# Patient Record
Sex: FEMALE | Race: WHITE | NOT HISPANIC OR LATINO | Employment: OTHER | URBAN - METROPOLITAN AREA
[De-identification: names, ages, dates, MRNs, and addresses within clinical notes are randomized per-mention and may not be internally consistent; named-entity substitution may affect disease eponyms.]

---

## 2017-03-03 ENCOUNTER — PATIENT OUTREACH (OUTPATIENT)
Dept: HEALTH INFORMATION MANAGEMENT | Facility: OTHER | Age: 72
End: 2017-03-03

## 2017-03-17 NOTE — PROGRESS NOTES
3/17/17  -  Verify PCP: yes    Communication Preference Obtained: yes     Annual Wellness Visit Scheduling  1. Scheduling Status:Scheduled     Care Gap Scheduling (Attempt to Schedule EACH Overdue Care Gap!)     Health Maintenance Due   Topic Date Due   • Annual Wellness Visit  Scheduled   • PFT SCREENING-FEV1 AND FEV/FVC RATIO / SPIROMETRY SHOULD BE PERFORMED ANNUALLY  03/18/1963   • PAP SMEAR  Will discuss care gaps with PCP   • BONE DENSITY           [employer] Activation: already active  [employer]: yes  Virtual Visits: yes  Opt In to Text Messages: yes

## 2017-03-30 ENCOUNTER — HOSPITAL ENCOUNTER (OUTPATIENT)
Dept: LAB | Facility: MEDICAL CENTER | Age: 72
End: 2017-03-30
Attending: INTERNAL MEDICINE
Payer: MEDICARE

## 2017-03-30 DIAGNOSIS — E78.5 HYPERLIPIDEMIA WITH TARGET LDL LESS THAN 130: ICD-10-CM

## 2017-03-30 DIAGNOSIS — I10 ESSENTIAL HYPERTENSION: ICD-10-CM

## 2017-03-30 LAB
ALBUMIN SERPL BCP-MCNC: 4.1 G/DL (ref 3.2–4.9)
ALBUMIN/GLOB SERPL: 1.2 G/DL
ALP SERPL-CCNC: 70 U/L (ref 30–99)
ALT SERPL-CCNC: 19 U/L (ref 2–50)
ANION GAP SERPL CALC-SCNC: 5 MMOL/L (ref 0–11.9)
AST SERPL-CCNC: 25 U/L (ref 12–45)
BASOPHILS # BLD AUTO: 0.02 K/UL (ref 0–0.12)
BASOPHILS NFR BLD AUTO: 0.2 % (ref 0–1.8)
BILIRUB SERPL-MCNC: 0.6 MG/DL (ref 0.1–1.5)
BUN SERPL-MCNC: 17 MG/DL (ref 8–22)
CALCIUM SERPL-MCNC: 9.6 MG/DL (ref 8.5–10.5)
CHLORIDE SERPL-SCNC: 101 MMOL/L (ref 96–112)
CHOLEST SERPL-MCNC: 168 MG/DL (ref 100–199)
CO2 SERPL-SCNC: 29 MMOL/L (ref 20–33)
CREAT SERPL-MCNC: 0.82 MG/DL (ref 0.5–1.4)
EOSINOPHIL # BLD: 0.08 K/UL (ref 0–0.51)
EOSINOPHIL NFR BLD AUTO: 0.9 % (ref 0–6.9)
ERYTHROCYTE [DISTWIDTH] IN BLOOD BY AUTOMATED COUNT: 42.6 FL (ref 35.9–50)
GLOBULIN SER CALC-MCNC: 3.3 G/DL (ref 1.9–3.5)
GLUCOSE SERPL-MCNC: 104 MG/DL (ref 65–99)
HCT VFR BLD AUTO: 47.9 % (ref 37–47)
HDLC SERPL-MCNC: 53 MG/DL
HGB BLD-MCNC: 15.4 G/DL (ref 12–16)
IMM GRANULOCYTES # BLD AUTO: 0.02 K/UL (ref 0–0.11)
IMM GRANULOCYTES NFR BLD AUTO: 0.2 % (ref 0–0.9)
LDLC SERPL CALC-MCNC: 87 MG/DL
LYMPHOCYTES # BLD: 2.12 K/UL (ref 1–4.8)
LYMPHOCYTES NFR BLD AUTO: 22.8 % (ref 22–41)
MCH RBC QN AUTO: 29.7 PG (ref 27–33)
MCHC RBC AUTO-ENTMCNC: 32.2 G/DL (ref 33.6–35)
MCV RBC AUTO: 92.3 FL (ref 81.4–97.8)
MONOCYTES # BLD: 0.58 K/UL (ref 0–0.85)
MONOCYTES NFR BLD AUTO: 6.2 % (ref 0–13.4)
NEUTROPHILS # BLD: 6.49 K/UL (ref 2–7.15)
NEUTROPHILS NFR BLD AUTO: 69.7 % (ref 44–72)
NRBC # BLD AUTO: 0 K/UL
NRBC BLD-RTO: 0 /100 WBC
PLATELET # BLD AUTO: 173 K/UL (ref 164–446)
PMV BLD AUTO: 9.6 FL (ref 9–12.9)
POTASSIUM SERPL-SCNC: 4.3 MMOL/L (ref 3.6–5.5)
PROT SERPL-MCNC: 7.4 G/DL (ref 6–8.2)
RBC # BLD AUTO: 5.19 M/UL (ref 4.2–5.4)
SODIUM SERPL-SCNC: 135 MMOL/L (ref 135–145)
TRIGL SERPL-MCNC: 138 MG/DL (ref 0–149)
WBC # BLD AUTO: 9.3 K/UL (ref 4.8–10.8)

## 2017-03-30 PROCEDURE — 80053 COMPREHEN METABOLIC PANEL: CPT

## 2017-03-30 PROCEDURE — 36415 COLL VENOUS BLD VENIPUNCTURE: CPT

## 2017-03-30 PROCEDURE — 80061 LIPID PANEL: CPT

## 2017-03-30 PROCEDURE — 85025 COMPLETE CBC W/AUTO DIFF WBC: CPT

## 2017-04-04 ENCOUNTER — OFFICE VISIT (OUTPATIENT)
Dept: MEDICAL GROUP | Age: 72
End: 2017-04-04
Payer: MEDICARE

## 2017-04-04 VITALS
HEART RATE: 66 BPM | DIASTOLIC BLOOD PRESSURE: 92 MMHG | HEIGHT: 62 IN | SYSTOLIC BLOOD PRESSURE: 172 MMHG | BODY MASS INDEX: 39.01 KG/M2 | OXYGEN SATURATION: 94 % | WEIGHT: 212 LBS | TEMPERATURE: 98 F

## 2017-04-04 DIAGNOSIS — E55.9 VITAMIN D DEFICIENCY DISEASE: ICD-10-CM

## 2017-04-04 DIAGNOSIS — G89.4 CHRONIC PAIN SYNDROME: ICD-10-CM

## 2017-04-04 DIAGNOSIS — Z98.890 S/P COLONOSCOPY: ICD-10-CM

## 2017-04-04 DIAGNOSIS — E78.5 HYPERLIPIDEMIA WITH TARGET LDL LESS THAN 130: ICD-10-CM

## 2017-04-04 DIAGNOSIS — E66.9 OBESITY (BMI 30-39.9): ICD-10-CM

## 2017-04-04 DIAGNOSIS — F51.01 PRIMARY INSOMNIA: ICD-10-CM

## 2017-04-04 DIAGNOSIS — I10 ESSENTIAL HYPERTENSION: ICD-10-CM

## 2017-04-04 PROCEDURE — G8419 CALC BMI OUT NRM PARAM NOF/U: HCPCS | Performed by: INTERNAL MEDICINE

## 2017-04-04 PROCEDURE — 1036F TOBACCO NON-USER: CPT | Performed by: INTERNAL MEDICINE

## 2017-04-04 PROCEDURE — 3014F SCREEN MAMMO DOC REV: CPT | Performed by: INTERNAL MEDICINE

## 2017-04-04 PROCEDURE — 1101F PT FALLS ASSESS-DOCD LE1/YR: CPT | Performed by: INTERNAL MEDICINE

## 2017-04-04 PROCEDURE — 99214 OFFICE O/P EST MOD 30 MIN: CPT | Performed by: INTERNAL MEDICINE

## 2017-04-04 PROCEDURE — G8432 DEP SCR NOT DOC, RNG: HCPCS | Performed by: INTERNAL MEDICINE

## 2017-04-04 PROCEDURE — 4040F PNEUMOC VAC/ADMIN/RCVD: CPT | Performed by: INTERNAL MEDICINE

## 2017-04-04 RX ORDER — ZOLPIDEM TARTRATE 10 MG/1
TABLET ORAL
Refills: 0 | COMMUNITY
Start: 2017-03-17 | End: 2020-01-06 | Stop reason: SDUPTHER

## 2017-04-04 RX ORDER — LISINOPRIL 10 MG/1
10 TABLET ORAL DAILY
Qty: 90 TAB | Refills: 4 | Status: SHIPPED | OUTPATIENT
Start: 2017-04-04 | End: 2017-08-22

## 2017-04-04 RX ORDER — LISINOPRIL 10 MG/1
10 TABLET ORAL DAILY
Qty: 90 TAB | Refills: 4 | Status: SHIPPED | OUTPATIENT
Start: 2017-04-04 | End: 2017-04-04 | Stop reason: SDUPTHER

## 2017-04-04 RX ORDER — HYDROCODONE BITARTRATE AND ACETAMINOPHEN 10; 325 MG/1; MG/1
TABLET ORAL
Refills: 0 | COMMUNITY
Start: 2017-03-17 | End: 2020-08-14

## 2017-04-04 ASSESSMENT — ENCOUNTER SYMPTOMS
RESPIRATORY NEGATIVE: 1
CONSTITUTIONAL NEGATIVE: 1
EYES NEGATIVE: 1
PSYCHIATRIC NEGATIVE: 1
GASTROINTESTINAL NEGATIVE: 1
MUSCULOSKELETAL NEGATIVE: 1
CARDIOVASCULAR NEGATIVE: 1
NEUROLOGICAL NEGATIVE: 1

## 2017-04-04 ASSESSMENT — PATIENT HEALTH QUESTIONNAIRE - PHQ9: CLINICAL INTERPRETATION OF PHQ2 SCORE: 0

## 2017-04-04 NOTE — PROGRESS NOTES
Subjective:      Rufina Macias is a 72 y.o. female who presents with Results  The patient is here for followup of chronic medical problems listed below. The patient is compliant with medications and having no side effects from them. Denies chest pain, abdominal pain, dyspnea, myalgias, or cough.   Patient Active Problem List    Diagnosis Date Noted   • Obesity (BMI 30-39.9) 04/04/2017   • Chronic pain syndrome- dr medrano 04/04/2017   • S/P colonoscopy- neg 2006 at [facility] 03/23/2016   • Controlled substance agreement signed 03/22/2016   • Essential hypertension 08/31/2015   • DDD (degenerative disc disease), cervical 04/07/2015   • Thoracic radiculopathy 03/09/2015   • Lumbar radiculopathy 03/09/2015   • Vitamin D deficiency disease 10/11/2013   • DDD (degenerative disc disease), lumbar 10/11/2013   • Chronic allergic rhinitis 10/11/2013   • Primary insomnia 11/29/2012   • History of endometrial cancer-2009 JORDON-BSO; neg nodes 04/16/2012   • DJD (degenerative joint disease)- hands 04/16/2012   • COPD (chronic obstructive pulmonary disease) (CMS-[facility]) 11/07/2011   • Hyperlipidemia with target LDL less than 130 10/28/2010   • Carpal tunnel syndrome      Allergies   Allergen Reactions   • Codeine Swelling   • Ace Inhibitors      Cough            Outpatient Prescriptions Prior to Visit   Medication Sig Dispense Refill   • simvastatin (ZOCOR) 20 MG Tab Take 0.25 Tabs by mouth every evening. 90 Tab 4   • metoprolol (TOPROL-XL) 200 MG XL tablet Take 1 Tab by mouth every day. 90 Tab 4   • Cholecalciferol (VITAMIN D) 2000 UNITS Cap Take 1 Cap by mouth every day. 90 Cap 4   • fluticasone (FLONASE) 50 MCG/ACT nasal spray Spray 1 Spray in nose every day. Each Nostril 1 Bottle 11   • potassium chloride SA (K-DUR) 20 MEQ Tab CR Take 1 Tab by mouth 2 times a day. 180 Tab 4   • hydrochlorothiazide (HYDRODIURIL) 25 MG Tab Take 0.5 Tabs by mouth every day. 90 Tab 4   • albuterol (VENTOLIN OR PROVENTIL) 108 (90 BASE) MCG/ACT Aero Soln  "inhalation aerosol Inhale 2 Puffs by mouth every 6 hours as needed for Shortness of Breath. 8.5 g 3     No facility-administered medications prior to visit.               HPI    Review of Systems   Constitutional: Negative.    HENT: Negative.    Eyes: Negative.    Respiratory: Negative.    Cardiovascular: Negative.    Gastrointestinal: Negative.    Genitourinary: Negative.    Musculoskeletal: Negative.    Skin: Negative.    Neurological: Negative.    Endo/Heme/Allergies: Negative.    Psychiatric/Behavioral: Negative.           Objective:     /92 mmHg  Pulse 66  Temp(Src) 36.7 °C (98 °F)  Ht 1.581 m (5' 2.24\")  Wt 96.163 kg (212 lb)  BMI 38.47 kg/m2  SpO2 94%     Physical Exam   Constitutional: She is oriented to person, place, and time. She appears well-developed and well-nourished.   HENT:   Head: Normocephalic and atraumatic.   Right Ear: External ear normal.   Left Ear: External ear normal.   Nose: Nose normal.   Mouth/Throat: Oropharynx is clear and moist.   Eyes: Conjunctivae and EOM are normal. Pupils are equal, round, and reactive to light. Right eye exhibits no discharge. Left eye exhibits no discharge. No scleral icterus.   Neck: Normal range of motion. Neck supple. No JVD present. No tracheal deviation present. No thyromegaly present.   Cardiovascular: Normal rate, regular rhythm, normal heart sounds and intact distal pulses.  Exam reveals no gallop and no friction rub.    Pulmonary/Chest: Effort normal and breath sounds normal. No stridor. No respiratory distress. She has no wheezes. She has no rales. She exhibits no tenderness.   Abdominal: Soft. Bowel sounds are normal. She exhibits no distension and no mass. There is no tenderness. There is no rebound and no guarding. No hernia.   Musculoskeletal: Normal range of motion. She exhibits no edema or tenderness.   Lymphadenopathy:     She has no cervical adenopathy.   Neurological: She is alert and oriented to person, place, and time. She has " normal reflexes. She displays normal reflexes. No cranial nerve deficit. Coordination normal.   Skin: Skin is warm and dry. No rash noted. No erythema. No pallor.   Psychiatric: She has a normal mood and affect. Her behavior is normal. Judgment and thought content normal.   Nursing note and vitals reviewed.    Hospital Outpatient Visit on 03/30/2017   Component Date Value   • Cholesterol,Tot 03/30/2017 168    • Triglycerides 03/30/2017 138    • HDL 03/30/2017 53    • LDL 03/30/2017 87    • Sodium 03/30/2017 135    • Potassium 03/30/2017 4.3    • Chloride 03/30/2017 101    • Co2 03/30/2017 29    • Anion Gap 03/30/2017 5.0    • Glucose 03/30/2017 104*   • Bun 03/30/2017 17    • Creatinine 03/30/2017 0.82    • Calcium 03/30/2017 9.6    • AST(SGOT) 03/30/2017 25    • ALT(SGPT) 03/30/2017 19    • Alkaline Phosphatase 03/30/2017 70    • Total Bilirubin 03/30/2017 0.6    • Albumin 03/30/2017 4.1    • Total Protein 03/30/2017 7.4    • Globulin 03/30/2017 3.3    • A-G Ratio 03/30/2017 1.2    • WBC 03/30/2017 9.3    • RBC 03/30/2017 5.19    • Hemoglobin 03/30/2017 15.4    • Hematocrit 03/30/2017 47.9*   • MCV 03/30/2017 92.3    • MCH 03/30/2017 29.7    • MCHC 03/30/2017 32.2*   • RDW 03/30/2017 42.6    • Platelet Count 03/30/2017 173    • MPV 03/30/2017 9.6    • Neutrophils-Polys 03/30/2017 69.70    • Lymphocytes 03/30/2017 22.80    • Monocytes 03/30/2017 6.20    • Eosinophils 03/30/2017 0.90    • Basophils 03/30/2017 0.20    • Immature Granulocytes 03/30/2017 0.20    • Nucleated RBC 03/30/2017 0.00    • Neutrophils (Absolute) 03/30/2017 6.49    • Lymphs (Absolute) 03/30/2017 2.12    • Monos (Absolute) 03/30/2017 0.58    • Eos (Absolute) 03/30/2017 0.08    • Baso (Absolute) 03/30/2017 0.02    • Immature Granulocytes (a* 03/30/2017 0.02    • NRBC (Absolute) 03/30/2017 0.00    • GFR If  03/30/2017 >60    • GFR If Non  [employer]* 03/30/2017 >60       No results found for: HBA1C  Lab Results   Component Value  Date/Time    SODIUM 135 03/30/2017 10:58 AM    POTASSIUM 4.3 03/30/2017 10:58 AM    CHLORIDE 101 03/30/2017 10:58 AM    CO2 29 03/30/2017 10:58 AM    GLUCOSE 104* 03/30/2017 10:58 AM    BUN 17 03/30/2017 10:58 AM    CREATININE 0.82 03/30/2017 10:58 AM    BUN-CREATININE RATIO 20 12/02/2010 12:00 AM    GLOM FILT RATE, EST >59 12/02/2010 12:00 AM    ALKALINE PHOSPHATASE 70 03/30/2017 10:58 AM    AST(SGOT) 25 03/30/2017 10:58 AM    ALT(SGPT) 19 03/30/2017 10:58 AM    TOTAL BILIRUBIN 0.6 03/30/2017 10:58 AM     No results found for: INR  Lab Results   Component Value Date/Time    CHOLESTEROL, 03/30/2017 10:58 AM    LDL 87 03/30/2017 10:58 AM    HDL 53 03/30/2017 10:58 AM    TRIGLYCERIDES 138 03/30/2017 10:58 AM       No results found for: TESTOSTERONE  Lab Results   Component Value Date/Time    TSH 1.640 12/02/2010 12:00 AM     Lab Results   Component Value Date/Time    FREE T-4 0.90 10/24/2014 11:56 AM    FREE T-4 0.91 06/13/2013 12:30 PM     No results found for: URICACID  No components found for: VITB12  Lab Results   Component Value Date/Time    25-HYDROXY   VITAMIN D 25 39 09/20/2016 11:36 AM    25-HYDROXY   VITAMIN D 25 39 10/24/2014 11:56 AM                    Assessment/Plan:     1. Essential hypertensionUnder good control. Continue same regimen.   - lisinopril (PRINIVIL) 10 MG Tab; Take 1 Tab by mouth every day. Replaces hctz and potasium  Dispense: 90 Tab; Refill: 4    2. Hyperlipidemia with target LDL less than 130Under good control. Continue same regimen.   - COMP METABOLIC PANEL; Future  - LIPID PROFILE; Future  - CBC WITH DIFFERENTIAL; Future    3. Vitamin D deficiency diseaseUnder good control. Continue same regimen.       4. Obesity (BMI 30-39.9)   diet/exercise/lose 15 lbs.; patient counseled       5. Primary insomniaUnder good control. Continue same regimen.      - zolpidem (AMBIEN) 10 MG Tab; TAKE ONE TABLET BY MOUTH DAILY AT BEDTIME 30 DAY SUPPLY; Refill: 0    6. S/P colonoscopy- neg 2006 at  [facility]    Get and link last colonoscopy report  FIT neg 6 mos ago    7. Chronic pain syndrome- dr medrano  Under good control. Continue same regimen.           - hydrocodone/acetaminophen (NORCO)  MG Tab; TAKE ONE TABLET BY MOUTH FOUR TIMES A DAY AS NEEDED 30 DAY SUPPLY; Refill: 0      30 minute face-to-face encounter took place today.  More than half of this time was spent in the coordination of care of the above problems, as well as counseling.

## 2017-04-04 NOTE — Clinical Note
[facility]  Quincy Angel M.D.  [address]  [place] [address]  Fax: [phone]   Authorization for Release/Disclosure of   Protected Health Information   Name: LUANN MACIAS : 1945 SSN: [id number]   Address: [address] [place] [address] Phone:    [phone] (home)    I authorize the entity listed below to release/disclose the PHI below to:   [facility]/Quincy Angel M.D. and Quincy Angel M.D.   Provider or Entity Name: [facility]     Address   City, State, Zip   Phone:      Fax:     Reason for request: continuity of care   Information to be released:    [x  ] LAST COLONOSCOPY,  including any PATH REPORT and follow-up  [  ] LAST FIT/COLOGUARD RESULT [  ] LAST DEXA  [  ] LAST MAMMOGRAM  [  ] LAST PAP  [  ] LAST LABS [  ] RETINA EXAM REPORT  [  ] IMMUNIZATION RECORDS  [  ] Release all info      [  ] Check here and initial the line next to each item to release ALL health information INCLUDING  _____ Care and treatment for drug and / or alcohol abuse  _____ HIV testing, infection status, or AIDS  _____ Genetic Testing    DATES OF SERVICE OR TIME PERIOD TO BE DISCLOSED: _____________  I understand and acknowledge that:  * This Authorization may be revoked at any time by you in writing, except if your health information has already been used or disclosed.  * Your health information that will be used or disclosed as a result of you signing this authorization could be re-disclosed by the recipient. If this occurs, your re-disclosed health information may no longer be protected by State or Federal laws.  * You may refuse to sign this Authorization. Your refusal will not affect your ability to obtain treatment.  * This Authorization becomes effective upon signing and will  on (date) __________.      If no date is indicated, this Authorization will  one (1) year from the signature date.    Name: Luann Macias    Signature:   Date:     2017       PLEASE FAX REQUESTED RECORDS  BACK TO: [phone]

## 2017-04-04 NOTE — MR AVS SNAPSHOT
"        Rufina Macias   2017 2:20 PM   Office Visit   MRN: [id number]    Department:  [address] [place] [address]   Dept Phone:  [phone]    Description:  Female : 1945   Provider:  Quincy Angel M.D.           Reason for Visit     Results Lab review, Hypertension, Hyperlipidemia      Allergies as of 2017     Allergen Noted Reactions    Codeine 2010   Swelling    Ace Inhibitors 2012       Cough      You were diagnosed with     Essential hypertension   [4210457]       Hyperlipidemia with target LDL less than 130   [014138]       Vitamin D deficiency disease   [388850]       Obesity (BMI 30-39.9)   [436122]         Vital Signs     Blood Pressure Pulse Temperature Height Weight Body Mass Index    172/92 mmHg 66 36.7 °C (98 °F) 1.581 m (5' 2.24\") 96.163 kg (212 lb) 38.47 kg/m2    Oxygen Saturation Smoking Status                94% Former Smoker          Basic Information     Date Of Birth Sex Race Ethnicity Preferred Language    1945 Female White Non- English      Your appointments     May 02, 2017  2:00 PM   ANNUAL WELLNESS with Quincy Angel M.D., [facility]    [address] [address] [phone]            Oct 06, 2017  9:40 AM   Established Patient with Quincy Angel M.D.   [address] [id number] [phone]           You will be receiving a confirmation call a few days before your appointment from our automated call confirmation system.              Problem List              ICD-10-CM Priority Class Noted - Resolved    Carpal tunnel syndrome G56.00   Unknown - Present    Hyperlipidemia with target LDL less than 130 E78.5   10/28/2010 - Present    COPD (chronic obstructive pulmonary disease) (CMS-HCC) J44.9   2011 - Present    History of endometrial cancer- JORDON-BSO; neg nodes Z85.42   2012 - Present    DJD " (degenerative joint disease)- hands M19.90   4/16/2012 - Present    Insomnia G47.00   11/29/2012 - Present    Vitamin D deficiency disease E55.9   10/11/2013 - Present    DDD (degenerative disc disease), lumbar M51.36   10/11/2013 - Present    Chronic allergic rhinitis J30.9   10/11/2013 - Present    Thoracic radiculopathy M54.14   3/9/2015 - Present    Lumbar radiculopathy M54.16   3/9/2015 - Present    DDD (degenerative disc disease), cervical M50.30   4/7/2015 - Present    Essential hypertension I10   8/31/2015 - Present    Controlled substance agreement signed Z79.899   3/22/2016 - Present    S/P colonoscopy- neg 2006 at [facility] Z98.890   3/23/2016 - Present    Obesity (BMI 30-39.9) E66.9   4/4/2017 - Present      Health Maintenance        Date Due Completion Dates    PAP SMEAR 3/18/1966 ---    BONE DENSITY 3/18/2010 ---    MAMMOGRAM 10/24/2017 10/24/2016, 10/2/2015, 9/11/2014, 8/7/2013, 8/7/2013, 8/7/2013, 11/30/2006    COLONOSCOPY 4/16/2019 4/16/2009 (N/S)    Override on 4/16/2009: (N/S)    IMM DTaP/Tdap/Td Vaccine (2 - Td) 6/25/2025 6/25/2015            Current Immunizations     13-VALENT PCV PREVNAR 3/23/2016 12:30 PM    Influenza TIV (IM) 10/11/2013, 10/29/2012    Influenza Vaccine Adult HD 10/4/2016 10:44 AM    Pneumococcal polysaccharide vaccine (PPSV-23) 8/31/2012    SHINGLES VACCINE 10/15/2013    Tdap Vaccine 6/25/2015      Below and/or attached are the medications your provider expects you to take. Review all of your home medications and newly ordered medications with your provider and/or pharmacist. Follow medication instructions as directed by your provider and/or pharmacist. Please keep your medication list with you and share with your provider. Update the information when medications are discontinued, doses are changed, or new medications (including over-the-counter products) are added; and carry medication information at all times in the event of emergency situations     Allergies:  CODEINE - Swelling      ACE INHIBITORS - (reactions not documented)               Medications  Valid as of: April 04, 2017 -  3:13 PM    Generic Name Brand Name Tablet Size Instructions for use    Albuterol Sulfate (Aero Soln) albuterol 108 (90 BASE) MCG/ACT Inhale 2 Puffs by mouth every 6 hours as needed for Shortness of Breath.        Cholecalciferol (Cap) Vitamin D 2000 UNITS Take 1 Cap by mouth every day.        Fluticasone Propionate (Suspension) FLONASE 50 MCG/ACT Spray 1 Spray in nose every day. Each Nostril        Hydrocodone-Acetaminophen (Tab) NORCO  MG TAKE ONE TABLET BY MOUTH FOUR TIMES A DAY AS NEEDED 30 DAY SUPPLY        Lisinopril (Tab) PRINIVIL 10 MG Take 1 Tab by mouth every day. Replaces hctz and potasium        Metoprolol Succinate (TABLET SR 24 HR) TOPROL- MG Take 1 Tab by mouth every day.        Simvastatin (Tab) ZOCOR 20 MG Take 0.25 Tabs by mouth every evening.        Zolpidem Tartrate (Tab) AMBIEN 10 MG TAKE ONE TABLET BY MOUTH DAILY AT BEDTIME 30 DAY SUPPLY        .                 Medicines prescribed today were sent to:     [id number] - [place] - [address]    [address] [facility] [address]    Phone: [phone] Fax: [phone]    Open 24 Hours?: No    [facility] - [place] - [address]    [address] [facility] [address]    Phone: [phone] Fax: [phone]    Open 24 Hours?: No      Medication refill instructions:       If your prescription bottle indicates you have medication refills left, it is not necessary to call your provider’s office. Please contact your pharmacy and they will refill your medication.    If your prescription bottle indicates you do not have any refills left, you may request refills at any time through one of the following ways: The online [employer] system (except Urgent Care), by calling your provider’s office, or by asking your pharmacy to contact your provider’s office with a refill request. Medication  refills are processed only during regular business hours and may not be available until the next business day. Your provider may request additional information or to have a follow-up visit with you prior to refilling your medication.   *Please Note: Medication refills are assigned a new Rx number when refilled electronically. Your pharmacy may indicate that no refills were authorized even though a new prescription for the same medication is available at the pharmacy. Please request the medicine by name with the pharmacy before contacting your provider for a refill.        Your To Do List     Future Labs/Procedures Complete By Expires    CBC WITH DIFFERENTIAL  As directed 4/4/2018    COMP METABOLIC PANEL  As directed 4/4/2018    LIPID PROFILE  As directed 4/4/2018         MyChart Access Code: Activation code not generated  Current [employer] Status: Active

## 2017-04-25 ENCOUNTER — TELEPHONE (OUTPATIENT)
Dept: MEDICAL GROUP | Age: 72
End: 2017-04-25

## 2017-04-25 NOTE — TELEPHONE ENCOUNTER
ANNUAL WELLNESS VISIT PRE-VISIT PLANNING     1.  Reviewed note from last office visit with PCP: YES    2.  If any orders were placed at last visit, do we have Results/Consult Notes?        •  Labs - Labs ordered, completed and results are in chart.       •  Imaging - Imaging ordered, completed and results are in chart.       •  Referrals - No referrals were ordered at last office visit.    3.  Immunizations were updated in [facility] using WebIZ?: Yes       •  WebIZ Recommendations: HEPATITIS A , HEPATITIS B and TD       •  Is patient due for Tdap? NO       •  Is patient due for Shingles? NO     4.  Patient is due for the following Health Maintenance Topics:   Health Maintenance Due   Topic Date Due   • Annual Wellness Visit  1945   • PFT SCREENING-FEV1 AND FEV/FVC RATIO / SPIROMETRY SHOULD BE PERFORMED ANNUALLY  03/18/1963   • PAP SMEAR  03/18/1966   • BONE DENSITY  03/18/2010       - Patient has completed FLU, PNEUMOVAX (PPSV23), PREVNAR (PCV13) , TDAP and ZOSTAVAX (Shingles) Immunization(s) per WebIZ. Chart has been updated.    5.  Reviewed/Updated the following with patient:       •   Preferred Pharmacy? YES       •   Preferred Lab? YES       •   Medications? YES. Was Abstract Encounter opened and chart updated? YES       •   Social History? YES. Was Abstract Encounter opened and chart updated? YES       •   Family History? YES. Was Abstract Encounter opened and chart updated? YES    6.  Care Team Updated:       •   DME Company (gait device, O2, CPAP, etc.): N\A       •   Other Specialists (eye doctor, derm, GYN, cardiology, endo, etc): YES    7.  Patient has the following Care Path diagnoses on Problem List:  COPD    1.  Is patient under the care of a pulmonologist? No.  2.  Has patient ever completed a PFT or spirometry? No.  3.  Is patient on oxygen or CPAP? No.  4.  Has patient ever had instruction on inhaler technique by health care professional? No  5.  Is patient interested in a referral to respiratory  therapy for more information on COPD, inhaler technique, and/or information on establishing an action plan?  No, patient is NOT interested.      8.  Specialty Comments was updated with diagnosis information provided by SCP: N\A    9.  Patient was advised: “This is a free wellness visit. The provider will screen for medical conditions to help you stay healthy. If you have other concerns to address you may be asked to discuss these at a separate visit or there may be an additional fee.”     10.  Patient was informed to arrive 15 min prior to their scheduled appointment and bring in their medication bottles?  YES

## 2017-06-02 DIAGNOSIS — Z12.11 SCREEN FOR COLON CANCER: ICD-10-CM

## 2017-06-02 RX ORDER — SIMVASTATIN 20 MG
TABLET ORAL
Qty: 90 TAB | Refills: 4 | Status: SHIPPED | OUTPATIENT
Start: 2017-06-02 | End: 2018-09-17 | Stop reason: SDUPTHER

## 2017-06-02 NOTE — PROGRESS NOTES
Phone Number Called: [phone] (home)     Message: Patient stated she cannot have a colonoscopy done at this time due to her taking care of family members. But is aware that order is in place for when she is able to get it done.    Left Message for patient to call back: no

## 2017-06-14 ENCOUNTER — TELEPHONE (OUTPATIENT)
Dept: MEDICAL GROUP | Age: 72
End: 2017-06-14

## 2017-06-14 NOTE — TELEPHONE ENCOUNTER
ANNUAL WELLNESS VISIT PRE-VISIT PLANNING     1.  Reviewed note from last office visit with PCP: YES    2.  If any orders were placed at last visit, do we have Results/Consult Notes?         •  Labs - Labs ordered, completed and results are in chart.       •  Imaging - Imaging ordered, completed and results are in chart.       •  Referrals - No referrals were ordered at last office visit.    3.  Immunizations were updated in [facility] using WebIZ?: Yes       •  WebIZ Recommendations: HEPATITIS A , HEPATITIS B and TD       •  Is patient due for Tdap? NO       •  Is patient due for Shingles? NO     4.  Patient is due for the following Health Maintenance Topics:    Health Maintenance Due    Topic  Date Due    •  Annual Wellness Visit   1945    •  PFT SCREENING-FEV1 AND FEV/FVC RATIO / SPIROMETRY SHOULD BE PERFORMED ANNUALLY   03/18/1963    •  PAP SMEAR   03/18/1966    •  BONE DENSITY   03/18/2010        - Patient has completed FLU, PNEUMOVAX (PPSV23), PREVNAR (PCV13) , TDAP and ZOSTAVAX (Shingles) Immunization(s) per WebIZ. Chart has been updated.    5.  Reviewed/Updated the following with patient:       •   Preferred Pharmacy? YES       •   Preferred Lab? YES       •   Medications? YES. Was Abstract Encounter opened and chart updated? YES       •   Social History? YES. Was Abstract Encounter opened and chart updated? YES       •   Family History? YES. Was Abstract Encounter opened and chart updated? YES    6.  Care Team Updated:       •   DME Company (gait device, O2, CPAP, etc.): N\A       •   Other Specialists (eye doctor, derm, GYN, cardiology, endo, etc): YES    7.  Patient has the following Care Path diagnoses on Problem List:  COPD    1.  Is patient under the care of a pulmonologist? No.  2.  Has patient ever completed a PFT or spirometry? No.  3.  Is patient on oxygen or CPAP? No.  4.  Has patient ever had instruction on inhaler technique by health care professional? No  5.  Is patient interested in a referral  to respiratory therapy for more information on COPD, inhaler technique, and/or information on establishing an action plan?  No, patient is NOT interested.      8.  Specialty Comments was updated with diagnosis information provided by SCP: N\A    9.  Patient was advised: “This is a free wellness visit. The provider will screen for medical conditions to help you stay healthy. If you have other concerns to address you may be asked to discuss these at a separate visit or there may be an additional fee.”     10.  Patient was informed to arrive 15 min prior to their scheduled appointment and bring in their medication bottles?  YES

## 2017-06-21 ENCOUNTER — OFFICE VISIT (OUTPATIENT)
Dept: MEDICAL GROUP | Age: 72
End: 2017-06-21
Payer: MEDICARE

## 2017-06-21 VITALS
BODY MASS INDEX: 40.22 KG/M2 | WEIGHT: 213 LBS | SYSTOLIC BLOOD PRESSURE: 160 MMHG | OXYGEN SATURATION: 91 % | TEMPERATURE: 98.8 F | HEART RATE: 82 BPM | HEIGHT: 61 IN | DIASTOLIC BLOOD PRESSURE: 92 MMHG

## 2017-06-21 DIAGNOSIS — E55.9 VITAMIN D DEFICIENCY DISEASE: ICD-10-CM

## 2017-06-21 DIAGNOSIS — M51.36 DDD (DEGENERATIVE DISC DISEASE), LUMBAR: ICD-10-CM

## 2017-06-21 DIAGNOSIS — G89.4 CHRONIC PAIN SYNDROME: ICD-10-CM

## 2017-06-21 DIAGNOSIS — Z85.42 HISTORY OF ENDOMETRIAL CANCER: ICD-10-CM

## 2017-06-21 DIAGNOSIS — G56.01 CARPAL TUNNEL SYNDROME OF RIGHT WRIST: ICD-10-CM

## 2017-06-21 DIAGNOSIS — E66.9 OBESITY (BMI 30-39.9): ICD-10-CM

## 2017-06-21 DIAGNOSIS — M54.14 THORACIC RADICULOPATHY: ICD-10-CM

## 2017-06-21 DIAGNOSIS — I10 ESSENTIAL HYPERTENSION: ICD-10-CM

## 2017-06-21 DIAGNOSIS — J30.9 CHRONIC ALLERGIC RHINITIS: ICD-10-CM

## 2017-06-21 DIAGNOSIS — J43.2 CENTRILOBULAR EMPHYSEMA (HCC): ICD-10-CM

## 2017-06-21 DIAGNOSIS — M15.9 PRIMARY OSTEOARTHRITIS INVOLVING MULTIPLE JOINTS: ICD-10-CM

## 2017-06-21 DIAGNOSIS — E78.5 HYPERLIPIDEMIA WITH TARGET LDL LESS THAN 130: ICD-10-CM

## 2017-06-21 DIAGNOSIS — Z12.11 COLON CANCER SCREENING: ICD-10-CM

## 2017-06-21 DIAGNOSIS — F51.01 PRIMARY INSOMNIA: ICD-10-CM

## 2017-06-21 DIAGNOSIS — R06.09 DOE (DYSPNEA ON EXERTION): ICD-10-CM

## 2017-06-21 DIAGNOSIS — M54.16 LUMBAR RADICULOPATHY: ICD-10-CM

## 2017-06-21 DIAGNOSIS — M50.30 DDD (DEGENERATIVE DISC DISEASE), CERVICAL: ICD-10-CM

## 2017-06-21 PROCEDURE — G0439 PPPS, SUBSEQ VISIT: HCPCS | Performed by: INTERNAL MEDICINE

## 2017-06-21 RX ORDER — LOSARTAN POTASSIUM 50 MG/1
50 TABLET ORAL DAILY
Qty: 90 TAB | Refills: 4 | Status: SHIPPED | OUTPATIENT
Start: 2017-06-21 | End: 2017-06-21 | Stop reason: SDUPTHER

## 2017-06-21 RX ORDER — LOSARTAN POTASSIUM 50 MG/1
50 TABLET ORAL DAILY
Qty: 90 TAB | Refills: 4 | Status: SHIPPED | OUTPATIENT
Start: 2017-06-21 | End: 2017-08-22

## 2017-06-21 ASSESSMENT — PATIENT HEALTH QUESTIONNAIRE - PHQ9: CLINICAL INTERPRETATION OF PHQ2 SCORE: 0

## 2017-06-21 NOTE — MR AVS SNAPSHOT
"        Rufina Macias   2017 11:00 AM   Office Visit   MRN: [id number]    Department:  [address] [place] [address]   Dept Phone:  [phone]    Description:  Female : 1945   Provider:  Quincy Angel M.D.; [facility]            Reason for Visit     Annual Exam AWV/SCP      Allergies as of 2017     Allergen Noted Reactions    Codeine 2010   Swelling    Ace Inhibitors 2012       Cough      You were diagnosed with     Essential hypertension   [0023244]       Carpal tunnel syndrome of right wrist   [655590]       PALMER (dyspnea on exertion)   [445296]       Hyperlipidemia with target LDL less than 130   [458565]       Centrilobular emphysema (CMS-HCC)   [210438]       History of endometrial cancer   [163375]       Primary osteoarthritis involving multiple joints   [1103300]       Primary insomnia   [640303]       Vitamin D deficiency disease   [123734]       DDD (degenerative disc disease), lumbar   [289141]       Chronic allergic rhinitis   [785449]       Obesity (BMI 30-39.9)   [137610]       Chronic pain syndrome   [338.4.ICD-9-CM]       Thoracic radiculopathy   [588088]       Lumbar radiculopathy   [971273]       DDD (degenerative disc disease), cervical   [604191]       Colon cancer screening   [465796]         Vital Signs     Blood Pressure Pulse Temperature Height Weight Body Mass Index    160/92 mmHg 82 37.1 °C (98.8 °F) 1.562 m (5' 1.5\") 96.616 kg (213 lb) 39.60 kg/m2    Oxygen Saturation Smoking Status                91% Former Smoker          Basic Information     Date Of Birth Sex Race Ethnicity Preferred Language    1945 Female White Non- English      Your appointments     Oct 06, 2017  9:40 AM   Established Patient with Quincy Angel M.D.   [address] [address] [phone]           You will be receiving a confirmation call a few days before your appointment from our automated call " confirmation system.              Problem List              ICD-10-CM Priority Class Noted - Resolved    Carpal tunnel syndrome of right wrist G56.01   Unknown - Present    Hyperlipidemia with target LDL less than 130 E78.5   10/28/2010 - Present    COPD (chronic obstructive pulmonary disease) (CMS-HCC) J44.9   11/7/2011 - Present    History of endometrial cancer-2009 JORDON-BSO; neg nodes Z85.42   4/16/2012 - Present    DJD (degenerative joint disease)- hands M19.90   4/16/2012 - Present    Primary insomnia F51.01   11/29/2012 - Present    Vitamin D deficiency disease E55.9   10/11/2013 - Present    DDD (degenerative disc disease), lumbar M51.36   10/11/2013 - Present    Chronic allergic rhinitis J30.9   10/11/2013 - Present    Thoracic radiculopathy M54.14   3/9/2015 - Present    Lumbar radiculopathy M54.16   3/9/2015 - Present    DDD (degenerative disc disease), cervical M50.30   4/7/2015 - Present    Essential hypertension I10   8/31/2015 - Present    Controlled substance agreement signed Z79.899   3/22/2016 - Present    Obesity (BMI 30-39.9) E66.9   4/4/2017 - Present    Chronic pain syndrome- dr medrano; off opiates  G89.4   4/4/2017 - Present    PALMER (dyspnea on exertion) R06.09   6/21/2017 - Present    Colon cancer screening- tbd aug 2017 Z12.11   6/21/2017 - Present      Health Maintenance        Date Due Completion Dates    BONE DENSITY 3/18/2010 ---    COLONOSCOPY 3/5/2015 3/5/2007    MAMMOGRAM 10/24/2018 10/24/2016, 10/2/2015, 9/11/2014, 8/7/2013, 11/30/2006    IMM DTaP/Tdap/Td Vaccine (2 - Td) 6/25/2025 6/25/2015            Current Immunizations     13-VALENT PCV PREVNAR 3/23/2016 12:30 PM    Influenza TIV (IM) 10/11/2013, 10/29/2012    Influenza Vaccine Adult HD 10/4/2016 10:44 AM    Pneumococcal polysaccharide vaccine (PPSV-23) 8/31/2012    SHINGLES VACCINE 10/15/2013    Tdap Vaccine 6/25/2015      Below and/or attached are the medications your provider expects you to take. Review all of your home  medications and newly ordered medications with your provider and/or pharmacist. Follow medication instructions as directed by your provider and/or pharmacist. Please keep your medication list with you and share with your provider. Update the information when medications are discontinued, doses are changed, or new medications (including over-the-counter products) are added; and carry medication information at all times in the event of emergency situations     Allergies:  CODEINE - Swelling     ACE INHIBITORS - (reactions not documented)               Medications  Valid as of: June 21, 2017 -  2:53 PM    Generic Name Brand Name Tablet Size Instructions for use    Albuterol Sulfate (Aero Soln) albuterol 108 (90 BASE) MCG/ACT Inhale 2 Puffs by mouth every 6 hours as needed for Shortness of Breath.        Cholecalciferol (Cap) Vitamin D 2000 UNITS Take 1 Cap by mouth every day.        Fluticasone Propionate (Suspension) FLONASE 50 MCG/ACT Spray 1 Spray in nose every day. Each Nostril        Hydrocodone-Acetaminophen (Tab) NORCO  MG TAKE ONE TABLET BY MOUTH FOUR TIMES A DAY AS NEEDED 30 DAY SUPPLY        Lisinopril (Tab) PRINIVIL 10 MG Take 1 Tab by mouth every day. Replaces hctz and potasium        Losartan Potassium (Tab) COZAAR 50 MG Take 1 Tab by mouth every day. Replaces lisinopril;  for blood pressure        Metoprolol Succinate (TABLET SR 24 HR) TOPROL- MG Take 1 Tab by mouth every day.        Simvastatin (Tab) ZOCOR 20 MG Take 0.25 Tabs by mouth every evening.        Simvastatin (Tab) ZOCOR 20 MG TAKE 0.5 TABS BY MOUTH EVERY EVENING.        Zolpidem Tartrate (Tab) AMBIEN 10 MG TAKE ONE TABLET BY MOUTH DAILY AT BEDTIME 30 DAY SUPPLY        .                 Medicines prescribed today were sent to:     [facility] #[id number] - [place] - [address]    [address] [place] [address]    Phone: [phone] Fax: [phone]    Open 24 Hours?: No    [facility] - [place] - [address]  [facility] [address]    [address] [place] [address]    Phone: [phone] Fax: [phone]    Open 24 Hours?: No      Medication refill instructions:       If your prescription bottle indicates you have medication refills left, it is not necessary to call your provider’s office. Please contact your pharmacy and they will refill your medication.    If your prescription bottle indicates you do not have any refills left, you may request refills at any time through one of the following ways: The online SwapDrive system (except Urgent Care), by calling your provider’s office, or by asking your pharmacy to contact your provider’s office with a refill request. Medication refills are processed only during regular business hours and may not be available until the next business day. Your provider may request additional information or to have a follow-up visit with you prior to refilling your medication.   *Please Note: Medication refills are assigned a new Rx number when refilled electronically. Your pharmacy may indicate that no refills were authorized even though a new prescription for the same medication is available at the pharmacy. Please request the medicine by name with the pharmacy before contacting your provider for a refill.        Your To Do List     Future Labs/Procedures Complete By Expires    ECHOCARDIOGRAM COMP W/O CONT  As directed 12/20/2017    NM-CARDIAC STRESS TEST  As directed 6/21/2018      Referral     A referral request has been sent to our patient care coordination department. Please allow 3-5 business days for us to process this request and contact you either by phone or mail. If you do not hear from us by the 5th business day, please call us at [phone].           SwapDrive Access Code: Activation code not generated  Current SwapDrive Status: Active

## 2017-06-21 NOTE — PROGRESS NOTES
Chief Complaint   Patient presents with   • Annual Exam     AWV/SCP         HPI:  Rufina Macias is a 72 y.o. female here for Medicare Annual Wellness Visit         Patient Active Problem List    Diagnosis Date Noted   • Obesity (BMI 30-39.9) 04/04/2017   • Chronic pain syndrome- dr medrano 04/04/2017   • Controlled substance agreement signed 03/22/2016   • Essential hypertension 08/31/2015   • DDD (degenerative disc disease), cervical 04/07/2015   • Thoracic radiculopathy 03/09/2015   • Lumbar radiculopathy 03/09/2015   • Vitamin D deficiency disease 10/11/2013   • DDD (degenerative disc disease), lumbar 10/11/2013   • Chronic allergic rhinitis 10/11/2013   • Primary insomnia 11/29/2012   • History of endometrial cancer-2009 JORDON-BSO; neg nodes 04/16/2012   • DJD (degenerative joint disease)- hands 04/16/2012   • COPD (chronic obstructive pulmonary disease) (CMS-[facility]) 11/07/2011   • Hyperlipidemia with target LDL less than 130 10/28/2010   • Carpal tunnel syndrome        Current Outpatient Prescriptions   Medication Sig Dispense Refill   • hydrocodone/acetaminophen (NORCO)  MG Tab TAKE ONE TABLET BY MOUTH FOUR TIMES A DAY AS NEEDED 30 DAY SUPPLY  0   • zolpidem (AMBIEN) 10 MG Tab TAKE ONE TABLET BY MOUTH DAILY AT BEDTIME 30 DAY SUPPLY  0   • lisinopril (PRINIVIL) 10 MG Tab Take 1 Tab by mouth every day. Replaces hctz and potasium 90 Tab 4   • simvastatin (ZOCOR) 20 MG Tab Take 0.25 Tabs by mouth every evening. 90 Tab 4   • metoprolol (TOPROL-XL) 200 MG XL tablet Take 1 Tab by mouth every day. 90 Tab 4   • Cholecalciferol (VITAMIN D) 2000 UNITS Cap Take 1 Cap by mouth every day. 90 Cap 4   • fluticasone (FLONASE) 50 MCG/ACT nasal spray Spray 1 Spray in nose every day. Each Nostril 1 Bottle 11   • albuterol (VENTOLIN OR PROVENTIL) 108 (90 BASE) MCG/ACT Aero Soln inhalation aerosol Inhale 2 Puffs by mouth every 6 hours as needed for Shortness of Breath. 8.5 g 3   • simvastatin (ZOCOR) 20 MG Tab TAKE 0.5 TABS BY  MOUTH EVERY EVENING. 90 Tab 4     No current facility-administered medications for this visit.        Patient is taking medications as noted in medication list.  Current supplements as per medication list.   Chronic narcotic pain medicines: yes    Allergies: Codeine and Ace inhibitors    Current social contact/activities: goes out with friends      Is patient current with immunizations?  Yes.     She  reports that she quit smoking about 26 years ago. She has never used smokeless tobacco. She reports that she drinks alcohol. She reports that she does not use illicit drugs.  Counseling given: Not Answered        DPA/Advanced Directive:  Patient has Living Will, but it is not on file. Instructed to bring in a copy to scan into their chart.    ROS:    Gait: Uses no assistive device    Ostomy: no    Other tubes: no    Amputations: no      Chronic oxygen use: no    Last eye exam: 1 to 1 1/2 years ago    Wears hearing aids: no    : Denies incontinence.         Screening:            Depression Screening    Little interest or pleasure in doing things?  0 - not at all  Feeling down, depressed, or hopeless?  0 - not at all  Patient Health Questionnaire Score: 0  If depressive symptoms identified deferred to follow up visit unless specifically addressed in assessment and plan.    Interpretation of PHQ-9 Total Score   Score Severity   1-4 Minimal Depression   5-9 Mild Depression   10-14 Moderate Depression   15-19 Moderately Severe Depression   20-27 Severe Depression    Screening for Cognitive Impairment    Three Minute Recall (banana, sunrise, fence)  3/3    Draw clock face with all 12 numbers set to the hand to show 10 minutes past 11 o'clock  1    If cognitive concerns identified deferred to follow up visit unless specifically addressed in assessment and plan.    Fall Risk Assessment    Has the patient had two or more falls in the last year or any fall with injury in the last year?  No  If Fall Risk identified deferred to  follow up visit unless specifically addressed in assessment and plan.    Safety Assessment    Throw rugs on floor.  Yes  Handrails on all stairs.  Yes  Good lighting in all hallways.  Yes  Difficulty hearing.  Yes  Patient counseled about all safety risks that were identified.    Functional Assessment ADLs    Are there any barriers preventing you from cooking for yourself or meeting nutritional needs?  No.    Are there any barriers preventing you from driving safely or obtaining transportation?  No.    Are there any barriers preventing you from using a telephone or calling for help?  No.    Are there any barriers preventing you from shopping?  No.    Are there any barriers preventing you from taking care of your own finances?  No.    Are there any barriers preventing you from managing your medications?  No.    Are currently engaging any exercise or physical activity?  Yes.  Walks dog,walks to work, yard work    Health Maintenance Summary                Annual Wellness Visit Overdue 1945     PFT SCREENING-FEV1 AND FEV/FVC RATIO / SPIROMETRY SHOULD BE PERFORMED ANNUALLY Overdue 3/18/1963     BONE DENSITY Overdue 3/18/2010     COLONOSCOPY Overdue 3/5/2015      Done 3/5/2007 REFERRAL TO GI FOR COLONOSCOPY    MAMMOGRAM Next Due 10/24/2018      Done 10/24/2016 MA-MAMMO SCREENING BILAT W/TOMOSYNTHESIS W/CAD     Patient has more history with this topic...    IMM DTaP/Tdap/Td Vaccine Next Due 6/25/2025      Done 6/25/2015 Imm Admin: Tdap Vaccine          Patient Care Team:  Quincy Angel M.D. as PCP - General (Internal Medicine)  Quinton Carrasco M.D. as Consulting Physician (Pain Management)  Lee Johnson M.D. as Consulting Physician (Gastroenterology)    Social History   Substance Use Topics   • Smoking status: Former Smoker     Quit date: 01/04/1991   • Smokeless tobacco: Never Used   • Alcohol Use: 0.0 - 2.0 oz/week     0-4 Standard drinks or equivalent per week      Comment: hardly ever     Family History  "  Problem Relation Age of Onset   • Heart Disease Father 45     MI   • Lung Disease Father    • Stroke Father 70   • Cancer Paternal Grandmother      breast     She  has a past medical history of Bronchitis; Diverticula of colon; Tremor, hereditary, benign; Allergy; CA - cancer of uterus; Arthritis; COPD; Hyperlipidemia; Hypertension; Carpal tunnel syndrome; and EMPHYSEMA. She also has no past medical history of Addisons disease (CMS-[facility]), Adrenal disorder, Cushings syndrome, Parathyroid disorder (CMS-HCC), Thyroid disease, Pituitary disease (CMS-HCC), Anxiety, Anemia, Blood transfusion, Depression, Diabetes, Arrhythmia, Heart murmur, Heart attack (CMS-[facility]), CHF (congestive heart failure) (CMS-HCC), Clotting disorder, ASTHMA, GERD (gastroesophageal reflux disease), Glaucoma, Goiter, HIV (human immunodeficiency virus infection), IBD (inflammatory bowel disease), Kidney disease, Meningitis, OSTEOPOROSIS, Headache, Migraine, Seizure (CMS-HCC), Stroke (CMS-HCC), Substance abuse, Tuberculosis, Ulcer (CMS-HCC), Urinary tract infection, site not specified, or CATARACT.   Past Surgical History   Procedure Laterality Date   • Hysterectomy, total abdominal  1/18/10     Uterine, Dr. Whelan and Dr. Cam +BSO   • Open reduction       ankle   • Abdominal hysterectomy total  Jan 2010     Dr. Cam   • Oophorectomy  Jan 2010     BSO   • Meri by laparoscopy  july, 2015     [facility]           Exam:     Blood pressure 160/92, pulse 82, temperature 37.1 °C (98.8 °F), height 1.562 m (5' 1.5\"), weight 96.616 kg (213 lb), SpO2 91 %. Body mass index is 39.6 kg/(m^2).    Hearing good.    Dentition upper and lower dentures  Alert, oriented in no acute distress.  Eye contact is good, speech goal directed, affect calm       Assessment and Plan. The following treatment and monitoring plan is recommended:                1. Essential hypertension     Under good control. Continue same regimen.    - losartan (COZAAR) 50 MG Tab; Take 1 Tab by mouth " every day. Replaces lisinopril;  for blood pressure  Dispense: 90 Tab; Refill: 4    2. Carpal tunnel syndrome of right wrist-still has recurrent symptoms from time to time.     - REFERRAL TO HAND SURGERY    3. PALMER (dyspnea on exertion)-new problem. Intermittently gets dyspnea with walking across the room or extended distances. Will refer to cardiology and the cardiac workup to rule out cardiac etiology. Also refer back to pulmonary for further management of her COPD which may be the ultimate cause of her symptoms.     - REFERRAL TO PULMONOLOGY  - REFERRAL TO CARDIOLOGY  - NM-CARDIAC STRESS TEST; Future  - ECHOCARDIOGRAM COMP W/O CONT; Future    4. Hyperlipidemia with target LDL less than 130     Under good control. Continue same regimen.  5. Centrilobular emphysema (CMS-HCC)-as above     - PULMONARY FUNCTION TESTS Test requested: Complete Pulmonary Function Test  - REFERRAL TO PULMONOLOGY    6. History of endometrial cancer-2009 JORDON-BSO; neg nodes Under good control. Continue same regimen. No recurrence.       7. Primary osteoarthritis involving multiple joints Under good control. Continue same regimen.       8. Primary insomnia Under good control. Continue same regimen.      9. Vitamin D deficiency disease Under good control. Continue same regimen.     10. DDD (degenerative disc disease), lumbar Under good control. Continue same regimen.       11. Chronic allergic rhinitis Under good control. Continue same regimen.      12. Obesity (BMI 30-39.9)   diet/exercise/lose 15 lbs.; patient counseled           13. Chronic pain syndrome- dr medrano-off of opiates now.    Under good control. Continue same regimen.    14. Thoracic radiculopathy Under good control. Continue same regimen.            15. Lumbar radiculopathy Under good control. Continue same regimen.        16. DDD (degenerative disc disease), cervical    Under good control. Continue same regimen.    17. Colon cancer screening- tbd aug 2017      Under good  control. Continue same regimen.    Services suggested: No services needed at this time  Health Care Screening recommendations as per orders if indicated.  Referrals offered: PT/OT/Nutrition counseling/Behavioral Health/Smoking cessation as per orders if indicated.    Discussion today about general wellness and lifestyle habits:    · Prevent falls and reduce trip hazards; Cautioned about securing or removing rugs.  · Have a working fire alarm and carbon monoxide detector;   · Engage in regular physical activity and social activities       Follow-up: No Follow-up on file.

## 2017-07-18 ENCOUNTER — HOSPITAL ENCOUNTER (OUTPATIENT)
Dept: CARDIOLOGY | Facility: MEDICAL CENTER | Age: 72
End: 2017-07-18
Attending: INTERNAL MEDICINE
Payer: MEDICARE

## 2017-07-18 DIAGNOSIS — R06.09 DOE (DYSPNEA ON EXERTION): ICD-10-CM

## 2017-07-18 LAB
LV EJECT FRACT MOD 2C 99903: 49.46
LV EJECT FRACT MOD 4C 99902: 72.91
LV EJECT FRACT MOD BP 99901: 60.86

## 2017-07-18 PROCEDURE — 93306 TTE W/DOPPLER COMPLETE: CPT

## 2017-07-18 PROCEDURE — 93306 TTE W/DOPPLER COMPLETE: CPT | Mod: 26 | Performed by: INTERNAL MEDICINE

## 2017-07-20 ENCOUNTER — OFFICE VISIT (OUTPATIENT)
Dept: CARDIOLOGY | Facility: MEDICAL CENTER | Age: 72
End: 2017-07-20
Payer: MEDICARE

## 2017-07-20 VITALS
BODY MASS INDEX: 37.39 KG/M2 | HEIGHT: 63 IN | OXYGEN SATURATION: 92 % | WEIGHT: 211 LBS | HEART RATE: 79 BPM | SYSTOLIC BLOOD PRESSURE: 174 MMHG | DIASTOLIC BLOOD PRESSURE: 96 MMHG

## 2017-07-20 DIAGNOSIS — I45.10 RBBB: ICD-10-CM

## 2017-07-20 DIAGNOSIS — R06.09 DOE (DYSPNEA ON EXERTION): ICD-10-CM

## 2017-07-20 DIAGNOSIS — E66.09 OBESITY DUE TO EXCESS CALORIES, UNSPECIFIED OBESITY SEVERITY: ICD-10-CM

## 2017-07-20 DIAGNOSIS — I10 ESSENTIAL HYPERTENSION: ICD-10-CM

## 2017-07-20 PROCEDURE — 99204 OFFICE O/P NEW MOD 45 MIN: CPT | Performed by: INTERNAL MEDICINE

## 2017-07-20 PROCEDURE — 93000 ELECTROCARDIOGRAM COMPLETE: CPT | Performed by: INTERNAL MEDICINE

## 2017-07-20 RX ORDER — TRIAMTERENE AND HYDROCHLOROTHIAZIDE 37.5; 25 MG/1; MG/1
1 TABLET ORAL DAILY
Qty: 30 TAB | Refills: 3 | Status: SHIPPED | OUTPATIENT
Start: 2017-07-20 | End: 2018-06-05 | Stop reason: SDUPTHER

## 2017-07-20 ASSESSMENT — ENCOUNTER SYMPTOMS
PALPITATIONS: 0
COUGH: 0
DIZZINESS: 0
EYE DISCHARGE: 0
BLURRED VISION: 0
BRUISES/BLEEDS EASILY: 0
HEADACHES: 0
NAUSEA: 0
NERVOUS/ANXIOUS: 0
PND: 0
DEPRESSION: 0
SHORTNESS OF BREATH: 1
CHILLS: 0
FEVER: 0
MYALGIAS: 0
HEARTBURN: 0

## 2017-07-20 NOTE — MR AVS SNAPSHOT
"        Rufina Macias   2017 1:00 PM   Office Visit   MRN: [id number]    Department:  [facility]   Dept Phone:  [phone]    Description:  Female : 1945   Provider:  Lawrence Flores M.D.           Reason for Visit     New Patient           Allergies as of 2017     Allergen Noted Reactions    Codeine 2010   Swelling    Ace Inhibitors 2012       Cough      You were diagnosed with     PALMER (dyspnea on exertion)   [587428]       RBBB   [081778]       Obesity due to excess calories, unspecified obesity severity   [8618083]       Essential hypertension   [0503563]         Vital Signs     Blood Pressure Pulse Height Weight Body Mass Index Oxygen Saturation    174/96 mmHg 79 1.588 m (5' 2.5\") 95.709 kg (211 lb) 37.95 kg/m2 92%    Smoking Status                   Former Smoker           Basic Information     Date Of Birth Sex Race Ethnicity Preferred Language    1945 Female White Non- English      Your appointments     Aug 07, 2017  9:00 AM   Nm 60 with [facility] NM CARDIAC PET   [facility] ([facility])    [address] [facility] [address] [phone]            Aug 22, 2017  1:45 PM   FOLLOW UP with Lawrence Flores M.D.   [facility]    [address]  [address]  [facility] [id number] [phone]            Oct 06, 2017  9:40 AM   Established Patient with Quincy Angel M.D.   [facility] [address] [place] [id number] [phone]           You will be receiving a confirmation call a few days before your appointment from our automated call confirmation system.              Problem List              ICD-10-CM Priority Class Noted - Resolved    Carpal tunnel syndrome of right wrist G56.01   Unknown - Present    Hyperlipidemia with target LDL less than 130 E78.5   10/28/2010 - Present    COPD (chronic obstructive " pulmonary disease) (CMS-HCC) J44.9   11/7/2011 - Present    History of endometrial cancer-2009 JORDON-BSO; neg nodes Z85.42   4/16/2012 - Present    DJD (degenerative joint disease)- hands M19.90   4/16/2012 - Present    Primary insomnia F51.01   11/29/2012 - Present    Vitamin D deficiency disease E55.9   10/11/2013 - Present    DDD (degenerative disc disease), lumbar M51.36   10/11/2013 - Present    Chronic allergic rhinitis J30.9   10/11/2013 - Present    Thoracic radiculopathy M54.14   3/9/2015 - Present    Lumbar radiculopathy M54.16   3/9/2015 - Present    DDD (degenerative disc disease), cervical M50.30   4/7/2015 - Present    Essential hypertension I10   8/31/2015 - Present    Controlled substance agreement signed Z79.899   3/22/2016 - Present    Obesity (BMI 30-39.9) E66.9   4/4/2017 - Present    Chronic pain syndrome- dr medrano; off opiates  G89.4   4/4/2017 - Present    PALMER (dyspnea on exertion) R06.09   6/21/2017 - Present    Colon cancer screening- tbd aug 2017 Z12.11   6/21/2017 - Present      Health Maintenance        Date Due Completion Dates    BONE DENSITY 3/18/2010 ---    COLONOSCOPY 3/5/2015 3/5/2007    IMM INFLUENZA (1) 9/1/2017 10/4/2016, 10/11/2013, 10/29/2012    MAMMOGRAM 10/24/2018 10/24/2016, 10/2/2015, 9/11/2014, 8/7/2013, 11/30/2006    IMM DTaP/Tdap/Td Vaccine (2 - Td) 6/25/2025 6/25/2015            Current Immunizations     13-VALENT PCV PREVNAR 3/23/2016 12:30 PM    Influenza TIV (IM) 10/11/2013, 10/29/2012    Influenza Vaccine Adult HD 10/4/2016 10:44 AM    Pneumococcal polysaccharide vaccine (PPSV-23) 8/31/2012    SHINGLES VACCINE 10/15/2013    Tdap Vaccine 6/25/2015      Below and/or attached are the medications your provider expects you to take. Review all of your home medications and newly ordered medications with your provider and/or pharmacist. Follow medication instructions as directed by your provider and/or pharmacist. Please keep your medication list with you and share with  your provider. Update the information when medications are discontinued, doses are changed, or new medications (including over-the-counter products) are added; and carry medication information at all times in the event of emergency situations     Allergies:  CODEINE - Swelling     ACE INHIBITORS - (reactions not documented)               Medications  Valid as of: July 20, 2017 -  2:04 PM    Generic Name Brand Name Tablet Size Instructions for use    Albuterol Sulfate (Aero Soln) albuterol 108 (90 BASE) MCG/ACT Inhale 2 Puffs by mouth every 6 hours as needed for Shortness of Breath.        Cholecalciferol (Cap) Vitamin D 2000 UNITS Take 1 Cap by mouth every day.        Fluticasone Propionate (Suspension) FLONASE 50 MCG/ACT Spray 1 Spray in nose every day. Each Nostril        Hydrocodone-Acetaminophen (Tab) NORCO  MG TAKE ONE TABLET BY MOUTH FOUR TIMES A DAY AS NEEDED 30 DAY SUPPLY        Lisinopril (Tab) PRINIVIL 10 MG Take 1 Tab by mouth every day. Replaces hctz and potasium        Losartan Potassium (Tab) COZAAR 50 MG Take 1 Tab by mouth every day. Replaces lisinopril;  for blood pressure        Metoprolol Succinate (TABLET SR 24 HR) TOPROL- MG Take 1 Tab by mouth every day.        Simvastatin (Tab) ZOCOR 20 MG Take 0.25 Tabs by mouth every evening.        Simvastatin (Tab) ZOCOR 20 MG TAKE 0.5 TABS BY MOUTH EVERY EVENING.        Triamterene-HCTZ (Tab) MAXZIDE-25/DYAZIDE 37.5-25 MG Take 1 Tab by mouth every day.        Zolpidem Tartrate (Tab) AMBIEN 10 MG TAKE ONE TABLET BY MOUTH DAILY AT BEDTIME 30 DAY SUPPLY        .                 Medicines prescribed today were sent to:     [id number] - [place] - [address]    [address] [place] [address]    Phone: [phone] Fax: [phone]    Open 24 Hours?: No    [facility] - [place] - [address] [address]    [address] [place] [address]    Phone: [phone] Fax: [phone]    Open 24 Hours?:  No      Medication refill instructions:       If your prescription bottle indicates you have medication refills left, it is not necessary to call your provider’s office. Please contact your pharmacy and they will refill your medication.    If your prescription bottle indicates you do not have any refills left, you may request refills at any time through one of the following ways: The online Verient system (except Urgent Care), by calling your provider’s office, or by asking your pharmacy to contact your provider’s office with a refill request. Medication refills are processed only during regular business hours and may not be available until the next business day. Your provider may request additional information or to have a follow-up visit with you prior to refilling your medication.   *Please Note: Medication refills are assigned a new Rx number when refilled electronically. Your pharmacy may indicate that no refills were authorized even though a new prescription for the same medication is available at the pharmacy. Please request the medicine by name with the pharmacy before contacting your provider for a refill.           Verient Access Code: Activation code not generated  Current Verient Status: Active

## 2017-07-20 NOTE — Clinical Note
[facility]   [address]   [address]  [place] [address]  Phone: [phone]  Fax: [phone]              Rufina Macias  1945    Encounter Date: 7/20/2017    Lawrence Flores M.D.          PROGRESS NOTE:  Subjective:   Rufina Macias is a 72 y.o. female who presents today in consultation at the request of Dr. Angel for effort dyspnea.  This patient works as a  and when she walks around the store at times she gets short of breath with walking.  At other times she feels fairly well.  She denies any chest discomfort at rest or with activity.   A recent echocardiogram was near normal.  Hypertension has been difficult to treat. Diuretic was discontinued in the past due to nocturnal leg cramps.   She lives with her daughter. Her daughter mentions that she use to snore but patient states she no longer snores. There is no history of daytime somnolence.  She has been told that her EKG is abnormal in the past.  Prior history of COPD and bilateral pneumonia in 2005. . No definitive history of asthma. No family history of emphysema    Office EKG today demonstrates right bundle branch block    Recent surgery for gallstone pancreatitis with cholecystectomy preceded by endoscopic drainage procedure    Past Medical History   Diagnosis Date   • Bronchitis    • Diverticula of colon    • Tremor, hereditary, benign    • Allergy    • CA - cancer of uterus    • Arthritis    • COPD    • Hyperlipidemia    • Hypertension    • Carpal tunnel syndrome    • EMPHYSEMA      Past Surgical History   Procedure Laterality Date   • Hysterectomy, total abdominal  1/18/10     Uterine, Dr. Whelan and Dr. Cam +BSO   • Open reduction       ankle   • Abdominal hysterectomy total  Jan 2010     Dr. Cam   • Oophorectomy  Jan 2010     BSO   • Meri by laparoscopy  july, 2015     [facility]     Family History   Problem Relation Age of Onset   • Heart Disease Father 45    MI   • Lung Disease Father    • Stroke Father 70   • Cancer Paternal Grandmother      breast     History   Smoking status   • Former Smoker   • Quit date: 01/04/1991   Smokeless tobacco   • Never Used     Allergies   Allergen Reactions   • Codeine Swelling   • Ace Inhibitors      Cough     Outpatient Encounter Prescriptions as of 7/20/2017   Medication Sig Dispense Refill   • triamterene-hctz (MAXZIDE-25/DYAZIDE) 37.5-25 MG Tab Take 1 Tab by mouth every day. 30 Tab 3   • losartan (COZAAR) 50 MG Tab Take 1 Tab by mouth every day. Replaces lisinopril;  for blood pressure 90 Tab 4   • hydrocodone/acetaminophen (NORCO)  MG Tab TAKE ONE TABLET BY MOUTH FOUR TIMES A DAY AS NEEDED 30 DAY SUPPLY  0   • zolpidem (AMBIEN) 10 MG Tab TAKE ONE TABLET BY MOUTH DAILY AT BEDTIME 30 DAY SUPPLY  0   • lisinopril (PRINIVIL) 10 MG Tab Take 1 Tab by mouth every day. Replaces hctz and potasium 90 Tab 4   • simvastatin (ZOCOR) 20 MG Tab Take 0.25 Tabs by mouth every evening. 90 Tab 4   • metoprolol (TOPROL-XL) 200 MG XL tablet Take 1 Tab by mouth every day. 90 Tab 4   • Cholecalciferol (VITAMIN D) 2000 UNITS Cap Take 1 Cap by mouth every day. 90 Cap 4   • simvastatin (ZOCOR) 20 MG Tab TAKE 0.5 TABS BY MOUTH EVERY EVENING. 90 Tab 4   • fluticasone (FLONASE) 50 MCG/ACT nasal spray Spray 1 Spray in nose every day. Each Nostril 1 Bottle 11   • albuterol (VENTOLIN OR PROVENTIL) 108 (90 BASE) MCG/ACT Aero Soln inhalation aerosol Inhale 2 Puffs by mouth every 6 hours as needed for Shortness of Breath. 8.5 g 3     No facility-administered encounter medications on file as of 7/20/2017.     Review of Systems   Constitutional: Negative for fever, chills and malaise/fatigue.   Eyes: Negative for blurred vision and discharge.   Respiratory: Positive for shortness of breath. Negative for cough.    Cardiovascular: Negative for chest pain, palpitations, leg swelling and PND.   Gastrointestinal: Negative for heartburn and nausea.   Genitourinary:  "Negative for dysuria and urgency.   Musculoskeletal: Negative for myalgias.   Skin: Negative for itching and rash.   Neurological: Negative for dizziness and headaches.   Endo/Heme/Allergies: Negative for environmental allergies. Does not bruise/bleed easily.   Psychiatric/Behavioral: Negative for depression. The patient is not nervous/anxious.         Objective:   /96 mmHg  Pulse 79  Ht 1.588 m (5' 2.5\")  Wt 95.709 kg (211 lb)  BMI 37.95 kg/m2  SpO2 92%    Physical Exam   Constitutional: She is oriented to person, place, and time. She appears well-developed and well-nourished.   Obese pleasant woman. Good historian   HENT:   Head: Normocephalic and atraumatic.   Eyes: Conjunctivae and EOM are normal. No scleral icterus.   Neck: Neck supple. No JVD present. No thyromegaly present.   Cardiovascular: Normal rate, regular rhythm and normal heart sounds.  Exam reveals no gallop and no friction rub.    No murmur heard.  Pulmonary/Chest: Effort normal and breath sounds normal. No respiratory distress. She has no wheezes. She has no rales. She exhibits no tenderness.   Abdominal: Soft. Bowel sounds are normal. She exhibits no distension and no mass. There is no tenderness.   Neurological: She is alert and oriented to person, place, and time. Coordination normal.   Skin: Skin is warm and dry. No rash noted. No pallor.   Psychiatric: She has a normal mood and affect. Her behavior is normal. Judgment and thought content normal.       Assessment:     1. PALMER (dyspnea on exertion)  EKG    PET SCAN MYOCARDIAL PERFUSION   2. RBBB  PET SCAN MYOCARDIAL PERFUSION   3. Obesity due to excess calories, unspecified obesity severity  PET SCAN MYOCARDIAL PERFUSION   4. Essential hypertension  triamterene-hctz (MAXZIDE-25/DYAZIDE) 37.5-25 MG Tab    PET SCAN MYOCARDIAL PERFUSION       Medical Decision Making:  Today's Assessment / Status / Plan:   Some of her effort dyspnea could certainly be related to obesity and " deconditioning.  There has been history of snoring in the past but not recently and she does not have daytime somnolence.  Her dose of metoprolol for hypertension could be contributing to dyspnea. She does mention that about one hour after she takes metoprolol she notes more shortness of breath.    I am concerned about the myocardial perfusion imaging test ordered in this obese lady with large breasts. We may get false abnormalities due to attenuation.    I am going to order a myocardial PET scan. This test is ordered due to the patient's size and large breasts, increased accuracy, increased sensitivity, and reduced radioisotope load.    I have suggested that she start generic Dyazide one a day  Reduce metoprolol to 100 mg per day  Losartan 50 mg per day as prescribed  In follow-up. In 3-4 weeks.  Thank you for this consultation        Quincy Angel M.D.  [address] [place] [address]   [address]  [place] [address]  VIA In Basket

## 2017-07-20 NOTE — PROGRESS NOTES
Subjective:   Rufina Macias is a 72 y.o. female who presents today in consultation at the request of Dr. Angel for effort dyspnea.  This patient works as a  and when she walks around the store at times she gets short of breath with walking.  At other times she feels fairly well.  She denies any chest discomfort at rest or with activity.   A recent echocardiogram was near normal.  Hypertension has been difficult to treat. Diuretic was discontinued in the past due to nocturnal leg cramps.   She lives with her daughter. Her daughter mentions that she use to snore but patient states she no longer snores. There is no history of daytime somnolence.  She has been told that her EKG is abnormal in the past.  Prior history of COPD and bilateral pneumonia in 2005. . No definitive history of asthma. No family history of emphysema    Office EKG today demonstrates right bundle branch block    Recent surgery for gallstone pancreatitis with cholecystectomy preceded by endoscopic drainage procedure    Past Medical History   Diagnosis Date   • Bronchitis    • Diverticula of colon    • Tremor, hereditary, benign    • Allergy    • CA - cancer of uterus    • Arthritis    • COPD    • Hyperlipidemia    • Hypertension    • Carpal tunnel syndrome    • EMPHYSEMA      Past Surgical History   Procedure Laterality Date   • Hysterectomy, total abdominal  1/18/10     Uterine, Dr. Whelan and Dr. Cam +BSO   • Open reduction       ankle   • Abdominal hysterectomy total  Jan 2010     Dr. Cam   • Oophorectomy  Jan 2010     BSO   • Meri by laparoscopy  july, 2015     [facility]     Family History   Problem Relation Age of Onset   • Heart Disease Father 45     MI   • Lung Disease Father    • Stroke Father 70   • Cancer Paternal Grandmother      breast     History   Smoking status   • Former Smoker   • Quit date: 01/04/1991   Smokeless tobacco   • Never Used     Allergies   Allergen Reactions   • Codeine Swelling   • Ace Inhibitors      Cough      Outpatient Encounter Prescriptions as of 7/20/2017   Medication Sig Dispense Refill   • triamterene-hctz (MAXZIDE-25/DYAZIDE) 37.5-25 MG Tab Take 1 Tab by mouth every day. 30 Tab 3   • losartan (COZAAR) 50 MG Tab Take 1 Tab by mouth every day. Replaces lisinopril;  for blood pressure 90 Tab 4   • hydrocodone/acetaminophen (NORCO)  MG Tab TAKE ONE TABLET BY MOUTH FOUR TIMES A DAY AS NEEDED 30 DAY SUPPLY  0   • zolpidem (AMBIEN) 10 MG Tab TAKE ONE TABLET BY MOUTH DAILY AT BEDTIME 30 DAY SUPPLY  0   • lisinopril (PRINIVIL) 10 MG Tab Take 1 Tab by mouth every day. Replaces hctz and potasium 90 Tab 4   • simvastatin (ZOCOR) 20 MG Tab Take 0.25 Tabs by mouth every evening. 90 Tab 4   • metoprolol (TOPROL-XL) 200 MG XL tablet Take 1 Tab by mouth every day. 90 Tab 4   • Cholecalciferol (VITAMIN D) 2000 UNITS Cap Take 1 Cap by mouth every day. 90 Cap 4   • simvastatin (ZOCOR) 20 MG Tab TAKE 0.5 TABS BY MOUTH EVERY EVENING. 90 Tab 4   • fluticasone (FLONASE) 50 MCG/ACT nasal spray Spray 1 Spray in nose every day. Each Nostril 1 Bottle 11   • albuterol (VENTOLIN OR PROVENTIL) 108 (90 BASE) MCG/ACT Aero Soln inhalation aerosol Inhale 2 Puffs by mouth every 6 hours as needed for Shortness of Breath. 8.5 g 3     No facility-administered encounter medications on file as of 7/20/2017.     Review of Systems   Constitutional: Negative for fever, chills and malaise/fatigue.   Eyes: Negative for blurred vision and discharge.   Respiratory: Positive for shortness of breath. Negative for cough.    Cardiovascular: Negative for chest pain, palpitations, leg swelling and PND.   Gastrointestinal: Negative for heartburn and nausea.   Genitourinary: Negative for dysuria and urgency.   Musculoskeletal: Negative for myalgias.   Skin: Negative for itching and rash.   Neurological: Negative for dizziness and headaches.   Endo/Heme/Allergies: Negative for environmental allergies. Does not bruise/bleed easily.   Psychiatric/Behavioral:  "Negative for depression. The patient is not nervous/anxious.         Objective:   /96 mmHg  Pulse 79  Ht 1.588 m (5' 2.5\")  Wt 95.709 kg (211 lb)  BMI 37.95 kg/m2  SpO2 92%    Physical Exam   Constitutional: She is oriented to person, place, and time. She appears well-developed and well-nourished.   Obese pleasant woman. Good historian   HENT:   Head: Normocephalic and atraumatic.   Eyes: Conjunctivae and EOM are normal. No scleral icterus.   Neck: Neck supple. No JVD present. No thyromegaly present.   Cardiovascular: Normal rate, regular rhythm and normal heart sounds.  Exam reveals no gallop and no friction rub.    No murmur heard.  Pulmonary/Chest: Effort normal and breath sounds normal. No respiratory distress. She has no wheezes. She has no rales. She exhibits no tenderness.   Abdominal: Soft. Bowel sounds are normal. She exhibits no distension and no mass. There is no tenderness.   Neurological: She is alert and oriented to person, place, and time. Coordination normal.   Skin: Skin is warm and dry. No rash noted. No pallor.   Psychiatric: She has a normal mood and affect. Her behavior is normal. Judgment and thought content normal.       Assessment:     1. PALMER (dyspnea on exertion)  EKG    PET SCAN MYOCARDIAL PERFUSION   2. RBBB  PET SCAN MYOCARDIAL PERFUSION   3. Obesity due to excess calories, unspecified obesity severity  PET SCAN MYOCARDIAL PERFUSION   4. Essential hypertension  triamterene-hctz (MAXZIDE-25/DYAZIDE) 37.5-25 MG Tab    PET SCAN MYOCARDIAL PERFUSION       Medical Decision Making:  Today's Assessment / Status / Plan:   Some of her effort dyspnea could certainly be related to obesity and deconditioning.  There has been history of snoring in the past but not recently and she does not have daytime somnolence.  Her dose of metoprolol for hypertension could be contributing to dyspnea. She does mention that about one hour after she takes metoprolol she notes more shortness of breath.    I " am concerned about the myocardial perfusion imaging test ordered in this obese lady with large breasts. We may get false abnormalities due to attenuation.    I am going to order a myocardial PET scan. This test is ordered due to the patient's size and large breasts, increased accuracy, increased sensitivity, and reduced radioisotope load.    I have suggested that she start generic Dyazide one a day  Reduce metoprolol to 100 mg per day  Losartan 50 mg per day as prescribed  In follow-up. In 3-4 weeks.  Thank you for this consultation

## 2017-07-24 ENCOUNTER — APPOINTMENT (OUTPATIENT)
Dept: RADIOLOGY | Facility: MEDICAL CENTER | Age: 72
End: 2017-07-24
Attending: INTERNAL MEDICINE
Payer: MEDICARE

## 2017-08-07 ENCOUNTER — HOSPITAL ENCOUNTER (OUTPATIENT)
Dept: RADIOLOGY | Facility: MEDICAL CENTER | Age: 72
End: 2017-08-07
Attending: INTERNAL MEDICINE
Payer: MEDICARE

## 2017-08-07 DIAGNOSIS — I10 ESSENTIAL HYPERTENSION, MALIGNANT: ICD-10-CM

## 2017-08-07 DIAGNOSIS — R06.09 OTHER DYSPNEA AND RESPIRATORY ABNORMALITY: ICD-10-CM

## 2017-08-07 DIAGNOSIS — E66.09 EXOGENOUS OBESITY: ICD-10-CM

## 2017-08-07 DIAGNOSIS — R09.89 OTHER DYSPNEA AND RESPIRATORY ABNORMALITY: ICD-10-CM

## 2017-08-07 DIAGNOSIS — I45.10 RIGHT BUNDLE BRANCH BLOCK: ICD-10-CM

## 2017-08-07 PROCEDURE — A9555 RB82 RUBIDIUM: HCPCS

## 2017-08-07 PROCEDURE — 700111 HCHG RX REV CODE 636 W/ 250 OVERRIDE (IP)

## 2017-08-22 ENCOUNTER — OFFICE VISIT (OUTPATIENT)
Dept: CARDIOLOGY | Facility: MEDICAL CENTER | Age: 72
End: 2017-08-22
Payer: MEDICARE

## 2017-08-22 VITALS
WEIGHT: 213 LBS | BODY MASS INDEX: 37.74 KG/M2 | HEIGHT: 63 IN | SYSTOLIC BLOOD PRESSURE: 232 MMHG | DIASTOLIC BLOOD PRESSURE: 110 MMHG | HEART RATE: 88 BPM | OXYGEN SATURATION: 94 %

## 2017-08-22 DIAGNOSIS — I10 ESSENTIAL HYPERTENSION: ICD-10-CM

## 2017-08-22 DIAGNOSIS — R06.09 DOE (DYSPNEA ON EXERTION): ICD-10-CM

## 2017-08-22 PROCEDURE — 99213 OFFICE O/P EST LOW 20 MIN: CPT | Performed by: INTERNAL MEDICINE

## 2017-08-22 RX ORDER — LOSARTAN POTASSIUM 100 MG/1
100 TABLET ORAL DAILY
Qty: 30 TAB | Refills: 11 | Status: SHIPPED | OUTPATIENT
Start: 2017-08-22 | End: 2018-06-05 | Stop reason: SDUPTHER

## 2017-08-22 RX ORDER — NEBIVOLOL 20 MG/1
20 TABLET ORAL DAILY
Qty: 30 TAB | Refills: 11 | Status: SHIPPED | OUTPATIENT
Start: 2017-08-22 | End: 2018-06-05

## 2017-08-22 RX ORDER — AMLODIPINE BESYLATE 2.5 MG/1
2.5 TABLET ORAL DAILY
Qty: 30 TAB | Refills: 11 | Status: SHIPPED | OUTPATIENT
Start: 2017-08-22 | End: 2017-11-30

## 2017-08-22 ASSESSMENT — ENCOUNTER SYMPTOMS
HEARTBURN: 0
PALPITATIONS: 0
SHORTNESS OF BREATH: 1
NERVOUS/ANXIOUS: 0
PND: 0
FEVER: 0
DEPRESSION: 0
MYALGIAS: 0
HEADACHES: 0
BLURRED VISION: 0
DIZZINESS: 0
CHILLS: 0
COUGH: 0
EYE DISCHARGE: 0
NAUSEA: 0
BRUISES/BLEEDS EASILY: 0

## 2017-08-22 NOTE — Clinical Note
[facility]   [address]   [address]  [place] [address]  Phone: [phone]  Fax: [phone]              Rufina Macias  1945    Encounter Date: 8/22/2017    Lawrence Flores M.D.          PROGRESS NOTE:  Subjective:   Rufina Macias is a 72 y.o. female who presents today in follow-up for exertional dyspnea and hypertension.  She has not done any home blood pressures because she gets panicky when she sees the numbers.  Blood pressure here very high.  She does walk her dog twice a day.   Myocardial PET scan was normal  No change in the sense of fatigue on the lower dose of metoprolol    She remembers taking amlodipine in the past for several years and then it was stopped due to edema      Past Medical History   Diagnosis Date   • Bronchitis    • Diverticula of colon    • Tremor, hereditary, benign    • Allergy    • CA - cancer of uterus    • Arthritis    • COPD    • Hyperlipidemia    • Hypertension    • Carpal tunnel syndrome    • EMPHYSEMA      Past Surgical History   Procedure Laterality Date   • Hysterectomy, total abdominal  1/18/10     Uterine, Dr. Whelan and Dr. Cam +BSO   • Open reduction       ankle   • Abdominal hysterectomy total  Jan 2010     Dr. Cam   • Oophorectomy  Jan 2010     BSO   • Meri by laparoscopy  july, 2015     [facility]     Family History   Problem Relation Age of Onset   • Heart Disease Father 45     MI   • Lung Disease Father    • Stroke Father 70   • Cancer Paternal Grandmother      breast     History   Smoking status   • Former Smoker   • Quit date: 01/04/1991   Smokeless tobacco   • Never Used     Allergies   Allergen Reactions   • Codeine Swelling   • Ace Inhibitors      Cough     Outpatient Encounter Prescriptions as of 8/22/2017   Medication Sig Dispense Refill   • losartan (COZAAR) 100 MG Tab Take 1 Tab by mouth every day. 30 Tab 11   • amlodipine (NORVASC) 2.5 MG Tab Take 1 Tab by mouth  every day. 30 Tab 11   • Nebivolol HCl (BYSTOLIC) 20 MG Tab Take 20 mg by mouth every day. 30 Tab 11   • triamterene-hctz (MAXZIDE-25/DYAZIDE) 37.5-25 MG Tab Take 1 Tab by mouth every day. 30 Tab 3   • hydrocodone/acetaminophen (NORCO)  MG Tab TAKE ONE TABLET BY MOUTH FOUR TIMES A DAY AS NEEDED 30 DAY SUPPLY  0   • zolpidem (AMBIEN) 10 MG Tab TAKE ONE TABLET BY MOUTH DAILY AT BEDTIME 30 DAY SUPPLY  0   • simvastatin (ZOCOR) 20 MG Tab Take 0.25 Tabs by mouth every evening. 90 Tab 4   • Cholecalciferol (VITAMIN D) 2000 UNITS Cap Take 1 Cap by mouth every day. 90 Cap 4   • fluticasone (FLONASE) 50 MCG/ACT nasal spray Spray 1 Spray in nose every day. Each Nostril 1 Bottle 11   • albuterol (VENTOLIN OR PROVENTIL) 108 (90 BASE) MCG/ACT Aero Soln inhalation aerosol Inhale 2 Puffs by mouth every 6 hours as needed for Shortness of Breath. 8.5 g 3   • [DISCONTINUED] losartan (COZAAR) 50 MG Tab Take 1 Tab by mouth every day. Replaces lisinopril;  for blood pressure 90 Tab 4   • simvastatin (ZOCOR) 20 MG Tab TAKE 0.5 TABS BY MOUTH EVERY EVENING. 90 Tab 4   • [DISCONTINUED] lisinopril (PRINIVIL) 10 MG Tab Take 1 Tab by mouth every day. Replaces hctz and potasium 90 Tab 4   • [DISCONTINUED] metoprolol (TOPROL-XL) 200 MG XL tablet Take 1 Tab by mouth every day. 90 Tab 4     No facility-administered encounter medications on file as of 8/22/2017.     Review of Systems   Constitutional: Negative for fever, chills and malaise/fatigue.   Eyes: Negative for blurred vision and discharge.   Respiratory: Positive for shortness of breath. Negative for cough.    Cardiovascular: Negative for chest pain, palpitations, leg swelling and PND.   Gastrointestinal: Negative for heartburn and nausea.   Genitourinary: Negative for dysuria and urgency.   Musculoskeletal: Negative for myalgias.   Skin: Negative for itching and rash.   Neurological: Negative for dizziness and headaches.   Endo/Heme/Allergies: Negative for environmental allergies.  "Does not bruise/bleed easily.   Psychiatric/Behavioral: Negative for depression. The patient is not nervous/anxious.         Objective:   /110 mmHg  Pulse 88  Ht 1.588 m (5' 2.52\")  Wt 96.616 kg (213 lb)  BMI 38.31 kg/m2  SpO2 94%    Physical Exam   Constitutional: She is oriented to person, place, and time. She appears well-developed and well-nourished.   Obese pleasant woman. Good historian   HENT:   Head: Normocephalic and atraumatic.   Eyes: Conjunctivae and EOM are normal. No scleral icterus.   Neck: Neck supple. No JVD present. No thyromegaly present.   Cardiovascular: Normal rate, regular rhythm and normal heart sounds.  Exam reveals no gallop and no friction rub.    No murmur heard.  Pulmonary/Chest: Effort normal and breath sounds normal. No respiratory distress. She has no wheezes. She has no rales. She exhibits no tenderness.   Abdominal: Soft. Bowel sounds are normal. She exhibits no distension and no mass. There is no tenderness.   Neurological: She is alert and oriented to person, place, and time. Coordination normal.   Skin: Skin is warm and dry. No rash noted. No pallor.   Psychiatric: She has a normal mood and affect. Her behavior is normal. Judgment and thought content normal.       Assessment:     1. Essential hypertension  losartan (COZAAR) 100 MG Tab    amlodipine (NORVASC) 2.5 MG Tab    Nebivolol HCl (BYSTOLIC) 20 MG Tab   2. PALMER (dyspnea on exertion)         Medical Decision Making:  Today's Assessment / Status / Plan:   With respect to hypertension, increase losartan to 100 mg per day  Continue Dyazide one a day  Add amlodipine 2.5 mg per day.  Discontinue metoprolol.  Prescribed nebivolol 20 mg per day  Home blood pressures and return in 4-6 weeks  Walking and weight loss discussed        Quincy Angel M.D.  [id number]  [place] [address]  VIA In Basket                   "

## 2017-08-22 NOTE — MR AVS SNAPSHOT
"        Rufina Macias   2017 1:45 PM   Office Visit   MRN: [id number]    Department:  [facility] [place] [place]   Dept Phone:  [phone]    Description:  Female : 1945   Provider:  Lawrence Flores M.D.           Allergies as of 2017     Allergen Noted Reactions    Codeine 2010   Swelling    Ace Inhibitors 2012       Cough      You were diagnosed with     Essential hypertension   [0267302]       PALMER (dyspnea on exertion)   [[id number]]         Vital Signs     Blood Pressure Pulse Height Weight Body Mass Index Oxygen Saturation    232/110 mmHg 88 1.588 m (5' 2.52\") 96.616 kg (213 lb) 38.31 kg/m2 94%    Smoking Status                   Former Smoker           Basic Information     Date Of Birth Sex Race Ethnicity Preferred Language    1945 Female White Non- English      Your appointments     Sep 28, 2017  1:00 PM   FOLLOW UP with Lawrence Flores M.D.   [facility] (--)    [address]  [address]  [facility] [address] [phone]            Oct 06, 2017  9:40 AM   Established Patient with Quincy Angel M.D.   [facility] [address] [place] [address] [phone]           You will be receiving a confirmation call a few days before your appointment from our automated call confirmation system.              Problem List              ICD-10-CM Priority Class Noted - Resolved    Carpal tunnel syndrome of right wrist G56.01   Unknown - Present    Hyperlipidemia with target LDL less than 130 E78.5   10/28/2010 - Present    COPD (chronic obstructive pulmonary disease) (CMS-HCC) J44.9   2011 - Present    History of endometrial cancer- JORDON-BSO; neg nodes Z85.42   2012 - Present    DJD (degenerative joint disease)- hands M19.90   2012 - Present    Primary insomnia F51.01   2012 - Present    Vitamin D deficiency disease E55.9   10/11/2013 - Present "    DDD (degenerative disc disease), lumbar M51.36   10/11/2013 - Present    Chronic allergic rhinitis J30.9   10/11/2013 - Present    Thoracic radiculopathy M54.14   3/9/2015 - Present    Lumbar radiculopathy M54.16   3/9/2015 - Present    DDD (degenerative disc disease), cervical M50.30   4/7/2015 - Present    Essential hypertension I10   8/31/2015 - Present    Controlled substance agreement signed Z79.899   3/22/2016 - Present    Obesity (BMI 30-39.9) E66.9   4/4/2017 - Present    Chronic pain syndrome- dr medrano; off opiates  G89.4   4/4/2017 - Present    PALMER (dyspnea on exertion) R06.09   6/21/2017 - Present    Colon cancer screening- tbd aug 2017 Z12.11   6/21/2017 - Present      Health Maintenance        Date Due Completion Dates    BONE DENSITY 3/18/2010 ---    COLONOSCOPY 3/5/2015 3/5/2007    IMM INFLUENZA (1) 9/1/2017 10/4/2016, 10/11/2013, 10/29/2012    MAMMOGRAM 10/24/2018 10/24/2016, 10/2/2015, 9/11/2014, 8/7/2013, 11/30/2006    IMM DTaP/Tdap/Td Vaccine (2 - Td) 6/25/2025 6/25/2015            Current Immunizations     13-VALENT PCV PREVNAR 3/23/2016 12:30 PM    Influenza TIV (IM) 10/11/2013, 10/29/2012    Influenza Vaccine Adult HD 10/4/2016 10:44 AM    Pneumococcal polysaccharide vaccine (PPSV-23) 8/31/2012    SHINGLES VACCINE 10/15/2013    Tdap Vaccine 6/25/2015      Below and/or attached are the medications your provider expects you to take. Review all of your home medications and newly ordered medications with your provider and/or pharmacist. Follow medication instructions as directed by your provider and/or pharmacist. Please keep your medication list with you and share with your provider. Update the information when medications are discontinued, doses are changed, or new medications (including over-the-counter products) are added; and carry medication information at all times in the event of emergency situations     Allergies:  CODEINE - Swelling     ACE INHIBITORS - (reactions not documented)                Medications  Valid as of: August 22, 2017 -  2:24 PM    Generic Name Brand Name Tablet Size Instructions for use    Albuterol Sulfate (Aero Soln) albuterol 108 (90 Base) MCG/ACT Inhale 2 Puffs by mouth every 6 hours as needed for Shortness of Breath.        AmLODIPine Besylate (Tab) NORVASC 2.5 MG Take 1 Tab by mouth every day.        Cholecalciferol (Cap) Vitamin D 2000 units Take 1 Cap by mouth every day.        Fluticasone Propionate (Suspension) FLONASE 50 MCG/ACT Spray 1 Spray in nose every day. Each Nostril        Hydrocodone-Acetaminophen (Tab) NORCO  MG TAKE ONE TABLET BY MOUTH FOUR TIMES A DAY AS NEEDED 30 DAY SUPPLY        Losartan Potassium (Tab) COZAAR 100 MG Take 1 Tab by mouth every day.        Nebivolol HCl (Tab) Nebivolol HCl 20 MG Take 20 mg by mouth every day.        Simvastatin (Tab) ZOCOR 20 MG Take 0.25 Tabs by mouth every evening.        Simvastatin (Tab) ZOCOR 20 MG TAKE 0.5 TABS BY MOUTH EVERY EVENING.        Triamterene-HCTZ (Tab) MAXZIDE-25/DYAZIDE 37.5-25 MG Take 1 Tab by mouth every day.        Zolpidem Tartrate (Tab) AMBIEN 10 MG TAKE ONE TABLET BY MOUTH DAILY AT BEDTIME 30 DAY SUPPLY        .                 Medicines prescribed today were sent to:     [id number] - [place], [place] - [address]    [address] [place] [address]    Phone: [phone] Fax: [phone]    Open 24 Hours?: No      Medication refill instructions:       If your prescription bottle indicates you have medication refills left, it is not necessary to call your provider’s office. Please contact your pharmacy and they will refill your medication.    If your prescription bottle indicates you do not have any refills left, you may request refills at any time through one of the following ways: The online [employer] system (except Urgent Care), by calling your provider’s office, or by asking your pharmacy to contact your provider’s office with a refill request. Medication refills are  processed only during regular business hours and may not be available until the next business day. Your provider may request additional information or to have a follow-up visit with you prior to refilling your medication.   *Please Note: Medication refills are assigned a new Rx number when refilled electronically. Your pharmacy may indicate that no refills were authorized even though a new prescription for the same medication is available at the pharmacy. Please request the medicine by name with the pharmacy before contacting your provider for a refill.           [email or web address] Access Code: Activation code not generated  Current [email or web address] Status: Active

## 2017-08-22 NOTE — PROGRESS NOTES
Subjective:   Rufina Macias is a 72 y.o. female who presents today in follow-up for exertional dyspnea and hypertension.  She has not done any home blood pressures because she gets panicky when she sees the numbers.  Blood pressure here very high.  She does walk her dog twice a day.   Myocardial PET scan was normal  No change in the sense of fatigue on the lower dose of metoprolol    She remembers taking amlodipine in the past for several years and then it was stopped due to edema      Past Medical History   Diagnosis Date   • Bronchitis    • Diverticula of colon    • Tremor, hereditary, benign    • Allergy    • CA - cancer of uterus    • Arthritis    • COPD    • Hyperlipidemia    • Hypertension    • Carpal tunnel syndrome    • EMPHYSEMA      Past Surgical History   Procedure Laterality Date   • Hysterectomy, total abdominal  1/18/10     Uterine, Dr. Whelan and Dr. Cam +BSO   • Open reduction       ankle   • Abdominal hysterectomy total  Jan 2010     Dr. Cam   • Oophorectomy  Jan 2010     BSO   • Meri by laparoscopy  july, 2015     [facility]     Family History   Problem Relation Age of Onset   • Heart Disease Father 45     MI   • Lung Disease Father    • Stroke Father 70   • Cancer Paternal Grandmother      breast     History   Smoking status   • Former Smoker   • Quit date: 01/04/1991   Smokeless tobacco   • Never Used     Allergies   Allergen Reactions   • Codeine Swelling   • Ace Inhibitors      Cough     Outpatient Encounter Prescriptions as of 8/22/2017   Medication Sig Dispense Refill   • losartan (COZAAR) 100 MG Tab Take 1 Tab by mouth every day. 30 Tab 11   • amlodipine (NORVASC) 2.5 MG Tab Take 1 Tab by mouth every day. 30 Tab 11   • Nebivolol HCl (BYSTOLIC) 20 MG Tab Take 20 mg by mouth every day. 30 Tab 11   • triamterene-hctz (MAXZIDE-25/DYAZIDE) 37.5-25 MG Tab Take 1 Tab by mouth every day. 30 Tab 3   • hydrocodone/acetaminophen (NORCO)  MG Tab TAKE ONE TABLET BY MOUTH FOUR TIMES A DAY AS  "NEEDED 30 DAY SUPPLY  0   • zolpidem (AMBIEN) 10 MG Tab TAKE ONE TABLET BY MOUTH DAILY AT BEDTIME 30 DAY SUPPLY  0   • simvastatin (ZOCOR) 20 MG Tab Take 0.25 Tabs by mouth every evening. 90 Tab 4   • Cholecalciferol (VITAMIN D) 2000 UNITS Cap Take 1 Cap by mouth every day. 90 Cap 4   • fluticasone (FLONASE) 50 MCG/ACT nasal spray Spray 1 Spray in nose every day. Each Nostril 1 Bottle 11   • albuterol (VENTOLIN OR PROVENTIL) 108 (90 BASE) MCG/ACT Aero Soln inhalation aerosol Inhale 2 Puffs by mouth every 6 hours as needed for Shortness of Breath. 8.5 g 3   • [DISCONTINUED] losartan (COZAAR) 50 MG Tab Take 1 Tab by mouth every day. Replaces lisinopril;  for blood pressure 90 Tab 4   • simvastatin (ZOCOR) 20 MG Tab TAKE 0.5 TABS BY MOUTH EVERY EVENING. 90 Tab 4   • [DISCONTINUED] lisinopril (PRINIVIL) 10 MG Tab Take 1 Tab by mouth every day. Replaces hctz and potasium 90 Tab 4   • [DISCONTINUED] metoprolol (TOPROL-XL) 200 MG XL tablet Take 1 Tab by mouth every day. 90 Tab 4     No facility-administered encounter medications on file as of 8/22/2017.     Review of Systems   Constitutional: Negative for fever, chills and malaise/fatigue.   Eyes: Negative for blurred vision and discharge.   Respiratory: Positive for shortness of breath. Negative for cough.    Cardiovascular: Negative for chest pain, palpitations, leg swelling and PND.   Gastrointestinal: Negative for heartburn and nausea.   Genitourinary: Negative for dysuria and urgency.   Musculoskeletal: Negative for myalgias.   Skin: Negative for itching and rash.   Neurological: Negative for dizziness and headaches.   Endo/Heme/Allergies: Negative for environmental allergies. Does not bruise/bleed easily.   Psychiatric/Behavioral: Negative for depression. The patient is not nervous/anxious.         Objective:   /110 mmHg  Pulse 88  Ht 1.588 m (5' 2.52\")  Wt 96.616 kg (213 lb)  BMI 38.31 kg/m2  SpO2 94%    Physical Exam   Constitutional: She is oriented to " person, place, and time. She appears well-developed and well-nourished.   Obese pleasant woman. Good historian   HENT:   Head: Normocephalic and atraumatic.   Eyes: Conjunctivae and EOM are normal. No scleral icterus.   Neck: Neck supple. No JVD present. No thyromegaly present.   Cardiovascular: Normal rate, regular rhythm and normal heart sounds.  Exam reveals no gallop and no friction rub.    No murmur heard.  Pulmonary/Chest: Effort normal and breath sounds normal. No respiratory distress. She has no wheezes. She has no rales. She exhibits no tenderness.   Abdominal: Soft. Bowel sounds are normal. She exhibits no distension and no mass. There is no tenderness.   Neurological: She is alert and oriented to person, place, and time. Coordination normal.   Skin: Skin is warm and dry. No rash noted. No pallor.   Psychiatric: She has a normal mood and affect. Her behavior is normal. Judgment and thought content normal.       Assessment:     1. Essential hypertension  losartan (COZAAR) 100 MG Tab    amlodipine (NORVASC) 2.5 MG Tab    Nebivolol HCl (BYSTOLIC) 20 MG Tab   2. PALMER (dyspnea on exertion)         Medical Decision Making:  Today's Assessment / Status / Plan:   With respect to hypertension, increase losartan to 100 mg per day  Continue Dyazide one a day  Add amlodipine 2.5 mg per day.  Discontinue metoprolol.  Prescribed nebivolol 20 mg per day  Home blood pressures and return in 4-6 weeks  Walking and weight loss discussed

## 2017-09-29 DIAGNOSIS — Z12.11 COLON CANCER SCREENING: ICD-10-CM

## 2017-10-03 ENCOUNTER — HOSPITAL ENCOUNTER (OUTPATIENT)
Dept: LAB | Facility: MEDICAL CENTER | Age: 72
End: 2017-10-03
Attending: INTERNAL MEDICINE
Payer: MEDICARE

## 2017-10-03 DIAGNOSIS — E78.5 HYPERLIPIDEMIA WITH TARGET LDL LESS THAN 130: ICD-10-CM

## 2017-10-03 LAB
ALBUMIN SERPL BCP-MCNC: 4.1 G/DL (ref 3.2–4.9)
ALBUMIN/GLOB SERPL: 1.4 G/DL
ALP SERPL-CCNC: 73 U/L (ref 30–99)
ALT SERPL-CCNC: 56 U/L (ref 2–50)
ANION GAP SERPL CALC-SCNC: 8 MMOL/L (ref 0–11.9)
AST SERPL-CCNC: 41 U/L (ref 12–45)
BASOPHILS # BLD AUTO: 0.3 % (ref 0–1.8)
BASOPHILS # BLD: 0.03 K/UL (ref 0–0.12)
BILIRUB SERPL-MCNC: 0.7 MG/DL (ref 0.1–1.5)
BUN SERPL-MCNC: 13 MG/DL (ref 8–22)
CALCIUM SERPL-MCNC: 9.7 MG/DL (ref 8.5–10.5)
CHLORIDE SERPL-SCNC: 103 MMOL/L (ref 96–112)
CHOLEST SERPL-MCNC: 131 MG/DL (ref 100–199)
CO2 SERPL-SCNC: 27 MMOL/L (ref 20–33)
CREAT SERPL-MCNC: 0.64 MG/DL (ref 0.5–1.4)
EOSINOPHIL # BLD AUTO: 0.08 K/UL (ref 0–0.51)
EOSINOPHIL NFR BLD: 0.9 % (ref 0–6.9)
ERYTHROCYTE [DISTWIDTH] IN BLOOD BY AUTOMATED COUNT: 45.1 FL (ref 35.9–50)
GFR SERPL CREATININE-BSD FRML MDRD: >60 ML/MIN/1.73 M 2
GLOBULIN SER CALC-MCNC: 2.9 G/DL (ref 1.9–3.5)
GLUCOSE SERPL-MCNC: 88 MG/DL (ref 65–99)
HCT VFR BLD AUTO: 48.5 % (ref 37–47)
HDLC SERPL-MCNC: 45 MG/DL
HGB BLD-MCNC: 15.3 G/DL (ref 12–16)
IMM GRANULOCYTES # BLD AUTO: 0.04 K/UL (ref 0–0.11)
IMM GRANULOCYTES NFR BLD AUTO: 0.4 % (ref 0–0.9)
LDLC SERPL CALC-MCNC: 61 MG/DL
LYMPHOCYTES # BLD AUTO: 2.02 K/UL (ref 1–4.8)
LYMPHOCYTES NFR BLD: 22.4 % (ref 22–41)
MCH RBC QN AUTO: 30 PG (ref 27–33)
MCHC RBC AUTO-ENTMCNC: 31.5 G/DL (ref 33.6–35)
MCV RBC AUTO: 95.1 FL (ref 81.4–97.8)
MONOCYTES # BLD AUTO: 0.59 K/UL (ref 0–0.85)
MONOCYTES NFR BLD AUTO: 6.5 % (ref 0–13.4)
NEUTROPHILS # BLD AUTO: 6.26 K/UL (ref 2–7.15)
NEUTROPHILS NFR BLD: 69.5 % (ref 44–72)
NRBC # BLD AUTO: 0 K/UL
NRBC BLD AUTO-RTO: 0 /100 WBC
PLATELET # BLD AUTO: 149 K/UL (ref 164–446)
PMV BLD AUTO: 9.9 FL (ref 9–12.9)
POTASSIUM SERPL-SCNC: 4.3 MMOL/L (ref 3.6–5.5)
PROT SERPL-MCNC: 7 G/DL (ref 6–8.2)
RBC # BLD AUTO: 5.1 M/UL (ref 4.2–5.4)
SODIUM SERPL-SCNC: 138 MMOL/L (ref 135–145)
TRIGL SERPL-MCNC: 123 MG/DL (ref 0–149)
WBC # BLD AUTO: 9 K/UL (ref 4.8–10.8)

## 2017-10-03 PROCEDURE — 80053 COMPREHEN METABOLIC PANEL: CPT

## 2017-10-03 PROCEDURE — 36415 COLL VENOUS BLD VENIPUNCTURE: CPT

## 2017-10-03 PROCEDURE — 80061 LIPID PANEL: CPT

## 2017-10-03 PROCEDURE — 85025 COMPLETE CBC W/AUTO DIFF WBC: CPT

## 2017-10-06 ENCOUNTER — OFFICE VISIT (OUTPATIENT)
Dept: MEDICAL GROUP | Age: 72
End: 2017-10-06
Payer: MEDICARE

## 2017-10-06 VITALS
DIASTOLIC BLOOD PRESSURE: 90 MMHG | BODY MASS INDEX: 38.8 KG/M2 | SYSTOLIC BLOOD PRESSURE: 156 MMHG | TEMPERATURE: 98.2 F | WEIGHT: 219 LBS | HEART RATE: 90 BPM | HEIGHT: 63 IN | OXYGEN SATURATION: 90 %

## 2017-10-06 DIAGNOSIS — I10 ESSENTIAL HYPERTENSION: ICD-10-CM

## 2017-10-06 DIAGNOSIS — R07.89 CHEST TIGHTNESS: ICD-10-CM

## 2017-10-06 DIAGNOSIS — J44.1 COPD EXACERBATION (HCC): ICD-10-CM

## 2017-10-06 PROCEDURE — 99214 OFFICE O/P EST MOD 30 MIN: CPT | Performed by: FAMILY MEDICINE

## 2017-10-06 RX ORDER — AZITHROMYCIN 250 MG/1
TABLET, FILM COATED ORAL
Qty: 6 TAB | Refills: 0 | Status: SHIPPED | OUTPATIENT
Start: 2017-10-06 | End: 2017-10-06

## 2017-10-06 RX ORDER — METHYLPREDNISOLONE 4 MG/1
TABLET ORAL
Qty: 21 TAB | Refills: 0 | Status: SHIPPED
Start: 2017-10-06 | End: 2018-02-14

## 2017-10-06 RX ORDER — AZITHROMYCIN 250 MG/1
TABLET, FILM COATED ORAL
Qty: 6 TAB | Refills: 0 | Status: SHIPPED
Start: 2017-10-06 | End: 2018-02-14

## 2017-10-06 NOTE — PROGRESS NOTES
SUBJECTIVE:        Chief Complaint   Patient presents with   • Cough     chest pressure and tightness COPD/lab review       HPI:     Rufina Macias is a 72 y.o. female with COPD here for cough symptoms.   Has had cough, but states she always has some phlegm with her COPD, but this feels like a cold to her. She has been on mucinex and flonase.   States she caught a head cold about 4 days and trying to keep it from going into her chest.  Has had nasal congestion and rhinorrhea. No fevers/chills.   Feeling tightness in her chest. Taking mucinex. Given albuterol, but is is a year old prescription and didn't seem to work well. Used to take combivent which seemed to help her more.     Hypertension - saw Dr. Flores, cardiology. Had question about enlarged heart thus had stress test and echocardiogram completed.   States she had her medication cut down. Then BP was up and then medication was changed per cardiology. Losartan 100 mg per cardiology, but she is only taking 50 mg currently, amlodipine 2.5 mg, nebivolol 20 mg, trimaterene/hctz 27.5/25 mg. States her BP has been 160-190/. 140-150/80s last few times.   No unusual chest pain, headache or dizziness.     ROS:  Denies any recent fevers or chills. No nausea or vomiting. No diarrhea. No chest pains or shortness of breath. No lower extremity edema.    Current Outpatient Prescriptions on File Prior to Visit   Medication Sig Dispense Refill   • losartan (COZAAR) 100 MG Tab Take 1 Tab by mouth every day. 30 Tab 11   • amlodipine (NORVASC) 2.5 MG Tab Take 1 Tab by mouth every day. 30 Tab 11   • Nebivolol HCl (BYSTOLIC) 20 MG Tab Take 20 mg by mouth every day. 30 Tab 11   • triamterene-hctz (MAXZIDE-25/DYAZIDE) 37.5-25 MG Tab Take 1 Tab by mouth every day. 30 Tab 3   • hydrocodone/acetaminophen (NORCO)  MG Tab TAKE ONE TABLET BY MOUTH FOUR TIMES A DAY AS NEEDED 30 DAY SUPPLY  0   • zolpidem (AMBIEN) 10 MG Tab TAKE ONE TABLET BY MOUTH DAILY AT BEDTIME 30 DAY  SUPPLY  0   • simvastatin (ZOCOR) 20 MG Tab Take 0.25 Tabs by mouth every evening. 90 Tab 4   • Cholecalciferol (VITAMIN D) 2000 UNITS Cap Take 1 Cap by mouth every day. 90 Cap 4   • fluticasone (FLONASE) 50 MCG/ACT nasal spray Spray 1 Spray in nose every day. Each Nostril 1 Bottle 11   • albuterol (VENTOLIN OR PROVENTIL) 108 (90 BASE) MCG/ACT Aero Soln inhalation aerosol Inhale 2 Puffs by mouth every 6 hours as needed for Shortness of Breath. 8.5 g 3   • simvastatin (ZOCOR) 20 MG Tab TAKE 0.5 TABS BY MOUTH EVERY EVENING. 90 Tab 4     No current facility-administered medications on file prior to visit.        Allergies   Allergen Reactions   • Codeine Swelling   • Ace Inhibitors      Cough       Patient Active Problem List    Diagnosis Date Noted   • PALMER (dyspnea on exertion) 06/21/2017   • Colon cancer screening- tbd aug 2017 06/21/2017   • Obesity (BMI 30-39.9) 04/04/2017   • Chronic pain syndrome- dr medrano; off opiates  04/04/2017   • Controlled substance agreement signed 03/22/2016   • Essential hypertension 08/31/2015   • DDD (degenerative disc disease), cervical 04/07/2015   • Thoracic radiculopathy 03/09/2015   • Lumbar radiculopathy 03/09/2015   • Vitamin D deficiency disease 10/11/2013   • DDD (degenerative disc disease), lumbar 10/11/2013   • Chronic allergic rhinitis 10/11/2013   • Primary insomnia 11/29/2012   • History of endometrial cancer-2009 JORDON-BSO; neg nodes 04/16/2012   • DJD (degenerative joint disease)- hands 04/16/2012   • COPD (chronic obstructive pulmonary disease) (CMS-[facility]) 11/07/2011   • Hyperlipidemia with target LDL less than 130 10/28/2010   • Carpal tunnel syndrome of right wrist        Past Medical History:   Diagnosis Date   • Allergy    • Arthritis    • Bronchitis    • CA - cancer of uterus    • Carpal tunnel syndrome    • COPD    • Diverticula of colon    • EMPHYSEMA    • Hyperlipidemia    • Hypertension    • Tremor, hereditary, benign          OBJECTIVE:   /90   Pulse 90   " Temp 36.8 °C (98.2 °F)   Ht 1.588 m (5' 2.5\")   Wt 99.3 kg (219 lb)   SpO2 90%   BMI 39.42 kg/m²   General: Well-developed well-nourished female, no acute distress  HEENT: oropharynx clear, eyes clear, TMs clear and intact, nose clear  Neck: supple, no lymphadenopathy- cervical or supraclavicular, no thyromegaly  Cardiovascular: regular rate and rhythm, no murmurs, gallops, rubs  Lungs: mild wheezes and prolonged expiration throughout. No crackles, or rhonchi.  Abdomen: +bowel sounds, soft, nontender, nondistended, no rebound, no guarding, no hepatosplenomegaly  Extremities: no cyanosis, clubbing, edema  Skin: Warm and dry  Psych: appropriate mood and affect      ASSESSMENT/PLAN:    72 y.o.female    1. COPD exacerbation (CMS-HCC)- appears mild at this time, associated with recent URI symptoms.    -Medications as prescribed. Rest, keep well hydrated, and discussed over-the-counter medications to use for symptom relief.  Follow up if no improvement in symptoms in 2-3 days.  MethylPREDNISolone (MEDROL DOSEPAK) 4 MG Tablet Therapy Pack    azithromycin (ZITHROMAX) 250 MG Tab       2. Chest tightness- as above.  ipratropium-albuterol (COMBIVENT RESPIMAT)  MCG/ACT Aero Soln   3. Essential hypertension- uncontrolled. Recommend increase losartan to 100 mg as previously recommended. Continue all other hypertension medications. Monitor BP. Recommend BP check in 1 week with office.          Return in about 2 weeks (around 10/20/2017). for hypertension.     This medical record contains text that has been entered with the assistance of computer voice recognition and dictation software.  Therefore, it may contain unintended errors in text, spelling, punctuation, or grammar.                          "

## 2017-10-10 ENCOUNTER — TELEPHONE (OUTPATIENT)
Dept: MEDICAL GROUP | Age: 72
End: 2017-10-10

## 2017-10-10 NOTE — TELEPHONE ENCOUNTER
Phone Number Called: [phone] (home)     Message: Pt states that her symptoms have improved. She states that she will come in at her next appointment with Dr. Sandra.    Left Message for patient to call back: no

## 2017-10-10 NOTE — TELEPHONE ENCOUNTER
Please see if patient is improving with her symptoms from last visit.   Recommend she come in this week for MA BP check.

## 2017-11-13 ENCOUNTER — HOSPITAL ENCOUNTER (OUTPATIENT)
Dept: RADIOLOGY | Facility: MEDICAL CENTER | Age: 72
End: 2017-11-13
Attending: INTERNAL MEDICINE
Payer: MEDICARE

## 2017-11-13 DIAGNOSIS — Z12.31 VISIT FOR SCREENING MAMMOGRAM: ICD-10-CM

## 2017-11-13 PROCEDURE — 77063 BREAST TOMOSYNTHESIS BI: CPT

## 2017-11-29 ENCOUNTER — TELEPHONE (OUTPATIENT)
Dept: MEDICAL GROUP | Age: 72
End: 2017-11-29

## 2017-11-30 ENCOUNTER — OFFICE VISIT (OUTPATIENT)
Dept: MEDICAL GROUP | Age: 72
End: 2017-11-30
Payer: MEDICARE

## 2017-11-30 VITALS
WEIGHT: 214.6 LBS | OXYGEN SATURATION: 98 % | HEIGHT: 63 IN | HEART RATE: 88 BPM | DIASTOLIC BLOOD PRESSURE: 82 MMHG | BODY MASS INDEX: 38.02 KG/M2 | TEMPERATURE: 98.7 F | SYSTOLIC BLOOD PRESSURE: 152 MMHG

## 2017-11-30 DIAGNOSIS — R05.9 COUGH: ICD-10-CM

## 2017-11-30 DIAGNOSIS — I10 ESSENTIAL HYPERTENSION: ICD-10-CM

## 2017-11-30 DIAGNOSIS — J06.9 UPPER RESPIRATORY TRACT INFECTION, UNSPECIFIED TYPE: ICD-10-CM

## 2017-11-30 PROCEDURE — 99214 OFFICE O/P EST MOD 30 MIN: CPT | Performed by: FAMILY MEDICINE

## 2017-11-30 RX ORDER — AMLODIPINE BESYLATE 5 MG/1
5 TABLET ORAL DAILY
Qty: 30 TAB | Refills: 6 | Status: SHIPPED | OUTPATIENT
Start: 2017-11-30 | End: 2018-06-05 | Stop reason: SDUPTHER

## 2017-11-30 NOTE — TELEPHONE ENCOUNTER
ESTABLISHED PATIENT PRE-VISIT PLANNING     Note: Patient will not be contacted if there is no indication to call.     1.  Reviewed notes from the last few office visits within the medical group: Yes    2.  If any orders were placed at last visit or intended to be done for this visit (i.e. 6 mos follow-up), do we have Results/Consult Notes?        •  Labs - Labs ordered, completed on 10/3/17 and results are in chart.   Note: If patient appointment is for lab review and patient did not complete labs, check with provider if OK to reschedule patient until labs completed.       •  Imaging - Imaging ordered, completed and results are in chart.       •  Referrals - Referral ordered, patient was seen and consult notes are in chart. Care Teams updated  YES.    3. Is this appointment scheduled as a Hospital Follow-Up? No    4.  Immunizations were updated in Epic using WebIZ?: Epic matches WebIZ       •  Web Iz Recommendations: Patient is up to date on all vaccines    5.  Patient is due for the following Health Maintenance Topics:   Health Maintenance Due   Topic Date Due   • Annual Wellness Visit  1945   • PFT SCREENING-FEV1 AND FEV/FVC RATIO / SPIROMETRY SHOULD BE PERFORMED ANNUALLY  03/18/1963   • BONE DENSITY  03/18/2010   • COLON CANCER SCREENING ANNUAL FIT  10/06/2017           6.  Patient was NOT informed to arrive 15 min prior to their scheduled appointment and bring in their medication bottles.

## 2017-11-30 NOTE — PROGRESS NOTES
SUBJECTIVE:        Chief Complaint   Patient presents with   • URI     x 1 week.   • Cough     x 1-2 days       HPI:     Rufina Macias is a 72 y.o. female here for symptoms of cough for past couple days. She improved after last visit and did better and she did not need to take medications. She was prescribed a medrol dose pack and an antibiotic. She did use an inhaler- combivent which has been helping. Overall did better.   Then she got sick again about a week ago, coughing more so the last couple days. Has been a slightly productive cough. She has had some congestion. No fevers/chills. No chest pain or shortness of breath.     Hypertension- had her losartan increased from 50 mg daily to 100 mg daily, maxzide/dyazie 37.5/25 mg daily, amlodipine 2.5 mg daily. Has tolerated medication well. BP still a little elevated.     ROS:  Denies any recent fevers or chills. No nausea or vomiting. No diarrhea. No chest pains or shortness of breath. No lower extremity edema.    Current Outpatient Prescriptions on File Prior to Visit   Medication Sig Dispense Refill   • ipratropium-albuterol (COMBIVENT RESPIMAT)  MCG/ACT Aero Soln Inhale 1 Puff by mouth 4 times a day. 1 Inhaler 3   • losartan (COZAAR) 100 MG Tab Take 1 Tab by mouth every day. 30 Tab 11   • Nebivolol HCl (BYSTOLIC) 20 MG Tab Take 20 mg by mouth every day. 30 Tab 11   • triamterene-hctz (MAXZIDE-25/DYAZIDE) 37.5-25 MG Tab Take 1 Tab by mouth every day. 30 Tab 3   • hydrocodone/acetaminophen (NORCO)  MG Tab TAKE ONE TABLET BY MOUTH FOUR TIMES A DAY AS NEEDED 30 DAY SUPPLY  0   • zolpidem (AMBIEN) 10 MG Tab TAKE ONE TABLET BY MOUTH DAILY AT BEDTIME 30 DAY SUPPLY  0   • simvastatin (ZOCOR) 20 MG Tab Take 0.25 Tabs by mouth every evening. 90 Tab 4   • Cholecalciferol (VITAMIN D) 2000 UNITS Cap Take 1 Cap by mouth every day. 90 Cap 4   • fluticasone (FLONASE) 50 MCG/ACT nasal spray Spray 1 Spray in nose every day. Each Nostril 1 Bottle 11   • albuterol  (VENTOLIN OR PROVENTIL) 108 (90 BASE) MCG/ACT Aero Soln inhalation aerosol Inhale 2 Puffs by mouth every 6 hours as needed for Shortness of Breath. 8.5 g 3   • MethylPREDNISolone (MEDROL DOSEPAK) 4 MG Tablet Therapy Pack As directed on the packaging label. (Patient not taking: Reported on 11/30/2017) 21 Tab 0   • azithromycin (ZITHROMAX) 250 MG Tab 2 tabs first day, then one tab daily. (Patient not taking: Reported on 11/30/2017) 6 Tab 0   • simvastatin (ZOCOR) 20 MG Tab TAKE 0.5 TABS BY MOUTH EVERY EVENING. 90 Tab 4     No current facility-administered medications on file prior to visit.        Allergies   Allergen Reactions   • Codeine Swelling   • Ace Inhibitors      Cough       Patient Active Problem List    Diagnosis Date Noted   • PALMER (dyspnea on exertion) 06/21/2017   • Colon cancer screening- tbd aug 2017 06/21/2017   • Obesity (BMI 30-39.9) 04/04/2017   • Chronic pain syndrome- dr medrano; off opiates  04/04/2017   • Controlled substance agreement signed 03/22/2016   • Essential hypertension 08/31/2015   • DDD (degenerative disc disease), cervical 04/07/2015   • Thoracic radiculopathy 03/09/2015   • Lumbar radiculopathy 03/09/2015   • Vitamin D deficiency disease 10/11/2013   • DDD (degenerative disc disease), lumbar 10/11/2013   • Chronic allergic rhinitis 10/11/2013   • Primary insomnia 11/29/2012   • History of endometrial cancer-2009 JORDON-BSO; neg nodes 04/16/2012   • DJD (degenerative joint disease)- hands 04/16/2012   • COPD (chronic obstructive pulmonary disease) (CMS-[facility]) 11/07/2011   • Hyperlipidemia with target LDL less than 130 10/28/2010   • Carpal tunnel syndrome of right wrist        Past Medical History:   Diagnosis Date   • Allergy    • Arthritis    • Bronchitis    • CA - cancer of uterus    • Carpal tunnel syndrome    • COPD    • Diverticula of colon    • EMPHYSEMA    • Hyperlipidemia    • Hypertension    • Tremor, hereditary, benign          OBJECTIVE:   /82   Pulse 88   Temp 37.1  "°C (98.7 °F)   Ht 1.588 m (5' 2.5\")   Wt 97.3 kg (214 lb 9.6 oz)   SpO2 98%   BMI 38.63 kg/m²   General: Well-developed well-nourished female, no acute distress  Neck: supple, no lymphadenopathy- cervical or supraclavicular, no thyromegaly  Cardiovascular: regular rate and rhythm, no murmurs, gallops, rubs  Lungs: clear to auscultation bilaterally, no wheezes, crackles, or rhonchi  Abdomen: +bowel sounds, soft, nontender, nondistended, no rebound, no guarding, no hepatosplenomegaly  Extremities: no cyanosis, clubbing, edema  Skin: Warm and dry  Psych: appropriate mood and affect        ASSESSMENT/PLAN:    72 y.o.female      1. Cough   Rest, keep well hydrated, and discussed over-the-counter medications to use for symptom relief. She has prior medication prescriptions she may use as needed if any worsening symptoms.   Follow up if no improvement in symptoms in 1-2 weeks.    2. Upper respiratory tract infection, unspecified type- as above.      3. Essential hypertension- uncontrolled.   -Will recommend increase amlodipine to 5 mg daily and continue remaining medications. Monitor BP. Modify diet.   Recommend follow up with PCP within 3 months.  amlodipine (NORVASC) 5 MG Tab       This medical record contains text that has been entered with the assistance of computer voice recognition and dictation software.  Therefore, it may contain unintended errors in text, spelling, punctuation, or grammar.                          "

## 2018-02-14 ENCOUNTER — APPOINTMENT (OUTPATIENT)
Dept: RADIOLOGY | Facility: MEDICAL CENTER | Age: 73
DRG: 871 | End: 2018-02-14
Attending: EMERGENCY MEDICINE
Payer: MEDICARE

## 2018-02-14 ENCOUNTER — HOSPITAL ENCOUNTER (INPATIENT)
Facility: MEDICAL CENTER | Age: 73
LOS: 5 days | DRG: 871 | End: 2018-02-19
Attending: EMERGENCY MEDICINE | Admitting: INTERNAL MEDICINE
Payer: MEDICARE

## 2018-02-14 ENCOUNTER — RESOLUTE PROFESSIONAL BILLING HOSPITAL PROF FEE (OUTPATIENT)
Dept: HOSPITALIST | Facility: MEDICAL CENTER | Age: 73
End: 2018-02-14
Payer: MEDICARE

## 2018-02-14 ENCOUNTER — APPOINTMENT (OUTPATIENT)
Dept: RADIOLOGY | Facility: MEDICAL CENTER | Age: 73
DRG: 871 | End: 2018-02-14
Attending: INTERNAL MEDICINE
Payer: MEDICARE

## 2018-02-14 DIAGNOSIS — D72.829 LEUKOCYTOSIS, UNSPECIFIED TYPE: ICD-10-CM

## 2018-02-14 PROBLEM — J18.9 PNEUMONIA: Status: ACTIVE | Noted: 2018-02-14

## 2018-02-14 LAB
ALBUMIN SERPL BCP-MCNC: 4 G/DL (ref 3.2–4.9)
ALBUMIN/GLOB SERPL: 1.3 G/DL
ALP SERPL-CCNC: 106 U/L (ref 30–99)
ALT SERPL-CCNC: 336 U/L (ref 2–50)
ANION GAP SERPL CALC-SCNC: 11 MMOL/L (ref 0–11.9)
APPEARANCE UR: CLEAR
AST SERPL-CCNC: 413 U/L (ref 12–45)
BASOPHILS # BLD AUTO: 0.1 % (ref 0–1.8)
BASOPHILS # BLD: 0.03 K/UL (ref 0–0.12)
BILIRUB SERPL-MCNC: 3.2 MG/DL (ref 0.1–1.5)
BILIRUB UR QL STRIP.AUTO: ABNORMAL
BUN SERPL-MCNC: 12 MG/DL (ref 8–22)
CALCIUM SERPL-MCNC: 9.1 MG/DL (ref 8.4–10.2)
CHLORIDE SERPL-SCNC: 99 MMOL/L (ref 96–112)
CO2 SERPL-SCNC: 23 MMOL/L (ref 20–33)
COLOR UR: YELLOW
CREAT SERPL-MCNC: 0.75 MG/DL (ref 0.5–1.4)
EOSINOPHIL # BLD AUTO: 0 K/UL (ref 0–0.51)
EOSINOPHIL NFR BLD: 0 % (ref 0–6.9)
ERYTHROCYTE [DISTWIDTH] IN BLOOD BY AUTOMATED COUNT: 41.4 FL (ref 35.9–50)
FLUAV RNA SPEC QL NAA+PROBE: NEGATIVE
FLUAV+FLUBV AG SPEC QL IA: NORMAL
FLUBV RNA SPEC QL NAA+PROBE: NEGATIVE
GLOBULIN SER CALC-MCNC: 3.1 G/DL (ref 1.9–3.5)
GLUCOSE SERPL-MCNC: 137 MG/DL (ref 65–99)
GLUCOSE UR STRIP.AUTO-MCNC: NEGATIVE MG/DL
HCT VFR BLD AUTO: 46.2 % (ref 37–47)
HGB BLD-MCNC: 15.7 G/DL (ref 12–16)
IMM GRANULOCYTES # BLD AUTO: 0.13 K/UL (ref 0–0.11)
IMM GRANULOCYTES NFR BLD AUTO: 0.6 % (ref 0–0.9)
INR PPP: 0.98 (ref 0.87–1.13)
KETONES UR STRIP.AUTO-MCNC: NEGATIVE MG/DL
LACTATE BLD-SCNC: 1.74 MMOL/L (ref 0.5–2)
LACTATE BLD-SCNC: 2.26 MMOL/L (ref 0.5–2)
LEUKOCYTE ESTERASE UR QL STRIP.AUTO: NEGATIVE
LYMPHOCYTES # BLD AUTO: 0.55 K/UL (ref 1–4.8)
LYMPHOCYTES NFR BLD: 2.5 % (ref 22–41)
MCH RBC QN AUTO: 30.1 PG (ref 27–33)
MCHC RBC AUTO-ENTMCNC: 34 G/DL (ref 33.6–35)
MCV RBC AUTO: 88.7 FL (ref 81.4–97.8)
MICRO URNS: ABNORMAL
MONOCYTES # BLD AUTO: 1.11 K/UL (ref 0–0.85)
MONOCYTES NFR BLD AUTO: 5 % (ref 0–13.4)
NEUTROPHILS # BLD AUTO: 20.18 K/UL (ref 2–7.15)
NEUTROPHILS NFR BLD: 91.8 % (ref 44–72)
NITRITE UR QL STRIP.AUTO: NEGATIVE
NRBC # BLD AUTO: 0 K/UL
NRBC BLD-RTO: 0 /100 WBC
PH UR STRIP.AUTO: 7 [PH]
PLATELET # BLD AUTO: 190 K/UL (ref 164–446)
PMV BLD AUTO: 9.4 FL (ref 9–12.9)
POTASSIUM SERPL-SCNC: 3.6 MMOL/L (ref 3.6–5.5)
PROCALCITONIN SERPL-MCNC: 0.96 NG/ML
PROT SERPL-MCNC: 7.1 G/DL (ref 6–8.2)
PROT UR QL STRIP: NEGATIVE MG/DL
PROTHROMBIN TIME: 12.6 SEC (ref 12–14.6)
RBC # BLD AUTO: 5.21 M/UL (ref 4.2–5.4)
RBC UR QL AUTO: NEGATIVE
SIGNIFICANT IND 70042: NORMAL
SITE SITE: NORMAL
SODIUM SERPL-SCNC: 133 MMOL/L (ref 135–145)
SOURCE SOURCE: NORMAL
SP GR UR STRIP.AUTO: 1.01
WBC # BLD AUTO: 22 K/UL (ref 4.8–10.8)

## 2018-02-14 PROCEDURE — 700111 HCHG RX REV CODE 636 W/ 250 OVERRIDE (IP): Performed by: INTERNAL MEDICINE

## 2018-02-14 PROCEDURE — 99223 1ST HOSP IP/OBS HIGH 75: CPT | Mod: AI | Performed by: INTERNAL MEDICINE

## 2018-02-14 PROCEDURE — 87086 URINE CULTURE/COLONY COUNT: CPT

## 2018-02-14 PROCEDURE — 87040 BLOOD CULTURE FOR BACTERIA: CPT | Mod: 91

## 2018-02-14 PROCEDURE — 700101 HCHG RX REV CODE 250: Performed by: INTERNAL MEDICINE

## 2018-02-14 PROCEDURE — 84145 PROCALCITONIN (PCT): CPT

## 2018-02-14 PROCEDURE — 76700 US EXAM ABDOM COMPLETE: CPT

## 2018-02-14 PROCEDURE — 85610 PROTHROMBIN TIME: CPT

## 2018-02-14 PROCEDURE — 85025 COMPLETE CBC W/AUTO DIFF WBC: CPT

## 2018-02-14 PROCEDURE — 87400 INFLUENZA A/B EACH AG IA: CPT

## 2018-02-14 PROCEDURE — 700105 HCHG RX REV CODE 258: Performed by: INTERNAL MEDICINE

## 2018-02-14 PROCEDURE — 700102 HCHG RX REV CODE 250 W/ 637 OVERRIDE(OP): Performed by: INTERNAL MEDICINE

## 2018-02-14 PROCEDURE — 83605 ASSAY OF LACTIC ACID: CPT | Mod: 91

## 2018-02-14 PROCEDURE — 96365 THER/PROPH/DIAG IV INF INIT: CPT

## 2018-02-14 PROCEDURE — 36415 COLL VENOUS BLD VENIPUNCTURE: CPT

## 2018-02-14 PROCEDURE — 87502 INFLUENZA DNA AMP PROBE: CPT

## 2018-02-14 PROCEDURE — 770020 HCHG ROOM/CARE - TELE (206)

## 2018-02-14 PROCEDURE — 87186 SC STD MICRODIL/AGAR DIL: CPT

## 2018-02-14 PROCEDURE — 71045 X-RAY EXAM CHEST 1 VIEW: CPT

## 2018-02-14 PROCEDURE — 700111 HCHG RX REV CODE 636 W/ 250 OVERRIDE (IP)

## 2018-02-14 PROCEDURE — 87522 HEPATITIS C REVRS TRNSCRPJ: CPT

## 2018-02-14 PROCEDURE — 700101 HCHG RX REV CODE 250

## 2018-02-14 PROCEDURE — 99285 EMERGENCY DEPT VISIT HI MDM: CPT

## 2018-02-14 PROCEDURE — 96375 TX/PRO/DX INJ NEW DRUG ADDON: CPT

## 2018-02-14 PROCEDURE — 87077 CULTURE AEROBIC IDENTIFY: CPT

## 2018-02-14 PROCEDURE — A9270 NON-COVERED ITEM OR SERVICE: HCPCS | Performed by: INTERNAL MEDICINE

## 2018-02-14 PROCEDURE — 80053 COMPREHEN METABOLIC PANEL: CPT

## 2018-02-14 PROCEDURE — 81003 URINALYSIS AUTO W/O SCOPE: CPT

## 2018-02-14 PROCEDURE — 96361 HYDRATE IV INFUSION ADD-ON: CPT

## 2018-02-14 RX ORDER — AZITHROMYCIN 500 MG/1
500 INJECTION, POWDER, LYOPHILIZED, FOR SOLUTION INTRAVENOUS DAILY
Status: DISCONTINUED | OUTPATIENT
Start: 2018-02-14 | End: 2018-02-14

## 2018-02-14 RX ORDER — POLYETHYLENE GLYCOL 3350 17 G/17G
1 POWDER, FOR SOLUTION ORAL
Status: DISCONTINUED | OUTPATIENT
Start: 2018-02-14 | End: 2018-02-19 | Stop reason: HOSPADM

## 2018-02-14 RX ORDER — HYDROCODONE BITARTRATE AND ACETAMINOPHEN 10; 325 MG/1; MG/1
1 TABLET ORAL EVERY 6 HOURS PRN
Status: DISCONTINUED | OUTPATIENT
Start: 2018-02-14 | End: 2018-02-19 | Stop reason: HOSPADM

## 2018-02-14 RX ORDER — LOSARTAN POTASSIUM 25 MG/1
100 TABLET ORAL DAILY
Status: DISCONTINUED | OUTPATIENT
Start: 2018-02-14 | End: 2018-02-15

## 2018-02-14 RX ORDER — FLUTICASONE PROPIONATE 50 MCG
1 SPRAY, SUSPENSION (ML) NASAL DAILY
Status: DISCONTINUED | OUTPATIENT
Start: 2018-02-15 | End: 2018-02-19 | Stop reason: HOSPADM

## 2018-02-14 RX ORDER — ZOLPIDEM TARTRATE 5 MG/1
5 TABLET ORAL NIGHTLY PRN
Status: DISCONTINUED | OUTPATIENT
Start: 2018-02-14 | End: 2018-02-19 | Stop reason: HOSPADM

## 2018-02-14 RX ORDER — BISACODYL 10 MG
10 SUPPOSITORY, RECTAL RECTAL
Status: DISCONTINUED | OUTPATIENT
Start: 2018-02-14 | End: 2018-02-19 | Stop reason: HOSPADM

## 2018-02-14 RX ORDER — DEXTROSE MONOHYDRATE 25 G/50ML
25 INJECTION, SOLUTION INTRAVENOUS
Status: DISCONTINUED | OUTPATIENT
Start: 2018-02-14 | End: 2018-02-19 | Stop reason: HOSPADM

## 2018-02-14 RX ORDER — AMOXICILLIN 250 MG
2 CAPSULE ORAL 2 TIMES DAILY
Status: DISCONTINUED | OUTPATIENT
Start: 2018-02-14 | End: 2018-02-19 | Stop reason: HOSPADM

## 2018-02-14 RX ORDER — CEFTRIAXONE 1 G/1
INJECTION, POWDER, FOR SOLUTION INTRAMUSCULAR; INTRAVENOUS
Status: DISPENSED
Start: 2018-02-14 | End: 2018-02-15

## 2018-02-14 RX ORDER — METOPROLOL TARTRATE 50 MG/1
100 TABLET, FILM COATED ORAL TWICE DAILY
Status: DISCONTINUED | OUTPATIENT
Start: 2018-02-14 | End: 2018-02-19 | Stop reason: HOSPADM

## 2018-02-14 RX ORDER — ACETAMINOPHEN 325 MG/1
650 TABLET ORAL EVERY 6 HOURS PRN
Status: DISCONTINUED | OUTPATIENT
Start: 2018-02-14 | End: 2018-02-19 | Stop reason: HOSPADM

## 2018-02-14 RX ORDER — SIMVASTATIN 20 MG
20 TABLET ORAL EVERY EVENING
Status: DISCONTINUED | OUTPATIENT
Start: 2018-02-14 | End: 2018-02-19 | Stop reason: HOSPADM

## 2018-02-14 RX ORDER — HYDROCODONE BITARTRATE AND ACETAMINOPHEN 5; 325 MG/1; MG/1
2 TABLET ORAL ONCE
Status: COMPLETED | OUTPATIENT
Start: 2018-02-14 | End: 2018-02-14

## 2018-02-14 RX ORDER — CEFTRIAXONE 2 G/1
INJECTION, POWDER, FOR SOLUTION INTRAMUSCULAR; INTRAVENOUS
Status: COMPLETED
Start: 2018-02-14 | End: 2018-02-14

## 2018-02-14 RX ORDER — NEBIVOLOL 20 MG/1
20 TABLET ORAL DAILY
Status: DISCONTINUED | OUTPATIENT
Start: 2018-02-15 | End: 2018-02-14

## 2018-02-14 RX ORDER — SODIUM CHLORIDE 9 MG/ML
1000 INJECTION, SOLUTION INTRAVENOUS ONCE
Status: COMPLETED | OUTPATIENT
Start: 2018-02-14 | End: 2018-02-14

## 2018-02-14 RX ORDER — TRIAMTERENE AND HYDROCHLOROTHIAZIDE 37.5; 25 MG/1; MG/1
1 TABLET ORAL DAILY
Status: DISCONTINUED | OUTPATIENT
Start: 2018-02-15 | End: 2018-02-15

## 2018-02-14 RX ORDER — SODIUM CHLORIDE AND POTASSIUM CHLORIDE 150; 900 MG/100ML; MG/100ML
INJECTION, SOLUTION INTRAVENOUS ONCE
Status: COMPLETED | OUTPATIENT
Start: 2018-02-14 | End: 2018-02-14

## 2018-02-14 RX ADMIN — ZOLPIDEM TARTRATE 5 MG: 5 TABLET, FILM COATED ORAL at 22:12

## 2018-02-14 RX ADMIN — CEFTRIAXONE SODIUM 2 G: 2 INJECTION, POWDER, FOR SOLUTION INTRAMUSCULAR; INTRAVENOUS at 16:15

## 2018-02-14 RX ADMIN — CEFTRIAXONE SODIUM 2 G: 10 INJECTION, POWDER, FOR SOLUTION INTRAVENOUS at 15:45

## 2018-02-14 RX ADMIN — AZITHROMYCIN MONOHYDRATE 500 MG: 500 INJECTION, POWDER, LYOPHILIZED, FOR SOLUTION INTRAVENOUS at 17:16

## 2018-02-14 RX ADMIN — POTASSIUM CHLORIDE AND SODIUM CHLORIDE: 900; 150 INJECTION, SOLUTION INTRAVENOUS at 22:04

## 2018-02-14 RX ADMIN — LOSARTAN POTASSIUM 100 MG: 25 TABLET, FILM COATED ORAL at 22:11

## 2018-02-14 RX ADMIN — HYDROCODONE BITARTRATE AND ACETAMINOPHEN 2 TABLET: 5; 325 TABLET ORAL at 17:25

## 2018-02-14 RX ADMIN — SODIUM CHLORIDE 1000 ML: 9 INJECTION, SOLUTION INTRAVENOUS at 16:02

## 2018-02-14 ASSESSMENT — COPD QUESTIONNAIRES
DURING THE PAST 4 WEEKS HOW MUCH DID YOU FEEL SHORT OF BREATH: NONE/LITTLE OF THE TIME
DO YOU EVER COUGH UP ANY MUCUS OR PHLEGM?: NO/ONLY WITH OCCASIONAL COLDS OR INFECTIONS
HAVE YOU SMOKED AT LEAST 100 CIGARETTES IN YOUR ENTIRE LIFE: YES
COPD SCREENING SCORE: 4

## 2018-02-14 ASSESSMENT — PAIN SCALES - GENERAL
PAINLEVEL_OUTOF10: 0
PAINLEVEL_OUTOF10: 6

## 2018-02-14 ASSESSMENT — LIFESTYLE VARIABLES
EVER_SMOKED: YES
EVER_SMOKED: YES
DO YOU DRINK ALCOHOL: NO
DO YOU DRINK ALCOHOL: NO

## 2018-02-14 ASSESSMENT — PATIENT HEALTH QUESTIONNAIRE - PHQ9
2. FEELING DOWN, DEPRESSED, IRRITABLE, OR HOPELESS: NOT AT ALL
SUM OF ALL RESPONSES TO PHQ QUESTIONS 1-9: 0
SUM OF ALL RESPONSES TO PHQ9 QUESTIONS 1 AND 2: 0
1. LITTLE INTEREST OR PLEASURE IN DOING THINGS: NOT AT ALL

## 2018-02-14 NOTE — ED NOTES
"Chief Complaint   Patient presents with   • Shortness of Breath   • Nausea   • Fever   • Cough     /85   Pulse (!) 101   Temp 36.8 °C (98.2 °F)   Resp (!) 22   Ht 1.6 m (5' 3\")   Wt 97.7 kg (215 lb 6.2 oz)   SpO2 92%   BMI 38.15 kg/m²     "

## 2018-02-15 ENCOUNTER — PATIENT OUTREACH (OUTPATIENT)
Dept: HEALTH INFORMATION MANAGEMENT | Facility: OTHER | Age: 73
End: 2018-02-15

## 2018-02-15 ENCOUNTER — APPOINTMENT (OUTPATIENT)
Dept: RADIOLOGY | Facility: MEDICAL CENTER | Age: 73
DRG: 871 | End: 2018-02-15
Attending: INTERNAL MEDICINE
Payer: MEDICARE

## 2018-02-15 PROBLEM — R10.9 ABDOMINAL PAIN: Status: ACTIVE | Noted: 2018-02-15

## 2018-02-15 PROBLEM — A41.9 SEPSIS (HCC): Status: ACTIVE | Noted: 2018-02-15

## 2018-02-15 LAB
ALBUMIN SERPL BCP-MCNC: 3.2 G/DL (ref 3.2–4.9)
ALBUMIN/GLOB SERPL: 1.1 G/DL
ALP SERPL-CCNC: 99 U/L (ref 30–99)
ALT SERPL-CCNC: 241 U/L (ref 2–50)
ANION GAP SERPL CALC-SCNC: 10 MMOL/L (ref 0–11.9)
AST SERPL-CCNC: 173 U/L (ref 12–45)
BILIRUB SERPL-MCNC: 4.3 MG/DL (ref 0.1–1.5)
BUN SERPL-MCNC: 6 MG/DL (ref 8–22)
CALCIUM SERPL-MCNC: 8.5 MG/DL (ref 8.4–10.2)
CHLORIDE SERPL-SCNC: 105 MMOL/L (ref 96–112)
CO2 SERPL-SCNC: 23 MMOL/L (ref 20–33)
CREAT SERPL-MCNC: 0.75 MG/DL (ref 0.5–1.4)
ERYTHROCYTE [DISTWIDTH] IN BLOOD BY AUTOMATED COUNT: 42.9 FL (ref 35.9–50)
GLOBULIN SER CALC-MCNC: 2.8 G/DL (ref 1.9–3.5)
GLUCOSE SERPL-MCNC: 121 MG/DL (ref 65–99)
HAV IGM SERPL QL IA: NEGATIVE
HBV CORE IGM SER QL: NEGATIVE
HBV SURFACE AG SER QL: NEGATIVE
HCT VFR BLD AUTO: 42.2 % (ref 37–47)
HCV AB SER QL: REACTIVE
HGB BLD-MCNC: 14 G/DL (ref 12–16)
LIPASE SERPL-CCNC: <10 U/L (ref 7–58)
MCH RBC QN AUTO: 29.7 PG (ref 27–33)
MCHC RBC AUTO-ENTMCNC: 33.2 G/DL (ref 33.6–35)
MCV RBC AUTO: 89.4 FL (ref 81.4–97.8)
PLATELET # BLD AUTO: 141 K/UL (ref 164–446)
PMV BLD AUTO: 9.7 FL (ref 9–12.9)
POTASSIUM SERPL-SCNC: 3.3 MMOL/L (ref 3.6–5.5)
PROT SERPL-MCNC: 6 G/DL (ref 6–8.2)
RBC # BLD AUTO: 4.72 M/UL (ref 4.2–5.4)
SODIUM SERPL-SCNC: 138 MMOL/L (ref 135–145)
WBC # BLD AUTO: 14.2 K/UL (ref 4.8–10.8)

## 2018-02-15 PROCEDURE — 700105 HCHG RX REV CODE 258: Performed by: INTERNAL MEDICINE

## 2018-02-15 PROCEDURE — 700111 HCHG RX REV CODE 636 W/ 250 OVERRIDE (IP): Performed by: HOSPITALIST

## 2018-02-15 PROCEDURE — 160035 HCHG PACU - 1ST 60 MINS PHASE I: Performed by: INTERNAL MEDICINE

## 2018-02-15 PROCEDURE — 85027 COMPLETE CBC AUTOMATED: CPT

## 2018-02-15 PROCEDURE — 94760 N-INVAS EAR/PLS OXIMETRY 1: CPT

## 2018-02-15 PROCEDURE — 0FC98ZZ EXTIRPATION OF MATTER FROM COMMON BILE DUCT, VIA NATURAL OR ARTIFICIAL OPENING ENDOSCOPIC: ICD-10-PCS | Performed by: INTERNAL MEDICINE

## 2018-02-15 PROCEDURE — 700102 HCHG RX REV CODE 250 W/ 637 OVERRIDE(OP): Performed by: HOSPITALIST

## 2018-02-15 PROCEDURE — 700101 HCHG RX REV CODE 250: Performed by: HOSPITALIST

## 2018-02-15 PROCEDURE — 700111 HCHG RX REV CODE 636 W/ 250 OVERRIDE (IP)

## 2018-02-15 PROCEDURE — BF101ZZ FLUOROSCOPY OF BILE DUCTS USING LOW OSMOLAR CONTRAST: ICD-10-PCS | Performed by: INTERNAL MEDICINE

## 2018-02-15 PROCEDURE — 80053 COMPREHEN METABOLIC PANEL: CPT

## 2018-02-15 PROCEDURE — 700117 HCHG RX CONTRAST REV CODE 255

## 2018-02-15 PROCEDURE — 770020 HCHG ROOM/CARE - TELE (206)

## 2018-02-15 PROCEDURE — 700101 HCHG RX REV CODE 250

## 2018-02-15 PROCEDURE — 160048 HCHG OR STATISTICAL LEVEL 1-5: Performed by: INTERNAL MEDICINE

## 2018-02-15 PROCEDURE — 0F798DZ DILATION OF COMMON BILE DUCT WITH INTRALUMINAL DEVICE, VIA NATURAL OR ARTIFICIAL OPENING ENDOSCOPIC: ICD-10-PCS | Performed by: INTERNAL MEDICINE

## 2018-02-15 PROCEDURE — C2617 STENT, NON-COR, TEM W/O DEL: HCPCS | Performed by: INTERNAL MEDICINE

## 2018-02-15 PROCEDURE — 160009 HCHG ANES TIME/MIN: Performed by: INTERNAL MEDICINE

## 2018-02-15 PROCEDURE — 700102 HCHG RX REV CODE 250 W/ 637 OVERRIDE(OP): Performed by: INTERNAL MEDICINE

## 2018-02-15 PROCEDURE — 80074 ACUTE HEPATITIS PANEL: CPT

## 2018-02-15 PROCEDURE — 160203 HCHG ENDO MINUTES - 1ST 30 MINS LEVEL 4: Performed by: INTERNAL MEDICINE

## 2018-02-15 PROCEDURE — 700101 HCHG RX REV CODE 250: Performed by: INTERNAL MEDICINE

## 2018-02-15 PROCEDURE — 99233 SBSQ HOSP IP/OBS HIGH 50: CPT | Performed by: HOSPITALIST

## 2018-02-15 PROCEDURE — 160208 HCHG ENDO MINUTES - EA ADDL 1 MIN LEVEL 4: Performed by: INTERNAL MEDICINE

## 2018-02-15 PROCEDURE — A9270 NON-COVERED ITEM OR SERVICE: HCPCS | Performed by: HOSPITALIST

## 2018-02-15 PROCEDURE — 700105 HCHG RX REV CODE 258

## 2018-02-15 PROCEDURE — 160002 HCHG RECOVERY MINUTES (STAT): Performed by: INTERNAL MEDICINE

## 2018-02-15 PROCEDURE — 110371 HCHG SHELL REV 272: Performed by: INTERNAL MEDICINE

## 2018-02-15 PROCEDURE — 700111 HCHG RX REV CODE 636 W/ 250 OVERRIDE (IP): Performed by: INTERNAL MEDICINE

## 2018-02-15 PROCEDURE — A9270 NON-COVERED ITEM OR SERVICE: HCPCS | Performed by: INTERNAL MEDICINE

## 2018-02-15 PROCEDURE — 83690 ASSAY OF LIPASE: CPT

## 2018-02-15 DEVICE — ADVANIX BILIARY CENTER BEND 10X5: Type: IMPLANTABLE DEVICE | Site: ESOPHAGUS | Status: FUNCTIONAL

## 2018-02-15 RX ORDER — MORPHINE SULFATE 4 MG/ML
1-2 INJECTION, SOLUTION INTRAMUSCULAR; INTRAVENOUS
Status: DISCONTINUED | OUTPATIENT
Start: 2018-02-15 | End: 2018-02-19 | Stop reason: HOSPADM

## 2018-02-15 RX ORDER — SODIUM CHLORIDE 9 MG/ML
500 INJECTION, SOLUTION INTRAVENOUS
Status: COMPLETED | OUTPATIENT
Start: 2018-02-15 | End: 2018-02-15

## 2018-02-15 RX ORDER — SODIUM CHLORIDE AND POTASSIUM CHLORIDE 150; 900 MG/100ML; MG/100ML
INJECTION, SOLUTION INTRAVENOUS CONTINUOUS
Status: DISCONTINUED | OUTPATIENT
Start: 2018-02-15 | End: 2018-02-19 | Stop reason: HOSPADM

## 2018-02-15 RX ORDER — IPRATROPIUM BROMIDE AND ALBUTEROL SULFATE 2.5; .5 MG/3ML; MG/3ML
3 SOLUTION RESPIRATORY (INHALATION)
Status: DISCONTINUED | OUTPATIENT
Start: 2018-02-15 | End: 2018-02-19 | Stop reason: HOSPADM

## 2018-02-15 RX ORDER — SODIUM CHLORIDE 9 MG/ML
INJECTION, SOLUTION INTRAVENOUS ONCE
Status: DISCONTINUED | OUTPATIENT
Start: 2018-02-15 | End: 2018-02-15

## 2018-02-15 RX ORDER — POTASSIUM CHLORIDE 20 MEQ/1
40 TABLET, EXTENDED RELEASE ORAL ONCE
Status: COMPLETED | OUTPATIENT
Start: 2018-02-15 | End: 2018-02-15

## 2018-02-15 RX ORDER — SODIUM CHLORIDE 9 MG/ML
INJECTION, SOLUTION INTRAVENOUS ONCE
Status: COMPLETED | OUTPATIENT
Start: 2018-02-15 | End: 2018-02-15

## 2018-02-15 RX ADMIN — POTASSIUM CHLORIDE AND SODIUM CHLORIDE: 900; 150 INJECTION, SOLUTION INTRAVENOUS at 16:52

## 2018-02-15 RX ADMIN — PIPERACILLIN AND TAZOBACTAM: 3; .375 INJECTION, POWDER, LYOPHILIZED, FOR SOLUTION INTRAVENOUS; PARENTERAL at 03:35

## 2018-02-15 RX ADMIN — FENTANYL CITRATE 50 MCG: 50 INJECTION, SOLUTION INTRAMUSCULAR; INTRAVENOUS at 15:00

## 2018-02-15 RX ADMIN — FENTANYL CITRATE 50 MCG: 50 INJECTION, SOLUTION INTRAMUSCULAR; INTRAVENOUS at 15:05

## 2018-02-15 RX ADMIN — FENTANYL CITRATE 50 MCG: 50 INJECTION, SOLUTION INTRAMUSCULAR; INTRAVENOUS at 15:11

## 2018-02-15 RX ADMIN — PIPERACILLIN AND TAZOBACTAM 3.38 G: 3; .375 INJECTION, POWDER, FOR SOLUTION INTRAVENOUS at 03:30

## 2018-02-15 RX ADMIN — PIPERACILLIN AND TAZOBACTAM 3.38 G: 3; .375 INJECTION, POWDER, FOR SOLUTION INTRAVENOUS at 11:12

## 2018-02-15 RX ADMIN — SODIUM CHLORIDE: 9 INJECTION, SOLUTION INTRAVENOUS at 11:15

## 2018-02-15 RX ADMIN — HYDROCODONE BITARTRATE AND ACETAMINOPHEN 1 TABLET: 10; 325 TABLET ORAL at 07:36

## 2018-02-15 RX ADMIN — FENTANYL CITRATE 50 MCG: 50 INJECTION, SOLUTION INTRAMUSCULAR; INTRAVENOUS at 14:54

## 2018-02-15 RX ADMIN — PIPERACILLIN AND TAZOBACTAM 3.38 G: 3; .375 INJECTION, POWDER, FOR SOLUTION INTRAVENOUS at 17:56

## 2018-02-15 RX ADMIN — SODIUM CHLORIDE: 9 INJECTION, SOLUTION INTRAVENOUS at 02:45

## 2018-02-15 RX ADMIN — SODIUM CHLORIDE 500 ML: 9 INJECTION, SOLUTION INTRAVENOUS at 03:15

## 2018-02-15 RX ADMIN — POTASSIUM CHLORIDE 40 MEQ: 1500 TABLET, EXTENDED RELEASE ORAL at 16:52

## 2018-02-15 RX ADMIN — ZOLPIDEM TARTRATE 5 MG: 5 TABLET, FILM COATED ORAL at 21:19

## 2018-02-15 ASSESSMENT — ENCOUNTER SYMPTOMS
MUSCULOSKELETAL NEGATIVE: 1
STRIDOR: 0
MYALGIAS: 0
DEPRESSION: 0
POLYDIPSIA: 0
EYES NEGATIVE: 1
HEADACHES: 0
NERVOUS/ANXIOUS: 0
SPEECH CHANGE: 0
SENSORY CHANGE: 0
BRUISES/BLEEDS EASILY: 0
DOUBLE VISION: 0
PHOTOPHOBIA: 0
PND: 0
CLAUDICATION: 0
EYE REDNESS: 0
EYE DISCHARGE: 0
LOSS OF CONSCIOUSNESS: 0
NEUROLOGICAL NEGATIVE: 1
RESPIRATORY NEGATIVE: 1
BLOOD IN STOOL: 0
SORE THROAT: 0
DIAPHORESIS: 0
DIZZINESS: 0
ABDOMINAL PAIN: 1
ABDOMINAL PAIN: 0
PALPITATIONS: 0
SORE THROAT: 1
FLANK PAIN: 0
SHORTNESS OF BREATH: 0
CONSTITUTIONAL NEGATIVE: 1
INSOMNIA: 0
FEVER: 1
CHILLS: 1
ORTHOPNEA: 0
HEARTBURN: 0
FEVER: 0
CARDIOVASCULAR NEGATIVE: 1
CONSTIPATION: 0
HEMOPTYSIS: 0
COUGH: 0
NECK PAIN: 0
DIARRHEA: 0
BLURRED VISION: 0
SPUTUM PRODUCTION: 0
VOMITING: 0
CHILLS: 0
HALLUCINATIONS: 0
WEAKNESS: 0
SEIZURES: 0
TREMORS: 0
WHEEZING: 0
BACK PAIN: 0
FOCAL WEAKNESS: 0
PSYCHIATRIC NEGATIVE: 1
COUGH: 1
MEMORY LOSS: 0
FALLS: 0
TINGLING: 0
SINUS PAIN: 0
NAUSEA: 0
WEIGHT LOSS: 0
EYE PAIN: 0

## 2018-02-15 ASSESSMENT — PATIENT HEALTH QUESTIONNAIRE - PHQ9
2. FEELING DOWN, DEPRESSED, IRRITABLE, OR HOPELESS: NOT AT ALL
1. LITTLE INTEREST OR PLEASURE IN DOING THINGS: NOT AT ALL
SUM OF ALL RESPONSES TO PHQ QUESTIONS 1-9: 0
SUM OF ALL RESPONSES TO PHQ9 QUESTIONS 1 AND 2: 0

## 2018-02-15 ASSESSMENT — PAIN SCALES - GENERAL
PAINLEVEL_OUTOF10: 6
PAINLEVEL_OUTOF10: 3
PAINLEVEL_OUTOF10: 0
PAINLEVEL_OUTOF10: 0
PAINLEVEL_OUTOF10: 7
PAINLEVEL_OUTOF10: 5
PAINLEVEL_OUTOF10: 0

## 2018-02-15 ASSESSMENT — LIFESTYLE VARIABLES
DO YOU DRINK ALCOHOL: NO
SUBSTANCE_ABUSE: 0

## 2018-02-15 NOTE — ASSESSMENT & PLAN NOTE
Resolving  Bacteremia - pan sensitive ecol - will stop zosyn, change to ceftriaxone  Repeat bc pending

## 2018-02-15 NOTE — FLOWSHEET NOTE
02/14/18 2244   Type of Assessment   Assessment Yes   Patient History   Pulmonary Diagnosis COPD HX   Surgical Procedures none   Home O2 No   Home Treatments/Frequency Yes   MDI 1/Frequency combivent bid   COPD Risk Screening   Do you have a history of COPD? Yes   Do you have a Pulmonologist? No   COPD Population Screener   During the past 4 weeks, how much did you feel short of breath? 0   Do you ever cough up any mucus or phlegm? 0   In the past 12 months, you do less than you used to because of your breathing problems 0   Have you smoked at least 100 cigarettes in your entire life? 2   How old are you? 2   COPD Screening Score 4   Smoking History   Have you Ever Smoked Yes   Have you Smoked in the Last 12 Mos No   Level Of Consciousness   Level of Consciousness Alert   Respiratory WDL   Respiratory (WDL) X   Chest Exam   Respiration 18   Pulse (!) 101   Breath Sounds   RUL Breath Sounds Clear   RML Breath Sounds Clear   RLL Breath Sounds Diminished   BRYAN Breath Sounds Clear   LLL Breath Sounds Diminished   Oxygen   Home O2 Use Prior To Admission? No   Pulse Oximetry 93 %   O2 Daily Delivery Respiratory  Room Air with O2 Available   Room Air Challenge Pass   Protocol Pathways   Protocol Pathways Yes   Incentive Spirometer Instruct   Predicted (ml) 1750   60% (ml) 1050   40% (ml) 700   Actual (ml) 1250

## 2018-02-15 NOTE — CARE PLAN
Problem: Knowledge Deficit  Goal: Knowledge of disease process/condition, treatment plan, diagnostic tests, and medications will improve  Outcome: PROGRESSING AS EXPECTED  Discuss POC with Mrs. Macias. Assess her knowledge of disease process, treatment plan, diagnostic test, labs, and medications; and explain and give information as needed.     Problem: Respiratory:  Goal: Respiratory status will improve  Outcome: PROGRESSING AS EXPECTED  Assess Mrs. Baumann's respiratory status. Encouraged deep breathing and coughing. Administer medications as scheduled. And PRN breathing tx as requested. Initiate and maintain O2 therapy as needed.

## 2018-02-15 NOTE — ASSESSMENT & PLAN NOTE
This is sepsis (without associated acute organ dysfunction).   - Likely from biliary infection given US finding of 1.2cm CBD & SIRS 2/4 (WBC, T)  - sepsis protocol initiated.   - Status post ceftriaxone and azithromycin in ED   - Initiated Zosyn empirically  - pending BCx & UCx  - Discussed with GI Dr. Galeana -> continue medical Rx & will see patient   - NPO for possible procedure   - close monitoring on telemetry

## 2018-02-15 NOTE — PROGRESS NOTES
[facility]    Date of Service: 2/15/2018    Chief Complaint  72 y.o. female admitted 2/14/2018 with abdominal pain, elevated liver function tests and a common bile duct stone    Interval Problem Update  Describes \"band like pain around her abdomen\" 5/10, no exacerbating or alleviating factors, no associated n/v, no cp, no sob  Ros otherwise negative    Consultants/Specialty  GI    Disposition  ERCP         Review of Systems   Constitutional: Negative.  Negative for chills, diaphoresis, fever, malaise/fatigue and weight loss.   HENT: Negative.  Negative for congestion, ear discharge, ear pain, hearing loss, nosebleeds, sinus pain, sore throat and tinnitus.    Eyes: Negative.  Negative for blurred vision, double vision, photophobia, pain, discharge and redness.   Respiratory: Negative.  Negative for cough, hemoptysis, sputum production, shortness of breath, wheezing and stridor.    Cardiovascular: Negative.  Negative for chest pain, palpitations, orthopnea, claudication, leg swelling and PND.   Gastrointestinal: Positive for abdominal pain. Negative for blood in stool, constipation, diarrhea, heartburn, melena, nausea and vomiting.   Genitourinary: Negative.  Negative for dysuria, flank pain, frequency, hematuria and urgency.   Musculoskeletal: Negative.  Negative for back pain, falls, joint pain, myalgias and neck pain.   Skin: Negative.  Negative for itching and rash.   Neurological: Negative.  Negative for dizziness, tingling, tremors, sensory change, speech change, focal weakness, seizures, loss of consciousness, weakness and headaches.   Endo/Heme/Allergies: Negative.  Negative for environmental allergies and polydipsia. Does not bruise/bleed easily.   Psychiatric/Behavioral: Negative.  Negative for depression, hallucinations, memory loss, substance abuse and suicidal ideas. The patient is not nervous/anxious and does not have insomnia.    All other systems reviewed and are negative.   "   Physical Exam  Laboratory/Imaging   Hemodynamics  Temp (24hrs), Av.2 °C (98.9 °F), Min:36.8 °C (98.2 °F), Max:37.7 °C (99.8 °F)   Temperature: 37 °C (98.6 °F)  Pulse  Av.1  Min: 80  Max: 103 Heart Rate (Monitored): 84  Blood Pressure : 159/76, NIBP: 111/58      Respiratory      Respiration: 18, Pulse Oximetry: 96 %, O2 Daily Delivery Respiratory : Room Air with O2 Available     Work Of Breathing / Effort:  (sob with exertion)  RUL Breath Sounds: Clear, RML Breath Sounds: Clear, RLL Breath Sounds: Clear, BRYAN Breath Sounds: Clear, LLL Breath Sounds: Clear    Fluids    Intake/Output Summary (Last 24 hours) at 02/15/18 1524  Last data filed at 02/15/18 1522   Gross per 24 hour   Intake             1200 ml   Output                2 ml   Net             1198 ml       Nutrition  Orders Placed This Encounter   Procedures   • DIET ORDER     Standing Status:   Standing     Number of Occurrences:   1     Order Specific Question:   Diet:     Answer:   Clear Liquid [10]     Physical Exam   Constitutional: She is oriented to person, place, and time. She appears well-developed and well-nourished. No distress.   HENT:   Head: Normocephalic and atraumatic.   Mouth/Throat: Oropharynx is clear and moist. No oropharyngeal exudate.   Eyes: Conjunctivae and EOM are normal. Pupils are equal, round, and reactive to light. Right eye exhibits no discharge. Left eye exhibits no discharge. No scleral icterus.   Neck: Normal range of motion. Neck supple. No tracheal deviation present. No thyromegaly present.   Cardiovascular: Normal rate, normal heart sounds and intact distal pulses.  Exam reveals no gallop and no friction rub.    No murmur heard.  Pulmonary/Chest: Effort normal and breath sounds normal. No stridor. No respiratory distress. She has no wheezes. She has no rales. She exhibits no tenderness.   Abdominal: Soft. Bowel sounds are normal. She exhibits no distension and no mass. There is no tenderness. There is no rebound  and no guarding.   Pain not reproducible   Musculoskeletal: Normal range of motion. She exhibits no edema or tenderness.   Lymphadenopathy:     She has no cervical adenopathy.   Neurological: She is alert and oriented to person, place, and time. She has normal reflexes. No cranial nerve deficit. She exhibits normal muscle tone. Coordination normal.   Skin: Skin is warm and dry. No rash noted. She is not diaphoretic. No erythema. No pallor.   Psychiatric: She has a normal mood and affect. Her behavior is normal. Judgment and thought content normal.   Nursing note and vitals reviewed.      Recent Labs      02/14/18   1158  02/15/18   0455   WBC  22.0*  14.2*   RBC  5.21  4.72   HEMOGLOBIN  15.7  14.0   HEMATOCRIT  46.2  42.2   MCV  88.7  89.4   MCH  30.1  29.7   MCHC  34.0  33.2*   RDW  41.4  42.9   PLATELETCT  190  141*   MPV  9.4  9.7     Recent Labs      02/14/18   1158  02/15/18   0455   SODIUM  133*  138   POTASSIUM  3.6  3.3*   CHLORIDE  99  105   CO2  23  23   GLUCOSE  137*  121*   BUN  12  6*   CREATININE  0.75  0.75   CALCIUM  9.1  8.5     Recent Labs      02/14/18   1158   INR  0.98                  Assessment/Plan     Abdominal pain   Assessment & Plan    Suspected CBD stone  ERCP today  Possible cholangitis, continue abx  Will check lipase  Continue supportive care        Sepsis (CMS-[facility])   Assessment & Plan    Resolving        Hypokalemia- (present on admission)   Assessment & Plan    Replacing  following        Leukocytosis resolving  Quality-Core Measures

## 2018-02-15 NOTE — PROGRESS NOTES
Telemetry reading: SR-S-tachy with RBBB [place]: 0.12 QRS 0.12 QT 0.36 HR from 80s to 110s  Ectopies: occasional PVCs.

## 2018-02-15 NOTE — ED PROVIDER NOTES
ED Provider Note    CHIEF COMPLAINT  Chief Complaint   Patient presents with   • Shortness of Breath   • Nausea   • Fever   • Cough       HPI  Rufina Macias is a 72 y.o. female who presents for evaluation of shortness of breath nausea fever and cough. Patient states she became ill last night. She continues to feel poorly and presents to emergency department for evaluation. She reportedly had a temperature elevation of 100.2. Her cough has been nonproductive. She denies any history of liver disease  She denies abdominal pain, has been nauseated. Nothing makes her symptoms worse or better. She has numerous medical problems which are noted below. She does have a history of emphysema and COPD not oxygen dependent  REVIEW OF SYSTEMS  See HPI for further details. All other systems are reviewed and negative except as noted above    PAST MEDICAL HISTORY  Past Medical History:   Diagnosis Date   • Allergy    • Arthritis    • Bronchitis    • CA - cancer of uterus    • Carpal tunnel syndrome    • COPD    • Diverticula of colon    • EMPHYSEMA    • Hyperlipidemia    • Hypertension    • Tremor, hereditary, benign        FAMILY HISTORY  Family History   Problem Relation Age of Onset   • Heart Disease Father 45     MI   • Lung Disease Father    • Stroke Father 70   • Cancer Paternal Grandmother      breast       SOCIAL HISTORY  Social History     Social History   • Marital status: Single     Spouse name: N/A   • Number of children: N/A   • Years of education: N/A     Social History Main Topics   • Smoking status: Former Smoker     Quit date: 1/4/1991   • Smokeless tobacco: Never Used   • Alcohol use No      Comment: hardly ever   • Drug use: No   • Sexual activity: No     Other Topics Concern   • Not on file     Social History Narrative   • No narrative on file       SURGICAL HISTORY  Past Surgical History:   Procedure Laterality Date   • ARIELLA BY LAPAROSCOPY  july, 2015    [facility]   • HYSTERECTOMY, TOTAL ABDOMINAL  1/18/10     "Uterine, Dr. Whelan and Dr. Cam +BSO   • ABDOMINAL HYSTERECTOMY TOTAL  Jan 2010    Dr. Cam   • OOPHORECTOMY  Jan 2010    BSO   • OPEN REDUCTION      ankle       CURRENT MEDICATIONS  Home Medications     Reviewed by Trina Lord PhT (Pharmacy [facility]) on 02/14/18 at 1359  Med List Status: Complete   Medication Last Dose Status   amlodipine (NORVASC) 5 MG Tab 2/13/2018 Active   aspirin EC (ECOTRIN) 81 MG Tablet Delayed Response 2/13/2018 Active   Cholecalciferol (VITAMIN D) 2000 UNITS Cap 2/13/2018 Active   FIBER PO 2/13/2018 Active   fluticasone (FLONASE) 50 MCG/ACT nasal spray 2/13/2018 Active   hydrocodone/acetaminophen (NORCO)  MG Tab 3 DAYS Active   losartan (COZAAR) 100 MG Tab 2/13/2018 Active   Nebivolol HCl (BYSTOLIC) 20 MG Tab 2/13/2018 Active   simvastatin (ZOCOR) 20 MG Tab 2/13/2018 Active   triamterene-hctz (MAXZIDE-25/DYAZIDE) 37.5-25 MG Tab 2/12/2018 Active   zolpidem (AMBIEN) 10 MG Tab 2/12/2018 Active                 ALLERGIES  Allergies   Allergen Reactions   • Codeine Swelling   • Ace Inhibitors      Cough       PHYSICAL EXAM  VITAL SIGNS: /86   Pulse 99   Temp 36.8 °C (98.2 °F)   Resp 20   Ht 1.6 m (5' 3\")   Wt 97.7 kg (215 lb 6.2 oz)   SpO2 94%   BMI 38.15 kg/m²   Constitutional :  Well developed, Well nourished, No acute distress, Non-toxic appearance.   HENT: Atraumatic normocephalic slightly dry mucous membranes  Eyes: Normal-appearing nonicteric  Neck: Normal range of motion, No tenderness, Supple, No stridor.   Lymphatic: No cervical adenopathy is noted.   Cardiovascular: Normal heart rate, Normal rhythm, No murmurs, No rubs, No gallops.   Thorax & Lungs: Equal breath sounds are noted bilaterally no rales rhonchi  Skin: Warm, Dry, No erythema, No rash   Abdomen is soft nontender no distention no rebound guarding  Neurological she is awake she is alert she has no focal neurological findings  Extremities no cyanosis or edema    DX-CHEST-PORTABLE (1 VIEW)   Final Result "      No consolidation identified      Suspicious for pulmonary arterial hypertension favored over lymphadenopathy      US-ABDOMEN COMPLETE SURVEY    (Results Pending)     Results for orders placed or performed during the hospital encounter of 02/14/18   Lactic acid (lactate)   Result Value Ref Range    Lactic Acid 2.26 (H) 0.50 - 2.00 mmol/L   Lactic acid (lactate)   Result Value Ref Range    Lactic Acid 1.74 0.50 - 2.00 mmol/L   CBC WITH DIFFERENTIAL   Result Value Ref Range    WBC 22.0 (H) 4.8 - 10.8 K/uL    RBC 5.21 4.20 - 5.40 M/uL    Hemoglobin 15.7 12.0 - 16.0 g/dL    Hematocrit 46.2 37.0 - 47.0 %    MCV 88.7 81.4 - 97.8 fL    MCH 30.1 27.0 - 33.0 pg    MCHC 34.0 33.6 - 35.0 g/dL    RDW 41.4 35.9 - 50.0 fL    Platelet Count 190 164 - 446 K/uL    MPV 9.4 9.0 - 12.9 fL    Neutrophils-Polys 91.80 (H) 44.00 - 72.00 %    Lymphocytes 2.50 (L) 22.00 - 41.00 %    Monocytes 5.00 0.00 - 13.40 %    Eosinophils 0.00 0.00 - 6.90 %    Basophils 0.10 0.00 - 1.80 %    Immature Granulocytes 0.60 0.00 - 0.90 %    Nucleated RBC 0.00 /100 WBC    Neutrophils (Absolute) 20.18 (H) 2.00 - 7.15 K/uL    Lymphs (Absolute) 0.55 (L) 1.00 - 4.80 K/uL    Monos (Absolute) 1.11 (H) 0.00 - 0.85 K/uL    Eos (Absolute) 0.00 0.00 - 0.51 K/uL    Baso (Absolute) 0.03 0.00 - 0.12 K/uL    Immature Granulocytes (abs) 0.13 (H) 0.00 - 0.11 K/uL    NRBC (Absolute) 0.00 K/uL   COMP METABOLIC PANEL   Result Value Ref Range    Sodium 133 (L) 135 - 145 mmol/L    Potassium 3.6 3.6 - 5.5 mmol/L    Chloride 99 96 - 112 mmol/L    Co2 23 20 - 33 mmol/L    Anion Gap 11.0 0.0 - 11.9    Glucose 137 (H) 65 - 99 mg/dL    Bun 12 8 - 22 mg/dL    Creatinine 0.75 0.50 - 1.40 mg/dL    Calcium 9.1 8.4 - 10.2 mg/dL    AST(SGOT) 413 (H) 12 - 45 U/L    ALT(SGPT) 336 (H) 2 - 50 U/L    Alkaline Phosphatase 106 (H) 30 - 99 U/L    Total Bilirubin 3.2 (H) 0.1 - 1.5 mg/dL    Albumin 4.0 3.2 - 4.9 g/dL    Total Protein 7.1 6.0 - 8.2 g/dL    Globulin 3.1 1.9 - 3.5 g/dL    A-G Ratio  1.3 g/dL   URINALYSIS   Result Value Ref Range    Color Yellow     Character Clear     Specific Gravity 1.015 <1.035    Ph 7.0 5.0 - 8.0    Glucose Negative Negative mg/dL    Ketones Negative Negative mg/dL    Protein Negative Negative mg/dL    Bilirubin Moderate (A) Negative    Nitrite Negative Negative    Leukocyte Esterase Negative Negative    Occult Blood Negative Negative    Micro Urine Req see below    INFLUENZA RAPID   Result Value Ref Range    Significant Indicator NEG     Source RESP     Site RESPIRATORY     Rapid Influenza A-B       Negative for Influenza A and Influenza B antigens.  Infection due to influenza A or B cannot be ruled out  since the antigen present in the specimen may be below the  detection limit of the test. Culture confirmation of  negative samples is recommended.     INFLUENZA A/B BY PCR   Result Value Ref Range    Influenza virus A RNA Negative Negative    Influenza virus B, PCR Negative Negative   ESTIMATED GFR   Result Value Ref Range    GFR If African American >60 >60 mL/min/1.73 m 2    GFR If Non African American >60 >60 mL/min/1.73 m 2   PROTHROMBIN TIME   Result Value Ref Range    PT 12.6 12.0 - 14.6 sec    INR 0.98 0.87 - 1.13     COURSE & MEDICAL DECISION MAKING  Pertinent Labs & Imaging studies reviewed. (See chart for details)  The patient presenting with a history of illness for less than 24 hours. She has findings of elevated white counts, elevated pulse consistent with Sirs criteria. She is influenza-negative on the rapid screen. The patient has findings of abnormal liver enzymes etiology of this is unclear i, she is taking a statin which may be contributing, she may have had episode of hypotension causing shock liver but this is unclear she does not really have any symptoms suggestive of hepatitis. She'll be admitted to the hospitalist staff to rule out sepsis, defined etiology of her liver abnormalities. I discussed the findings with the hospitalist who will be admitting  she'll be started on empiric antibiotics per hospitalist.    FINAL IMPRESSION  1. Leukocytosis rule out sepsis  2. Transaminase elevation unclear etiology  3.      Electronically signed by: Morris Nieto, 2/14/2018

## 2018-02-15 NOTE — CARE PLAN
Problem: Infection  Goal: Will remain free from infection  Outcome: PROGRESSING AS EXPECTED  PNA protocol,on IV antibiotics  Monitor for increase resp.distress.    Problem: Knowledge Deficit  Goal: Knowledge of disease process/condition, treatment plan, diagnostic tests, and medications will improve  Outcome: PROGRESSING AS EXPECTED  Continue to update on POC,test and procedures,ERCP today  Contiue education about PNS,importance of DBC and mobilixzation

## 2018-02-15 NOTE — PROGRESS NOTES
JOLLY [id number] from Lab called with critical result of Positive Blood Culture with Gram negative rods at 0237. Critical lab result read back to JOLLY.   Dr. Martin notified of critical lab result at 0245.  Critical lab result read back by Dr. Martin.

## 2018-02-15 NOTE — ED NOTES
Pt medicated for pain, IV abx infusing. Pt up to BR, able to ambulate ind. Pt taken off O2, room air sat after ambulating 93%. Bed sheets changed. Will cont to monitor.

## 2018-02-15 NOTE — PROGRESS NOTES
Admitted Pt from ER via ranca. Pt awake, A&O x4. Denies any pain or discomfort. No SOB at this time. Oriented Pt to room. Assessment Performed. Encouraged Pt to call for assistance as needed.

## 2018-02-15 NOTE — CONSULTS
DATE OF SERVICE:  02/15/2018    REFERRING PHYSICIAN:  Patrice Rossi MD    REASON FOR CONSULTATION:  Evaluate for possible ERCP with elevated LFTs and   common duct stone.    HISTORY:  The patient is a 72-year-old female who was in her usual state of   health until yesterday when she started developing subjective fevers, and   shaking chills, including chattering of teeth.  This lasted for several hours.    She had associated nausea with a small amount of vomiting, and generalized   muscle aches.  Patient presented for further evaluation, and was found to have   elevated liver tests with an AST of 413, ALT of 336, alkaline phosphatase   106, and total bilirubin of 3.2.  Laboratories back in October of last year   were completely normal in that regard.  Patient's creatinine is normal at   0.75.  Her ultrasound demonstrated a common duct size of 12.6 mm, with an 8.8   mm stone within the duct itself.  Patient is post-cholecystectomy.  Patient   denies any previous history of liver disease.  In the hospital here, she has   been placed on Zosyn, and her fever has resolved since admission.  Her white   count on admission was elevated at 22,000.  INR is normal at 0.98, platelet   count is normal at 190,000.    PAST MEDICAL HISTORY:  Significant for colonic diverticulosis, with last   colonoscopy approximately 10 years ago, history of hypertension, history of   tremor, history of COPD, history of dyslipidemia.    PAST SURGICAL HISTORY:  Includes emergency cholecystectomy in July 2015, total   abdominal hysterectomy.    MEDICATIONS:  Include amlodipine, Cozaar, triamterene/hydrochlorothiazide,   simvastatin, Norco p.r.n., Ambien, fluticasone, vitamin D.    ALLERGIES:  CODEINE, ACE INHIBITORS.    SOCIAL HISTORY:  Patient is a former smoker, quitting in 1991.  She does not   use alcohol to any significant extent.    FAMILY HISTORY:  Negative for any GI disease.  Patient's father had heart   attack at the age of 45 as well as a  stroke at the age of 70.    REVIEW OF SYSTEMS:  Currently, patient is actually feeling quite well.  She   denies any fevers, chills, chest pain or shortness of breath.  No nausea or   vomiting.  No abdominal pains.  No dysuria, hematuria.  No blood in the   stools.  No diarrhea or constipation.  No lower extremity swelling, numbness,   tingling, pain or weakness.    PHYSICAL EXAMINATION:  VITAL SIGNS:  Temperature is 37.7, pulse is 103, respirations 18, blood   pressure 151/65.  GENERAL:  This is a well-developed female, who appears in no apparent   distress.  HEENT:  Sclerae mildly icteric.  LUNGS:  Clear.  HEART:  Has a regular rhythm.  ABDOMEN:  Soft, nontender, nondistended.  Bowel sounds are active and normal.  EXTREMITIES:  No rashes or edema present.  Pulses are present in the distal   extremities bilaterally.    LABORATORY AND IMAGING:  As above in HPI.    ASSESSMENT AND PLAN:  1.  Common duct stone, seen on ultrasound.  2.  Sepsis syndrome yesterday, suspicious for underlying cholangitis.  3.  Elevated liver tests, likely secondary to #1 and #2.  4.  Additional medical conditions of dyslipidemia, hypertension, chronic   obstructive pulmonary disease, tremor, arthritis, and colonic diverticulosis.    DISCUSSION:  The patient is a 72-year-old female who has a documented stone in   her duct on ultrasound, and potentially with experiencing symptoms of   cholangitis yesterday.  She has been appropriately placed on Zosyn.  Blood   cultures are currently pending.  Patient is currently hemodynamically stable,   and her fever has abated.  We will plan on proceeding with an ERCP with help   of anesthesia later today.  I discussed ERCP with the patient, including risks   and benefits, and she agrees to proceed.  She understands that one of my   partners will be performing the procedure.  Orders have been placed in the   chart.    Thank you for allowing us to participate in the care of the patient.        ____________________________________     ELVIS MD CLAUS LARSEN    DD:  02/15/2018 04:28:04  DT:  02/15/2018 05:00:32    D#:  [id number]  Job#:  [id number]

## 2018-02-15 NOTE — FLOWSHEET NOTE
02/15/18 0914   Interdisciplinary Plan of Care-Goals (Indications)   Obstructive Ventilatory Defect or Pulmonary Disease without Obvious Obstruction History / Diagnosis   Therapy Not Performed   Type of Therapy Not Performed SVN   Reason Therapy Not Performed PRN, No Indication   Incentive Spirometry Group   Incentive Spirometry Instruction Yes   Incentive Spirometer Volume 1000 mL   Chest Exam   Respiration 18   Pulse 88   Breath Sounds   RUL Breath Sounds Clear   RML Breath Sounds Clear   RLL Breath Sounds Clear   BRYAN Breath Sounds Clear   LLL Breath Sounds Clear   Oxygen   Home O2 Use Prior To Admission? No   Pulse Oximetry 93 %   O2 (LPM) 0   O2 Daily Delivery Respiratory  Room Air with O2 Available

## 2018-02-15 NOTE — OP REPORT
DATE OF SERVICE: 2/15/2018       PROCEDURE PERFORMED:  Endoscopic retrograde cholangiography with sphincterotomy, mechanical lithotripsy and stone extraction     CONSENT:  Procedure risks and benefits reviewed thoroughly with the patient, risks including but not limited to bleeding, perforation, side effects of medication were informed.  Patient voiced understanding and agreed to proceed.    Additional risks inherent to ERCP that being mild, moderate, severe pancreatitis that could lead to postprocedural pain, prolonged hospitalization, intensive care unit stay, and/or death were reviewed with the patient who voiced understanding and agreed to proceed.     PREPROCEDURE DIAGNOSIS:  choledocholithiasis     POSTPROCEDURE DIAGNOSES:  choledocholithiasis     PHYSICIAN: Quincy Louie MD        DESCRIPTION OF PROCEDURE:  Patient was placed in a prone position after intubation and sedation.  A bite block was inserted in the mouth and a side-viewing duodenoscope was passed carefully and easily under indirect vision into the esophagus past the second portion of duodenum brought in a   shortened position.  The ampulla was identified. Evidence of prior sphincterotomy was noted    Using a [employer] Rx44 sphincterotome with a 0.035 wire, the CBD was cannulated. Contrast was injected and the CBD was visualized. The CBD was dilated to 1.5cm. There were numerous stones in the duct. The sphincterotome was then used to extend the sphincterotomy. Mechanical lithotripsy and then balloon dredges were used to extract a large portion of the debris in the CBD. There was still copious debris present so the duct was stented using a 10F x 5cm plastic stent.     COMPLICATIONS:  None.     BLOOD LOSS:  None.     SPECIMENS:  None.     RECOMMENDATIONS:   1) repeat ERCP in 6 weeks for stone extraction and stent removal  2) restart diet  3) monitor pain

## 2018-02-15 NOTE — PROGRESS NOTES
Received report,resting in bed c/o band like pain on her back,assisted up to chair and fixed her linens.Discussed POC and verbalized understanding.Maintained NPO.Medicated with Norco with sips of water.Instructed to call for any needs call light with in reach.

## 2018-02-15 NOTE — H&P
Hospital Medicine History and Physical    Date of Service  2/15/2018    Chief Complaint  Chief Complaint   Patient presents with   • Shortness of Breath   • Nausea   • Fever   • Cough       History of Presenting Illness  72 y.o. female who presented 2/14/2018 with flulike symptoms.    The patient reported that she has been having fever, chills, body ache and fatigue since yesterday. She also reported sore throat and sneezing and headache. She has chronic cough from possible COPD but she denied any change in the amount and viscosity of the sputum. She denied any chest pain abdominal pain, dysuria. Never had a history of DVT or PE. She quit smoking in 1990s. No jaundice was noted.             Primary Care Physician  Quincy Angel M.D.    Consultants  None    Code Status  DO NOT RESUSCITATE    Review of Systems  Review of Systems   Constitutional: Positive for chills, fever and malaise/fatigue.   HENT: Positive for sore throat. Negative for ear discharge and nosebleeds.    Eyes: Negative for pain, discharge and redness.   Respiratory: Positive for cough. Negative for hemoptysis and shortness of breath.    Cardiovascular: Negative for chest pain, palpitations and orthopnea.   Gastrointestinal: Negative for abdominal pain, constipation, diarrhea and vomiting.   Genitourinary: Negative for dysuria and urgency.   Skin: Negative for rash.   Neurological: Negative for speech change, focal weakness, seizures, loss of consciousness and headaches.   Psychiatric/Behavioral: Negative for depression.        Past Medical History  Past Medical History:   Diagnosis Date   • Allergy    • Arthritis    • Bronchitis    • CA - cancer of uterus    • Carpal tunnel syndrome    • COPD    • Diverticula of colon    • EMPHYSEMA    • Hyperlipidemia    • Hypertension    • Tremor, hereditary, benign        Surgical History  Past Surgical History:   Procedure Laterality Date   • ARIELLA BY LAPAROSCOPY  july, 2015    [facility]   • HYSTERECTOMY, TOTAL  ABDOMINAL  1/18/10    Uterine, Dr. Whelan and Dr. Cam +BSO   • ABDOMINAL HYSTERECTOMY TOTAL  2010    Dr. Cam   • OOPHORECTOMY  2010    BSO   • OPEN REDUCTION      ankle       Medications  No current facility-administered medications on file prior to encounter.      Current Outpatient Prescriptions on File Prior to Encounter   Medication Sig Dispense Refill   • amlodipine (NORVASC) 5 MG Tab Take 1 Tab by mouth every day. 30 Tab 6   • losartan (COZAAR) 100 MG Tab Take 1 Tab by mouth every day. 30 Tab 11   • Nebivolol HCl (BYSTOLIC) 20 MG Tab Take 20 mg by mouth every day. 30 Tab 11   • triamterene-hctz (MAXZIDE-25/DYAZIDE) 37.5-25 MG Tab Take 1 Tab by mouth every day. 30 Tab 3   • simvastatin (ZOCOR) 20 MG Tab TAKE 0.5 TABS BY MOUTH EVERY EVENING. 90 Tab 4   • hydrocodone/acetaminophen (NORCO)  MG Tab TAKE ONE TABLET BY MOUTH FOUR TIMES A DAY AS NEEDED 30 DAY SUPPLY  0   • zolpidem (AMBIEN) 10 MG Tab TAKE ONE TABLET BY MOUTH DAILY AT BEDTIME 30 DAY SUPPLY  0   • Cholecalciferol (VITAMIN D) 2000 UNITS Cap Take 1 Cap by mouth every day. 90 Cap 4   • fluticasone (FLONASE) 50 MCG/ACT nasal spray Spray 1 Spray in nose every day. Each Nostril 1 Bottle 11       Family History  Family History   Problem Relation Age of Onset   • Heart Disease Father 45     MI   • Lung Disease Father    • Stroke Father 70   • Cancer Paternal Grandmother      breast       Social History  Social History   Substance Use Topics   • Smoking status: Former Smoker     Quit date: 1991   • Smokeless tobacco: Never Used   • Alcohol use No      Comment: hardly ever       Allergies  Allergies   Allergen Reactions   • Codeine Swelling   • Ace Inhibitors      Cough        Physical Exam  Laboratory   Hemodynamics  Temp (24hrs), Av.8 °C (98.3 °F), Min:36.8 °C (98.2 °F), Max:36.9 °C (98.4 °F)   Temperature: 36.9 °C (98.4 °F)  Pulse  Av.6  Min: 80  Max: 101    Blood Pressure : 145/53      Respiratory      Respiration: 18, Pulse  Oximetry: 91 %     Work Of Breathing / Effort: Mild  RUL Breath Sounds: Clear, RML Breath Sounds: Clear, RLL Breath Sounds: Diminished, BRYAN Breath Sounds: Clear, LLL Breath Sounds: Diminished    Physical Exam   Constitutional: She is oriented to person, place, and time. She appears well-developed and well-nourished.   HENT:   Head: Normocephalic and atraumatic.   Mouth/Throat: No oropharyngeal exudate.   Eyes: Conjunctivae are normal. Pupils are equal, round, and reactive to light. Right eye exhibits no discharge. Left eye exhibits no discharge.   Neck: Normal range of motion. Neck supple. No thyromegaly present.   Cardiovascular: Normal rate, regular rhythm and intact distal pulses.    No murmur heard.  Pulmonary/Chest: Effort normal and breath sounds normal. She has no wheezes. She has no rales.   Abdominal: Soft. She exhibits no distension. There is no tenderness. There is no rebound.   Musculoskeletal: Normal range of motion. She exhibits no edema or deformity.   Neurological: She is alert and oriented to person, place, and time. No cranial nerve deficit. Coordination normal.   Skin: Skin is warm and dry. She is not diaphoretic. No erythema.       Recent Labs      02/14/18   1158   WBC  22.0*   RBC  5.21   HEMOGLOBIN  15.7   HEMATOCRIT  46.2   MCV  88.7   MCH  30.1   MCHC  34.0   RDW  41.4   PLATELETCT  190   MPV  9.4     Recent Labs      02/14/18   1158   SODIUM  133*   POTASSIUM  3.6   CHLORIDE  99   CO2  23   GLUCOSE  137*   BUN  12   CREATININE  0.75   CALCIUM  9.1     Recent Labs      02/14/18   1158   ALTSGPT  336*   ASTSGOT  413*   ALKPHOSPHAT  106*   TBILIRUBIN  3.2*   GLUCOSE  137*     Recent Labs      02/14/18   1158   INR  0.98             No results found for: TROPONINI  Urinalysis:    Lab Results  Component Value Date/Time   SPECGRAVITY 1.015 02/14/2018 1435   GLUCOSEUR Negative 02/14/2018 1435   KETONES Negative 02/14/2018 1435   NITRITE Negative 02/14/2018 1435        Imaging  Reviewed     Assessment/Plan     I anticipate this patient will require at least two midnights for appropriate medical management, necessitating inpatient admission.    Sepsis (CMS-HCC)   Assessment & Plan    This is sepsis (without associated acute organ dysfunction).   - Likely from biliary infection given US finding of 1.2cm CBD & SIRS 2/4 (WBC, T)  - sepsis protocol initiated.   - Status post ceftriaxone and azithromycin in ED   - Initiated Zosyn empirically  - pending BCx & UCx  - Discussed with GI Dr. Galeana -> continue medical Rx & will see patient   - NPO for possible procedure   - close monitoring on telemetry         Essential hypertension- (present on admission)   Assessment & Plan    - Given concern for biliary cholangitis, we'll hold home medications triamtrene-HCTZ & losartan             Primary insomnia- (present on admission)   Assessment & Plan    Continue home medications zolpidem            VTE prophylaxis: SCD for possible GI procedure on 2/15

## 2018-02-15 NOTE — ASSESSMENT & PLAN NOTE
- Given concern for biliary cholangitis, we'll hold home medications triamtrene-HCTZ & losartan

## 2018-02-15 NOTE — PROGRESS NOTES
Joyce [id number] from Lab called with critical result of 2nd blood culture positive for gram negative rods at 0600. Critical lab result read back to [facility].   Dr. Martin notified of critical lab result at 0620.  Critical lab result read back by Dr. Martin.    No new orders received.

## 2018-02-15 NOTE — ED NOTES
Update to pt. Aware of room assignment, awaiting return call from floor rn for report. Denies needs, vs as charted, call light in reach

## 2018-02-15 NOTE — ED NOTES
ivf and meds started. Pt with awan noted upon return from br-oxygen sat=95%. Oxygen applied at 2L n/c for awan despite sat.await room assignment and u/s

## 2018-02-15 NOTE — OR NURSING
1430pt to pacu s/p general anes for ERCP with stone extractions and stent palcement.  VSS. Lungs clear. ekg = sr.  Pt abd is soft.  1445 pt states pain increasing as wakens more. Has irritating dry cough.  Will titrate fentanyl for effect. 1500 cont to titrate fentanyl for effect. 1515 states pain to abd is lessening.  Will cont to monitor. 1525 meets PACU DC criteria for transfer back to pt's room.

## 2018-02-16 LAB
ALBUMIN SERPL BCP-MCNC: 3 G/DL (ref 3.2–4.9)
ALBUMIN/GLOB SERPL: 1.1 G/DL
ALP SERPL-CCNC: 89 U/L (ref 30–99)
ALT SERPL-CCNC: 175 U/L (ref 2–50)
ANION GAP SERPL CALC-SCNC: 6 MMOL/L (ref 0–11.9)
AST SERPL-CCNC: 75 U/L (ref 12–45)
BACTERIA UR CULT: NORMAL
BASOPHILS # BLD AUTO: 0.1 % (ref 0–1.8)
BASOPHILS # BLD: 0.01 K/UL (ref 0–0.12)
BILIRUB SERPL-MCNC: 1.4 MG/DL (ref 0.1–1.5)
BUN SERPL-MCNC: 8 MG/DL (ref 8–22)
CALCIUM SERPL-MCNC: 8.6 MG/DL (ref 8.4–10.2)
CHLORIDE SERPL-SCNC: 109 MMOL/L (ref 96–112)
CO2 SERPL-SCNC: 26 MMOL/L (ref 20–33)
CREAT SERPL-MCNC: 0.79 MG/DL (ref 0.5–1.4)
EOSINOPHIL # BLD AUTO: 0 K/UL (ref 0–0.51)
EOSINOPHIL NFR BLD: 0 % (ref 0–6.9)
ERYTHROCYTE [DISTWIDTH] IN BLOOD BY AUTOMATED COUNT: 43.9 FL (ref 35.9–50)
GLOBULIN SER CALC-MCNC: 2.8 G/DL (ref 1.9–3.5)
GLUCOSE SERPL-MCNC: 144 MG/DL (ref 65–99)
HCT VFR BLD AUTO: 42.6 % (ref 37–47)
HGB BLD-MCNC: 13.8 G/DL (ref 12–16)
IMM GRANULOCYTES # BLD AUTO: 0.07 K/UL (ref 0–0.11)
IMM GRANULOCYTES NFR BLD AUTO: 0.6 % (ref 0–0.9)
LACTATE BLD-SCNC: 1.29 MMOL/L (ref 0.5–2)
LYMPHOCYTES # BLD AUTO: 0.76 K/UL (ref 1–4.8)
LYMPHOCYTES NFR BLD: 6.2 % (ref 22–41)
MCH RBC QN AUTO: 29.8 PG (ref 27–33)
MCHC RBC AUTO-ENTMCNC: 32.4 G/DL (ref 33.6–35)
MCV RBC AUTO: 92 FL (ref 81.4–97.8)
MONOCYTES # BLD AUTO: 0.46 K/UL (ref 0–0.85)
MONOCYTES NFR BLD AUTO: 3.8 % (ref 0–13.4)
NEUTROPHILS # BLD AUTO: 10.95 K/UL (ref 2–7.15)
NEUTROPHILS NFR BLD: 89.3 % (ref 44–72)
NRBC # BLD AUTO: 0 K/UL
NRBC BLD-RTO: 0 /100 WBC
PLATELET # BLD AUTO: 141 K/UL (ref 164–446)
PMV BLD AUTO: 9.5 FL (ref 9–12.9)
POTASSIUM SERPL-SCNC: 4 MMOL/L (ref 3.6–5.5)
PROT SERPL-MCNC: 5.8 G/DL (ref 6–8.2)
RBC # BLD AUTO: 4.63 M/UL (ref 4.2–5.4)
SIGNIFICANT IND 70042: NORMAL
SITE SITE: NORMAL
SODIUM SERPL-SCNC: 141 MMOL/L (ref 135–145)
SOURCE SOURCE: NORMAL
WBC # BLD AUTO: 12.3 K/UL (ref 4.8–10.8)

## 2018-02-16 PROCEDURE — 83605 ASSAY OF LACTIC ACID: CPT

## 2018-02-16 PROCEDURE — 87040 BLOOD CULTURE FOR BACTERIA: CPT | Mod: 91

## 2018-02-16 PROCEDURE — 94760 N-INVAS EAR/PLS OXIMETRY 1: CPT

## 2018-02-16 PROCEDURE — 700111 HCHG RX REV CODE 636 W/ 250 OVERRIDE (IP): Performed by: INTERNAL MEDICINE

## 2018-02-16 PROCEDURE — A9270 NON-COVERED ITEM OR SERVICE: HCPCS | Performed by: INTERNAL MEDICINE

## 2018-02-16 PROCEDURE — 770020 HCHG ROOM/CARE - TELE (206)

## 2018-02-16 PROCEDURE — 85025 COMPLETE CBC W/AUTO DIFF WBC: CPT

## 2018-02-16 PROCEDURE — 99232 SBSQ HOSP IP/OBS MODERATE 35: CPT | Performed by: HOSPITALIST

## 2018-02-16 PROCEDURE — 80053 COMPREHEN METABOLIC PANEL: CPT

## 2018-02-16 PROCEDURE — 700102 HCHG RX REV CODE 250 W/ 637 OVERRIDE(OP): Performed by: INTERNAL MEDICINE

## 2018-02-16 PROCEDURE — 700105 HCHG RX REV CODE 258: Performed by: INTERNAL MEDICINE

## 2018-02-16 RX ADMIN — PIPERACILLIN AND TAZOBACTAM 3.38 G: 3; .375 INJECTION, POWDER, FOR SOLUTION INTRAVENOUS at 06:16

## 2018-02-16 RX ADMIN — HYDROCODONE BITARTRATE AND ACETAMINOPHEN 1 TABLET: 10; 325 TABLET ORAL at 20:25

## 2018-02-16 RX ADMIN — HYDROCODONE BITARTRATE AND ACETAMINOPHEN 1 TABLET: 10; 325 TABLET ORAL at 14:11

## 2018-02-16 RX ADMIN — Medication 400 UNITS: at 12:21

## 2018-02-16 RX ADMIN — PIPERACILLIN AND TAZOBACTAM 3.38 G: 3; .375 INJECTION, POWDER, FOR SOLUTION INTRAVENOUS at 01:17

## 2018-02-16 RX ADMIN — PIPERACILLIN AND TAZOBACTAM 3.38 G: 3; .375 INJECTION, POWDER, FOR SOLUTION INTRAVENOUS at 12:21

## 2018-02-16 RX ADMIN — ZOLPIDEM TARTRATE 5 MG: 5 TABLET, FILM COATED ORAL at 20:29

## 2018-02-16 RX ADMIN — METOPROLOL TARTRATE 100 MG: 50 TABLET, FILM COATED ORAL at 20:25

## 2018-02-16 RX ADMIN — PIPERACILLIN AND TAZOBACTAM 3.38 G: 3; .375 INJECTION, POWDER, FOR SOLUTION INTRAVENOUS at 17:22

## 2018-02-16 RX ADMIN — HYDROCODONE BITARTRATE AND ACETAMINOPHEN 1 TABLET: 10; 325 TABLET ORAL at 06:23

## 2018-02-16 RX ADMIN — ASPIRIN 81 MG: 81 TABLET, COATED ORAL at 08:31

## 2018-02-16 RX ADMIN — METOPROLOL TARTRATE 100 MG: 50 TABLET, FILM COATED ORAL at 12:21

## 2018-02-16 ASSESSMENT — ENCOUNTER SYMPTOMS
HEARTBURN: 0
LOSS OF CONSCIOUSNESS: 0
TREMORS: 0
COUGH: 0
INSOMNIA: 0
CHILLS: 0
DIAPHORESIS: 0
DOUBLE VISION: 0
EYE PAIN: 0
HEADACHES: 0
FOCAL WEAKNESS: 0
SPUTUM PRODUCTION: 0
RESPIRATORY NEGATIVE: 1
HEMOPTYSIS: 0
WEIGHT LOSS: 0
SHORTNESS OF BREATH: 0
CONSTIPATION: 0
SEIZURES: 0
FEVER: 0
SORE THROAT: 0
DIARRHEA: 0
DIZZINESS: 0
STRIDOR: 0
POLYDIPSIA: 0
MUSCULOSKELETAL NEGATIVE: 1
SINUS PAIN: 0
CLAUDICATION: 0
MYALGIAS: 0
WEAKNESS: 0
DEPRESSION: 0
BRUISES/BLEEDS EASILY: 0
BLOOD IN STOOL: 0
NECK PAIN: 0
EYES NEGATIVE: 1
SPEECH CHANGE: 0
PHOTOPHOBIA: 0
SENSORY CHANGE: 0
EYE REDNESS: 0
TINGLING: 0
CONSTITUTIONAL NEGATIVE: 1
BACK PAIN: 0
FALLS: 0
VOMITING: 0
MEMORY LOSS: 0
HALLUCINATIONS: 0
NERVOUS/ANXIOUS: 0
FLANK PAIN: 0
WHEEZING: 0
GASTROINTESTINAL NEGATIVE: 1
NEUROLOGICAL NEGATIVE: 1
ABDOMINAL PAIN: 1
PND: 0
CARDIOVASCULAR NEGATIVE: 1
PSYCHIATRIC NEGATIVE: 1
BLURRED VISION: 0
NAUSEA: 0
PALPITATIONS: 0
ORTHOPNEA: 0
EYE DISCHARGE: 0

## 2018-02-16 ASSESSMENT — PAIN SCALES - GENERAL
PAINLEVEL_OUTOF10: 0
PAINLEVEL_OUTOF10: 6
PAINLEVEL_OUTOF10: 0
PAINLEVEL_OUTOF10: 4
PAINLEVEL_OUTOF10: 0
PAINLEVEL_OUTOF10: 4
PAINLEVEL_OUTOF10: 0

## 2018-02-16 ASSESSMENT — LIFESTYLE VARIABLES: SUBSTANCE_ABUSE: 0

## 2018-02-16 NOTE — PROGRESS NOTES
[facility] Progress Note    Date of Service: 2/16/2018    Chief Complaint  72 y.o. female admitted 2/14/2018 with abdominal pain, elevated liver function tests and a common bile duct stone    Interval Problem Update  Abdominal pain much improved, currently 1/10, no nausea or vomiting, eager to advance diet  No cp, no sob  ROS otherwise negative  Consultants/Specialty  GI    Disposition  B/C pending        Review of Systems   Constitutional: Negative.  Negative for chills, diaphoresis, fever, malaise/fatigue and weight loss.   HENT: Negative.  Negative for congestion, ear discharge, ear pain, hearing loss, nosebleeds, sinus pain, sore throat and tinnitus.    Eyes: Negative.  Negative for blurred vision, double vision, photophobia, pain, discharge and redness.   Respiratory: Negative.  Negative for cough, hemoptysis, sputum production, shortness of breath, wheezing and stridor.    Cardiovascular: Negative.  Negative for chest pain, palpitations, orthopnea, claudication, leg swelling and PND.   Gastrointestinal: Positive for abdominal pain. Negative for blood in stool, constipation, diarrhea, heartburn, melena, nausea and vomiting.   Genitourinary: Negative.  Negative for dysuria, flank pain, frequency, hematuria and urgency.   Musculoskeletal: Negative.  Negative for back pain, falls, joint pain, myalgias and neck pain.   Skin: Negative.  Negative for itching and rash.   Neurological: Negative.  Negative for dizziness, tingling, tremors, sensory change, speech change, focal weakness, seizures, loss of consciousness, weakness and headaches.   Endo/Heme/Allergies: Negative.  Negative for environmental allergies and polydipsia. Does not bruise/bleed easily.   Psychiatric/Behavioral: Negative.  Negative for depression, hallucinations, memory loss, substance abuse and suicidal ideas. The patient is not nervous/anxious and does not have insomnia.    All other systems reviewed and are negative.     Physical Exam   Laboratory/Imaging   Hemodynamics  Temp (24hrs), Av.8 °C (98.3 °F), Min:36.4 °C (97.5 °F), Max:37.1 °C (98.7 °F)   Temperature: 36.8 °C (98.3 °F)  Pulse  Av.3  Min: 74  Max: 103 Heart Rate (Monitored): 84  Blood Pressure : 141/65, NIBP: 121/70      Respiratory      Respiration: 18, Pulse Oximetry: 93 %, O2 Daily Delivery Respiratory : Room Air with O2 Available     Work Of Breathing / Effort: Mild  RUL Breath Sounds: Clear, RML Breath Sounds: Clear, RLL Breath Sounds: Clear, BRYAN Breath Sounds: Clear, LLL Breath Sounds: Clear    Fluids    Intake/Output Summary (Last 24 hours) at 18 1345  Last data filed at 18 0800   Gross per 24 hour   Intake             2940 ml   Output                0 ml   Net             2940 ml       Nutrition  Orders Placed This Encounter   Procedures   • DIET ORDER     Standing Status:   Standing     Number of Occurrences:   1     Order Specific Question:   Diet:     Answer:   Regular [1]     Physical Exam   Constitutional: She is oriented to person, place, and time. She appears well-developed and well-nourished. No distress.   HENT:   Head: Normocephalic and atraumatic.   Mouth/Throat: Oropharynx is clear and moist. No oropharyngeal exudate.   Eyes: Conjunctivae and EOM are normal. Pupils are equal, round, and reactive to light. Right eye exhibits no discharge. Left eye exhibits no discharge. No scleral icterus.   Neck: Normal range of motion. Neck supple. No tracheal deviation present. No thyromegaly present.   Cardiovascular: Normal rate, normal heart sounds and intact distal pulses.  Exam reveals no gallop and no friction rub.    No murmur heard.  Pulmonary/Chest: Effort normal and breath sounds normal. No stridor. No respiratory distress. She has no wheezes. She has no rales. She exhibits no tenderness.   Abdominal: Soft. Bowel sounds are normal. She exhibits no distension and no mass. There is no tenderness. There is no rebound and no guarding.   Pain not  reproducible   Musculoskeletal: Normal range of motion. She exhibits no edema or tenderness.   Lymphadenopathy:     She has no cervical adenopathy.   Neurological: She is alert and oriented to person, place, and time. She has normal reflexes. No cranial nerve deficit. She exhibits normal muscle tone. Coordination normal.   Skin: Skin is warm and dry. No rash noted. She is not diaphoretic. No erythema. No pallor.   Psychiatric: She has a normal mood and affect. Her behavior is normal. Judgment and thought content normal.   Nursing note and vitals reviewed.      Recent Labs      02/14/18   1158  02/15/18   0455  02/16/18   0447   WBC  22.0*  14.2*  12.3*   RBC  5.21  4.72  4.63   HEMOGLOBIN  15.7  14.0  13.8   HEMATOCRIT  46.2  42.2  42.6   MCV  88.7  89.4  92.0   MCH  30.1  29.7  29.8   MCHC  34.0  33.2*  32.4*   RDW  41.4  42.9  43.9   PLATELETCT  190  141*  141*   MPV  9.4  9.7  9.5     Recent Labs      02/14/18   1158  02/15/18   0455  02/16/18   0447   SODIUM  133*  138  141   POTASSIUM  3.6  3.3*  4.0   CHLORIDE  99  105  109   CO2  23  23  26   GLUCOSE  137*  121*  144*   BUN  12  6*  8   CREATININE  0.75  0.75  0.79   CALCIUM  9.1  8.5  8.6     Recent Labs      02/14/18   1158   INR  0.98                  Assessment/Plan     Abdominal pain   Assessment & Plan    Much improved POD #1 s/p   ERCP   Possible cholangitis, continue abx  Continue supportive care        Sepsis (CMS-[facility])   Assessment & Plan    Resolving  Bacteremia - preliminary blood culture results gm neg marc - final pending  Repeat bc drawn        Hypokalemia- (present on admission)   Assessment & Plan    Resolved        Leukocytosis continues to improve  Quality-Core Measures

## 2018-02-16 NOTE — CARE PLAN
Problem: Communication  Goal: The ability to communicate needs accurately and effectively will improve    Intervention: [place] patient and significant other/support system to call light to alert staff of needs  Pt uses call light appropriately.  Hourly rounding maintained.       Problem: Knowledge Deficit  Goal: Knowledge of disease process/condition, treatment plan, diagnostic tests, and medications will improve    Intervention: Assess knowledge level of disease process/condition, treatment plan, diagnostic tests, and medications  POC dicussed, awaiting GI and hosp to round on patient.

## 2018-02-16 NOTE — PROGRESS NOTES
Report received from NOC RN, POC discussed, walking rounds completed, pt awake RT at bedside, all orders, medications, and lines verified, no s/s distress, call light within reach and will continue to monitor.

## 2018-02-16 NOTE — PROGRESS NOTES
Cardiac Summary.  Sinus rhythm with bundle branch block, frequent PVCs, and occasional trigeminy (0.16/0.16/0.40)

## 2018-02-16 NOTE — PROGRESS NOTES
Telemetry reading: SR with BBB  NV: 0.16 QRS 0.14 QT 0.38 HR 74-96  Ectopies: frequent PACs, frequent PVCs.

## 2018-02-16 NOTE — FLOWSHEET NOTE
02/16/18 0702   Therapy Not Performed   Type of Therapy Not Performed SVN   Reason Therapy Not Performed PRN, No Indication   Incentive Spirometry Group   Incentive Spirometer Volume 1000 mL   Chest Exam   Respiration 18   Pulse 85   Breath Sounds   RUL Breath Sounds Clear   RML Breath Sounds Clear   RLL Breath Sounds Clear;Diminished   BRYAN Breath Sounds Clear   LLL Breath Sounds Clear;Diminished   Oximetry   #Pulse Oximetry (Single Determination) Yes   Oxygen   Home O2 Use Prior To Admission? No   Pulse Oximetry 96 %   O2 (LPM) 1.5   O2 Daily Delivery Respiratory  Silicone Nasal Cannula

## 2018-02-16 NOTE — PROGRESS NOTES
PALMA gongora RN updated and notified Dr Montana that pt want to begin home BP meds, awaiting DR orders at this time.

## 2018-02-16 NOTE — PROGRESS NOTES
Received report from day shift nurse. Assumed patient's cares Pt is in bed . Pt denies any pain, discomfort, or any needs at this time. Encouraged Pt to call for assistance if needed .

## 2018-02-17 LAB
BACTERIA BLD CULT: ABNORMAL
BASOPHILS # BLD AUTO: 0.2 % (ref 0–1.8)
BASOPHILS # BLD: 0.02 K/UL (ref 0–0.12)
EOSINOPHIL # BLD AUTO: 0.07 K/UL (ref 0–0.51)
EOSINOPHIL NFR BLD: 0.6 % (ref 0–6.9)
ERYTHROCYTE [DISTWIDTH] IN BLOOD BY AUTOMATED COUNT: 44.6 FL (ref 35.9–50)
HCT VFR BLD AUTO: 43.5 % (ref 37–47)
HCV RNA SERPL NAA+PROBE-ACNC: <15 IU/ML
HCV RNA SERPL NAA+PROBE-LOG IU: <1.2 LOG IU
HCV RNA SERPL QL NAA+PROBE: NOT DETECTED
HGB BLD-MCNC: 14.1 G/DL (ref 12–16)
IMM GRANULOCYTES # BLD AUTO: 0.05 K/UL (ref 0–0.11)
IMM GRANULOCYTES NFR BLD AUTO: 0.4 % (ref 0–0.9)
LYMPHOCYTES # BLD AUTO: 2.01 K/UL (ref 1–4.8)
LYMPHOCYTES NFR BLD: 17.6 % (ref 22–41)
MCH RBC QN AUTO: 29.9 PG (ref 27–33)
MCHC RBC AUTO-ENTMCNC: 32.4 G/DL (ref 33.6–35)
MCV RBC AUTO: 92.4 FL (ref 81.4–97.8)
MONOCYTES # BLD AUTO: 0.72 K/UL (ref 0–0.85)
MONOCYTES NFR BLD AUTO: 6.3 % (ref 0–13.4)
NEUTROPHILS # BLD AUTO: 8.58 K/UL (ref 2–7.15)
NEUTROPHILS NFR BLD: 74.9 % (ref 44–72)
NRBC # BLD AUTO: 0 K/UL
NRBC BLD-RTO: 0 /100 WBC
PATHOLOGY STUDY: NORMAL
PLATELET # BLD AUTO: 156 K/UL (ref 164–446)
PMV BLD AUTO: 9.9 FL (ref 9–12.9)
RBC # BLD AUTO: 4.71 M/UL (ref 4.2–5.4)
SIGNIFICANT IND 70042: ABNORMAL
SIGNIFICANT IND 70042: ABNORMAL
SITE SITE: ABNORMAL
SITE SITE: ABNORMAL
SOURCE SOURCE: ABNORMAL
SOURCE SOURCE: ABNORMAL
WBC # BLD AUTO: 11.5 K/UL (ref 4.8–10.8)

## 2018-02-17 PROCEDURE — A9270 NON-COVERED ITEM OR SERVICE: HCPCS | Performed by: INTERNAL MEDICINE

## 2018-02-17 PROCEDURE — A9270 NON-COVERED ITEM OR SERVICE: HCPCS | Performed by: HOSPITALIST

## 2018-02-17 PROCEDURE — 700101 HCHG RX REV CODE 250: Performed by: HOSPITALIST

## 2018-02-17 PROCEDURE — 700102 HCHG RX REV CODE 250 W/ 637 OVERRIDE(OP): Performed by: INTERNAL MEDICINE

## 2018-02-17 PROCEDURE — 85025 COMPLETE CBC W/AUTO DIFF WBC: CPT

## 2018-02-17 PROCEDURE — 700105 HCHG RX REV CODE 258: Performed by: INTERNAL MEDICINE

## 2018-02-17 PROCEDURE — 700102 HCHG RX REV CODE 250 W/ 637 OVERRIDE(OP): Performed by: HOSPITALIST

## 2018-02-17 PROCEDURE — 700111 HCHG RX REV CODE 636 W/ 250 OVERRIDE (IP): Performed by: INTERNAL MEDICINE

## 2018-02-17 PROCEDURE — 770020 HCHG ROOM/CARE - TELE (206)

## 2018-02-17 PROCEDURE — 99232 SBSQ HOSP IP/OBS MODERATE 35: CPT | Performed by: HOSPITALIST

## 2018-02-17 RX ORDER — LOSARTAN POTASSIUM 25 MG/1
100 TABLET ORAL DAILY
Status: DISCONTINUED | OUTPATIENT
Start: 2018-02-17 | End: 2018-02-19 | Stop reason: HOSPADM

## 2018-02-17 RX ORDER — TRIAMTERENE AND HYDROCHLOROTHIAZIDE 37.5; 25 MG/1; MG/1
1 TABLET ORAL DAILY
Status: DISCONTINUED | OUTPATIENT
Start: 2018-02-17 | End: 2018-02-19 | Stop reason: HOSPADM

## 2018-02-17 RX ADMIN — HYDROCODONE BITARTRATE AND ACETAMINOPHEN 1 TABLET: 10; 325 TABLET ORAL at 11:23

## 2018-02-17 RX ADMIN — Medication 400 UNITS: at 09:52

## 2018-02-17 RX ADMIN — HYDROCODONE BITARTRATE AND ACETAMINOPHEN 1 TABLET: 10; 325 TABLET ORAL at 05:11

## 2018-02-17 RX ADMIN — PIPERACILLIN AND TAZOBACTAM 3.38 G: 3; .375 INJECTION, POWDER, FOR SOLUTION INTRAVENOUS at 13:40

## 2018-02-17 RX ADMIN — POTASSIUM CHLORIDE AND SODIUM CHLORIDE: 900; 150 INJECTION, SOLUTION INTRAVENOUS at 09:56

## 2018-02-17 RX ADMIN — METOPROLOL TARTRATE 100 MG: 50 TABLET, FILM COATED ORAL at 09:52

## 2018-02-17 RX ADMIN — PIPERACILLIN AND TAZOBACTAM 3.38 G: 3; .375 INJECTION, POWDER, FOR SOLUTION INTRAVENOUS at 00:33

## 2018-02-17 RX ADMIN — LOSARTAN POTASSIUM 100 MG: 25 TABLET, FILM COATED ORAL at 09:51

## 2018-02-17 RX ADMIN — METOPROLOL TARTRATE 100 MG: 50 TABLET, FILM COATED ORAL at 21:05

## 2018-02-17 RX ADMIN — PIPERACILLIN AND TAZOBACTAM 3.38 G: 3; .375 INJECTION, POWDER, FOR SOLUTION INTRAVENOUS at 23:49

## 2018-02-17 RX ADMIN — PIPERACILLIN AND TAZOBACTAM 3.38 G: 3; .375 INJECTION, POWDER, FOR SOLUTION INTRAVENOUS at 17:56

## 2018-02-17 RX ADMIN — ZOLPIDEM TARTRATE 5 MG: 5 TABLET, FILM COATED ORAL at 21:05

## 2018-02-17 RX ADMIN — HYDROCODONE BITARTRATE AND ACETAMINOPHEN 1 TABLET: 10; 325 TABLET ORAL at 17:33

## 2018-02-17 RX ADMIN — ASPIRIN 81 MG: 81 TABLET, COATED ORAL at 09:52

## 2018-02-17 RX ADMIN — HYDROCODONE BITARTRATE AND ACETAMINOPHEN 1 TABLET: 10; 325 TABLET ORAL at 23:42

## 2018-02-17 RX ADMIN — PIPERACILLIN AND TAZOBACTAM 3.38 G: 3; .375 INJECTION, POWDER, FOR SOLUTION INTRAVENOUS at 05:11

## 2018-02-17 ASSESSMENT — ENCOUNTER SYMPTOMS
MUSCULOSKELETAL NEGATIVE: 1
COUGH: 0
CLAUDICATION: 0
HEADACHES: 0
DIZZINESS: 0
EYE PAIN: 0
SINUS PAIN: 0
RESPIRATORY NEGATIVE: 1
TREMORS: 0
HALLUCINATIONS: 0
SEIZURES: 0
PHOTOPHOBIA: 0
PSYCHIATRIC NEGATIVE: 1
NEUROLOGICAL NEGATIVE: 1
ORTHOPNEA: 0
DOUBLE VISION: 0
PND: 0
WHEEZING: 0
DIARRHEA: 0
ABDOMINAL PAIN: 0
TINGLING: 0
EYES NEGATIVE: 1
MEMORY LOSS: 0
WEAKNESS: 0
EYE REDNESS: 0
MYALGIAS: 0
EYE DISCHARGE: 0
CARDIOVASCULAR NEGATIVE: 1
SHORTNESS OF BREATH: 0
SPUTUM PRODUCTION: 0
BLURRED VISION: 0
NAUSEA: 0
CONSTITUTIONAL NEGATIVE: 1
FLANK PAIN: 0
INSOMNIA: 0
DEPRESSION: 0
CONSTIPATION: 0
SORE THROAT: 0
BRUISES/BLEEDS EASILY: 0
BACK PAIN: 0
STRIDOR: 0
LOSS OF CONSCIOUSNESS: 0
DIAPHORESIS: 0
HEARTBURN: 0
BLOOD IN STOOL: 0
FOCAL WEAKNESS: 0
NECK PAIN: 0
FALLS: 0
NERVOUS/ANXIOUS: 0
WEIGHT LOSS: 0
HEMOPTYSIS: 0
CHILLS: 0
VOMITING: 0
FEVER: 0
PALPITATIONS: 0
POLYDIPSIA: 0
SPEECH CHANGE: 0
SENSORY CHANGE: 0

## 2018-02-17 ASSESSMENT — PAIN SCALES - GENERAL
PAINLEVEL_OUTOF10: 4
PAINLEVEL_OUTOF10: 0
PAINLEVEL_OUTOF10: 5
PAINLEVEL_OUTOF10: 5
PAINLEVEL_OUTOF10: 6
PAINLEVEL_OUTOF10: 3
PAINLEVEL_OUTOF10: 5

## 2018-02-17 ASSESSMENT — LIFESTYLE VARIABLES: SUBSTANCE_ABUSE: 0

## 2018-02-17 NOTE — CARE PLAN
Problem: Safety  Goal: Will remain free from falls  Outcome: PROGRESSING AS EXPECTED  Pt calls appropriately, demonstrates safe ambulation techniques - all fall precautions are in place and call light is within reach    Problem: Knowledge Deficit  Goal: Knowledge of disease process/condition, treatment plan, diagnostic tests, and medications will improve  Outcome: PROGRESSING AS EXPECTED  Pt medicated for pain per MAR, pt goal of comfort at rest met, all pain medication education given - pt verbalized understanding

## 2018-02-17 NOTE — PROGRESS NOTES
Report given to Leatha day-shift RN. Pt POC discussed, pt resting comfortably in bed, all safety precautions in place.

## 2018-02-17 NOTE — PROGRESS NOTES
Gastroenterology Progress Note     Author: Serafin Lev   Date & Time Created: 2/16/2018 6:31 PM    Interval History:  S/p ercp  Doing well ate lunch feels good  Stent was placed to drain biliary system will need o/p removal 4 wks    Chief Complaint:  Abdominal pain    Review of Systems:  Review of Systems   Constitutional: Negative.    HENT: Negative.    Respiratory: Negative.    Cardiovascular: Negative.    Gastrointestinal: Negative.    Neurological: Negative.        Physical Exam:  Physical Exam   Constitutional: She is oriented to person, place, and time. She appears well-developed.   HENT:   Head: Normocephalic.   Eyes: Pupils are equal, round, and reactive to light.   Neck: Normal range of motion.   Cardiovascular: Normal rate and regular rhythm.    Pulmonary/Chest: Effort normal and breath sounds normal.   Abdominal: Soft. Bowel sounds are normal.   Neurological: She is alert and oriented to person, place, and time.       Labs:        Invalid input(s): [id number]      Recent Labs      02/14/18   1158  02/15/18   0455  02/16/18   0447   SODIUM  133*  138  141   POTASSIUM  3.6  3.3*  4.0   CHLORIDE  99  105  109   CO2  23  23  26   BUN  12  6*  8   CREATININE  0.75  0.75  0.79   CALCIUM  9.1  8.5  8.6     Recent Labs      02/14/18   1158  02/15/18   0455  02/16/18   0447   ALTSGPT  336*  241*  175*   ASTSGOT  413*  173*  75*   ALKPHOSPHAT  106*  99  89   TBILIRUBIN  3.2*  4.3*  1.4   LIPASE   --   <10   --    GLUCOSE  137*  121*  144*     Recent Labs      02/14/18   1158  02/15/18   0455  02/16/18   0447   RBC  5.21  4.72  4.63   HEMOGLOBIN  15.7  14.0  13.8   HEMATOCRIT  46.2  42.2  42.6   PLATELETCT  190  141*  141*   PROTHROMBTM  12.6   --    --    INR  0.98   --    --      Recent Labs      02/14/18   1158  02/15/18   0455  02/16/18   0447   WBC  22.0*  14.2*  12.3*   NEUTSPOLYS  91.80*   --   89.30*   LYMPHOCYTES  2.50*   --   6.20*   MONOCYTES  5.00   --   3.80   EOSINOPHILS  0.00   --   0.00    BASOPHILS  0.10   --   0.10   ASTSGOT  413*  173*  75*   ALTSGPT  336*  241*  175*   ALKPHOSPHAT  106*  99  89   TBILIRUBIN  3.2*  4.3*  1.4     Hemodynamics:  Temp (24hrs), Av.9 °C (98.4 °F), Min:36.8 °C (98.2 °F), Max:37.1 °C (98.7 °F)  Temperature: 36.8 °C (98.3 °F)  Pulse  Av.7  Min: 74  Max: 103   Blood Pressure : 152/69     Respiratory:    Respiration: 16, Pulse Oximetry: 93 %, O2 Daily Delivery Respiratory : Room Air with O2 Available     Work Of Breathing / Effort: Mild  RUL Breath Sounds: Clear, RML Breath Sounds: Clear, RLL Breath Sounds: Clear, BRYAN Breath Sounds: Clear, LLL Breath Sounds: Clear  Fluids:    Intake/Output Summary (Last 24 hours) at 18 1831  Last data filed at 18 1400   Gross per 24 hour   Intake             1280 ml   Output                0 ml   Net             1280 ml        GI/Nutrition:  Orders Placed This Encounter   Procedures   • DIET ORDER     Standing Status:   Standing     Number of Occurrences:   1     Order Specific Question:   Diet:     Answer:   Regular [1]     Medical Decision Making, by Problem:  Active Hospital Problems    Diagnosis   • Sepsis (CMS-HCC) [A41.9]   • Abdominal pain [R10.9]   • Pneumonia [J18.9]   • Essential hypertension [I10]   • Hypokalemia [E87.6]   • Primary insomnia [F51.01]       Plan:  S/p ercp  Doing well, lft's decreaseing  Tolerating diet  Will need follow up as o/p at [facility] [phone] for stent removal in 4-6 wks  We will sign off  Call if any issues     Quality-Core Measures

## 2018-02-17 NOTE — PROGRESS NOTES
[facility] Progress Note    Date of Service: 2018    Chief Complaint  72 y.o. female admitted 2018 with abdominal pain, elevated liver function tests and a common bile duct stone    Interval Problem Update  Feeling much better  Abdominal pain resolved  Tolerating diet  No sob    Consultants/Specialty  GI    Disposition  Repeat B/C pending        Review of Systems   Constitutional: Negative.  Negative for chills, diaphoresis, fever, malaise/fatigue and weight loss.   HENT: Negative.  Negative for congestion, ear discharge, ear pain, hearing loss, nosebleeds, sinus pain, sore throat and tinnitus.    Eyes: Negative.  Negative for blurred vision, double vision, photophobia, pain, discharge and redness.   Respiratory: Negative.  Negative for cough, hemoptysis, sputum production, shortness of breath, wheezing and stridor.    Cardiovascular: Negative.  Negative for chest pain, palpitations, orthopnea, claudication, leg swelling and PND.   Gastrointestinal: Negative for abdominal pain, blood in stool, constipation, diarrhea, heartburn, melena, nausea and vomiting.   Genitourinary: Negative.  Negative for dysuria, flank pain, frequency, hematuria and urgency.   Musculoskeletal: Negative.  Negative for back pain, falls, joint pain, myalgias and neck pain.   Skin: Negative.  Negative for itching and rash.   Neurological: Negative.  Negative for dizziness, tingling, tremors, sensory change, speech change, focal weakness, seizures, loss of consciousness, weakness and headaches.   Endo/Heme/Allergies: Negative.  Negative for environmental allergies and polydipsia. Does not bruise/bleed easily.   Psychiatric/Behavioral: Negative.  Negative for depression, hallucinations, memory loss, substance abuse and suicidal ideas. The patient is not nervous/anxious and does not have insomnia.    All other systems reviewed and are negative.     Physical Exam  Laboratory/Imaging   Hemodynamics  Temp (24hrs), Av.9 °C (98.4  °F), Min:36.5 °C (97.7 °F), Max:37.2 °C (99 °F)   Temperature: 36.5 °C (97.7 °F)  Pulse  Av  Min: 71  Max: 103    Blood Pressure : (!) 171/88 (RN notified)      Respiratory      Respiration: 16, Pulse Oximetry: 94 %     Work Of Breathing / Effort: Mild  RUL Breath Sounds: Diminished, RML Breath Sounds: Diminished, RLL Breath Sounds: Diminished, BRYAN Breath Sounds: Diminished, LLL Breath Sounds: Diminished    Fluids    Intake/Output Summary (Last 24 hours) at 18 1210  Last data filed at 18 1200   Gross per 24 hour   Intake              810 ml   Output                0 ml   Net              810 ml       Nutrition  Orders Placed This Encounter   Procedures   • DIET ORDER     Standing Status:   Standing     Number of Occurrences:   1     Order Specific Question:   Diet:     Answer:   Regular [1]     Physical Exam   Constitutional: She is oriented to person, place, and time. She appears well-developed and well-nourished. No distress.   HENT:   Head: Normocephalic and atraumatic.   Mouth/Throat: Oropharynx is clear and moist. No oropharyngeal exudate.   Eyes: Conjunctivae and EOM are normal. Pupils are equal, round, and reactive to light. Right eye exhibits no discharge. Left eye exhibits no discharge. No scleral icterus.   Neck: Normal range of motion. Neck supple. No tracheal deviation present. No thyromegaly present.   Cardiovascular: Normal rate, normal heart sounds and intact distal pulses.  Exam reveals no gallop and no friction rub.    No murmur heard.  Pulmonary/Chest: Effort normal and breath sounds normal. No stridor. No respiratory distress. She has no wheezes. She has no rales. She exhibits no tenderness.   Abdominal: Soft. Bowel sounds are normal. She exhibits no distension and no mass. There is no tenderness. There is no rebound and no guarding.   Pain not reproducible   Musculoskeletal: Normal range of motion. She exhibits no edema or tenderness.   Lymphadenopathy:     She has no cervical  adenopathy.   Neurological: She is alert and oriented to person, place, and time. She has normal reflexes. No cranial nerve deficit. She exhibits normal muscle tone. Coordination normal.   Skin: Skin is warm and dry. No rash noted. She is not diaphoretic. No erythema. No pallor.   Psychiatric: She has a normal mood and affect. Her behavior is normal. Judgment and thought content normal.   Nursing note and vitals reviewed.      Recent Labs      02/15/18   0455  02/16/18   0447  02/17/18   0412   WBC  14.2*  12.3*  11.5*   RBC  4.72  4.63  4.71   HEMOGLOBIN  14.0  13.8  14.1   HEMATOCRIT  42.2  42.6  43.5   MCV  89.4  92.0  92.4   MCH  29.7  29.8  29.9   MCHC  33.2*  32.4*  32.4*   RDW  42.9  43.9  44.6   PLATELETCT  141*  141*  156*   MPV  9.7  9.5  9.9     Recent Labs      02/15/18   0455  02/16/18   0447   SODIUM  138  141   POTASSIUM  3.3*  4.0   CHLORIDE  105  109   CO2  23  26   GLUCOSE  121*  144*   BUN  6*  8   CREATININE  0.75  0.79   CALCIUM  8.5  8.6                      Assessment/Plan     Abdominal pain   Assessment & Plan    Much improved POD #1 s/p   ERCP   Possible cholangitis, continue abx  Continue supportive care        Sepsis (CMS-[facility])   Assessment & Plan    Resolving  Bacteremia - preliminary blood culture results ecoli - sensitivities pending  Repeat bc pending        Essential hypertension- (present on admission)   Assessment & Plan    Bp increasing  Will resume home meds as tolerated  Following        Hypokalemia- (present on admission)   Assessment & Plan    Resolved        Leukocytosis continues to improve  Quality-Core Measures

## 2018-02-17 NOTE — PROGRESS NOTES
@ 2020 - Pt assessed, A&O, sitting at edge of bed w/ c/o lower back pain - PRN med given per MAR, NOC meds given, IV site assessed and infusing ABX, pt gait steady - up to bathroom independently, pt given PRN Ambien per MAR, TV in use and call light within reach     @ 0025 - Abx infusing, pt sleeping comfortably in bed, no other needs at this time    Tele Summary:    Rhythm: SR/BBB  Rate: 69-83  SD: 0.16  QRS: 0.14  QT: 0.40    Ectopy: no ectopy    Tele strip placed in pt chart.    @ 0515 - Pt given pain med per request, IV abx infusing

## 2018-02-17 NOTE — PROGRESS NOTES
0745Report received, plan of care reviewed and discussed, assessment complete, oriented to room, bed alarm on, nonskid socks applied, advised to call for assistance.   0945 Discussed plan of care, encouraged and answered all questions, will continue to monitor.  1145 Complaints of pain, medication administered per MAR, will continue to monitor.  1345 Up to bathroom, no complaints.  1545 Up in bed, family at bedside, call light within reach.  1700 Report given to Halina FRENCH.  Cardiac Summary.  Sinus rhythm with bundle branch block and frequent PVCs (0.14/0.12/0.40)

## 2018-02-17 NOTE — PROGRESS NOTES
Report received from Yuridia day-shift RN. Pt POC discussed, pt resting comfortably in bed, all safety precautions in place.

## 2018-02-18 LAB
BASOPHILS # BLD AUTO: 0.2 % (ref 0–1.8)
BASOPHILS # BLD: 0.02 K/UL (ref 0–0.12)
EOSINOPHIL # BLD AUTO: 0.08 K/UL (ref 0–0.51)
EOSINOPHIL NFR BLD: 0.9 % (ref 0–6.9)
ERYTHROCYTE [DISTWIDTH] IN BLOOD BY AUTOMATED COUNT: 42.5 FL (ref 35.9–50)
HCT VFR BLD AUTO: 44.2 % (ref 37–47)
HGB BLD-MCNC: 14.5 G/DL (ref 12–16)
IMM GRANULOCYTES # BLD AUTO: 0.07 K/UL (ref 0–0.11)
IMM GRANULOCYTES NFR BLD AUTO: 0.8 % (ref 0–0.9)
LYMPHOCYTES # BLD AUTO: 1.94 K/UL (ref 1–4.8)
LYMPHOCYTES NFR BLD: 21.9 % (ref 22–41)
MCH RBC QN AUTO: 29.7 PG (ref 27–33)
MCHC RBC AUTO-ENTMCNC: 32.8 G/DL (ref 33.6–35)
MCV RBC AUTO: 90.4 FL (ref 81.4–97.8)
MONOCYTES # BLD AUTO: 0.77 K/UL (ref 0–0.85)
MONOCYTES NFR BLD AUTO: 8.7 % (ref 0–13.4)
NEUTROPHILS # BLD AUTO: 5.96 K/UL (ref 2–7.15)
NEUTROPHILS NFR BLD: 67.5 % (ref 44–72)
NRBC # BLD AUTO: 0 K/UL
NRBC BLD-RTO: 0 /100 WBC
PLATELET # BLD AUTO: 178 K/UL (ref 164–446)
PMV BLD AUTO: 9.2 FL (ref 9–12.9)
RBC # BLD AUTO: 4.89 M/UL (ref 4.2–5.4)
WBC # BLD AUTO: 8.8 K/UL (ref 4.8–10.8)

## 2018-02-18 PROCEDURE — 700102 HCHG RX REV CODE 250 W/ 637 OVERRIDE(OP): Performed by: HOSPITALIST

## 2018-02-18 PROCEDURE — 99232 SBSQ HOSP IP/OBS MODERATE 35: CPT | Performed by: HOSPITALIST

## 2018-02-18 PROCEDURE — 700102 HCHG RX REV CODE 250 W/ 637 OVERRIDE(OP): Performed by: INTERNAL MEDICINE

## 2018-02-18 PROCEDURE — A9270 NON-COVERED ITEM OR SERVICE: HCPCS | Performed by: HOSPITALIST

## 2018-02-18 PROCEDURE — 700101 HCHG RX REV CODE 250: Performed by: HOSPITALIST

## 2018-02-18 PROCEDURE — 700105 HCHG RX REV CODE 258: Performed by: INTERNAL MEDICINE

## 2018-02-18 PROCEDURE — A9270 NON-COVERED ITEM OR SERVICE: HCPCS | Performed by: INTERNAL MEDICINE

## 2018-02-18 PROCEDURE — 700111 HCHG RX REV CODE 636 W/ 250 OVERRIDE (IP): Performed by: INTERNAL MEDICINE

## 2018-02-18 PROCEDURE — 770020 HCHG ROOM/CARE - TELE (206)

## 2018-02-18 PROCEDURE — 700111 HCHG RX REV CODE 636 W/ 250 OVERRIDE (IP): Performed by: HOSPITALIST

## 2018-02-18 PROCEDURE — 85025 COMPLETE CBC W/AUTO DIFF WBC: CPT

## 2018-02-18 RX ORDER — LABETALOL HYDROCHLORIDE 5 MG/ML
10 INJECTION, SOLUTION INTRAVENOUS EVERY 6 HOURS PRN
Status: DISCONTINUED | OUTPATIENT
Start: 2018-02-18 | End: 2018-02-19 | Stop reason: HOSPADM

## 2018-02-18 RX ADMIN — LABETALOL HYDROCHLORIDE 10 MG: 5 INJECTION, SOLUTION INTRAVENOUS at 15:41

## 2018-02-18 RX ADMIN — LOSARTAN POTASSIUM 100 MG: 25 TABLET, FILM COATED ORAL at 20:38

## 2018-02-18 RX ADMIN — POTASSIUM CHLORIDE AND SODIUM CHLORIDE: 900; 150 INJECTION, SOLUTION INTRAVENOUS at 15:40

## 2018-02-18 RX ADMIN — Medication 400 UNITS: at 08:24

## 2018-02-18 RX ADMIN — HYDROCODONE BITARTRATE AND ACETAMINOPHEN 1 TABLET: 10; 325 TABLET ORAL at 05:40

## 2018-02-18 RX ADMIN — PIPERACILLIN AND TAZOBACTAM 3.38 G: 3; .375 INJECTION, POWDER, FOR SOLUTION INTRAVENOUS at 05:49

## 2018-02-18 RX ADMIN — ZOLPIDEM TARTRATE 5 MG: 5 TABLET, FILM COATED ORAL at 20:38

## 2018-02-18 RX ADMIN — CEFTRIAXONE SODIUM 2 G: 10 INJECTION, POWDER, FOR SOLUTION INTRAVENOUS at 15:44

## 2018-02-18 RX ADMIN — HYDROCODONE BITARTRATE AND ACETAMINOPHEN 1 TABLET: 10; 325 TABLET ORAL at 17:46

## 2018-02-18 RX ADMIN — METOPROLOL TARTRATE 100 MG: 50 TABLET, FILM COATED ORAL at 08:24

## 2018-02-18 RX ADMIN — HYDROCODONE BITARTRATE AND ACETAMINOPHEN 1 TABLET: 10; 325 TABLET ORAL at 11:46

## 2018-02-18 RX ADMIN — ASPIRIN 81 MG: 81 TABLET, COATED ORAL at 08:24

## 2018-02-18 RX ADMIN — METOPROLOL TARTRATE 100 MG: 50 TABLET, FILM COATED ORAL at 20:37

## 2018-02-18 ASSESSMENT — ENCOUNTER SYMPTOMS
HEADACHES: 0
EYE DISCHARGE: 0
RESPIRATORY NEGATIVE: 1
SHORTNESS OF BREATH: 0
EYE PAIN: 0
DIAPHORESIS: 0
DIZZINESS: 0
HEMOPTYSIS: 0
PSYCHIATRIC NEGATIVE: 1
TREMORS: 0
FOCAL WEAKNESS: 0
FLANK PAIN: 0
NECK PAIN: 0
WEAKNESS: 0
ABDOMINAL PAIN: 0
WHEEZING: 0
INSOMNIA: 0
HALLUCINATIONS: 0
SINUS PAIN: 0
NAUSEA: 0
PALPITATIONS: 0
DIARRHEA: 0
TINGLING: 0
SPUTUM PRODUCTION: 0
BRUISES/BLEEDS EASILY: 0
BLOOD IN STOOL: 0
CHILLS: 0
FEVER: 0
CONSTIPATION: 0
WEIGHT LOSS: 0
DOUBLE VISION: 0
FALLS: 0
POLYDIPSIA: 0
HEARTBURN: 0
NERVOUS/ANXIOUS: 0
PND: 0
CARDIOVASCULAR NEGATIVE: 1
DEPRESSION: 0
BACK PAIN: 0
SPEECH CHANGE: 0
EYE REDNESS: 0
MYALGIAS: 0
VOMITING: 0
PHOTOPHOBIA: 0
MUSCULOSKELETAL NEGATIVE: 1
MEMORY LOSS: 0
CLAUDICATION: 0
SEIZURES: 0
COUGH: 0
SORE THROAT: 0
NEUROLOGICAL NEGATIVE: 1
LOSS OF CONSCIOUSNESS: 0
ORTHOPNEA: 0
EYES NEGATIVE: 1
BLURRED VISION: 0
SENSORY CHANGE: 0
CONSTITUTIONAL NEGATIVE: 1
STRIDOR: 0

## 2018-02-18 ASSESSMENT — PAIN SCALES - GENERAL
PAINLEVEL_OUTOF10: 5
PAINLEVEL_OUTOF10: 3
PAINLEVEL_OUTOF10: 3
PAINLEVEL_OUTOF10: 5
PAINLEVEL_OUTOF10: 4

## 2018-02-18 ASSESSMENT — LIFESTYLE VARIABLES: SUBSTANCE_ABUSE: 0

## 2018-02-18 NOTE — PROGRESS NOTES
[facility]    Date of Service: 2018    Chief Complaint  72 y.o. female admitted 2018 with abdominal pain, elevated liver function tests and a common bile duct stone    Interval Problem Update  No headache  Home bp medication restarted  No further abdominal pain  No sob    Consultants/Specialty  GI    Disposition  Repeat B/C pending        Review of Systems   Constitutional: Negative.  Negative for chills, diaphoresis, fever, malaise/fatigue and weight loss.   HENT: Negative.  Negative for congestion, ear discharge, ear pain, hearing loss, nosebleeds, sinus pain, sore throat and tinnitus.    Eyes: Negative.  Negative for blurred vision, double vision, photophobia, pain, discharge and redness.   Respiratory: Negative.  Negative for cough, hemoptysis, sputum production, shortness of breath, wheezing and stridor.    Cardiovascular: Negative.  Negative for chest pain, palpitations, orthopnea, claudication, leg swelling and PND.   Gastrointestinal: Negative for abdominal pain, blood in stool, constipation, diarrhea, heartburn, melena, nausea and vomiting.   Genitourinary: Negative.  Negative for dysuria, flank pain, frequency, hematuria and urgency.   Musculoskeletal: Negative.  Negative for back pain, falls, joint pain, myalgias and neck pain.   Skin: Negative.  Negative for itching and rash.   Neurological: Negative.  Negative for dizziness, tingling, tremors, sensory change, speech change, focal weakness, seizures, loss of consciousness, weakness and headaches.   Endo/Heme/Allergies: Negative.  Negative for environmental allergies and polydipsia. Does not bruise/bleed easily.   Psychiatric/Behavioral: Negative.  Negative for depression, hallucinations, memory loss, substance abuse and suicidal ideas. The patient is not nervous/anxious and does not have insomnia.    All other systems reviewed and are negative.     Physical Exam  Laboratory/Imaging   Hemodynamics  Temp (24hrs), Av.9 °C  (98.4 °F), Min:36.6 °C (97.8 °F), Max:37.2 °C (99 °F)   Temperature: 36.7 °C (98.1 °F)  Pulse  Av.1  Min: 71  Max: 103    Blood Pressure : (!) 189/93 (nurse aware.)      Respiratory      Respiration: 18, Pulse Oximetry: 95 %     Work Of Breathing / Effort: Mild  RUL Breath Sounds: Clear, RML Breath Sounds: Clear, RLL Breath Sounds: Diminished, BRYAN Breath Sounds: Clear, LLL Breath Sounds: Diminished    Fluids    Intake/Output Summary (Last 24 hours) at 18 1356  Last data filed at 18 0800   Gross per 24 hour   Intake             3000 ml   Output                0 ml   Net             3000 ml       Nutrition  Orders Placed This Encounter   Procedures   • DIET ORDER     Standing Status:   Standing     Number of Occurrences:   1     Order Specific Question:   Diet:     Answer:   Regular [1]     Physical Exam   Constitutional: She is oriented to person, place, and time. She appears well-developed and well-nourished. No distress.   HENT:   Head: Normocephalic and atraumatic.   Mouth/Throat: Oropharynx is clear and moist. No oropharyngeal exudate.   Eyes: Conjunctivae and EOM are normal. Pupils are equal, round, and reactive to light. Right eye exhibits no discharge. Left eye exhibits no discharge. No scleral icterus.   Neck: Normal range of motion. Neck supple. No tracheal deviation present. No thyromegaly present.   Cardiovascular: Normal rate, normal heart sounds and intact distal pulses.  Exam reveals no gallop and no friction rub.    No murmur heard.  Pulmonary/Chest: Effort normal and breath sounds normal. No stridor. No respiratory distress. She has no wheezes. She has no rales. She exhibits no tenderness.   Abdominal: Soft. Bowel sounds are normal. She exhibits no distension and no mass. There is no tenderness. There is no rebound and no guarding.   Pain not reproducible   Musculoskeletal: Normal range of motion. She exhibits no edema or tenderness.   Lymphadenopathy:     She has no cervical  adenopathy.   Neurological: She is alert and oriented to person, place, and time. She has normal reflexes. No cranial nerve deficit. She exhibits normal muscle tone. Coordination normal.   Skin: Skin is warm and dry. No rash noted. She is not diaphoretic. No erythema. No pallor.   Psychiatric: She has a normal mood and affect. Her behavior is normal. Judgment and thought content normal.   Nursing note and vitals reviewed.      Recent Labs      02/16/18   0447  02/17/18   0412  02/18/18   0424   WBC  12.3*  11.5*  8.8   RBC  4.63  4.71  4.89   HEMOGLOBIN  13.8  14.1  14.5   HEMATOCRIT  42.6  43.5  44.2   MCV  92.0  92.4  90.4   MCH  29.8  29.9  29.7   MCHC  32.4*  32.4*  32.8*   RDW  43.9  44.6  42.5   PLATELETCT  141*  156*  178   MPV  9.5  9.9  9.2     Recent Labs      02/16/18 0447   SODIUM  141   POTASSIUM  4.0   CHLORIDE  109   CO2  26   GLUCOSE  144*   BUN  8   CREATININE  0.79   CALCIUM  8.6                      Assessment/Plan     Abdominal pain   Assessment & Plan    Much improved   S/p ERCP   Possible cholangitis, continue abx  Continue supportive care        Sepsis (CMS-[facility])   Assessment & Plan    Resolving  Bacteremia - pan sensitive ecol - will stop zosyn, change to ceftriaxone  Repeat bc pending        Essential hypertension- (present on admission)   Assessment & Plan    Bp increasing  Continue home meds and iv prn  Following        Hypokalemia- (present on admission)   Assessment & Plan    Resolved        Leukocytosis resolved    Quality-Core Measures

## 2018-02-18 NOTE — CARE PLAN
Problem: Communication  Goal: The ability to communicate needs accurately and effectively will improve    Intervention: [place] patient and significant other/support system to call light to alert staff of needs   02/18/18 3678   OTHER   Oriented to: All of the Following : Location of Bathroom, Visiting Policy, Unit Routine, Call Light and Bedside Controls, Bedside Rail Policy, Smoking Policy, Rights and Responsibilities, Bedside Report, and Patient Education Notebook

## 2018-02-18 NOTE — PROGRESS NOTES
Received bedside report from GILLIAN Meade. Plan of care discussed. Safety precautions in place. Call light and personal belongings within reach. Patient has no additional needs at this time.

## 2018-02-18 NOTE — PROGRESS NOTES
Telemetry Summary    Rhythm SR/SB w/ RBBB  HR 60s-70s, low of 48  Ectopy fPVC, oPAC  VT 0.14  QRS 0.14  QT 0.38

## 2018-02-18 NOTE — PROGRESS NOTES
1733 Patient's sitting up in bed, medicated with Norco (see MAR) for c/o's chronic low back pain, rates pain 5/10.

## 2018-02-18 NOTE — PROGRESS NOTES
Gave bedside report to GILLIAN Cline. Plan of care discussed. Safety precautions in place. Personal belongings and call light are with in reach. Patient sleeping at this time; respirations equal and unlabored.

## 2018-02-18 NOTE — CARE PLAN
Problem: Safety  Goal: Will remain free from injury    Intervention: Provide assistance with mobility   02/18/18 1250   OTHER   Assistance / Tolerance No assistance required;Tolerates Well

## 2018-02-18 NOTE — CARE PLAN
Problem: Safety  Goal: Will remain free from injury  Outcome: PROGRESSING AS EXPECTED  Remind patient to use call light and provide assistance. Bed in low position, bed locked, and appropriate alarms set. Patient wearing non-slip socks. Call light and personal belongings are within reach.     Problem: Infection  Goal: Will remain free from infection  Outcome: PROGRESSING AS EXPECTED  Assess and monitor for signs and symptoms of infection. Perform hand hygiene before and after patient contact and entering/exiting the room.

## 2018-02-18 NOTE — PROGRESS NOTES
2659 Bedside report received from GILLIAN Johnson. Assumed care. Patient's sitting up in bed. IVF patent & infusing. No distress noted. Daughter visiting.

## 2018-02-19 VITALS
HEART RATE: 80 BPM | DIASTOLIC BLOOD PRESSURE: 83 MMHG | OXYGEN SATURATION: 93 % | WEIGHT: 226.63 LBS | SYSTOLIC BLOOD PRESSURE: 171 MMHG | BODY MASS INDEX: 40.16 KG/M2 | RESPIRATION RATE: 18 BRPM | TEMPERATURE: 99.2 F | HEIGHT: 63 IN

## 2018-02-19 PROBLEM — J18.9 PNEUMONIA: Status: RESOLVED | Noted: 2018-02-14 | Resolved: 2018-02-19

## 2018-02-19 PROBLEM — A41.9 SEPSIS (HCC): Status: RESOLVED | Noted: 2018-02-15 | Resolved: 2018-02-19

## 2018-02-19 PROBLEM — R10.9 ABDOMINAL PAIN: Status: RESOLVED | Noted: 2018-02-15 | Resolved: 2018-02-19

## 2018-02-19 LAB
BASOPHILS # BLD AUTO: 0.2 % (ref 0–1.8)
BASOPHILS # BLD: 0.02 K/UL (ref 0–0.12)
EOSINOPHIL # BLD AUTO: 0.09 K/UL (ref 0–0.51)
EOSINOPHIL NFR BLD: 0.8 % (ref 0–6.9)
ERYTHROCYTE [DISTWIDTH] IN BLOOD BY AUTOMATED COUNT: 41.8 FL (ref 35.9–50)
HCT VFR BLD AUTO: 43.9 % (ref 37–47)
HGB BLD-MCNC: 14.6 G/DL (ref 12–16)
IMM GRANULOCYTES # BLD AUTO: 0.05 K/UL (ref 0–0.11)
IMM GRANULOCYTES NFR BLD AUTO: 0.4 % (ref 0–0.9)
LYMPHOCYTES # BLD AUTO: 2.07 K/UL (ref 1–4.8)
LYMPHOCYTES NFR BLD: 18.1 % (ref 22–41)
MCH RBC QN AUTO: 29.9 PG (ref 27–33)
MCHC RBC AUTO-ENTMCNC: 33.3 G/DL (ref 33.6–35)
MCV RBC AUTO: 89.8 FL (ref 81.4–97.8)
MONOCYTES # BLD AUTO: 0.9 K/UL (ref 0–0.85)
MONOCYTES NFR BLD AUTO: 7.9 % (ref 0–13.4)
NEUTROPHILS # BLD AUTO: 8.28 K/UL (ref 2–7.15)
NEUTROPHILS NFR BLD: 72.6 % (ref 44–72)
NRBC # BLD AUTO: 0 K/UL
NRBC BLD-RTO: 0 /100 WBC
PLATELET # BLD AUTO: 191 K/UL (ref 164–446)
PMV BLD AUTO: 9.1 FL (ref 9–12.9)
RBC # BLD AUTO: 4.89 M/UL (ref 4.2–5.4)
WBC # BLD AUTO: 11.4 K/UL (ref 4.8–10.8)

## 2018-02-19 PROCEDURE — 700102 HCHG RX REV CODE 250 W/ 637 OVERRIDE(OP): Performed by: INTERNAL MEDICINE

## 2018-02-19 PROCEDURE — 700101 HCHG RX REV CODE 250: Performed by: HOSPITALIST

## 2018-02-19 PROCEDURE — A9270 NON-COVERED ITEM OR SERVICE: HCPCS | Performed by: INTERNAL MEDICINE

## 2018-02-19 PROCEDURE — 99239 HOSP IP/OBS DSCHRG MGMT >30: CPT | Performed by: HOSPITALIST

## 2018-02-19 PROCEDURE — 85025 COMPLETE CBC W/AUTO DIFF WBC: CPT

## 2018-02-19 RX ORDER — CEFDINIR 300 MG/1
300 CAPSULE ORAL 2 TIMES DAILY
Qty: 20 CAP | Refills: 0 | Status: SHIPPED | OUTPATIENT
Start: 2018-02-19 | End: 2018-03-29

## 2018-02-19 RX ADMIN — ASPIRIN 81 MG: 81 TABLET, COATED ORAL at 08:01

## 2018-02-19 RX ADMIN — METOPROLOL TARTRATE 100 MG: 50 TABLET, FILM COATED ORAL at 08:00

## 2018-02-19 RX ADMIN — POTASSIUM CHLORIDE AND SODIUM CHLORIDE: 900; 150 INJECTION, SOLUTION INTRAVENOUS at 05:10

## 2018-02-19 RX ADMIN — Medication 400 UNITS: at 08:01

## 2018-02-19 RX ADMIN — HYDROCODONE BITARTRATE AND ACETAMINOPHEN 1 TABLET: 10; 325 TABLET ORAL at 12:19

## 2018-02-19 RX ADMIN — HYDROCODONE BITARTRATE AND ACETAMINOPHEN 1 TABLET: 10; 325 TABLET ORAL at 06:18

## 2018-02-19 RX ADMIN — HYDROCODONE BITARTRATE AND ACETAMINOPHEN 1 TABLET: 10; 325 TABLET ORAL at 00:20

## 2018-02-19 ASSESSMENT — PAIN SCALES - GENERAL
PAINLEVEL_OUTOF10: 3
PAINLEVEL_OUTOF10: 5

## 2018-02-19 ASSESSMENT — PATIENT HEALTH QUESTIONNAIRE - PHQ9
2. FEELING DOWN, DEPRESSED, IRRITABLE, OR HOPELESS: NOT AT ALL
SUM OF ALL RESPONSES TO PHQ9 QUESTIONS 1 AND 2: 0
1. LITTLE INTEREST OR PLEASURE IN DOING THINGS: NOT AT ALL
SUM OF ALL RESPONSES TO PHQ QUESTIONS 1-9: 0

## 2018-02-19 ASSESSMENT — LIFESTYLE VARIABLES: EVER_SMOKED: YES

## 2018-02-19 NOTE — CARE PLAN
Problem: Safety  Goal: Will remain free from injury    Intervention: Provide assistance with mobility   02/19/18 1047   OTHER   Assistance / Tolerance No assistance required;Tolerates Well

## 2018-02-19 NOTE — CARE PLAN
Problem: Knowledge Deficit  Goal: Knowledge of disease process/condition, treatment plan, diagnostic tests, and medications will improve  Outcome: PROGRESSING AS EXPECTED  Encourage patient to ask questions and be involved in plan of care. Provide education on treatment plan, diagnostic testing, and medications; have patient verbalize understanding.     Problem: Pain Management  Goal: Pain level will decrease to patient's comfort goal  Outcome: PROGRESSING AS EXPECTED  Collaborate with patient and interdisiplinary team to manage pain and keep it at a comfortable level (comfort goal). Educate and incorporate nonpharmacologic interventions to reduce pain.

## 2018-02-19 NOTE — PROGRESS NOTES
Received bedside report from GILLIAN Cline. Plan of care discussed. Safety precautions in place. Call light and personal belongings within reach. Patient has no additional needs at this time.

## 2018-02-19 NOTE — CARE PLAN
Problem: Communication  Goal: The ability to communicate needs accurately and effectively will improve    Intervention: [place] patient and significant other/support system to call light to alert staff of needs   02/19/18 1047   OTHER   Oriented to: All of the Following : Location of Bathroom, Visiting Policy, Unit Routine, Call Light and Bedside Controls, Bedside Rail Policy, Smoking Policy, Rights and Responsibilities, Bedside Report, and Patient Education Notebook

## 2018-02-19 NOTE — PROGRESS NOTES
Gave bedside report to GILLIAN Cline. Plan of care discussed. Safety precautions in place. Personal belongings and call light are with in reach. Patient has no additional needs at this time.

## 2018-02-19 NOTE — DISCHARGE INSTRUCTIONS
Discharge Instructions    Discharged to home by car with relative. Discharged via wheelchair, hospital escort: Yes.  Special equipment needed: Not Applicable    Be sure to schedule a follow-up appointment with your primary care doctor or any specialists as instructed.     Discharge Plan:   Diet Plan: Discussed  Activity Level: Discussed  Confirmed Follow up Appointment: Patient to Call and Schedule Appointment  Confirmed Symptoms Management: Discussed  Medication Reconciliation Updated: Yes  Influenza Vaccine Indication: Not indicated: Previously immunized this influenza season and > 8 years of age    I understand that a diet low in cholesterol, fat, and sodium is recommended for good health. Unless I have been given specific instructions below for another diet, I accept this instruction as my diet prescription.   Other diet: diet as tolerated        · Is patient discharged on Warfarin / Coumadin?   No       Pneumonia, Adult  Pneumonia is an infection of the lungs.   CAUSES  Pneumonia may be caused by bacteria or a virus. Usually, the infection is caused by breathing in droplets from an infected person's cough or sneeze.   SYMPTOMS   Symptoms of pneumonia include:  · Cough.  · Fever.  · Chest pain.  · Rapid breathing.  · Shortness of breath.  · Shaking chills.  · Mucus production.  DIAGNOSIS   If you have the common symptoms of pneumonia, often your health care provider will confirm the diagnosis with a chest X-ray. The X-ray will show an abnormality in the lung if you have pneumonia. Other tests may be done on your blood, urine, or mucus (sputum) to find the specific cause of your pneumonia. A blood gas test or pulse oximetry test may be needed to check how well your lungs are working.  TREATMENT   Your treatment will depend on whether your pneumonia is caused by bacteria or a virus.   · Bacterial pneumonia is treated with antibiotic medicine.  · Pneumonia that is caused by the influenza virus may be treated with  an antiviral medicine.  · Pneumonia that is caused by a virus other than influenza will not respond to antibiotic medicine. This type of pneumonia will have to run its course.   HOME CARE INSTRUCTIONS   · Cough suppressants may be used if you are losing too much rest from coughing at night. However, you should try to avoid taking cough suppresants. This is because coughing helps to remove mucus from your lungs.  · Sleep in a semi-upright position at night. Try sleeping in a reclining chair, or place a few pillows under your head.  · Try using a cold steam vaporizer or humidifier in your home or bedroom. This may help loosen your mucus.  · If you were prescribed an antibiotic medicine, finish all of it even if you start to feel better.  · If you were prescribed an expectorant, take it as directed by your health care provider. This medicine loosens the mucus so you can cough it up.  · Take medicines only as directed by your health care provider.  · Do not smoke. If you are a smoker and continue to smoke, your cough may last several weeks after your pneumonia has cleared.  · Get rest when you feel tired, or as needed.  PREVENTION  A pneumococcal shot (vaccine) is available to prevent a common bacterial cause of pneumonia. This is usually suggested for:  · People over 65 years old.  · People on chemotherapy.  · People with chronic lung problems, such as bronchitis or emphysema.  · People with immune system problems.  If you are over 65 years old or have a high risk condition, you may receive the pneumococcal vaccine if you have not received it before. In some countries, a routine influenza vaccine is also recommended. This vaccine can help prevent some cases of pneumonia. You may be offered the influenza vaccine as part of your care.  If you are a smoker, it is time to quit in order to prevent pneumonia in the future. You may receive instructions on how to stop smoking. Your health care provider can provide medicines  and counseling to help you quit.  SEEK MEDICAL CARE IF:  · You have a fever.  · You cannot control your cough with suppressants at night, and you keep losing sleep.  SEEK IMMEDIATE MEDICAL CARE IF:   · You have worsening shortness of breath.  · You have increased chest pain.  · Your sickness becomes worse, especially if you are an older adult or have a weakened immune system.  · You cough up blood.  · You have pain that is getting worse or is not controlled with medicines.  · Your symptoms are getting worse rather than better.     This information is not intended to replace advice given to you by your health care provider. Make sure you discuss any questions you have with your health care provider.     Document Released: 12/18/2006 Document Revised: 01/08/2016 Document Reviewed: 04/13/2016  [employer] Interactive Patient Education ©2016 [employer]      Depression / Suicide Risk    As you are discharged from this [facility] facility, it is important to learn how to keep safe from harming yourself.    Recognize the warning signs:  · Abrupt changes in personality, positive or negative- including increase in energy   · Giving away possessions  · Change in eating patterns- significant weight changes-  positive or negative  · Change in sleeping patterns- unable to sleep or sleeping all the time   · Unwillingness or inability to communicate  · Depression  · Unusual sadness, discouragement and loneliness  · Talk of wanting to die  · Neglect of personal appearance   · Rebelliousness- reckless behavior  · Withdrawal from people/activities they love  · Confusion- inability to concentrate     If you or a loved one observes any of these behaviors or has concerns about self-harm, here's what you can do:  · Talk about it- your feelings and reasons for harming yourself  · Remove any means that you might use to hurt yourself (examples: pills, rope, extension cords, firearm)  · Get professional help from the community (Mental  Health, Substance Abuse, psychological counseling)  · Do not be alone:Call your Safe Contact- someone whom you trust who will be there for you.  · Call your local CRISIS HOTLINE 280-6861 or 382-858-5117  · Call your local Children's Mobile Crisis Response Team [place] (214) 761-3941 or www.Scroll.in  · Call the toll free National Suicide Prevention Hotlines   · National Suicide Prevention Lifeline 096-112-ANDX (1068)  · National Hope Line Network 800-SUICIDE (877-1994)
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
## 2018-02-19 NOTE — DISCHARGE SUMMARY
CHIEF COMPLAINT ON ADMISSION  Chief Complaint   Patient presents with   • Shortness of Breath   • Nausea   • Fever   • Cough       CODE STATUS  DNR    HPI & HOSPITAL COURSE  Please see history and physical dictated by Dr. Rossi on 2/15/18 for further details.    This is a 72 y.o. female who presented with flu like symptoms  And was found to be septic.  There was initial concern for cholangitis.  She was started on empiric antibiotics and underwent an ERCP during which an impacted common bile duct stone was extracted and a stent was placed.    She clinically did well.  Her presenting pain and malaise resolved.  She had no respiratory complaints or abnormalities throughout the remained of her hospital stay.  She was found to have e coli bacteremia (pan sensitive) which likely originated from her biliary system.  She will complete a course of oral antibiotics and follow up with GI to have the stent removed.    She will resume her home bp regimen and agrees to keep a home bp log with follow up and further medication titration with her pcp.    She agrees to return to the er if needed.    The patient met 2-midnight criteria for an inpatient stay at the time of discharge.    Therefore, she is discharged in fair and stable condition with close outpatient follow-up.    SPECIFIC OUTPATIENT FOLLOW-UP      DISCHARGE PROBLEM LIST  Active Problems:    Primary insomnia POA: Yes    Essential hypertension POA: Yes  Resolved Problems:    Hypokalemia POA: Yes    Pneumonia POA: Unknown    Sepsis (CMS-HCC) POA: Unknown    Abdominal pain POA: Unknown  Bacteremia - e coli    FOLLOW UP  Future Appointments  Date Time Provider Department Center   3/1/2018 2:40 PM Quincy Angel M.D. [address] M.D.  [facility] [id number]  [place] [address]  [phone]    Schedule an appointment as soon as possible for a visit in 1 day  Hospital follow-up appointment with PCP    [facility]  [address] [place] [address]  [place]  [place] [address]  [phone]  In 4 weeks        MEDICATIONS ON DISCHARGE   Rufina Macias   Home Medication Instructions LINDA:[id number]    Printed on:02/19/18 1347   Medication Information                      amlodipine (NORVASC) 5 MG Tab  Take 1 Tab by mouth every day.             aspirin EC (ECOTRIN) 81 MG Tablet Delayed Response  Take 81 mg by mouth every day.             cefdinir (OMNICEF) 300 MG Cap  Take 1 Cap by mouth 2 times a day.             Cholecalciferol (VITAMIN D) 2000 UNITS Cap  Take 1 Cap by mouth every day.             FIBER PO  Take 2 Caps by mouth every day.             fluticasone (FLONASE) 50 MCG/ACT nasal spray  Spray 1 Spray in nose every day. Each Nostril             hydrocodone/acetaminophen (NORCO)  MG Tab  TAKE ONE TABLET BY MOUTH FOUR TIMES A DAY AS NEEDED 30 DAY SUPPLY             losartan (COZAAR) 100 MG Tab  Take 1 Tab by mouth every day.             Nebivolol HCl (BYSTOLIC) 20 MG Tab  Take 20 mg by mouth every day.             simvastatin (ZOCOR) 20 MG Tab  TAKE 0.5 TABS BY MOUTH EVERY EVENING.             triamterene-hctz (MAXZIDE-25/DYAZIDE) 37.5-25 MG Tab  Take 1 Tab by mouth every day.             zolpidem (AMBIEN) 10 MG Tab  TAKE ONE TABLET BY MOUTH DAILY AT BEDTIME 30 DAY SUPPLY                 DIET  Orders Placed This Encounter   Procedures   • DIET ORDER     Standing Status:   Standing     Number of Occurrences:   1     Order Specific Question:   Diet:     Answer:   Regular [1]       ACTIVITY  As tolerated.      CONSULTATIONS  PFAU    PROCEDURES  ERCP  DX-PORTABLE FLUOROSCOPY < 1 HOUR   Final Result         Portable fluoroscopy utilized for 3.8 minutes.      US-ABDOMEN COMPLETE SURVEY   Final Result      Surgical absence of the gallbladder. Dilated common bile duct and suspected 8.8 mm stone within the common duct.   Dilatation of the left intrahepatic bile ducts.   No hydronephrosis. No free fluid identified.         DX-CHEST-PORTABLE (1 VIEW)   Final Result       No consolidation identified      Suspicious for pulmonary arterial hypertension favored over lymphadenopathy          LABORATORY  Lab Results   Component Value Date/Time    SODIUM 141 02/16/2018 04:47 AM    POTASSIUM 4.0 02/16/2018 04:47 AM    CHLORIDE 109 02/16/2018 04:47 AM    CO2 26 02/16/2018 04:47 AM    GLUCOSE 144 (H) 02/16/2018 04:47 AM    BUN 8 02/16/2018 04:47 AM    CREATININE 0.79 02/16/2018 04:47 AM    CREATININE 0.80 12/02/2010 12:00 AM    GLOMRATE >59 12/02/2010 12:00 AM        Lab Results   Component Value Date/Time    WBC 11.4 (H) 02/19/2018 02:54 AM    WBC 7.6 12/02/2010 12:00 AM    HEMOGLOBIN 14.6 02/19/2018 02:54 AM    HEMATOCRIT 43.9 02/19/2018 02:54 AM    PLATELETCT 191 02/19/2018 02:54 AM        Total time of the discharge process exceeds 38 minutes

## 2018-02-19 NOTE — PROGRESS NOTES
Sl dc'd site intact  Pt given dc inst  Pt agrees to  rx at pharmacy on file  Family at bedside  Pt dc'd home

## 2018-02-19 NOTE — DIETARY
NUTRITION SERVICES: BMI - Pt with BMI >40 (=40.16). Weight loss counseling not appropriate in acute care setting.     RECOMMEND - Referral to outpatient nutrition services for weight management after D/C. RD available PRN.

## 2018-02-20 ENCOUNTER — PATIENT OUTREACH (OUTPATIENT)
Dept: HEALTH INFORMATION MANAGEMENT | Facility: OTHER | Age: 73
End: 2018-02-20

## 2018-02-21 LAB
BACTERIA BLD CULT: NORMAL
BACTERIA BLD CULT: NORMAL
SIGNIFICANT IND 70042: NORMAL
SIGNIFICANT IND 70042: NORMAL
SITE SITE: NORMAL
SITE SITE: NORMAL
SOURCE SOURCE: NORMAL
SOURCE SOURCE: NORMAL

## 2018-03-01 ENCOUNTER — OFFICE VISIT (OUTPATIENT)
Dept: MEDICAL GROUP | Age: 73
End: 2018-03-01
Payer: MEDICARE

## 2018-03-01 VITALS
OXYGEN SATURATION: 93 % | TEMPERATURE: 98.8 F | SYSTOLIC BLOOD PRESSURE: 138 MMHG | WEIGHT: 213 LBS | BODY MASS INDEX: 37.74 KG/M2 | DIASTOLIC BLOOD PRESSURE: 88 MMHG | HEART RATE: 99 BPM | HEIGHT: 63 IN

## 2018-03-01 DIAGNOSIS — G89.29 CHRONIC MIDLINE LOW BACK PAIN WITHOUT SCIATICA: ICD-10-CM

## 2018-03-01 DIAGNOSIS — E78.2 MIXED HYPERLIPIDEMIA: ICD-10-CM

## 2018-03-01 DIAGNOSIS — J42 CHRONIC BRONCHITIS, UNSPECIFIED CHRONIC BRONCHITIS TYPE (HCC): ICD-10-CM

## 2018-03-01 DIAGNOSIS — Z09 HOSPITAL DISCHARGE FOLLOW-UP: ICD-10-CM

## 2018-03-01 DIAGNOSIS — E66.9 OBESITY (BMI 30-39.9): ICD-10-CM

## 2018-03-01 DIAGNOSIS — K80.51 CALCULUS OF BILE DUCT WITHOUT CHOLECYSTITIS WITH OBSTRUCTION: ICD-10-CM

## 2018-03-01 DIAGNOSIS — Z12.11 COLON CANCER SCREENING: ICD-10-CM

## 2018-03-01 DIAGNOSIS — I10 ESSENTIAL HYPERTENSION: ICD-10-CM

## 2018-03-01 DIAGNOSIS — M54.50 CHRONIC MIDLINE LOW BACK PAIN WITHOUT SCIATICA: ICD-10-CM

## 2018-03-01 PROBLEM — R06.09 DOE (DYSPNEA ON EXERTION): Status: RESOLVED | Noted: 2017-06-21 | Resolved: 2018-03-01

## 2018-03-01 PROCEDURE — 99214 OFFICE O/P EST MOD 30 MIN: CPT | Performed by: INTERNAL MEDICINE

## 2018-03-01 ASSESSMENT — ENCOUNTER SYMPTOMS
MUSCULOSKELETAL NEGATIVE: 1
GASTROINTESTINAL NEGATIVE: 1
PSYCHIATRIC NEGATIVE: 1
NEUROLOGICAL NEGATIVE: 1
CARDIOVASCULAR NEGATIVE: 1
EYES NEGATIVE: 1
CONSTITUTIONAL NEGATIVE: 1
RESPIRATORY NEGATIVE: 1

## 2018-03-01 NOTE — PROGRESS NOTES
Subjective:      Rufina Macias is a 72 y.o. female who presents with Gallstones and Hospital Follow-up  The patient is here for followup of chronic medical problems listed below. The patient is compliant with medications and having no side effects from them. Denies chest pain, abdominal pain, dyspnea, myalgias, or cough.     Patient Active Problem List    Diagnosis Date Noted   • 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 with obstruction- s/p CBD stent feb 2018 after sone retrieval; gall bladder removed 2015 03/01/2018   • Colon cancer screening- tbd aug 2017 06/21/2017   • Obesity (BMI 30-39.9) 04/04/2017   • Chronic midline low back pain without sciatica- norco prn 04/04/2017   • Controlled substance agreement signed 03/22/2016   • Essential hypertension 08/31/2015   • DDD (degenerative disc disease), cervical 04/07/2015   • Thoracic radiculopathy 03/09/2015   • Lumbar radiculopathy 03/09/2015   • Vitamin D deficiency disease 10/11/2013   • DDD (degenerative disc disease), lumbar 10/11/2013   • Chronic allergic rhinitis 10/11/2013   • Primary insomnia 11/29/2012   • History of endometrial cancer-2009 JORDON-BSO; neg nodes 04/16/2012   • DJD (degenerative joint disease)- hands 04/16/2012   • COPD (chronic obstructive pulmonary disease) (CMS-[facility]) 11/07/2011   • Mixed hyperlipidemia 10/28/2010   • Carpal tunnel syndrome of right wrist        Allergies   Allergen Reactions   • Codeine Swelling   • Ace Inhibitors      Cough     Outpatient Medications Prior to Visit   Medication Sig Dispense Refill   • cefdinir (OMNICEF) 300 MG Cap Take 1 Cap by mouth 2 times a day. 20 Cap 0   • aspirin EC (ECOTRIN) 81 MG Tablet Delayed Response Take 81 mg by mouth every day.     • FIBER PO Take 2 Caps by mouth every day.     • amlodipine (NORVASC) 5 MG Tab Take 1 Tab by mouth every day. 30 Tab 6   • losartan (COZAAR) 100 MG Tab Take 1 Tab by mouth every day. 30 Tab 11   • Nebivolol HCl (BYSTOLIC) 20 MG Tab Take 20 mg by mouth  "every day. 30 Tab 11   • triamterene-hctz (MAXZIDE-25/DYAZIDE) 37.5-25 MG Tab Take 1 Tab by mouth every day. 30 Tab 3   • simvastatin (ZOCOR) 20 MG Tab TAKE 0.5 TABS BY MOUTH EVERY EVENING. 90 Tab 4   • hydrocodone/acetaminophen (NORCO)  MG Tab TAKE ONE TABLET BY MOUTH FOUR TIMES A DAY AS NEEDED 30 DAY SUPPLY  0   • zolpidem (AMBIEN) 10 MG Tab TAKE ONE TABLET BY MOUTH DAILY AT BEDTIME 30 DAY SUPPLY  0   • Cholecalciferol (VITAMIN D) 2000 UNITS Cap Take 1 Cap by mouth every day. 90 Cap 4   • fluticasone (FLONASE) 50 MCG/ACT nasal spray Spray 1 Spray in nose every day. Each Nostril 1 Bottle 11     No facility-administered medications prior to visit.                HPI    Review of Systems   Constitutional: Negative.    HENT: Negative.    Eyes: Negative.    Respiratory: Negative.    Cardiovascular: Negative.    Gastrointestinal: Negative.    Genitourinary: Negative.    Musculoskeletal: Negative.    Skin: Negative.    Neurological: Negative.    Endo/Heme/Allergies: Negative.    Psychiatric/Behavioral: Negative.           Objective:     /88   Pulse 99   Temp 37.1 °C (98.8 °F)   Ht 1.588 m (5' 2.5\")   Wt 96.6 kg (213 lb)   SpO2 93%   BMI 38.34 kg/m²      Physical Exam   Constitutional: She is oriented to person, place, and time. She appears well-developed and well-nourished. No distress.   HENT:   Head: Normocephalic and atraumatic.   Right Ear: External ear normal.   Left Ear: External ear normal.   Nose: Nose normal.   Mouth/Throat: Oropharynx is clear and moist. No oropharyngeal exudate.   Eyes: Conjunctivae and EOM are normal. Pupils are equal, round, and reactive to light. Right eye exhibits no discharge. Left eye exhibits no discharge. No scleral icterus.   Neck: Normal range of motion. Neck supple. No JVD present. No tracheal deviation present. No thyromegaly present.   Cardiovascular: Normal rate, regular rhythm, normal heart sounds and intact distal pulses.  Exam reveals no gallop and no " friction rub.    No murmur heard.  Pulmonary/Chest: Effort normal and breath sounds normal. No stridor. No respiratory distress. She has no wheezes. She has no rales. She exhibits no tenderness.   Abdominal: Soft. Bowel sounds are normal. She exhibits no distension and no mass. There is no tenderness. There is no rebound and no guarding.   Musculoskeletal: Normal range of motion. She exhibits no edema or tenderness.   Lymphadenopathy:     She has no cervical adenopathy.   Neurological: She is alert and oriented to person, place, and time. She has normal reflexes. She displays normal reflexes. No cranial nerve deficit. She exhibits normal muscle tone. Coordination normal.   Skin: Skin is warm and dry. No rash noted. She is not diaphoretic. No erythema. No pallor.   Psychiatric: She has a normal mood and affect. Her behavior is normal. Judgment and thought content normal.   Vitals reviewed.    Admission on 02/14/2018, Discharged on 02/19/2018   No results displayed because visit has over 200 results.         No results found for: HBA1C  Lab Results   Component Value Date/Time    SODIUM 141 02/16/2018 04:47 AM    POTASSIUM 4.0 02/16/2018 04:47 AM    CHLORIDE 109 02/16/2018 04:47 AM    CO2 26 02/16/2018 04:47 AM    GLUCOSE 144 (H) 02/16/2018 04:47 AM    BUN 8 02/16/2018 04:47 AM    CREATININE 0.79 02/16/2018 04:47 AM    CREATININE 0.80 12/02/2010 12:00 AM    BUNCREATRAT 20 12/02/2010 12:00 AM    GLOMRATE >59 12/02/2010 12:00 AM    ALKPHOSPHAT 89 02/16/2018 04:47 AM    ASTSGOT 75 (H) 02/16/2018 04:47 AM    ALTSGPT 175 (H) 02/16/2018 04:47 AM    TBILIRUBIN 1.4 02/16/2018 04:47 AM     Lab Results   Component Value Date/Time    INR 0.98 02/14/2018 11:58 AM     Lab Results   Component Value Date/Time    CHOLSTRLTOT 131 10/03/2017 11:02 AM    LDL 61 10/03/2017 11:02 AM    HDL 45 10/03/2017 11:02 AM    TRIGLYCERIDE 123 10/03/2017 11:02 AM       No results found for: TESTOSTERONE  Lab Results   Component Value Date/Time    TSH  1.640 12/02/2010 12:00 AM     Lab Results   Component Value Date/Time    FREET4 0.90 10/24/2014 11:56 AM    FREET4 0.91 06/13/2013 12:30 PM     No results found for: URICACID  No components found for: VITB12  Lab Results   Component Value Date/Time    25HYDROXY 39 09/20/2016 11:36 AM    25HYDROXY 39 10/24/2014 11:56 AM               Assessment/Plan:     1. Hospital discharge follow-up- CBD stent for retainned stone/cholangiitis     Under good control. Continue same regimen.      2. 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 with obstruction- s/p CBD stent feb 2018 after sone retrieval; gb removed 2015        .3. Chronic midline low back pain without sciatica- norco prn     Under good control. Continue same regimen    4. Obesity (BMI 30-39.9)    diet/exercise/lose 15 lbs.; patient counseled    - Patient identified as having weight management issue.  Appropriate orders and counseling given.    5. Essential hypertension- awan resolved with switching metoprolol to bystolic        Under good control. Continue same regimen.  6. Mixed hyperlipidemia      Under good control. Continue same regimen.  - TSH; Future  - COMP METABOLIC PANEL; Future  - LIPID PROFILE; Future  - CBC WITH DIFFERENTIAL; Future    7. Chronic bronchitis, unspecified chronic bronchitis type (CMS-HCC)       Under good control. Continue same regimen.  8. Colon cancer screening- tbd aug 2017          - REFERRAL TO GASTROENTEROLOGY  - REFERRAL TO GI FOR COLONOSCOPY  - OCCULT BLOOD FECES IMMUNOASSAY (FIT); Future    30 minute face-to-face encounter took place today.  More than half of this time was spent in the coordination of care of the above problems, as well as counseling.

## 2018-03-21 ENCOUNTER — PATIENT OUTREACH (OUTPATIENT)
Dept: HEALTH INFORMATION MANAGEMENT | Facility: OTHER | Age: 73
End: 2018-03-21

## 2018-03-29 DIAGNOSIS — Z01.810 PRE-OPERATIVE CARDIOVASCULAR EXAMINATION: ICD-10-CM

## 2018-03-29 DIAGNOSIS — Z01.812 PRE-OPERATIVE LABORATORY EXAMINATION: ICD-10-CM

## 2018-03-29 LAB
ANION GAP SERPL CALC-SCNC: 7 MMOL/L (ref 0–11.9)
BUN SERPL-MCNC: 13 MG/DL (ref 8–22)
CALCIUM SERPL-MCNC: 9.3 MG/DL (ref 8.5–10.5)
CHLORIDE SERPL-SCNC: 104 MMOL/L (ref 96–112)
CO2 SERPL-SCNC: 27 MMOL/L (ref 20–33)
CREAT SERPL-MCNC: 0.75 MG/DL (ref 0.5–1.4)
EKG IMPRESSION: NORMAL
GLUCOSE SERPL-MCNC: 119 MG/DL (ref 65–99)
POTASSIUM SERPL-SCNC: 3.5 MMOL/L (ref 3.6–5.5)
SODIUM SERPL-SCNC: 138 MMOL/L (ref 135–145)

## 2018-03-29 PROCEDURE — 36415 COLL VENOUS BLD VENIPUNCTURE: CPT

## 2018-03-29 PROCEDURE — 93005 ELECTROCARDIOGRAM TRACING: CPT

## 2018-03-29 PROCEDURE — 80048 BASIC METABOLIC PNL TOTAL CA: CPT

## 2018-03-29 PROCEDURE — 93010 ELECTROCARDIOGRAM REPORT: CPT | Performed by: INTERNAL MEDICINE

## 2018-03-29 RX ORDER — CALCIUM CARBONATE 300MG(750)
400 TABLET,CHEWABLE ORAL DAILY
COMMUNITY

## 2018-03-29 NOTE — OR NURSING
Pre admit appt: Pt instructed to continue regularly prescribed medications through day before surgery.Per anesthesia protocol pt instructed to take these medications with a sip of water the day of surgery- amlodipine and norco and combivent inh if needed. Asked to bring combivent DOS.

## 2018-04-05 ENCOUNTER — APPOINTMENT (OUTPATIENT)
Dept: RADIOLOGY | Facility: MEDICAL CENTER | Age: 73
End: 2018-04-05
Attending: INTERNAL MEDICINE
Payer: MEDICARE

## 2018-04-05 ENCOUNTER — HOSPITAL ENCOUNTER (OUTPATIENT)
Facility: MEDICAL CENTER | Age: 73
End: 2018-04-05
Attending: INTERNAL MEDICINE | Admitting: INTERNAL MEDICINE
Payer: MEDICARE

## 2018-04-05 VITALS
HEIGHT: 63 IN | BODY MASS INDEX: 37.58 KG/M2 | SYSTOLIC BLOOD PRESSURE: 146 MMHG | TEMPERATURE: 98.4 F | RESPIRATION RATE: 16 BRPM | DIASTOLIC BLOOD PRESSURE: 73 MMHG | HEART RATE: 88 BPM | WEIGHT: 212.08 LBS | OXYGEN SATURATION: 93 %

## 2018-04-05 PROCEDURE — 160046 HCHG PACU - 1ST 60 MINS PHASE II: Performed by: INTERNAL MEDICINE

## 2018-04-05 PROCEDURE — 160002 HCHG RECOVERY MINUTES (STAT): Performed by: INTERNAL MEDICINE

## 2018-04-05 PROCEDURE — 74328 X-RAY BILE DUCT ENDOSCOPY: CPT

## 2018-04-05 PROCEDURE — 160009 HCHG ANES TIME/MIN: Performed by: INTERNAL MEDICINE

## 2018-04-05 PROCEDURE — 160025 RECOVERY II MINUTES (STATS): Performed by: INTERNAL MEDICINE

## 2018-04-05 PROCEDURE — C1769 GUIDE WIRE: HCPCS | Performed by: INTERNAL MEDICINE

## 2018-04-05 PROCEDURE — 700117 HCHG RX CONTRAST REV CODE 255

## 2018-04-05 PROCEDURE — 160035 HCHG PACU - 1ST 60 MINS PHASE I: Performed by: INTERNAL MEDICINE

## 2018-04-05 PROCEDURE — 160048 HCHG OR STATISTICAL LEVEL 1-5: Performed by: INTERNAL MEDICINE

## 2018-04-05 PROCEDURE — 500066 HCHG BITE BLOCK, ECT: Performed by: INTERNAL MEDICINE

## 2018-04-05 PROCEDURE — 700101 HCHG RX REV CODE 250

## 2018-04-05 PROCEDURE — 700111 HCHG RX REV CODE 636 W/ 250 OVERRIDE (IP)

## 2018-04-05 PROCEDURE — 160203 HCHG ENDO MINUTES - 1ST 30 MINS LEVEL 4: Performed by: INTERNAL MEDICINE

## 2018-04-05 PROCEDURE — 110371 HCHG SHELL REV 272: Performed by: INTERNAL MEDICINE

## 2018-04-05 PROCEDURE — 160036 HCHG PACU - EA ADDL 30 MINS PHASE I: Performed by: INTERNAL MEDICINE

## 2018-04-05 PROCEDURE — 160208 HCHG ENDO MINUTES - EA ADDL 1 MIN LEVEL 4: Performed by: INTERNAL MEDICINE

## 2018-04-05 RX ORDER — MIDAZOLAM HYDROCHLORIDE 1 MG/ML
INJECTION INTRAMUSCULAR; INTRAVENOUS
Status: DISCONTINUED
Start: 2018-04-05 | End: 2018-04-05 | Stop reason: HOSPADM

## 2018-04-05 RX ORDER — SODIUM CHLORIDE, SODIUM LACTATE, POTASSIUM CHLORIDE, CALCIUM CHLORIDE 600; 310; 30; 20 MG/100ML; MG/100ML; MG/100ML; MG/100ML
1000 INJECTION, SOLUTION INTRAVENOUS
Status: COMPLETED | OUTPATIENT
Start: 2018-04-05 | End: 2018-04-05

## 2018-04-05 RX ORDER — METOCLOPRAMIDE HYDROCHLORIDE 5 MG/ML
INJECTION INTRAMUSCULAR; INTRAVENOUS
Status: DISCONTINUED
Start: 2018-04-05 | End: 2018-04-05 | Stop reason: HOSPADM

## 2018-04-05 RX ADMIN — SODIUM CHLORIDE, SODIUM LACTATE, POTASSIUM CHLORIDE, CALCIUM CHLORIDE 1000 ML: 600; 310; 30; 20 INJECTION, SOLUTION INTRAVENOUS at 07:00

## 2018-04-05 ASSESSMENT — PAIN SCALES - GENERAL
PAINLEVEL_OUTOF10: 2
PAINLEVEL_OUTOF10: ASSUMED PAIN PRESENT
PAINLEVEL_OUTOF10: 0
PAINLEVEL_OUTOF10: 2
PAINLEVEL_OUTOF10: 0
PAINLEVEL_OUTOF10: 1
PAINLEVEL_OUTOF10: 2

## 2018-04-05 NOTE — PROGRESS NOTES
1024 To PACU from Endo via gurney, side rails up x 2 for safety, lungs clear bilaterally, scds on patient and machine operational, pt does not arouse to voice or touch, breathing easy and unlabored with OPA in place.   1035 Dr Louie here to see patient. Pt responds appropriately to MD/RN. Pt reports sore throat; given sip of water. Reports minimal upper gastric discomfort with palpation but reports sore throat as worse.   1040 Instructed to DB/C; demonstrated understanding. HOB elevated to 30 degrees.  1050 Report to Charito RN

## 2018-04-05 NOTE — OR NURSING
Patient denies pain and nausea.  Sats 91-93 on room air for 30 min. In PACU. Patient using incentive spirometer to 1250 cc. Patient and her daughter verbalize good understanding of discharge instructions.

## 2018-04-05 NOTE — OP REPORT
DATE OF SERVICE: 4/5/2018       PROCEDURE PERFORMED:  Endoscopic retrograde cholangiography with stent removal and stone removal     CONSENT:  Procedure risks and benefits reviewed thoroughly with the patient, risks including but not limited to bleeding, perforation, side effects of medication were informed.  Patient voiced understanding and agreed to proceed.    Additional risks inherent to ERCP that being mild, moderate, severe pancreatitis that could lead to postprocedural pain, prolonged hospitalization, intensive care unit stay, and/or death were reviewed with the patient who voiced understanding and agreed to proceed.     PREPROCEDURE DIAGNOSIS:  choledocholithiasis     POSTPROCEDURE DIAGNOSES:  choledocholithiasis     PHYSICIAN: Quincy Louie MD        DESCRIPTION OF PROCEDURE:  Patient was placed in a prone position after intubation and sedation.  A bite block was inserted in the mouth and a side-viewing duodenoscope was passed carefully and easily under indirect vision into the esophagus past the second portion of duodenum brought in a shortened position.  The ampulla was identified. The previously placed biliary stent was identified and removed in its entirety from the duct using a snare.     Using a 9-12mm biliary extraction balloon with a 0.025 wire, the CBD was cannulated. Contrast was injected and the CBD was visualized. The CBD was dilated to 1.3cm. There were numerous stones in the duct. Multiple balloon dredges were performed with extraction of 10-15 stones along with fragments. An occlusion cholangiogram was performed at the conclusion of the procedure revealing a patent duct and no residual debris. All instruments were then removed and there was prompt spontaneous flow of bile from the duct.     COMPLICATIONS:  None.     BLOOD LOSS:  None.     SPECIMENS:  None.     RECOMMENDATIONS:   1) follow up with [facility] as needed  2) restart diet  3) restart home meds

## 2018-04-05 NOTE — OR NURSING
1105 Pt remains awake and alert on gurney. Instructed to DB/C; demonstrated understanding. Oxygen titrated to RA.   1115 Pt desaturation noted to 87% on RA at rest.   1130 Instructed to use IS x 10 every hour while awake; demonstrated understanding. IS max 1000. Dr Palomares updated and instructs RN to keep patient in PACU and work on IS. Pt tolerating sips of water, remains awake without stimulation.   1145 Up to recliner in PACU; pt remains in PACU due to desaturations. IS max 1000.   1200 Pt using IS x 10 every 1-2 minutes as instructed; IS max 1100. Pt sitting up in recliner and reports breathing remains baseline.   1215 Ambulated 50+ feet to BR with SBA; tolerated well. Pt reports lungs baseline with exertion. Able to void without difficulty.   1225 Dr Palomares here in PACU to see patient; VORB okay to transfer to stage II and continue to monitor pulse ox.

## 2018-04-05 NOTE — DISCHARGE INSTRUCTIONS
ENDOSCOPY HOME CARE INSTRUCTIONS    GASTROSCOPY OR ERCP  1. Don't eat or drink anything for about an hour after the test. You can then resume your regular diet.  2. Don't drive or drink alcohol for 24 hours. The medication you received will make you too drowsy.  3. Don't take any coffee, tea, or aspirin products until after you see your doctor. These can harm the lining of your stomach.  4. If you begin to vomit bloody material, or develop black or bloody stools, call your doctor as soon as possible.  5. If you have any neck, chest, abdominal pain or temp of 100 degrees, call your doctor.  6. See your doctor as needed  7. Additional instructions: Resume home medications and previous diet  8. Prescriptions: none    Dr Louie [phone]    You should call 911 if you develop problems with breathing or chest pain.  If any questions arise, call your doctor. If your doctor is not available, please feel free to call [phone]. You can also call the HEALTH HOTLINE open 24 hours/day, 7 days/week and speak to a nurse at [phone], or toll free [phone].    Depression / Suicide Risk    As you are discharged from this [facility] facility, it is important to learn how to keep safe from harming yourself.    Recognize the warning signs:  · Abrupt changes in personality, positive or negative- including increase in energy   · Giving away possessions  · Change in eating patterns- significant weight changes-  positive or negative  · Change in sleeping patterns- unable to sleep or sleeping all the time   · Unwillingness or inability to communicate  · Depression  · Unusual sadness, discouragement and loneliness  · Talk of wanting to die  · Neglect of personal appearance   · Rebelliousness- reckless behavior  · Withdrawal from people/activities they love  · Confusion- inability to concentrate     If you or a loved one observes any of these behaviors or has concerns about self-harm, here's what you can do:  · Talk  about it- your feelings and reasons for harming yourself  · Remove any means that you might use to hurt yourself (examples: pills, rope, extension cords, firearm)  · Get professional help from the community (Mental Health, Substance Abuse, psychological counseling)  · Do not be alone:Call your Safe Contact- someone whom you trust who will be there for you.  · Call your local CRISIS HOTLINE 618-4732 or 940-921-2999  · Call your local Children's Mobile Crisis Response Team [place] (915) 228-8030 or wwwKoolLearning  · Call the toll free National Suicide Prevention Hotlines   · National Suicide Prevention Lifeline 994-753-YPMK (4413)  · National Hope Line Network 800-SUICIDE (796-2168)    I acknowledge receipt and understanding of these Home Care Instructions.    Discharge Education for patients on TOM (Obstructive Sleep Apnea) Protocol    Prior to receiving sedation or anesthesia, we screen all patients for Obstructive Sleep Apnea.  During your screening, you were identified as having suspected, but not confirmed Obstructive Sleep Apnea(TOM).    What is Obstructive Sleep Apnea?  Sleep apnea (AP-ne-ah) is a common disorder which involves breathing pauses that occur during sleep.  These can last from 10 seconds to a minute or longer.  Normal breathing resumes often with a loud snort or choking sound.    Sleep apnea occurs in all age groups and both genders but is more common in men and people over 40 years of age.  It has been estimated that as many as 18 million Americans have sleep apnea.  Most people who have sleep apnea don’t know they have it because it only occurs during sleep.  A family member and/or bed partner may first notice the signs of sleep apnea.  Sleep apnea is a chronic (ongoing) condition that disrupts the quality and quantity of your sleep repeatedly throughout the night.  This often results in excessive daytime sleepiness or fatigue during the day.  It may also contribute to high blood  pressure, heart problems, and complications following medications used for surgery and procedures.    To establish a definitive diagnosis, further testing from a specialist would be needed.  We recommend that you follow up with your primary care physician.    We recommend that you should be with an adult observer for at least 24 hours after your sedation/anesthesia.  If you have a CPAP machine, you should wear it during any sleep period (day or night) for the week following your procedure.  We encourage you to sleep on your side or in a sitting position, even with napping.  Lying flat on your back increases the risk of apnea and airway obstruction during your post procedure recovery period.    It is important to prevent over-sedation that could increase your risk for apnea.  Please take all pain medication as directed by your physician.  If you are not getting pain relief, please contact your physician to discuss possible approaches to relieving pain while minimizing medications that can affect your breathing and oxygen levels.

## 2018-04-05 NOTE — OR NURSING
1050: Rcvd report from April, RN and assumed care. Pt resting comfortably in gurney, pain is tolerable, no nausea, awake and alert. Tolerating decrease in supplemental O2.  1100: Pain is still tolerable, no nausea, sitting up in bed, awake and alert.  1105: Report given back to April, RN and she reassumed care, pt still resting comfortably in gurney, pain is tolerable, and no nausea, awake and alert.

## 2018-05-17 ENCOUNTER — PATIENT OUTREACH (OUTPATIENT)
Dept: HEALTH INFORMATION MANAGEMENT | Facility: OTHER | Age: 73
End: 2018-05-17

## 2018-05-17 NOTE — PROGRESS NOTES
1. Attempt #: 1    2. HealthConnect Verified: yes    3. Verify PCP: yes    4. Care Team Updated:       •   DME Company (gait device, O2, CPAP, etc.): YES       •   Other Specialists (eye doctor, derm, GYN, cardiology, endo, etc): YES    5.  Reviewed/Updated the following with patient:       •   Communication Preference Obtained? YES       •   Preferred Pharmacy? YES       •   Preferred Lab? YES       •   Family History (document living status of immediate family members and if + hx of cancer, diabetes, hypertension, hyperlipidemia, heart attack, stroke) YES. Was Abstract Encounter opened and chart updated? YES    6. [employer] Activation: already active    7. [employer] Jacobo: no    8. Annual Wellness Visit Scheduling  Scheduling Status:Scheduled      9. Care Gap Scheduling (Attempt to Schedule EACH Overdue Care Gap!)     Health Maintenance Due   Topic Date Due   • Annual Wellness Visit  1945   • PFT SCREENING-FEV1 AND FEV/FVC RATIO / SPIROMETRY SHOULD BE PERFORMED ANNUALLY  03/18/1963   • BONE DENSITY  03/18/2010   • COLON CANCER SCREENING ANNUAL FIT  10/06/2017        Scheduled patient for Annual Wellness Visit/ SENT NEW FIT TEST    10. Patient was advised: “This is a free wellness visit. The provider will screen for medical conditions to help you stay healthy. If you have other concerns to address you may be asked to discuss these at a separate visit or there may be an additional fee.”     11. Patient was informed to arrive 15 min prior to their scheduled appointment and bring in their medication bottles.

## 2018-06-01 ENCOUNTER — HOSPITAL ENCOUNTER (OUTPATIENT)
Dept: LAB | Facility: MEDICAL CENTER | Age: 73
End: 2018-06-01
Attending: INTERNAL MEDICINE
Payer: MEDICARE

## 2018-06-01 ENCOUNTER — TELEPHONE (OUTPATIENT)
Dept: MEDICAL GROUP | Age: 73
End: 2018-06-01

## 2018-06-01 DIAGNOSIS — E78.2 MIXED HYPERLIPIDEMIA: ICD-10-CM

## 2018-06-01 LAB
ALBUMIN SERPL BCP-MCNC: 4.2 G/DL (ref 3.2–4.9)
ALBUMIN/GLOB SERPL: 1.4 G/DL
ALP SERPL-CCNC: 72 U/L (ref 30–99)
ALT SERPL-CCNC: 18 U/L (ref 2–50)
ANION GAP SERPL CALC-SCNC: 8 MMOL/L (ref 0–11.9)
AST SERPL-CCNC: 26 U/L (ref 12–45)
BASOPHILS # BLD AUTO: 0.4 % (ref 0–1.8)
BASOPHILS # BLD: 0.04 K/UL (ref 0–0.12)
BILIRUB SERPL-MCNC: 0.5 MG/DL (ref 0.1–1.5)
BUN SERPL-MCNC: 18 MG/DL (ref 8–22)
CALCIUM SERPL-MCNC: 9.3 MG/DL (ref 8.5–10.5)
CHLORIDE SERPL-SCNC: 102 MMOL/L (ref 96–112)
CHOLEST SERPL-MCNC: 169 MG/DL (ref 100–199)
CO2 SERPL-SCNC: 29 MMOL/L (ref 20–33)
CREAT SERPL-MCNC: 0.74 MG/DL (ref 0.5–1.4)
EOSINOPHIL # BLD AUTO: 0.08 K/UL (ref 0–0.51)
EOSINOPHIL NFR BLD: 0.9 % (ref 0–6.9)
ERYTHROCYTE [DISTWIDTH] IN BLOOD BY AUTOMATED COUNT: 44.8 FL (ref 35.9–50)
GLOBULIN SER CALC-MCNC: 3.1 G/DL (ref 1.9–3.5)
GLUCOSE SERPL-MCNC: 91 MG/DL (ref 65–99)
HCT VFR BLD AUTO: 47.9 % (ref 37–47)
HDLC SERPL-MCNC: 52 MG/DL
HGB BLD-MCNC: 14.9 G/DL (ref 12–16)
IMM GRANULOCYTES # BLD AUTO: 0.01 K/UL (ref 0–0.11)
IMM GRANULOCYTES NFR BLD AUTO: 0.1 % (ref 0–0.9)
LDLC SERPL CALC-MCNC: 87 MG/DL
LYMPHOCYTES # BLD AUTO: 1.99 K/UL (ref 1–4.8)
LYMPHOCYTES NFR BLD: 21.9 % (ref 22–41)
MCH RBC QN AUTO: 29.1 PG (ref 27–33)
MCHC RBC AUTO-ENTMCNC: 31.1 G/DL (ref 33.6–35)
MCV RBC AUTO: 93.6 FL (ref 81.4–97.8)
MONOCYTES # BLD AUTO: 0.51 K/UL (ref 0–0.85)
MONOCYTES NFR BLD AUTO: 5.6 % (ref 0–13.4)
NEUTROPHILS # BLD AUTO: 6.44 K/UL (ref 2–7.15)
NEUTROPHILS NFR BLD: 71.1 % (ref 44–72)
NRBC # BLD AUTO: 0 K/UL
NRBC BLD-RTO: 0 /100 WBC
PLATELET # BLD AUTO: 174 K/UL (ref 164–446)
PMV BLD AUTO: 9.5 FL (ref 9–12.9)
POTASSIUM SERPL-SCNC: 3.8 MMOL/L (ref 3.6–5.5)
PROT SERPL-MCNC: 7.3 G/DL (ref 6–8.2)
RBC # BLD AUTO: 5.12 M/UL (ref 4.2–5.4)
SODIUM SERPL-SCNC: 139 MMOL/L (ref 135–145)
TRIGL SERPL-MCNC: 150 MG/DL (ref 0–149)
TSH SERPL DL<=0.005 MIU/L-ACNC: 1.64 UIU/ML (ref 0.38–5.33)
WBC # BLD AUTO: 9.1 K/UL (ref 4.8–10.8)

## 2018-06-01 PROCEDURE — 80061 LIPID PANEL: CPT

## 2018-06-01 PROCEDURE — 80053 COMPREHEN METABOLIC PANEL: CPT

## 2018-06-01 PROCEDURE — 36415 COLL VENOUS BLD VENIPUNCTURE: CPT

## 2018-06-01 PROCEDURE — 84443 ASSAY THYROID STIM HORMONE: CPT

## 2018-06-01 PROCEDURE — 85025 COMPLETE CBC W/AUTO DIFF WBC: CPT

## 2018-06-01 NOTE — TELEPHONE ENCOUNTER
Future Appointments       Provider Department Center    6/5/2018 2:00 PM Quincy Angel M.D. [place] [address] [address]    6/26/2018 1:20 PM Quincy Angel M.D.; [facility] [place] [address] [address]      ESTABLISHED PATIENT PRE-VISIT PLANNING     Note: Patient will not be contacted if there is no indication to call.     1.  Reviewed notes from the last few office visits within the medical group: Yes    2.  If any orders were placed at last visit or intended to be done for this visit (i.e. 6 mos follow-up), do we have Results/Consult Notes?        •  Labs -  scheduled 6/1/18   Note: If patient appointment is for lab review and patient did not complete labs, check with provider if OK to reschedule patient until labs completed.       •  Imaging - Imaging was not ordered at last office visit.       •  Referrals - Referral ordered, patient has NOT been seen.    3. Is this appointment scheduled as a Hospital Follow-Up? No    4.  Immunizations were updated in Epic using WebIZ?: Epic matches WebIZ       •  Web Iz Recommendations: Patient is up to date on all vaccines    5.  Patient is due for the following Health Maintenance Topics:   Health Maintenance Due   Topic Date Due   • Annual Wellness Visit  1945   • PFT SCREENING-FEV1 AND FEV/FVC RATIO / SPIROMETRY SHOULD BE PERFORMED ANNUALLY  03/18/1963   • BONE DENSITY  03/18/2010   • COLON CANCER SCREENING ANNUAL FIT  10/06/2017           6.  MDX printed for Provider? YES    7.  Patient was NOT informed to arrive 15 min prior to their scheduled appointment and bring in their medication bottles.

## 2018-06-05 ENCOUNTER — OFFICE VISIT (OUTPATIENT)
Dept: MEDICAL GROUP | Age: 73
End: 2018-06-05
Payer: MEDICARE

## 2018-06-05 VITALS
OXYGEN SATURATION: 87 % | HEART RATE: 87 BPM | TEMPERATURE: 99.7 F | HEIGHT: 63 IN | WEIGHT: 214 LBS | SYSTOLIC BLOOD PRESSURE: 138 MMHG | BODY MASS INDEX: 37.92 KG/M2 | DIASTOLIC BLOOD PRESSURE: 92 MMHG

## 2018-06-05 DIAGNOSIS — E66.9 OBESITY (BMI 30-39.9): ICD-10-CM

## 2018-06-05 DIAGNOSIS — J42 CHRONIC BRONCHITIS, UNSPECIFIED CHRONIC BRONCHITIS TYPE (HCC): ICD-10-CM

## 2018-06-05 DIAGNOSIS — Z23 NEED FOR SHINGLES VACCINE: ICD-10-CM

## 2018-06-05 DIAGNOSIS — R09.02 HYPOXEMIA: ICD-10-CM

## 2018-06-05 DIAGNOSIS — E78.2 MIXED HYPERLIPIDEMIA: ICD-10-CM

## 2018-06-05 DIAGNOSIS — G89.29 CHRONIC MIDLINE LOW BACK PAIN WITHOUT SCIATICA: ICD-10-CM

## 2018-06-05 DIAGNOSIS — I10 ESSENTIAL HYPERTENSION: ICD-10-CM

## 2018-06-05 DIAGNOSIS — Z12.11 SCREEN FOR COLON CANCER: ICD-10-CM

## 2018-06-05 DIAGNOSIS — M54.50 CHRONIC MIDLINE LOW BACK PAIN WITHOUT SCIATICA: ICD-10-CM

## 2018-06-05 PROCEDURE — 99214 OFFICE O/P EST MOD 30 MIN: CPT | Performed by: INTERNAL MEDICINE

## 2018-06-05 RX ORDER — TRIAMTERENE AND HYDROCHLOROTHIAZIDE 37.5; 25 MG/1; MG/1
1 TABLET ORAL DAILY
Qty: 90 TAB | Refills: 4 | Status: SHIPPED | OUTPATIENT
Start: 2018-06-05 | End: 2019-01-08 | Stop reason: SDUPTHER

## 2018-06-05 RX ORDER — METHYLPREDNISOLONE 4 MG/1
TABLET ORAL
Refills: 0 | COMMUNITY
Start: 2018-05-03 | End: 2018-06-05

## 2018-06-05 RX ORDER — METOPROLOL SUCCINATE 100 MG/1
100 TABLET, EXTENDED RELEASE ORAL
Status: DISCONTINUED | OUTPATIENT
Start: 2018-06-05 | End: 2018-06-05

## 2018-06-05 RX ORDER — METOPROLOL TARTRATE 100 MG/1
100 TABLET ORAL 2 TIMES DAILY
Qty: 180 TAB | Refills: 4 | Status: SHIPPED | OUTPATIENT
Start: 2018-06-05 | End: 2019-01-08 | Stop reason: SDUPTHER

## 2018-06-05 RX ORDER — AZITHROMYCIN 250 MG/1
TABLET, FILM COATED ORAL
COMMUNITY
Start: 2018-05-20 | End: 2018-06-05

## 2018-06-05 RX ORDER — LOSARTAN POTASSIUM 100 MG/1
100 TABLET ORAL DAILY
Qty: 90 TAB | Refills: 4 | Status: SHIPPED | OUTPATIENT
Start: 2018-06-05 | End: 2019-01-08 | Stop reason: SDUPTHER

## 2018-06-05 RX ORDER — AMLODIPINE BESYLATE 5 MG/1
5 TABLET ORAL DAILY
Qty: 90 TAB | Refills: 4 | Status: SHIPPED | OUTPATIENT
Start: 2018-06-05 | End: 2019-01-08 | Stop reason: SDUPTHER

## 2018-06-05 ASSESSMENT — ENCOUNTER SYMPTOMS
RESPIRATORY NEGATIVE: 1
GASTROINTESTINAL NEGATIVE: 1
CARDIOVASCULAR NEGATIVE: 1
CONSTITUTIONAL NEGATIVE: 1
PSYCHIATRIC NEGATIVE: 1
NEUROLOGICAL NEGATIVE: 1
MUSCULOSKELETAL NEGATIVE: 1
EYES NEGATIVE: 1

## 2018-06-05 NOTE — PROGRESS NOTES
Subjective:      Rufina Macias is a 73 y.o. female who presents with Follow-Up (patient doing well)  and  The patient is here for followup of chronic medical problems listed below. The patient is compliant with medications and having no side effects from them. Denies chest pain, abdominal pain, dyspnea, myalgias, or cough.   Patient Active Problem List    Diagnosis Date Noted   • Hypoxemia 06/05/2018   • 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 with obstruction- s/p CBD stent feb 2018 after sone retrieval; gall bladder removed 2015 03/01/2018   • Colon cancer screening- tbd  2018 06/21/2017   • Obesity (BMI 30-39.9) 04/04/2017   • Chronic midline low back pain without sciatica- norco prn; nv pain and spine 04/04/2017   • Controlled substance agreement signed 03/22/2016   • Essential hypertension 08/31/2015   • DDD (degenerative disc disease), cervical 04/07/2015   • Thoracic radiculopathy 03/09/2015   • Lumbar radiculopathy 03/09/2015   • Vitamin D deficiency disease 10/11/2013   • DDD (degenerative disc disease), lumbar 10/11/2013   • Chronic allergic rhinitis 10/11/2013   • Primary insomnia 11/29/2012   • History of endometrial cancer-2009 JORDON-BSO; neg nodes 04/16/2012   • DJD (degenerative joint disease)- hands 04/16/2012   • COPD (chronic obstructive pulmonary disease) ([facility]) 11/07/2011   • Mixed hyperlipidemia 10/28/2010   • Carpal tunnel syndrome of right wrist      Allergies   Allergen Reactions   • Codeine Swelling   • Ace Inhibitors      Cough     Outpatient Medications Prior to Visit   Medication Sig Dispense Refill   • Calcium Carbonate-Vit D-Min (CALCIUM 1200 PO) Take 1,200 mg by mouth every day.     • Magnesium 400 MG Tab Take 400 mg by mouth every day.     • aspirin EC (ECOTRIN) 81 MG Tablet Delayed Response Take 81 mg by mouth every day.     • FIBER PO Take 2 Caps by mouth every day.     • simvastatin (ZOCOR) 20 MG Tab TAKE 0.5 TABS BY MOUTH EVERY EVENING. 90 Tab 4   •  "hydrocodone/acetaminophen (NORCO)  MG Tab TAKE ONE TABLET BY MOUTH FOUR TIMES A DAY AS NEEDED 30 DAY SUPPLY  0   • zolpidem (AMBIEN) 10 MG Tab TAKE ONE TABLET BY MOUTH DAILY AT BEDTIME 30 DAY SUPPLY  0   • Cholecalciferol (VITAMIN D) 2000 UNITS Cap Take 1 Cap by mouth every day. 90 Cap 4   • ipratropium-albuterol (COMBIVENT RESPIMAT)  MCG/ACT Aero Soln Inhale 1 Puff by mouth as needed.     • amlodipine (NORVASC) 5 MG Tab Take 1 Tab by mouth every day. 30 Tab 6   • losartan (COZAAR) 100 MG Tab Take 1 Tab by mouth every day. 30 Tab 11   • Nebivolol HCl (BYSTOLIC) 20 MG Tab Take 20 mg by mouth every day. 30 Tab 11   • triamterene-hctz (MAXZIDE-25/DYAZIDE) 37.5-25 MG Tab Take 1 Tab by mouth every day. 30 Tab 3     No facility-administered medications prior to visit.                HPI    Review of Systems   Constitutional: Negative.    HENT: Negative.    Eyes: Negative.    Respiratory: Negative.    Cardiovascular: Negative.    Gastrointestinal: Negative.    Genitourinary: Negative.    Musculoskeletal: Negative.    Skin: Negative.    Neurological: Negative.  Tingling: .lll.   Endo/Heme/Allergies: Negative.    Psychiatric/Behavioral: Negative.           Objective:     /92   Pulse 87   Temp 37.6 °C (99.7 °F)   Ht 1.6 m (5' 2.99\")   Wt 97.1 kg (214 lb)   SpO2 (!) 87%   BMI 37.92 kg/m²      Physical Exam   Constitutional: She is oriented to person, place, and time. She appears well-developed and well-nourished. No distress.   HENT:   Head: Normocephalic and atraumatic.   Right Ear: External ear normal.   Left Ear: External ear normal.   Nose: Nose normal.   Mouth/Throat: Oropharynx is clear and moist. No oropharyngeal exudate.   Eyes: Conjunctivae and EOM are normal. Pupils are equal, round, and reactive to light. Right eye exhibits no discharge. Left eye exhibits no discharge. No scleral icterus.   Neck: Normal range of motion. Neck supple. No JVD present. No tracheal deviation present. No " thyromegaly present.   Cardiovascular: Normal rate, regular rhythm, normal heart sounds and intact distal pulses.  Exam reveals no gallop and no friction rub.    No murmur heard.  Pulmonary/Chest: Effort normal and breath sounds normal. No stridor. No respiratory distress. She has no wheezes. She has no rales. She exhibits no tenderness.   Abdominal: Soft. Bowel sounds are normal. She exhibits no distension and no mass. There is no tenderness. There is no rebound and no guarding.   Musculoskeletal: Normal range of motion. She exhibits no edema or tenderness.   Lymphadenopathy:     She has no cervical adenopathy.   Neurological: She is alert and oriented to person, place, and time. She has normal reflexes. She displays normal reflexes. No cranial nerve deficit. She exhibits normal muscle tone. Coordination normal.   Skin: Skin is warm and dry. No rash noted. She is not diaphoretic. No erythema. No pallor.   Psychiatric: She has a normal mood and affect. Her behavior is normal. Judgment and thought content normal.   Vitals reviewed.    Hospital Outpatient Visit on 06/01/2018   Component Date Value   • TSH 06/01/2018 1.640    • Sodium 06/01/2018 139    • Potassium 06/01/2018 3.8    • Chloride 06/01/2018 102    • Co2 06/01/2018 29    • Anion Gap 06/01/2018 8.0    • Glucose 06/01/2018 91    • Bun 06/01/2018 18    • Creatinine 06/01/2018 0.74    • Calcium 06/01/2018 9.3    • AST(SGOT) 06/01/2018 26    • ALT(SGPT) 06/01/2018 18    • Alkaline Phosphatase 06/01/2018 72    • Total Bilirubin 06/01/2018 0.5    • Albumin 06/01/2018 4.2    • Total Protein 06/01/2018 7.3    • Globulin 06/01/2018 3.1    • A-G Ratio 06/01/2018 1.4    • Cholesterol,Tot 06/01/2018 169    • Triglycerides 06/01/2018 150*   • HDL 06/01/2018 52    • LDL 06/01/2018 87    • WBC 06/01/2018 9.1    • RBC 06/01/2018 5.12    • Hemoglobin 06/01/2018 14.9    • Hematocrit 06/01/2018 47.9*   • MCV 06/01/2018 93.6    • MCH 06/01/2018 29.1    • MCHC 06/01/2018 31.1*    • RDW 06/01/2018 44.8    • Platelet Count 06/01/2018 174    • MPV 06/01/2018 9.5    • Neutrophils-Polys 06/01/2018 71.10    • Lymphocytes 06/01/2018 21.90*   • Monocytes 06/01/2018 5.60    • Eosinophils 06/01/2018 0.90    • Basophils 06/01/2018 0.40    • Immature Granulocytes 06/01/2018 0.10    • Nucleated RBC 06/01/2018 0.00    • Neutrophils (Absolute) 06/01/2018 6.44    • Lymphs (Absolute) 06/01/2018 1.99    • Monos (Absolute) 06/01/2018 0.51    • Eos (Absolute) 06/01/2018 0.08    • Baso (Absolute) 06/01/2018 0.04    • Immature Granulocytes (a* 06/01/2018 0.01    • NRBC (Absolute) 06/01/2018 0.00    • GFR If  06/01/2018 >60    • GFR If Non  Ameri* 06/01/2018 >60       Allergies   Allergen Reactions   • Codeine Swelling   • Ace Inhibitors      Cough     .e            Assessment/Plan:     1. Hypoxemia      - REFERRAL TO PULMONOLOGY    2. Chronic bronchitis, unspecified chronic bronchitis type (HCC)   Under good control. Continue same regimen.      3. Mixed hyperlipidemia       Under good control. Continue same regimen.    4. Essential hypertension     Under good control. Continue same regimen.- amLODIPine (NORVASC) 5 MG Tab; Take 1 Tab by mouth every day.  Dispense: 90 Tab; Refill: 4  - losartan (COZAAR) 100 MG Tab; Take 1 Tab by mouth every day.  Dispense: 90 Tab; Refill: 4  - triamterene-hctz (MAXZIDE-25/DYAZIDE) 37.5-25 MG Tab; Take 1 Tab by mouth every day.  Dispense: 90 Tab; Refill: 4    5. Chronic midline low back pain without sciatica- norco prn    Under good control. Continue same regimen.    6. Obesity (BMI 30-39.9)     diet/exercise/lose 15 lbs.; patient counseled    - OBESITY COUNSELING (No Charge): Patient identified as having weight management issue.  Appropriate orders and counseling given.    7. Need for shingles vaccine         8. Screen for colon cancer     - REFERRAL TO GI FOR COLONOSCOPY    30 minute face-to-face encounter took place today.  More than half of this time was  spent in the coordination of care of the above problems, as well as counseling.

## 2018-06-20 ENCOUNTER — TELEPHONE (OUTPATIENT)
Dept: MEDICAL GROUP | Age: 73
End: 2018-06-20

## 2018-06-20 NOTE — TELEPHONE ENCOUNTER
Future Appointments       Provider Department Center    6/26/2018 1:20 PM Quincy Angel M.D.; [facility] [address]        ANNUAL WELLNESS VISIT PRE-VISIT PLANNING WITH OUTREACH    1.  If any orders were placed at last visit, do we have Results/Consult Notes?        •  Labs - Labs ordered, completed on 6/1/18 and results are in chart.  Note: If patient appointment is for lab review and patient did not complete labs, check with provider if OK to reschedule patient until labs completed.       •  Imaging - Imaging was not ordered at last office visit.       •  Referrals - Referral ordered, patient has NOT been seen.    2.  Immunizations were updated in Epic using WebIZ?:Epic matches WebIZ       •  WebIZ Recommendations: Patient is up to date on all vaccines       •  Is patient due for Tdap? NO       •  Is patient due for Shingles?NO    3.  Patient has the following Care Path diagnoses on Problem List:  COPD    1.  Is patient under the care of a pulmonologist? No.  2.  Has patient ever completed a PFT or spirometry? Yes, on 2/16/18.  3.  Is patient on oxygen or CPAP? No.  4.  Has patient ever had instruction on inhaler technique by health care professional? Yes  5.  Is patient interested in a referral to respiratory therapy for more information on COPD, inhaler technique, and/or information on establishing an action plan?  Yes, and is NOT interested in more at this time.      4.  MDX printed for Provider? NO

## 2018-06-26 ENCOUNTER — OFFICE VISIT (OUTPATIENT)
Dept: MEDICAL GROUP | Age: 73
End: 2018-06-26
Payer: MEDICARE

## 2018-06-26 VITALS
OXYGEN SATURATION: 93 % | HEIGHT: 62 IN | TEMPERATURE: 99.1 F | HEART RATE: 92 BPM | DIASTOLIC BLOOD PRESSURE: 90 MMHG | WEIGHT: 216.6 LBS | SYSTOLIC BLOOD PRESSURE: 166 MMHG | BODY MASS INDEX: 39.86 KG/M2

## 2018-06-26 DIAGNOSIS — E78.2 MIXED HYPERLIPIDEMIA: ICD-10-CM

## 2018-06-26 DIAGNOSIS — M51.36 DDD (DEGENERATIVE DISC DISEASE), LUMBAR: ICD-10-CM

## 2018-06-26 DIAGNOSIS — F51.01 PRIMARY INSOMNIA: ICD-10-CM

## 2018-06-26 DIAGNOSIS — Z12.11 COLON CANCER SCREENING: ICD-10-CM

## 2018-06-26 DIAGNOSIS — M81.0 MENOPAUSAL OSTEOPOROSIS: ICD-10-CM

## 2018-06-26 DIAGNOSIS — I10 ESSENTIAL HYPERTENSION: ICD-10-CM

## 2018-06-26 DIAGNOSIS — Z79.899 CONTROLLED SUBSTANCE AGREEMENT SIGNED: ICD-10-CM

## 2018-06-26 DIAGNOSIS — M50.30 DDD (DEGENERATIVE DISC DISEASE), CERVICAL: ICD-10-CM

## 2018-06-26 DIAGNOSIS — M54.14 THORACIC RADICULOPATHY: ICD-10-CM

## 2018-06-26 DIAGNOSIS — G89.29 CHRONIC MIDLINE LOW BACK PAIN WITHOUT SCIATICA: ICD-10-CM

## 2018-06-26 DIAGNOSIS — J30.9 CHRONIC ALLERGIC RHINITIS: ICD-10-CM

## 2018-06-26 DIAGNOSIS — E55.9 VITAMIN D DEFICIENCY DISEASE: ICD-10-CM

## 2018-06-26 DIAGNOSIS — J43.1 PANLOBULAR EMPHYSEMA (HCC): ICD-10-CM

## 2018-06-26 DIAGNOSIS — M54.16 LUMBAR RADICULOPATHY: ICD-10-CM

## 2018-06-26 DIAGNOSIS — E66.9 OBESITY (BMI 30-39.9): ICD-10-CM

## 2018-06-26 DIAGNOSIS — M54.50 CHRONIC MIDLINE LOW BACK PAIN WITHOUT SCIATICA: ICD-10-CM

## 2018-06-26 DIAGNOSIS — Z00.00 MEDICARE ANNUAL WELLNESS VISIT, SUBSEQUENT: ICD-10-CM

## 2018-06-26 DIAGNOSIS — M15.9 PRIMARY OSTEOARTHRITIS INVOLVING MULTIPLE JOINTS: ICD-10-CM

## 2018-06-26 PROBLEM — R09.02 HYPOXEMIA: Status: RESOLVED | Noted: 2018-06-05 | Resolved: 2018-06-26

## 2018-06-26 PROBLEM — N95.1 MENOPAUSAL STATE: Status: ACTIVE | Noted: 2018-06-26

## 2018-06-26 PROBLEM — N95.1 MENOPAUSAL STATE: Status: RESOLVED | Noted: 2018-06-26 | Resolved: 2018-06-26

## 2018-06-26 PROBLEM — K80.51 CALCULUS OF BILE DUCT WITHOUT CHOLECYSTITIS WITH OBSTRUCTION: Status: RESOLVED | Noted: 2018-03-01 | Resolved: 2018-06-26

## 2018-06-26 PROCEDURE — G0439 PPPS, SUBSEQ VISIT: HCPCS | Performed by: INTERNAL MEDICINE

## 2018-06-26 ASSESSMENT — PAIN SCALES - GENERAL: PAINLEVEL: 2=MINIMAL-SLIGHT

## 2018-06-26 ASSESSMENT — ACTIVITIES OF DAILY LIVING (ADL): BATHING_REQUIRES_ASSISTANCE: 0

## 2018-06-26 ASSESSMENT — ENCOUNTER SYMPTOMS: GENERAL WELL-BEING: GOOD

## 2018-06-26 ASSESSMENT — PATIENT HEALTH QUESTIONNAIRE - PHQ9: CLINICAL INTERPRETATION OF PHQ2 SCORE: 0

## 2018-06-26 NOTE — PROGRESS NOTES
Chief Complaint   Patient presents with   • Annual Wellness Visit     SCP         HPI:  Rufina is a 73 y.o. here for Medicare Annual Wellness Visit         Patient Active Problem List    Diagnosis Date Noted   • Hypoxemia 06/05/2018   • 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 with obstruction- s/p CBD stent feb 2018 after sone retrieval; gall bladder removed 2015 03/01/2018   • Colon cancer screening- tbd  2018 06/21/2017   • Obesity (BMI 30-39.9) 04/04/2017   • Chronic midline low back pain without sciatica- norco prn; nv pain and spine 04/04/2017   • Controlled substance agreement signed 03/22/2016   • Essential hypertension 08/31/2015   • DDD (degenerative disc disease), cervical 04/07/2015   • Thoracic radiculopathy 03/09/2015   • Lumbar radiculopathy 03/09/2015   • Vitamin D deficiency disease 10/11/2013   • DDD (degenerative disc disease), lumbar 10/11/2013   • Chronic allergic rhinitis 10/11/2013   • Primary insomnia 11/29/2012   • History of endometrial cancer-2009 JORDON-BSO; neg nodes 04/16/2012   • DJD (degenerative joint disease)- hands 04/16/2012   • COPD (chronic obstructive pulmonary disease) ([facility]) 11/07/2011   • Mixed hyperlipidemia 10/28/2010   • Carpal tunnel syndrome of right wrist        Current Outpatient Prescriptions   Medication Sig Dispense Refill   • amLODIPine (NORVASC) 5 MG Tab Take 1 Tab by mouth every day. 90 Tab 4   • losartan (COZAAR) 100 MG Tab Take 1 Tab by mouth every day. 90 Tab 4   • triamterene-hctz (MAXZIDE-25/DYAZIDE) 37.5-25 MG Tab Take 1 Tab by mouth every day. 90 Tab 4   • metoprolol (LOPRESSOR) 100 MG Tab Take 1 Tab by mouth 2 times a day. 180 Tab 4   • Magnesium 400 MG Tab Take 400 mg by mouth every day.     • aspirin EC (ECOTRIN) 81 MG Tablet Delayed Response Take 81 mg by mouth every day.     • FIBER PO Take 2 Caps by mouth every day.     • simvastatin (ZOCOR) 20 MG Tab TAKE 0.5 TABS BY MOUTH EVERY EVENING. 90 Tab 4   • hydrocodone/acetaminophen (NORCO)  MG Tab  TAKE ONE TABLET BY MOUTH FOUR TIMES A DAY AS NEEDED 30 DAY SUPPLY  0   • zolpidem (AMBIEN) 10 MG Tab TAKE ONE TABLET BY MOUTH DAILY AT BEDTIME 30 DAY SUPPLY  0   • Cholecalciferol (VITAMIN D) 2000 UNITS Cap Take 1 Cap by mouth every day. 90 Cap 4   • NON SPECIFIED Shingles vaccine 1 Each 0   • Calcium Carbonate-Vit D-Min (CALCIUM 1200 PO) Take 1,200 mg by mouth every day.     • ipratropium-albuterol (COMBIVENT RESPIMAT)  MCG/ACT Aero Soln Inhale 1 Puff by mouth as needed.       No current facility-administered medications for this visit.         Patient is taking medications as noted in medication list.  Current supplements as per medication list.     Allergies: Codeine and Ace inhibitors    Current social contact/activities: dinner out with friends/spend time with family       Is patient current with immunizations? Yes.    She  reports that she quit smoking about 27 years ago. She has never used smokeless tobacco. She reports that she does not drink alcohol or use drugs.  Counseling given: Not Answered        DPA/Advanced directive: Patient has Living Will, but it is not on file. Instructed to bring in a copy to scan into their chart.    ROS:    Gait: Uses no assistive device     Ostomy: no    Other tubes: no    Amputations: no    Chronic oxygen use no     Last eye exam 2016    Wears hearing aids: no    : Denies any urinary leakage during the last 6 months       Screening:         Depression Screening    Little interest or pleasure in doing things?  0 - not at all  Feeling down, depressed, or hopeless? 0 - not at all  Patient Health Questionnaire Score: 0    If depressive symptoms identified deferred to follow up visit unless specifically addressed in assessment and plan.    Interpretation of PHQ-9 Total Score   Score Severity   1-4 No Depression   5-9 Mild Depression   10-14 Moderate Depression   15-19 Moderately Severe Depression   20-27 Severe Depression    Screening for Cognitive Impairment    Three  Minute Recall (leader, season, table)  3/3    [place]umaro clock face with all 12 numbers and set the hands to show 10 past 11.  Yes    If cognitive concerns identified, deferred for follow up unless specifically addressed in assessment and plan.    Fall Risk Assessment    Has the patient had two or more falls in the last year or any fall with injury in the last year?  No  If fall risk identified, deferred for follow up unless specifically addressed in assessment and plan.    Safety Assessment    Throw rugs on floor.  Yes  Handrails on all stairs.  Yes  Good lighting in all hallways.  Yes  Difficulty hearing.  No  Patient counseled about all safety risks that were identified.    Functional Assessment ADLs    Are there any barriers preventing you from cooking for yourself or meeting nutritional needs?  No.    Are there any barriers preventing you from driving safely or obtaining transportation?  No.    Are there any barriers preventing you from using a telephone or calling for help?  No.    Are there any barriers preventing you from shopping?  No.    Are there any barriers preventing you from taking care of your own finances?  No.    Are there any barriers preventing you from managing your medications?  No.    Are there any barriers preventing you from showering, bathing or dressing yourself?  No.    Are you currently engaging in any exercise or physical activity?  Yes.  walk  What is your perception of your health?  Good.    Health Maintenance Summary                Annual Wellness Visit Overdue 1945     PFT SCREENING-FEV1 AND FEV/FVC RATIO / SPIROMETRY SHOULD BE PERFORMED ANNUALLY Overdue 3/18/1963     BONE DENSITY Overdue 3/18/2010     COLON CANCER SCREENING ANNUAL FIT Overdue 10/6/2017      Done 10/6/2016 OCCULT BLOOD FECES IMMUNOASSAY    MAMMOGRAM Next Due 11/13/2019      Done 11/13/2017 MA-MAMMO SCREENING BILAT W/TOMOSYNTHESIS W/CAD     Patient has more history with this topic...    IMM DTaP/Tdap/Td Vaccine Next  Due 6/25/2025      Done 6/25/2015 Imm Admin: Tdap Vaccine          Patient Care Team:  Quincy Angel M.D. as PCP - General (Internal Medicine)  Quinton Carrasco M.D. as Consulting Physician (Pain Management)  Lawrence Flores M.D. as Consulting Physician (Cardiology)  Jamie De Souza M.D. (Gastroenterology)    Social History   Substance Use Topics   • Smoking status: Former Smoker     Quit date: 1/4/1991   • Smokeless tobacco: Never Used   • Alcohol use No      Comment: hardly ever     Family History   Problem Relation Age of Onset   • Heart Disease Father 45     MI   • Lung Disease Father    • Stroke Father 70   • Cancer Father    • Hypertension Father    • Cancer Paternal Grandmother      breast   • Cancer Paternal Grandfather      She  has a past medical history of Allergy; Arthritis; Asthma; Back pain; Breath shortness; Bronchitis; CA - cancer of uterus; Cancer (HCC) (2010); Carpal tunnel syndrome; COPD; Diverticula of colon; Heart burn; High cholesterol; Hyperlipidemia; Hypertension; Pneumonia (1993); and Tremor, hereditary, benign. She also has no past medical history of Addisons disease (HCC); Adrenal disorder; Anemia; Anxiety; Blood transfusion; Clotting disorder; Cushings syndrome; Depression; GERD (gastroesophageal reflux disease); Goiter; Headache(784.0); HIV (human immunodeficiency virus infection); IBD (inflammatory bowel disease); Meningitis; Migraine; OSTEOPOROSIS; Parathyroid disorder (HCC); Pituitary disease (HCC); Substance abuse; Ulcer; or Urinary tract infection, site not specified.   Past Surgical History:   Procedure Laterality Date   • ERCP  4/5/2018    Procedure: ERCP W/POSS BIOPSY;  Surgeon: Quincy Louie M.D.;  Location: [facility];  Service: Gastroenterology   • ERCP W/SPHINCTEROTOMY/PAPILL.  4/5/2018    Procedure: ERCP W/SPHINCTEROTOMY/PAPILL.;  Surgeon: Quincy Louie M.D.;  Location: [facility];  Service: Gastroenterology   • ERCP W/ INSERTION  "STENT/TUBE  4/5/2018    Procedure: ERCP W/ INSERTION STENT/TUBE - STENT PLACEMENT/REMOVAL;  Surgeon: Quincy Louie M.D.;  Location: [facility];  Service: Gastroenterology   • ERCP W/REMOVAL CALCULUS  4/5/2018    Procedure: ERCP W/REMOVAL CALCULUS W/DILATION;  Surgeon: Quincy Louie M.D.;  Location: [facility];  Service: Gastroenterology   • ERCP  2/15/2018    Procedure: ERCP, SPHINCTEROTOMY, STENT PLACEMENT, DEBRIDEMENT;  Surgeon: Quincy Louie M.D.;  Location: [facility];  Service: Gastroenterology   • COMMON BILE DUCT EXPLORATION  02/15/2018   • ERCP W/ INSERTION STENT/TUBE  02/15/2018   • ERCP W/SPHINCTEROTOMY/PAPILL.  02/15/2018   • ERCP W/REMOVAL CALCULUS  02/15/2018   • ARIELLA BY LAPAROSCOPY  july, 2015    [facility]   • HYSTERECTOMY, TOTAL ABDOMINAL  1/18/10    Uterine, Dr. Whelan and Dr. Cam +BSO   • ABDOMINAL HYSTERECTOMY TOTAL  Jan 2010    Dr. Cam   • OOPHORECTOMY  Jan 2010    BSO   • OPEN REDUCTION      ankle           Exam:     Blood pressure (!) 166/90, pulse 92, temperature 37.3 °C (99.1 °F), height 1.562 m (5' 1.5\"), weight 98.2 kg (216 lb 9.6 oz), SpO2 93 %. Body mass index is 40.26 kg/m².    Hearing excellent.    Dentition good  Alert, oriented in no acute distress.  Eye contact is good, speech goal directed, affect calm        Assessment and Plan. The following treatment and monitoring plan is recommended:     1. Medicare annual wellness visit, subsequent       2. Panlobular emphysema (HCC)     - PULMONARY FUNCTION TESTS Test requested: Complete Pulmonary Function Test    3. Menopausal osteoporosis     - DS-BONE DENSITY STUDY (DEXA); Future    4. Mixed hyperlipidemia   Under good control. Continue same regimen.    - COMP METABOLIC PANEL; Future  - LIPID PROFILE; Future  - CBC WITH DIFFERENTIAL; Future    5. Primary osteoarthritis involving multiple joints    Under good control. Continue same regimen.    6. Primary insomnia     Under good control. " Continue same regimen.    7. Vitamin D deficiency disease         Under good control. Continue same regimen.    8. DDD (degenerative disc disease), lumbar       Under good control. Continue same regimen.  9. Chronic allergic rhinitis    Under good control. Continue same regimen.    10. Thoracic radiculopathy    Under good control. Continue same regimen.    11. Lumbar radiculopathy    Under good control. Continue same regimen.    12. DDD (degenerative disc disease), cervical    Under good control. Continue same regimen.    13. Essential hypertension     Under good control. Continue same regimen.    14. Controlled substance agreement signed    Under good control. Continue same regimen.    15. Obesity (BMI 30-39.9)    diet/exercise/lose 15 lbs.; patient counseled      16. Chronic midline low back pain without sciatica- norco prn; nv pain and spine    Under good control. Continue same regimen.    17. Colon cancer screening- tbd  7/23/2018     - REFERRAL TO GI FOR COLONOSCOPY  - OCCULT BLOOD FECES IMMUNOASSAY (FIT); Future  - COLOGUARD (FIT DNA)         Services suggested: No services needed at this time  Health Care Screening recommendations as per orders if indicated.  Referrals offered: PT/OT/Nutrition counseling/Behavioral Health/Smoking cessation as per orders if indicated.    Discussion today about general wellness and lifestyle habits:    · Prevent falls and reduce trip hazards; Cautioned about securing or removing rugs.  · Have a working fire alarm and carbon monoxide detector;   · Engage in regular physical activity and social activities       Follow-up: No Follow-up on file.

## 2018-07-24 ENCOUNTER — TELEPHONE (OUTPATIENT)
Dept: MEDICAL GROUP | Age: 73
End: 2018-07-24

## 2018-07-24 NOTE — TELEPHONE ENCOUNTER
Fax Received From: [employer]    Message: Form Scanned to [employer]. Per patient wish not to complete screening.

## 2018-08-16 ENCOUNTER — TELEPHONE (OUTPATIENT)
Dept: MEDICAL GROUP | Age: 73
End: 2018-08-16

## 2018-08-16 NOTE — TELEPHONE ENCOUNTER
VOICEMAIL  1. Caller Name: Rufina Macias                        Call Back Number: [phone] (home)       2. Message: patient has had several high blood pressure readings with switch from metoprolol she would like to know if she should go back to taking it     3. Patient approves office to leave a detailed voicemail/MyChart message: N\A

## 2018-08-20 NOTE — TELEPHONE ENCOUNTER
Phone Number Called: [phone] (home)       Message: please go back to taking metoprolol for blood pressure issues. Patient will take 2 100mg QD of metoprolol    Left Message for patient to call back: yes

## 2018-09-17 ENCOUNTER — OFFICE VISIT (OUTPATIENT)
Dept: MEDICAL GROUP | Age: 73
End: 2018-09-17
Payer: MEDICARE

## 2018-09-17 VITALS
BODY MASS INDEX: 38.83 KG/M2 | SYSTOLIC BLOOD PRESSURE: 152 MMHG | HEIGHT: 62 IN | DIASTOLIC BLOOD PRESSURE: 90 MMHG | TEMPERATURE: 97.7 F | WEIGHT: 211 LBS | HEART RATE: 85 BPM | OXYGEN SATURATION: 93 %

## 2018-09-17 DIAGNOSIS — I10 ESSENTIAL HYPERTENSION: ICD-10-CM

## 2018-09-17 DIAGNOSIS — J43.1 PANLOBULAR EMPHYSEMA (HCC): ICD-10-CM

## 2018-09-17 DIAGNOSIS — E78.2 MIXED HYPERLIPIDEMIA: ICD-10-CM

## 2018-09-17 PROCEDURE — 99214 OFFICE O/P EST MOD 30 MIN: CPT | Performed by: INTERNAL MEDICINE

## 2018-09-17 RX ORDER — SIMVASTATIN 20 MG
20 TABLET ORAL EVERY EVENING
Qty: 90 TAB | Refills: 4 | Status: SHIPPED | OUTPATIENT
Start: 2018-09-17 | End: 2019-01-08 | Stop reason: SDUPTHER

## 2018-09-17 RX ORDER — SIMVASTATIN 20 MG
TABLET ORAL
Qty: 90 TAB | Refills: 4 | Status: CANCELLED | OUTPATIENT
Start: 2018-09-17

## 2018-09-17 NOTE — PATIENT INSTRUCTIONS
"DASH Eating Plan  DASH stands for \"Dietary Approaches to Stop Hypertension.\" The DASH eating plan is a healthy eating plan that has been shown to reduce high blood pressure (hypertension). Additional health benefits may include reducing the risk of type 2 diabetes mellitus, heart disease, and stroke. The DASH eating plan may also help with weight loss.  What do I need to know about the DASH eating plan?  For the DASH eating plan, you will follow these general guidelines:  · Choose foods with less than 150 milligrams of sodium per serving (as listed on the food label).  · Use salt-free seasonings or herbs instead of table salt or sea salt.  · Check with your health care provider or pharmacist before using salt substitutes.  · Eat lower-sodium products. These are often labeled as \"low-sodium\" or \"no salt added.\"  · Eat fresh foods. Avoid eating a lot of canned foods.  · Eat more vegetables, fruits, and low-fat dairy products.  · Choose whole grains. Look for the word \"whole\" as the first word in the ingredient list.  · Choose fish and skinless chicken or turkey more often than red meat. Limit fish, poultry, and meat to 6 oz (170 g) each day.  · Limit sweets, desserts, sugars, and sugary drinks.  · Choose heart-healthy fats.  · Eat more home-cooked food and less restaurant, buffet, and fast food.  · Limit fried foods.  · Do not rudolph foods. Cook foods using methods such as baking, boiling, grilling, and broiling instead.  · When eating at a restaurant, ask that your food be prepared with less salt, or no salt if possible.  What foods can I eat?  Seek help from a dietitian for individual calorie needs.  Grains   Whole grain or whole wheat bread. Brown rice. Whole grain or whole wheat pasta. Quinoa, bulgur, and whole grain cereals. Low-sodium cereals. Corn or whole wheat flour tortillas. Whole grain cornbread. Whole grain crackers. Low-sodium crackers.  Vegetables   Fresh or frozen vegetables (raw, steamed, roasted, or " grilled). Low-sodium or reduced-sodium tomato and vegetable juices. Low-sodium or reduced-sodium tomato sauce and paste. Low-sodium or reduced-sodium canned vegetables.  Fruits   All fresh, canned (in natural juice), or frozen fruits.  Meat and Other Protein Products   Ground beef (85% or leaner), grass-fed beef, or beef trimmed of fat. Skinless chicken or turkey. Ground chicken or turkey. Pork trimmed of fat. All fish and seafood. Eggs. Dried beans, peas, or lentils. Unsalted nuts and seeds. Unsalted canned beans.  Dairy   Low-fat dairy products, such as skim or 1% milk, 2% or reduced-fat cheeses, low-fat ricotta or cottage cheese, or plain low-fat yogurt. Low-sodium or reduced-sodium cheeses.  Fats and Oils   Tub margarines without trans fats. Light or reduced-fat mayonnaise and salad dressings (reduced sodium). Avocado. Safflower, olive, or canola oils. Natural peanut or almond butter.  Other   Unsalted popcorn and pretzels.  The items listed above may not be a complete list of recommended foods or beverages. Contact your dietitian for more options.   What foods are not recommended?  Grains   White bread. White pasta. White rice. Refined cornbread. Bagels and croissants. Crackers that contain trans fat.  Vegetables   Creamed or fried vegetables. Vegetables in a cheese sauce. Regular canned vegetables. Regular canned tomato sauce and paste. Regular tomato and vegetable juices.  Fruits   Canned fruit in light or heavy syrup. Fruit juice.  Meat and Other Protein Products   Fatty cuts of meat. Ribs, chicken wings, carpenter, sausage, bologna, salami, chitterlings, fatback, hot dogs, bratwurst, and packaged luncheon meats. Salted nuts and seeds. Canned beans with salt.  Dairy   Whole or 2% milk, cream, half-and-half, and cream cheese. Whole-fat or sweetened yogurt. Full-fat cheeses or blue cheese. Nondairy creamers and whipped toppings. Processed cheese, cheese spreads, or cheese curds.  Condiments   Onion and garlic  salt, seasoned salt, table salt, and sea salt. Canned and packaged gravies. Worcestershire sauce. Tartar sauce. Barbecue sauce. Teriyaki sauce. Soy sauce, including reduced sodium. Steak sauce. Fish sauce. Oyster sauce. Cocktail sauce. Horseradish. Ketchup and mustard. Meat flavorings and tenderizers. Bouillon cubes. Hot sauce. Tabasco sauce. Marinades. Taco seasonings. Relishes.  Fats and Oils   Butter, stick margarine, lard, shortening, ghee, and carpenter fat. Coconut, palm kernel, or palm oils. Regular salad dressings.  Other   Pickles and olives. Salted popcorn and pretzels.  The items listed above may not be a complete list of foods and beverages to avoid. Contact your dietitian for more information.   Where can I find more information?  National [employer], [employer], [employer] [place]: www.nhlbi.nih.gov/health/health-topics/topics/dash/  This information is not intended to replace advice given to you by your health care provider. Make sure you discuss any questions you have with your health care provider.  Document Released: 12/06/2012 Document Revised: 05/25/2017 Document Reviewed: 10/22/2014  Elsevier Interactive Patient Education © 2017 [employer] Inc[employer]

## 2018-09-17 NOTE — ASSESSMENT & PLAN NOTE
Patient has history of resistant hypertension.  She follows with cardiologist.  Patient is the regular patient of Dr. Angel.  She stated that her cardiologist prescribed bisoprolol but medication is very expensive.  So her primary care provider, Dr. Angel switched bisoprolol to metoprolol.  Patient reported that she take metoprolol 100 mg 2 tablets daily, amlodipine 5 mg daily, losartan 100 mg daily.  She stated that she takes triamterene-HCTZ 37.5-25 mg 1 tablet 3-4 days per week.  Patient has normal kidney function tests and normal electrolytes on 6/1/18.  She stated that she did not want to take diuretics daily as she did not want to have frequent urination throughout the day.  Her blood pressure is high at 164/98 in initial reading in clinic today.  We rechecked her blood pressure 20 minutes after rest and blood pressure decreased to 152/90.  She did not take her diuretic today.  I advised patient to take Triamterene-HCTZ 37.5-25 mg 1 tablet every morning, amlodipine 5 mg in the morning and metoprolol 100 mg in the morning.  I advised her to take second dose of metoprolol 100 mg in the evening time with losartan 100 mg in the evening and simvastatin in the evening.  I advised patient to closely monitor her blood pressure and pulse at home.  She is advised to take extra dose of amlodipine 5 mg daily if her blood pressure is above 150/90.  Patient is advised to return to clinic next week for medical assistant visit to recheck her blood pressure and to receive flu vaccine.  Patient wanted to postpone flu shot today.

## 2018-09-17 NOTE — ASSESSMENT & PLAN NOTE
Patient has COPD and she is not compliant with inhaler as prescribed.  Patient reported shortness of breath on physical exertion but denied cough or wheezing or chest congestion or fever or chest pain.  She requested to refill Combivent.  She stated that she is advised to take Combivent as needed but she has never been using it regularly.  I refilled Combivent inhaler today and advised patient to use Combivent inhaler 1 puff every 6 hour as needed and review potential side effects or Combivent with her.

## 2018-09-17 NOTE — ASSESSMENT & PLAN NOTE
Patient stated that she takes simvastatin 20 mg half tablet daily.  She denies side effects from taking it.  She requested to refill medication today.  She has normal liver enzymes.  I reviewed her previous blood test with her in clinic today.    Results for LUANN GAONA (MRN [id number]) as of 9/17/2018 12:45   Ref. Range 6/1/2018 12:11   Cholesterol,Tot Latest Ref Range: 100 - 199 mg/dL 169   Triglycerides Latest Ref Range: 0 - 149 mg/dL 150 (H)   HDL Latest Ref Range: >=40 mg/dL 52   LDL Latest Ref Range: <100 mg/dL 87

## 2018-09-17 NOTE — PROGRESS NOTES
Subjective:   Luann Macias is a 73 y.o. female here today for evaluation and management of:      Mixed hyperlipidemia  Patient stated that she takes simvastatin 20 mg half tablet daily.  She denies side effects from taking it.  She requested to refill medication today.  She has normal liver enzymes.  I reviewed her previous blood test with her in clinic today.    Results for LUANN MACIAS (MRN [id number]) as of 9/17/2018 12:45   Ref. Range 6/1/2018 12:11   Cholesterol,Tot Latest Ref Range: 100 - 199 mg/dL 169   Triglycerides Latest Ref Range: 0 - 149 mg/dL 150 (H)   HDL Latest Ref Range: >=40 mg/dL 52   LDL Latest Ref Range: <100 mg/dL 87       Essential hypertension  Patient has history of resistant hypertension.  She follows with cardiologist.  Patient is the regular patient of Dr. Angel.  She stated that her cardiologist prescribed bisoprolol but medication is very expensive.  So her primary care provider, Dr. Angel switched bisoprolol to metoprolol.  Patient reported that she take metoprolol 100 mg 2 tablets daily, amlodipine 5 mg daily, losartan 100 mg daily.  She stated that she takes triamterene-HCTZ 37.5-25 mg 1 tablet 3-4 days per week.  Patient has normal kidney function tests and normal electrolytes on 6/1/18.  She stated that she did not want to take diuretics daily as she did not want to have frequent urination throughout the day.  Her blood pressure is high at 164/98 in initial reading in clinic today.  We rechecked her blood pressure 20 minutes after rest and blood pressure decreased to 152/90.  She did not take her diuretic today.  I advised patient to take Triamterene-HCTZ 37.5-25 mg 1 tablet every morning, amlodipine 5 mg in the morning and metoprolol 100 mg in the morning.  I advised her to take second dose of metoprolol 100 mg in the evening time with losartan 100 mg in the evening and simvastatin in the evening.  I advised patient to closely monitor her blood pressure and pulse at  home.  She is advised to take extra dose of amlodipine 5 mg daily if her blood pressure is above 150/90.  Patient is advised to return to clinic next week for medical assistant visit to recheck her blood pressure and to receive flu vaccine.  Patient wanted to postpone flu shot today.    COPD (chronic obstructive pulmonary disease)  Patient has COPD and she is not compliant with inhaler as prescribed.  Patient reported shortness of breath on physical exertion but denied cough or wheezing or chest congestion or fever or chest pain.  She requested to refill Combivent.  She stated that she is advised to take Combivent as needed but she has never been using it regularly.  I refilled Combivent inhaler today and advised patient to use Combivent inhaler 1 puff every 6 hour as needed and review potential side effects or Combivent with her.         Current medicines (including changes today)  Current Outpatient Prescriptions   Medication Sig Dispense Refill   • ipratropium-albuterol (COMBIVENT RESPIMAT)  MCG/ACT Aero Soln Inhale 1 Puff by mouth every 6 hours as needed (shortness of breath). 1 Inhaler 5   • simvastatin (ZOCOR) 20 MG Tab Take 1 Tab by mouth every evening. 90 Tab 4   • amLODIPine (NORVASC) 5 MG Tab Take 1 Tab by mouth every day. 90 Tab 4   • losartan (COZAAR) 100 MG Tab Take 1 Tab by mouth every day. 90 Tab 4   • triamterene-hctz (MAXZIDE-25/DYAZIDE) 37.5-25 MG Tab Take 1 Tab by mouth every day. 90 Tab 4   • NON SPECIFIED Shingles vaccine 1 Each 0   • metoprolol (LOPRESSOR) 100 MG Tab Take 1 Tab by mouth 2 times a day. 180 Tab 4   • Calcium Carbonate-Vit D-Min (CALCIUM 1200 PO) Take 1,200 mg by mouth every day.     • Magnesium 400 MG Tab Take 400 mg by mouth every day.     • aspirin EC (ECOTRIN) 81 MG Tablet Delayed Response Take 81 mg by mouth every day.     • FIBER PO Take 2 Caps by mouth every day.     • hydrocodone/acetaminophen (NORCO)  MG Tab TAKE ONE TABLET BY MOUTH FOUR TIMES A DAY AS NEEDED  "30 DAY SUPPLY  0   • zolpidem (AMBIEN) 10 MG Tab TAKE ONE TABLET BY MOUTH DAILY AT BEDTIME 30 DAY SUPPLY  0   • Cholecalciferol (VITAMIN D) 2000 UNITS Cap Take 1 Cap by mouth every day. 90 Cap 4     No current facility-administered medications for this visit.      She  has a past medical history of Allergy; Arthritis; Asthma; Back pain; Breath shortness; Bronchitis; CA - cancer of uterus; Cancer ([facility]) (2010); Carpal tunnel syndrome; COPD; Diverticula of colon; Heart burn; High cholesterol; Hyperlipidemia; Hypertension; Pneumonia (1993); and Tremor, hereditary, benign. She also has no past medical history of Addisons disease ([facility]); Adrenal disorder; Anemia; Anxiety; Blood transfusion; Clotting disorder; Cushings syndrome; Depression; GERD (gastroesophageal reflux disease); Goiter; Headache(784.0); HIV (human immunodeficiency virus infection); IBD (inflammatory bowel disease); Meningitis; Migraine; OSTEOPOROSIS; Parathyroid disorder ([facility]); Pituitary disease ([facility]); Substance abuse; Ulcer; or Urinary tract infection, site not specified.    ROS   No chest pain, no shortness of breath, no abdominal pain       Objective:     Blood pressure 152/90, pulse 85, temperature 36.5 °C (97.7 °F), height 1.562 m (5' 1.5\"), weight 95.7 kg (211 lb), SpO2 93 %. Body mass index is 39.22 kg/m².   Physical Exam:  General: Alert, oriented and no acute distress.  Eye contact is good, speech goal directed, affect calm  HEENT: conjunctiva non-injected, sclera non-icteric.  Oral mucous membranes pink and moist with no lesions.  Pinna normal.   Lungs: Normal respiratory effort, clear to auscultation bilaterally with good excursion.  CV: regular rate and rhythm. No murmurs.   Abdomen: soft, non distended, nontender, No CVAT, Bowel sound normal.  Ext: no edema, color normal, vascularity normal, temperature normal        Assessment and Plan:   The following treatment plan was discussed     1. Essential hypertension  - Not well controlled yet.  " Patient is asymptomatic and she denied chest pain or dizziness or headache or shortness of breath at rest or tingling or numbness or weakness on the body.  I do not think that she needs to switch metoprolol back to bisoprolol and it would be at risk of having uncontrolled blood pressure again by switching medication.  - I discussed with patient how to take her antihypertensive medications regularly and I advised patient to take metoprolol 100 mg twice a day instead of taking metoprolol 200 mg once a day.  I also advised patient to take triamterene-hydrochlorothiazide 37.5-25 mg every morning with amlodipine 5 mg daily and metoprolol 100 mg every morning.  She will take losartan 100 mg every evening with metoprolol 100 mg every evening.  I advised patient to keep well-hydrated.  Patient is advised to check her blood pressure and pulse regularly at home and bring her blood pressure log to follow-up visit.  Patient is advised to return to clinic next week for medical assistant visit to check her blood pressure.  -Patient is advised to take extra dose of amlodipine 5 mg if her blood pressure is more than 150/90.  Patient is advised to seek urgent medical attention if she has any headache or dizziness or chest pain or any symptoms with high blood pressure.  Patient stated understanding.  - I also advised patient to eat DASH diet to control her blood pressure and printed out diet plan for patient.    2. Mixed hyperlipidemia  - Stable.  Continue current regimen.  Refill simvastatin.  Reviewed potential side effects of simvastatin with patient.  Advised patient to do blood test before follow-up with her PCP.  - simvastatin (ZOCOR) 20 MG Tab; Take 1 Tab by mouth every evening.  Dispense: 90 Tab; Refill: 4    3. Panlobular emphysema (HCC)  - Patient reported having shortness of breath on physical exertion.  She denied shortness of breath at rest or wheezing or cough or fever or chills or chest pain.  Patient stated that she  has Combivent inhaler which is .  She stated that she did not use her inhaler as prescribed.  She agreed to retry Combivent inhaler.  She denies side effects from using Combivent inhaler.  I refilled Combivent inhaler and reviewed the use and side effects of inhaler with patient.  Patient is advised to return to clinic sooner if her symptoms do not improve.  - ipratropium-albuterol (COMBIVENT RESPIMAT)  MCG/ACT Aero Soln; Inhale 1 Puff by mouth every 6 hours as needed (shortness of breath).  Dispense: 1 Inhaler; Refill: 5    4. Health Maintenance   - Patient is advised to receive flu vaccine today.  I reviewed the benefits of flu shot.  She stated that she would like to postpone the flu shot to next few weeks.  I advised her to have medical assistant visit next week to recheck her blood pressure and received flu vaccine.    - Discussed ED precautions with patient: seek emergency evaluation for symptoms including but not limited to: worsening symptoms or developing any new symptoms, crushing chest pain, chest pain associated with difficulty breathing, nausea, or sweats, heart rate irregular or too fast to count.   - Any change or worsening of signs or symptoms, patient encouraged to follow-up or report to emergency room for further evaluation. Patient understands and agrees.      Followup: Return in about 1 week (around 2018), or if symptoms worsen or fail to improve, for Medical assistant visit to check her blood pressure, follow-up with Dr. Angel, PCP as scheduled on 18.      Please note that this dictation was created using voice recognition software. I have made every reasonable attempt to correct obvious errors, but I expect that there may have unintended errors in text, spelling, punctuation, or grammar that I did not discover.

## 2018-10-08 ENCOUNTER — OFFICE VISIT (OUTPATIENT)
Dept: MEDICAL GROUP | Age: 73
End: 2018-10-08
Payer: MEDICARE

## 2018-10-08 VITALS — SYSTOLIC BLOOD PRESSURE: 132 MMHG | DIASTOLIC BLOOD PRESSURE: 76 MMHG

## 2018-10-08 DIAGNOSIS — I10 BENIGN ESSENTIAL HTN: ICD-10-CM

## 2018-10-08 NOTE — PROGRESS NOTES
Rufina Macias is a 73 y.o. female here for a non-provider visit for BP check    Vitals:    10/08/18 1459   BP: 132/76     If abnormal, was an in office provider notified today? Not Indicated  Routed to PCP? Yes

## 2018-10-25 ENCOUNTER — APPOINTMENT (OUTPATIENT)
Dept: RADIOLOGY | Facility: IMAGING CENTER | Age: 73
End: 2018-10-25
Attending: PHYSICIAN ASSISTANT
Payer: MEDICARE

## 2018-10-25 ENCOUNTER — OFFICE VISIT (OUTPATIENT)
Dept: URGENT CARE | Facility: CLINIC | Age: 73
End: 2018-10-25
Payer: MEDICARE

## 2018-10-25 VITALS
TEMPERATURE: 100 F | SYSTOLIC BLOOD PRESSURE: 132 MMHG | HEART RATE: 75 BPM | BODY MASS INDEX: 39.41 KG/M2 | WEIGHT: 212 LBS | DIASTOLIC BLOOD PRESSURE: 88 MMHG | OXYGEN SATURATION: 97 %

## 2018-10-25 DIAGNOSIS — M25.562 ACUTE PAIN OF LEFT KNEE: ICD-10-CM

## 2018-10-25 DIAGNOSIS — M25.462 EFFUSION OF LEFT KNEE: ICD-10-CM

## 2018-10-25 PROCEDURE — 99214 OFFICE O/P EST MOD 30 MIN: CPT | Performed by: PHYSICIAN ASSISTANT

## 2018-10-25 PROCEDURE — 73562 X-RAY EXAM OF KNEE 3: CPT | Mod: 26,LT | Performed by: PHYSICIAN ASSISTANT

## 2018-10-25 ASSESSMENT — ENCOUNTER SYMPTOMS
CHILLS: 0
NAUSEA: 0
SHORTNESS OF BREATH: 0
BLURRED VISION: 0
TINGLING: 0
PALPITATIONS: 0
VOMITING: 0
SORE THROAT: 0
FEVER: 0
SENSORY CHANGE: 0

## 2018-10-25 NOTE — PROGRESS NOTES
Subjective:      CC: Rufina Macias is a 73 y.o. female who presents with Knee Pain (left knee x8 days)      HPI:  This is a new problem. Rufina Macias is a 73 y.o. female who presents today for evaluation of left knee pain.  Patient states that she has a history of arthritis that her left knee has been bothering her a lot more for the last 8 days.  She said it is ago she was climbing a ladder and started noticed the pain shortly after, however, she cannot recall any specific moment where she injured her knee.  She says this feels exactly like her other arthritis pains because it is sharp.  She has not noticed her knee locking or clicking.  She says it seems to hurt most after walking on it for a little while.  She states that it hurts the most 8 days ago which was the initial day of symptoms.  She said it was getting better but then 3 days ago started to hurt a little more again but still has never gotten as bad as it was 8 days ago.  She says she has been intermittently applying ice, taking anti-inflammatories, and elevating it.  She denies fever/chills, chest pain, shortness of breath, rash, or numbness/tingling in the affected extremity.          Review of Systems   Constitutional: Negative for chills and fever.   HENT: Negative for sore throat.    Eyes: Negative for blurred vision.   Respiratory: Negative for shortness of breath.    Cardiovascular: Negative for chest pain and palpitations.   Gastrointestinal: Negative for nausea and vomiting.   Musculoskeletal: Positive for joint pain (Left knee).   Neurological: Negative for tingling and sensory change.         PMH:  has a past medical history of Allergy; Arthritis; Asthma; Back pain; Breath shortness; Bronchitis; CA - cancer of uterus; Cancer ([facility]) (2010); Carpal tunnel syndrome; COPD; Diverticula of colon; Heart burn; High cholesterol; Hyperlipidemia; Hypertension; Pneumonia (1993); and Tremor, hereditary, benign. She also has no past medical  history of Addisons disease (HCC); Adrenal disorder; Anemia; Anxiety; Blood transfusion; Clotting disorder; Cushings syndrome; Depression; GERD (gastroesophageal reflux disease); Goiter; Headache(784.0); HIV (human immunodeficiency virus infection); IBD (inflammatory bowel disease); Meningitis; Migraine; OSTEOPOROSIS; Parathyroid disorder (HCC); Pituitary disease (HCC); Substance abuse (HCC); Ulcer; or Urinary tract infection, site not specified.  MEDS:   Current Outpatient Prescriptions:   •  ipratropium-albuterol (COMBIVENT RESPIMAT)  MCG/ACT Aero Soln, Inhale 1 Puff by mouth every 6 hours as needed (shortness of breath)., Disp: 1 Inhaler, Rfl: 5  •  simvastatin (ZOCOR) 20 MG Tab, Take 1 Tab by mouth every evening., Disp: 90 Tab, Rfl: 4  •  amLODIPine (NORVASC) 5 MG Tab, Take 1 Tab by mouth every day., Disp: 90 Tab, Rfl: 4  •  losartan (COZAAR) 100 MG Tab, Take 1 Tab by mouth every day., Disp: 90 Tab, Rfl: 4  •  triamterene-hctz (MAXZIDE-25/DYAZIDE) 37.5-25 MG Tab, Take 1 Tab by mouth every day., Disp: 90 Tab, Rfl: 4  •  metoprolol (LOPRESSOR) 100 MG Tab, Take 1 Tab by mouth 2 times a day., Disp: 180 Tab, Rfl: 4  •  Calcium Carbonate-Vit D-Min (CALCIUM 1200 PO), Take 1,200 mg by mouth every day., Disp: , Rfl:   •  Magnesium 400 MG Tab, Take 400 mg by mouth every day., Disp: , Rfl:   •  aspirin EC (ECOTRIN) 81 MG Tablet Delayed Response, Take 81 mg by mouth every day., Disp: , Rfl:   •  FIBER PO, Take 2 Caps by mouth every day., Disp: , Rfl:   •  hydrocodone/acetaminophen (NORCO)  MG Tab, TAKE ONE TABLET BY MOUTH FOUR TIMES A DAY AS NEEDED 30 DAY SUPPLY, Disp: , Rfl: 0  •  zolpidem (AMBIEN) 10 MG Tab, TAKE ONE TABLET BY MOUTH DAILY AT BEDTIME 30 DAY SUPPLY, Disp: , Rfl: 0  •  Cholecalciferol (VITAMIN D) 2000 UNITS Cap, Take 1 Cap by mouth every day., Disp: 90 Cap, Rfl: 4  •  NON SPECIFIED, Shingles vaccine (Patient not taking: Reported on 10/25/2018), Disp: 1 Each, Rfl: 0  ALLERGIES:   Allergies    Allergen Reactions   • Codeine Swelling   • Ace Inhibitors      Cough     SURGHX:   Past Surgical History:   Procedure Laterality Date   • ERCP  4/5/2018    Procedure: ERCP W/POSS BIOPSY;  Surgeon: Quincy Louie M.D.;  Location: [facility];  Service: Gastroenterology   • ERCP W/SPHINCTEROTOMY/PAPILL.  4/5/2018    Procedure: ERCP W/SPHINCTEROTOMY/PAPILL.;  Surgeon: Quincy Louie M.D.;  Location: [facility];  Service: Gastroenterology   • ERCP W/ INSERTION STENT/TUBE  4/5/2018    Procedure: ERCP W/ INSERTION STENT/TUBE - STENT PLACEMENT/REMOVAL;  Surgeon: Quincy Louie M.D.;  Location: [facility];  Service: Gastroenterology   • ERCP W/REMOVAL CALCULUS  4/5/2018    Procedure: ERCP W/REMOVAL CALCULUS W/DILATION;  Surgeon: Quincy Louie M.D.;  Location: [facility];  Service: Gastroenterology   • ERCP  2/15/2018    Procedure: ERCP, SPHINCTEROTOMY, STENT PLACEMENT, DEBRIDEMENT;  Surgeon: Quincy Louie M.D.;  Location: [facility];  Service: Gastroenterology   • COMMON BILE DUCT EXPLORATION  02/15/2018   • ERCP W/ INSERTION STENT/TUBE  02/15/2018   • ERCP W/SPHINCTEROTOMY/PAPILL.  02/15/2018   • ERCP W/REMOVAL CALCULUS  02/15/2018   • ARIELLA BY LAPAROSCOPY  july, 2015    [facility]   • HYSTERECTOMY, TOTAL ABDOMINAL  1/18/10    Uterine, Dr. Whelan and Dr. Cam +BSO   • ABDOMINAL HYSTERECTOMY TOTAL  Jan 2010    Dr. Cam   • OOPHORECTOMY  Jan 2010    BSO   • OPEN REDUCTION      ankle     SOCHX:  reports that she quit smoking about 27 years ago. She has never used smokeless tobacco. She reports that she does not drink alcohol or use drugs.  FH: Family history was reviewed, no pertinent findings to report       Objective:     /88 (BP Location: Left arm, Patient Position: Sitting)   Pulse 75   Temp 37.8 °C (100 °F) (Temporal)   Wt 96.2 kg (212 lb)   SpO2 97%   BMI 39.41 kg/m²        Physical Exam   Constitutional: She is oriented  to person, place, and time. She appears well-developed and well-nourished.   HENT:   Head: Normocephalic and atraumatic.   Right Ear: External ear normal.   Left Ear: External ear normal.   Eyes: Pupils are equal, round, and reactive to light. Conjunctivae are normal.   Cardiovascular: Normal rate, regular rhythm and normal heart sounds.    No murmur heard.  Pulmonary/Chest: Effort normal and breath sounds normal. She has no wheezes.   Musculoskeletal:        Left knee: She exhibits swelling. She exhibits normal range of motion, no ecchymosis, no deformity, no laceration and no erythema. Tenderness found. Medial joint line tenderness noted.   Left knee exhibits some mild swelling of the medial aspect.  Point tenderness over the medial joint line and surrounding area.  No laxity appreciated.  No warmth, erythema, or ecchymosis, or obvious deformity appreciated.  She has full painless range of motion and full strength of the left lower extremity.  She has pain at the medial joint line with passive flexion of the knee combined with internal rotation of the tibia.  No locking or clicking heard.   Neurological: She is alert and oriented to person, place, and time.   Skin: Skin is warm and dry. Capillary refill takes less than 2 seconds.   Psychiatric: She has a normal mood and affect. Her behavior is normal. Judgment normal.           DX-KNEE 3 VIEWS LEFT  Impression:   1.  Small to moderate left knee effusion.       Assessment/Plan:     1. Effusion of left knee, suspect possible tear of medial meniscus  - DX-KNEE 3 VIEWS LEFT  - REFERRAL TO ORTHOPEDICS  -Rest  -Apply ice multiple times per day  -OTC anti-inflammatory  -Keep elevated as much as possible  -Left knee was wrapped in Ace bandage prior to discharge        Differential Diagnosis, natural history, and supportive care discussed. Return to the Urgent Care or follow up with your PCP if symptoms fail to resolve, or for any new or worsening symptoms. Emergency room  precautions discussed. Patient and/or family appears understanding of information.

## 2018-11-27 ENCOUNTER — HOSPITAL ENCOUNTER (OUTPATIENT)
Dept: RADIOLOGY | Facility: MEDICAL CENTER | Age: 73
End: 2018-11-27
Attending: INTERNAL MEDICINE
Payer: MEDICARE

## 2018-11-27 DIAGNOSIS — Z12.31 VISIT FOR SCREENING MAMMOGRAM: ICD-10-CM

## 2018-11-27 PROCEDURE — 77067 SCR MAMMO BI INCL CAD: CPT

## 2019-01-05 ENCOUNTER — TELEPHONE (OUTPATIENT)
Dept: MEDICAL GROUP | Age: 74
End: 2019-01-05

## 2019-01-05 NOTE — TELEPHONE ENCOUNTER
Left message for patient to call back regarding pre-visit planning. Please transfer call to [phone].                  ESTABLISHED PATIENT PRE-VISIT PLANNING     Patient was contacted to complete PVP.     Note: Patient will not be contacted if there is no indication to call.     1.  Reviewed notes from the last few office visits within the medical group: Yes    2.  If any orders were placed at last visit or intended to be done for this visit (i.e. 6 mos follow-up), do we have Results/Consult Notes?        •  Labs - Labs ordered, NOT completed. Patient advised to complete prior to next appointment.   Note: If patient appointment is for lab review and patient did not complete labs, check with provider if OK to reschedule patient until labs completed.       •  Imaging - Imaging ordered, NOT completed. Patient advised to complete prior to next appointment.       •  Referrals - No referrals were ordered at last office visit.    3. Is this appointment scheduled as a Hospital Follow-Up? No    4.  Immunizations were updated in Epic using WebIZ?: Epic matches WebIZ       •  Web Iz Recommendations: SHINGRIX (Shingles)    5.  Patient is due for the following Health Maintenance Topics:   Health Maintenance Due   Topic Date Due   • PFT SCREENING-FEV1 AND FEV/FVC RATIO / SPIROMETRY SHOULD BE PERFORMED ANNUALLY  03/18/1963   • BONE DENSITY  03/18/2010   • IMM ZOSTER VACCINES (2 of 3) 12/10/2013           6. Orders for overdue Health Maintenance topics pended in Pre-Charting? Orders were already placed    7.  AHA (MDX) form printed for Provider? YES    8.  Patient was NOT informed to arrive 15 min prior to their scheduled appointment and bring in their medication bottles.

## 2019-01-07 ENCOUNTER — HOSPITAL ENCOUNTER (OUTPATIENT)
Dept: LAB | Facility: MEDICAL CENTER | Age: 74
End: 2019-01-07
Attending: INTERNAL MEDICINE
Payer: MEDICARE

## 2019-01-07 DIAGNOSIS — E78.2 MIXED HYPERLIPIDEMIA: ICD-10-CM

## 2019-01-07 LAB
ALBUMIN SERPL BCP-MCNC: 3.7 G/DL (ref 3.2–4.9)
ALBUMIN/GLOB SERPL: 1.2 G/DL
ALP SERPL-CCNC: 70 U/L (ref 30–99)
ALT SERPL-CCNC: 12 U/L (ref 2–50)
ANION GAP SERPL CALC-SCNC: 7 MMOL/L (ref 0–11.9)
AST SERPL-CCNC: 19 U/L (ref 12–45)
BASOPHILS # BLD AUTO: 0.3 % (ref 0–1.8)
BASOPHILS # BLD: 0.03 K/UL (ref 0–0.12)
BILIRUB SERPL-MCNC: 0.6 MG/DL (ref 0.1–1.5)
BUN SERPL-MCNC: 14 MG/DL (ref 8–22)
CALCIUM SERPL-MCNC: 9.2 MG/DL (ref 8.5–10.5)
CHLORIDE SERPL-SCNC: 102 MMOL/L (ref 96–112)
CHOLEST SERPL-MCNC: 146 MG/DL (ref 100–199)
CO2 SERPL-SCNC: 28 MMOL/L (ref 20–33)
CREAT SERPL-MCNC: 0.74 MG/DL (ref 0.5–1.4)
EOSINOPHIL # BLD AUTO: 0.06 K/UL (ref 0–0.51)
EOSINOPHIL NFR BLD: 0.5 % (ref 0–6.9)
ERYTHROCYTE [DISTWIDTH] IN BLOOD BY AUTOMATED COUNT: 44.6 FL (ref 35.9–50)
FASTING STATUS PATIENT QL REPORTED: NORMAL
GLOBULIN SER CALC-MCNC: 3 G/DL (ref 1.9–3.5)
GLUCOSE SERPL-MCNC: 89 MG/DL (ref 65–99)
HCT VFR BLD AUTO: 49.1 % (ref 37–47)
HDLC SERPL-MCNC: 45 MG/DL
HGB BLD-MCNC: 15.5 G/DL (ref 12–16)
IMM GRANULOCYTES # BLD AUTO: 0.04 K/UL (ref 0–0.11)
IMM GRANULOCYTES NFR BLD AUTO: 0.4 % (ref 0–0.9)
LDLC SERPL CALC-MCNC: 82 MG/DL
LYMPHOCYTES # BLD AUTO: 2.18 K/UL (ref 1–4.8)
LYMPHOCYTES NFR BLD: 19.7 % (ref 22–41)
MCH RBC QN AUTO: 29.4 PG (ref 27–33)
MCHC RBC AUTO-ENTMCNC: 31.6 G/DL (ref 33.6–35)
MCV RBC AUTO: 93 FL (ref 81.4–97.8)
MONOCYTES # BLD AUTO: 0.74 K/UL (ref 0–0.85)
MONOCYTES NFR BLD AUTO: 6.7 % (ref 0–13.4)
NEUTROPHILS # BLD AUTO: 7.99 K/UL (ref 2–7.15)
NEUTROPHILS NFR BLD: 72.4 % (ref 44–72)
NRBC # BLD AUTO: 0 K/UL
NRBC BLD-RTO: 0 /100 WBC
PLATELET # BLD AUTO: 195 K/UL (ref 164–446)
PMV BLD AUTO: 9.7 FL (ref 9–12.9)
POTASSIUM SERPL-SCNC: 4.2 MMOL/L (ref 3.6–5.5)
PROT SERPL-MCNC: 6.7 G/DL (ref 6–8.2)
RBC # BLD AUTO: 5.28 M/UL (ref 4.2–5.4)
SODIUM SERPL-SCNC: 137 MMOL/L (ref 135–145)
TRIGL SERPL-MCNC: 97 MG/DL (ref 0–149)
WBC # BLD AUTO: 11 K/UL (ref 4.8–10.8)

## 2019-01-07 PROCEDURE — 85025 COMPLETE CBC W/AUTO DIFF WBC: CPT

## 2019-01-07 PROCEDURE — 80061 LIPID PANEL: CPT

## 2019-01-07 PROCEDURE — 80053 COMPREHEN METABOLIC PANEL: CPT

## 2019-01-07 PROCEDURE — 36415 COLL VENOUS BLD VENIPUNCTURE: CPT

## 2019-01-08 ENCOUNTER — OFFICE VISIT (OUTPATIENT)
Dept: MEDICAL GROUP | Age: 74
End: 2019-01-08
Payer: MEDICARE

## 2019-01-08 VITALS
BODY MASS INDEX: 39.65 KG/M2 | TEMPERATURE: 99 F | DIASTOLIC BLOOD PRESSURE: 86 MMHG | SYSTOLIC BLOOD PRESSURE: 126 MMHG | HEIGHT: 61 IN | OXYGEN SATURATION: 91 % | WEIGHT: 210 LBS | HEART RATE: 64 BPM

## 2019-01-08 DIAGNOSIS — E78.2 MIXED HYPERLIPIDEMIA: ICD-10-CM

## 2019-01-08 DIAGNOSIS — G89.29 CHRONIC PAIN OF LEFT KNEE: ICD-10-CM

## 2019-01-08 DIAGNOSIS — M54.50 CHRONIC MIDLINE LOW BACK PAIN WITHOUT SCIATICA: ICD-10-CM

## 2019-01-08 DIAGNOSIS — J43.1 PANLOBULAR EMPHYSEMA (HCC): ICD-10-CM

## 2019-01-08 DIAGNOSIS — I10 ESSENTIAL HYPERTENSION: ICD-10-CM

## 2019-01-08 DIAGNOSIS — I51.89 GRADE II DIASTOLIC DYSFUNCTION: ICD-10-CM

## 2019-01-08 DIAGNOSIS — M25.562 CHRONIC PAIN OF LEFT KNEE: ICD-10-CM

## 2019-01-08 DIAGNOSIS — F51.01 PRIMARY INSOMNIA: ICD-10-CM

## 2019-01-08 DIAGNOSIS — E66.9 OBESITY (BMI 30-39.9): ICD-10-CM

## 2019-01-08 DIAGNOSIS — M15.9 PRIMARY OSTEOARTHRITIS INVOLVING MULTIPLE JOINTS: ICD-10-CM

## 2019-01-08 DIAGNOSIS — G89.29 CHRONIC MIDLINE LOW BACK PAIN WITHOUT SCIATICA: ICD-10-CM

## 2019-01-08 PROBLEM — M25.569 CHRONIC KNEE PAIN: Status: ACTIVE | Noted: 2019-01-08

## 2019-01-08 PROBLEM — Z12.11 COLON CANCER SCREENING: Status: RESOLVED | Noted: 2017-06-21 | Resolved: 2019-01-08

## 2019-01-08 PROBLEM — M25.569 CHRONIC KNEE PAIN: Status: RESOLVED | Noted: 2019-01-08 | Resolved: 2019-01-08

## 2019-01-08 PROCEDURE — 8041 PR SCP AHA: Performed by: INTERNAL MEDICINE

## 2019-01-08 PROCEDURE — 99215 OFFICE O/P EST HI 40 MIN: CPT | Performed by: INTERNAL MEDICINE

## 2019-01-08 RX ORDER — CELECOXIB 200 MG/1
200 CAPSULE ORAL
Refills: 3 | COMMUNITY
Start: 2018-12-17 | End: 2019-01-08 | Stop reason: SDUPTHER

## 2019-01-08 RX ORDER — TRIAMTERENE AND HYDROCHLOROTHIAZIDE 37.5; 25 MG/1; MG/1
1 TABLET ORAL DAILY
Qty: 90 TAB | Refills: 4 | Status: SHIPPED | OUTPATIENT
Start: 2019-01-08 | End: 2020-01-06 | Stop reason: SDUPTHER

## 2019-01-08 RX ORDER — AMLODIPINE BESYLATE 5 MG/1
5 TABLET ORAL DAILY
Qty: 90 TAB | Refills: 4 | Status: SHIPPED | OUTPATIENT
Start: 2019-01-08 | End: 2020-01-06 | Stop reason: SDUPTHER

## 2019-01-08 RX ORDER — METOPROLOL TARTRATE 100 MG/1
100 TABLET ORAL 2 TIMES DAILY
Qty: 180 TAB | Refills: 4 | Status: SHIPPED | OUTPATIENT
Start: 2019-01-08 | End: 2020-01-06 | Stop reason: SDUPTHER

## 2019-01-08 RX ORDER — LOSARTAN POTASSIUM 100 MG/1
100 TABLET ORAL DAILY
Qty: 90 TAB | Refills: 4 | Status: SHIPPED | OUTPATIENT
Start: 2019-01-08 | End: 2020-01-06 | Stop reason: SDUPTHER

## 2019-01-08 RX ORDER — CELECOXIB 200 MG/1
200 CAPSULE ORAL
Qty: 60 CAP | Refills: 3 | Status: SHIPPED | DISCHARGE
Start: 2019-01-08 | End: 2020-01-06

## 2019-01-08 RX ORDER — SIMVASTATIN 20 MG
20 TABLET ORAL EVERY EVENING
Qty: 90 TAB | Refills: 4 | Status: SHIPPED | OUTPATIENT
Start: 2019-01-08 | End: 2020-01-06 | Stop reason: SDUPTHER

## 2019-01-08 ASSESSMENT — ENCOUNTER SYMPTOMS
GASTROINTESTINAL NEGATIVE: 1
NEUROLOGICAL NEGATIVE: 1
RESPIRATORY NEGATIVE: 1
CARDIOVASCULAR NEGATIVE: 1
CONSTITUTIONAL NEGATIVE: 1
FALLS: 0
EYES NEGATIVE: 1
PSYCHIATRIC NEGATIVE: 1

## 2019-01-08 ASSESSMENT — PATIENT HEALTH QUESTIONNAIRE - PHQ9: CLINICAL INTERPRETATION OF PHQ2 SCORE: 0

## 2019-01-08 NOTE — PROGRESS NOTES
Annual Health Assessment Questions:    1.  Are you currently engaging in any exercise or physical activity? Yes    2.  How would you describe your mood or emotional well-being today? fair    3.  Have you had any falls in the last year? No    4.  Have you noticed any problems with your balance or had difficulty walking? No    5.  In the last six months have you experienced any leakage of urine? No    6. DPA/Advanced Directive: Patient does not have an Advanced Directive.  A packet and workshop information was given on Advanced Directives.

## 2019-01-08 NOTE — PROGRESS NOTES
Subjective:      Rufina Macias is a 73 y.o. female who presents with Follow-Up (lab review) and Knee Pain (left knee x3 wks)        HPI  The patient developed an acute onset of left knee pain two months ago. No injury, trauma or falls. She presented to the [facility] where she was diagnosed with arthritis and a ligament injury. She has had cortisone injections administered for the past two months which are improving her symptoms. She is also on Celebrex prescribed by Dr. Britton (Orthopaedics). She is not a candidate for a knee replacement at this time. No prior history of left knee pain.     The patient is here for follow up of labs completed on 01/07/19 and for follow up of chronic medical problems listed below. The patient is compliant with their medications and is having no side effects from them. Overall, the patient is doing well. Influenza vaccine is up to date.     Patient has a history of hypertension well controlled with Losartan, Amlodipine and Triamterene. Blood pressure is stable today at 126/86.  They report following a low sodium diet and deny any related complaints including chronic cough, chest pain, headaches or dizziness. History of hyperlipidemia which is treated with Simvastatin. Negative for myalgias. She has a history of insomnia which is well controlled with Ambien prescribed by pain management. Emphysema is under good control and treated with Ipratropium-Albuterol inhaler.       Patient Active Problem List   Diagnosis   • Mixed hyperlipidemia   • Primary insomnia   • Vitamin D deficiency disease   • DDD (degenerative disc disease), lumbar   • Chronic allergic rhinitis   • Thoracic radiculopathy   • Lumbar radiculopathy   • DDD (degenerative disc disease), cervical   • Essential hypertension   • Controlled substance agreement signed   • Obesity (BMI 30-39.9)   • Chronic midline low back pain without sciatica- norco prn; [facility] pain and spine   • Primary osteoarthritis  involving multiple joints   • Panlobular emphysema (HCC)   • Grade II diastolic dysfunction- echo 2017 with preserved EF   • Chronic pain of left knee- dr contreras- celebrex and cortisone inj       Outpatient Medications Prior to Visit   Medication Sig Dispense Refill   • Calcium Carbonate-Vit D-Min (CALCIUM 1200 PO) Take 1,200 mg by mouth every day.     • Magnesium 400 MG Tab Take 400 mg by mouth every day.     • aspirin EC (ECOTRIN) 81 MG Tablet Delayed Response Take 81 mg by mouth every day.     • FIBER PO Take 2 Caps by mouth every day.     • hydrocodone/acetaminophen (NORCO)  MG Tab TAKE ONE TABLET BY MOUTH FOUR TIMES A DAY AS NEEDED 30 DAY SUPPLY  0   • zolpidem (AMBIEN) 10 MG Tab TAKE ONE TABLET BY MOUTH DAILY AT BEDTIME 30 DAY SUPPLY  0   • Cholecalciferol (VITAMIN D) 2000 UNITS Cap Take 1 Cap by mouth every day. 90 Cap 4   • ipratropium-albuterol (COMBIVENT RESPIMAT)  MCG/ACT Aero Soln Inhale 1 Puff by mouth every 6 hours as needed (shortness of breath). 1 Inhaler 5   • simvastatin (ZOCOR) 20 MG Tab Take 1 Tab by mouth every evening. 90 Tab 4   • amLODIPine (NORVASC) 5 MG Tab Take 1 Tab by mouth every day. 90 Tab 4   • losartan (COZAAR) 100 MG Tab Take 1 Tab by mouth every day. 90 Tab 4   • triamterene-hctz (MAXZIDE-25/DYAZIDE) 37.5-25 MG Tab Take 1 Tab by mouth every day. 90 Tab 4   • NON SPECIFIED Shingles vaccine (Patient not taking: Reported on 1/8/2019) 1 Each 0   • metoprolol (LOPRESSOR) 100 MG Tab Take 1 Tab by mouth 2 times a day. 180 Tab 4     No facility-administered medications prior to visit.         Allergies   Allergen Reactions   • Codeine Swelling   • Ace Inhibitors      Cough       Review of Systems   Constitutional: Negative.    HENT: Negative.    Eyes: Negative.    Respiratory: Negative.    Cardiovascular: Negative.    Gastrointestinal: Negative.    Genitourinary: Negative.    Musculoskeletal: Negative for falls.        Positive for left knee pain. Negative for injury or trauma.  "  Skin: Negative.    Neurological: Negative.    Endo/Heme/Allergies: Negative.    Psychiatric/Behavioral: Negative.    All other systems reviewed and are negative.    See HPI for further details.       Objective:     /86 (BP Location: Left arm, Patient Position: Sitting)   Pulse 64   Temp 37.2 °C (99 °F) (Temporal)   Ht 1.562 m (5' 1.5\")   Wt 95.3 kg (210 lb)   SpO2 91%   BMI 39.04 kg/m²     Physical Exam   Constitutional: Oriented to person, place, and time. Appears well-developed and well-nourished. No distress.   Head: Normocephalic and atraumatic.   Right Ear: External ear normal.   Left Ear: External ear normal.   Nose: Nose normal.   Mouth/Throat: Oropharynx is clear and moist. No oropharyngeal exudate.   Eyes: Pupils are equal, round, and reactive to light. Conjunctivae and EOM are normal. Right eye exhibits no discharge. Left eye exhibits no discharge. No scleral icterus.   Neck: Normal range of motion. Neck supple. No JVD present. No tracheal deviation present. No thyromegaly present. No bruit.  Cardiovascular: Normal rate, regular rhythm, normal heart sounds and intact distal pulses.  Exam reveals no gallop and no friction rub.    No murmur heard.  Pulmonary/Chest: Effort normal. No stridor. No respiratory distress. No wheezing or rales. No tenderness.   Abdominal: Soft. Bowel sounds are normal. No distension and no mass. There is no tenderness. There is no rebound and no guarding. No hernia. No pulsatile masses.  Musculoskeletal: Normal range of motion No edema or tenderness.   Lymphadenopathy: No cervical adenopathy.   Neurological: Alert and oriented to person, place, and time. Normal reflexes. Normal reflexes. No cranial nerve deficit. Normal muscle tone. Coordination normal.   Skin: Skin is warm and dry. No rash noted. Not diaphoretic. No erythema. No pallor.   Psychiatric: Normal mood and affect. Behavior is normal. Judgment and thought content normal.     [facility]ing [facility] and vitals " [facility]   Component Date Value   • Sodium 01/07/2019 137    • Potassium 01/07/2019 4.2    • Chloride 01/07/2019 102    • Co2 01/07/2019 28    • Anion Gap 01/07/2019 7.0    • Glucose 01/07/2019 89    • Bun 01/07/2019 14    • Creatinine 01/07/2019 0.74    • Calcium 01/07/2019 9.2    • AST(SGOT) 01/07/2019 19    • ALT(SGPT) 01/07/2019 12    • Alkaline Phosphatase 01/07/2019 70    • Total Bilirubin 01/07/2019 0.6    • Albumin 01/07/2019 3.7    • Total Protein 01/07/2019 6.7    • Globulin 01/07/2019 3.0    • A-G Ratio 01/07/2019 1.2    • Cholesterol,Tot 01/07/2019 146    • Triglycerides 01/07/2019 97    • HDL 01/07/2019 45    • LDL 01/07/2019 82    • WBC 01/07/2019 11.0*   • RBC 01/07/2019 5.28    • Hemoglobin 01/07/2019 15.5    • Hematocrit 01/07/2019 49.1*   • MCV 01/07/2019 93.0    • MCH 01/07/2019 29.4    • MCHC 01/07/2019 31.6*   • RDW 01/07/2019 44.6    • Platelet Count 01/07/2019 195    • MPV 01/07/2019 9.7    • Neutrophils-Polys 01/07/2019 72.40*   • Lymphocytes 01/07/2019 19.70*   • Monocytes 01/07/2019 6.70    • Eosinophils 01/07/2019 0.50    • Basophils 01/07/2019 0.30    • Immature Granulocytes 01/07/2019 0.40    • Nucleated RBC 01/07/2019 0.00    • Neutrophils (Absolute) 01/07/2019 7.99*   • Lymphs (Absolute) 01/07/2019 2.18    • Monos (Absolute) 01/07/2019 0.74    • Eos (Absolute) 01/07/2019 0.06    • Baso (Absolute) 01/07/2019 0.03    • Immature Granulocytes (a* 01/07/2019 0.04    • NRBC (Absolute) 01/07/2019 0.00    • Fasting Status 01/07/2019 Fasting    • GFR If  01/07/2019 >60    • GFR If Non  [employer]i* 01/07/2019 >60       No results found for: HBA1C  Lab Results   Component Value Date/Time    SODIUM 137 01/07/2019 12:43 PM    POTASSIUM 4.2 01/07/2019 12:43 PM    CHLORIDE 102 01/07/2019 12:43 PM    CO2 28 01/07/2019 12:43 PM    GLUCOSE 89 01/07/2019 12:43 PM    BUN 14 01/07/2019 12:43 PM    CREATININE 0.74 01/07/2019 12:43 PM     CREATININE 0.80 12/02/2010 12:00 AM    BUNCREATRAT 20 12/02/2010 12:00 AM    GLOMRATE >59 12/02/2010 12:00 AM    ALKPHOSPHAT 70 01/07/2019 12:43 PM    ASTSGOT 19 01/07/2019 12:43 PM    ALTSGPT 12 01/07/2019 12:43 PM    TBILIRUBIN 0.6 01/07/2019 12:43 PM     Lab Results   Component Value Date/Time    INR 0.98 02/14/2018 11:58 AM     Lab Results   Component Value Date/Time    CHOLSTRLTOT 146 01/07/2019 12:43 PM    LDL 82 01/07/2019 12:43 PM    HDL 45 01/07/2019 12:43 PM    TRIGLYCERIDE 97 01/07/2019 12:43 PM       No results found for: TESTOSTERONE  Lab Results   Component Value Date/Time    TSH 1.640 12/02/2010 12:00 AM     Lab Results   Component Value Date/Time    FREET4 0.90 10/24/2014 11:56 AM    FREET4 0.91 06/13/2013 12:30 PM     No results found for: URICACID  No components found for: VITB12  Lab Results   Component Value Date/Time    25HYDROXY 39 09/20/2016 11:36 AM    25HYDROXY 39 10/24/2014 11:56 AM          Assessment/Plan:     1. Panlobular emphysema (HCC)  Under good control. Continue same regimen.   - ipratropium-albuterol (COMBIVENT RESPIMAT)  MCG/ACT Aero Soln; Inhale 1 Puff by mouth every 6 hours as needed (shortness of breath).  Dispense: 1 Inhaler; Refill: 5    2. Mixed hyperlipidemia  Lipid panel completed on 01/07/19.  Cholesterol 146; LDL 82; Triglycerides 97. Advised patient follow a high protein, low carbohydrate diet. Under good control. Continue same regimen of Simvastatin.  Will continue to monitor hyperlipidemia with laboratory testing.  - simvastatin (ZOCOR) 20 MG Tab; Take 1 Tab by mouth every evening.  Dispense: 90 Tab; Refill: 4  - COMP METABOLIC PANEL; Future  - Lipid Profile; Future  - CBC WITH DIFFERENTIAL; Future    3. Primary insomnia  Under good control. Continue same regimen.     4. Essential hypertension  Blood pressure is stable today.  Advised patient follow the DASH diet as well as maintain a low sodium diet. Continue the same regimen:  - amLODIPine (NORVASC) 5 MG Tab;  Take 1 Tab by mouth every day.  Dispense: 90 Tab; Refill: 4  - losartan (COZAAR) 100 MG Tab; Take 1 Tab by mouth every day.  Dispense: 90 Tab; Refill: 4  - triamterene-hctz (MAXZIDE-25/DYAZIDE) 37.5-25 MG Tab; Take 1 Tab by mouth every day.  Dispense: 90 Tab; Refill: 4  - metoprolol (LOPRESSOR) 100 MG Tab; Take 1 Tab by mouth 2 times a day.  Dispense: 180 Tab; Refill: 4    5. Obesity (BMI 30-39.9)  Advised patient diet/exercise/lose 15 lbs.      6. Chronic midline low back pain without sciatica- norco prn; [facility] pain and spine  Under good control. Continue same regimen.     7. Primary osteoarthritis involving multiple joints  Under good control. Continue same regimen of Celebrex.    8. Grade II diastolic dysfunction- echo 2017 with preserved EF  Under good control. Continue same regimen.     9. Chronic pain of left knee- dr contreras- celebrex and cortisone inj  Under good control. Continue same regimen.   - celecoxib (CELEBREX) 200 MG Cap; Take 1 Cap by mouth every day.  Dispense: 60 Cap; Refill: 3         --Follow up in six months.      40 minute face-to-face encounter took place today.  More than half of this time was spent in the coordination of care of the above problems, as well as counseling.      Fatou MALHOTRA (Scribe), am scribing for, and in the presence of, Quincy Angel M.D.    Electronically signed by: Fatou Oro (Jaydaibalvarez), 1/8/2019    IQuincy M.D., personally performed the services described in this documentation, as scribed by Fatou Oro in my presence, and it is both accurate and complete.

## 2019-05-23 ENCOUNTER — PATIENT OUTREACH (OUTPATIENT)
Dept: HEALTH INFORMATION MANAGEMENT | Facility: OTHER | Age: 74
End: 2019-05-23

## 2019-05-23 NOTE — PROGRESS NOTES
Outcome: Left voicemail/ message    Please transfer to [facility]  [phone] when patient returns call.     WebIZ Checked & Epic Updated:  yes     [facility] Verified: yes     Attempt # 1

## 2019-07-02 ENCOUNTER — HOSPITAL ENCOUNTER (OUTPATIENT)
Dept: LAB | Facility: MEDICAL CENTER | Age: 74
End: 2019-07-02
Attending: INTERNAL MEDICINE
Payer: MEDICARE

## 2019-07-02 DIAGNOSIS — E78.2 MIXED HYPERLIPIDEMIA: ICD-10-CM

## 2019-07-02 LAB
ALBUMIN SERPL BCP-MCNC: 4 G/DL (ref 3.2–4.9)
ALBUMIN/GLOB SERPL: 1.4 G/DL
ALP SERPL-CCNC: 71 U/L (ref 30–99)
ALT SERPL-CCNC: 10 U/L (ref 2–50)
ANION GAP SERPL CALC-SCNC: 10 MMOL/L (ref 0–11.9)
AST SERPL-CCNC: 17 U/L (ref 12–45)
BASOPHILS # BLD AUTO: 0.4 % (ref 0–1.8)
BASOPHILS # BLD: 0.04 K/UL (ref 0–0.12)
BILIRUB SERPL-MCNC: 0.6 MG/DL (ref 0.1–1.5)
BUN SERPL-MCNC: 17 MG/DL (ref 8–22)
CALCIUM SERPL-MCNC: 9.4 MG/DL (ref 8.5–10.5)
CHLORIDE SERPL-SCNC: 104 MMOL/L (ref 96–112)
CHOLEST SERPL-MCNC: 163 MG/DL (ref 100–199)
CO2 SERPL-SCNC: 26 MMOL/L (ref 20–33)
CREAT SERPL-MCNC: 0.68 MG/DL (ref 0.5–1.4)
EOSINOPHIL # BLD AUTO: 0.09 K/UL (ref 0–0.51)
EOSINOPHIL NFR BLD: 0.8 % (ref 0–6.9)
ERYTHROCYTE [DISTWIDTH] IN BLOOD BY AUTOMATED COUNT: 45.9 FL (ref 35.9–50)
FASTING STATUS PATIENT QL REPORTED: NORMAL
GLOBULIN SER CALC-MCNC: 2.9 G/DL (ref 1.9–3.5)
GLUCOSE SERPL-MCNC: 90 MG/DL (ref 65–99)
HCT VFR BLD AUTO: 49.6 % (ref 37–47)
HDLC SERPL-MCNC: 46 MG/DL
HGB BLD-MCNC: 15.4 G/DL (ref 12–16)
IMM GRANULOCYTES # BLD AUTO: 0.03 K/UL (ref 0–0.11)
IMM GRANULOCYTES NFR BLD AUTO: 0.3 % (ref 0–0.9)
LDLC SERPL CALC-MCNC: 101 MG/DL
LYMPHOCYTES # BLD AUTO: 2 K/UL (ref 1–4.8)
LYMPHOCYTES NFR BLD: 17.5 % (ref 22–41)
MCH RBC QN AUTO: 29.2 PG (ref 27–33)
MCHC RBC AUTO-ENTMCNC: 31 G/DL (ref 33.6–35)
MCV RBC AUTO: 93.9 FL (ref 81.4–97.8)
MONOCYTES # BLD AUTO: 0.94 K/UL (ref 0–0.85)
MONOCYTES NFR BLD AUTO: 8.2 % (ref 0–13.4)
NEUTROPHILS # BLD AUTO: 8.32 K/UL (ref 2–7.15)
NEUTROPHILS NFR BLD: 72.8 % (ref 44–72)
NRBC # BLD AUTO: 0 K/UL
NRBC BLD-RTO: 0 /100 WBC
PLATELET # BLD AUTO: 204 K/UL (ref 164–446)
PMV BLD AUTO: 10 FL (ref 9–12.9)
POTASSIUM SERPL-SCNC: 4 MMOL/L (ref 3.6–5.5)
PROT SERPL-MCNC: 6.9 G/DL (ref 6–8.2)
RBC # BLD AUTO: 5.28 M/UL (ref 4.2–5.4)
SODIUM SERPL-SCNC: 140 MMOL/L (ref 135–145)
TRIGL SERPL-MCNC: 79 MG/DL (ref 0–149)
WBC # BLD AUTO: 11.4 K/UL (ref 4.8–10.8)

## 2019-07-02 PROCEDURE — 80061 LIPID PANEL: CPT

## 2019-07-02 PROCEDURE — 85025 COMPLETE CBC W/AUTO DIFF WBC: CPT

## 2019-07-02 PROCEDURE — 36415 COLL VENOUS BLD VENIPUNCTURE: CPT

## 2019-07-02 PROCEDURE — 80053 COMPREHEN METABOLIC PANEL: CPT

## 2019-07-03 ENCOUNTER — TELEPHONE (OUTPATIENT)
Dept: MEDICAL GROUP | Age: 74
End: 2019-07-03

## 2019-07-03 NOTE — TELEPHONE ENCOUNTER
ESTABLISHED PATIENT PRE-VISIT PLANNING     Patient was NOT contacted to complete PVP.     Note: Patient will not be contacted if there is no indication to call.     1.  Reviewed notes from the last few office visits within the medical group: Yes    2.  If any orders were placed at last visit or intended to be done for this visit (i.e. 6 mos follow-up), do we have Results/Consult Notes?        •  Labs - Labs ordered, completed on 7/2/19 and results are in chart.   Note: If patient appointment is for lab review and patient did not complete labs, check with provider if OK to reschedule patient until labs completed.       •  Imaging - Imaging was not ordered at last office visit.       •  Referrals - No referrals were ordered at last office visit.    3. Is this appointment scheduled as a Hospital Follow-Up? No    4.  Immunizations were updated in Epic using WebIZ?: Epic matches WebIZ       •  Web Iz Recommendations: SHINGRIX (Shingles)    5.  Patient is due for the following Health Maintenance Topics:   Health Maintenance Due   Topic Date Due   • BONE DENSITY  03/18/2010   • IMM ZOSTER VACCINES (2 of 3) 12/10/2013   • Annual Wellness Visit  06/27/2019           6. Orders for overdue Health Maintenance topics pended in Pre-Charting? N\A    7.  AHA (MDX) form printed for Provider? No, already completed    8.  Patient was NOT informed to arrive 15 min prior to their scheduled appointment and bring in their medication bottles.

## 2019-07-08 ENCOUNTER — OFFICE VISIT (OUTPATIENT)
Dept: MEDICAL GROUP | Age: 74
End: 2019-07-08
Payer: MEDICARE

## 2019-07-08 VITALS
TEMPERATURE: 96.8 F | DIASTOLIC BLOOD PRESSURE: 80 MMHG | HEIGHT: 62 IN | WEIGHT: 215 LBS | OXYGEN SATURATION: 93 % | HEART RATE: 79 BPM | SYSTOLIC BLOOD PRESSURE: 152 MMHG | BODY MASS INDEX: 39.56 KG/M2

## 2019-07-08 DIAGNOSIS — M50.30 DDD (DEGENERATIVE DISC DISEASE), CERVICAL: ICD-10-CM

## 2019-07-08 DIAGNOSIS — E66.9 OBESITY (BMI 30-39.9): ICD-10-CM

## 2019-07-08 DIAGNOSIS — G89.29 CHRONIC NECK PAIN: ICD-10-CM

## 2019-07-08 DIAGNOSIS — F51.01 PRIMARY INSOMNIA: ICD-10-CM

## 2019-07-08 DIAGNOSIS — Z78.0 POSTMENOPAUSAL ESTROGEN DEFICIENCY: ICD-10-CM

## 2019-07-08 DIAGNOSIS — J43.1 PANLOBULAR EMPHYSEMA (HCC): ICD-10-CM

## 2019-07-08 DIAGNOSIS — M51.36 DDD (DEGENERATIVE DISC DISEASE), LUMBAR: ICD-10-CM

## 2019-07-08 DIAGNOSIS — E78.2 MIXED HYPERLIPIDEMIA: ICD-10-CM

## 2019-07-08 DIAGNOSIS — M67.441 GANGLION CYST OF TENDON SHEATH OF RIGHT HAND: ICD-10-CM

## 2019-07-08 DIAGNOSIS — M15.9 PRIMARY OSTEOARTHRITIS INVOLVING MULTIPLE JOINTS: ICD-10-CM

## 2019-07-08 DIAGNOSIS — M54.2 CHRONIC NECK PAIN: ICD-10-CM

## 2019-07-08 DIAGNOSIS — M79.603 PAIN OF UPPER EXTREMITY, UNSPECIFIED LATERALITY: ICD-10-CM

## 2019-07-08 DIAGNOSIS — E55.9 VITAMIN D DEFICIENCY DISEASE: ICD-10-CM

## 2019-07-08 DIAGNOSIS — M54.16 BILATERAL LUMBAR RADICULOPATHY: ICD-10-CM

## 2019-07-08 DIAGNOSIS — I10 ESSENTIAL HYPERTENSION: ICD-10-CM

## 2019-07-08 PROCEDURE — 99214 OFFICE O/P EST MOD 30 MIN: CPT | Performed by: INTERNAL MEDICINE

## 2019-07-08 RX ORDER — ZOLPIDEM TARTRATE 10 MG/1
10 TABLET ORAL NIGHTLY PRN
Qty: 30 TAB | Refills: 5 | Status: SHIPPED
Start: 2019-07-08 | End: 2019-08-07

## 2019-07-08 RX ORDER — ZOLPIDEM TARTRATE 10 MG/1
10 TABLET ORAL NIGHTLY PRN
Qty: 48 TAB | Refills: 0 | Status: SHIPPED | OUTPATIENT
Start: 2019-07-08 | End: 2019-07-08 | Stop reason: SDUPTHER

## 2019-07-08 ASSESSMENT — ENCOUNTER SYMPTOMS
GASTROINTESTINAL NEGATIVE: 1
CARDIOVASCULAR NEGATIVE: 1
CONSTITUTIONAL NEGATIVE: 1
RESPIRATORY NEGATIVE: 1

## 2019-07-08 NOTE — PROGRESS NOTES
Subjective:      Rufina Macias is a 74 y.o. female who presents with Follow-Up; Hyperlipidemia; Vitamin D Deficiency; and Hypertension      HPI  The patient is here for followup of chronic medical problems listed below. The patient is compliant with medications and having no side effects from them. Denies chest pain, abdominal pain, dyspnea, or cough.      1. Mixed hyperlipidemia  Chronic, currently taking Simvastatin 20 mg every evening. Recent lipid panel on 7/2/19 shows LDL slightly elevated at 101.     2. Primary insomnia  Chronic, well controlled with Ambien 10 mg every evening before bed. Patient denies any side effects.     3. Primary osteoarthritis involving multiple joints  History of primary osteoarthritis of multiple joints. Patient followed by [facility]. She has been doing physical therapy for her knee pain as well. Patient has routine steroid injections of her knees by [facility]. She is planning on starting Sodium hyaluronate in the future.    4. Panlobular emphysema (HCC)  Chronic, patient using Ipratropium-Albuterol inhaler every 6 hours PRN. Patient denies any shortness of breath, cough, or chest pain.     5. Postmenopausal estrogen deficiency  Chronic, stable.     6. Essential hypertension  Patient has a history of hypertension well controlled with Losartan 100 mg QD, Amlodipine 5 mg QD, and Triamterene-HCTZ 37.5-25 mg once daily.   Patient has been monitoring BP at home and states systolic pressures have been less than 140.     7. Ganglion cyst of tendon sheath of right hand- surveillance  Patient complains of new area of swelling and mild pain over right hand. Patient has not treated symptoms with any OTC medications. She denies any recent injury or trauma.     8. Obesity (BMI 30-39.9)  9. Vitamin D deficiency disease  History of low vitamin D. Currently taking Cholecalciferol 2000 units daily.     Patient Active Problem List   Diagnosis   • Mixed hyperlipidemia   •  Primary insomnia   • Vitamin D deficiency disease   • DDD (degenerative disc disease), lumbar   • Chronic allergic rhinitis   • Thoracic radiculopathy   • Lumbar radiculopathy   • DDD (degenerative disc disease), cervical   • Essential hypertension   • Controlled substance agreement signed   • Obesity (BMI 30-39.9)   • Chronic midline low back pain without sciatica- norco prn; [facility]   • Primary osteoarthritis involving multiple joints   • Panlobular emphysema (HCC)   • Grade II diastolic dysfunction- echo 2017 with preserved EF   • Chronic pain of left knee- dr contreras- celebrex and cortisone inj       Outpatient Medications Prior to Visit   Medication Sig Dispense Refill   • ipratropium-albuterol (COMBIVENT RESPIMAT)  MCG/ACT Aero Soln Inhale 1 Puff by mouth every 6 hours as needed (shortness of breath). 1 Inhaler 5   • simvastatin (ZOCOR) 20 MG Tab Take 1 Tab by mouth every evening. 90 Tab 4   • amLODIPine (NORVASC) 5 MG Tab Take 1 Tab by mouth every day. 90 Tab 4   • losartan (COZAAR) 100 MG Tab Take 1 Tab by mouth every day. 90 Tab 4   • triamterene-hctz (MAXZIDE-25/DYAZIDE) 37.5-25 MG Tab Take 1 Tab by mouth every day. 90 Tab 4   • metoprolol (LOPRESSOR) 100 MG Tab Take 1 Tab by mouth 2 times a day. 180 Tab 4   • Calcium Carbonate-Vit D-Min (CALCIUM 1200 PO) Take 1,200 mg by mouth every day.     • Magnesium 400 MG Tab Take 400 mg by mouth every day.     • FIBER PO Take 2 Caps by mouth every day.     • hydrocodone/acetaminophen (NORCO)  MG Tab TAKE ONE TABLET BY MOUTH FOUR TIMES A DAY AS NEEDED 30 DAY SUPPLY  0   • zolpidem (AMBIEN) 10 MG Tab TAKE ONE TABLET BY MOUTH DAILY AT BEDTIME 30 DAY SUPPLY  0   • Cholecalciferol (VITAMIN D) 2000 UNITS Cap Take 1 Cap by mouth every day. 90 Cap 4   • celecoxib (CELEBREX) 200 MG Cap Take 1 Cap by mouth every day. (Patient not taking: Reported on 7/8/2019) 60 Cap 3   • aspirin EC (ECOTRIN) 81 MG Tablet Delayed Response Take 81 mg by mouth every day.    "    No facility-administered medications prior to visit.         Allergies   Allergen Reactions   • Codeine Swelling   • Ace Inhibitors      Cough       Review of Systems   Constitutional: Negative.    HENT: Negative.    Respiratory: Negative.    Cardiovascular: Negative.    Gastrointestinal: Negative.    Genitourinary: Negative.    Musculoskeletal:        Positive for cyst of right hand   All other systems reviewed and are negative.       Objective:     /80 (BP Location: Right arm, Patient Position: Sitting, BP Cuff Size: Adult)   Pulse 79   Temp 36 °C (96.8 °F) (Temporal)   Ht 1.562 m (5' 1.5\")   Wt 97.5 kg (215 lb)   SpO2 93%   BMI 39.97 kg/m²     Physical Exam   Constitutional: Oriented to person, place, and time. Appears well-developed and well-nourished. No distress.   Head: Normocephalic and atraumatic.   Right Ear: External ear normal.   Left Ear: External ear normal.   Nose: Nose normal.   Mouth/Throat: Oropharynx is clear and moist. No oropharyngeal exudate.   Eyes: Pupils are equal, round, and reactive to light. Conjunctivae and EOM are normal. Right eye exhibits no discharge. Left eye exhibits no discharge. No scleral icterus.   Neck: Normal range of motion. Neck supple. No JVD present. No tracheal deviation present. No thyromegaly present.   Cardiovascular: Normal rate, regular rhythm, normal heart sounds and intact distal pulses.  Exam reveals no gallop and no friction rub.    No murmur heard.  Pulmonary/Chest: Effort normal. No stridor. No respiratory distress. No wheezing or rales. No tenderness.   Abdominal: Soft. Bowel sounds are normal. No distension and no mass. There is no tenderness. There is no rebound and no guarding. No hernia.   Musculoskeletal: Normal range of motion No edema or tenderness.   Lymphadenopathy: No cervical adenopathy.   Neurological: Alert and oriented to person, place, and time. Normal reflexes. Normal reflexes. No cranial nerve deficit. Normal muscle tone. " Coordination normal.   Skin: Skin is warm and dry. No rash noted.   - Ganglion cyst of tendon sheath of right hand   Psychiatric: Normal mood and affect. Behavior is normal. Judgment and thought content normal.   Nursing note and vitals reviewed.      No results found for: HBA1C  Lab Results   Component Value Date/Time    SODIUM 140 07/02/2019 12:24 PM    POTASSIUM 4.0 07/02/2019 12:24 PM    CHLORIDE 104 07/02/2019 12:24 PM    CO2 26 07/02/2019 12:24 PM    GLUCOSE 90 07/02/2019 12:24 PM    BUN 17 07/02/2019 12:24 PM    CREATININE 0.68 07/02/2019 12:24 PM    CREATININE 0.80 12/02/2010 12:00 AM    BUNCREATRAT 20 12/02/2010 12:00 AM    GLOMRATE >59 12/02/2010 12:00 AM    ALKPHOSPHAT 71 07/02/2019 12:24 PM    ASTSGOT 17 07/02/2019 12:24 PM    ALTSGPT 10 07/02/2019 12:24 PM    TBILIRUBIN 0.6 07/02/2019 12:24 PM     Lab Results   Component Value Date/Time    INR 0.98 02/14/2018 11:58 AM     Lab Results   Component Value Date/Time    CHOLSTRLTOT 163 07/02/2019 12:24 PM     (H) 07/02/2019 12:24 PM    HDL 46 07/02/2019 12:24 PM    TRIGLYCERIDE 79 07/02/2019 12:24 PM       No results found for: TESTOSTERONE  Lab Results   Component Value Date/Time    TSH 1.640 12/02/2010 12:00 AM     Lab Results   Component Value Date/Time    FREET4 0.90 10/24/2014 11:56 AM    FREET4 0.91 06/13/2013 12:30 PM     No results found for: URICACID  No components found for: VITB12  Lab Results   Component Value Date/Time    25HYDROXY 39 09/20/2016 11:36 AM    25HYDROXY 39 10/24/2014 11:56 AM         Assessment/Plan:       1. Mixed hyperlipidemia  - Chronic problem. Well controlled with Simvastatin 20 mg once daily. Recent lipid panel on 7/2/19 shows LDL slightly elevated at 101.  - Comp Metabolic Panel; Future  - Lipid Profile; Future  - CBC WITH DIFFERENTIAL; Future    2. Primary insomnia  - Chronic problem, well controlled with Ambien 10 mg every evening. Continue same regimen.   - zolpidem (AMBIEN) 10 MG Tab; Take 1 Tab by mouth at  bedtime as needed for Sleep for up to 30 days.  Dispense: 30 Tab; Refill: 5    3. Primary osteoarthritis involving multiple joints  - Chronic problem. Patient followed by [facility] for Prolia injection every 6 months. Continue with current regimen     4. Panlobular emphysema (HCC)  Under good control. Continue same regimen.     5. Postmenopausal estrogen deficiency  Under good control. Continue same regimen.   - DS-BONE DENSITY STUDY (DEXA); Future    6. Essential hypertension  Chronic problem. Well controlled with Losartan 100 mg QD, Amlodipine 5 mg QD, and Triamterene-HCTZ 37.5-25 mg once daily. Continue with current regimen.     7. Ganglion cyst of tendon sheath of right hand- surveillance  New problem.  Declines ref to hand surgeon; no significant pain or dysfunction.       8. Obesity (BMI 30-39.9)  Chronic problem.    diet/exercise/lose 15 lbs.; patient counseled     9. Vitamin D deficiency disease  Under good control. Continue same regimen.     10. DDD (degenerative disc disease), lumbar  Under good control. Continue same regimen.  Follow-up with [facility].    11. Bilateral lumbar radiculopathy  Under good control. Continue same regimen. Follow-up with [facility].      12. DDD (degenerative disc disease), cervical  Under good control. Continue same regimen. Follow-up with [facility].      13. Chronic neck pain  Under good control. Continue same regimen. Follow-up with [facility].       30 minute face-to-face encounter took place today.  More than half of this time was spent in the coordination of care of the above problems, as well as counseling.                        30 minute face-to-face encounter took place today.  More than half of this time was spent in the coordination of care of the above problems, as well as counseling.     Cj MALHOTRA (Scribe), am scribing for, and in the presence of, Quincy Angel M.D..    Electronically signed by: Cj  JUNG Cam (Scribe), 7/8/2019    IQuincy M.D., personally performed the services described in this documentation, as scribed by Cj Cam in my presence, and it is both accurate and complete.

## 2019-07-31 NOTE — PROGRESS NOTES
Called [facility] to adv 20% coinsurance for all drugs covered under [employer]. This is for both cortisone and amnio irena injection as it is related to osteoporosis

## 2019-10-01 ENCOUNTER — HOSPITAL ENCOUNTER (OUTPATIENT)
Dept: RADIOLOGY | Facility: MEDICAL CENTER | Age: 74
End: 2019-10-01
Attending: INTERNAL MEDICINE
Payer: MEDICARE

## 2019-10-01 DIAGNOSIS — Z78.0 POSTMENOPAUSAL ESTROGEN DEFICIENCY: ICD-10-CM

## 2019-10-01 PROCEDURE — 77080 DXA BONE DENSITY AXIAL: CPT

## 2020-01-02 ENCOUNTER — HOSPITAL ENCOUNTER (OUTPATIENT)
Dept: LAB | Facility: MEDICAL CENTER | Age: 75
End: 2020-01-02
Attending: INTERNAL MEDICINE
Payer: MEDICARE

## 2020-01-02 ENCOUNTER — TELEPHONE (OUTPATIENT)
Dept: MEDICAL GROUP | Age: 75
End: 2020-01-02

## 2020-01-02 DIAGNOSIS — E78.2 MIXED HYPERLIPIDEMIA: ICD-10-CM

## 2020-01-02 LAB
ALBUMIN SERPL BCP-MCNC: 4 G/DL (ref 3.2–4.9)
ALBUMIN/GLOB SERPL: 1.4 G/DL
ALP SERPL-CCNC: 61 U/L (ref 30–99)
ALT SERPL-CCNC: 20 U/L (ref 2–50)
ANION GAP SERPL CALC-SCNC: 9 MMOL/L (ref 0–11.9)
AST SERPL-CCNC: 20 U/L (ref 12–45)
BASOPHILS # BLD AUTO: 0.2 % (ref 0–1.8)
BASOPHILS # BLD: 0.02 K/UL (ref 0–0.12)
BILIRUB SERPL-MCNC: 0.6 MG/DL (ref 0.1–1.5)
BUN SERPL-MCNC: 18 MG/DL (ref 8–22)
CALCIUM SERPL-MCNC: 8.8 MG/DL (ref 8.5–10.5)
CHLORIDE SERPL-SCNC: 104 MMOL/L (ref 96–112)
CHOLEST SERPL-MCNC: 136 MG/DL (ref 100–199)
CO2 SERPL-SCNC: 27 MMOL/L (ref 20–33)
CREAT SERPL-MCNC: 0.75 MG/DL (ref 0.5–1.4)
EOSINOPHIL # BLD AUTO: 0.05 K/UL (ref 0–0.51)
EOSINOPHIL NFR BLD: 0.5 % (ref 0–6.9)
ERYTHROCYTE [DISTWIDTH] IN BLOOD BY AUTOMATED COUNT: 45 FL (ref 35.9–50)
FASTING STATUS PATIENT QL REPORTED: NORMAL
GLOBULIN SER CALC-MCNC: 2.9 G/DL (ref 1.9–3.5)
GLUCOSE SERPL-MCNC: 88 MG/DL (ref 65–99)
HCT VFR BLD AUTO: 48.1 % (ref 37–47)
HDLC SERPL-MCNC: 47 MG/DL
HGB BLD-MCNC: 15.1 G/DL (ref 12–16)
IMM GRANULOCYTES # BLD AUTO: 0.03 K/UL (ref 0–0.11)
IMM GRANULOCYTES NFR BLD AUTO: 0.3 % (ref 0–0.9)
LDLC SERPL CALC-MCNC: 72 MG/DL
LYMPHOCYTES # BLD AUTO: 1.94 K/UL (ref 1–4.8)
LYMPHOCYTES NFR BLD: 19 % (ref 22–41)
MCH RBC QN AUTO: 30 PG (ref 27–33)
MCHC RBC AUTO-ENTMCNC: 31.4 G/DL (ref 33.6–35)
MCV RBC AUTO: 95.6 FL (ref 81.4–97.8)
MONOCYTES # BLD AUTO: 0.57 K/UL (ref 0–0.85)
MONOCYTES NFR BLD AUTO: 5.6 % (ref 0–13.4)
NEUTROPHILS # BLD AUTO: 7.58 K/UL (ref 2–7.15)
NEUTROPHILS NFR BLD: 74.4 % (ref 44–72)
NRBC # BLD AUTO: 0 K/UL
NRBC BLD-RTO: 0 /100 WBC
PLATELET # BLD AUTO: 154 K/UL (ref 164–446)
PMV BLD AUTO: 10 FL (ref 9–12.9)
POTASSIUM SERPL-SCNC: 4.2 MMOL/L (ref 3.6–5.5)
PROT SERPL-MCNC: 6.9 G/DL (ref 6–8.2)
RBC # BLD AUTO: 5.03 M/UL (ref 4.2–5.4)
SODIUM SERPL-SCNC: 140 MMOL/L (ref 135–145)
TRIGL SERPL-MCNC: 86 MG/DL (ref 0–149)
WBC # BLD AUTO: 10.2 K/UL (ref 4.8–10.8)

## 2020-01-02 PROCEDURE — 85025 COMPLETE CBC W/AUTO DIFF WBC: CPT

## 2020-01-02 PROCEDURE — 36415 COLL VENOUS BLD VENIPUNCTURE: CPT

## 2020-01-02 PROCEDURE — 80053 COMPREHEN METABOLIC PANEL: CPT

## 2020-01-02 PROCEDURE — 80061 LIPID PANEL: CPT

## 2020-01-02 NOTE — TELEPHONE ENCOUNTER
ESTABLISHED PATIENT PRE-VISIT PLANNING     Patient was NOT contacted to complete PVP.     Note: Patient will not be contacted if there is no indication to call.     1.  Reviewed notes from the last few office visits within the medical group: Yes    2.  If any orders were placed at last visit or intended to be done for this visit (i.e. 6 mos follow-up), do we have Results/Consult Notes?        •  Labs - Labs ordered, completed on 1/2/20 and results are in chart.   Note: If patient appointment is for lab review and patient did not complete labs, check with provider if OK to reschedule patient until labs completed.       •  Imaging - Imaging ordered, completed and results are in chart.       •  Referrals - No referrals were ordered at last office visit.    3. Is this appointment scheduled as a Hospital Follow-Up? No    4.  Immunizations were updated in Epic using WebIZ?: Epic matches WebIZ       •  Web Iz Recommendations: FLU and SHINGRIX (Shingles)    5.  Patient is due for the following Health Maintenance Topics:   Health Maintenance Due   Topic Date Due   • ANNUAL PFT SCREENING-FEV1 AND FEV/FVC RATIO / SPIROMETRY  03/18/1963   • IMM ZOSTER VACCINES (2 of 3) 12/10/2013   • Annual Wellness Visit  06/27/2019   • IMM INFLUENZA (1) 09/01/2019           6. Orders for overdue Health Maintenance topics pended in Pre-Charting? N\A    7.  AHA (MDX) form printed for Provider? No, already completed, unable to print, damaged file    8.  Patient was NOT informed to arrive 15 min prior to their scheduled appointment and bring in their medication bottles.

## 2020-01-06 ENCOUNTER — OFFICE VISIT (OUTPATIENT)
Dept: MEDICAL GROUP | Age: 75
End: 2020-01-06
Payer: MEDICARE

## 2020-01-06 VITALS
TEMPERATURE: 98.5 F | OXYGEN SATURATION: 90 % | WEIGHT: 217.2 LBS | DIASTOLIC BLOOD PRESSURE: 80 MMHG | HEART RATE: 66 BPM | HEIGHT: 61 IN | BODY MASS INDEX: 41.01 KG/M2 | SYSTOLIC BLOOD PRESSURE: 160 MMHG

## 2020-01-06 DIAGNOSIS — G89.29 CHRONIC MIDLINE LOW BACK PAIN WITHOUT SCIATICA: ICD-10-CM

## 2020-01-06 DIAGNOSIS — M15.9 PRIMARY OSTEOARTHRITIS INVOLVING MULTIPLE JOINTS: ICD-10-CM

## 2020-01-06 DIAGNOSIS — E66.9 OBESITY (BMI 30-39.9): ICD-10-CM

## 2020-01-06 DIAGNOSIS — F11.20 UNCOMPLICATED OPIOID DEPENDENCE (HCC): ICD-10-CM

## 2020-01-06 DIAGNOSIS — M67.441 GANGLION CYST OF TENDON SHEATH OF RIGHT HAND: ICD-10-CM

## 2020-01-06 DIAGNOSIS — M51.36 DDD (DEGENERATIVE DISC DISEASE), LUMBAR: ICD-10-CM

## 2020-01-06 DIAGNOSIS — M54.50 CHRONIC MIDLINE LOW BACK PAIN WITHOUT SCIATICA: ICD-10-CM

## 2020-01-06 DIAGNOSIS — I10 ESSENTIAL HYPERTENSION: ICD-10-CM

## 2020-01-06 DIAGNOSIS — E55.9 VITAMIN D DEFICIENCY DISEASE: ICD-10-CM

## 2020-01-06 DIAGNOSIS — J43.1 PANLOBULAR EMPHYSEMA (HCC): ICD-10-CM

## 2020-01-06 DIAGNOSIS — F51.01 PRIMARY INSOMNIA: ICD-10-CM

## 2020-01-06 DIAGNOSIS — E78.2 MIXED HYPERLIPIDEMIA: ICD-10-CM

## 2020-01-06 PROCEDURE — 99214 OFFICE O/P EST MOD 30 MIN: CPT | Performed by: INTERNAL MEDICINE

## 2020-01-06 PROCEDURE — 8041 PR SCP AHA: Performed by: INTERNAL MEDICINE

## 2020-01-06 RX ORDER — METOPROLOL TARTRATE 100 MG/1
100 TABLET ORAL 2 TIMES DAILY
Qty: 180 TAB | Refills: 4 | Status: SHIPPED | OUTPATIENT
Start: 2020-01-06 | End: 2020-04-06 | Stop reason: SDUPTHER

## 2020-01-06 RX ORDER — SIMVASTATIN 20 MG
20 TABLET ORAL EVERY EVENING
Qty: 90 TAB | Refills: 4 | Status: SHIPPED | OUTPATIENT
Start: 2020-01-06 | End: 2020-06-12

## 2020-01-06 RX ORDER — ZOLPIDEM TARTRATE 10 MG/1
10 TABLET ORAL NIGHTLY PRN
Qty: 30 TAB | Refills: 11 | Status: SHIPPED | DISCHARGE
Start: 2020-01-06 | End: 2020-02-06

## 2020-01-06 RX ORDER — AMLODIPINE BESYLATE 5 MG/1
5 TABLET ORAL DAILY
Qty: 90 TAB | Refills: 4 | Status: ON HOLD | OUTPATIENT
Start: 2020-01-06 | End: 2020-02-27

## 2020-01-06 RX ORDER — LOSARTAN POTASSIUM 100 MG/1
100 TABLET ORAL DAILY
Qty: 90 TAB | Refills: 4 | Status: ON HOLD | OUTPATIENT
Start: 2020-01-06 | End: 2020-02-27

## 2020-01-06 RX ORDER — TRIAMTERENE AND HYDROCHLOROTHIAZIDE 37.5; 25 MG/1; MG/1
1 TABLET ORAL DAILY
Qty: 90 TAB | Refills: 4 | Status: ON HOLD | OUTPATIENT
Start: 2020-01-06 | End: 2020-02-16

## 2020-01-06 ASSESSMENT — ENCOUNTER SYMPTOMS
CARDIOVASCULAR NEGATIVE: 1
RESPIRATORY NEGATIVE: 1
CONSTITUTIONAL NEGATIVE: 1
PSYCHIATRIC NEGATIVE: 1
EYES NEGATIVE: 1
MUSCULOSKELETAL NEGATIVE: 1
GASTROINTESTINAL NEGATIVE: 1
NEUROLOGICAL NEGATIVE: 1

## 2020-01-06 ASSESSMENT — PATIENT HEALTH QUESTIONNAIRE - PHQ9: CLINICAL INTERPRETATION OF PHQ2 SCORE: 0

## 2020-01-06 NOTE — PROGRESS NOTES
Subjective:      Rufina Macias is a 74 y.o. female who presents with Hyperlipidemia (lab review)        HPI    The patient is here for follow up of chronic medical problems listed below. The patient is compliant with medications and having no side effects from them. Denies chest pain, abdominal pain, dyspnea, myalgias, or cough.    Mixed hyperlipidemia  Chronic. Patient reports compliancy with simvastatin 20 mg QN and denies any associated side effects. She denies any chest pain or claudication. Today we reviewed blood work from 1/2/20 which indicated stable lipid panel with tot 136; tri 86; HDL 47; LDL 72. The 10-year ASCVD risk score ([place] KAUSHIK Jr., et al., 2013) is: 27.1%.    Primary insomnia  Chronic. Patient reports using Ambien 10 mg QN and denies any associated side effects. She reports sleep is greatly improved with use of this medication. Medication was last refilled by me on 7/8/19.    Vitamin D deficiency disease  Chronic. Patient reports compliancy with cholecalciferol 2000 IU QD and denies any associated side effects.    Essential hypertension  Chronic. Patient reports compliancy with losartan 100 mg QD, amlodipine 5 mg QD, metoprolol 100 mg BID, and triamterene-HCTZ 37.5-25 mg QD and denies any associated side effects. BP in clinic today is mildly elevated at 160/80. She denies any chest pain, headaches, lightheadedness, or lower extremity edema.    DDD (degenerative disc disease), lumbar  Chronic midline low back pain without sciatica- norco chronic daily use; nv pain and spine  Chronic. She reports regular use of hydrocodone/acetaminophen  mg. Patient continues to regularly follow with [facility].     Primary osteoarthritis involving multiple joints- to get supartz inj left knee- nv pain and spine  Chronic. Patient has routine steroid injections with [facility] and reports 12/2019 she had sodium hyaluronate injection, which greatly improved the pain. She's previously  attended physical therapy for this problem. She is able to ambulate without difficulty and occasionally uses a walker.    Panlobular emphysema (HCC)  Chronic. Patient reports compliancy with ipratropium-albuterol inhaler Q6H PRN and denies any associated side effects. She denies any cough, wheezing, or shortness of breath.    Ganglion cyst of tendon sheath of right hand- surveillance  Chronic. Patient reports ongoing swelling and mild pain in right hand. No associated weakness, numbness, or tingling. She has previously declined referral to hand surgeon, instead preferring conservative treatment.    Uncomplicated opioid dependence (HCC)- nv pain and spine  Patient receives Lind from [facility].     Patient Active Problem List   Diagnosis   • Mixed hyperlipidemia   • Primary insomnia   • Vitamin D deficiency disease   • DDD (degenerative disc disease), lumbar   • Chronic allergic rhinitis   • Thoracic radiculopathy   • Lumbar radiculopathy   • DDD (degenerative disc disease), cervical   • Essential hypertension   • Obesity (BMI 30-39.9)   • Chronic midline low back pain without sciatica- norco prn; nv pain and spine   • Primary osteoarthritis involving multiple joints- to get supartz inj left knee- nv pain and spine   • Panlobular emphysema (HCC)   • Grade II diastolic dysfunction- echo 2017 with preserved EF   • Chronic pain of left knee- dr contreras- celebrex and cortisone inj; dr cade to do  supartz inj   • Ganglion cyst of tendon sheath of right hand- surveillance       Outpatient Medications Prior to Visit   Medication Sig Dispense Refill   • ipratropium-albuterol (COMBIVENT RESPIMAT)  MCG/ACT Aero Soln Inhale 1 Puff by mouth every 6 hours as needed (shortness of breath). 1 Inhaler 5   • simvastatin (ZOCOR) 20 MG Tab Take 1 Tab by mouth every evening. 90 Tab 4   • amLODIPine (NORVASC) 5 MG Tab Take 1 Tab by mouth every day. 90 Tab 4   • losartan (COZAAR) 100 MG Tab Take 1 Tab by mouth every day.  "90 Tab 4   • triamterene-hctz (MAXZIDE-25/DYAZIDE) 37.5-25 MG Tab Take 1 Tab by mouth every day. 90 Tab 4   • metoprolol (LOPRESSOR) 100 MG Tab Take 1 Tab by mouth 2 times a day. 180 Tab 4   • celecoxib (CELEBREX) 200 MG Cap Take 1 Cap by mouth every day. 60 Cap 3   • Calcium Carbonate-Vit D-Min (CALCIUM 1200 PO) Take 1,200 mg by mouth every day.     • Magnesium 400 MG Tab Take 400 mg by mouth every day.     • aspirin EC (ECOTRIN) 81 MG Tablet Delayed Response Take 81 mg by mouth every day.     • FIBER PO Take 2 Caps by mouth every day.     • hydrocodone/acetaminophen (NORCO)  MG Tab TAKE ONE TABLET BY MOUTH FOUR TIMES A DAY AS NEEDED 30 DAY SUPPLY  0   • zolpidem (AMBIEN) 10 MG Tab TAKE ONE TABLET BY MOUTH DAILY AT BEDTIME 30 DAY SUPPLY  0   • Cholecalciferol (VITAMIN D) 2000 UNITS Cap Take 1 Cap by mouth every day. 90 Cap 4     No facility-administered medications prior to visit.         Allergies   Allergen Reactions   • Codeine Swelling   • Ace Inhibitors      Cough       Review of Systems   Constitutional: Negative.    HENT: Negative.    Eyes: Negative.    Respiratory: Negative.    Cardiovascular: Negative.    Gastrointestinal: Negative.    Genitourinary: Negative.    Musculoskeletal: Negative.    Skin: Negative.    Neurological: Negative.    Endo/Heme/Allergies: Negative.    Psychiatric/Behavioral: Negative.    All other systems reviewed and are negative.       Objective:     /80 (BP Location: Right arm, Patient Position: Sitting, BP Cuff Size: Adult long)   Pulse 66   Temp 36.9 °C (98.5 °F) (Temporal)   Ht 1.549 m (5' 1\")   Wt 98.5 kg (217 lb 3.2 oz)   SpO2 90%   BMI 41.04 kg/m²     Physical Exam   Constitutional: Oriented to person, place, and time. Appears well-developed and well-nourished. No distress.   Head: Normocephalic and atraumatic.   Right Ear: External ear normal.   Left Ear: External ear normal.   Nose: Nose normal.   Mouth/Throat: Oropharynx is clear and moist. No oropharyngeal " exudate.   Eyes: Pupils are equal, round, and reactive to light. Conjunctivae and EOM are normal. Right eye exhibits no discharge. Left eye exhibits no discharge. No scleral icterus.   Neck: Normal range of motion. Neck supple. No JVD present. No tracheal deviation present. No thyromegaly present.   Cardiovascular: Normal rate, regular rhythm, normal heart sounds and intact distal pulses.  Exam reveals no gallop and no friction rub.    No murmur heard.  Pulmonary/Chest: Effort normal. No stridor. No respiratory distress. No wheezing or rales. No tenderness.   Abdominal: Soft. Bowel sounds are normal. No distension and no mass. There is no tenderness. There is no rebound and no guarding. No hernia.   Musculoskeletal: Normal range of motion No edema or tenderness.   Lymphadenopathy: No cervical adenopathy.   Neurological: Alert and oriented to person, place, and time. Normal reflexes. Normal reflexes. No cranial nerve deficit. Normal muscle tone. Coordination normal.   Skin: Skin is warm and dry. No rash noted. Not diaphoretic. No erythema. No pallor.   Psychiatric: Normal mood and affect. Behavior is normal. Judgment and thought content normal.   Nursing note and vitals reviewed.      No results found for: HBA1C  Lab Results   Component Value Date/Time    SODIUM 140 01/02/2020 01:15 PM    POTASSIUM 4.2 01/02/2020 01:15 PM    CHLORIDE 104 01/02/2020 01:15 PM    CO2 27 01/02/2020 01:15 PM    GLUCOSE 88 01/02/2020 01:15 PM    BUN 18 01/02/2020 01:15 PM    CREATININE 0.75 01/02/2020 01:15 PM    CREATININE 0.80 12/02/2010 12:00 AM    BUNCREATRAT 20 12/02/2010 12:00 AM    GLOMRATE >59 12/02/2010 12:00 AM    ALKPHOSPHAT 61 01/02/2020 01:15 PM    ASTSGOT 20 01/02/2020 01:15 PM    ALTSGPT 20 01/02/2020 01:15 PM    TBILIRUBIN 0.6 01/02/2020 01:15 PM     Lab Results   Component Value Date/Time    INR 0.98 02/14/2018 11:58 AM     Lab Results   Component Value Date/Time    CHOLSTRLTOT 136 01/02/2020 01:15 PM    LDL 72 01/02/2020  01:15 PM    HDL 47 01/02/2020 01:15 PM    TRIGLYCERIDE 86 01/02/2020 01:15 PM       No results found for: TESTOSTERONE  Lab Results   Component Value Date/Time    TSH 1.640 12/02/2010 12:00 AM     Lab Results   Component Value Date/Time    FREET4 0.90 10/24/2014 11:56 AM    FREET4 0.91 06/13/2013 12:30 PM     No results found for: URICACID  No components found for: VITB12  Lab Results   Component Value Date/Time    25HYDROXY 39 09/20/2016 11:36 AM    25HYDROXY 39 10/24/2014 11:56 AM          Assessment/Plan:     1. Mixed hyperlipidemia  Under good control. Continue same regimen, simvastatin 20 mg QN.  Blood work ordered as follows:  - TSH; Future  - Comp Metabolic Panel; Future  - Lipid Profile; Future  - CBC WITH DIFFERENTIAL; Future  - simvastatin (ZOCOR) 20 MG Tab; Take 1 Tab by mouth every evening.  Dispense: 90 Tab; Refill: 4    2. Primary insomnia  Under good control. Continue same regimen.    3. Vitamin D deficiency disease  Under good control. Continue same regimen, cholecalciferol 2000 IU QD.  Blood work ordered as follows:  - VITAMIN D,25 HYDROXY; Future    4. DDD (degenerative disc disease), lumbar  Under good control. Continue same regimen.   Continue to follow with [facility].    5. Essential hypertension  Under good control. Continue same regimen, losartan 100 mg QD, amlodipine 5 mg QD, metoprolol 100 mg BID, and triamterene-HCTZ 37.5-25 mg QD.  Medication refills as follows:  - amLODIPine (NORVASC) 5 MG Tab; Take 1 Tab by mouth every day.  Dispense: 90 Tab; Refill: 4  - losartan (COZAAR) 100 MG Tab; Take 1 Tab by mouth every day.  Dispense: 90 Tab; Refill: 4  - triamterene-hctz (MAXZIDE-25/DYAZIDE) 37.5-25 MG Tab; Take 1 Tab by mouth every day.  Dispense: 90 Tab; Refill: 4  - metoprolol (LOPRESSOR) 100 MG Tab; Take 1 Tab by mouth 2 times a day.  Dispense: 180 Tab; Refill: 4    6. Obesity (BMI 30-39.9)  Advised to eat low fat, low carbohydrate and high fiber diet as well as do cardio physical  exercise regularly.     7. Panlobular emphysema (HCC)  Under good control. Continue same regimen. Medication refill as follows:  - ipratropium-albuterol (COMBIVENT RESPIMAT)  MCG/ACT Aero Soln; Inhale 1 Puff by mouth every 6 hours as needed (shortness of breath).  Dispense: 1 Inhaler; Refill: 5    8. Ganglion cyst of tendon sheath of right hand- surveillance  Under good control. Continue same regimen. Will continue to monitor.    9. Uncomplicated opioid dependence (HCC)- nv pain and spine  Under good control. Continue same regimen.   Continue to follow with [facility].    10. Chronic midline low back pain without sciatica- norco chronic daily use; nv pain and spine  Under good control. Continue same regimen.   Continue to follow with [facility].    11. Primary osteoarthritis involving multiple joints- to get supartz inj left knee- nv pain and spine  Under good control. Continue same regimen.   Continue to follow with [facility].  Blood work ordered as follows:  - RHEUMATOID ARTHRITIS FACTOR; Future  - EULALIO IGG CAROLE W/RFLX TO EULALIO IGG IFA; Future  - CCP ANTIBODY IGG/IGA    Daisy MALHOTRA (Shaw), am scribing for, and in the presence of, Quincy Angel M.D..    Electronically signed by: Daisy Hopkins (Shaw), 1/6/2020    IQuincy M.D., personally performed the services described in this documentation, as scribed by Daisy Hopkins in my presence, and it is both accurate and complete.

## 2020-01-06 NOTE — PROGRESS NOTES
Annual Health Assessment Questions:    1.  Are you currently engaging in any exercise or physical activity? Yes    2.  How would you describe your mood or emotional well-being today? good    3.  Have you had any falls in the last year? No    4.  Have you noticed any problems with your balance or had difficulty walking? Yes    5.  In the last six months have you experienced any leakage of urine? No    6. DPA/Advanced Directive: Patient has Advanced Directive on file.

## 2020-01-09 NOTE — PROGRESS NOTES
Please get a hold the patient and have her do FIT. Also have her schedule colonoscopy screening with GI. Orders for both have been written. I will redo the FIT screening order. She was exposed and had a colonoscopy done in August of this year. Please get that report was done.   17

## 2020-02-08 ENCOUNTER — APPOINTMENT (OUTPATIENT)
Dept: RADIOLOGY | Facility: MEDICAL CENTER | Age: 75
DRG: 872 | End: 2020-02-08
Attending: EMERGENCY MEDICINE
Payer: MEDICARE

## 2020-02-08 ENCOUNTER — HOSPITAL ENCOUNTER (INPATIENT)
Facility: MEDICAL CENTER | Age: 75
LOS: 8 days | DRG: 872 | End: 2020-02-16
Attending: EMERGENCY MEDICINE | Admitting: INTERNAL MEDICINE
Payer: MEDICARE

## 2020-02-08 DIAGNOSIS — A41.9 SEPSIS, DUE TO UNSPECIFIED ORGANISM, UNSPECIFIED WHETHER ACUTE ORGAN DYSFUNCTION PRESENT (HCC): ICD-10-CM

## 2020-02-08 DIAGNOSIS — G89.4 CHRONIC PAIN SYNDROME: ICD-10-CM

## 2020-02-08 DIAGNOSIS — R74.01 TRANSAMINITIS: ICD-10-CM

## 2020-02-08 DIAGNOSIS — I47.10 SVT (SUPRAVENTRICULAR TACHYCARDIA) (HCC): ICD-10-CM

## 2020-02-08 LAB
ALBUMIN SERPL BCP-MCNC: 4.3 G/DL (ref 3.2–4.9)
ALBUMIN/GLOB SERPL: 1.3 G/DL
ALP SERPL-CCNC: 121 U/L (ref 30–99)
ALT SERPL-CCNC: 137 U/L (ref 2–50)
ANION GAP SERPL CALC-SCNC: 18 MMOL/L (ref 0–11.9)
APPEARANCE UR: CLEAR
AST SERPL-CCNC: 193 U/L (ref 12–45)
BACTERIA #/AREA URNS HPF: ABNORMAL /HPF
BASOPHILS # BLD AUTO: 0.2 % (ref 0–1.8)
BASOPHILS # BLD: 0.04 K/UL (ref 0–0.12)
BILIRUB SERPL-MCNC: 1.1 MG/DL (ref 0.1–1.5)
BILIRUB UR QL STRIP.AUTO: NEGATIVE
BUN SERPL-MCNC: 14 MG/DL (ref 8–22)
CALCIUM SERPL-MCNC: 9.5 MG/DL (ref 8.4–10.2)
CHLORIDE SERPL-SCNC: 99 MMOL/L (ref 96–112)
CO2 SERPL-SCNC: 23 MMOL/L (ref 20–33)
COLOR UR: YELLOW
CREAT SERPL-MCNC: 0.68 MG/DL (ref 0.5–1.4)
EKG IMPRESSION: NORMAL
EKG IMPRESSION: NORMAL
EOSINOPHIL # BLD AUTO: 0.02 K/UL (ref 0–0.51)
EOSINOPHIL NFR BLD: 0.1 % (ref 0–6.9)
EPI CELLS #/AREA URNS HPF: ABNORMAL /HPF
ERYTHROCYTE [DISTWIDTH] IN BLOOD BY AUTOMATED COUNT: 42 FL (ref 35.9–50)
FLUAV RNA SPEC QL NAA+PROBE: NEGATIVE
FLUBV RNA SPEC QL NAA+PROBE: NEGATIVE
GLOBULIN SER CALC-MCNC: 3.3 G/DL (ref 1.9–3.5)
GLUCOSE SERPL-MCNC: 131 MG/DL (ref 65–99)
GLUCOSE UR STRIP.AUTO-MCNC: NEGATIVE MG/DL
HCT VFR BLD AUTO: 49.9 % (ref 37–47)
HGB BLD-MCNC: 16.5 G/DL (ref 12–16)
IMM GRANULOCYTES # BLD AUTO: 0.12 K/UL (ref 0–0.11)
IMM GRANULOCYTES NFR BLD AUTO: 0.7 % (ref 0–0.9)
KETONES UR STRIP.AUTO-MCNC: NEGATIVE MG/DL
LACTATE BLD-SCNC: 1.2 MMOL/L (ref 0.5–2)
LACTATE BLD-SCNC: 1.6 MMOL/L (ref 0.5–2)
LACTATE BLD-SCNC: 2.1 MMOL/L (ref 0.5–2)
LACTATE BLD-SCNC: 3.1 MMOL/L (ref 0.5–2)
LEUKOCYTE ESTERASE UR QL STRIP.AUTO: NEGATIVE
LYMPHOCYTES # BLD AUTO: 0.68 K/UL (ref 1–4.8)
LYMPHOCYTES NFR BLD: 3.9 % (ref 22–41)
MCH RBC QN AUTO: 29.6 PG (ref 27–33)
MCHC RBC AUTO-ENTMCNC: 33.1 G/DL (ref 33.6–35)
MCV RBC AUTO: 89.6 FL (ref 81.4–97.8)
MICRO URNS: ABNORMAL
MONOCYTES # BLD AUTO: 0.91 K/UL (ref 0–0.85)
MONOCYTES NFR BLD AUTO: 5.2 % (ref 0–13.4)
NEUTROPHILS # BLD AUTO: 15.64 K/UL (ref 2–7.15)
NEUTROPHILS NFR BLD: 89.9 % (ref 44–72)
NITRITE UR QL STRIP.AUTO: NEGATIVE
NRBC # BLD AUTO: 0 K/UL
NRBC BLD-RTO: 0 /100 WBC
PH UR STRIP.AUTO: 5.5 [PH] (ref 5–8)
PLATELET # BLD AUTO: 152 K/UL (ref 164–446)
PMV BLD AUTO: 9 FL (ref 9–12.9)
POTASSIUM SERPL-SCNC: 3.8 MMOL/L (ref 3.6–5.5)
PROT SERPL-MCNC: 7.6 G/DL (ref 6–8.2)
PROT UR QL STRIP: NEGATIVE MG/DL
RBC # BLD AUTO: 5.57 M/UL (ref 4.2–5.4)
RBC # URNS HPF: ABNORMAL /HPF
RBC UR QL AUTO: ABNORMAL
SODIUM SERPL-SCNC: 140 MMOL/L (ref 135–145)
SP GR UR STRIP.AUTO: 1.01
TROPONIN T SERPL-MCNC: 15 NG/L (ref 6–19)
WBC # BLD AUTO: 17.4 K/UL (ref 4.8–10.8)
WBC #/AREA URNS HPF: ABNORMAL /HPF

## 2020-02-08 PROCEDURE — 93005 ELECTROCARDIOGRAM TRACING: CPT | Performed by: EMERGENCY MEDICINE

## 2020-02-08 PROCEDURE — 87502 INFLUENZA DNA AMP PROBE: CPT

## 2020-02-08 PROCEDURE — 87186 SC STD MICRODIL/AGAR DIL: CPT

## 2020-02-08 PROCEDURE — 71045 X-RAY EXAM CHEST 1 VIEW: CPT

## 2020-02-08 PROCEDURE — 87040 BLOOD CULTURE FOR BACTERIA: CPT | Mod: 91

## 2020-02-08 PROCEDURE — 94640 AIRWAY INHALATION TREATMENT: CPT

## 2020-02-08 PROCEDURE — 96374 THER/PROPH/DIAG INJ IV PUSH: CPT

## 2020-02-08 PROCEDURE — 99223 1ST HOSP IP/OBS HIGH 75: CPT | Performed by: INTERNAL MEDICINE

## 2020-02-08 PROCEDURE — 700111 HCHG RX REV CODE 636 W/ 250 OVERRIDE (IP): Performed by: EMERGENCY MEDICINE

## 2020-02-08 PROCEDURE — 94760 N-INVAS EAR/PLS OXIMETRY 1: CPT

## 2020-02-08 PROCEDURE — 700101 HCHG RX REV CODE 250: Performed by: INTERNAL MEDICINE

## 2020-02-08 PROCEDURE — A9270 NON-COVERED ITEM OR SERVICE: HCPCS | Performed by: EMERGENCY MEDICINE

## 2020-02-08 PROCEDURE — 87077 CULTURE AEROBIC IDENTIFY: CPT

## 2020-02-08 PROCEDURE — 700111 HCHG RX REV CODE 636 W/ 250 OVERRIDE (IP)

## 2020-02-08 PROCEDURE — 96361 HYDRATE IV INFUSION ADD-ON: CPT

## 2020-02-08 PROCEDURE — 76705 ECHO EXAM OF ABDOMEN: CPT

## 2020-02-08 PROCEDURE — 700101 HCHG RX REV CODE 250: Performed by: EMERGENCY MEDICINE

## 2020-02-08 PROCEDURE — 700102 HCHG RX REV CODE 250 W/ 637 OVERRIDE(OP): Performed by: INTERNAL MEDICINE

## 2020-02-08 PROCEDURE — 700105 HCHG RX REV CODE 258: Performed by: EMERGENCY MEDICINE

## 2020-02-08 PROCEDURE — 96375 TX/PRO/DX INJ NEW DRUG ADDON: CPT

## 2020-02-08 PROCEDURE — A9270 NON-COVERED ITEM OR SERVICE: HCPCS | Performed by: INTERNAL MEDICINE

## 2020-02-08 PROCEDURE — 770020 HCHG ROOM/CARE - TELE (206)

## 2020-02-08 PROCEDURE — 99285 EMERGENCY DEPT VISIT HI MDM: CPT

## 2020-02-08 PROCEDURE — 83605 ASSAY OF LACTIC ACID: CPT | Mod: 91

## 2020-02-08 PROCEDURE — 84484 ASSAY OF TROPONIN QUANT: CPT

## 2020-02-08 PROCEDURE — 36415 COLL VENOUS BLD VENIPUNCTURE: CPT

## 2020-02-08 PROCEDURE — 700102 HCHG RX REV CODE 250 W/ 637 OVERRIDE(OP): Performed by: EMERGENCY MEDICINE

## 2020-02-08 PROCEDURE — 700105 HCHG RX REV CODE 258: Performed by: INTERNAL MEDICINE

## 2020-02-08 PROCEDURE — 700111 HCHG RX REV CODE 636 W/ 250 OVERRIDE (IP): Performed by: INTERNAL MEDICINE

## 2020-02-08 PROCEDURE — 85025 COMPLETE CBC W/AUTO DIFF WBC: CPT

## 2020-02-08 PROCEDURE — 80053 COMPREHEN METABOLIC PANEL: CPT

## 2020-02-08 PROCEDURE — 81001 URINALYSIS AUTO W/SCOPE: CPT

## 2020-02-08 RX ORDER — CEFTRIAXONE 2 G/1
INJECTION, POWDER, FOR SOLUTION INTRAMUSCULAR; INTRAVENOUS
Status: COMPLETED
Start: 2020-02-08 | End: 2020-02-08

## 2020-02-08 RX ORDER — ACETAMINOPHEN 325 MG/1
650 TABLET ORAL EVERY 6 HOURS PRN
Status: DISCONTINUED | OUTPATIENT
Start: 2020-02-08 | End: 2020-02-16 | Stop reason: HOSPADM

## 2020-02-08 RX ORDER — ZOLPIDEM TARTRATE 10 MG/1
10 TABLET ORAL NIGHTLY PRN
Status: ON HOLD | COMMUNITY
End: 2020-02-27

## 2020-02-08 RX ORDER — ONDANSETRON 4 MG/1
4 TABLET, ORALLY DISINTEGRATING ORAL EVERY 4 HOURS PRN
Status: DISCONTINUED | OUTPATIENT
Start: 2020-02-08 | End: 2020-02-16 | Stop reason: HOSPADM

## 2020-02-08 RX ORDER — SODIUM CHLORIDE, SODIUM LACTATE, POTASSIUM CHLORIDE, AND CALCIUM CHLORIDE .6; .31; .03; .02 G/100ML; G/100ML; G/100ML; G/100ML
1000 INJECTION, SOLUTION INTRAVENOUS ONCE
Status: COMPLETED | OUTPATIENT
Start: 2020-02-08 | End: 2020-02-08

## 2020-02-08 RX ORDER — VITAMIN B COMPLEX
1000 TABLET ORAL EVERY EVENING
Status: DISCONTINUED | OUTPATIENT
Start: 2020-02-08 | End: 2020-02-16 | Stop reason: HOSPADM

## 2020-02-08 RX ORDER — LOSARTAN POTASSIUM 25 MG/1
100 TABLET ORAL EVERY EVENING
Status: DISCONTINUED | OUTPATIENT
Start: 2020-02-08 | End: 2020-02-16 | Stop reason: HOSPADM

## 2020-02-08 RX ORDER — AMOXICILLIN 250 MG
2 CAPSULE ORAL 2 TIMES DAILY
Status: DISCONTINUED | OUTPATIENT
Start: 2020-02-08 | End: 2020-02-16 | Stop reason: HOSPADM

## 2020-02-08 RX ORDER — METOPROLOL TARTRATE 50 MG/1
100 TABLET, FILM COATED ORAL 2 TIMES DAILY
Status: DISCONTINUED | OUTPATIENT
Start: 2020-02-08 | End: 2020-02-16 | Stop reason: HOSPADM

## 2020-02-08 RX ORDER — POLYETHYLENE GLYCOL 3350 17 G/17G
1 POWDER, FOR SOLUTION ORAL
Status: DISCONTINUED | OUTPATIENT
Start: 2020-02-08 | End: 2020-02-16 | Stop reason: HOSPADM

## 2020-02-08 RX ORDER — SODIUM CHLORIDE, SODIUM LACTATE, POTASSIUM CHLORIDE, CALCIUM CHLORIDE 600; 310; 30; 20 MG/100ML; MG/100ML; MG/100ML; MG/100ML
500 INJECTION, SOLUTION INTRAVENOUS ONCE
Status: COMPLETED | OUTPATIENT
Start: 2020-02-08 | End: 2020-02-08

## 2020-02-08 RX ORDER — HYDROCODONE BITARTRATE AND ACETAMINOPHEN 5; 325 MG/1; MG/1
1 TABLET ORAL ONCE
Status: COMPLETED | OUTPATIENT
Start: 2020-02-08 | End: 2020-02-08

## 2020-02-08 RX ORDER — ADENOSINE 3 MG/ML
6 INJECTION, SOLUTION INTRAVENOUS ONCE
Status: ACTIVE | OUTPATIENT
Start: 2020-02-08 | End: 2020-02-09

## 2020-02-08 RX ORDER — METOPROLOL TARTRATE 50 MG/1
100 TABLET, FILM COATED ORAL ONCE
Status: COMPLETED | OUTPATIENT
Start: 2020-02-08 | End: 2020-02-08

## 2020-02-08 RX ORDER — IPRATROPIUM BROMIDE AND ALBUTEROL SULFATE 2.5; .5 MG/3ML; MG/3ML
3 SOLUTION RESPIRATORY (INHALATION)
Status: DISCONTINUED | OUTPATIENT
Start: 2020-02-08 | End: 2020-02-09

## 2020-02-08 RX ORDER — SODIUM CHLORIDE 9 MG/ML
2500 INJECTION, SOLUTION INTRAVENOUS CONTINUOUS
Status: DISCONTINUED | OUTPATIENT
Start: 2020-02-08 | End: 2020-02-09

## 2020-02-08 RX ORDER — BISACODYL 10 MG
10 SUPPOSITORY, RECTAL RECTAL
Status: DISCONTINUED | OUTPATIENT
Start: 2020-02-08 | End: 2020-02-16 | Stop reason: HOSPADM

## 2020-02-08 RX ORDER — ACETAMINOPHEN 325 MG/1
650 TABLET ORAL ONCE
Status: COMPLETED | OUTPATIENT
Start: 2020-02-08 | End: 2020-02-08

## 2020-02-08 RX ORDER — ZOLPIDEM TARTRATE 5 MG/1
5 TABLET ORAL NIGHTLY PRN
Status: DISCONTINUED | OUTPATIENT
Start: 2020-02-08 | End: 2020-02-09

## 2020-02-08 RX ORDER — SODIUM CHLORIDE, SODIUM LACTATE, POTASSIUM CHLORIDE, AND CALCIUM CHLORIDE .6; .31; .03; .02 G/100ML; G/100ML; G/100ML; G/100ML
500 INJECTION, SOLUTION INTRAVENOUS
Status: DISCONTINUED | OUTPATIENT
Start: 2020-02-08 | End: 2020-02-16 | Stop reason: HOSPADM

## 2020-02-08 RX ORDER — ADENOSINE 3 MG/ML
INJECTION, SOLUTION INTRAVENOUS
Status: COMPLETED
Start: 2020-02-08 | End: 2020-02-08

## 2020-02-08 RX ORDER — ONDANSETRON 2 MG/ML
4 INJECTION INTRAMUSCULAR; INTRAVENOUS EVERY 4 HOURS PRN
Status: DISCONTINUED | OUTPATIENT
Start: 2020-02-08 | End: 2020-02-16 | Stop reason: HOSPADM

## 2020-02-08 RX ORDER — AMLODIPINE BESYLATE 5 MG/1
5 TABLET ORAL EVERY EVENING
Status: DISCONTINUED | OUTPATIENT
Start: 2020-02-08 | End: 2020-02-09

## 2020-02-08 RX ORDER — HYDROCODONE BITARTRATE AND ACETAMINOPHEN 10; 325 MG/1; MG/1
1 TABLET ORAL EVERY 6 HOURS PRN
Status: DISCONTINUED | OUTPATIENT
Start: 2020-02-08 | End: 2020-02-16 | Stop reason: HOSPADM

## 2020-02-08 RX ADMIN — CEFTRIAXONE SODIUM 2 G: 2 INJECTION, POWDER, FOR SOLUTION INTRAMUSCULAR; INTRAVENOUS at 08:05

## 2020-02-08 RX ADMIN — MELATONIN 1000 UNITS: at 17:39

## 2020-02-08 RX ADMIN — ZOLPIDEM TARTRATE 5 MG: 5 TABLET ORAL at 20:29

## 2020-02-08 RX ADMIN — HYDROCODONE BITARTRATE AND ACETAMINOPHEN 1 TABLET: 10; 325 TABLET ORAL at 20:29

## 2020-02-08 RX ADMIN — IPRATROPIUM BROMIDE AND ALBUTEROL SULFATE 3 ML: .5; 3 SOLUTION RESPIRATORY (INHALATION) at 20:25

## 2020-02-08 RX ADMIN — HYDROCODONE BITARTRATE AND ACETAMINOPHEN 1 TABLET: 5; 325 TABLET ORAL at 08:22

## 2020-02-08 RX ADMIN — AMLODIPINE BESYLATE 5 MG: 5 TABLET ORAL at 17:42

## 2020-02-08 RX ADMIN — FENTANYL CITRATE 50 MCG: 50 INJECTION INTRAMUSCULAR; INTRAVENOUS at 07:23

## 2020-02-08 RX ADMIN — LOSARTAN POTASSIUM 100 MG: 25 TABLET ORAL at 17:41

## 2020-02-08 RX ADMIN — ACETAMINOPHEN 650 MG: 325 TABLET, FILM COATED ORAL at 06:31

## 2020-02-08 RX ADMIN — SODIUM CHLORIDE, POTASSIUM CHLORIDE, SODIUM LACTATE AND CALCIUM CHLORIDE 500 ML: 600; 310; 30; 20 INJECTION, SOLUTION INTRAVENOUS at 08:20

## 2020-02-08 RX ADMIN — METOPROLOL TARTRATE 100 MG: 50 TABLET, FILM COATED ORAL at 13:01

## 2020-02-08 RX ADMIN — METRONIDAZOLE 500 MG: 500 INJECTION, SOLUTION INTRAVENOUS at 14:02

## 2020-02-08 RX ADMIN — ADENOSINE 6 MG: 3 INJECTION, SOLUTION INTRAVENOUS at 09:16

## 2020-02-08 RX ADMIN — SODIUM CHLORIDE, POTASSIUM CHLORIDE, SODIUM LACTATE AND CALCIUM CHLORIDE 1000 ML: 600; 310; 30; 20 INJECTION, SOLUTION INTRAVENOUS at 06:56

## 2020-02-08 RX ADMIN — ENOXAPARIN SODIUM 40 MG: 100 INJECTION SUBCUTANEOUS at 12:59

## 2020-02-08 RX ADMIN — SODIUM CHLORIDE 2500 ML: 9 INJECTION, SOLUTION INTRAVENOUS at 12:59

## 2020-02-08 RX ADMIN — METRONIDAZOLE 500 MG: 500 INJECTION, SOLUTION INTRAVENOUS at 09:54

## 2020-02-08 RX ADMIN — METRONIDAZOLE 500 MG: 500 INJECTION, SOLUTION INTRAVENOUS at 22:08

## 2020-02-08 RX ADMIN — METOPROLOL TARTRATE 100 MG: 50 TABLET, FILM COATED ORAL at 17:40

## 2020-02-08 RX ADMIN — HYDROCODONE BITARTRATE AND ACETAMINOPHEN 1 TABLET: 10; 325 TABLET ORAL at 14:01

## 2020-02-08 RX ADMIN — SODIUM CHLORIDE 1000 ML: 9 INJECTION, SOLUTION INTRAVENOUS at 22:11

## 2020-02-08 RX ADMIN — IPRATROPIUM BROMIDE AND ALBUTEROL SULFATE 3 ML: .5; 3 SOLUTION RESPIRATORY (INHALATION) at 13:36

## 2020-02-08 ASSESSMENT — LIFESTYLE VARIABLES
EVER_SMOKED: YES
EVER FELT BAD OR GUILTY ABOUT YOUR DRINKING: NO
CONSUMPTION TOTAL: NEGATIVE
TOTAL SCORE: 0
EVER_SMOKED: YES
ON A TYPICAL DAY WHEN YOU DRINK ALCOHOL HOW MANY DRINKS DO YOU HAVE: 0
EVER HAD A DRINK FIRST THING IN THE MORNING TO STEADY YOUR NERVES TO GET RID OF A HANGOVER: NO
ALCOHOL_USE: NO
HAVE PEOPLE ANNOYED YOU BY CRITICIZING YOUR DRINKING: NO
AVERAGE NUMBER OF DAYS PER WEEK YOU HAVE A DRINK CONTAINING ALCOHOL: 0
HAVE YOU EVER FELT YOU SHOULD CUT DOWN ON YOUR DRINKING: NO
TOTAL SCORE: 0
DOES PATIENT WANT TO STOP DRINKING: CANNOT ASSESS
HOW MANY TIMES IN THE PAST YEAR HAVE YOU HAD 5 OR MORE DRINKS IN A DAY: 0
TOTAL SCORE: 0

## 2020-02-08 ASSESSMENT — ENCOUNTER SYMPTOMS
CONSTIPATION: 0
FEVER: 0
SENSORY CHANGE: 0
INSOMNIA: 0
VOMITING: 0
BACK PAIN: 0
WEAKNESS: 0
BLOOD IN STOOL: 0
PALPITATIONS: 0
EYE DISCHARGE: 0
TREMORS: 0
ORTHOPNEA: 0
EYE PAIN: 0
FOCAL WEAKNESS: 0
PSYCHIATRIC NEGATIVE: 1
WHEEZING: 0
CHILLS: 0
NEUROLOGICAL NEGATIVE: 1
EYES NEGATIVE: 1
MUSCULOSKELETAL NEGATIVE: 1
CARDIOVASCULAR NEGATIVE: 1
SINUS PAIN: 0
HEARTBURN: 0
COUGH: 0
DEPRESSION: 0
RESPIRATORY NEGATIVE: 1
HEADACHES: 0
CONSTITUTIONAL NEGATIVE: 1
NAUSEA: 1
DIAPHORESIS: 0
SHORTNESS OF BREATH: 0
STRIDOR: 0
ABDOMINAL PAIN: 1
NERVOUS/ANXIOUS: 0
HEMOPTYSIS: 0
DIARRHEA: 0
MYALGIAS: 0

## 2020-02-08 ASSESSMENT — COGNITIVE AND FUNCTIONAL STATUS - GENERAL
DAILY ACTIVITIY SCORE: 24
SUGGESTED CMS G CODE MODIFIER MOBILITY: CH
MOBILITY SCORE: 24
SUGGESTED CMS G CODE MODIFIER DAILY ACTIVITY: CH

## 2020-02-08 ASSESSMENT — PATIENT HEALTH QUESTIONNAIRE - PHQ9
1. LITTLE INTEREST OR PLEASURE IN DOING THINGS: NOT AT ALL
2. FEELING DOWN, DEPRESSED, IRRITABLE, OR HOPELESS: NOT AT ALL
SUM OF ALL RESPONSES TO PHQ9 QUESTIONS 1 AND 2: 0

## 2020-02-08 ASSESSMENT — COPD QUESTIONNAIRES
DURING THE PAST 4 WEEKS HOW MUCH DID YOU FEEL SHORT OF BREATH: SOME OF THE TIME
COPD SCREENING SCORE: 6
HAVE YOU SMOKED AT LEAST 100 CIGARETTES IN YOUR ENTIRE LIFE: YES
DO YOU EVER COUGH UP ANY MUCUS OR PHLEGM?: YES, A FEW DAYS A WEEK OR MONTH

## 2020-02-08 ASSESSMENT — PAIN DESCRIPTION - DESCRIPTORS: DESCRIPTORS: PRESSURE

## 2020-02-08 NOTE — H&P
Hospital Medicine History & Physical Note    Date of Service  2/8/2020    Primary Care Physician  Quincy Angel M.D.    Consultants  GI, DHA    Code Status  dnr    Chief Complaint  Abdominal pain    History of Presenting Illness  74 y.o. female who presented 2/8/2020 with abdominal pain across the abdomen increasing in severity and worse with leaning forward. She felt flushing through her body and nausea associated with the pain. She denies any fever, chills, headache, rash, or chest pain. She has a remote history of cholecystectomy but had endoscopy for stone removal with Dr. Louie from  in 2018.    Review of Systems  Review of Systems   Constitutional: Positive for malaise/fatigue. Negative for chills, diaphoresis and fever.   HENT: Negative for congestion, nosebleeds and sinus pain.    Eyes: Negative for pain and discharge.   Respiratory: Negative for cough, hemoptysis, shortness of breath, wheezing and stridor.    Cardiovascular: Negative for chest pain, palpitations, orthopnea and leg swelling.   Gastrointestinal: Positive for abdominal pain and nausea. Negative for blood in stool, constipation, diarrhea, heartburn and vomiting.   Genitourinary: Negative for dysuria, frequency, hematuria and urgency.   Musculoskeletal: Negative for back pain and myalgias.   Skin: Negative for itching and rash.   Neurological: Negative for tremors, sensory change, focal weakness, weakness and headaches.   Psychiatric/Behavioral: Negative for depression. The patient is not nervous/anxious and does not have insomnia.    All other systems reviewed and are negative.      Past Medical History   has a past medical history of Allergy, Arthritis, Asthma, Back pain, Breath shortness, Bronchitis, CA - cancer of uterus, Cancer (HCC) (2010), Carpal tunnel syndrome, COPD, Diverticula of colon, Heart burn, High cholesterol, Hyperlipidemia, Hypertension, Pneumonia (1993), and Tremor, hereditary, benign. She also has no past medical  history of Addisons disease (HCC), Adrenal disorder, Anemia, Anxiety, Blood transfusion, Clotting disorder, Cushings syndrome, Depression, GERD (gastroesophageal reflux disease), Goiter, Headache(784.0), HIV (human immunodeficiency virus infection), IBD (inflammatory bowel disease), Meningitis, Migraine, OSTEOPOROSIS, Parathyroid disorder (HCC), Pituitary disease (HCC), Substance abuse (HCC), Ulcer, or Urinary tract infection, site not specified.    Surgical History   has a past surgical history that includes hysterectomy, total abdominal (1/18/10); open reduction; abdominal hysterectomy total (Jan 2010); oophorectomy (Jan 2010); aditi by laparoscopy (july, 2015); ercp (2/15/2018); common bile duct exploration (02/15/2018); ercp w/ insertion stent/tube (02/15/2018); ercp w/sphincterotomy/papill. (02/15/2018); ercp w/removal calculus (02/15/2018); ercp (4/5/2018); ercp w/sphincterotomy/papill. (4/5/2018); ercp w/ insertion stent/tube (4/5/2018); and ercp w/removal calculus (4/5/2018).     Family History  family history includes Cancer in her father, paternal grandfather, and paternal grandmother; Heart Disease (age of onset: 45) in her father; Hypertension in her father; Lung Disease in her father; Stroke (age of onset: 70) in her father.     Social History   reports that she quit smoking about 29 years ago. She has never used smokeless tobacco. She reports that she does not drink alcohol or use drugs.    Allergies  Allergies   Allergen Reactions   • Codeine Swelling   • Ace Inhibitors      Cough       Medications  Prior to Admission Medications   Prescriptions Last Dose Informant Patient Reported? Taking?   Calcium Carbonate-Vit D-Min (CALCIUM 1200 PO) 2/7/2020 at PM Family Member Yes No   Sig: Take 1,200 mg by mouth every day.   Cholecalciferol (VITAMIN D) 2000 UNITS Cap 2/7/2020 at PM Family Member No No   Sig: Take 1 Cap by mouth every day.   FIBER PO 2/7/2020 at PM Family Member Yes No   Sig: Take 2 Caps by  mouth every day.   Magnesium 400 MG Tab 2/7/2020 at PM Family Member Yes No   Sig: Take 400 mg by mouth every day.   amLODIPine (NORVASC) 5 MG Tab 2/7/2020 at PM Family Member No No   Sig: Take 1 Tab by mouth every day.   hydrocodone/acetaminophen (NORCO)  MG Tab 2/7/2020 at PM Family Member Yes No   Sig: TAKE ONE TABLET BY MOUTH FOUR TIMES A DAY AS NEEDED 30 DAY SUPPLY   ipratropium-albuterol (COMBIVENT RESPIMAT)  MCG/ACT Aero Soln PRN at PRN Family Member No No   Sig: Inhale 1 Puff by mouth every 6 hours as needed (shortness of breath).   losartan (COZAAR) 100 MG Tab 2/7/2020 at PM Family Member No No   Sig: Take 1 Tab by mouth every day.   metoprolol (LOPRESSOR) 100 MG Tab 2/7/2020 at PM Family Member No No   Sig: Take 1 Tab by mouth 2 times a day.   simvastatin (ZOCOR) 20 MG Tab 2/7/2020 at PM Family Member No No   Sig: Take 1 Tab by mouth every evening.   triamterene-hctz (MAXZIDE-25/DYAZIDE) 37.5-25 MG Tab FEW DAYS AGO at UNK Family Member No No   Sig: Take 1 Tab by mouth every day.   zolpidem (AMBIEN) 10 MG Tab 2/7/2020 at PM Family Member Yes Yes   Sig: Take 10 mg by mouth at bedtime as needed for Sleep.      Facility-Administered Medications: None       Physical Exam  Temp:  [38.4 °C (101.2 °F)] 38.4 °C (101.2 °F)  Pulse:  [] 92  Resp:  [15-36] 15  BP: (165-211)/(83-93) 165/83  SpO2:  [90 %-99 %] 97 %    Physical Exam  Vitals signs and nursing note reviewed.   Constitutional:       Appearance: She is obese. She is not ill-appearing or diaphoretic.   HENT:      Nose: No congestion or rhinorrhea.      Mouth/Throat:      Mouth: Mucous membranes are dry.      Pharynx: No oropharyngeal exudate or posterior oropharyngeal erythema.   Eyes:      General: No scleral icterus.     Extraocular Movements: Extraocular movements intact.      Conjunctiva/sclera: Conjunctivae normal.      Pupils: Pupils are equal, round, and reactive to light.   Neck:      Musculoskeletal: Neck supple. No muscular  tenderness.   Cardiovascular:      Rate and Rhythm: Normal rate and regular rhythm.      Heart sounds: Normal heart sounds. No murmur. No gallop.    Pulmonary:      Effort: Pulmonary effort is normal. No respiratory distress.      Breath sounds: Normal breath sounds. No stridor. No wheezing or rhonchi.   Abdominal:      General: Bowel sounds are normal. There is no distension.      Palpations: Abdomen is soft. There is no mass.      Tenderness: There is no tenderness. There is no guarding or rebound.   Musculoskeletal:         General: No swelling, tenderness or deformity.   Skin:     General: Skin is warm.      Capillary Refill: Capillary refill takes less than 2 seconds.      Coloration: Skin is not jaundiced or pale.      Findings: No bruising or erythema.   Neurological:      General: No focal deficit present.      Mental Status: She is alert and oriented to person, place, and time.      Cranial Nerves: No cranial nerve deficit.      Motor: No weakness.   Psychiatric:         Mood and Affect: Mood normal.         Thought Content: Thought content normal.         Laboratory:  Recent Labs     02/08/20  0640   WBC 17.4*   RBC 5.57*   HEMOGLOBIN 16.5*   HEMATOCRIT 49.9*   MCV 89.6   MCH 29.6   MCHC 33.1*   RDW 42.0   PLATELETCT 152*   MPV 9.0     Recent Labs     02/08/20  0640   SODIUM 140   POTASSIUM 3.8   CHLORIDE 99   CO2 23   GLUCOSE 131*   BUN 14   CREATININE 0.68   CALCIUM 9.5     Recent Labs     02/08/20  0640   ALTSGPT 137*   ASTSGOT 193*   ALKPHOSPHAT 121*   TBILIRUBIN 1.1   GLUCOSE 131*         No results for input(s): NTPROBNP in the last 72 hours.      Recent Labs     02/08/20  0640   TROPONINT 15       Urinalysis:    Recent Labs     02/08/20  0802   SPECGRAVITY 1.010   GLUCOSEUR Negative   KETONES Negative   NITRITE Negative   LEUKESTERAS Negative   WBCURINE 0-2   RBCURINE 2-5*   BACTERIA Rare*   EPITHELCELL Few        Imaging:  [id number]   Final Result      1.  Post cholecystectomy.      2.  There is  biliary ductal dilatation and apparent distal common bile duct stone measuring slightly less than 1 cm in diameter.      DX-CHEST-PORTABLE (1 VIEW)   Final Result         1. No acute cardiopulmonary abnormalities are identified.            Assessment/Plan:  I anticipate this patient will require at least two midnights for appropriate medical management, necessitating inpatient admission.    SVT (supraventricular tachycardia) (HCC)  Assessment & Plan  Transient in emergency department, resolved spontaneously  Complicated with dehydration, continue metoprolol, iv fluids to start  Monitor on telemetry    Grade II diastolic dysfunction- echo 2017 with preserved EF- (present on admission)  Assessment & Plan  Monitor for fluid overload, ok to give iv fluids for sepsis  Continue metoprolol and losartan    Sepsis due to undetermined organism (HCC)  Assessment & Plan  This is Sepsis Present on admission  SIRS criteria identified on my evaluation include: Fever, with temperature greater than 101 deg F, Tachycardia, with heart rate greater than 90 BPM, Tachypnea, with respirations greater than 20 per minute and Leukocyosis, with WBC greater than 12,000  Source is abdominal  Sepsis protocol initiated  Fluid resuscitation ordered per protocol  IV antibiotics as appropriate for source of sepsis  While organ dysfunction may be noted elsewhere in this problem list or in the chart, degree of organ dysfunction does not meet CMS criteria for severe sepsis    Abdominal ultrasound is pending  Monitor liver transaminase levels may need GI consult, Dr. Louie as patient had endoscopy 2018 after remote cholecystectomy and stones removed          Chronic midline low back pain without sciatica- norco chronic daily use; nv pain and spine- (present on admission)  Assessment & Plan  Continue norco and as needed pain medication iv if unable to take orally    Essential hypertension- (present on admission)  Assessment & Plan  Resume home norvasc,  metoprolol and losartan, hold hctz due to dehydration and sepsis  Blood pressure is elevated, will monitor    BMI 37.0-37.9, adult- (present on admission)  Assessment & Plan  Encourage healthy weight loss after discharge  Npo except for fluids for now         VTE prophylaxis: lovenox

## 2020-02-08 NOTE — ASSESSMENT & PLAN NOTE
Transient in emergency department, resolved spontaneously  Recurred late yesterday, I did order diltiazem with improvement in her heart rate  Will monitor on telemetry and reorder diltiazem if needed

## 2020-02-08 NOTE — ED NOTES
Mini cath procedure explained and performed. Privacy maintained for pt. Pt tolerated procedure well. Urine to lab. Pure-wick applied for incontinence and urine frequency.

## 2020-02-08 NOTE — ED PROVIDER NOTES
ED Provider Note    ER PROVIDER NOTE    CHIEF COMPLAINT  Chief Complaint   Patient presents with   • Fever     woke up with chills this morning.  Temp 101.2f   • Headache   • Chest Pain     last night; lasted about 30 min; radiated to back legs and arms   • Nausea       HPI  Rufina Macias is a 74 y.o. female who presents to the emergency department complaining of fever body aches and headache.  Patient reports that yesterday she began to have some slight cough and congestion thought she was having some allergies.  Then last night she called EMS after an episode of chest pain, described as a tightness where everything seemed to lock up.  This however resolved on its own after about 30 minutes and she declined transport or further treatment.  Then this morning she woke up and began to feel chilled and feverish with some associated rigors.  She has had some slight associated nausea but no vomiting.  No abdominal pain or diarrhea.  No shortness of breath.  No rashes.  She does report some urinary frequency over the last day as well.  No recent travel    REVIEW OF SYSTEMS  Pertinent positives include fever. Pertinent negatives include no vomiting. See HPI for details. All other systems reviewed and are negative.    PAST MEDICAL HISTORY   has a past medical history of Allergy, Arthritis, Asthma, Back pain, Breath shortness, Bronchitis, CA - cancer of uterus, Cancer (HCC) (2010), Carpal tunnel syndrome, COPD, Diverticula of colon, Heart burn, High cholesterol, Hyperlipidemia, Hypertension, Pneumonia (1993), and Tremor, hereditary, benign.    SURGICAL HISTORY   has a past surgical history that includes hysterectomy, total abdominal (1/18/10); open reduction; abdominal hysterectomy total (Jan 2010); oophorectomy (Jan 2010); aditi by laparoscopy (july, 2015); ercp (2/15/2018); common bile duct exploration (02/15/2018); ercp w/ insertion stent/tube (02/15/2018); ercp w/sphincterotomy/papill. (02/15/2018); ercp w/removal  calculus (02/15/2018); ercp (2018); ercp w/sphincterotomy/papill. (2018); ercp w/ insertion stent/tube (2018); and ercp w/removal calculus (2018).    FAMILY HISTORY  Family History   Problem Relation Age of Onset   • Heart Disease Father 45        MI   • Lung Disease Father    • Stroke Father 70   • Cancer Father    • Hypertension Father    • Cancer Paternal Grandmother         breast   • Cancer Paternal Grandfather        SOCIAL HISTORY  Social History     Socioeconomic History   • Marital status:      Spouse name: Not on file   • Number of children: Not on file   • Years of education: Not on file   • Highest education level: Not on file   Occupational History   • Not on file   Social Needs   • Financial resource strain: Not on file   • Food insecurity:     Worry: Not on file     Inability: Not on file   • Transportation needs:     Medical: Not on file     Non-medical: Not on file   Tobacco Use   • Smoking status: Former Smoker     Last attempt to quit: 1991     Years since quittin.1   • Smokeless tobacco: Never Used   Substance and Sexual Activity   • Alcohol use: No     Alcohol/week: 0.0 - 2.4 oz     Comment: hardly ever   • Drug use: No   • Sexual activity: Never     Partners: Male     Birth control/protection: Post-Menopausal   Lifestyle   • Physical activity:     Days per week: Not on file     Minutes per session: Not on file   • Stress: Not on file   Relationships   • Social connections:     Talks on phone: Not on file     Gets together: Not on file     Attends Caodaism service: Not on file     Active member of club or organization: Not on file     Attends meetings of clubs or organizations: Not on file     Relationship status: Not on file   • Intimate partner violence:     Fear of current or ex partner: Not on file     Emotionally abused: Not on file     Physically abused: Not on file     Forced sexual activity: Not on file   Other Topics Concern   • Not on file   Social  "History Narrative   • Not on file      Social History     Substance and Sexual Activity   Drug Use No       CURRENT MEDICATIONS  Home Medications     Reviewed by Santy Nagy R.N. (Registered Nurse) on 02/08/20 at 1304  Med List Status: Complete   Medication Last Dose Status   amLODIPine (NORVASC) 5 MG Tab 2/7/2020 Active   Calcium Carbonate-Vit D-Min (CALCIUM 1200 PO) 2/7/2020 Active   Cholecalciferol (VITAMIN D) 2000 UNITS Cap 2/7/2020 Active   FIBER PO 2/7/2020 Active   hydrocodone/acetaminophen (NORCO)  MG Tab 2/7/2020 Active   ipratropium-albuterol (COMBIVENT RESPIMAT)  MCG/ACT Aero Soln PRN Active   losartan (COZAAR) 100 MG Tab 2/7/2020 Active   Magnesium 400 MG Tab 2/7/2020 Active   metoprolol (LOPRESSOR) 100 MG Tab 2/7/2020 Active   simvastatin (ZOCOR) 20 MG Tab 2/7/2020 Active   triamterene-hctz (MAXZIDE-25/DYAZIDE) 37.5-25 MG Tab FEW DAYS AGO Active   zolpidem (AMBIEN) 10 MG Tab 2/7/2020 Active                ALLERGIES  Allergies   Allergen Reactions   • Codeine Swelling   • Ace Inhibitors      Cough       PHYSICAL EXAM  VITAL SIGNS: /78   Pulse 94   Temp 36.8 °C (98.2 °F) (Oral)   Resp 20   Ht 1.6 m (5' 3\")   Wt 98 kg (216 lb 0.8 oz)   SpO2 94%   BMI 38.27 kg/m²   Pulse ox interpretation: I interpret this pulse ox as normal.    Constitutional: Alert ill-appearing  HENT: No signs of trauma, Bilateral external ears normal, Nose normal.   Eyes: Pupils are equal and reactive, Conjunctiva normal, Non-icteric.   Neck: Normal range of motion, No tenderness, Supple, No stridor.   Lymphatic: No lymphadenopathy noted.   Cardiovascular: Tachycardic, regular, no murmurs.   Thorax & Lungs: Normal breath sounds, No respiratory distress, No wheezing, No chest tenderness.   Abdomen: Bowel sounds normal, Soft, No tenderness, No masses, No pulsatile masses. No peritoneal signs.  Skin: Warm, Dry, No erythema, No rash.   Back: No bony tenderness, No CVA tenderness.   Extremities: Intact distal " pulses, No edema, No tenderness, No cyanosis, Negative Byron's sign.  Musculoskeletal: Good range of motion in all major joints. No tenderness to palpation or major deformities noted.   Neurologic: Alert , Normal motor function, Normal sensory function, No focal deficits noted.   Psychiatric: Affect normal, Judgment normal, Mood normal.     DIAGNOSTIC STUDIES / PROCEDURES    Results for orders placed or performed during the hospital encounter of 02/08/20   Influenza A/B By PCR (Adult - Flu Only)   Result Value Ref Range    Influenza virus A RNA Negative Negative    Influenza virus B, PCR Negative Negative   CBC WITH DIFFERENTIAL   Result Value Ref Range    WBC 17.4 (H) 4.8 - 10.8 K/uL    RBC 5.57 (H) 4.20 - 5.40 M/uL    Hemoglobin 16.5 (H) 12.0 - 16.0 g/dL    Hematocrit 49.9 (H) 37.0 - 47.0 %    MCV 89.6 81.4 - 97.8 fL    MCH 29.6 27.0 - 33.0 pg    MCHC 33.1 (L) 33.6 - 35.0 g/dL    RDW 42.0 35.9 - 50.0 fL    Platelet Count 152 (L) 164 - 446 K/uL    MPV 9.0 9.0 - 12.9 fL    Neutrophils-Polys 89.90 (H) 44.00 - 72.00 %    Lymphocytes 3.90 (L) 22.00 - 41.00 %    Monocytes 5.20 0.00 - 13.40 %    Eosinophils 0.10 0.00 - 6.90 %    Basophils 0.20 0.00 - 1.80 %    Immature Granulocytes 0.70 0.00 - 0.90 %    Nucleated RBC 0.00 /100 WBC    Neutrophils (Absolute) 15.64 (H) 2.00 - 7.15 K/uL    Lymphs (Absolute) 0.68 (L) 1.00 - 4.80 K/uL    Monos (Absolute) 0.91 (H) 0.00 - 0.85 K/uL    Eos (Absolute) 0.02 0.00 - 0.51 K/uL    Baso (Absolute) 0.04 0.00 - 0.12 K/uL    Immature Granulocytes (abs) 0.12 (H) 0.00 - 0.11 K/uL    NRBC (Absolute) 0.00 K/uL   COMP METABOLIC PANEL   Result Value Ref Range    Sodium 140 135 - 145 mmol/L    Potassium 3.8 3.6 - 5.5 mmol/L    Chloride 99 96 - 112 mmol/L    Co2 23 20 - 33 mmol/L    Anion Gap 18.0 (H) 0.0 - 11.9    Glucose 131 (H) 65 - 99 mg/dL    Bun 14 8 - 22 mg/dL    Creatinine 0.68 0.50 - 1.40 mg/dL    Calcium 9.5 8.4 - 10.2 mg/dL    AST(SGOT) 193 (H) 12 - 45 U/L    ALT(SGPT) 137 (H) 2 - 50  U/L    Alkaline Phosphatase 121 (H) 30 - 99 U/L    Total Bilirubin 1.1 0.1 - 1.5 mg/dL    Albumin 4.3 3.2 - 4.9 g/dL    Total Protein 7.6 6.0 - 8.2 g/dL    Globulin 3.3 1.9 - 3.5 g/dL    A-G Ratio 1.3 g/dL   LACTIC ACID   Result Value Ref Range    Lactic Acid 3.1 (H) 0.5 - 2.0 mmol/L   LACTIC ACID   Result Value Ref Range    Lactic Acid 2.1 (H) 0.5 - 2.0 mmol/L   TROPONIN   Result Value Ref Range    Troponin T 15 6 - 19 ng/L   ESTIMATED GFR   Result Value Ref Range    GFR If African American >60 >60 mL/min/1.73 m 2    GFR If Non African American >60 >60 mL/min/1.73 m 2   URINALYSIS   Result Value Ref Range    Color Yellow     Character Clear     Specific Gravity 1.010 <1.035    Ph 5.5 5.0 - 8.0    Glucose Negative Negative mg/dL    Ketones Negative Negative mg/dL    Protein Negative Negative mg/dL    Bilirubin Negative Negative    Nitrite Negative Negative    Leukocyte Esterase Negative Negative    Occult Blood Trace (A) Negative    Micro Urine Req Microscopic    URINE MICROSCOPIC (W/UA)   Result Value Ref Range    WBC 0-2 /hpf    RBC 2-5 (A) /hpf    Bacteria Rare (A) None /hpf    Epithelial Cells Few Few /hpf   EKG (Now)   Result Value Ref Range    Report       [facility] Emergency Dept.    Test Date:  2020  Pt Name:    LUANN GAONA                Department: [facility]  MRN:        [id number]                      [facility]  Gender:     Female                       Technician:   :        1945                   Requested By:WILBERT WELCH  Order #:    [id number]                    Reading MD: WILBERT WELCH MD    Measurements  Intervals                                Axis  Rate:       104                          P:          61  DE:         120                          QRS:        78  QRSD:       132                          T:          20  QT:         356  QTc:        469    Interpretive Statements  SINUS TACHYCARDIA  RBBB AND LPFB  Compared to ECG 2018  14:09:50  Left posterior fascicular block now present  Sinus rhythm no longer present  Electronically Signed On 2020 6:28:06 PST by WILBERT WELCH MD     EKG (NOW)   Result Value Ref Range    Report       [facility] Emergency Dept.    Test Date:  2020  Pt Name:    LUANN GAONA                Department: [facility]  MRN:        [id number]                      [facility]  Gender:     Female                       Technician: HRR  :        1945                   Requested By:WILBERT WELCH  Order #:    [id number]                    Reading MD:    Measurements  Intervals                                Axis  Rate:       153                          P:          0  NY:         204                          QRS:        104  QRSD:       146                          T:          27  QT:         340  QTc:        543    Interpretive Statements  EXTREME TACHYCARDIA WITH WIDE COMPLEX, NO FURTHER RHYTHM ANALYSIS ATTEMPTED  Compared to ECG 2020 06:19:34  Sinus tachycardia no longer present  Left posterior fascicular block no longer present  Right bundle-branch block no longer present           RADIOLOGY  US-RUQ   Final Result      1.  Post cholecystectomy.      2.  There is biliary ductal dilatation and apparent distal common bile duct stone measuring slightly less than 1 cm in diameter.      DX-CHEST-PORTABLE (1 VIEW)   Final Result         1. No acute cardiopulmonary abnormalities are identified.        The radiologist's interpretation of all radiological studies have been reviewed by me.    COURSE & MEDICAL DECISION MAKING  Nursing notes, VS, PMSFHx reviewed in chart.    6:09 AM Patient seen and examined at bedside. Patient will be treated with IV fluids and acetaminophen. Ordered for sepsis bundle including influenza to evaluate her symptoms.     6:50 AM patient more comfortable after Tylenol, pending results    7:49 AM  Still pending urinalysis however her influenza has  returned at this point and negative.  Ordered for antibiotics with presumed urinary source  Patient reevaluated, she is more comfortable after the pain medication.  Blood pressure with systolics in the 160s, heart rate has improved to 90s    8:32 AM  Patient reevaluated, noted increased tachycardia, will plan for ECG    9:06 AM  ECG shows sustained tachycardia that appears SVT with her pre-existing bundle branch, plan for adenosine    Additionally her urinalysis has returned and is negative.  In further discussion with patient, she did have her gallbladder removed 5 years ago although did form new stones requiring ERCP in 2018.  She still denies any abdominal pain and has no abdominal tenderness    I will broaden her antibiotics to include Flagyl for intra-abdominal source, and ultrasound has been ordered to look for potential ductal stone    Patient appears to have spontaneously converted, now appears in sinus low 100s, although with occasional runs into 120/130    9:28 AM  Paged hospitalist for admission    Discussed case with Dr. Roche for admission    The total critical care time spent on this patient was 40 minutes, resuscitating patient, speaking with admitting physician, and interpreting test results. This 40 minutes is exclusive of separately billable procedures.        HYDRATION: Based on the patient's presentation of Sepsis and Tachycardia the patient was given IV fluids. IV Hydration was used because oral hydration was not as rapid as required. Upon recheck following hydration, the patient was Improved.    Decision Making:  This is a 74 y.o. female presented with fever and chills and is found to be septic and admitted for further care.  She does have a significant leukocytosis although no significant lactic acidosis or hypotension.  She was initially started on Rocephin with some urinary symptoms however urinalysis appears negative.  She does have any transaminitis and has had some issues with recurrent  stones and ductal obstruction in the past with her bile duct ultrasound was ordered and is pending at the time of admission hospitalist to follow-up on as this may be her source.  Regarding other source for potential fever, her influenza is negative, chest x-ray shows no obvious findings of pneumonia and clinical findings are not suggestive of this either.  She has no headaches or rashes.  Patient did have an episode of apparent SVT here in the emergency department although this did not require any further treatment and she is currently in sinus rhythm.    Patient is admitted in guarded condition    FINAL IMPRESSION  1. Sepsis, due to unspecified organism, unspecified whether acute organ dysfunction present (HCC)    2. Transaminitis         The note accurately reflects work and decisions made by me.  Jamie Nunez M.D.  2/8/2020  1:12 PM

## 2020-02-08 NOTE — ASSESSMENT & PLAN NOTE
This is Sepsis Present on admission  SIRS criteria identified on my evaluation include: Fever, with temperature greater than 101 deg F, Tachycardia, with heart rate greater than 90 BPM, Tachypnea, with respirations greater than 20 per minute and Leukocyosis, with WBC greater than 12,000  Source is abdominal  Sepsis protocol initiated  Fluid resuscitation ordered per protocol  IV antibiotics as appropriate for source of sepsis  While organ dysfunction may be noted elsewhere in this problem list or in the chart, degree of organ dysfunction does not meet CMS criteria for severe sepsis  Continue antibiotics for GI bacteria coverage due to retained gallstone

## 2020-02-08 NOTE — FLOWSHEET NOTE
02/08/20 1339   Events/Summary/Plan   Events/Summary/Plan tx given   Interdisciplinary Plan of Care-Goals (Indications)   Bronchodilator Indications History / Diagnosis   Interdisciplinary Plan of Care-Outcomes    Bronchodilator Outcome Patient at Stable Baseline   Education   Education Yes - Pt. / Family has been Instructed in use of Respiratory Equipment   RT Assessment of Delivered Medications   Evaluation of Medication Delivery Daily Yes-- Pt /Family has been Instructed in use of Respiratory Medications and Adverse Reactions   SVN Group   #SVN Performed Yes   Given By: Mouthpiece   Date SVN Last Changed 02/08/20   Date SVN Next Change Due (Q 7 Days) 02/15/20   Chest Exam   Respiration 18   Pulse 92   Breath Sounds   RUL Breath Sounds Clear   RML Breath Sounds Clear;Diminished   RLL Breath Sounds Diminished   BRYAN Breath Sounds Clear   LLL Breath Sounds Diminished   Secretions   Cough Congested;Non Productive   Oximetry   #Pulse Oximetry (Single Determination) Yes   Oxygen   Pulse Oximetry 93 %   O2 (LPM) 0   O2 Daily Delivery Respiratory  Room Air with O2 Available

## 2020-02-08 NOTE — ED TRIAGE NOTES
"Pt has with c/o    Chief Complaint   Patient presents with   • Fever     woke up with chills this morning.  Temp 101.2f   • Headache   • Chest Pain     last night; lasted about 30 min; radiated to back legs and arms   • Nausea       BP (!) 199/92   Pulse (!) 111   Temp (!) 38.4 °C (101.2 °F) (Oral)   Resp 19   Ht 1.6 m (5' 3\")   Wt 96.3 kg (212 lb 4.9 oz)   SpO2 99%   BMI 37.61 kg/m²   "

## 2020-02-09 PROBLEM — R78.81 GRAM-NEGATIVE BACTEREMIA: Status: ACTIVE | Noted: 2020-02-09

## 2020-02-09 PROBLEM — K80.50 COMMON BILE DUCT STONE: Status: ACTIVE | Noted: 2020-02-09

## 2020-02-09 LAB
ALBUMIN SERPL BCP-MCNC: 3.3 G/DL (ref 3.2–4.9)
ALBUMIN/GLOB SERPL: 1.5 G/DL
ALP SERPL-CCNC: 88 U/L (ref 30–99)
ALT SERPL-CCNC: 103 U/L (ref 2–50)
ANION GAP SERPL CALC-SCNC: 10 MMOL/L (ref 0–11.9)
AST SERPL-CCNC: 87 U/L (ref 12–45)
BASOPHILS # BLD AUTO: 0.3 % (ref 0–1.8)
BASOPHILS # BLD: 0.03 K/UL (ref 0–0.12)
BILIRUB SERPL-MCNC: 0.8 MG/DL (ref 0.1–1.5)
BUN SERPL-MCNC: 9 MG/DL (ref 8–22)
CALCIUM SERPL-MCNC: 8.1 MG/DL (ref 8.4–10.2)
CHLORIDE SERPL-SCNC: 105 MMOL/L (ref 96–112)
CO2 SERPL-SCNC: 25 MMOL/L (ref 20–33)
CREAT SERPL-MCNC: 0.66 MG/DL (ref 0.5–1.4)
EOSINOPHIL # BLD AUTO: 0.06 K/UL (ref 0–0.51)
EOSINOPHIL NFR BLD: 0.6 % (ref 0–6.9)
ERYTHROCYTE [DISTWIDTH] IN BLOOD BY AUTOMATED COUNT: 44.9 FL (ref 35.9–50)
GLOBULIN SER CALC-MCNC: 2.2 G/DL (ref 1.9–3.5)
GLUCOSE SERPL-MCNC: 92 MG/DL (ref 65–99)
HCT VFR BLD AUTO: 42.8 % (ref 37–47)
HGB BLD-MCNC: 13.7 G/DL (ref 12–16)
IMM GRANULOCYTES # BLD AUTO: 0.05 K/UL (ref 0–0.11)
IMM GRANULOCYTES NFR BLD AUTO: 0.5 % (ref 0–0.9)
LACTATE BLD-SCNC: 1.1 MMOL/L (ref 0.5–2)
LYMPHOCYTES # BLD AUTO: 1.28 K/UL (ref 1–4.8)
LYMPHOCYTES NFR BLD: 11.8 % (ref 22–41)
MAGNESIUM SERPL-MCNC: 1.9 MG/DL (ref 1.5–2.5)
MCH RBC QN AUTO: 29.7 PG (ref 27–33)
MCHC RBC AUTO-ENTMCNC: 32 G/DL (ref 33.6–35)
MCV RBC AUTO: 92.6 FL (ref 81.4–97.8)
MONOCYTES # BLD AUTO: 0.79 K/UL (ref 0–0.85)
MONOCYTES NFR BLD AUTO: 7.3 % (ref 0–13.4)
NEUTROPHILS # BLD AUTO: 8.68 K/UL (ref 2–7.15)
NEUTROPHILS NFR BLD: 79.5 % (ref 44–72)
NRBC # BLD AUTO: 0 K/UL
NRBC BLD-RTO: 0 /100 WBC
PLATELET # BLD AUTO: 121 K/UL (ref 164–446)
PMV BLD AUTO: 9.3 FL (ref 9–12.9)
POTASSIUM SERPL-SCNC: 3.5 MMOL/L (ref 3.6–5.5)
PROT SERPL-MCNC: 5.5 G/DL (ref 6–8.2)
RBC # BLD AUTO: 4.62 M/UL (ref 4.2–5.4)
SODIUM SERPL-SCNC: 140 MMOL/L (ref 135–145)
WBC # BLD AUTO: 10.9 K/UL (ref 4.8–10.8)

## 2020-02-09 PROCEDURE — A9270 NON-COVERED ITEM OR SERVICE: HCPCS | Performed by: INTERNAL MEDICINE

## 2020-02-09 PROCEDURE — 700111 HCHG RX REV CODE 636 W/ 250 OVERRIDE (IP): Performed by: INTERNAL MEDICINE

## 2020-02-09 PROCEDURE — 700102 HCHG RX REV CODE 250 W/ 637 OVERRIDE(OP): Performed by: INTERNAL MEDICINE

## 2020-02-09 PROCEDURE — 83605 ASSAY OF LACTIC ACID: CPT

## 2020-02-09 PROCEDURE — 99233 SBSQ HOSP IP/OBS HIGH 50: CPT | Performed by: INTERNAL MEDICINE

## 2020-02-09 PROCEDURE — 770020 HCHG ROOM/CARE - TELE (206)

## 2020-02-09 PROCEDURE — 83735 ASSAY OF MAGNESIUM: CPT

## 2020-02-09 PROCEDURE — 93005 ELECTROCARDIOGRAM TRACING: CPT | Performed by: INTERNAL MEDICINE

## 2020-02-09 PROCEDURE — 85025 COMPLETE CBC W/AUTO DIFF WBC: CPT

## 2020-02-09 PROCEDURE — 80053 COMPREHEN METABOLIC PANEL: CPT

## 2020-02-09 PROCEDURE — 700101 HCHG RX REV CODE 250: Performed by: INTERNAL MEDICINE

## 2020-02-09 PROCEDURE — 94760 N-INVAS EAR/PLS OXIMETRY 1: CPT

## 2020-02-09 PROCEDURE — 87040 BLOOD CULTURE FOR BACTERIA: CPT | Mod: 91

## 2020-02-09 PROCEDURE — 700105 HCHG RX REV CODE 258: Performed by: INTERNAL MEDICINE

## 2020-02-09 RX ORDER — IPRATROPIUM BROMIDE AND ALBUTEROL SULFATE 2.5; .5 MG/3ML; MG/3ML
3 SOLUTION RESPIRATORY (INHALATION)
Status: DISCONTINUED | OUTPATIENT
Start: 2020-02-09 | End: 2020-02-16 | Stop reason: HOSPADM

## 2020-02-09 RX ORDER — ZOLPIDEM TARTRATE 5 MG/1
10 TABLET ORAL NIGHTLY PRN
Status: DISCONTINUED | OUTPATIENT
Start: 2020-02-09 | End: 2020-02-16 | Stop reason: HOSPADM

## 2020-02-09 RX ORDER — MORPHINE SULFATE 4 MG/ML
2 INJECTION, SOLUTION INTRAMUSCULAR; INTRAVENOUS
Status: DISCONTINUED | OUTPATIENT
Start: 2020-02-09 | End: 2020-02-16 | Stop reason: HOSPADM

## 2020-02-09 RX ORDER — METOPROLOL TARTRATE 1 MG/ML
5 INJECTION, SOLUTION INTRAVENOUS
Status: COMPLETED | OUTPATIENT
Start: 2020-02-09 | End: 2020-02-11

## 2020-02-09 RX ORDER — HYDROCHLOROTHIAZIDE 12.5 MG/1
25 CAPSULE, GELATIN COATED ORAL
Status: DISCONTINUED | OUTPATIENT
Start: 2020-02-09 | End: 2020-02-11

## 2020-02-09 RX ADMIN — HYDROCODONE BITARTRATE AND ACETAMINOPHEN 1 TABLET: 10; 325 TABLET ORAL at 03:16

## 2020-02-09 RX ADMIN — METOPROLOL TARTRATE 5 MG: 5 INJECTION, SOLUTION INTRAVENOUS at 14:39

## 2020-02-09 RX ADMIN — HYDROCHLOROTHIAZIDE 25 MG: 12.5 CAPSULE ORAL at 16:57

## 2020-02-09 RX ADMIN — METOPROLOL TARTRATE 100 MG: 50 TABLET, FILM COATED ORAL at 17:16

## 2020-02-09 RX ADMIN — METRONIDAZOLE 500 MG: 500 INJECTION, SOLUTION INTRAVENOUS at 16:40

## 2020-02-09 RX ADMIN — HYDROCODONE BITARTRATE AND ACETAMINOPHEN 1 TABLET: 10; 325 TABLET ORAL at 21:36

## 2020-02-09 RX ADMIN — HYDROCODONE BITARTRATE AND ACETAMINOPHEN 1 TABLET: 10; 325 TABLET ORAL at 09:29

## 2020-02-09 RX ADMIN — HYDROCODONE BITARTRATE AND ACETAMINOPHEN 1 TABLET: 10; 325 TABLET ORAL at 15:15

## 2020-02-09 RX ADMIN — DILTIAZEM HYDROCHLORIDE 30 MG: 30 TABLET, FILM COATED ORAL at 14:35

## 2020-02-09 RX ADMIN — ZOLPIDEM TARTRATE 10 MG: 5 TABLET ORAL at 22:44

## 2020-02-09 RX ADMIN — LOSARTAN POTASSIUM 100 MG: 25 TABLET ORAL at 17:15

## 2020-02-09 RX ADMIN — METOPROLOL TARTRATE 100 MG: 50 TABLET, FILM COATED ORAL at 05:11

## 2020-02-09 RX ADMIN — CEFTRIAXONE SODIUM 2 G: 2 INJECTION, POWDER, FOR SOLUTION INTRAMUSCULAR; INTRAVENOUS at 05:13

## 2020-02-09 RX ADMIN — MELATONIN 1000 UNITS: at 17:20

## 2020-02-09 RX ADMIN — MORPHINE SULFATE 2 MG: 4 INJECTION INTRAVENOUS at 17:25

## 2020-02-09 RX ADMIN — METRONIDAZOLE 500 MG: 500 INJECTION, SOLUTION INTRAVENOUS at 21:35

## 2020-02-09 RX ADMIN — METRONIDAZOLE 500 MG: 500 INJECTION, SOLUTION INTRAVENOUS at 05:13

## 2020-02-09 RX ADMIN — MORPHINE SULFATE 2 MG: 4 INJECTION INTRAVENOUS at 14:40

## 2020-02-09 ASSESSMENT — ENCOUNTER SYMPTOMS
RESPIRATORY NEGATIVE: 1
TREMORS: 0
SHORTNESS OF BREATH: 0
BACK PAIN: 0
SORE THROAT: 0
WHEEZING: 0
VOMITING: 0
DIARRHEA: 0
CONSTIPATION: 0
MYALGIAS: 0
NAUSEA: 0
PALPITATIONS: 1
DIZZINESS: 0
STRIDOR: 0
HEADACHES: 0
CHILLS: 0
FEVER: 0
ABDOMINAL PAIN: 1

## 2020-02-09 NOTE — PROGRESS NOTES
Hospital Medicine Daily Progress Note    Date of Service  2/9/2020    Chief Complaint  74 y.o. female admitted 2/8/2020 with abdominal pain    Hospital Course    This is a 73 yo female who presented 2/8/2020 with abdominal pain across the abdomen increasing in severity and worse with leaning forward. She felt flushing through her body and nausea associated with the pain. She has a remote history of cholecystectomy but had endoscopy for stone removal with Dr. Louie from GI in 2018.  She met sepsis criteria on admission and was treated appropriately for this. An abdominal ultrasound showed a dilated common bile duct with retained stone.      Interval Problem Update  The patient has pain NOT controlled, liver transaminase levels are improving    Consultants/Specialty  GI Dr. Urias    Code Status  DNR    Disposition  home    Review of Systems  Review of Systems   Constitutional: Negative for chills, fever and malaise/fatigue.   HENT: Negative for congestion and sore throat.    Respiratory: Negative for shortness of breath, wheezing and stridor.    Cardiovascular: Negative for chest pain.   Gastrointestinal: Positive for abdominal pain. Negative for constipation, diarrhea, nausea and vomiting.   Genitourinary: Negative for dysuria, frequency and hematuria.   Musculoskeletal: Negative for back pain and myalgias.   Skin: Negative for rash.   Neurological: Negative for dizziness, tremors and headaches.        Physical Exam  Temp:  [36.6 °C (97.8 °F)-36.8 °C (98.2 °F)] 36.7 °C (98.1 °F)  Pulse:  [71-88] 71  Resp:  [16-18] 16  BP: (146-185)/() 154/81  SpO2:  [89 %-95 %] 89 %    Physical Exam  Vitals signs and nursing note reviewed.   Constitutional:       Appearance: She is obese. She is not ill-appearing.   HENT:      Mouth/Throat:      Pharynx: No oropharyngeal exudate or posterior oropharyngeal erythema.   Eyes:      General: No scleral icterus.     Conjunctiva/sclera: Conjunctivae normal.   Neck:       Musculoskeletal: Normal range of motion.   Cardiovascular:      Rate and Rhythm: Normal rate and regular rhythm.      Heart sounds: Normal heart sounds. No murmur.   Pulmonary:      Effort: Pulmonary effort is normal.      Breath sounds: Normal breath sounds.   Abdominal:      General: Bowel sounds are normal. There is no distension.      Tenderness: There is no tenderness. There is no guarding or rebound.   Musculoskeletal:         General: No swelling or tenderness.   Skin:     General: Skin is dry.      Capillary Refill: Capillary refill takes less than 2 seconds.      Coloration: Skin is not jaundiced or pale.   Neurological:      Mental Status: She is alert and oriented to person, place, and time.      Coordination: Coordination normal.   Psychiatric:         Mood and Affect: Mood normal.         Thought Content: Thought content normal.         Fluids    Intake/Output Summary (Last 24 hours) at 2/9/2020 1356  Last data filed at 2/9/2020 0300  Gross per 24 hour   Intake 827.08 ml   Output 900 ml   Net -72.92 ml       Laboratory  Recent Labs     02/08/20  0640 02/09/20  0038   WBC 17.4* 10.9*   RBC 5.57* 4.62   HEMOGLOBIN 16.5* 13.7   HEMATOCRIT 49.9* 42.8   MCV 89.6 92.6   MCH 29.6 29.7   MCHC 33.1* 32.0*   RDW 42.0 44.9   PLATELETCT 152* 121*   MPV 9.0 9.3     Recent Labs     02/08/20  0640 02/09/20  0038   SODIUM 140 140   POTASSIUM 3.8 3.5*   CHLORIDE 99 105   CO2 23 25   GLUCOSE 131* 92   BUN 14 9   CREATININE 0.68 0.66   CALCIUM 9.5 8.1*                   Imaging  US-RUQ   Final Result      1.  Post cholecystectomy.      2.  There is biliary ductal dilatation and apparent distal common bile duct stone measuring slightly less than 1 cm in diameter.      DX-CHEST-PORTABLE (1 VIEW)   Final Result         1. No acute cardiopulmonary abnormalities are identified.           Assessment/Plan  Common bile duct stone  Assessment & Plan  Recurrent s/p cholecystectomy years ago  Discussed with GI Dr. Urias and will  plan for ERCP tomorrow    SVT (supraventricular tachycardia) (HCC)  Assessment & Plan  Transient in emergency department, resolved spontaneously  Complicated with dehydration, no recurrence  Monitor on telemetry    Grade II diastolic dysfunction- echo 2017 with preserved EF- (present on admission)  Assessment & Plan  No exacerbation  Continue metoprolol and losartan    Sepsis due to undetermined organism (HCC)  Assessment & Plan  This is Sepsis Present on admission  SIRS criteria identified on my evaluation include: Fever, with temperature greater than 101 deg F, Tachycardia, with heart rate greater than 90 BPM, Tachypnea, with respirations greater than 20 per minute and Leukocyosis, with WBC greater than 12,000  Source is abdominal  Sepsis protocol initiated  Fluid resuscitation ordered per protocol  IV antibiotics as appropriate for source of sepsis  While organ dysfunction may be noted elsewhere in this problem list or in the chart, degree of organ dysfunction does not meet CMS criteria for severe sepsis  Continue antibiotics for GI bacteria coverage due to retained gallstone          Chronic midline low back pain without sciatica- norco chronic daily use; nv pain and spine- (present on admission)  Assessment & Plan  Continue norco and as needed pain medication iv if unable to take orally    Essential hypertension- (present on admission)  Assessment & Plan  Resume home norvasc, metoprolol and losartan, restart hctz   Blood pressure is elevated, stop iv fluids    BMI 37.0-37.9, adult- (present on admission)  Assessment & Plan  Encourage healthy weight loss after discharge  Continue liquid diet     GRAM NEGATIVE BACTEREMIA  Will continue rocephin, repeat blood cultures to ensure sterility     Add iv morphine for pain  VTE prophylaxis: lovenox

## 2020-02-09 NOTE — FLOWSHEET NOTE
02/08/20 2025   Events/Summary/Plan   Events/Summary/Plan SVN with MP upright, FI02  1 LPM   Non-Invasive Resp Device Site Inspection Completed Intact   Interdisciplinary Plan of Care-Goals (Indications)   Bronchodilator Indications History / Diagnosis   Interdisciplinary Plan of Care-Outcomes    Bronchodilator Outcome Patient at Stable Baseline   Education   Education Yes - Pt. / Family has been Instructed in use of Respiratory Medications and Adverse Reactions   RT Assessment of Delivered Medications   Evaluation of Medication Delivery Daily Yes-- Pt /Family has been Instructed in use of Respiratory Medications and Adverse Reactions   SVN Group   #SVN Performed Yes   Given By: Mouthpiece   Date SVN Last Changed 02/08/20   Date SVN Next Change Due (Q 7 Days) 02/15/20   Chest Exam   Respiration 18   Pulse 73   Breath Sounds   RUL Breath Sounds Clear   RML Breath Sounds Clear;Diminished   RLL Breath Sounds Diminished   BRYAN Breath Sounds Clear   LLL Breath Sounds Diminished   Secretions   Cough Dry;Non Productive   Oximetry   #Pulse Oximetry (Single Determination) Yes   Oxygen   Home O2 Use Prior To Admission? No   Pulse Oximetry 95 %   O2 (LPM) 1   O2 Daily Delivery Respiratory  Silicone Nasal Cannula

## 2020-02-09 NOTE — CARE PLAN
Problem: Infection  Goal: Will remain free from infection  Outcome: PROGRESSING SLOWER THAN EXPECTED  Note:   Trending lactic acid for sepsis; pt will trend lower     Problem: Knowledge Deficit  Goal: Knowledge of disease process/condition, treatment plan, diagnostic tests, and medications will improve  Outcome: PROGRESSING AS EXPECTED  Note:   Educated about poc; updated white board

## 2020-02-09 NOTE — FLOWSHEET NOTE
02/09/20 1008   Interdisciplinary Plan of Care-Goals (Indications)   Hyperinflation Protocol Indications Surgery Patient with COPD   Interdisciplinary Plan of Care-Outcomes    Hyperinflation Protocol Goals/Outcome Greater Than 60% of Predicted I.S. Volume x 24 hrs   Therapy Not Performed   Type of Therapy Not Performed SVN   Reason Therapy Not Performed PRN, No Indication   Incentive Spirometry Group   Incentive Spirometry Instruction Yes   Breathing Exercises Yes   Incentive Spirometer Volume 750 mL   Breath Sounds   RUL Breath Sounds Clear   RML Breath Sounds Clear;Diminished   RLL Breath Sounds Diminished   BRYAN Breath Sounds Clear   LLL Breath Sounds Clear   Oxygen   O2 (LPM) 1   O2 Daily Delivery Respiratory  Silicone Nasal Cannula

## 2020-02-09 NOTE — CONSULTS
"Date of service: 2/8/2020    Attending Physician: Nolvia Roche M.D.    History of Present Illness: Rufina Macias is a 74 y.o. female here for abdominal pain.    We were asked by the hospitalist attending to consult on this patient who is 74 years old and prior to admission experienced severe upper abdominal pain.  He also has some nausea and discomfort throughout the abdomen and somewhat to the back.  Was no fever or chills.  The patient had an emergency cholecystectomy about 4 years ago.  In subsequent years she has had several ERCPs for stones.  It appears the stones are a consequence of renu-formation and not of retained stones.  She just \"makes stones\".  She now has the concern for a recurrent distal common bile duct stone of approximately 1 cm.    The patient currently is pain-free.  She does not look toxic.  She does not look in shock.  She does not look in distress.  Her daughter is at the bedside and conversing with her without difficulty.  She does not look sedated.  She understands the issues at hand fully.  We discussed repeat ERCP.    She otherwise denies cardiovascular problems, pulmonary problems, liver problems, kidney problems, and history of stroke.    Her liver function tests are abnormal but she is not jaundiced.  She mostly has a transaminitis.    Review of Systems:   Review of Systems   Constitutional: Negative.    HENT: Negative.    Eyes: Negative.    Respiratory: Negative.    Cardiovascular: Negative.    Gastrointestinal: Positive for abdominal pain and nausea.   Genitourinary: Negative.    Musculoskeletal: Negative.    Neurological: Negative.    Endo/Heme/Allergies: Negative.    Psychiatric/Behavioral: Negative.        Current Facility-Administered Medications   Medication Dose Route Frequency Provider Last Rate Last Dose   • adenosine (ADENOCARD) injection 6 mg  6 mg Intravenous Once Jmaie Nunez M.D.   Stopped at 02/08/20 0930   • amLODIPine (NORVASC) tablet 5 mg  5 mg Oral Q " EVENING Nolvia Roche M.D.       • vitamin D (cholecalciferol) tablet 1,000 Units  1,000 Units Oral Q EVENING Nolvia Roche M.D.       • HYDROcodone/acetaminophen (NORCO)  MG per tablet 1 Tab  1 Tab Oral Q6HRS PRN Nolvia Roche M.D.   1 Tab at 02/08/20 1401   • losartan (COZAAR) tablet 100 mg  100 mg Oral Q EVENING Nolvia Roche M.D.       • metoprolol (LOPRESSOR) tablet 100 mg  100 mg Oral BID Nolvia Roche M.D.       • zolpidem (AMBIEN) tablet 5 mg  5 mg Oral HS PRN Nolvia Roche M.D.       • senna-docusate (PERICOLACE or SENOKOT S) 8.6-50 MG per tablet 2 Tab  2 Tab Oral BID Nolvia Roche M.D.        And   • polyethylene glycol/lytes (MIRALAX) PACKET 1 Packet  1 Packet Oral QDAY PRN Nolvia Roche M.D.        And   • magnesium hydroxide (MILK OF MAGNESIA) suspension 30 mL  30 mL Oral QDAY PRN Nolvia Roche M.D.        And   • bisacodyl (DULCOLAX) suppository 10 mg  10 mg Rectal QDAY PRN Nolvia Roche M.D.       • Respiratory Care per Protocol   Nebulization Continuous RT Nolvia Roche M.D.       • lactated ringers infusion (BOLUS)  500 mL Intravenous Once PRN Nolvia Roche M.D.       • NS infusion 2,500 mL  2,500 mL Intravenous Continuous Nolvia Roche M.D. 125 mL/hr at 02/08/20 1259 2,500 mL at 02/08/20 1259   • enoxaparin (LOVENOX) inj 40 mg  40 mg Subcutaneous DAILY Nolvia Roche M.D.   40 mg at 02/08/20 1259   • acetaminophen (TYLENOL) tablet 650 mg  650 mg Oral Q6HRS PRN Nolvia Roche M.D.       • [START ON 2/9/2020] cefTRIAXone (ROCEPHIN) 2 g in  mL IVPB  2 g Intravenous Q24HRS Nolvia P Melchor, M.D.       • metroNIDAZOLE (FLAGYL) IVPB 500 mg  500 mg Intravenous Q8HRS Nolvia Roche M.D. 100 mL/hr at 02/08/20 1402 500 mg at 02/08/20 1402   • ondansetron (ZOFRAN) syringe/vial injection 4 mg  4 mg Intravenous Q4HRS PRN Nolvia Roche M.D.       • ondansetron (ZOFRAN ODT) dispertab 4 mg  4 mg Oral Q4HRS PRN Nolvia Roche M.D.       • ipratropium-albuterol (DUONEB)  nebulizer solution  3 mL Nebulization 4X/DAY (RT) Nolvia Roche M.D.   3 mL at 20 1336       Social History     Tobacco Use   • Smoking status: Former Smoker     Last attempt to quit: 1991     Years since quittin.1   • Smokeless tobacco: Never Used   Substance Use Topics   • Alcohol use: No     Alcohol/week: 0.0 - 2.4 oz     Comment: hardly ever   • Drug use: No        Past Medical History:   Diagnosis Date   • Allergy    • Arthritis     Spine, neck, hands, ankles and knees   • Asthma     inhalers as needed   • Back pain     and neck   • Breath shortness     at times   • Bronchitis    • CA - cancer of uterus    • Cancer (HCC)     uterine   • Carpal tunnel syndrome    • COPD    • Diverticula of colon    • Heart burn    • High cholesterol    • Hyperlipidemia    • Hypertension    • Pneumonia    • Tremor, hereditary, benign        Past Surgical History:   Procedure Laterality Date   • ERCP  2018    Procedure: ERCP W/POSS BIOPSY;  Surgeon: Quincy Louie M.D.;  Location: [facility];  Service: Gastroenterology   • ERCP W/SPHINCTEROTOMY/PAPILL.  2018    Procedure: ERCP W/SPHINCTEROTOMY/PAPILL.;  Surgeon: Quincy Louie M.D.;  Location: [facility];  Service: Gastroenterology   • ERCP W/ INSERTION STENT/TUBE  2018    Procedure: ERCP W/ INSERTION STENT/TUBE - STENT PLACEMENT/REMOVAL;  Surgeon: Quincy Louie M.D.;  Location: [facility];  Service: Gastroenterology   • ERCP W/REMOVAL CALCULUS  2018    Procedure: ERCP W/REMOVAL CALCULUS W/DILATION;  Surgeon: Quincy Louie M.D.;  Location: [facility];  Service: Gastroenterology   • ERCP  2/15/2018    Procedure: ERCP, SPHINCTEROTOMY, STENT PLACEMENT, DEBRIDEMENT;  Surgeon: Quincy Louie M.D.;  Location: [facility];  Service: Gastroenterology   • COMMON BILE DUCT EXPLORATION  02/15/2018   • ERCP W/ INSERTION STENT/TUBE  02/15/2018   • ERCP  "W/SPHINCTEROTOMY/PAPILL.  02/15/2018   • ERCP W/REMOVAL CALCULUS  02/15/2018   • ARIELLA BY LAPAROSCOPY  july, 2015    [facility]   • HYSTERECTOMY, TOTAL ABDOMINAL  1/18/10    Uterine, Dr. Whelan and Dr. Cam +BSO   • ABDOMINAL HYSTERECTOMY TOTAL  Jan 2010    Dr. Cam   • OOPHORECTOMY  Jan 2010    BSO   • OPEN REDUCTION      ankle       Allergies: Codeine and Ace inhibitors    Family History   Problem Relation Age of Onset   • Heart Disease Father 45        MI   • Lung Disease Father    • Stroke Father 70   • Cancer Father    • Hypertension Father    • Cancer Paternal Grandmother         breast   • Cancer Paternal Grandfather        Vitals:    02/08/20 0606 02/08/20 0617 02/08/20 0625 02/08/20 0658   Height: 1.6 m (5' 3\")      Weight: 96.3 kg (212 lb 4.9 oz)      Weight % change since last entry.: 0 %      BP: (!) 199/92 (!) 211/93     Pulse: (!) 111 99 (!) 108 99   BMI (Calculated): 37.61      Resp: 19 (!) 35 (!) 34 (!) 35   Temp: (!) 38.4 °C (101.2 °F)      TempSrc: Oral       02/08/20 0718 02/08/20 0822 02/08/20 1027 02/08/20 1057   Height:       Weight:       Weight % change since last entry.:       BP: (!) 165/83   (!) 162/78   Pulse: 92  100 92   BMI (Calculated):       Resp: (!) 36 15 14 (!) 23   Temp:       TempSrc:        02/08/20 1146 02/08/20 1339   Height: 1.6 m (5' 3\")    Weight: 98 kg (216 lb 0.8 oz)    Weight % change since last entry.: 1.77 %    BP: 145/78    Pulse: 94 92   BMI (Calculated): 38.27    Resp: 20 18   Temp: 36.8 °C (98.2 °F)    TempSrc: Oral        Physical Examination:  Physical Exam   Constitutional: She is oriented to person, place, and time and well-developed, well-nourished, and in no distress.   HENT:   Head: Normocephalic and atraumatic.   Eyes: No scleral icterus.   Neck: No tracheal deviation present.   Cardiovascular: Normal rate and regular rhythm.   Pulmonary/Chest: Effort normal. No stridor. No respiratory distress.   Abdominal: She exhibits no distension. There is no tenderness. " There is no rebound and no guarding.   Musculoskeletal: Normal range of motion.   Neurological: She is alert and oriented to person, place, and time.   Skin: Skin is warm and dry.   Psychiatric: Mood, affect and judgment normal.         Lab Results   Component Value Date/Time    PROTHROMBTM 12.6 02/14/2018 11:58 AM    INR 0.98 02/14/2018 11:58 AM      Lab Results   Component Value Date/Time    WBC 17.4 (H) 02/08/2020 06:40 AM    WBC 7.6 12/02/2010 12:00 AM    RBC 5.57 (H) 02/08/2020 06:40 AM    RBC 4.92 12/02/2010 12:00 AM    HEMOGLOBIN 16.5 (H) 02/08/2020 06:40 AM    HEMATOCRIT 49.9 (H) 02/08/2020 06:40 AM    MCV 89.6 02/08/2020 06:40 AM    MCV 89 12/02/2010 12:00 AM    MCH 29.6 02/08/2020 06:40 AM    MCH 31.3 12/02/2010 12:00 AM    MCHC 33.1 (L) 02/08/2020 06:40 AM    MPV 9.0 02/08/2020 06:40 AM    NEUTSPOLYS 89.90 (H) 02/08/2020 06:40 AM    LYMPHOCYTES 3.90 (L) 02/08/2020 06:40 AM    MONOCYTES 5.20 02/08/2020 06:40 AM    EOSINOPHILS 0.10 02/08/2020 06:40 AM    BASOPHILS 0.20 02/08/2020 06:40 AM    HYPOCHROMIA 1+ 05/06/2014 10:52 AM      Lab Results   Component Value Date/Time    SODIUM 140 02/08/2020 06:40 AM    POTASSIUM 3.8 02/08/2020 06:40 AM    CHLORIDE 99 02/08/2020 06:40 AM    CO2 23 02/08/2020 06:40 AM    GLUCOSE 131 (H) 02/08/2020 06:40 AM    BUN 14 02/08/2020 06:40 AM    CREATININE 0.68 02/08/2020 06:40 AM    CREATININE 0.80 12/02/2010 12:00 AM    BUNCREATRAT 20 12/02/2010 12:00 AM    GLOMRATE >59 12/02/2010 12:00 AM      Recent Labs     02/08/20  0640   ASTSGOT 193*   ALTSGPT 137*   TBILIRUBIN 1.1   ALKPHOSPHAT 121*   GLOBULIN 3.3       Imaging:   US-RUQ   Final Result      1.  Post cholecystectomy.      2.  There is biliary ductal dilatation and apparent distal common bile duct stone measuring slightly less than 1 cm in diameter.      DX-CHEST-PORTABLE (1 VIEW)   Final Result         1. No acute cardiopulmonary abnormalities are identified.              Assessment and  Plan:    Choledocholithiasis  Abdominal pain and nausea now resolved  Abnormal liver function tests likely related to acute obstruction in the distal common bile duct    History of recurrent common bile duct stones post cholecystectomy    We have discussed the procedure with the patient.  We will consent her for the procedure and we will schedule her for ERCP.  Anticoagulation should be held she currently does not demonstrate any signs of acute cholangitis so we will hold off on antibiotics.  Unless there is a difficult drainage procedure we will not proceed with antibiotics.

## 2020-02-09 NOTE — CARE PLAN
Problem: Safety  Goal: Will remain free from injury  Outcome: PROGRESSING AS EXPECTED  Note:   Bed in low locked position. Call bell within reach. Patient calls appropriately.      Problem: Bowel/Gastric:  Goal: Normal bowel function is maintained or improved  Outcome: PROGRESSING AS EXPECTED  Flowsheets (Taken 2/8/2020 1300 by Santy Nagy R.N.)  Last BM: 02/07/20  Note:   Bowel sounds present in all quadrants.

## 2020-02-09 NOTE — PROGRESS NOTES
Tele strip at 1206 shows SR at 84.      Measurements from am strip were as follows:  CO=0.16  QRS=0.16 R bbb  QT=0.40    Tele Shift Summary:    Rhythm : SR  Rate : 72-87  Ectopy : Per CCT [place], pt had rare PACs and PVCs.     Telemetry monitoring strips placed in pt's chart.

## 2020-02-09 NOTE — PROGRESS NOTES
Telemetry Strip     Strip printed: 2614  Measurements from am strip were as follows:  Rhythm: SR with BBB  HR: 72  Measurements: 0.16/0.14/0.40        Telemetry Shift Summary:    Rhythm: SR with BBB  HR: 60-70's  Ectopy: rare PAC, rare PVC        Normal Values  Rhythm SR  HR Range    Measurements 0.12-0.20 / 0.06-0.10  / 0.30-0.52

## 2020-02-09 NOTE — ASSESSMENT & PLAN NOTE
Due to biliary obstruction, ERCP for stone removal hopefully today  Continue rocephin,   Repeat blood cultures drawn 2/9, will monitor for any growth

## 2020-02-09 NOTE — PROGRESS NOTES
Gastroenterology Progress Note     Author: Renzo Bardales M.D.   Date & Time Created: 2020 3:08 PM    Chief Complaint:  Getting metoprolol for tachycardia    Interval History:  Had tachycardia, had recurrent pain    Review of Systems:  Review of Systems   Constitutional: Negative for fever.   Respiratory: Negative.    Cardiovascular: Positive for palpitations.   Gastrointestinal: Positive for abdominal pain.       Physical Exam:  Physical Exam  Constitutional:       Appearance: Normal appearance.   Eyes:      General: Scleral icterus present.   Cardiovascular:      Rate and Rhythm: Tachycardia present.   Pulmonary:      Effort: Pulmonary effort is normal. No respiratory distress.   Abdominal:      General: There is no distension.   Skin:     Coloration: Skin is not jaundiced.   Neurological:      General: No focal deficit present.      Mental Status: She is alert.   Psychiatric:         Mood and Affect: Mood normal.         Thought Content: Thought content normal.         Labs:          Recent Labs     2040 20   SODIUM 140 140   POTASSIUM 3.8 3.5*   CHLORIDE 99 105   CO2 23 25   BUN 14 9   CREATININE 0.68 0.66   MAGNESIUM  --  1.9   CALCIUM 9.5 8.1*     Recent Labs     20  0640 20  0038   ALTSGPT 137* 103*   ASTSGOT 193* 87*   ALKPHOSPHAT 121* 88   TBILIRUBIN 1.1 0.8   GLUCOSE 131* 92     Recent Labs     2040 208   RBC 5.57* 4.62   HEMOGLOBIN 16.5* 13.7   HEMATOCRIT 49.9* 42.8   PLATELETCT 152* 121*     Recent Labs     2040 208   WBC 17.4* 10.9*   NEUTSPOLYS 89.90* 79.50*   LYMPHOCYTES 3.90* 11.80*   MONOCYTES 5.20 7.30   EOSINOPHILS 0.10 0.60   BASOPHILS 0.20 0.30   ASTSGOT 193* 87*   ALTSGPT 137* 103*   ALKPHOSPHAT 121* 88   TBILIRUBIN 1.1 0.8     Hemodynamics:  Temp (24hrs), Av.7 °C (98 °F), Min:36.6 °C (97.8 °F), Max:36.8 °C (98.2 °F)  Temperature: 36.7 °C (98.1 °F)  Pulse  Av.8  Min: 71  Max: 111   Blood Pressure :  154/81     Respiratory:    Respiration: 16, Pulse Oximetry: 89 %, O2 Daily Delivery Respiratory : Silicone Nasal Cannula     Given By:: Mouthpiece  RUL Breath Sounds: Clear, RML Breath Sounds: Clear;Diminished, RLL Breath Sounds: Diminished, BRYAN Breath Sounds: Clear, LLL Breath Sounds: Clear  Fluids:    Intake/Output Summary (Last 24 hours) at 2/9/2020 1508  Last data filed at 2/9/2020 0300  Gross per 24 hour   Intake 727.08 ml   Output 900 ml   Net -172.92 ml        GI/Nutrition:  Orders Placed This Encounter   Procedures   • Diet Order Clear Liquid     Standing Status:   Standing     Number of Occurrences:   1     Order Specific Question:   Diet:     Answer:   Clear Liquid [10]     Medical Decision Making, by Problem:  Active Hospital Problems    Diagnosis   • Common bile duct stone [K80.50]   • Gram-negative bacteremia [R78.81]   • SVT (supraventricular tachycardia) ([facility]) [I47.1]   • Grade II diastolic dysfunction- echo 2017 with preserved EF [I51.9]   • Sepsis due to undetermined organism (HCC) [A41.9]   • Chronic midline low back pain without sciatica- norco chronic daily use; nv pain and spine [M54.5, G89.29]   • Essential hypertension [I10]   • BMI 37.0-37.9, adult [Z68.37]       Plan:  ERCP tomorrow  NPO after midnite  Abtx  LFTs have improved, had some recurrent pain may be passing stone    Quality-Core Measures

## 2020-02-09 NOTE — CARE PLAN
Problem: Communication  Goal: The ability to communicate needs accurately and effectively will improve  Outcome: PROGRESSING AS EXPECTED     Problem: Safety  Goal: Will remain free from injury  Outcome: PROGRESSING AS EXPECTED  Goal: Will remain free from falls  Outcome: PROGRESSING AS EXPECTED     Problem: Infection  Goal: Will remain free from infection  Outcome: PROGRESSING AS EXPECTED  Intervention: Assess signs and symptoms of infection  Note:   Receiving ordered IV antibiotics     Problem: Venous Thromboembolism (VTW)/Deep Vein Thrombosis (DVT) Prevention:  Goal: Patient will participate in Venous Thrombosis (VTE)/Deep Vein Thrombosis (DVT)Prevention Measures  Outcome: PROGRESSING AS EXPECTED  Intervention: Assess and monitor for anticoagulation complications  Note:   Patient on lovenox, held for ERCP     Problem: Bowel/Gastric:  Goal: Normal bowel function is maintained or improved  Outcome: PROGRESSING AS EXPECTED  Goal: Will not experience complications related to bowel motility  Outcome: PROGRESSING AS EXPECTED     Problem: Knowledge Deficit  Goal: Knowledge of disease process/condition, treatment plan, diagnostic tests, and medications will improve  Outcome: PROGRESSING AS EXPECTED  Goal: Knowledge of the prescribed therapeutic regimen will improve  Outcome: PROGRESSING AS EXPECTED     Problem: Discharge Barriers/Planning  Goal: Patient's continuum of care needs will be met  Outcome: PROGRESSING AS EXPECTED     Problem: Respiratory:  Goal: Respiratory status will improve  Outcome: PROGRESSING AS EXPECTED     Problem: Fluid Volume:  Goal: Will maintain balanced intake and output  Outcome: PROGRESSING AS EXPECTED  Intervention: Monitor, educate, and encourage compliance with therapeutic intake of liquids  Note:   Patient receiving ordered IV fluids in addition to PO     Problem: Urinary Elimination:  Goal: Ability to reestablish a normal urinary elimination pattern will improve  Outcome: PROGRESSING AS  EXPECTED     Problem: Pain Management  Goal: Pain level will decrease to patient's comfort goal  Outcome: PROGRESSING SLOWER THAN EXPECTED  Intervention: Follow pain managment plan developed in collaboration with patient and Interdisciplinary Team  Note:   Patient has chronic pain, spoke with MD regarding additional interventions for acute pain

## 2020-02-09 NOTE — CARE PLAN
Problem: Infection  Goal: Will remain free from infection  2/8/2020 1846 by Santy Nagy R.N.  Outcome: PROGRESSING AS EXPECTED  Note:   Trending lactic acid; down to 1.3 tonight  2/8/2020 1636 by Santy Nagy R.N.  Outcome: PROGRESSING SLOWER THAN EXPECTED  Note:   Trending lactic acid for sepsis; pt will trend lower     Problem: Knowledge Deficit  Goal: Knowledge of disease process/condition, treatment plan, diagnostic tests, and medications will improve  2/8/2020 1846 by Santy Nagy R.N.  Outcome: PROGRESSING AS EXPECTED  Note:   Educated about ERCP in am; npo at midnight.   2/8/2020 1636 by Santy Nagy R.N.  Outcome: PROGRESSING AS EXPECTED  Note:   Educated about poc; updated white board

## 2020-02-09 NOTE — PROGRESS NOTES
Annmarie from Lab called with critical result of positive blood cultures growing Gram negative rods at 2004. Critical lab result read back to Annmarie.   Dr. Hall notified of critical lab result at 2014.  Critical lab result read back by Dr. Hlal.

## 2020-02-09 NOTE — FLOWSHEET NOTE
02/09/20 0651   Events/Summary/Plan   Events/Summary/Plan Changed to PRN, added IS QID.   Education   Education Yes - Pt. / Family has been Instructed in use of Respiratory Equipment   Therapy Not Performed   Type of Therapy Not Performed SVN   Reason Therapy Not Performed PRN, No Indication   Incentive Spirometry Group   Incentive Spirometry Instruction Yes   Breathing Exercises Yes   Incentive Spirometer Volume 1000 mL   Incentive Spirometer Date Last Changed 02/09/20   Incentive Spirometer Next Change Date (Q 30 Days) 03/09/20   Respiratory WDL   Respiratory (WDL) X   Chest Exam   Respiration 16   Pulse 74   Breath Sounds   RUL Breath Sounds Clear   RML Breath Sounds Clear;Diminished   RLL Breath Sounds Diminished   BRYAN Breath Sounds Clear   LLL Breath Sounds Diminished   Secretions   Cough Dry;Non Productive;Strong   Oximetry   #Pulse Oximetry (Single Determination) Yes   Oxygen   Home O2 Use Prior To Admission? No   Pulse Oximetry 95 %   O2 (LPM) 1   O2 Daily Delivery Respiratory  Silicone Nasal Cannula

## 2020-02-10 LAB
ALBUMIN SERPL BCP-MCNC: 3.2 G/DL (ref 3.2–4.9)
ALBUMIN/GLOB SERPL: 1.2 G/DL
ALP SERPL-CCNC: 88 U/L (ref 30–99)
ALT SERPL-CCNC: 72 U/L (ref 2–50)
ANION GAP SERPL CALC-SCNC: 13 MMOL/L (ref 0–11.9)
AST SERPL-CCNC: 41 U/L (ref 12–45)
BILIRUB SERPL-MCNC: 0.4 MG/DL (ref 0.1–1.5)
BUN SERPL-MCNC: 10 MG/DL (ref 8–22)
CALCIUM SERPL-MCNC: 8.6 MG/DL (ref 8.4–10.2)
CHLORIDE SERPL-SCNC: 100 MMOL/L (ref 96–112)
CO2 SERPL-SCNC: 24 MMOL/L (ref 20–33)
CREAT SERPL-MCNC: 0.66 MG/DL (ref 0.5–1.4)
EKG IMPRESSION: NORMAL
GLOBULIN SER CALC-MCNC: 2.7 G/DL (ref 1.9–3.5)
GLUCOSE SERPL-MCNC: 87 MG/DL (ref 65–99)
POTASSIUM SERPL-SCNC: 3.4 MMOL/L (ref 3.6–5.5)
PROT SERPL-MCNC: 5.9 G/DL (ref 6–8.2)
SODIUM SERPL-SCNC: 137 MMOL/L (ref 135–145)

## 2020-02-10 PROCEDURE — 770020 HCHG ROOM/CARE - TELE (206)

## 2020-02-10 PROCEDURE — 93010 ELECTROCARDIOGRAM REPORT: CPT | Performed by: INTERNAL MEDICINE

## 2020-02-10 PROCEDURE — 700105 HCHG RX REV CODE 258: Performed by: INTERNAL MEDICINE

## 2020-02-10 PROCEDURE — 94760 N-INVAS EAR/PLS OXIMETRY 1: CPT

## 2020-02-10 PROCEDURE — A9270 NON-COVERED ITEM OR SERVICE: HCPCS | Performed by: INTERNAL MEDICINE

## 2020-02-10 PROCEDURE — 700111 HCHG RX REV CODE 636 W/ 250 OVERRIDE (IP): Performed by: INTERNAL MEDICINE

## 2020-02-10 PROCEDURE — 700102 HCHG RX REV CODE 250 W/ 637 OVERRIDE(OP): Performed by: INTERNAL MEDICINE

## 2020-02-10 PROCEDURE — 80053 COMPREHEN METABOLIC PANEL: CPT

## 2020-02-10 PROCEDURE — 99232 SBSQ HOSP IP/OBS MODERATE 35: CPT | Performed by: INTERNAL MEDICINE

## 2020-02-10 PROCEDURE — 700101 HCHG RX REV CODE 250: Performed by: INTERNAL MEDICINE

## 2020-02-10 RX ORDER — HYDRALAZINE HYDROCHLORIDE 20 MG/ML
20 INJECTION INTRAMUSCULAR; INTRAVENOUS EVERY 6 HOURS PRN
Status: DISCONTINUED | OUTPATIENT
Start: 2020-02-10 | End: 2020-02-16 | Stop reason: HOSPADM

## 2020-02-10 RX ORDER — POTASSIUM CHLORIDE 20 MEQ/1
20 TABLET, EXTENDED RELEASE ORAL DAILY
Status: DISCONTINUED | OUTPATIENT
Start: 2020-02-10 | End: 2020-02-16 | Stop reason: HOSPADM

## 2020-02-10 RX ORDER — METRONIDAZOLE 500 MG/1
500 TABLET ORAL EVERY 8 HOURS
Status: COMPLETED | OUTPATIENT
Start: 2020-02-10 | End: 2020-02-15

## 2020-02-10 RX ADMIN — MORPHINE SULFATE 2 MG: 4 INJECTION INTRAVENOUS at 05:35

## 2020-02-10 RX ADMIN — METOPROLOL TARTRATE 100 MG: 50 TABLET, FILM COATED ORAL at 17:32

## 2020-02-10 RX ADMIN — LOSARTAN POTASSIUM 100 MG: 25 TABLET ORAL at 17:32

## 2020-02-10 RX ADMIN — HYDROCODONE BITARTRATE AND ACETAMINOPHEN 1 TABLET: 10; 325 TABLET ORAL at 03:35

## 2020-02-10 RX ADMIN — SENNOSIDES AND DOCUSATE SODIUM 2 TABLET: 8.6; 5 TABLET ORAL at 17:32

## 2020-02-10 RX ADMIN — HYDROCHLOROTHIAZIDE 25 MG: 12.5 CAPSULE ORAL at 05:43

## 2020-02-10 RX ADMIN — MORPHINE SULFATE 2 MG: 4 INJECTION INTRAVENOUS at 02:34

## 2020-02-10 RX ADMIN — HYDROCODONE BITARTRATE AND ACETAMINOPHEN 1 TABLET: 10; 325 TABLET ORAL at 10:00

## 2020-02-10 RX ADMIN — CEFTRIAXONE SODIUM 2 G: 2 INJECTION, POWDER, FOR SOLUTION INTRAMUSCULAR; INTRAVENOUS at 05:42

## 2020-02-10 RX ADMIN — HYDROCODONE BITARTRATE AND ACETAMINOPHEN 1 TABLET: 10; 325 TABLET ORAL at 17:05

## 2020-02-10 RX ADMIN — MORPHINE SULFATE 2 MG: 4 INJECTION INTRAVENOUS at 09:00

## 2020-02-10 RX ADMIN — MORPHINE SULFATE 2 MG: 4 INJECTION INTRAVENOUS at 14:03

## 2020-02-10 RX ADMIN — METRONIDAZOLE 500 MG: 500 INJECTION, SOLUTION INTRAVENOUS at 06:26

## 2020-02-10 RX ADMIN — METRONIDAZOLE 500 MG: 500 TABLET ORAL at 22:51

## 2020-02-10 RX ADMIN — POTASSIUM CHLORIDE 20 MEQ: 20 TABLET, EXTENDED RELEASE ORAL at 12:28

## 2020-02-10 RX ADMIN — ZOLPIDEM TARTRATE 10 MG: 5 TABLET ORAL at 19:46

## 2020-02-10 RX ADMIN — HYDRALAZINE HYDROCHLORIDE 20 MG: 20 INJECTION INTRAMUSCULAR; INTRAVENOUS at 04:25

## 2020-02-10 RX ADMIN — METRONIDAZOLE 500 MG: 500 INJECTION, SOLUTION INTRAVENOUS at 13:59

## 2020-02-10 RX ADMIN — METOPROLOL TARTRATE 100 MG: 50 TABLET, FILM COATED ORAL at 05:43

## 2020-02-10 RX ADMIN — MORPHINE SULFATE 2 MG: 4 INJECTION INTRAVENOUS at 19:19

## 2020-02-10 ASSESSMENT — ENCOUNTER SYMPTOMS
FEVER: 0
STRIDOR: 0
RESPIRATORY NEGATIVE: 1
ORTHOPNEA: 0
MYALGIAS: 0
SHORTNESS OF BREATH: 0
PALPITATIONS: 0
DIZZINESS: 0
PSYCHIATRIC NEGATIVE: 1
BACK PAIN: 1
CONSTIPATION: 0
SORE THROAT: 0
DIARRHEA: 0
ABDOMINAL PAIN: 1
NEUROLOGICAL NEGATIVE: 1
DIAPHORESIS: 0
HEARTBURN: 0
NAUSEA: 0
TREMORS: 0
WHEEZING: 0

## 2020-02-10 NOTE — PROGRESS NOTES
MD updated, 12 lead ekg shows afib w/ RBBB, blood pressure machine not taking pt's bp, pt ao x4, no pain, no dizziness, not in distress. New orders placed. Charge at bedside attempting IV access.

## 2020-02-10 NOTE — PROGRESS NOTES
Cardiac monitor summary:  Rhythm: sinus rhythm/Afib,Aflutter  Rate: , up to 160's  Ectopy: PVC's  Measurements: .12/.14/.40

## 2020-02-10 NOTE — RESPIRATORY CARE
COPD EDUCATION by COPD CLINICAL EDUCATOR  2/10/2020 at 8:52 AM by Adeline Benitez, RRT     Patient reviewed by COPD education team. Patient does not have a history or diagnosis of COPD and is a former smoker quit 1990, therefore does not qualify for the COPD program.

## 2020-02-10 NOTE — PROGRESS NOTES
Pt lost both IV access, site reddened and edematous, elevated on pillows.  This RN attempted x1, pt hard stick. Endorsed to charge RN.

## 2020-02-10 NOTE — PROGRESS NOTES
Pt scheduled for ERCP tomorrow at 1130, Pt informed and aware. Pt given lunch tray and will be NPO at midnight this evening. Pt resting calmly in no distress. Pt denies N/V pain at this time. WCM

## 2020-02-10 NOTE — PROGRESS NOTES
Gave bedside report to GILLIAN Church. Plan of care discussed. Safety precautions in place. Personal belongings and call light are with in reach. Patient has no additional needs at this time. Patient complained of pain at IV site. IV infiltrated IV antibiotic stopped at this time.

## 2020-02-10 NOTE — PROGRESS NOTES
Gastroenterology Progress Note     Author: Renzo Bardales M.D.   Date & Time Created: 2/10/2020 3:57 PM    Chief Complaint:  No longer having tachycardia    Interval History:  Tolerable pain    Review of Systems:  Review of Systems   Constitutional: Negative for fever and malaise/fatigue.   Respiratory: Negative.    Cardiovascular: Negative for palpitations.   Gastrointestinal: Positive for abdominal pain.   Neurological: Negative.    Psychiatric/Behavioral: Negative.        Physical Exam:  Physical Exam  Constitutional:       Appearance: Normal appearance.   Eyes:      General: Scleral icterus present.   Cardiovascular:      Rate and Rhythm: Tachycardia present.   Pulmonary:      Effort: Pulmonary effort is normal. No respiratory distress.   Abdominal:      General: There is no distension.   Skin:     Coloration: Skin is not jaundiced.   Neurological:      General: No focal deficit present.      Mental Status: She is alert.   Psychiatric:         Mood and Affect: Mood normal.         Thought Content: Thought content normal.         Labs:          Recent Labs     02/08/20  0640 02/09/20  0038 02/10/20  0326   SODIUM 140 140 137   POTASSIUM 3.8 3.5* 3.4*   CHLORIDE 99 105 100   CO2 23 25 24   BUN 14 9 10   CREATININE 0.68 0.66 0.66   MAGNESIUM  --  1.9  --    CALCIUM 9.5 8.1* 8.6     Recent Labs     02/08/20  0640 02/09/20  0038 02/10/20  0326   ALTSGPT 137* 103* 72*   ASTSGOT 193* 87* 41   ALKPHOSPHAT 121* 88 88   TBILIRUBIN 1.1 0.8 0.4   GLUCOSE 131* 92 87     Recent Labs     02/08/20  0640 02/09/20  0038   RBC 5.57* 4.62   HEMOGLOBIN 16.5* 13.7   HEMATOCRIT 49.9* 42.8   PLATELETCT 152* 121*     Recent Labs     02/08/20  0640 02/09/20  0038 02/10/20  0326   WBC 17.4* 10.9*  --    NEUTSPOLYS 89.90* 79.50*  --    LYMPHOCYTES 3.90* 11.80*  --    MONOCYTES 5.20 7.30  --    EOSINOPHILS 0.10 0.60  --    BASOPHILS 0.20 0.30  --    ASTSGOT 193* 87* 41   ALTSGPT 137* 103* 72*   ALKPHOSPHAT 121* 88 88   TBILIRUBIN 1.1  0.8 0.4     Hemodynamics:  Temp (24hrs), Av.6 °C (97.9 °F), Min:36.3 °C (97.3 °F), Max:37.1 °C (98.7 °F)  Temperature: 36.4 °C (97.5 °F)  Pulse  Av.8  Min: 64  Max: 111   Blood Pressure : 145/71     Respiratory:    Respiration: 16, Pulse Oximetry: 94 %, O2 Daily Delivery Respiratory : Silicone Nasal Cannula        RUL Breath Sounds: Clear, RML Breath Sounds: Clear, RLL Breath Sounds: Diminished, BRYAN Breath Sounds: Clear, LLL Breath Sounds: Diminished  Fluids:    Intake/Output Summary (Last 24 hours) at 2020 1508  Last data filed at 2020 0300  Gross per 24 hour   Intake 727.08 ml   Output 900 ml   Net -172.92 ml     Weight: 96.7 kg (213 lb 3 oz)  GI/Nutrition:  Orders Placed This Encounter   Procedures   • Diet Order Regular     Standing Status:   Standing     Number of Occurrences:   1     Order Specific Question:   Diet:     Answer:   Regular [1]     Medical Decision Making, by Problem:  Active Hospital Problems    Diagnosis   • Common bile duct stone [K80.50]   • Gram-negative bacteremia [R78.81]   • SVT (supraventricular tachycardia) (HCC) [I47.1]   • Grade II diastolic dysfunction- echo 2017 with preserved EF [I51.9]   • Sepsis due to undetermined organism (HCC) [A41.9]   • Chronic midline low back pain without sciatica- norco chronic daily use; nv pain and spine [M54.5, G89.29]   • Essential hypertension [I10]   • BMI 37.0-37.9, adult [Z68.37]       Plan:  ERCP tomorrow, no availability in OR today!  NPO after midnite  Abtx WBC 10.9 yesterday  LFTs have improved further    Quality-Core Measures

## 2020-02-10 NOTE — CARE PLAN
Problem: Safety  Goal: Will remain free from injury  Outcome: PROGRESSING AS EXPECTED  Note:   Remind patient to use call light and provide assistance. Bed in low position, bed locked, and appropriate alarms set. Patient wearing non-slip socks. Call light and personal belongings are within reach.     Problem: Knowledge Deficit  Goal: Knowledge of the prescribed therapeutic regimen will improve  Outcome: PROGRESSING AS EXPECTED  Note:   Encourage patient to ask questions and be involved in plan of care. Provide education on treatment plan, diagnostic testing, and medications; have patient verbalize understanding.

## 2020-02-10 NOTE — PROGRESS NOTES
"Pt converted to SR at 1449.  However, pt said that IV access hurts, site is red and edematous, IV taken out.  Pt says she does not think that she got the morphine as she is still in a lot of pain, and when she has morphine \"my face usually gets numb.\"  IV taken out, escalated to Charge RN.    "

## 2020-02-10 NOTE — PROGRESS NOTES
Received bedside report from GILLIAN Dillard. Plan of care discussed. Safety precautions in place. Call light and personal belongings within reach. Patient has no needs at this time.

## 2020-02-10 NOTE — PROGRESS NOTES
Telemetry Shift Summary    Rhythm SR w/ BBB  HR Range 60s-70s  Ectopy PSVT for 2.1 sec up to 182 at 0307  Measurements 0.16/0.14/0.44        Normal Values  Rhythm SR  HR Range    Measurements 0.12-0.20 / 0.06-0.10  / 0.30-0.52

## 2020-02-10 NOTE — PROGRESS NOTES
Hospital Medicine Daily Progress Note    Date of Service  2/10/2020    Chief Complaint  74 y.o. female admitted 2/8/2020 with abdominal pain    Hospital Course    This is a 75 yo female who presented 2/8/2020 with abdominal pain across the abdomen increasing in severity and worse with leaning forward. She felt flushing through her body and nausea associated with the pain. She has a remote history of cholecystectomy but had endoscopy for stone removal with Dr. Louie from GI in 2018.  She met sepsis criteria on admission and was treated appropriately for this. An abdominal ultrasound showed a dilated common bile duct with retained stone.      Interval Problem Update  The patient is planned for ERCP today  She did have rapid heart rate in the 130's yesterday    Consultants/Specialty  GI Dr. Urias    Code Status  DNR    Disposition  home    Review of Systems  Review of Systems   Constitutional: Negative for diaphoresis, fever and malaise/fatigue.   HENT: Negative for congestion and sore throat.    Respiratory: Negative for shortness of breath, wheezing and stridor.    Cardiovascular: Negative for chest pain, palpitations and orthopnea.   Gastrointestinal: Positive for abdominal pain. Negative for constipation, diarrhea, heartburn and nausea.   Genitourinary: Negative for dysuria, hematuria and urgency.   Musculoskeletal: Positive for back pain. Negative for myalgias.        Abdominal pain radiates into the back   Neurological: Negative for dizziness and tremors.        Physical Exam  Temp:  [36.3 °C (97.3 °F)-37.1 °C (98.7 °F)] 36.4 °C (97.5 °F)  Pulse:  [64-88] 64  Resp:  [14-18] 18  BP: (142-180)/(71-90) 145/71  SpO2:  [88 %-97 %] 95 %    Physical Exam  Vitals signs and nursing note reviewed.   Constitutional:       General: She is not in acute distress.     Appearance: She is obese. She is not ill-appearing.   HENT:      Mouth/Throat:      Mouth: Mucous membranes are moist.      Pharynx: No oropharyngeal exudate  or posterior oropharyngeal erythema.   Eyes:      General: No scleral icterus.     Pupils: Pupils are equal, round, and reactive to light.   Neck:      Musculoskeletal: Normal range of motion.   Cardiovascular:      Rate and Rhythm: Normal rate and regular rhythm.      Heart sounds: Normal heart sounds. No murmur.   Pulmonary:      Effort: Pulmonary effort is normal.      Breath sounds: Normal breath sounds.   Abdominal:      General: Bowel sounds are normal. There is no distension.      Palpations: Abdomen is soft.      Tenderness: There is no guarding or rebound.   Musculoskeletal:         General: No swelling or tenderness.   Skin:     General: Skin is dry.      Capillary Refill: Capillary refill takes less than 2 seconds.      Coloration: Skin is not jaundiced or pale.      Findings: Bruising present. No erythema.      Comments: Bruising over arms from IV attempts   Neurological:      Mental Status: She is alert and oriented to person, place, and time.      Coordination: Coordination normal.   Psychiatric:         Mood and Affect: Mood normal.         Behavior: Behavior normal.         Thought Content: Thought content normal.         Fluids    Intake/Output Summary (Last 24 hours) at 2/10/2020 1204  Last data filed at 2/10/2020 0929  Gross per 24 hour   Intake 173 ml   Output 1400 ml   Net -1227 ml       Laboratory  Recent Labs     02/08/20  0640 02/09/20  0038   WBC 17.4* 10.9*   RBC 5.57* 4.62   HEMOGLOBIN 16.5* 13.7   HEMATOCRIT 49.9* 42.8   MCV 89.6 92.6   MCH 29.6 29.7   MCHC 33.1* 32.0*   RDW 42.0 44.9   PLATELETCT 152* 121*   MPV 9.0 9.3     Recent Labs     02/08/20  0640 02/09/20  0038 02/10/20  0326   SODIUM 140 140 137   POTASSIUM 3.8 3.5* 3.4*   CHLORIDE 99 105 100   CO2 23 25 24   GLUCOSE 131* 92 87   BUN 14 9 10   CREATININE 0.68 0.66 0.66   CALCIUM 9.5 8.1* 8.6                   Imaging  US-RUQ   Final Result      1.  Post cholecystectomy.      2.  There is biliary ductal dilatation and apparent  distal common bile duct stone measuring slightly less than 1 cm in diameter.      DX-CHEST-PORTABLE (1 VIEW)   Final Result         1. No acute cardiopulmonary abnormalities are identified.           Assessment/Plan  Gram-negative bacteremia  Assessment & Plan  Due to biliary obstruction, ERCP for stone removal hopefully today  Continue rocephin,   Repeat blood cultures drawn 2/9, will monitor for any growth    Common bile duct stone  Assessment & Plan  Recurrent s/p cholecystectomy years ago  Discussed with GI Dr. Urias and will plan for ERCP today    SVT (supraventricular tachycardia) ([facility])  Assessment & Plan  Transient in emergency department, resolved spontaneously  Recurred late yesterday, I did order diltiazem with improvement in her heart rate  Will monitor on telemetry and reorder diltiazem if needed    Grade II diastolic dysfunction- echo 2017 with preserved EF- (present on admission)  Assessment & Plan  No exacerbation  Continue metoprolol and losartan    Sepsis due to undetermined organism (HCC)  Assessment & Plan  This is Sepsis Present on admission  SIRS criteria identified on my evaluation include: Fever, with temperature greater than 101 deg F, Tachycardia, with heart rate greater than 90 BPM, Tachypnea, with respirations greater than 20 per minute and Leukocyosis, with WBC greater than 12,000  Source is abdominal  Sepsis protocol initiated  Fluid resuscitation ordered per protocol  IV antibiotics as appropriate for source of sepsis  While organ dysfunction may be noted elsewhere in this problem list or in the chart, degree of organ dysfunction does not meet CMS criteria for severe sepsis  Continue antibiotics for GI bacteria coverage due to retained gallstone          Chronic midline low back pain without sciatica- norco chronic daily use; nv pain and spine- (present on admission)  Assessment & Plan  Continue norco and as needed pain medication iv for breakthrough pain    Essential hypertension-  (present on admission)  Assessment & Plan  Resume home norvasc, metoprolol and losartan and hctz       BMI 37.0-37.9, adult- (present on admission)  Assessment & Plan  Encourage healthy weight loss after discharge     GRAM NEGATIVE BACTEREMIA  Will continue rocephin, repeat blood cultures to ensure sterility     Add iv morphine for pain  VTE prophylaxis: lovenox

## 2020-02-11 ENCOUNTER — APPOINTMENT (OUTPATIENT)
Dept: CARDIOLOGY | Facility: MEDICAL CENTER | Age: 75
DRG: 872 | End: 2020-02-11
Attending: HOSPITALIST
Payer: MEDICARE

## 2020-02-11 LAB
ALBUMIN SERPL BCP-MCNC: 3.3 G/DL (ref 3.2–4.9)
ALBUMIN/GLOB SERPL: 1.3 G/DL
ALP SERPL-CCNC: 85 U/L (ref 30–99)
ALT SERPL-CCNC: 54 U/L (ref 2–50)
ANION GAP SERPL CALC-SCNC: 11 MMOL/L (ref 0–11.9)
AST SERPL-CCNC: 24 U/L (ref 12–45)
BACTERIA BLD CULT: ABNORMAL
BACTERIA BLD CULT: ABNORMAL
BASOPHILS # BLD AUTO: 0.2 % (ref 0–1.8)
BASOPHILS # BLD: 0.02 K/UL (ref 0–0.12)
BILIRUB SERPL-MCNC: 0.4 MG/DL (ref 0.1–1.5)
BUN SERPL-MCNC: 16 MG/DL (ref 8–22)
CALCIUM SERPL-MCNC: 8.7 MG/DL (ref 8.4–10.2)
CHLORIDE SERPL-SCNC: 99 MMOL/L (ref 96–112)
CO2 SERPL-SCNC: 27 MMOL/L (ref 20–33)
CREAT SERPL-MCNC: 0.69 MG/DL (ref 0.5–1.4)
EKG IMPRESSION: NORMAL
EOSINOPHIL # BLD AUTO: 0.22 K/UL (ref 0–0.51)
EOSINOPHIL NFR BLD: 1.8 % (ref 0–6.9)
ERYTHROCYTE [DISTWIDTH] IN BLOOD BY AUTOMATED COUNT: 44.9 FL (ref 35.9–50)
GLOBULIN SER CALC-MCNC: 2.6 G/DL (ref 1.9–3.5)
GLUCOSE SERPL-MCNC: 107 MG/DL (ref 65–99)
HCT VFR BLD AUTO: 46.4 % (ref 37–47)
HGB BLD-MCNC: 14.7 G/DL (ref 12–16)
IMM GRANULOCYTES # BLD AUTO: 0.04 K/UL (ref 0–0.11)
IMM GRANULOCYTES NFR BLD AUTO: 0.3 % (ref 0–0.9)
LV EJECT FRACT  99904: 60
LV EJECT FRACT MOD 2C 99903: 55.98
LV EJECT FRACT MOD 4C 99902: 69.92
LV EJECT FRACT MOD BP 99901: 62.98
LYMPHOCYTES # BLD AUTO: 1.45 K/UL (ref 1–4.8)
LYMPHOCYTES NFR BLD: 12.1 % (ref 22–41)
MCH RBC QN AUTO: 29.4 PG (ref 27–33)
MCHC RBC AUTO-ENTMCNC: 31.7 G/DL (ref 33.6–35)
MCV RBC AUTO: 92.8 FL (ref 81.4–97.8)
MONOCYTES # BLD AUTO: 1.07 K/UL (ref 0–0.85)
MONOCYTES NFR BLD AUTO: 8.9 % (ref 0–13.4)
NEUTROPHILS # BLD AUTO: 9.16 K/UL (ref 2–7.15)
NEUTROPHILS NFR BLD: 76.7 % (ref 44–72)
NRBC # BLD AUTO: 0 K/UL
NRBC BLD-RTO: 0 /100 WBC
PLATELET # BLD AUTO: 162 K/UL (ref 164–446)
PMV BLD AUTO: 9.5 FL (ref 9–12.9)
POTASSIUM SERPL-SCNC: 3.6 MMOL/L (ref 3.6–5.5)
PROT SERPL-MCNC: 5.9 G/DL (ref 6–8.2)
RBC # BLD AUTO: 5 M/UL (ref 4.2–5.4)
SIGNIFICANT IND 70042: ABNORMAL
SITE SITE: ABNORMAL
SODIUM SERPL-SCNC: 137 MMOL/L (ref 135–145)
SOURCE SOURCE: ABNORMAL
TSH SERPL DL<=0.005 MIU/L-ACNC: 1.95 UIU/ML (ref 0.38–5.33)
WBC # BLD AUTO: 12 K/UL (ref 4.8–10.8)

## 2020-02-11 PROCEDURE — 700102 HCHG RX REV CODE 250 W/ 637 OVERRIDE(OP): Performed by: INTERNAL MEDICINE

## 2020-02-11 PROCEDURE — 93010 ELECTROCARDIOGRAM REPORT: CPT | Performed by: INTERNAL MEDICINE

## 2020-02-11 PROCEDURE — 84443 ASSAY THYROID STIM HORMONE: CPT

## 2020-02-11 PROCEDURE — 80053 COMPREHEN METABOLIC PANEL: CPT

## 2020-02-11 PROCEDURE — 99233 SBSQ HOSP IP/OBS HIGH 50: CPT | Performed by: HOSPITALIST

## 2020-02-11 PROCEDURE — A9270 NON-COVERED ITEM OR SERVICE: HCPCS | Performed by: INTERNAL MEDICINE

## 2020-02-11 PROCEDURE — 93306 TTE W/DOPPLER COMPLETE: CPT | Mod: 26 | Performed by: INTERNAL MEDICINE

## 2020-02-11 PROCEDURE — 700111 HCHG RX REV CODE 636 W/ 250 OVERRIDE (IP): Performed by: INTERNAL MEDICINE

## 2020-02-11 PROCEDURE — 99223 1ST HOSP IP/OBS HIGH 75: CPT | Performed by: INTERNAL MEDICINE

## 2020-02-11 PROCEDURE — 700105 HCHG RX REV CODE 258: Performed by: INTERNAL MEDICINE

## 2020-02-11 PROCEDURE — 93306 TTE W/DOPPLER COMPLETE: CPT

## 2020-02-11 PROCEDURE — 770020 HCHG ROOM/CARE - TELE (206)

## 2020-02-11 PROCEDURE — 85025 COMPLETE CBC W/AUTO DIFF WBC: CPT

## 2020-02-11 PROCEDURE — 700101 HCHG RX REV CODE 250: Performed by: INTERNAL MEDICINE

## 2020-02-11 PROCEDURE — 94760 N-INVAS EAR/PLS OXIMETRY 1: CPT

## 2020-02-11 PROCEDURE — 93005 ELECTROCARDIOGRAM TRACING: CPT | Performed by: ANESTHESIOLOGY

## 2020-02-11 RX ORDER — DILTIAZEM HYDROCHLORIDE 120 MG/1
120 CAPSULE, COATED, EXTENDED RELEASE ORAL
Status: DISCONTINUED | OUTPATIENT
Start: 2020-02-11 | End: 2020-02-13

## 2020-02-11 RX ORDER — HEPARIN SODIUM 5000 [USP'U]/100ML
INJECTION, SOLUTION INTRAVENOUS CONTINUOUS
Status: DISCONTINUED | OUTPATIENT
Start: 2020-02-11 | End: 2020-02-11

## 2020-02-11 RX ORDER — HEPARIN SODIUM 1000 [USP'U]/ML
3200 INJECTION, SOLUTION INTRAVENOUS; SUBCUTANEOUS PRN
Status: DISCONTINUED | OUTPATIENT
Start: 2020-02-11 | End: 2020-02-11

## 2020-02-11 RX ADMIN — ENOXAPARIN SODIUM 40 MG: 100 INJECTION SUBCUTANEOUS at 18:19

## 2020-02-11 RX ADMIN — METRONIDAZOLE 500 MG: 500 TABLET ORAL at 14:00

## 2020-02-11 RX ADMIN — CEFTRIAXONE SODIUM 2 G: 2 INJECTION, POWDER, FOR SOLUTION INTRAMUSCULAR; INTRAVENOUS at 05:11

## 2020-02-11 RX ADMIN — ZOLPIDEM TARTRATE 10 MG: 5 TABLET ORAL at 21:52

## 2020-02-11 RX ADMIN — METOPROLOL TARTRATE 5 MG: 5 INJECTION, SOLUTION INTRAVENOUS at 12:53

## 2020-02-11 RX ADMIN — HYDROCODONE BITARTRATE AND ACETAMINOPHEN 1 TABLET: 10; 325 TABLET ORAL at 19:10

## 2020-02-11 RX ADMIN — MORPHINE SULFATE 2 MG: 4 INJECTION INTRAVENOUS at 05:06

## 2020-02-11 RX ADMIN — LOSARTAN POTASSIUM 100 MG: 25 TABLET ORAL at 17:25

## 2020-02-11 RX ADMIN — HYDROCODONE BITARTRATE AND ACETAMINOPHEN 1 TABLET: 10; 325 TABLET ORAL at 12:59

## 2020-02-11 RX ADMIN — HYDROCODONE BITARTRATE AND ACETAMINOPHEN 1 TABLET: 10; 325 TABLET ORAL at 06:41

## 2020-02-11 RX ADMIN — HYDROCODONE BITARTRATE AND ACETAMINOPHEN 1 TABLET: 10; 325 TABLET ORAL at 00:22

## 2020-02-11 RX ADMIN — POTASSIUM CHLORIDE 20 MEQ: 20 TABLET, EXTENDED RELEASE ORAL at 05:12

## 2020-02-11 RX ADMIN — MORPHINE SULFATE 2 MG: 4 INJECTION INTRAVENOUS at 17:24

## 2020-02-11 RX ADMIN — MELATONIN 1000 UNITS: at 17:26

## 2020-02-11 RX ADMIN — METOPROLOL TARTRATE 100 MG: 50 TABLET, FILM COATED ORAL at 05:12

## 2020-02-11 RX ADMIN — HYDROCHLOROTHIAZIDE 25 MG: 12.5 CAPSULE ORAL at 05:12

## 2020-02-11 RX ADMIN — METOPROLOL TARTRATE 100 MG: 50 TABLET, FILM COATED ORAL at 17:25

## 2020-02-11 RX ADMIN — METRONIDAZOLE 500 MG: 500 TABLET ORAL at 05:12

## 2020-02-11 RX ADMIN — DILTIAZEM HYDROCHLORIDE 120 MG: 120 CAPSULE, COATED, EXTENDED RELEASE ORAL at 17:25

## 2020-02-11 RX ADMIN — MORPHINE SULFATE 2 MG: 4 INJECTION INTRAVENOUS at 14:10

## 2020-02-11 RX ADMIN — SENNOSIDES AND DOCUSATE SODIUM 2 TABLET: 8.6; 5 TABLET ORAL at 17:25

## 2020-02-11 RX ADMIN — METOPROLOL TARTRATE 5 MG: 5 INJECTION, SOLUTION INTRAVENOUS at 14:05

## 2020-02-11 RX ADMIN — METRONIDAZOLE 500 MG: 500 TABLET ORAL at 21:52

## 2020-02-11 ASSESSMENT — ENCOUNTER SYMPTOMS
ABDOMINAL PAIN: 1
PND: 0
PHOTOPHOBIA: 0
COUGH: 0
CONSTITUTIONAL NEGATIVE: 1
CONSTIPATION: 0
BACK PAIN: 0
EYE PAIN: 0
HEARTBURN: 0
CLAUDICATION: 0
PALPITATIONS: 1
SENSORY CHANGE: 0
SPEECH CHANGE: 0
HEMOPTYSIS: 0
TINGLING: 0
SHORTNESS OF BREATH: 0
VOMITING: 0
HEADACHES: 0
NECK PAIN: 0
SPUTUM PRODUCTION: 0
BLURRED VISION: 0
MUSCULOSKELETAL NEGATIVE: 1
DIARRHEA: 0
DIZZINESS: 0
FEVER: 0
DOUBLE VISION: 0
NERVOUS/ANXIOUS: 0
DIAPHORESIS: 0
BLOOD IN STOOL: 0
NAUSEA: 0
WEAKNESS: 0
SORE THROAT: 0
ORTHOPNEA: 0
STRIDOR: 0
CHILLS: 0
DEPRESSION: 0
MEMORY LOSS: 0
WHEEZING: 0
RESPIRATORY NEGATIVE: 1
MYALGIAS: 0
TREMORS: 0

## 2020-02-11 NOTE — PROGRESS NOTES
Hospital Medicine Daily Progress Note    Date of Service  2/11/2020    Chief Complaint  74 y.o. female admitted 2/8/2020 with abdominal pain    Hospital Course    This is a 75 yo female who presented 2/8/2020 with abdominal pain across the abdomen increasing in severity and worse with leaning forward. She felt flushing through her body and nausea associated with the pain. She has a remote history of cholecystectomy but had endoscopy for stone removal with Dr. Louie from GI in 2018.  She met sepsis criteria on admission and was treated appropriately for this. An abdominal ultrasound showed a dilated common bile duct with retained stone.      Interval Problem Update  Patient reports still having mild abdominal pain, 3/10, no exacerbating or relieving factors.  In addition in the OR prior to undergoing ERCP, patient developed Atrial fibrillation, being monitored per staff.   Echo, TSH, pending.    Consultants/Specialty  GI Dr. Urias    Code Status  DNR    Disposition  TBD    Review of Systems  Review of Systems   Constitutional: Negative for chills, diaphoresis, fever and malaise/fatigue.   HENT: Negative for congestion, hearing loss, sore throat and tinnitus.    Eyes: Negative for blurred vision, double vision, photophobia and pain.   Respiratory: Negative for cough, hemoptysis, sputum production, shortness of breath, wheezing and stridor.    Cardiovascular: Positive for palpitations. Negative for chest pain, orthopnea, claudication and PND.   Gastrointestinal: Positive for abdominal pain. Negative for blood in stool, constipation, diarrhea, heartburn, melena, nausea and vomiting.   Genitourinary: Negative for dysuria, frequency, hematuria and urgency.   Musculoskeletal: Negative for back pain, myalgias and neck pain.        Abdominal pain radiates into the back   Neurological: Negative for dizziness, tingling, tremors, sensory change, speech change, weakness and headaches.   Psychiatric/Behavioral: Negative for  depression, memory loss and suicidal ideas. The patient is not nervous/anxious.    All other systems reviewed and are negative.       Physical Exam  Temp:  [36.3 °C (97.3 °F)-36.9 °C (98.4 °F)] 36.6 °C (97.9 °F)  Pulse:  [61-74] 61  Resp:  [16-18] 17  BP: (131-147)/(65-76) 146/76  SpO2:  [94 %-97 %] 95 %    Physical Exam  Vitals signs reviewed.   Constitutional:       General: She is not in acute distress.     Appearance: Normal appearance. She is obese. She is not ill-appearing, toxic-appearing or diaphoretic.   HENT:      Head: Normocephalic and atraumatic.      Nose: No congestion.      Mouth/Throat:      Mouth: Mucous membranes are moist.      Pharynx: No oropharyngeal exudate or posterior oropharyngeal erythema.   Eyes:      Extraocular Movements: Extraocular movements intact.      Conjunctiva/sclera: Conjunctivae normal.      Pupils: Pupils are equal, round, and reactive to light.   Neck:      Musculoskeletal: Normal range of motion and neck supple. No neck rigidity.   Cardiovascular:      Pulses: Normal pulses.      Heart sounds: No murmur.   Pulmonary:      Effort: Pulmonary effort is normal. No respiratory distress.      Breath sounds: Normal breath sounds. No wheezing or rales.   Abdominal:      General: Abdomen is flat. Bowel sounds are normal. There is no distension.      Palpations: Abdomen is soft.      Tenderness: There is no tenderness. There is no guarding.   Musculoskeletal: Normal range of motion.         General: No swelling or tenderness.   Skin:     General: Skin is warm and dry.      Capillary Refill: Capillary refill takes less than 2 seconds.      Findings: Bruising (Over anti cubital area, improving ) present.   Neurological:      General: No focal deficit present.      Mental Status: She is alert and oriented to person, place, and time. Mental status is at baseline.      Cranial Nerves: No cranial nerve deficit.   Psychiatric:         Mood and Affect: Mood normal.         Thought Content:  Thought content normal.         Fluids  No intake or output data in the 24 hours ending 02/11/20 0933    Laboratory  Recent Labs     02/09/20  0038 02/11/20  0304   WBC 10.9* 12.0*   RBC 4.62 5.00   HEMOGLOBIN 13.7 14.7   HEMATOCRIT 42.8 46.4   MCV 92.6 92.8   MCH 29.7 29.4   MCHC 32.0* 31.7*   RDW 44.9 44.9   PLATELETCT 121* 162*   MPV 9.3 9.5     Recent Labs     02/09/20  0038 02/10/20  0326 02/11/20  0304   SODIUM 140 137 137   POTASSIUM 3.5* 3.4* 3.6   CHLORIDE 105 100 99   CO2 25 24 27   GLUCOSE 92 87 107*   BUN 9 10 16   CREATININE 0.66 0.66 0.69   CALCIUM 8.1* 8.6 8.7                   Imaging  US-RUQ   Final Result      1.  Post cholecystectomy.      2.  There is biliary ductal dilatation and apparent distal common bile duct stone measuring slightly less than 1 cm in diameter.      DX-CHEST-PORTABLE (1 VIEW)   Final Result         1. No acute cardiopulmonary abnormalities are identified.           Assessment/Plan  Gram-negative bacteremia  Assessment & Plan  2/2 to biliary obstruction  ERCP pending today.  Resume IV antibiotics  Blood cultures + Ecoli, repeat neg thus far.    Common bile duct stone  Assessment & Plan  Hx of cholecystectomy.  GI to perform ERCP today.    SVT (supraventricular tachycardia) (HCC)  Assessment & Plan  Resolved.  Now with atrial fibrillation?     Grade II diastolic dysfunction- echo 2017 with preserved EF- (present on admission)  Assessment & Plan  Not in acute exacerbation  Resume Metoprolol and losartan    Sepsis due to undetermined organism (HCC)  Assessment & Plan  This is Sepsis Present on admission  SIRS criteria identified on my evaluation include: Fever, with temperature greater than 101 deg F, Tachycardia, with heart rate greater than 90 BPM, Tachypnea, with respirations greater than 20 per minute and Leukocyosis, with WBC greater than 12,000  Source is abdominal  Sepsis protocol initiated on admission  Fluid resuscitation ordered per protocol  IV antibiotics as  appropriate for source of sepsis  While organ dysfunction may be noted elsewhere in this problem list or in the chart, degree of organ dysfunction does not meet CMS criteria for severe sepsis  Sepsis controlled  Resume IV antibiotics for now.       Chronic midline low back pain without sciatica- norco chronic daily use; nv pain and spine- (present on admission)  Assessment & Plan  Continue with pain control     Essential hypertension- (present on admission)  Assessment & Plan  Controlled  Resume home dose of norvasc, metoprolol and losartan and hctz       BMI 37.0-37.9, adult- (present on admission)  Assessment & Plan  Body mass index is 37.76 kg/m².  Encourage healthy lifestyle and physical activity.    New Onset Atrial fibrillation- (present on admission)  Assessment & Plan  Prior hx of SVT? New onset?  Echocardiogram ordered  Patient already on metoprolol 100mg BID.  Check TSH          The total time spent face to face with this patient was about 37 mins of which 60% of time was spent on counseling, review of records including pertinent lab data and studies, as well as discussing diagnostic evaluation and work up, planned therapeutic interventions and future disposition of care. Where indicated, the assessment and plan reflect discussion of patient with consultants, other healthcare providers, family members, and additional research needed to obtain further information in formulating the plan of care of this patient.       VTE prophylaxis: lovenox

## 2020-02-11 NOTE — DISCHARGE PLANNING
EMANUELW spoke with pt and dtr Destinee at bedside. LSW confirmed face sheet and dtr Destinee provided her cell number ([phone]). Pt lives with Destinee and is independent with ADLs, does use a FWW for outside of the home. Pt not on home 02. Pt has living will on file. Pt has no HHC/SNF history. Pt denied any mental health or substance abuse history. Pt uses [facility] on [address] for Rx. Plan is home once medically cleared. CM team following for d/c planning needs.       Care Transition Team Assessment    Information Source  Orientation : Oriented x 4  Information Given By: Patient  Who is responsible for making decisions for patient? : Patient    Readmission Evaluation  Is this a readmission?: No    Elopement Risk  Legal Hold: No  Ambulatory or Self Mobile in Wheelchair: No-Not an Elopement Risk  Elopement Risk: Not at Risk for Elopement    Interdisciplinary Discharge Planning  Patient or legal guardian wants to designate a caregiver (see row info): No    Discharge Preparedness  What is your plan after discharge?: Home with help  What are your discharge supports?: Child  Prior Functional Level: Ambulatory, Independent with Activities of Daily Living, Independent with Medication Management, Uses Walker    Functional Assesment  Prior Functional Level: Ambulatory, Independent with Activities of Daily Living, Independent with Medication Management, Uses Walker    Finances  Financial Barriers to Discharge: No  Prescription Coverage: Yes    Vision / Hearing Impairment  Vision Impairment : Yes  Right Eye Vision: Wears Glasses  Left Eye Vision: Wears Glasses  Hearing Impairment : No         Advance Directive  Advance Directive?: Living Will    Domestic Abuse  Have you ever been the victim of abuse or violence?: No  Physical Abuse or Sexual Abuse: No  Verbal Abuse or Emotional Abuse: No  Possible Abuse Reported to:: Not Applicable    Psychological Assessment  History of Substance Abuse: None  History of Psychiatric Problems: No  Non-compliant  with Treatment: No  Newly Diagnosed Illness: No    Discharge Risks or Barriers  Discharge risks or barriers?: No  Patient risk factors: Vulnerable adult    Anticipated Discharge Information  Anticipated discharge disposition: Home  Discharge Address: ([address] [place] [address])  Discharge Contact Phone Number: ([phone])

## 2020-02-11 NOTE — PROGRESS NOTES
Rounding completed, pt sleeping in bed. No needs at this time. Bed in low locked position, call light in reach, will continue to monitor.

## 2020-02-11 NOTE — CARE PLAN
Problem: Knowledge Deficit  Goal: Knowledge of the prescribed therapeutic regimen will improve  Outcome: PROGRESSING AS EXPECTED  Note:   Updated pt on plan of care. No further questions at this time.      Problem: Pain Management  Goal: Pain level will decrease to patient's comfort goal  Outcome: PROGRESSING AS EXPECTED  Flowsheets (Taken 2/11/2020 0003)  Pain Rating Scale (NPRS): 6  Note:   Pt is having consistent pain that is controlled by morphine and Norco per the MAR.

## 2020-02-11 NOTE — PROGRESS NOTES
Gastroenterology Progress Note     Author: Renzo Bardales M.D.   Date & Time Created: 2020 11:44 AM    Chief Complaint:  No pain    Interval History:  HR 61 per hospitalist note , now in afib 160s    Review of Systems:  Review of Systems   Constitutional: Negative.    HENT: Negative.    Respiratory: Negative.    Cardiovascular: Positive for palpitations.   Gastrointestinal: Positive for abdominal pain.   Musculoskeletal: Negative.        Physical Exam:  Physical Exam  Constitutional:       Appearance: Normal appearance.   Eyes:      General: No scleral icterus.  Cardiovascular:      Rate and Rhythm: Normal rate.   Pulmonary:      Effort: Pulmonary effort is normal.   Abdominal:      Tenderness: There is tenderness. There is no guarding or rebound.   Neurological:      General: No focal deficit present.      Mental Status: She is alert.         Labs:          Recent Labs     02/09/20  0038 02/10/20  0326 02/11/20  0304   SODIUM 140 137 137   POTASSIUM 3.5* 3.4* 3.6   CHLORIDE 105 100 99   CO2 25 24 27   BUN 9 10 16   CREATININE 0.66 0.66 0.69   MAGNESIUM 1.9  --   --    CALCIUM 8.1* 8.6 8.7     Recent Labs     02/09/20  0038 02/10/20  0326 20  0304   ALTSGPT 103* 72* 54*   ASTSGOT 87* 41 24   ALKPHOSPHAT 88 88 85   TBILIRUBIN 0.8 0.4 0.4   GLUCOSE 92 87 107*     Recent Labs     208 20  0304   RBC 4.62 5.00   HEMOGLOBIN 13.7 14.7   HEMATOCRIT 42.8 46.4   PLATELETCT 121* 162*     Recent Labs     02/09/20  0038 02/10/20  0326 20  0304   WBC 10.9*  --  12.0*   NEUTSPOLYS 79.50*  --  76.70*   LYMPHOCYTES 11.80*  --  12.10*   MONOCYTES 7.30  --  8.90   EOSINOPHILS 0.60  --  1.80   BASOPHILS 0.30  --  0.20   ASTSGOT 87* 41 24   ALTSGPT 103* 72* 54*   ALKPHOSPHAT 88 88 85   TBILIRUBIN 0.8 0.4 0.4     Hemodynamics:  Temp (24hrs), Av.7 °C (98 °F), Min:36.3 °C (97.3 °F), Max:37 °C (98.6 °F)  Temperature: 37 °C (98.6 °F)  Pulse  Av.1  Min: 61  Max: 111   Blood Pressure : (!) 183/90      Respiratory:    Respiration: 16, Pulse Oximetry: 99 %, O2 Daily Delivery Respiratory : Silicone Nasal Cannula        RUL Breath Sounds: Clear, RML Breath Sounds: Clear;Diminished, RLL Breath Sounds: Diminished, BRYAN Breath Sounds: Clear, LLL Breath Sounds: Diminished  Fluids:  No intake or output data in the 24 hours ending 02/11/20 1144     GI/Nutrition:  Orders Placed This Encounter   Procedures   • Diet Order Regular     Standing Status:   Standing     Number of Occurrences:   1     Order Specific Question:   Diet:     Answer:   Regular [1]     Medical Decision Making, by Problem:  Active Hospital Problems    Diagnosis   • Common bile duct stone [K80.50]   • Gram-negative bacteremia [R78.81]   • SVT (supraventricular tachycardia) (HCC) [I47.1]   • Grade II diastolic dysfunction- echo 2017 with preserved EF [I51.9]   • Sepsis due to undetermined organism (HCC) [A41.9]   • Chronic midline low back pain without sciatica- norco chronic daily use; nv pain and spine [M54.5, G89.29]   • Essential hypertension [I10]   • BMI 37.0-37.9, adult [Z68.37]       Plan:  CBD stones  Abnormal LFTs improved  Abd pain improved  Now with intermittent tachycardia and rapid afib  Cancel per anesthesia discussion    Plan ERCP when stable form GI perspective    Quality-Core Measures

## 2020-02-11 NOTE — CARE PLAN
Problem: Venous Thromboembolism (VTW)/Deep Vein Thrombosis (DVT) Prevention:  Goal: Patient will participate in Venous Thrombosis (VTE)/Deep Vein Thrombosis (DVT)Prevention Measures        Outcome: PROGRESSING AS EXPECTED  Intervention: Encourage ambulation/mobilization at level directed by Physical Therapy in collaboration with Interdisciplinary Team  Note:   Will encourage pt to use walker to go to bathroom instead of commode     Problem: Knowledge Deficit  Goal: Knowledge of disease process/condition, treatment plan, diagnostic tests, and medications will improve  Outcome: PROGRESSING AS EXPECTED  Note:   Will educate pt on ECRP and POC

## 2020-02-11 NOTE — OR NURSING
Case cancelled because of results of EKG, called GILLIAN Michael on the floor and notified him that she would be coming back.  1225: Pt d/c

## 2020-02-11 NOTE — PROGRESS NOTES
Notified by monitor tech that pt sustaining afib HR in 150s, HR up to 200 briefly. Pt still in pre-op. Dr WILCOX notified, Lopressor ordered for when pt returns to floor.

## 2020-02-11 NOTE — PROGRESS NOTES
Received report from Sandra FRENCH. Pt comfortable in bed. Bed in low, locked position with call bell in reach.

## 2020-02-11 NOTE — OR NURSING
Patient to preop from floor.  Resting in bed, daughter at bedside.  Set of vitals collected, fluids connected.  RN from the floor called to notify preop RN that patient's HR went into a-fib.  Notified the anesthesiologist and ordered an EKG.  Dr. Mills went into room to assess patient.

## 2020-02-11 NOTE — PROGRESS NOTES
Telemetry Strip       Measurements from am strip were as follows:  Rhythm: SR   HR: 61-79  Measurements: 0.18/ 0.14/ 0.46  Ectopy: o-r PVC             Normal Values  Rhythm SR  HR Range    Measurements 0.12-0.20 / 0.06-0.10  / 0.30-0.52

## 2020-02-11 NOTE — PROGRESS NOTES
Patient converted to A fib at 1116 per MT. Patient currently in pre-op, pre-op called to notify.  Dr. WILCOX updated, no further orders at this time.

## 2020-02-11 NOTE — PROGRESS NOTES
Spoke with pt about medication for pain control. Pt prefers to use Norco first for pain control and only use morphine as needed for breakthrough pain

## 2020-02-11 NOTE — PROGRESS NOTES
Took report from Lynnette FRENCH. Pt is sitting up in bed. Pt requested morphine for pain. Will treat according to MAR. No further needs at this time. Bed in low locked position, call light in reach. Will continue to monitor.

## 2020-02-12 LAB
ALBUMIN SERPL BCP-MCNC: 3.4 G/DL (ref 3.2–4.9)
ALBUMIN/GLOB SERPL: 1.4 G/DL
ALP SERPL-CCNC: 81 U/L (ref 30–99)
ALT SERPL-CCNC: 52 U/L (ref 2–50)
ANION GAP SERPL CALC-SCNC: 12 MMOL/L (ref 0–11.9)
AST SERPL-CCNC: 35 U/L (ref 12–45)
BASOPHILS # BLD AUTO: 0.3 % (ref 0–1.8)
BASOPHILS # BLD: 0.03 K/UL (ref 0–0.12)
BILIRUB SERPL-MCNC: 0.4 MG/DL (ref 0.1–1.5)
BUN SERPL-MCNC: 16 MG/DL (ref 8–22)
CALCIUM SERPL-MCNC: 9 MG/DL (ref 8.4–10.2)
CHLORIDE SERPL-SCNC: 99 MMOL/L (ref 96–112)
CO2 SERPL-SCNC: 29 MMOL/L (ref 20–33)
CREAT SERPL-MCNC: 0.79 MG/DL (ref 0.5–1.4)
EOSINOPHIL # BLD AUTO: 0.19 K/UL (ref 0–0.51)
EOSINOPHIL NFR BLD: 2 % (ref 0–6.9)
ERYTHROCYTE [DISTWIDTH] IN BLOOD BY AUTOMATED COUNT: 44.2 FL (ref 35.9–50)
GLOBULIN SER CALC-MCNC: 2.5 G/DL (ref 1.9–3.5)
GLUCOSE SERPL-MCNC: 104 MG/DL (ref 65–99)
HCT VFR BLD AUTO: 46.6 % (ref 37–47)
HGB BLD-MCNC: 14.7 G/DL (ref 12–16)
IMM GRANULOCYTES # BLD AUTO: 0.04 K/UL (ref 0–0.11)
IMM GRANULOCYTES NFR BLD AUTO: 0.4 % (ref 0–0.9)
LYMPHOCYTES # BLD AUTO: 1.88 K/UL (ref 1–4.8)
LYMPHOCYTES NFR BLD: 19.6 % (ref 22–41)
MCH RBC QN AUTO: 29.4 PG (ref 27–33)
MCHC RBC AUTO-ENTMCNC: 31.5 G/DL (ref 33.6–35)
MCV RBC AUTO: 93.2 FL (ref 81.4–97.8)
MONOCYTES # BLD AUTO: 0.9 K/UL (ref 0–0.85)
MONOCYTES NFR BLD AUTO: 9.4 % (ref 0–13.4)
NEUTROPHILS # BLD AUTO: 6.56 K/UL (ref 2–7.15)
NEUTROPHILS NFR BLD: 68.3 % (ref 44–72)
NRBC # BLD AUTO: 0 K/UL
NRBC BLD-RTO: 0 /100 WBC
PLATELET # BLD AUTO: 165 K/UL (ref 164–446)
PMV BLD AUTO: 9.5 FL (ref 9–12.9)
POTASSIUM SERPL-SCNC: 3.3 MMOL/L (ref 3.6–5.5)
PROT SERPL-MCNC: 5.9 G/DL (ref 6–8.2)
RBC # BLD AUTO: 5 M/UL (ref 4.2–5.4)
SODIUM SERPL-SCNC: 140 MMOL/L (ref 135–145)
WBC # BLD AUTO: 9.6 K/UL (ref 4.8–10.8)

## 2020-02-12 PROCEDURE — 700102 HCHG RX REV CODE 250 W/ 637 OVERRIDE(OP): Performed by: HOSPITALIST

## 2020-02-12 PROCEDURE — 770020 HCHG ROOM/CARE - TELE (206)

## 2020-02-12 PROCEDURE — A9270 NON-COVERED ITEM OR SERVICE: HCPCS | Performed by: HOSPITALIST

## 2020-02-12 PROCEDURE — 700111 HCHG RX REV CODE 636 W/ 250 OVERRIDE (IP): Performed by: INTERNAL MEDICINE

## 2020-02-12 PROCEDURE — 700102 HCHG RX REV CODE 250 W/ 637 OVERRIDE(OP): Performed by: INTERNAL MEDICINE

## 2020-02-12 PROCEDURE — 94760 N-INVAS EAR/PLS OXIMETRY 1: CPT

## 2020-02-12 PROCEDURE — A9270 NON-COVERED ITEM OR SERVICE: HCPCS | Performed by: INTERNAL MEDICINE

## 2020-02-12 PROCEDURE — 99233 SBSQ HOSP IP/OBS HIGH 50: CPT | Performed by: HOSPITALIST

## 2020-02-12 PROCEDURE — 80053 COMPREHEN METABOLIC PANEL: CPT

## 2020-02-12 PROCEDURE — 700105 HCHG RX REV CODE 258: Performed by: INTERNAL MEDICINE

## 2020-02-12 PROCEDURE — 85025 COMPLETE CBC W/AUTO DIFF WBC: CPT

## 2020-02-12 RX ORDER — POTASSIUM CHLORIDE 20 MEQ/1
40 TABLET, EXTENDED RELEASE ORAL ONCE
Status: COMPLETED | OUTPATIENT
Start: 2020-02-12 | End: 2020-02-12

## 2020-02-12 RX ADMIN — HYDROCODONE BITARTRATE AND ACETAMINOPHEN 1 TABLET: 10; 325 TABLET ORAL at 20:39

## 2020-02-12 RX ADMIN — DILTIAZEM HYDROCHLORIDE 120 MG: 120 CAPSULE, COATED, EXTENDED RELEASE ORAL at 05:03

## 2020-02-12 RX ADMIN — HYDROCODONE BITARTRATE AND ACETAMINOPHEN 1 TABLET: 10; 325 TABLET ORAL at 01:57

## 2020-02-12 RX ADMIN — MORPHINE SULFATE 2 MG: 4 INJECTION INTRAVENOUS at 05:15

## 2020-02-12 RX ADMIN — ONDANSETRON 4 MG: 2 INJECTION INTRAMUSCULAR; INTRAVENOUS at 08:15

## 2020-02-12 RX ADMIN — METOPROLOL TARTRATE 100 MG: 50 TABLET, FILM COATED ORAL at 04:53

## 2020-02-12 RX ADMIN — CEFTRIAXONE SODIUM 2 G: 2 INJECTION, POWDER, FOR SOLUTION INTRAMUSCULAR; INTRAVENOUS at 04:53

## 2020-02-12 RX ADMIN — HYDROCODONE BITARTRATE AND ACETAMINOPHEN 1 TABLET: 10; 325 TABLET ORAL at 14:00

## 2020-02-12 RX ADMIN — ZOLPIDEM TARTRATE 10 MG: 5 TABLET ORAL at 20:39

## 2020-02-12 RX ADMIN — METRONIDAZOLE 500 MG: 500 TABLET ORAL at 21:48

## 2020-02-12 RX ADMIN — MELATONIN 1000 UNITS: at 17:25

## 2020-02-12 RX ADMIN — ONDANSETRON 4 MG: 2 INJECTION INTRAMUSCULAR; INTRAVENOUS at 12:51

## 2020-02-12 RX ADMIN — MORPHINE SULFATE 2 MG: 4 INJECTION INTRAVENOUS at 17:25

## 2020-02-12 RX ADMIN — POTASSIUM CHLORIDE 20 MEQ: 20 TABLET, EXTENDED RELEASE ORAL at 04:53

## 2020-02-12 RX ADMIN — METRONIDAZOLE 500 MG: 500 TABLET ORAL at 04:59

## 2020-02-12 RX ADMIN — POTASSIUM CHLORIDE 40 MEQ: 1500 TABLET, EXTENDED RELEASE ORAL at 12:51

## 2020-02-12 RX ADMIN — LOSARTAN POTASSIUM 100 MG: 25 TABLET ORAL at 17:24

## 2020-02-12 RX ADMIN — METOPROLOL TARTRATE 100 MG: 50 TABLET, FILM COATED ORAL at 17:25

## 2020-02-12 RX ADMIN — METRONIDAZOLE 500 MG: 500 TABLET ORAL at 14:00

## 2020-02-12 RX ADMIN — HYDROCODONE BITARTRATE AND ACETAMINOPHEN 1 TABLET: 10; 325 TABLET ORAL at 07:50

## 2020-02-12 RX ADMIN — MORPHINE SULFATE 2 MG: 4 INJECTION INTRAVENOUS at 12:51

## 2020-02-12 ASSESSMENT — ENCOUNTER SYMPTOMS
DIAPHORESIS: 0
MEMORY LOSS: 0
SPUTUM PRODUCTION: 0
SENSORY CHANGE: 0
DIZZINESS: 0
SPEECH CHANGE: 0
PALPITATIONS: 0
SORE THROAT: 0
FEVER: 0
COUGH: 0
ORTHOPNEA: 0
SHORTNESS OF BREATH: 0
PHOTOPHOBIA: 0
CLAUDICATION: 0
CHILLS: 0
STRIDOR: 0
WHEEZING: 0
DOUBLE VISION: 0
BACK PAIN: 0
NAUSEA: 0
TINGLING: 0
BLURRED VISION: 0
BLOOD IN STOOL: 0
VOMITING: 0
EYE PAIN: 0
PND: 0
TREMORS: 0
NECK PAIN: 0
DIARRHEA: 0
CONSTIPATION: 0
NERVOUS/ANXIOUS: 0
ABDOMINAL PAIN: 1
HEADACHES: 0
WEAKNESS: 0
DEPRESSION: 0
HEARTBURN: 0
HEMOPTYSIS: 0
MYALGIAS: 0

## 2020-02-12 NOTE — PROGRESS NOTES
Report received from GILLIAN Flores. Pt resting in bed, medicated for generalized neck/back/knee pain. NPO since midnight, POC discussed. Pt encouraged use of call light with any and all needs and agrees. Will continue with care.

## 2020-02-12 NOTE — PROGRESS NOTES
Telemetry Shift Summary    Rhythm SB-SR, converted to A fib at 1116, back to SR at 1437 w/BBB  HR Range 50-70 when SB-SR, 150s when A fib   Ectopy R PVCs  Measurements .12/.14/.42        Normal Values  Rhythm SR  HR Range    Measurements 0.12-0.20 / 0.06-0.10  / 0.30-0.52

## 2020-02-12 NOTE — PROGRESS NOTES
Took report from Ulices FRENCH, pt is sitting up in bed. Pt asked for pain medication, will medicate according to MAR. No further needs at this time. Bed in low locked position, call light in reach, will continue to monitor.

## 2020-02-12 NOTE — PROGRESS NOTES
Pt had 11 beats of Vtach, pt is asymptomatic sleeping in bed. Notified MD Karen Bishop. No new orders at this time, will continue to monitor.

## 2020-02-12 NOTE — PROGRESS NOTES
Hospital Medicine Daily Progress Note    Date of Service  2/12/2020    Chief Complaint  74 y.o. female admitted 2/8/2020 with abdominal pain    Hospital Course    This is a 73 yo female who presented 2/8/2020 with abdominal pain across the abdomen increasing in severity and worse with leaning forward. She felt flushing through her body and nausea associated with the pain. She has a remote history of cholecystectomy but had endoscopy for stone removal with Dr. Louie from GI in 2018.  She met sepsis criteria on admission and was treated appropriately for this. An abdominal ultrasound showed a dilated common bile duct with retained stone.      Interval Problem Update  Patient reports still having mild abdominal pain, 3/10, no exacerbating or relieving factors.  In addition in the OR prior to undergoing ERCP, patient developed Atrial fibrillation, being monitored per staff.   Echo, TSH, pending.    2/12  No acute overnight events, patient in normal sinus rhythm.  Awaiting for ERCP today    Consultants/Specialty  GI Dr. Urias  Cardiology Dr. Boston    Code Status  DNR    Disposition  TBD    Review of Systems  Review of Systems   Constitutional: Negative for chills, diaphoresis, fever and malaise/fatigue.   HENT: Negative for congestion, hearing loss, sore throat and tinnitus.    Eyes: Negative for blurred vision, double vision, photophobia and pain.   Respiratory: Negative for cough, hemoptysis, sputum production, shortness of breath, wheezing and stridor.    Cardiovascular: Negative for chest pain, palpitations, orthopnea, claudication and PND.   Gastrointestinal: Positive for abdominal pain (less). Negative for blood in stool, constipation, diarrhea, heartburn, melena, nausea and vomiting.   Genitourinary: Negative for dysuria, frequency, hematuria and urgency.   Musculoskeletal: Negative for back pain, myalgias and neck pain.        Abdominal pain radiates into the back   Neurological: Negative for dizziness,  tingling, tremors, sensory change, speech change, weakness and headaches.   Psychiatric/Behavioral: Negative for depression, memory loss and suicidal ideas. The patient is not nervous/anxious.    All other systems reviewed and are negative.       Physical Exam  Temp:  [36.4 °C (97.5 °F)-36.7 °C (98 °F)] 36.4 °C (97.6 °F)  Pulse:  [] 59  Resp:  [16-18] 16  BP: (121-180)/() 128/69  SpO2:  [94 %-97 %] 95 %    Physical Exam  Vitals signs reviewed.   Constitutional:       General: She is not in acute distress.     Appearance: Normal appearance. She is obese. She is not ill-appearing, toxic-appearing or diaphoretic.   HENT:      Head: Normocephalic and atraumatic.      Nose: No congestion.      Mouth/Throat:      Mouth: Mucous membranes are moist.      Pharynx: No oropharyngeal exudate or posterior oropharyngeal erythema.   Eyes:      Extraocular Movements: Extraocular movements intact.      Conjunctiva/sclera: Conjunctivae normal.      Pupils: Pupils are equal, round, and reactive to light.   Neck:      Musculoskeletal: Normal range of motion and neck supple. No neck rigidity.   Cardiovascular:      Rate and Rhythm: Normal rate and regular rhythm.      Pulses: Normal pulses.      Heart sounds: No murmur.   Pulmonary:      Effort: Pulmonary effort is normal. No respiratory distress.      Breath sounds: Normal breath sounds. No wheezing or rales.   Abdominal:      General: Abdomen is flat. Bowel sounds are normal. There is no distension.      Palpations: Abdomen is soft.      Tenderness: There is abdominal tenderness. There is no guarding.   Musculoskeletal: Normal range of motion.         General: No swelling or tenderness.   Skin:     General: Skin is warm and dry.      Capillary Refill: Capillary refill takes less than 2 seconds.      Findings: Bruising (Over anti cubital area, improving ) present.   Neurological:      General: No focal deficit present.      Mental Status: She is alert and oriented to  person, place, and time. Mental status is at baseline.      Cranial Nerves: No cranial nerve deficit.   Psychiatric:         Mood and Affect: Mood normal.         Thought Content: Thought content normal.         Fluids    Intake/Output Summary (Last 24 hours) at 2/12/2020 1152  Last data filed at 2/12/2020 0800  Gross per 24 hour   Intake 200 ml   Output 400 ml   Net -200 ml       Laboratory  Recent Labs     02/11/20  0304 02/12/20  0237   WBC 12.0* 9.6   RBC 5.00 5.00   HEMOGLOBIN 14.7 14.7   HEMATOCRIT 46.4 46.6   MCV 92.8 93.2   MCH 29.4 29.4   MCHC 31.7* 31.5*   RDW 44.9 44.2   PLATELETCT 162* 165   MPV 9.5 9.5     Recent Labs     02/10/20  0326 02/11/20  0304 02/12/20  0237   SODIUM 137 137 140   POTASSIUM 3.4* 3.6 3.3*   CHLORIDE 100 99 99   CO2 24 27 29   GLUCOSE 87 107* 104*   BUN 10 16 16   CREATININE 0.66 0.69 0.79   CALCIUM 8.6 8.7 9.0                   Imaging  EC-ECHOCARDIOGRAM COMPLETE W/O CONT   Final Result      US-RUQ   Final Result      1.  Post cholecystectomy.      2.  There is biliary ductal dilatation and apparent distal common bile duct stone measuring slightly less than 1 cm in diameter.      DX-CHEST-PORTABLE (1 VIEW)   Final Result         1. No acute cardiopulmonary abnormalities are identified.           Assessment/Plan  Gram-negative bacteremia  Assessment & Plan  2/2 to biliary obstruction  ERCP pending again today  Resume IV antibiotics  Blood cultures + Ecoli, repeat blood cultures negative thus far.    Common bile duct stone  Assessment & Plan  Hx of cholecystectomy.  Waiting for ERCP today    SVT (supraventricular tachycardia) (HCC)  Assessment & Plan  Resolved.      Grade II diastolic dysfunction- echo 2017 with preserved EF- (present on admission)  Assessment & Plan  Not in acute exacerbation  Resume Metoprolol and losartan as well as Cardizem    Sepsis due to undetermined organism (HCC)  Assessment & Plan  This is Sepsis Present on admission  SIRS criteria identified on my  evaluation include: Fever, with temperature greater than 101 deg F, Tachycardia, with heart rate greater than 90 BPM, Tachypnea, with respirations greater than 20 per minute and Leukocyosis, with WBC greater than 12,000  Source is abdominal  Sepsis protocol initiated on admission  Fluid resuscitation ordered per protocol  IV antibiotics as appropriate for source of sepsis  While organ dysfunction may be noted elsewhere in this problem list or in the chart, degree of organ dysfunction does not meet CMS criteria for severe sepsis  Sepsis resolved at this time  Continue with IV antibiotics      Chronic midline low back pain without sciatica- norco chronic daily use; nv pain and spine- (present on admission)  Assessment & Plan  Continue with pain control     Essential hypertension- (present on admission)  Assessment & Plan  Controlled  Resume home dose of norvasc, metoprolol and losartan and hctz       BMI 37.0-37.9, adult- (present on admission)  Assessment & Plan  Body mass index is 37.8 kg/m².  Encourage healthy lifestyle and physical activity.    New Onset Atrial fibrillation- (present on admission)  Assessment & Plan  Prior hx of SVT? New onset?  Now in NSR, resolved.  TSH within normal limit  Cardiology was consulted, they recommended 120 mg of Cardizem daily on top of metoprolol 100 mg twice daily  IV PRN Cardizem as needed  I had a long discussion with the patient in terms of initiating anticoagulation which patient was okay with once patient undergoes ERCP.    Hypokalemia- (present on admission)  Assessment & Plan  Potassium supplementation ordered  Expect increase of 0.1 mEq/L per each 10 mEq given  Will recheck after supplementation  Lab Results   Component Value Date/Time    POTASSIUM 3.3 (L) 02/12/2020 02:37 AM    Recheck bmp in the am for changes        The total time spent face to face with this patient was about 35 mins of which 60% of time was spent on counseling, review of records including pertinent  lab data and studies, as well as discussing diagnostic evaluation and work up, planned therapeutic interventions and future disposition of care. Where indicated, the assessment and plan reflect discussion of patient with consultants, other healthcare providers, family members, and additional research needed to obtain further information in formulating the plan of care of this patient.       VTE prophylaxis: lovenox

## 2020-02-12 NOTE — PROGRESS NOTES
Report given to Sandra FRENCH and Anat FRENCH. Plan of care discussed. Patient resting comfortably in bed. Safety precautions in place.

## 2020-02-12 NOTE — PROGRESS NOTES
Telemetry Strip       Measurements from am strip were as follows:  Rhythm: SB-SR  HR: 54-76  Measurements: 0.18/ 0.12/ 0.40  Ectopy: o-r-f PVC, 7 beats VT             Normal Values  Rhythm SR  HR Range    Measurements 0.12-0.20 / 0.06-0.10  / 0.30-0.52

## 2020-02-12 NOTE — PROGRESS NOTES
Pt still in SR. Pt resting comfortably in bed. Bed in low, locked position with call bell in reach.

## 2020-02-12 NOTE — CONSULTS
Cardiology Initial Consultation    Date of Service  2/11/2020    Referring Physician  Sara Birmingham M.D.    Reason for Consultation  Atrial fibrillation with RVR    History of Presenting Illness  Rufina Macias is a 74 y.o. female who presented 2/8/2020 with mid back pain with a sensation of sharp pain shooting down her legs and upper arms.  She felt poorly enough to call EMS, they commented on an abnormality on her ECG.  She decided to stay at home for 1 more day to make arrangements for her dog.  The next day she noted fevers and chills.  She presented the emergency room with signs of sepsis.  She has had a few episodes of atrial fibrillation and flutter, heart rate up to 150.    She was found to have retained gallstone despite prior history of cholecystectomy.  She was to undergo EGD for removal of stone.  She has responded to antibiotics.  She went back into fast atrial fibrillation just before the procedure and stayed in A. fib for some time between 3 and 4 hours.  During that time she notes fatigue and heart fluttering.    Prior to this, she is rarely had heart fluttering.  Previously evaluated in cardiology clinic for shortness of breath and hypertension.  She is known to have COPD, not made significantly worse by metoprolol.  Uses inhalers, does not need oxygen.  Had normal PET nuclear stress test in 2017.  Has hypertension at home, has been on metoprolol tartrate 100 twice daily.  Echocardiogram here shows normal EF and normal RVSP with normal size left atrium.  She went into A. fib and flutter despite being on metoprolol 100 mg twice daily, only missed dose was Saturday morning.    Has been hypertensive at times.    Hemoglobin 14.7, blood is 162.  Potassium 3.6, creatinine 0.69.  TSH 1.95    Prior to this admission, she is not limited by chest pain, pressure or tightness with activity.   Has mild chronic dyspnea on exertion related to COPD was is unchanged, no orthopnea or lower extremity  swelling.   Rare and brief palpitations.  No lightheadedness, or presyncope/syncope.   No symptoms of leg claudication.   No stroke/TIA like symptoms.      Review of Systems  Review of Systems   All other systems reviewed and are negative.      Past Medical History   has a past medical history of Allergy, Arthritis, Asthma, Back pain, Breath shortness, Bronchitis, CA - cancer of uterus, Cancer (HCC) (2010), Carpal tunnel syndrome, COPD, Diverticula of colon, Heart burn, High cholesterol, Hyperlipidemia, Hypertension, Pneumonia (1993), and Tremor, hereditary, benign. She also has no past medical history of Addisons disease (HCC), Adrenal disorder, Anemia, Anxiety, Blood transfusion, Clotting disorder, Cushings syndrome, Depression, GERD (gastroesophageal reflux disease), Goiter, Headache(784.0), HIV (human immunodeficiency virus infection), IBD (inflammatory bowel disease), Meningitis, Migraine, OSTEOPOROSIS, Parathyroid disorder (HCC), Pituitary disease (HCC), Substance abuse ([facility]), Ulcer, or Urinary tract infection, site not specified.    Surgical History   has a past surgical history that includes hysterectomy, total abdominal (1/18/10); open reduction; abdominal hysterectomy total (Jan 2010); oophorectomy (Jan 2010); aditi by laparoscopy (july, 2015); ercp (2/15/2018); common bile duct exploration (02/15/2018); ercp w/ insertion stent/tube (02/15/2018); ercp w/sphincterotomy/papill. (02/15/2018); ercp w/removal calculus (02/15/2018); ercp (4/5/2018); ercp w/sphincterotomy/papill. (4/5/2018); ercp w/ insertion stent/tube (4/5/2018); and ercp w/removal calculus (4/5/2018).    Family History  family history includes Cancer in her father, paternal grandfather, and paternal grandmother; Heart Disease (age of onset: 45) in her father; Hypertension in her father; Lung Disease in her father; Stroke (age of onset: 70) in her father.    Social History   reports that she quit smoking about 29 years ago. She has never used  smokeless tobacco. She reports that she does not drink alcohol or use drugs.    Medications  Prior to Admission Medications   Prescriptions Last Dose Informant Patient Reported? Taking?   Calcium Carbonate-Vit D-Min (CALCIUM 1200 PO) 2/7/2020 at PM Family Member Yes No   Sig: Take 1,200 mg by mouth every day.   Cholecalciferol (VITAMIN D) 2000 UNITS Cap 2/7/2020 at PM Family Member No No   Sig: Take 1 Cap by mouth every day.   FIBER PO 2/7/2020 at PM Family Member Yes No   Sig: Take 2 Caps by mouth every day.   Magnesium 400 MG Tab 2/7/2020 at PM Family Member Yes No   Sig: Take 400 mg by mouth every day.   amLODIPine (NORVASC) 5 MG Tab 2/7/2020 at PM Family Member No No   Sig: Take 1 Tab by mouth every day.   hydrocodone/acetaminophen (NORCO)  MG Tab 2/7/2020 at PM Family Member Yes No   Sig: TAKE ONE TABLET BY MOUTH FOUR TIMES A DAY AS NEEDED 30 DAY SUPPLY   ipratropium-albuterol (COMBIVENT RESPIMAT)  MCG/ACT Aero Soln PRN at PRN Family Member No No   Sig: Inhale 1 Puff by mouth every 6 hours as needed (shortness of breath).   losartan (COZAAR) 100 MG Tab 2/7/2020 at PM Family Member No No   Sig: Take 1 Tab by mouth every day.   metoprolol (LOPRESSOR) 100 MG Tab 2/7/2020 at PM Family Member No No   Sig: Take 1 Tab by mouth 2 times a day.   simvastatin (ZOCOR) 20 MG Tab 2/7/2020 at PM Family Member No No   Sig: Take 1 Tab by mouth every evening.   triamterene-hctz (MAXZIDE-25/DYAZIDE) 37.5-25 MG Tab FEW DAYS AGO at UNK Family Member No No   Sig: Take 1 Tab by mouth every day.   Patient taking differently: Take 1 Tab by mouth every day. Take if ankle/knee are swollen   zolpidem (AMBIEN) 10 MG Tab 2/7/2020 at PM Family Member Yes Yes   Sig: Take 10 mg by mouth at bedtime as needed for Sleep.      Facility-Administered Medications: None       Allergies  Allergies   Allergen Reactions   • Codeine Swelling   • Ace Inhibitors      Cough       Vital signs in last 24 hours  Temp:  [36.6 °C (97.9 °F)-37 °C (98.6  °F)] 37 °C (98.6 °F)  Pulse:  [] 78  Resp:  [16-18] 16  BP: (121-183)/() 121/86  SpO2:  [95 %-99 %] 95 %    Physical Exam  Physical Exam     General: No acute distress. Well nourished.  Wearing oxygen.  HEENT: EOM grossly intact, no scleral icterus, no pharyngeal erythema.   Neck:  No JVD, no bruits, trachea midline  CVS: RRR. Normal S1, S2.  1 out of 6 systolic murmur at the right sternal border no LE edema.  2+ radial pulses, 2+ PT pulses  Resp: Coarse breath sounds at the bases, mild prolonged expiratory phase, grossly normal work of breathing  Abdomen: Soft, no toño tenderness to palpation, no toño hepatomegaly.  MSK/Ext: No clubbing or cyanosis.  Skin: Warm and dry, no rashes.  Neurological: CN III-XII grossly intact. No focal deficits.   Psych: A&O x 3, appropriate affect, good judgement      Lab Review  Lab Results   Component Value Date/Time    WBC 12.0 (H) 02/11/2020 03:04 AM    WBC 7.6 12/02/2010 12:00 AM    RBC 5.00 02/11/2020 03:04 AM    RBC 4.92 12/02/2010 12:00 AM    HEMOGLOBIN 14.7 02/11/2020 03:04 AM    HEMATOCRIT 46.4 02/11/2020 03:04 AM    MCV 92.8 02/11/2020 03:04 AM    MCV 89 12/02/2010 12:00 AM    MCH 29.4 02/11/2020 03:04 AM    MCH 31.3 12/02/2010 12:00 AM    MCHC 31.7 (L) 02/11/2020 03:04 AM    MPV 9.5 02/11/2020 03:04 AM      Lab Results   Component Value Date/Time    SODIUM 137 02/11/2020 03:04 AM    POTASSIUM 3.6 02/11/2020 03:04 AM    CHLORIDE 99 02/11/2020 03:04 AM    CO2 27 02/11/2020 03:04 AM    GLUCOSE 107 (H) 02/11/2020 03:04 AM    BUN 16 02/11/2020 03:04 AM    CREATININE 0.69 02/11/2020 03:04 AM    CREATININE 0.80 12/02/2010 12:00 AM    BUNCREATRAT 20 12/02/2010 12:00 AM    GLOMRATE >59 12/02/2010 12:00 AM      Lab Results   Component Value Date/Time    ASTSGOT 24 02/11/2020 03:04 AM    ALTSGPT 54 (H) 02/11/2020 03:04 AM     Lab Results   Component Value Date/Time    CHOLSTRLTOT 136 01/02/2020 01:15 PM    LDL 72 01/02/2020 01:15 PM    HDL 47 01/02/2020 01:15 PM     TRIGLYCERIDE 86 01/02/2020 01:15 PM    TROPONINT 15 02/08/2020 06:40 AM       No results for input(s): NTPROBNP in the last 72 hours.    Cardiac Imaging and Procedures Review  EKG:  My personal interpretation of the EKG dated 2/11/2020 is atrial fibrillation, rate 148, right bundle branch block    EKG 2/9/2020 atrial fibrillation, rate 131    ECG 2/8/2020 atrial flutter with 2-1 block, rate 153    EKG 2/8/2020  Sinus tachycardia, rate 104, right bundle branch block, borderline left posterior fascicular block    Echocardiogram: 2/11/2020  Normal left ventricular systolic function. Left ventricular ejection   fraction is visually estimated to be 60%.   Normal diastolic function.  Right ventricular systolic pressure is estimated to be 25 mmHg.  Normal-sized left atrium    Imaging  Chest X-Ray: 2/8/2020  1. No acute cardiopulmonary abnormalities are identified.    PET stress Test: 8/7/2017  CONCLUSIONS AND IMPRESSIONS:   Normal perfusion on PET with normal function of the left  ventricle.  No evidence of ischemia or infarction.       Assessment/Plan  -Atrial fibrillation and flutter with RVR  -Chads 2 vascular score of 3, no prior stroke or TIA  -Hypertension  -Right bundle branch block  -COPD        Plan:  -Continue metoprolol 100 mg twice daily, add Cardizem 120 mg daily, continue to use calcium channel blocker with metoprolol until A. fib is suppressed, I have also added as needed short acting Cardizem every 4 hours.  -Eventually anticoagulation for chads 2 vascular score of 3, no prior stroke, I discussed this with her today, I recommend trying Eliquis or Xarelto, or referral to anticoagulant for warfarin.  -There are no restrictions taking her for her procedure other than controlling her atrial fibrillation.  Would be ideal to give her IV amiodarone intraoperative if needed.  -I will arrange for follow-up as an outpatient but please recall me to see the patient as needed if her A. fib and flutter are not  controlled.    Discussed with Dr. Sara Birmingham M.D.    Thank you for allowing me to participate in the care of this patient.    Cardiology will sign off on this patient    Please contact me with any questions.    Isabel Boston M.D.   Cardiologist, [facility]  [phone]

## 2020-02-12 NOTE — CARE PLAN
Problem: Knowledge Deficit  Goal: Knowledge of disease process/condition, treatment plan, diagnostic tests, and medications will improve  Outcome: PROGRESSING AS EXPECTED  Note: POC discussed with patient and daughter; both verbalize understanding of meds/treatments/etc     Problem: Urinary Elimination:  Goal: Ability to reestablish a normal urinary elimination pattern will improve  Outcome: PROGRESSING AS EXPECTED  Flowsheets (Taken 2/12/2020 1142)  Urinary Elimination: Frequency

## 2020-02-12 NOTE — CARE PLAN
Problem: Safety  Goal: Will remain free from falls  Outcome: PROGRESSING AS EXPECTED  Note:   Pt ambulated to bathroom using 4 wheel walker. Pt is steady and requires no assistance.      Problem: Pain Management  Goal: Pain level will decrease to patient's comfort goal  Outcome: PROGRESSING AS EXPECTED  Note:   Pt is being treated for pain according to MAR. Pain well controlled

## 2020-02-13 LAB
ALBUMIN SERPL BCP-MCNC: 3.2 G/DL (ref 3.2–4.9)
ALBUMIN/GLOB SERPL: 1.2 G/DL
ALP SERPL-CCNC: 76 U/L (ref 30–99)
ALT SERPL-CCNC: 48 U/L (ref 2–50)
ANION GAP SERPL CALC-SCNC: 11 MMOL/L (ref 0–11.9)
AST SERPL-CCNC: 38 U/L (ref 12–45)
BACTERIA BLD CULT: NORMAL
BASOPHILS # BLD AUTO: 0.3 % (ref 0–1.8)
BASOPHILS # BLD: 0.03 K/UL (ref 0–0.12)
BILIRUB SERPL-MCNC: 0.3 MG/DL (ref 0.1–1.5)
BUN SERPL-MCNC: 22 MG/DL (ref 8–22)
CALCIUM SERPL-MCNC: 9 MG/DL (ref 8.4–10.2)
CHLORIDE SERPL-SCNC: 102 MMOL/L (ref 96–112)
CO2 SERPL-SCNC: 26 MMOL/L (ref 20–33)
CREAT SERPL-MCNC: 0.85 MG/DL (ref 0.5–1.4)
EOSINOPHIL # BLD AUTO: 0.12 K/UL (ref 0–0.51)
EOSINOPHIL NFR BLD: 1.1 % (ref 0–6.9)
ERYTHROCYTE [DISTWIDTH] IN BLOOD BY AUTOMATED COUNT: 46.7 FL (ref 35.9–50)
GLOBULIN SER CALC-MCNC: 2.7 G/DL (ref 1.9–3.5)
GLUCOSE SERPL-MCNC: 110 MG/DL (ref 65–99)
HCT VFR BLD AUTO: 48.7 % (ref 37–47)
HGB BLD-MCNC: 15 G/DL (ref 12–16)
IMM GRANULOCYTES # BLD AUTO: 0.04 K/UL (ref 0–0.11)
IMM GRANULOCYTES NFR BLD AUTO: 0.4 % (ref 0–0.9)
LYMPHOCYTES # BLD AUTO: 1.68 K/UL (ref 1–4.8)
LYMPHOCYTES NFR BLD: 15.4 % (ref 22–41)
MAGNESIUM SERPL-MCNC: 2.1 MG/DL (ref 1.5–2.5)
MCH RBC QN AUTO: 29.6 PG (ref 27–33)
MCHC RBC AUTO-ENTMCNC: 30.8 G/DL (ref 33.6–35)
MCV RBC AUTO: 96.2 FL (ref 81.4–97.8)
MONOCYTES # BLD AUTO: 0.97 K/UL (ref 0–0.85)
MONOCYTES NFR BLD AUTO: 8.9 % (ref 0–13.4)
NEUTROPHILS # BLD AUTO: 8.05 K/UL (ref 2–7.15)
NEUTROPHILS NFR BLD: 73.9 % (ref 44–72)
NRBC # BLD AUTO: 0 K/UL
NRBC BLD-RTO: 0 /100 WBC
PLATELET # BLD AUTO: 182 K/UL (ref 164–446)
PMV BLD AUTO: 9.8 FL (ref 9–12.9)
POTASSIUM SERPL-SCNC: 3.6 MMOL/L (ref 3.6–5.5)
PROT SERPL-MCNC: 5.9 G/DL (ref 6–8.2)
RBC # BLD AUTO: 5.06 M/UL (ref 4.2–5.4)
SIGNIFICANT IND 70042: NORMAL
SITE SITE: NORMAL
SODIUM SERPL-SCNC: 139 MMOL/L (ref 135–145)
SOURCE SOURCE: NORMAL
WBC # BLD AUTO: 10.9 K/UL (ref 4.8–10.8)

## 2020-02-13 PROCEDURE — A9270 NON-COVERED ITEM OR SERVICE: HCPCS | Performed by: INTERNAL MEDICINE

## 2020-02-13 PROCEDURE — 99233 SBSQ HOSP IP/OBS HIGH 50: CPT | Performed by: HOSPITALIST

## 2020-02-13 PROCEDURE — 700102 HCHG RX REV CODE 250 W/ 637 OVERRIDE(OP): Performed by: INTERNAL MEDICINE

## 2020-02-13 PROCEDURE — 80053 COMPREHEN METABOLIC PANEL: CPT

## 2020-02-13 PROCEDURE — 83735 ASSAY OF MAGNESIUM: CPT

## 2020-02-13 PROCEDURE — 700105 HCHG RX REV CODE 258: Performed by: INTERNAL MEDICINE

## 2020-02-13 PROCEDURE — 700111 HCHG RX REV CODE 636 W/ 250 OVERRIDE (IP): Performed by: HOSPITALIST

## 2020-02-13 PROCEDURE — 700101 HCHG RX REV CODE 250: Performed by: HOSPITALIST

## 2020-02-13 PROCEDURE — 700111 HCHG RX REV CODE 636 W/ 250 OVERRIDE (IP): Performed by: INTERNAL MEDICINE

## 2020-02-13 PROCEDURE — 85025 COMPLETE CBC W/AUTO DIFF WBC: CPT

## 2020-02-13 PROCEDURE — 99232 SBSQ HOSP IP/OBS MODERATE 35: CPT | Performed by: INTERNAL MEDICINE

## 2020-02-13 PROCEDURE — 770020 HCHG ROOM/CARE - TELE (206)

## 2020-02-13 RX ORDER — METOPROLOL TARTRATE 1 MG/ML
5 INJECTION, SOLUTION INTRAVENOUS ONCE
Status: COMPLETED | OUTPATIENT
Start: 2020-02-13 | End: 2020-02-13

## 2020-02-13 RX ORDER — DILTIAZEM HYDROCHLORIDE 120 MG/1
240 CAPSULE, COATED, EXTENDED RELEASE ORAL
Status: DISCONTINUED | OUTPATIENT
Start: 2020-02-14 | End: 2020-02-13

## 2020-02-13 RX ORDER — DILTIAZEM HYDROCHLORIDE 120 MG/1
120 CAPSULE, COATED, EXTENDED RELEASE ORAL
Status: DISCONTINUED | OUTPATIENT
Start: 2020-02-14 | End: 2020-02-16 | Stop reason: HOSPADM

## 2020-02-13 RX ORDER — METOPROLOL TARTRATE 1 MG/ML
5 INJECTION, SOLUTION INTRAVENOUS
Status: DISCONTINUED | OUTPATIENT
Start: 2020-02-13 | End: 2020-02-16 | Stop reason: HOSPADM

## 2020-02-13 RX ORDER — DIGOXIN 0.25 MG/ML
500 INJECTION INTRAMUSCULAR; INTRAVENOUS ONCE
Status: COMPLETED | OUTPATIENT
Start: 2020-02-13 | End: 2020-02-13

## 2020-02-13 RX ORDER — DILTIAZEM HYDROCHLORIDE 5 MG/ML
10 INJECTION INTRAVENOUS ONCE
Status: COMPLETED | OUTPATIENT
Start: 2020-02-13 | End: 2020-02-13

## 2020-02-13 RX ADMIN — METOPROLOL TARTRATE 100 MG: 50 TABLET, FILM COATED ORAL at 16:55

## 2020-02-13 RX ADMIN — METRONIDAZOLE 500 MG: 500 TABLET ORAL at 13:14

## 2020-02-13 RX ADMIN — MORPHINE SULFATE 2 MG: 4 INJECTION INTRAVENOUS at 08:02

## 2020-02-13 RX ADMIN — METRONIDAZOLE 500 MG: 500 TABLET ORAL at 05:51

## 2020-02-13 RX ADMIN — DILTIAZEM HYDROCHLORIDE 120 MG: 120 CAPSULE, COATED, EXTENDED RELEASE ORAL at 05:51

## 2020-02-13 RX ADMIN — HYDROCODONE BITARTRATE AND ACETAMINOPHEN 1 TABLET: 10; 325 TABLET ORAL at 16:55

## 2020-02-13 RX ADMIN — MORPHINE SULFATE 2 MG: 4 INJECTION INTRAVENOUS at 19:51

## 2020-02-13 RX ADMIN — HYDROCODONE BITARTRATE AND ACETAMINOPHEN 1 TABLET: 10; 325 TABLET ORAL at 10:56

## 2020-02-13 RX ADMIN — METRONIDAZOLE 500 MG: 500 TABLET ORAL at 23:08

## 2020-02-13 RX ADMIN — DIGOXIN 500 MCG: 0.25 INJECTION INTRAMUSCULAR; INTRAVENOUS at 14:43

## 2020-02-13 RX ADMIN — ZOLPIDEM TARTRATE 10 MG: 5 TABLET ORAL at 19:56

## 2020-02-13 RX ADMIN — METOPROLOL TARTRATE 5 MG: 1 INJECTION, SOLUTION INTRAVENOUS at 14:18

## 2020-02-13 RX ADMIN — MELATONIN 1000 UNITS: at 16:55

## 2020-02-13 RX ADMIN — MORPHINE SULFATE 2 MG: 4 INJECTION INTRAVENOUS at 13:14

## 2020-02-13 RX ADMIN — ONDANSETRON 4 MG: 2 INJECTION INTRAMUSCULAR; INTRAVENOUS at 06:48

## 2020-02-13 RX ADMIN — LOSARTAN POTASSIUM 100 MG: 25 TABLET ORAL at 16:55

## 2020-02-13 RX ADMIN — HYDROCODONE BITARTRATE AND ACETAMINOPHEN 1 TABLET: 10; 325 TABLET ORAL at 04:42

## 2020-02-13 RX ADMIN — DILTIAZEM HYDROCHLORIDE 30 MG: 30 TABLET, FILM COATED ORAL at 13:27

## 2020-02-13 RX ADMIN — CEFTRIAXONE SODIUM 2 G: 2 INJECTION, POWDER, FOR SOLUTION INTRAMUSCULAR; INTRAVENOUS at 05:40

## 2020-02-13 RX ADMIN — POTASSIUM CHLORIDE 20 MEQ: 20 TABLET, EXTENDED RELEASE ORAL at 05:52

## 2020-02-13 RX ADMIN — DILTIAZEM HYDROCHLORIDE 10 MG: 5 INJECTION INTRAVENOUS at 15:20

## 2020-02-13 RX ADMIN — HYDROCODONE BITARTRATE AND ACETAMINOPHEN 1 TABLET: 10; 325 TABLET ORAL at 23:08

## 2020-02-13 ASSESSMENT — ENCOUNTER SYMPTOMS
HEADACHES: 0
WHEEZING: 0
BLOOD IN STOOL: 0
DIAPHORESIS: 0
TREMORS: 0
CONSTIPATION: 0
MUSCULOSKELETAL NEGATIVE: 1
CONSTITUTIONAL NEGATIVE: 1
SPUTUM PRODUCTION: 0
PALPITATIONS: 1
ORTHOPNEA: 0
EYE PAIN: 0
MEMORY LOSS: 0
NECK PAIN: 0
PND: 0
SPEECH CHANGE: 0
VOMITING: 0
FEVER: 0
SHORTNESS OF BREATH: 0
CHILLS: 0
DIZZINESS: 0
HEMOPTYSIS: 0
DIARRHEA: 0
RESPIRATORY NEGATIVE: 1
MYALGIAS: 0
DOUBLE VISION: 0
PALPITATIONS: 0
TINGLING: 0
PHOTOPHOBIA: 0
BACK PAIN: 0
NERVOUS/ANXIOUS: 1
EYES NEGATIVE: 1
COUGH: 0
SORE THROAT: 0
DEPRESSION: 0
GASTROINTESTINAL NEGATIVE: 1
WEAKNESS: 0
NAUSEA: 0
ABDOMINAL PAIN: 1
HEARTBURN: 0
CLAUDICATION: 0
STRIDOR: 0
SENSORY CHANGE: 0
BLURRED VISION: 0

## 2020-02-13 ASSESSMENT — COGNITIVE AND FUNCTIONAL STATUS - GENERAL
MOBILITY SCORE: 24
DAILY ACTIVITIY SCORE: 24
SUGGESTED CMS G CODE MODIFIER DAILY ACTIVITY: CH
SUGGESTED CMS G CODE MODIFIER MOBILITY: CH

## 2020-02-13 NOTE — CARE PLAN
Problem: Communication  Goal: The ability to communicate needs accurately and effectively will improve  Outcome: PROGRESSING AS EXPECTED   Patient able to effectively communicate her needs.    Problem: Safety  Goal: Will remain free from injury  Outcome: PROGRESSING AS EXPECTED   Pillows in use for support and positioning. Frequent ambulation encouraged.  Frequent position changes encouraged.

## 2020-02-13 NOTE — PROGRESS NOTES
Telemetry Strip        Measurements from am strip were as follows:  Rhythm: Sinus Rhythm / A fib  HR:   Measurements:  /.12/  Ectopy: Occasional PACs and rare PVCs, RVR              Normal Values  Rhythm SR  HR Range    Measurements 0.12-0.20 / 0.06-0.10  / 0.30-0.52

## 2020-02-13 NOTE — PROGRESS NOTES
Telemetry Strip       Measurements from am strip were as follows:  Rhythm: SR-SB with BBB  HR: 51-62  Measurements: 0.16/ 0.12/ 0.42  Ectopy: r PVC, o-r PAC, SVT-AFIB-SR             Normal Values  Rhythm SR  HR Range    Measurements 0.12-0.20 / 0.06-0.10  / 0.30-0.52

## 2020-02-13 NOTE — PROGRESS NOTES
Telemetry Summary   Sinus bradycardia/sinus rhythm rate 50s-60s; right BBB    OR: 0.14  QRS: 0.14  QT: 0.44  Ectopy: rare PVCs, rare PACs

## 2020-02-13 NOTE — PROGRESS NOTES
Assumed care of patient.  Received report from NOC RN.  Verified lines.  Call light and belongings within reach.  Bed in low and locked position.  Patient awake and sitting up in bed.  Requesting morphine for breakthrough pain.  No needs at this time.

## 2020-02-13 NOTE — PROGRESS NOTES
Hospital Medicine Daily Progress Note    Date of Service  2/13/2020    Chief Complaint  74 y.o. female admitted 2/8/2020 with abdominal pain    Hospital Course    This is a 75 yo female who presented 2/8/2020 with abdominal pain across the abdomen increasing in severity and worse with leaning forward. She felt flushing through her body and nausea associated with the pain. She has a remote history of cholecystectomy but had endoscopy for stone removal with Dr. Louie from GI in 2018.  She met sepsis criteria on admission and was treated appropriately for this. An abdominal ultrasound showed a dilated common bile duct with retained stone.      Interval Problem Update  Patient reports still having mild abdominal pain, 3/10, no exacerbating or relieving factors.  In addition in the OR prior to undergoing ERCP, patient developed Atrial fibrillation, being monitored per staff.   Echo, TSH, pending.    2/12  No acute overnight events, patient in normal sinus rhythm.  Awaiting for ERCP today    2/13  No acute complaints, patient is eager to undergo an ERCP today.  Update, patient developed rapid ventricular response atrial fibrillation, PRN diltiazem has been given, will continue to monitor patient to make sure that this resolves.    Consultants/Specialty  GI Dr. Urias  Cardiology Dr. Boston    Code Status  DNR    Disposition  TBD    Review of Systems  Review of Systems   Constitutional: Negative for chills, diaphoresis, fever and malaise/fatigue.   HENT: Negative for congestion, hearing loss, sore throat and tinnitus.    Eyes: Negative for blurred vision, double vision, photophobia and pain.   Respiratory: Negative for cough, hemoptysis, sputum production, shortness of breath, wheezing and stridor.    Cardiovascular: Negative for chest pain, palpitations, orthopnea, claudication and PND.   Gastrointestinal: Positive for abdominal pain (unchanged). Negative for blood in stool, constipation, diarrhea, heartburn, melena,  nausea and vomiting.   Genitourinary: Negative for dysuria, frequency, hematuria and urgency.   Musculoskeletal: Negative for back pain, myalgias and neck pain.        Abdominal pain radiates into the back   Neurological: Negative for dizziness, tingling, tremors, sensory change, speech change, weakness and headaches.   Psychiatric/Behavioral: Negative for depression, memory loss and suicidal ideas. The patient is nervous/anxious.    All other systems reviewed and are negative.       Physical Exam  Temp:  [36.3 °C (97.4 °F)-37.1 °C (98.7 °F)] 36.3 °C (97.4 °F)  Pulse:  [56-65] 56  Resp:  [18] 18  BP: (116-136)/(56-67) 117/56  SpO2:  [93 %-98 %] 93 %    Physical Exam  Vitals signs reviewed.   Constitutional:       General: She is not in acute distress.     Appearance: Normal appearance. She is obese. She is not ill-appearing, toxic-appearing or diaphoretic.   HENT:      Head: Normocephalic and atraumatic.      Nose: No congestion or rhinorrhea.      Mouth/Throat:      Mouth: Mucous membranes are moist.      Pharynx: No oropharyngeal exudate or posterior oropharyngeal erythema.   Eyes:      Extraocular Movements: Extraocular movements intact.      Conjunctiva/sclera: Conjunctivae normal.      Pupils: Pupils are equal, round, and reactive to light.   Neck:      Musculoskeletal: Normal range of motion and neck supple. No neck rigidity.   Cardiovascular:      Rate and Rhythm: Normal rate and regular rhythm.      Pulses: Normal pulses.      Heart sounds: No murmur.   Pulmonary:      Effort: Pulmonary effort is normal. No respiratory distress.      Breath sounds: Normal breath sounds. No wheezing or rales.   Abdominal:      General: Abdomen is flat. Bowel sounds are normal. There is no distension.      Palpations: Abdomen is soft. There is no mass.      Tenderness: There is abdominal tenderness (minimal). There is no guarding.      Hernia: No hernia is present.   Musculoskeletal: Normal range of motion.         General: No  swelling or tenderness.   Skin:     General: Skin is warm and dry.      Capillary Refill: Capillary refill takes less than 2 seconds.      Coloration: Skin is not jaundiced or pale.      Findings: Bruising (improving) present. No erythema.   Neurological:      General: No focal deficit present.      Mental Status: She is alert and oriented to person, place, and time. Mental status is at baseline.      Cranial Nerves: No cranial nerve deficit.   Psychiatric:         Mood and Affect: Mood normal.         Thought Content: Thought content normal.         Fluids    Intake/Output Summary (Last 24 hours) at 2/13/2020 0814  Last data filed at 2/12/2020 1725  Gross per 24 hour   Intake 500 ml   Output --   Net 500 ml       Laboratory  Recent Labs     02/11/20  0304 02/12/20  0237 02/13/20  0427   WBC 12.0* 9.6 10.9*   RBC 5.00 5.00 5.06   HEMOGLOBIN 14.7 14.7 15.0   HEMATOCRIT 46.4 46.6 48.7*   MCV 92.8 93.2 96.2   MCH 29.4 29.4 29.6   MCHC 31.7* 31.5* 30.8*   RDW 44.9 44.2 46.7   PLATELETCT 162* 165 182   MPV 9.5 9.5 9.8     Recent Labs     02/11/20  0304 02/12/20  0237 02/13/20  0427   SODIUM 137 140 139   POTASSIUM 3.6 3.3* 3.6   CHLORIDE 99 99 102   CO2 27 29 26   GLUCOSE 107* 104* 110*   BUN 16 16 22   CREATININE 0.69 0.79 0.85   CALCIUM 8.7 9.0 9.0                   Imaging  EC-ECHOCARDIOGRAM COMPLETE W/O CONT   Final Result      US-RUQ   Final Result      1.  Post cholecystectomy.      2.  There is biliary ductal dilatation and apparent distal common bile duct stone measuring slightly less than 1 cm in diameter.      DX-CHEST-PORTABLE (1 VIEW)   Final Result         1. No acute cardiopulmonary abnormalities are identified.           Assessment/Plan  Gram-negative bacteremia  Assessment & Plan  2/2 to biliary obstruction  ERCP pending , hopefully can be done today by GI.  Resume IV antibiotics  Blood cultures + Ecoli, repeat blood cultures negative thus far.    Common bile duct stone  Assessment & Plan  Hx of  cholecystectomy.  Waiting for ERCP     SVT (supraventricular tachycardia) (HCC)  Assessment & Plan  Resolved.    Grade II diastolic dysfunction- echo 2017 with preserved EF- (present on admission)  Assessment & Plan  Not in acute exacerbation  Resume Metoprolol and losartan as well as Cardizem    Sepsis due to undetermined organism (HCC)  Assessment & Plan  This is Sepsis Present on admission  SIRS criteria identified on my evaluation include: Fever, with temperature greater than 101 deg F, Tachycardia, with heart rate greater than 90 BPM, Tachypnea, with respirations greater than 20 per minute and Leukocyosis, with WBC greater than 12,000  Source is abdominal  Sepsis protocol initiated on admission  Fluid resuscitation ordered per protocol  IV antibiotics as appropriate for source of sepsis  While organ dysfunction may be noted elsewhere in this problem list or in the chart, degree of organ dysfunction does not meet CMS criteria for severe sepsis  Sepsis resolved at this time  Continue with IV antibiotics for now until GI procedure.      Chronic midline low back pain without sciatica- norco chronic daily use; nv pain and spine- (present on admission)  Assessment & Plan  Continue with pain control     Essential hypertension- (present on admission)  Assessment & Plan  Controlled  Resume home dose of norvasc, metoprolol and losartan and hctz       BMI 37.0-37.9, adult- (present on admission)  Assessment & Plan  Body mass index is 37.8 kg/m².  Encourage healthy lifestyle and physical activity.    New Onset Atrial fibrillation- (present on admission)  Assessment & Plan  Resolved in NSR currently.  TSH within normal limit  Resume 120 mg of Cardizem daily on top of metoprolol 100 mg twice daily  IV PRN Cardizem as needed  I had a long discussion with the patient in terms of initiating anticoagulation which patient was okay with once patient undergoes ERCP.    Update, 1:30 PM  Patient went into atrial fibrillation with  rapid ventricular response, PRN Cardizem has been given.  Apparently patient's metoprolol was held today because of slight hypotension.     Hypokalemia- (present on admission)  Assessment & Plan  Potassium replenished.  CTM      The total time spent face to face with this patient was about 38 mins of which 60% of time was spent on counseling, review of records including pertinent lab data and studies, as well as discussing diagnostic evaluation and work up, planned therapeutic interventions and future disposition of care. Where indicated, the assessment and plan reflect discussion of patient with consultants, other healthcare providers, family members, and additional research needed to obtain further information in formulating the plan of care of this patient.       VTE prophylaxis: lovenox

## 2020-02-13 NOTE — PROGRESS NOTES
Took report from Shailesh FRENCH, pt is resting in bed and ready for pain medication at next available time. Pt has no further needs, bed in low locked position, call light in reach, will continue to monitor.

## 2020-02-13 NOTE — PROGRESS NOTES
Spoke with pre-op.  They would like this RN to call Cardiology to verify that the patient is cleared for surgery.  Spoke with Dr Boston and she is still cleared for the ERCP.  Order received for IV Cardizem 10 mg one time.

## 2020-02-13 NOTE — CARE PLAN
Problem: Safety  Goal: Will remain free from falls  Outcome: PROGRESSING AS EXPECTED  Note: Pt  uses 4 wheel walker, is up self and does not require assistance, no slip sock on, bed in low locked position, call light in reach will continue to monitor.      Problem: Pain Management  Goal: Pain level will decrease to patient's comfort goal  Outcome: PROGRESSING AS EXPECTED  Note: Pt has constant pain that is well controlled by norco. Medicated per MAR

## 2020-02-14 ENCOUNTER — ANESTHESIA EVENT (OUTPATIENT)
Dept: SURGERY | Facility: MEDICAL CENTER | Age: 75
DRG: 872 | End: 2020-02-14
Payer: MEDICARE

## 2020-02-14 ENCOUNTER — APPOINTMENT (OUTPATIENT)
Dept: RADIOLOGY | Facility: MEDICAL CENTER | Age: 75
DRG: 872 | End: 2020-02-14
Attending: INTERNAL MEDICINE
Payer: MEDICARE

## 2020-02-14 ENCOUNTER — ANESTHESIA (OUTPATIENT)
Dept: SURGERY | Facility: MEDICAL CENTER | Age: 75
DRG: 872 | End: 2020-02-14
Payer: MEDICARE

## 2020-02-14 LAB
ALBUMIN SERPL BCP-MCNC: 3.4 G/DL (ref 3.2–4.9)
ALBUMIN/GLOB SERPL: 1.3 G/DL
ALP SERPL-CCNC: 78 U/L (ref 30–99)
ALT SERPL-CCNC: 57 U/L (ref 2–50)
ANION GAP SERPL CALC-SCNC: 12 MMOL/L (ref 0–11.9)
AST SERPL-CCNC: 54 U/L (ref 12–45)
BACTERIA BLD CULT: NORMAL
BACTERIA BLD CULT: NORMAL
BASOPHILS # BLD AUTO: 0.3 % (ref 0–1.8)
BASOPHILS # BLD: 0.04 K/UL (ref 0–0.12)
BILIRUB SERPL-MCNC: 0.4 MG/DL (ref 0.1–1.5)
BUN SERPL-MCNC: 22 MG/DL (ref 8–22)
CALCIUM SERPL-MCNC: 9.2 MG/DL (ref 8.4–10.2)
CHLORIDE SERPL-SCNC: 102 MMOL/L (ref 96–112)
CO2 SERPL-SCNC: 28 MMOL/L (ref 20–33)
CREAT SERPL-MCNC: 0.86 MG/DL (ref 0.5–1.4)
EOSINOPHIL # BLD AUTO: 0.11 K/UL (ref 0–0.51)
EOSINOPHIL NFR BLD: 0.8 % (ref 0–6.9)
ERYTHROCYTE [DISTWIDTH] IN BLOOD BY AUTOMATED COUNT: 46.5 FL (ref 35.9–50)
GLOBULIN SER CALC-MCNC: 2.6 G/DL (ref 1.9–3.5)
GLUCOSE SERPL-MCNC: 100 MG/DL (ref 65–99)
HCT VFR BLD AUTO: 48.3 % (ref 37–47)
HGB BLD-MCNC: 14.8 G/DL (ref 12–16)
IMM GRANULOCYTES # BLD AUTO: 0.06 K/UL (ref 0–0.11)
IMM GRANULOCYTES NFR BLD AUTO: 0.4 % (ref 0–0.9)
LYMPHOCYTES # BLD AUTO: 1.74 K/UL (ref 1–4.8)
LYMPHOCYTES NFR BLD: 12.9 % (ref 22–41)
MCH RBC QN AUTO: 29.7 PG (ref 27–33)
MCHC RBC AUTO-ENTMCNC: 30.6 G/DL (ref 33.6–35)
MCV RBC AUTO: 97 FL (ref 81.4–97.8)
MONOCYTES # BLD AUTO: 1.15 K/UL (ref 0–0.85)
MONOCYTES NFR BLD AUTO: 8.6 % (ref 0–13.4)
NEUTROPHILS # BLD AUTO: 10.34 K/UL (ref 2–7.15)
NEUTROPHILS NFR BLD: 77 % (ref 44–72)
NRBC # BLD AUTO: 0 K/UL
NRBC BLD-RTO: 0 /100 WBC
PLATELET # BLD AUTO: 159 K/UL (ref 164–446)
PMV BLD AUTO: 9.6 FL (ref 9–12.9)
POTASSIUM SERPL-SCNC: 4.1 MMOL/L (ref 3.6–5.5)
PROT SERPL-MCNC: 6 G/DL (ref 6–8.2)
RBC # BLD AUTO: 4.98 M/UL (ref 4.2–5.4)
SIGNIFICANT IND 70042: NORMAL
SIGNIFICANT IND 70042: NORMAL
SITE SITE: NORMAL
SITE SITE: NORMAL
SODIUM SERPL-SCNC: 142 MMOL/L (ref 135–145)
SOURCE SOURCE: NORMAL
SOURCE SOURCE: NORMAL
WBC # BLD AUTO: 13.4 K/UL (ref 4.8–10.8)

## 2020-02-14 PROCEDURE — 770020 HCHG ROOM/CARE - TELE (206)

## 2020-02-14 PROCEDURE — A9270 NON-COVERED ITEM OR SERVICE: HCPCS | Performed by: INTERNAL MEDICINE

## 2020-02-14 PROCEDURE — 74328 X-RAY BILE DUCT ENDOSCOPY: CPT

## 2020-02-14 PROCEDURE — 80053 COMPREHEN METABOLIC PANEL: CPT

## 2020-02-14 PROCEDURE — 110371 HCHG SHELL REV 272: Performed by: INTERNAL MEDICINE

## 2020-02-14 PROCEDURE — 160208 HCHG ENDO MINUTES - EA ADDL 1 MIN LEVEL 4: Performed by: INTERNAL MEDICINE

## 2020-02-14 PROCEDURE — 700101 HCHG RX REV CODE 250: Performed by: ANESTHESIOLOGY

## 2020-02-14 PROCEDURE — 85025 COMPLETE CBC W/AUTO DIFF WBC: CPT

## 2020-02-14 PROCEDURE — 700111 HCHG RX REV CODE 636 W/ 250 OVERRIDE (IP): Performed by: ANESTHESIOLOGY

## 2020-02-14 PROCEDURE — 160036 HCHG PACU - EA ADDL 30 MINS PHASE I: Performed by: INTERNAL MEDICINE

## 2020-02-14 PROCEDURE — C1769 GUIDE WIRE: HCPCS | Performed by: INTERNAL MEDICINE

## 2020-02-14 PROCEDURE — 500066 HCHG BITE BLOCK, ECT: Performed by: INTERNAL MEDICINE

## 2020-02-14 PROCEDURE — 700102 HCHG RX REV CODE 250 W/ 637 OVERRIDE(OP): Performed by: INTERNAL MEDICINE

## 2020-02-14 PROCEDURE — 160009 HCHG ANES TIME/MIN: Performed by: INTERNAL MEDICINE

## 2020-02-14 PROCEDURE — BF101ZZ FLUOROSCOPY OF BILE DUCTS USING LOW OSMOLAR CONTRAST: ICD-10-PCS | Performed by: INTERNAL MEDICINE

## 2020-02-14 PROCEDURE — 160048 HCHG OR STATISTICAL LEVEL 1-5: Performed by: INTERNAL MEDICINE

## 2020-02-14 PROCEDURE — 99233 SBSQ HOSP IP/OBS HIGH 50: CPT | Performed by: HOSPITALIST

## 2020-02-14 PROCEDURE — A9270 NON-COVERED ITEM OR SERVICE: HCPCS | Performed by: HOSPITALIST

## 2020-02-14 PROCEDURE — 700111 HCHG RX REV CODE 636 W/ 250 OVERRIDE (IP): Performed by: INTERNAL MEDICINE

## 2020-02-14 PROCEDURE — 0FC98ZZ EXTIRPATION OF MATTER FROM COMMON BILE DUCT, VIA NATURAL OR ARTIFICIAL OPENING ENDOSCOPIC: ICD-10-PCS | Performed by: INTERNAL MEDICINE

## 2020-02-14 PROCEDURE — 160035 HCHG PACU - 1ST 60 MINS PHASE I: Performed by: INTERNAL MEDICINE

## 2020-02-14 PROCEDURE — 160002 HCHG RECOVERY MINUTES (STAT): Performed by: INTERNAL MEDICINE

## 2020-02-14 PROCEDURE — 700102 HCHG RX REV CODE 250 W/ 637 OVERRIDE(OP): Performed by: HOSPITALIST

## 2020-02-14 PROCEDURE — 700105 HCHG RX REV CODE 258: Performed by: ANESTHESIOLOGY

## 2020-02-14 PROCEDURE — 700105 HCHG RX REV CODE 258: Performed by: INTERNAL MEDICINE

## 2020-02-14 PROCEDURE — 160203 HCHG ENDO MINUTES - 1ST 30 MINS LEVEL 4: Performed by: INTERNAL MEDICINE

## 2020-02-14 RX ORDER — HYDROMORPHONE HYDROCHLORIDE 1 MG/ML
0.1 INJECTION, SOLUTION INTRAMUSCULAR; INTRAVENOUS; SUBCUTANEOUS
Status: DISCONTINUED | OUTPATIENT
Start: 2020-02-14 | End: 2020-02-14 | Stop reason: HOSPADM

## 2020-02-14 RX ORDER — DIPHENHYDRAMINE HYDROCHLORIDE 50 MG/ML
12.5 INJECTION INTRAMUSCULAR; INTRAVENOUS
Status: DISCONTINUED | OUTPATIENT
Start: 2020-02-14 | End: 2020-02-14 | Stop reason: HOSPADM

## 2020-02-14 RX ORDER — METOPROLOL TARTRATE 1 MG/ML
1 INJECTION, SOLUTION INTRAVENOUS
Status: DISCONTINUED | OUTPATIENT
Start: 2020-02-14 | End: 2020-02-14 | Stop reason: HOSPADM

## 2020-02-14 RX ORDER — LIDOCAINE HYDROCHLORIDE 20 MG/ML
INJECTION, SOLUTION EPIDURAL; INFILTRATION; INTRACAUDAL; PERINEURAL PRN
Status: DISCONTINUED | OUTPATIENT
Start: 2020-02-14 | End: 2020-02-14 | Stop reason: SURG

## 2020-02-14 RX ORDER — ONDANSETRON 2 MG/ML
INJECTION INTRAMUSCULAR; INTRAVENOUS PRN
Status: DISCONTINUED | OUTPATIENT
Start: 2020-02-14 | End: 2020-02-14 | Stop reason: SURG

## 2020-02-14 RX ORDER — SODIUM CHLORIDE, SODIUM LACTATE, POTASSIUM CHLORIDE, CALCIUM CHLORIDE 600; 310; 30; 20 MG/100ML; MG/100ML; MG/100ML; MG/100ML
INJECTION, SOLUTION INTRAVENOUS
Status: DISCONTINUED | OUTPATIENT
Start: 2020-02-14 | End: 2020-02-14 | Stop reason: SURG

## 2020-02-14 RX ORDER — HYDROMORPHONE HYDROCHLORIDE 1 MG/ML
0.4 INJECTION, SOLUTION INTRAMUSCULAR; INTRAVENOUS; SUBCUTANEOUS
Status: DISCONTINUED | OUTPATIENT
Start: 2020-02-14 | End: 2020-02-14 | Stop reason: HOSPADM

## 2020-02-14 RX ORDER — IPRATROPIUM BROMIDE AND ALBUTEROL SULFATE 2.5; .5 MG/3ML; MG/3ML
3 SOLUTION RESPIRATORY (INHALATION)
Status: DISCONTINUED | OUTPATIENT
Start: 2020-02-14 | End: 2020-02-14 | Stop reason: HOSPADM

## 2020-02-14 RX ORDER — HYDROMORPHONE HYDROCHLORIDE 1 MG/ML
0.2 INJECTION, SOLUTION INTRAMUSCULAR; INTRAVENOUS; SUBCUTANEOUS
Status: DISCONTINUED | OUTPATIENT
Start: 2020-02-14 | End: 2020-02-14 | Stop reason: HOSPADM

## 2020-02-14 RX ORDER — HALOPERIDOL 5 MG/ML
1 INJECTION INTRAMUSCULAR
Status: DISCONTINUED | OUTPATIENT
Start: 2020-02-14 | End: 2020-02-14 | Stop reason: HOSPADM

## 2020-02-14 RX ORDER — OXYCODONE HCL 5 MG/5 ML
5 SOLUTION, ORAL ORAL
Status: DISCONTINUED | OUTPATIENT
Start: 2020-02-14 | End: 2020-02-14 | Stop reason: HOSPADM

## 2020-02-14 RX ORDER — OXYCODONE HCL 5 MG/5 ML
10 SOLUTION, ORAL ORAL
Status: DISCONTINUED | OUTPATIENT
Start: 2020-02-14 | End: 2020-02-14 | Stop reason: HOSPADM

## 2020-02-14 RX ORDER — ONDANSETRON 2 MG/ML
4 INJECTION INTRAMUSCULAR; INTRAVENOUS
Status: DISCONTINUED | OUTPATIENT
Start: 2020-02-14 | End: 2020-02-14 | Stop reason: HOSPADM

## 2020-02-14 RX ORDER — DEXAMETHASONE SODIUM PHOSPHATE 4 MG/ML
INJECTION, SOLUTION INTRA-ARTICULAR; INTRALESIONAL; INTRAMUSCULAR; INTRAVENOUS; SOFT TISSUE PRN
Status: DISCONTINUED | OUTPATIENT
Start: 2020-02-14 | End: 2020-02-14 | Stop reason: SURG

## 2020-02-14 RX ADMIN — ZOLPIDEM TARTRATE 10 MG: 5 TABLET ORAL at 22:07

## 2020-02-14 RX ADMIN — MORPHINE SULFATE 2 MG: 4 INJECTION INTRAVENOUS at 12:41

## 2020-02-14 RX ADMIN — METRONIDAZOLE 500 MG: 500 TABLET ORAL at 16:04

## 2020-02-14 RX ADMIN — LOSARTAN POTASSIUM 100 MG: 25 TABLET ORAL at 18:23

## 2020-02-14 RX ADMIN — APIXABAN 5 MG: 5 TABLET, FILM COATED ORAL at 22:08

## 2020-02-14 RX ADMIN — SODIUM CHLORIDE, POTASSIUM CHLORIDE, SODIUM LACTATE AND CALCIUM CHLORIDE: 600; 310; 30; 20 INJECTION, SOLUTION INTRAVENOUS at 09:18

## 2020-02-14 RX ADMIN — METRONIDAZOLE 500 MG: 500 TABLET ORAL at 05:15

## 2020-02-14 RX ADMIN — METRONIDAZOLE 500 MG: 500 TABLET ORAL at 22:07

## 2020-02-14 RX ADMIN — POTASSIUM CHLORIDE 20 MEQ: 20 TABLET, EXTENDED RELEASE ORAL at 05:16

## 2020-02-14 RX ADMIN — HYDROCODONE BITARTRATE AND ACETAMINOPHEN 1 TABLET: 10; 325 TABLET ORAL at 05:17

## 2020-02-14 RX ADMIN — DILTIAZEM HYDROCHLORIDE 120 MG: 120 CAPSULE, COATED, EXTENDED RELEASE ORAL at 05:16

## 2020-02-14 RX ADMIN — MORPHINE SULFATE 2 MG: 4 INJECTION INTRAVENOUS at 16:13

## 2020-02-14 RX ADMIN — FENTANYL CITRATE 100 MCG: 50 INJECTION, SOLUTION INTRAMUSCULAR; INTRAVENOUS at 09:25

## 2020-02-14 RX ADMIN — METOPROLOL TARTRATE 100 MG: 50 TABLET, FILM COATED ORAL at 18:25

## 2020-02-14 RX ADMIN — ONDANSETRON 4 MG: 2 INJECTION INTRAMUSCULAR; INTRAVENOUS at 05:25

## 2020-02-14 RX ADMIN — SUCCINYLCHOLINE CHLORIDE 100 MG: 20 INJECTION, SOLUTION INTRAMUSCULAR; INTRAVENOUS at 09:25

## 2020-02-14 RX ADMIN — MORPHINE SULFATE 2 MG: 4 INJECTION INTRAVENOUS at 20:45

## 2020-02-14 RX ADMIN — METOPROLOL TARTRATE 100 MG: 50 TABLET, FILM COATED ORAL at 05:16

## 2020-02-14 RX ADMIN — ONDANSETRON 4 MG: 2 INJECTION INTRAMUSCULAR; INTRAVENOUS at 09:36

## 2020-02-14 RX ADMIN — CEFTRIAXONE SODIUM 2 G: 2 INJECTION, POWDER, FOR SOLUTION INTRAMUSCULAR; INTRAVENOUS at 05:15

## 2020-02-14 RX ADMIN — DEXAMETHASONE SODIUM PHOSPHATE 4 MG: 4 INJECTION, SOLUTION INTRAMUSCULAR; INTRAVENOUS at 09:36

## 2020-02-14 RX ADMIN — HYDROCODONE BITARTRATE AND ACETAMINOPHEN 1 TABLET: 10; 325 TABLET ORAL at 18:24

## 2020-02-14 RX ADMIN — PROPOFOL 100 MG: 10 INJECTION, EMULSION INTRAVENOUS at 09:25

## 2020-02-14 RX ADMIN — MELATONIN 1000 UNITS: at 18:24

## 2020-02-14 RX ADMIN — MORPHINE SULFATE 2 MG: 4 INJECTION INTRAVENOUS at 04:23

## 2020-02-14 RX ADMIN — HYDROCODONE BITARTRATE AND ACETAMINOPHEN 1 TABLET: 10; 325 TABLET ORAL at 11:35

## 2020-02-14 RX ADMIN — LIDOCAINE HYDROCHLORIDE 50 MG: 20 INJECTION, SOLUTION EPIDURAL; INFILTRATION; INTRACAUDAL; PERINEURAL at 09:25

## 2020-02-14 ASSESSMENT — ENCOUNTER SYMPTOMS
BLOOD IN STOOL: 0
MEMORY LOSS: 0
WEAKNESS: 0
PHOTOPHOBIA: 0
BLURRED VISION: 0
DIAPHORESIS: 0
HEADACHES: 0
ORTHOPNEA: 0
FEVER: 0
EYE PAIN: 0
NECK PAIN: 0
ABDOMINAL PAIN: 1
NAUSEA: 0
TREMORS: 0
MYALGIAS: 0
DIARRHEA: 0
BACK PAIN: 0
SENSORY CHANGE: 0
DOUBLE VISION: 0
NERVOUS/ANXIOUS: 0
TINGLING: 0
CHILLS: 0
CLAUDICATION: 0
HEMOPTYSIS: 0
SPUTUM PRODUCTION: 0
SORE THROAT: 0
SHORTNESS OF BREATH: 0
COUGH: 0
CONSTIPATION: 0
SPEECH CHANGE: 0
HEARTBURN: 0
VOMITING: 0
WHEEZING: 0
PND: 0
STRIDOR: 0
DIZZINESS: 0
DEPRESSION: 0
PALPITATIONS: 0

## 2020-02-14 ASSESSMENT — CHA2DS2 SCORE
VASCULAR DISEASE: NO
PRIOR STROKE OR TIA OR THROMBOEMBOLISM: NO
DIABETES: NO
CHF OR LEFT VENTRICULAR DYSFUNCTION: NO
HYPERTENSION: YES
AGE 75 OR GREATER: NO
SEX: FEMALE
CHA2DS2 VASC SCORE: 3
AGE 65 TO 74: YES

## 2020-02-14 NOTE — OP REPORT
DATE OF SERVICE:  02/14/2020    PHYSICIAN:  Clinton Ray MD    ANESTHESIOLOGIST:  Ranjit Olivier MD    MEDICATIONS:  General anesthesia.    CONSENT:  Procedure, risks and benefits reviewed thoroughly with the patient,   risks including but not limited to bleeding, perforation, side effects of   medication were informed.  Patient voiced understanding and agreed to proceed.    Additional risks inherent to ERCP that being mild, moderate, severe   pancreatitis that could lead to postprocedural pain, prolonged   hospitalization, intensive care unit stay, and/or death reviewed with the   patient, who voiced understanding and agreed to proceed.    PREPROCEDURE DIAGNOSIS:  Recurrent choledocholithiasis.    POSTPROCEDURE DIAGNOSIS:  Recurrent choledocholithiasis with occlusion balloon   dredging and removal of choledocholithiasis.    DESCRIPTION OF PROCEDURE:  Patient was placed in a prone position after   intubation and sedation, bite block was inserted in the mouth and a   side-viewing duodenoscope was passed carefully and easily under indirect   visualization into the esophagus, past second portion of duodenum brought in a   shortened position.  Adjacent to a large mouth duodenal diverticulum was the   ampulla, which revealed post-sphincterotomy changes.  There was no mass   effect.  Bile was exiting the ampulla without difficulty.  A CleverCut   sphincterotome with 0.035 wire was utilized to cannulate the ampulla.    Contrast injection revealed numerous debris within the bile duct.  The wire   was maintained, the tome was retracted, and extension sphincterotomy was   performed.  The wire was maintained.  The tome was exchanged for an occlusion   balloon 9-12 mm and at least 10 passes through the biliary system with the   balloon dilated to 12 mm was performed removing significant amount of stone   and debris, after which occlusion balloon cholangiogram revealed no further   evidence of filling defects and the  contrast injection of the biliary system   spontaneously exited without any evidence for obstruction.  The stomach was   suctioned, desufflated, and scope was removed.    COMPLICATIONS:  None.    BLOOD LOSS:  None.    SPECIMENS:  None.    RECOMMENDATIONS:  Recurrent choledocholithiasis, resolved by ERCP,   sphincterotomy, and stone removal.  Dietary changes that being specifically   avoidance of animal consumption and approaching more of a vegan diet would be   imperative to avoid further stone formation.  Patient voiced understanding of   this and agreed to proceed.  Patient may be started on a clear liquid diet,   advance as tolerated.       ____________________________________     [facility] MD [facility] / [place]    DD:  02/14/2020 10:29:16  DT:  02/14/2020 10:46:06    D#:  [id number]  Job#:  [id number]

## 2020-02-14 NOTE — ANESTHESIA PREPROCEDURE EVALUATION
Relevant Problems   PULMONARY   (+) Panlobular emphysema (HCC)      CARDIAC   (+) Essential hypertension   (+) SVT (supraventricular tachycardia) (HCC)       Physical Exam    Airway   Mallampati: II  TM distance: >3 FB  Neck ROM: full       Cardiovascular - normal exam  Rhythm: regular  Rate: normal  (-) murmur     Dental - normal exam  (+) upper dentures, lower dentures         Pulmonary - normal exam  Breath sounds clear to auscultation     Abdominal   (+) obese     Neurological - normal exam                 Anesthesia Plan    ASA 3 (paroxysmal Afib, COPD)   ASA physical status 3 criteria: COPD    Plan - general       Airway plan will be ETT        Induction: intravenous    Postoperative Plan: Postoperative administration of opioids is intended.    Pertinent diagnostic labs and testing reviewed    Informed Consent:    Anesthetic plan and risks discussed with patient.    Use of blood products discussed with: patient whom consented to blood products.

## 2020-02-14 NOTE — PROGRESS NOTES
Hospital Medicine Daily Progress Note    Date of Service  2/14/2020    Chief Complaint  74 y.o. female admitted 2/8/2020 with abdominal pain    Hospital Course    This is a 73 yo female who presented 2/8/2020 with abdominal pain across the abdomen increasing in severity and worse with leaning forward. She felt flushing through her body and nausea associated with the pain. She has a remote history of cholecystectomy but had endoscopy for stone removal with Dr. Louie from GI in 2018.  She met sepsis criteria on admission and was treated appropriately for this. An abdominal ultrasound showed a dilated common bile duct with retained stone.      Interval Problem Update  Patient reports still having mild abdominal pain, 3/10, no exacerbating or relieving factors.  In addition in the OR prior to undergoing ERCP, patient developed Atrial fibrillation, being monitored per staff.   Echo, TSH, pending.    2/12  No acute overnight events, patient in normal sinus rhythm.  Awaiting for ERCP today    2/13  No acute complaints, patient is eager to undergo an ERCP today.  Update, patient developed rapid ventricular response atrial fibrillation, PRN diltiazem has been given, will continue to monitor patient to make sure that this resolves.    2/14  Yesterday afternoon patient went into rapid atrial fibrillation, her ERCP was canceled.  She received 1 dose of digoxin, PO metoprolol resumed. She converted back to sinus.  Pending ERCP today    Consultants/Specialty  GI Dr. Urias  Cardiology Dr. Boston    Code Status  DNR    Disposition  TBD    Review of Systems  Review of Systems   Constitutional: Negative for chills, diaphoresis, fever and malaise/fatigue.   HENT: Negative for congestion, hearing loss, sore throat and tinnitus.    Eyes: Negative for blurred vision, double vision, photophobia and pain.   Respiratory: Negative for cough, hemoptysis, sputum production, shortness of breath, wheezing and stridor.    Cardiovascular:  Negative for chest pain, palpitations, orthopnea, claudication and PND.   Gastrointestinal: Positive for abdominal pain (same). Negative for blood in stool, constipation, diarrhea, heartburn, melena, nausea and vomiting.   Genitourinary: Negative for dysuria, frequency, hematuria and urgency.   Musculoskeletal: Negative for back pain, myalgias and neck pain.        Abdominal pain radiates into the back   Neurological: Negative for dizziness, tingling, tremors, sensory change, speech change, weakness and headaches.   Psychiatric/Behavioral: Negative for depression, memory loss and suicidal ideas. The patient is not nervous/anxious.    All other systems reviewed and are negative.       Physical Exam  Temp:  [36.4 °C (97.5 °F)-36.8 °C (98.3 °F)] 36.7 °C (98 °F)  Pulse:  [] 56  Resp:  [16-18] 16  BP: (105-154)/(50-74) 122/55  SpO2:  [91 %-96 %] 91 %    Physical Exam  Vitals signs reviewed.   Constitutional:       General: She is not in acute distress.     Appearance: Normal appearance. She is obese. She is not ill-appearing, toxic-appearing or diaphoretic.   HENT:      Head: Normocephalic and atraumatic.      Nose: No congestion or rhinorrhea.      Mouth/Throat:      Mouth: Mucous membranes are moist.      Pharynx: No oropharyngeal exudate or posterior oropharyngeal erythema.   Eyes:      Extraocular Movements: Extraocular movements intact.      Conjunctiva/sclera: Conjunctivae normal.      Pupils: Pupils are equal, round, and reactive to light.   Neck:      Musculoskeletal: Normal range of motion and neck supple. No neck rigidity.   Cardiovascular:      Rate and Rhythm: Normal rate and regular rhythm.      Pulses: Normal pulses.      Heart sounds: No murmur.   Pulmonary:      Effort: Pulmonary effort is normal. No respiratory distress.      Breath sounds: Normal breath sounds. No wheezing or rales.   Abdominal:      General: Abdomen is flat. Bowel sounds are normal. There is no distension.      Palpations:  Abdomen is soft. There is no mass.      Tenderness: There is no abdominal tenderness. There is no guarding.      Hernia: No hernia is present.   Musculoskeletal: Normal range of motion.         General: No swelling or tenderness.   Skin:     General: Skin is warm and dry.      Capillary Refill: Capillary refill takes less than 2 seconds.      Coloration: Skin is not jaundiced or pale.      Findings: No bruising or erythema.   Neurological:      General: No focal deficit present.      Mental Status: She is alert and oriented to person, place, and time. Mental status is at baseline.      Cranial Nerves: No cranial nerve deficit.   Psychiatric:         Mood and Affect: Mood normal.         Thought Content: Thought content normal.         Fluids  No intake or output data in the 24 hours ending 02/14/20 0812    Laboratory  Recent Labs     02/12/20 0237 02/13/20 0427 02/14/20 0414   WBC 9.6 10.9* 13.4*   RBC 5.00 5.06 4.98   HEMOGLOBIN 14.7 15.0 14.8   HEMATOCRIT 46.6 48.7* 48.3*   MCV 93.2 96.2 97.0   MCH 29.4 29.6 29.7   MCHC 31.5* 30.8* 30.6*   RDW 44.2 46.7 46.5   PLATELETCT 165 182 159*   MPV 9.5 9.8 9.6     Recent Labs     02/12/20 0237 02/13/20 0427 02/14/20 0414   SODIUM 140 139 142   POTASSIUM 3.3* 3.6 4.1   CHLORIDE 99 102 102   CO2 29 26 28   GLUCOSE 104* 110* 100*   BUN 16 22 22   CREATININE 0.79 0.85 0.86   CALCIUM 9.0 9.0 9.2                   Imaging  EC-ECHOCARDIOGRAM COMPLETE W/O CONT   Final Result      US-RUQ   Final Result      1.  Post cholecystectomy.      2.  There is biliary ductal dilatation and apparent distal common bile duct stone measuring slightly less than 1 cm in diameter.      DX-CHEST-PORTABLE (1 VIEW)   Final Result         1. No acute cardiopulmonary abnormalities are identified.           Assessment/Plan  Gram-negative bacteremia  Assessment & Plan  2/2 to biliary obstruction  ERCP pending , hopefully can be done today by GI.  Resume IV antibiotics  Blood cultures + Ecoli, repeat  blood cultures negative thus far.    Common bile duct stone  Assessment & Plan  Hx of cholecystectomy.  Waiting for ERCP     SVT (supraventricular tachycardia) (HCC)  Assessment & Plan  Resolved.    Grade II diastolic dysfunction- echo 2017 with preserved EF- (present on admission)  Assessment & Plan  Not in acute exacerbation  Resume Metoprolol and losartan as well as Cardizem    Sepsis due to undetermined organism (HCC)  Assessment & Plan  This is Sepsis Present on admission  SIRS criteria identified on my evaluation include: Fever, with temperature greater than 101 deg F, Tachycardia, with heart rate greater than 90 BPM, Tachypnea, with respirations greater than 20 per minute and Leukocyosis, with WBC greater than 12,000  Source is abdominal  Sepsis protocol initiated on admission  Fluid resuscitation ordered per protocol  IV antibiotics as appropriate for source of sepsis  While organ dysfunction may be noted elsewhere in this problem list or in the chart, degree of organ dysfunction does not meet CMS criteria for severe sepsis  Sepsis resolved at this time  Continue with IV antibiotics for now until GI procedure.      Chronic midline low back pain without sciatica- norco chronic daily use; nv pain and spine- (present on admission)  Assessment & Plan  Continue with pain control     Essential hypertension- (present on admission)  Assessment & Plan  Controlled  Resume home dose of norvasc, metoprolol and losartan and hctz       BMI 37.0-37.9, adult- (present on admission)  Assessment & Plan  Body mass index is 37.8 kg/m².  Encourage healthy lifestyle and physical activity.    New Onset Atrial fibrillation- (present on admission)  Assessment & Plan  She went back into AFIB RVR yesterday afternoon.  1x dose of IV dig, and PO metoprolol, patient converted back to NSR.  TSH within normal limit  Resume 120 mg of Cardizem daily on top of metoprolol 100 mg twice daily  IV PRN Cardizem as needed  If she goes into AFIB  with RVR, will initiate Amiodarone gtt.  I had a long discussion with the patient in terms of initiating anticoagulation which patient was okay with once patient undergoes ERCP.  Cardiology following.       Hypokalemia- (present on admission)  Assessment & Plan  Potassium replenished.  CTM      The total time spent face to face with this patient was about 35 mins of which 60% of time was spent on counseling, review of records including pertinent lab data and studies, as well as discussing diagnostic evaluation and work up, planned therapeutic interventions and future disposition of care. Where indicated, the assessment and plan reflect discussion of patient with consultants, other healthcare providers, family members, and additional research needed to obtain further information in formulating the plan of care of this patient.       VTE prophylaxis: lovenox

## 2020-02-14 NOTE — PROGRESS NOTES
Cardiology Follow Up Progress Note    Date of Service  2/13/2020    Attending Physician  Sara Birmingham M.D.    Chief Complaint   A. fib with RVR    HPI  Rufina Macias is a 74 y.o. female admitted 2/8/2020 with back pain, found to have retained gallstone despite prior cholecystectomy.  Noted atrial fibrillation with RVR and atrial flutter, heart rates up to 150.  Has been awaiting ERCP.    Echo with normal EF and normal RVSP normal size left atrium.  Prior normal nuclear PET stress test in 2017.  Known COPD.  Has hypertension.  Has been in and out of paroxysmal atrial fibrillation.    Interim Events  Telemetry-sinus and paroxysmal atrial fibrillation  Metoprolol was held this morning does hold parameters were for heart rate less than 60  ERCP again canceled  Eventually went back into sinus after getting oral metoprolol.    No chest pain.  No shortness of breath.  Does note mild palpitations during A. Fib.  Daughter at the bedside.    Review of Systems  Review of Systems   Constitutional: Negative for chills and fever.   Gastrointestinal: Negative for nausea and vomiting.       Vital signs in last 24 hours  Temp:  [36.3 °C (97.4 °F)-37.1 °C (98.7 °F)] 36.7 °C (98 °F)  Pulse:  [] 132  Resp:  [18] 18  BP: (113-154)/(56-74) 154/74  SpO2:  [93 %-98 %] 95 %    Physical Exam  Physical Exam     General: No acute distress. Well nourished.  HEENT: EOM grossly intact, no scleral icterus, no pharyngeal erythema.   Neck:  No JVD, no bruits, trachea midline  CVS: RRR. Normal S1, S2.  1 out of 6 systolic murmur at the right sternal border no LE edema.  2+ radial pulses, 2+ PT pulses  Resp: Coarse breath sounds at the bases, mild prolonged expiratory phase, grossly normal work of breathing  Abdomen: Soft, no toño tenderness to palpation, no toño hepatomegaly.  MSK/Ext: No clubbing or cyanosis.  Skin: Warm and dry, no rashes.  Neurological: CN III-XII grossly intact. No focal deficits.   Psych: A&O x 3, appropriate  affect, good judgement    Lab Review  Lab Results   Component Value Date/Time    WBC 10.9 (H) 02/13/2020 04:27 AM    WBC 7.6 12/02/2010 12:00 AM    RBC 5.06 02/13/2020 04:27 AM    RBC 4.92 12/02/2010 12:00 AM    HEMOGLOBIN 15.0 02/13/2020 04:27 AM    HEMATOCRIT 48.7 (H) 02/13/2020 04:27 AM    MCV 96.2 02/13/2020 04:27 AM    MCV 89 12/02/2010 12:00 AM    MCH 29.6 02/13/2020 04:27 AM    MCH 31.3 12/02/2010 12:00 AM    MCHC 30.8 (L) 02/13/2020 04:27 AM    MPV 9.8 02/13/2020 04:27 AM      Lab Results   Component Value Date/Time    SODIUM 139 02/13/2020 04:27 AM    POTASSIUM 3.6 02/13/2020 04:27 AM    CHLORIDE 102 02/13/2020 04:27 AM    CO2 26 02/13/2020 04:27 AM    GLUCOSE 110 (H) 02/13/2020 04:27 AM    BUN 22 02/13/2020 04:27 AM    CREATININE 0.85 02/13/2020 04:27 AM    CREATININE 0.80 12/02/2010 12:00 AM    BUNCREATRAT 20 12/02/2010 12:00 AM    GLOMRATE >59 12/02/2010 12:00 AM      Lab Results   Component Value Date/Time    ASTSGOT 38 02/13/2020 04:27 AM    ALTSGPT 48 02/13/2020 04:27 AM     Lab Results   Component Value Date/Time    CHOLSTRLTOT 136 01/02/2020 01:15 PM    LDL 72 01/02/2020 01:15 PM    HDL 47 01/02/2020 01:15 PM    TRIGLYCERIDE 86 01/02/2020 01:15 PM    TROPONINT 15 02/08/2020 06:40 AM       No results for input(s): NTPROBNP in the last 72 hours.    Cardiac Imaging and Procedures Review  EKG:  My personal interpretation of the EKG dated 2/11/2020 is atrial fibrillation, rate 148, right bundle branch block     EKG 2/9/2020 atrial fibrillation, rate 131     ECG 2/8/2020 atrial flutter with 2-1 block, rate 153     EKG 2/8/2020  Sinus tachycardia, rate 104, right bundle branch block, borderline left posterior fascicular block     Echocardiogram: 2/11/2020  Normal left ventricular systolic function. Left ventricular ejection   fraction is visually estimated to be 60%.   Normal diastolic function.  Right ventricular systolic pressure is estimated to be 25 mmHg.  Normal-sized left atrium     Imaging  Chest  X-Ray: 2/8/2020  1. No acute cardiopulmonary abnormalities are identified.     PET stress Test: 8/7/2017  CONCLUSIONS AND IMPRESSIONS:   Normal perfusion on PET with normal function of the left  ventricle.  No evidence of ischemia or infarction.        Assessment/Plan  -Atrial fibrillation and flutter with RVR  -Chads 2 vascular score of 3, no prior stroke or TIA  -Hypertension  -Right bundle branch block  -COPD  -Retained gallstone    Plan:  -Continue metoprolol 100 mg twice daily without holding, essential set hold for heart rate less than 40, continue Cardizem 120 mg daily, continue to use calcium channel blocker with metoprolol until A. fib is suppressed, I have also added as needed short acting Cardizem every 4 hours.  Can also continue to use IV metoprolol.  -If she goes in atrial fibrillation before her ERCP, initiate amiodarone drip, beginning it rate of 1 then reducing to 0.5 after 6 hours per protocol.  Did discuss with the patient and her daughter there is a very small chance of acute severe idiopathic pulmonary fibrosis which can be fatal which is very rare.  -Eventually anticoagulation for chads 2 vascular score of 3, no prior stroke, I discussed this with her today, I recommend trying Eliquis or Xarelto, or referral to anticoagulant for warfarin.  -There are no restrictions taking her for her procedure other than controlling her atrial fibrillation.  Would be ideal to give her IV amiodarone intraoperative if needed.  -I will arrange for follow-up as an outpatient but please recall me to see the patient as needed if her A. fib and flutter are not controlled.     Discussed with Dr. Sara Birmingham M.D.    Thank you for allowing me to participate in the care of this patient.  Cardiology will sign off on this patient    Please contact me with any questions.    Isabel Boston M.D.   Cardiologist, [facility]  [phone]

## 2020-02-14 NOTE — PROGRESS NOTES
Telemetry Shift Summary    Rhythm SR, SB  HR Range 56-60  Ectopy OPVC, OPAC, Rtrip  Measurements 0.20/0.08/0.40        Normal Values  Rhythm SR  HR Range    Measurements 0.12-0.20 / 0.06-0.10  / 0.30-0.52

## 2020-02-14 NOTE — PROGRESS NOTES
Gastroenterology Progress Note     Author: Renzo Bardales M.D.   Date & Time Created: 2/13/2020 5:56 PM    Chief Complaint:  Again in rapid afib. Despite Dig and beta blocker and channel blocker she has not converted yet nor is she rate controlled.    Interval History:  - 147 range    Review of Systems:  Review of Systems   Constitutional: Negative.  Negative for chills and fever.   HENT: Negative.    Eyes: Negative.    Respiratory: Negative.    Cardiovascular: Positive for palpitations.   Gastrointestinal: Negative.    Genitourinary: Negative.    Musculoskeletal: Negative.    Skin: Negative.        Physical Exam:  Physical Exam  Vitals signs reviewed.   Constitutional:       Appearance: Normal appearance.   Eyes:      General: No scleral icterus.  Cardiovascular:      Rate and Rhythm: Normal rate.   Pulmonary:      Effort: Pulmonary effort is normal.   Abdominal:      Tenderness: There is no abdominal tenderness.   Skin:     Coloration: Skin is not jaundiced.   Neurological:      General: No focal deficit present.      Mental Status: She is alert.   Psychiatric:         Mood and Affect: Mood normal.         Thought Content: Thought content normal.         Labs:          Recent Labs     02/11/20 0304 02/12/20 0237 02/13/20 0427   SODIUM 137 140 139   POTASSIUM 3.6 3.3* 3.6   CHLORIDE 99 99 102   CO2 27 29 26   BUN 16 16 22   CREATININE 0.69 0.79 0.85   MAGNESIUM  --   --  2.1   CALCIUM 8.7 9.0 9.0     Recent Labs     02/11/20 0304 02/12/20 0237 02/13/20 0427   ALTSGPT 54* 52* 48   ASTSGOT 24 35 38   ALKPHOSPHAT 85 81 76   TBILIRUBIN 0.4 0.4 0.3   GLUCOSE 107* 104* 110*     Recent Labs     02/11/20 0304 02/12/20 0237 02/13/20 0427   RBC 5.00 5.00 5.06   HEMOGLOBIN 14.7 14.7 15.0   HEMATOCRIT 46.4 46.6 48.7*   PLATELETCT 162* 165 182     Recent Labs     02/11/20 0304 02/12/20 0237 02/13/20 0427   WBC 12.0* 9.6 10.9*   NEUTSPOLYS 76.70* 68.30 73.90*   LYMPHOCYTES 12.10* 19.60* 15.40*   MONOCYTES  8.90 9.40 8.90   EOSINOPHILS 1.80 2.00 1.10   BASOPHILS 0.20 0.30 0.30   ASTSGOT 24 35 38   ALTSGPT 54* 52* 48   ALKPHOSPHAT 85 81 76   TBILIRUBIN 0.4 0.4 0.3     Hemodynamics:  Temp (24hrs), Av.6 °C (97.9 °F), Min:36.3 °C (97.4 °F), Max:37.1 °C (98.7 °F)  Temperature: 36.7 °C (98 °F)  Pulse  Av.6  Min: 56  Max: 158   Blood Pressure : 154/74     Respiratory:    Respiration: 18, Pulse Oximetry: 95 %     Work Of Breathing / Effort: Mild  RUL Breath Sounds: Clear, RML Breath Sounds: Clear, RLL Breath Sounds: Diminished, BRYAN Breath Sounds: Clear, LLL Breath Sounds: Diminished  Fluids:  No intake or output data in the 24 hours ending 20 1144  Weight: 96.8 kg (213 lb 6.5 oz)  GI/Nutrition:  Orders Placed This Encounter   Procedures   • Diet Order Regular     Standing Status:   Standing     Number of Occurrences:   1     Order Specific Question:   Diet:     Answer:   Regular [1]     Medical Decision Making, by Problem:  Active Hospital Problems    Diagnosis   • Common bile duct stone [K80.50]   • Gram-negative bacteremia [R78.81]   • SVT (supraventricular tachycardia) (HCC) [I47.1]   • Grade II diastolic dysfunction- echo 2017 with preserved EF [I51.9]   • Sepsis due to undetermined organism (HCC) [A41.9]   • Chronic midline low back pain without sciatica- norco chronic daily use; nv pain and spine [M54.5, G89.29]   • Essential hypertension [I10]   • BMI 37.0-37.9, adult [Z68.37]       Plan:  CBD stones  Abnormal LFTs improved  Abd pain improved  Now again with  tachycardia and rapid afib  Scheduling and intermittent rapid afib has made it difficult to schedule this patient    Plan ERCP tomorrow 9 AM  Should be on Cardizem drip and rate controlled    Quality-Core Measures

## 2020-02-14 NOTE — ANESTHESIA QCDR
2019 [facility] Clinical Data Registry (for Quality Improvement)     Postoperative nausea/vomiting risk protocol (Adult = 18 yrs and Pediatric 3-17 yrs)- (430 and 463)  General inhalation anesthetic (NOT TIVA) with PONV risk factors: Yes  Provision of anti-emetic therapy with at least 2 different classes of agents: Yes   Patient DID NOT receive anti-emetic therapy and reason is documented in Medical Record:  N/A    Multimodal Pain Management- (477)  Non-emergent surgery AND patient age >= 18: Yes  Use of Multimodal Pain Management, two or more drugs and/or interventions, NOT including systemic opioids: Yes  Exception: Documented allergy to multiple classes of analgesics: N/A    Smoking Abstinence (404)  Patient is current smoker (cigarette, pipe, e-cig, marijuanna): No  Elective Surgery:   Abstinence instructions provided prior to day of surgery:   Patient abstained from smoking on day of surgery:     Pre-Op Beta-Blocker in Isolated CABG (44)  Isolated CABG AND patient age >= 18:   Beta-blocker admin within 24 hours of surgical incision:   Exception:of medical reason(s) for not administering beta blocker within 24 hours prior to surgical incision (e.g., not  indicated,other medical reason):     PACU assessment of acute postoperative pain prior to Anesthesia Care End- Applies to Patients Age = 18- (ABG7)  Initial PACU pain score is which of the following: < 7/10  Patient unable to report pain score: N/A    Post-anesthetic transfer of care checklist/protocol to PACU/ICU- (426 and 427)  Upon conclusion of case, patient transferred to which of the following locations: PACU/Non-ICU  Use of transfer checklist/protocol: Yes  Exclusion: Service Performed in Patient Hospital Room (and thus did not require transfer): N/A  Unplanned admission to ICU related to anesthesia service up through end of PACU care- (MD51)  Unplanned admission to ICU (not initially anticipated at anesthesia start time): No

## 2020-02-14 NOTE — CARE PLAN
Problem: Communication  Goal: The ability to communicate needs accurately and effectively will improve  Outcome: PROGRESSING AS EXPECTED  Note: Allow time for patient to verbalize feelings and ask questions. Answer questions to best of ability. Update patient on plan of care.       Problem: Safety  Goal: Will remain free from injury  Outcome: PROGRESSING AS EXPECTED  Note: Keep call light within reach. Ensure environment is clutter free. Have patient wear treaded socks.

## 2020-02-14 NOTE — PROGRESS NOTES
Report received from Alexis FRENCH, assumed care. Patient resting comfortably in bed with daughter at bedside. NPO for ERCP. Reports 3/10 chronic pain to lower back. HR sinus rhythm 50's, per monitor tech HR dipped to 44, patient asymptomatic and  aware.

## 2020-02-14 NOTE — OR NURSING
1005- Patient arrived from [facility]. Respirations spontaneous and unlabored. VSS, see flowsheets. Abdomen soft. Normoactive bowel sounds.   1028- Patient more awake. Reporting chronic lower back pain. Declines repositioning assistance. Resting with eyes closed.   1052- Room air trial attempted. Patient maintaining 89% on room air. O2 re-applied at 2L NC.   1106- Patient meets criteria to transfer back to room.

## 2020-02-14 NOTE — ANESTHESIA PROCEDURE NOTES
Airway  Date/Time: 2/14/2020 9:27 AM  Performed by: Ranjit Olivier M.D.  Authorized by: Ranjit Olivier M.D.     Location:  OR  Urgency:  Elective  Indications for Airway Management:  Anesthesia  Spontaneous Ventilation: absent    Sedation Level:  Deep  Preoxygenated: Yes    Patient Position:  Sniffing  Mask Difficulty Assessment:  1 - vent by mask  Final Airway Type:  Endotracheal airway  Final Endotracheal Airway:  ETT  Cuffed: Yes    Technique Used for Successful ETT Placement:  Direct laryngoscopy  Insertion Site:  Oral  Blade Type:  Ileana  Laryngoscope Blade/Videolaryngoscope Blade Size:  3  ETT Size (mm):  7.0  Measured from:  Teeth  ETT to Teeth (cm):  21  Placement Verified by: auscultation and capnometry    Cormack-Lehane Classification:  Grade I - full view of glottis  Number of Attempts at Approach:  1

## 2020-02-14 NOTE — ANESTHESIA TIME REPORT
Anesthesia Start and Stop Event Times     Date Time Event    2/14/2020 0910 Ready for Procedure     0918 Anesthesia Start     1007 Anesthesia Stop        Responsible Staff  02/14/20    Name Role Begin End    Ranjit Olivier M.D. Anesth 0918 1007        Preop Diagnosis (Free Text):  Pre-op Diagnosis     choledocholithiasis        Preop Diagnosis (Codes):  Diagnosis Information     Diagnosis Code(s): Choledocholithiasis [K80.50]        Post op Diagnosis  Choledocholithiasis      Premium Reason  Non-Premium    Comments:

## 2020-02-14 NOTE — PROGRESS NOTES
Patient back from ERCP. Reported 8/10 pain to lower back, medicated with norco with minimal relief, pain down to 6/10. Medicated with PRN morphine. Tolerating clear fluid diet without difficulty.

## 2020-02-15 LAB
ALBUMIN SERPL BCP-MCNC: 3.6 G/DL (ref 3.2–4.9)
ALBUMIN/GLOB SERPL: 1.3 G/DL
ALP SERPL-CCNC: 76 U/L (ref 30–99)
ALT SERPL-CCNC: 49 U/L (ref 2–50)
ANION GAP SERPL CALC-SCNC: 12 MMOL/L (ref 7–16)
AST SERPL-CCNC: 32 U/L (ref 12–45)
BASOPHILS # BLD AUTO: 0.2 % (ref 0–1.8)
BASOPHILS # BLD: 0.03 K/UL (ref 0–0.12)
BILIRUB SERPL-MCNC: 0.4 MG/DL (ref 0.1–1.5)
BUN SERPL-MCNC: 15 MG/DL (ref 8–22)
CALCIUM SERPL-MCNC: 9.2 MG/DL (ref 8.4–10.2)
CHLORIDE SERPL-SCNC: 102 MMOL/L (ref 96–112)
CO2 SERPL-SCNC: 27 MMOL/L (ref 20–33)
CREAT SERPL-MCNC: 0.66 MG/DL (ref 0.5–1.4)
EOSINOPHIL # BLD AUTO: 0 K/UL (ref 0–0.51)
EOSINOPHIL NFR BLD: 0 % (ref 0–6.9)
ERYTHROCYTE [DISTWIDTH] IN BLOOD BY AUTOMATED COUNT: 44.5 FL (ref 35.9–50)
ERYTHROCYTE [DISTWIDTH] IN BLOOD BY AUTOMATED COUNT: 44.7 FL (ref 35.9–50)
GLOBULIN SER CALC-MCNC: 2.7 G/DL (ref 1.9–3.5)
GLUCOSE SERPL-MCNC: 117 MG/DL (ref 65–99)
HCT VFR BLD AUTO: 45.5 % (ref 37–47)
HCT VFR BLD AUTO: 47 % (ref 37–47)
HGB BLD-MCNC: 14.1 G/DL (ref 12–16)
HGB BLD-MCNC: 14.6 G/DL (ref 12–16)
IMM GRANULOCYTES # BLD AUTO: 0.09 K/UL (ref 0–0.11)
IMM GRANULOCYTES NFR BLD AUTO: 0.5 % (ref 0–0.9)
LYMPHOCYTES # BLD AUTO: 1.2 K/UL (ref 1–4.8)
LYMPHOCYTES NFR BLD: 7 % (ref 22–41)
MCH RBC QN AUTO: 29.6 PG (ref 27–33)
MCH RBC QN AUTO: 29.7 PG (ref 27–33)
MCHC RBC AUTO-ENTMCNC: 31 G/DL (ref 33.6–35)
MCHC RBC AUTO-ENTMCNC: 31.1 G/DL (ref 33.6–35)
MCV RBC AUTO: 95.4 FL (ref 81.4–97.8)
MCV RBC AUTO: 95.7 FL (ref 81.4–97.8)
MONOCYTES # BLD AUTO: 0.93 K/UL (ref 0–0.85)
MONOCYTES NFR BLD AUTO: 5.4 % (ref 0–13.4)
NEUTROPHILS # BLD AUTO: 14.88 K/UL (ref 2–7.15)
NEUTROPHILS NFR BLD: 86.9 % (ref 44–72)
NRBC # BLD AUTO: 0 K/UL
NRBC BLD-RTO: 0 /100 WBC
PLATELET # BLD AUTO: 181 K/UL (ref 164–446)
PLATELET # BLD AUTO: 184 K/UL (ref 164–446)
PMV BLD AUTO: 10.7 FL (ref 9–12.9)
PMV BLD AUTO: 9.7 FL (ref 9–12.9)
POTASSIUM SERPL-SCNC: 4.4 MMOL/L (ref 3.6–5.5)
PROT SERPL-MCNC: 6.3 G/DL (ref 6–8.2)
RBC # BLD AUTO: 4.77 M/UL (ref 4.2–5.4)
RBC # BLD AUTO: 4.91 M/UL (ref 4.2–5.4)
SODIUM SERPL-SCNC: 141 MMOL/L (ref 135–145)
WBC # BLD AUTO: 17.1 K/UL (ref 4.8–10.8)
WBC # BLD AUTO: 18.2 K/UL (ref 4.8–10.8)

## 2020-02-15 PROCEDURE — 700111 HCHG RX REV CODE 636 W/ 250 OVERRIDE (IP): Performed by: HOSPITALIST

## 2020-02-15 PROCEDURE — 85027 COMPLETE CBC AUTOMATED: CPT

## 2020-02-15 PROCEDURE — 700102 HCHG RX REV CODE 250 W/ 637 OVERRIDE(OP): Performed by: INTERNAL MEDICINE

## 2020-02-15 PROCEDURE — 700111 HCHG RX REV CODE 636 W/ 250 OVERRIDE (IP): Performed by: INTERNAL MEDICINE

## 2020-02-15 PROCEDURE — 99232 SBSQ HOSP IP/OBS MODERATE 35: CPT | Performed by: HOSPITALIST

## 2020-02-15 PROCEDURE — 700102 HCHG RX REV CODE 250 W/ 637 OVERRIDE(OP): Performed by: HOSPITALIST

## 2020-02-15 PROCEDURE — A9270 NON-COVERED ITEM OR SERVICE: HCPCS | Performed by: INTERNAL MEDICINE

## 2020-02-15 PROCEDURE — A9270 NON-COVERED ITEM OR SERVICE: HCPCS | Performed by: HOSPITALIST

## 2020-02-15 PROCEDURE — 80053 COMPREHEN METABOLIC PANEL: CPT

## 2020-02-15 PROCEDURE — 700101 HCHG RX REV CODE 250: Performed by: HOSPITALIST

## 2020-02-15 PROCEDURE — 85025 COMPLETE CBC W/AUTO DIFF WBC: CPT

## 2020-02-15 PROCEDURE — 770020 HCHG ROOM/CARE - TELE (206)

## 2020-02-15 PROCEDURE — 700105 HCHG RX REV CODE 258: Performed by: HOSPITALIST

## 2020-02-15 RX ADMIN — METOPROLOL TARTRATE 100 MG: 50 TABLET, FILM COATED ORAL at 05:45

## 2020-02-15 RX ADMIN — HYDROCODONE BITARTRATE AND ACETAMINOPHEN 1 TABLET: 10; 325 TABLET ORAL at 16:35

## 2020-02-15 RX ADMIN — POTASSIUM CHLORIDE 20 MEQ: 20 TABLET, EXTENDED RELEASE ORAL at 05:45

## 2020-02-15 RX ADMIN — HYDROCODONE BITARTRATE AND ACETAMINOPHEN 1 TABLET: 10; 325 TABLET ORAL at 03:18

## 2020-02-15 RX ADMIN — METRONIDAZOLE 500 MG: 500 INJECTION, SOLUTION INTRAVENOUS at 22:54

## 2020-02-15 RX ADMIN — DILTIAZEM HYDROCHLORIDE 120 MG: 120 CAPSULE, COATED, EXTENDED RELEASE ORAL at 05:45

## 2020-02-15 RX ADMIN — METRONIDAZOLE 500 MG: 500 TABLET ORAL at 05:45

## 2020-02-15 RX ADMIN — LOSARTAN POTASSIUM 100 MG: 25 TABLET ORAL at 18:17

## 2020-02-15 RX ADMIN — APIXABAN 5 MG: 5 TABLET, FILM COATED ORAL at 05:45

## 2020-02-15 RX ADMIN — ONDANSETRON 4 MG: 2 INJECTION INTRAMUSCULAR; INTRAVENOUS at 05:46

## 2020-02-15 RX ADMIN — METOPROLOL TARTRATE 100 MG: 50 TABLET, FILM COATED ORAL at 18:18

## 2020-02-15 RX ADMIN — CEFTRIAXONE SODIUM 2 G: 2 INJECTION, POWDER, FOR SOLUTION INTRAMUSCULAR; INTRAVENOUS at 14:38

## 2020-02-15 RX ADMIN — APIXABAN 5 MG: 5 TABLET, FILM COATED ORAL at 18:18

## 2020-02-15 RX ADMIN — MELATONIN 1000 UNITS: at 18:17

## 2020-02-15 RX ADMIN — MORPHINE SULFATE 2 MG: 4 INJECTION INTRAVENOUS at 05:58

## 2020-02-15 RX ADMIN — MORPHINE SULFATE 2 MG: 4 INJECTION INTRAVENOUS at 19:21

## 2020-02-15 RX ADMIN — ZOLPIDEM TARTRATE 10 MG: 5 TABLET ORAL at 22:54

## 2020-02-15 RX ADMIN — HYDROCODONE BITARTRATE AND ACETAMINOPHEN 1 TABLET: 10; 325 TABLET ORAL at 22:54

## 2020-02-15 RX ADMIN — METRONIDAZOLE 500 MG: 500 INJECTION, SOLUTION INTRAVENOUS at 15:30

## 2020-02-15 RX ADMIN — HYDROCODONE BITARTRATE AND ACETAMINOPHEN 1 TABLET: 10; 325 TABLET ORAL at 10:35

## 2020-02-15 RX ADMIN — MORPHINE SULFATE 2 MG: 4 INJECTION INTRAVENOUS at 14:16

## 2020-02-15 ASSESSMENT — ENCOUNTER SYMPTOMS
DIAPHORESIS: 0
TREMORS: 0
SHORTNESS OF BREATH: 0
BACK PAIN: 0
PHOTOPHOBIA: 0
NEUROLOGICAL NEGATIVE: 1
VOMITING: 0
ORTHOPNEA: 0
NAUSEA: 0
SORE THROAT: 0
CONSTIPATION: 0
BLOOD IN STOOL: 0
SPUTUM PRODUCTION: 0
SENSORY CHANGE: 0
WHEEZING: 0
SPEECH CHANGE: 0
MEMORY LOSS: 0
DEPRESSION: 0
ABDOMINAL PAIN: 0
GASTROINTESTINAL NEGATIVE: 1
FEVER: 0
COUGH: 0
EYE PAIN: 0
PSYCHIATRIC NEGATIVE: 1
HEMOPTYSIS: 0
HEADACHES: 0
CARDIOVASCULAR NEGATIVE: 1
CHILLS: 0
NERVOUS/ANXIOUS: 0
MYALGIAS: 0
PALPITATIONS: 0
DOUBLE VISION: 0
CLAUDICATION: 0
CONSTITUTIONAL NEGATIVE: 1
BLURRED VISION: 0
HEARTBURN: 0
DIARRHEA: 0
NECK PAIN: 0
STRIDOR: 0
DIZZINESS: 0
WEAKNESS: 0
RESPIRATORY NEGATIVE: 1
TINGLING: 0
PND: 0

## 2020-02-15 NOTE — PROGRESS NOTES
Patient medicated with norco for 7/10 pain to lower back. Scheduled evening meds given, taken whole without difficulty. Patient refused stool softener, states she has been having loose stools from abx and last BM was today.

## 2020-02-15 NOTE — DISCHARGE PLANNING
Anticipated Discharge Disposition: Home with DTR    Action: Patient discussed in afternoon rounds.  Per IDT report patient is a possible d/c for tomorrow and has no current SS needs noted.     Barriers to Discharge: Medical clearance     Plan: Care coordination will continue to monitor and assist as needed.

## 2020-02-15 NOTE — PROGRESS NOTES
Report received from Alexis FRENCH. Care assumed. Patient sitting up in bed eating breakfast. Denies N/V, tolerating clear fluid diet without difficulty. Reports 4/10 pain to lower back, states this is a comfortable level. Safety precautions in place.

## 2020-02-15 NOTE — PROGRESS NOTES
Cardiac monitor summary:  Rhythm: sinus rhythm/sinu hao  Rate: 60-64 with one episode down to 45  Measurements: .14/.14/.40

## 2020-02-15 NOTE — CARE PLAN
Problem: Infection  Goal: Will remain free from infection  Outcome: PROGRESSING AS EXPECTED  Note: Assess for signs and symptoms of infection. Educate on hand hygiene. Educate on personal hygiene.       Problem: Pain Management  Goal: Pain level will decrease to patient's comfort goal  Outcome: PROGRESSING AS EXPECTED  Flowsheets (Taken 2/14/2020 2030)  Comfort Goal: Comfort at Rest  Pain Rating Scale (NPRS): 7  Note: Identify source of pain. Medicate as appropriate. Provide nonpharmcological measures.

## 2020-02-15 NOTE — CARE PLAN
Problem: Communication  Goal: The ability to communicate needs accurately and effectively will improve  Outcome: PROGRESSING AS EXPECTED     Problem: Safety  Goal: Will remain free from injury  Outcome: PROGRESSING AS EXPECTED  Goal: Will remain free from falls  Outcome: PROGRESSING AS EXPECTED     Problem: Infection  Goal: Will remain free from infection  Outcome: PROGRESSING AS EXPECTED     Problem: Venous Thromboembolism (VTW)/Deep Vein Thrombosis (DVT) Prevention:  Goal: Patient will participate in Venous Thrombosis (VTE)/Deep Vein Thrombosis (DVT)Prevention Measures  Outcome: PROGRESSING AS EXPECTED     Problem: Bowel/Gastric:  Goal: Normal bowel function is maintained or improved  Outcome: PROGRESSING AS EXPECTED  Goal: Will not experience complications related to bowel motility  Outcome: PROGRESSING AS EXPECTED     Problem: Knowledge Deficit  Goal: Knowledge of disease process/condition, treatment plan, diagnostic tests, and medications will improve  Outcome: PROGRESSING AS EXPECTED  Goal: Knowledge of the prescribed therapeutic regimen will improve  Outcome: PROGRESSING AS EXPECTED     Problem: Discharge Barriers/Planning  Goal: Patient's continuum of care needs will be met  Outcome: PROGRESSING AS EXPECTED     Problem: Respiratory:  Goal: Respiratory status will improve  Outcome: PROGRESSING AS EXPECTED     Problem: Fluid Volume:  Goal: Will maintain balanced intake and output  Outcome: PROGRESSING AS EXPECTED     Problem: Urinary Elimination:  Goal: Ability to reestablish a normal urinary elimination pattern will improve  Outcome: PROGRESSING AS EXPECTED     Problem: Psychosocial Needs:  Goal: Level of anxiety will decrease  Outcome: PROGRESSING AS EXPECTED     Problem: Pain Management  Goal: Pain level will decrease to patient's comfort goal  Outcome: PROGRESSING SLOWER THAN EXPECTED  Flowsheets (Taken 2/15/2020 [facility])  Pain Rating Scale (NPRS): 4  Note: Patient being medicated with morphine for acute  pain and norco for chronic pain; continues to have increased pain to lower back.

## 2020-02-15 NOTE — PROGRESS NOTES
Telemetry Shift Summary    Rhythm SR, SB w/ BBB  HR Range 40s-70s  Ectopy RPAC, RPVC, Rtrip  Measurements 0.14/0.12/0.40        Normal Values  Rhythm SR  HR Range    Measurements 0.12-0.20 / 0.06-0.10  / 0.30-0.52

## 2020-02-15 NOTE — CARE PLAN
Problem: Communication  Goal: The ability to communicate needs accurately and effectively will improve  Outcome: PROGRESSING AS EXPECTED     Problem: Safety  Goal: Will remain free from injury  Outcome: PROGRESSING AS EXPECTED  Goal: Will remain free from falls  Outcome: PROGRESSING AS EXPECTED     Problem: Infection  Goal: Will remain free from infection  Outcome: PROGRESSING AS EXPECTED     Problem: Venous Thromboembolism (VTW)/Deep Vein Thrombosis (DVT) Prevention:  Goal: Patient will participate in Venous Thrombosis (VTE)/Deep Vein Thrombosis (DVT)Prevention Measures  Outcome: PROGRESSING AS EXPECTED     Problem: Bowel/Gastric:  Goal: Normal bowel function is maintained or improved  Outcome: PROGRESSING AS EXPECTED  Goal: Will not experience complications related to bowel motility  Outcome: PROGRESSING AS EXPECTED     Problem: Knowledge Deficit  Goal: Knowledge of disease process/condition, treatment plan, diagnostic tests, and medications will improve  Outcome: PROGRESSING AS EXPECTED  Goal: Knowledge of the prescribed therapeutic regimen will improve  Outcome: PROGRESSING AS EXPECTED     Problem: Discharge Barriers/Planning  Goal: Patient's continuum of care needs will be met  Outcome: PROGRESSING AS EXPECTED     Problem: Pain Management  Goal: Pain level will decrease to patient's comfort goal  Outcome: PROGRESSING AS EXPECTED     Problem: Respiratory:  Goal: Respiratory status will improve  Outcome: PROGRESSING AS EXPECTED     Problem: Fluid Volume:  Goal: Will maintain balanced intake and output  Outcome: PROGRESSING AS EXPECTED     Problem: Urinary Elimination:  Goal: Ability to reestablish a normal urinary elimination pattern will improve  Outcome: PROGRESSING AS EXPECTED     Problem: Psychosocial Needs:  Goal: Level of anxiety will decrease  Outcome: PROGRESSING AS EXPECTED

## 2020-02-15 NOTE — PROGRESS NOTES
Hospital Medicine Daily Progress Note    Date of Service  2/15/2020    Chief Complaint  74 y.o. female admitted 2/8/2020 with abdominal pain    Hospital Course    This is a 73 yo female who presented 2/8/2020 with abdominal pain across the abdomen increasing in severity and worse with leaning forward. She felt flushing through her body and nausea associated with the pain. She has a remote history of cholecystectomy but had endoscopy for stone removal with Dr. Louie from GI in 2018.  She met sepsis criteria on admission and was treated appropriately for this. An abdominal ultrasound showed a dilated common bile duct with retained stone.      Interval Problem Update  No acute events overnight patient overall comfortable, denies any overt abdominal pain.  She feels mildly constipated but otherwise denies having any fevers chills chest pain shortness of breath or nausea vomiting    Consultants/Specialty  GI Dr. Urias  Cardiology Dr. Boston    Code Status  DNR    Disposition  TBD    Review of Systems  Review of Systems   Constitutional: Negative for chills, diaphoresis, fever and malaise/fatigue.   HENT: Negative for congestion, hearing loss, sore throat and tinnitus.    Eyes: Negative for blurred vision, double vision, photophobia and pain.   Respiratory: Negative for cough, hemoptysis, sputum production, shortness of breath, wheezing and stridor.    Cardiovascular: Negative for chest pain, palpitations, orthopnea, claudication and PND.   Gastrointestinal: Negative for abdominal pain, blood in stool, constipation, diarrhea, heartburn, melena, nausea and vomiting.   Genitourinary: Negative for dysuria, frequency, hematuria and urgency.   Musculoskeletal: Negative for back pain, myalgias and neck pain.        Abdominal pain radiates into the back   Neurological: Negative for dizziness, tingling, tremors, sensory change, speech change, weakness and headaches.   Psychiatric/Behavioral: Negative for depression, memory  loss and suicidal ideas. The patient is not nervous/anxious.    All other systems reviewed and are negative.       Physical Exam  Temp:  [36.4 °C (97.5 °F)-36.7 °C (98.1 °F)] 36.7 °C (98 °F)  Pulse:  [58-72] 58  Resp:  [16-18] 16  BP: (118-144)/(53-65) 128/58  SpO2:  [93 %-95 %] 95 %    Physical Exam  Vitals signs reviewed.   Constitutional:       General: She is not in acute distress.     Appearance: Normal appearance. She is obese. She is not ill-appearing, toxic-appearing or diaphoretic.   HENT:      Head: Normocephalic and atraumatic.      Nose: No congestion or rhinorrhea.      Mouth/Throat:      Mouth: Mucous membranes are moist.      Pharynx: No oropharyngeal exudate or posterior oropharyngeal erythema.   Eyes:      Extraocular Movements: Extraocular movements intact.      Conjunctiva/sclera: Conjunctivae normal.      Pupils: Pupils are equal, round, and reactive to light.   Neck:      Musculoskeletal: Normal range of motion and neck supple. No neck rigidity or muscular tenderness.   Cardiovascular:      Rate and Rhythm: Normal rate and regular rhythm.      Pulses: Normal pulses.      Heart sounds: No murmur.   Pulmonary:      Effort: Pulmonary effort is normal. No respiratory distress.      Breath sounds: Normal breath sounds. No wheezing or rales.   Abdominal:      General: Abdomen is flat. Bowel sounds are normal. There is no distension.      Palpations: Abdomen is soft. There is no mass.      Tenderness: There is no abdominal tenderness. There is no guarding or rebound.      Hernia: No hernia is present.   Musculoskeletal: Normal range of motion.         General: No swelling or tenderness.   Skin:     General: Skin is warm and dry.      Capillary Refill: Capillary refill takes less than 2 seconds.      Coloration: Skin is not jaundiced or pale.      Findings: No bruising or erythema.   Neurological:      General: No focal deficit present.      Mental Status: She is alert and oriented to person, place, and  time. Mental status is at baseline.      Cranial Nerves: No cranial nerve deficit.   Psychiatric:         Mood and Affect: Mood normal.         Thought Content: Thought content normal.         Fluids    Intake/Output Summary (Last 24 hours) at 2/15/2020 1328  Last data filed at 2/15/2020 1245  Gross per 24 hour   Intake 580 ml   Output --   Net 580 ml       Laboratory  Recent Labs     02/14/20  0414 02/15/20  0245 02/15/20  0921   WBC 13.4* 17.1* 18.2*   RBC 4.98 4.91 4.77   HEMOGLOBIN 14.8 14.6 14.1   HEMATOCRIT 48.3* 47.0 45.5   MCV 97.0 95.7 95.4   MCH 29.7 29.7 29.6   MCHC 30.6* 31.1* 31.0*   RDW 46.5 44.7 44.5   PLATELETCT 159* 181 184   MPV 9.6 10.7 9.7     Recent Labs     02/13/20  0427 02/14/20  0414 02/15/20  0245   SODIUM 139 142 141   POTASSIUM 3.6 4.1 4.4   CHLORIDE 102 102 102   CO2 26 28 27   GLUCOSE 110* 100* 117*   BUN 22 22 15   CREATININE 0.85 0.86 0.66   CALCIUM 9.0 9.2 9.2                   Imaging  [id number] STONE REMOVAL   Final Result      Intraoperative fluoroscopy spot images as described above.      DX-PORTABLE FLUOROSCOPY < 1 HOUR   Final Result      Portable fluoroscopy utilized for 0.2 minute.         INTERPRETING LOCATION: [address] [place] [address], [facility]      EC-ECHOCARDIOGRAM COMPLETE W/O CONT   Final Result      US-RUQ   Final Result      1.  Post cholecystectomy.      2.  There is biliary ductal dilatation and apparent distal common bile duct stone measuring slightly less than 1 cm in diameter.      DX-CHEST-PORTABLE (1 VIEW)   Final Result         1. No acute cardiopulmonary abnormalities are identified.           Assessment/Plan  Gram-negative bacteremia  Assessment & Plan  2/2 to biliary obstruction  Status post ERCP with stone retrieval  Resume IV ceftriaxone and metronidazole  David blood cultures negative thus far    Common bile duct stone  Assessment & Plan  Hx of cholecystectomy.  As above status post ERCP sphincterotomy and stone removal    SVT (supraventricular  tachycardia) (HCC)  Assessment & Plan  Resolved.    Grade II diastolic dysfunction- echo 2017 with preserved EF- (present on admission)  Assessment & Plan  Not in acute exacerbation  Resume Metoprolol and losartan as well as Cardizem    Sepsis due to undetermined organism (HCC)  Assessment & Plan  This is Sepsis Present on admission  SIRS criteria identified on my evaluation include: Fever, with temperature greater than 101 deg F, Tachycardia, with heart rate greater than 90 BPM, Tachypnea, with respirations greater than 20 per minute and Leukocyosis, with WBC greater than 12,000  Source is abdominal  Sepsis protocol initiated on admission  Fluid resuscitation ordered per protocol  IV antibiotics as appropriate for source of sepsis  While organ dysfunction may be noted elsewhere in this problem list or in the chart, degree of organ dysfunction does not meet CMS criteria for severe sepsis  Overall sepsis resolved but still antibiotics      Chronic midline low back pain without sciatica- norco chronic daily use; nv pain and spine- (present on admission)  Assessment & Plan  Continue with pain control     Essential hypertension- (present on admission)  Assessment & Plan  Controlled  Resume home dose of norvasc, metoprolol and losartan and hctz       BMI 37.0-37.9, adult- (present on admission)  Assessment & Plan  Body mass index is 37.57 kg/m².  Encourage healthy lifestyle and physical activity.    New Onset Atrial fibrillation- (present on admission)  Assessment & Plan  Overall well controlled currently in sinus rhythm  Resume current dose of metoprolol and oral Cardizem  TSH was normal  We will utilize IV PRN Cardizem as needed  We will continue patient on Eliquis      Hypokalemia- (present on admission)  Assessment & Plan  Potassium replenished.  CTM    Patient plan of care discussed at multidisplinary team rounds and with patient and R.N at [facility].        VTE prophylaxis: lovenox

## 2020-02-16 VITALS
SYSTOLIC BLOOD PRESSURE: 158 MMHG | OXYGEN SATURATION: 91 % | HEIGHT: 63 IN | DIASTOLIC BLOOD PRESSURE: 80 MMHG | WEIGHT: 212.08 LBS | TEMPERATURE: 97.7 F | BODY MASS INDEX: 37.58 KG/M2 | RESPIRATION RATE: 18 BRPM | HEART RATE: 70 BPM

## 2020-02-16 PROBLEM — A41.9 SEPSIS DUE TO UNDETERMINED ORGANISM (HCC): Status: RESOLVED | Noted: 2018-02-15 | Resolved: 2020-02-16

## 2020-02-16 PROBLEM — K80.50 COMMON BILE DUCT STONE: Status: RESOLVED | Noted: 2020-02-09 | Resolved: 2020-02-16

## 2020-02-16 PROBLEM — R78.81 GRAM-NEGATIVE BACTEREMIA: Status: RESOLVED | Noted: 2020-02-09 | Resolved: 2020-02-16

## 2020-02-16 LAB
ALBUMIN SERPL BCP-MCNC: 3.2 G/DL (ref 3.2–4.9)
ALBUMIN/GLOB SERPL: 1.3 G/DL
ALP SERPL-CCNC: 63 U/L (ref 30–99)
ALT SERPL-CCNC: 38 U/L (ref 2–50)
ANION GAP SERPL CALC-SCNC: 9 MMOL/L (ref 7–16)
AST SERPL-CCNC: 29 U/L (ref 12–45)
BASOPHILS # BLD AUTO: 0.2 % (ref 0–1.8)
BASOPHILS # BLD: 0.02 K/UL (ref 0–0.12)
BILIRUB SERPL-MCNC: 0.3 MG/DL (ref 0.1–1.5)
BUN SERPL-MCNC: 14 MG/DL (ref 8–22)
CALCIUM SERPL-MCNC: 8.8 MG/DL (ref 8.4–10.2)
CHLORIDE SERPL-SCNC: 103 MMOL/L (ref 96–112)
CO2 SERPL-SCNC: 27 MMOL/L (ref 20–33)
CREAT SERPL-MCNC: 0.66 MG/DL (ref 0.5–1.4)
EOSINOPHIL # BLD AUTO: 0.05 K/UL (ref 0–0.51)
EOSINOPHIL NFR BLD: 0.4 % (ref 0–6.9)
ERYTHROCYTE [DISTWIDTH] IN BLOOD BY AUTOMATED COUNT: 46.2 FL (ref 35.9–50)
GLOBULIN SER CALC-MCNC: 2.5 G/DL (ref 1.9–3.5)
GLUCOSE SERPL-MCNC: 92 MG/DL (ref 65–99)
HCT VFR BLD AUTO: 45.3 % (ref 37–47)
HGB BLD-MCNC: 13.7 G/DL (ref 12–16)
IMM GRANULOCYTES # BLD AUTO: 0.06 K/UL (ref 0–0.11)
IMM GRANULOCYTES NFR BLD AUTO: 0.5 % (ref 0–0.9)
LYMPHOCYTES # BLD AUTO: 2.43 K/UL (ref 1–4.8)
LYMPHOCYTES NFR BLD: 19.9 % (ref 22–41)
MCH RBC QN AUTO: 29.7 PG (ref 27–33)
MCHC RBC AUTO-ENTMCNC: 30.2 G/DL (ref 33.6–35)
MCV RBC AUTO: 98.3 FL (ref 81.4–97.8)
MONOCYTES # BLD AUTO: 0.84 K/UL (ref 0–0.85)
MONOCYTES NFR BLD AUTO: 6.9 % (ref 0–13.4)
NEUTROPHILS # BLD AUTO: 8.84 K/UL (ref 2–7.15)
NEUTROPHILS NFR BLD: 72.1 % (ref 44–72)
NRBC # BLD AUTO: 0 K/UL
NRBC BLD-RTO: 0 /100 WBC
PLATELET # BLD AUTO: 166 K/UL (ref 164–446)
PMV BLD AUTO: 10.3 FL (ref 9–12.9)
POTASSIUM SERPL-SCNC: 4.4 MMOL/L (ref 3.6–5.5)
PROT SERPL-MCNC: 5.7 G/DL (ref 6–8.2)
RBC # BLD AUTO: 4.61 M/UL (ref 4.2–5.4)
SODIUM SERPL-SCNC: 139 MMOL/L (ref 135–145)
WBC # BLD AUTO: 12.2 K/UL (ref 4.8–10.8)

## 2020-02-16 PROCEDURE — 700102 HCHG RX REV CODE 250 W/ 637 OVERRIDE(OP): Performed by: INTERNAL MEDICINE

## 2020-02-16 PROCEDURE — A9270 NON-COVERED ITEM OR SERVICE: HCPCS | Performed by: INTERNAL MEDICINE

## 2020-02-16 PROCEDURE — 80053 COMPREHEN METABOLIC PANEL: CPT

## 2020-02-16 PROCEDURE — 99239 HOSP IP/OBS DSCHRG MGMT >30: CPT | Performed by: HOSPITALIST

## 2020-02-16 PROCEDURE — 700105 HCHG RX REV CODE 258: Performed by: HOSPITALIST

## 2020-02-16 PROCEDURE — 700111 HCHG RX REV CODE 636 W/ 250 OVERRIDE (IP): Performed by: HOSPITALIST

## 2020-02-16 PROCEDURE — 85025 COMPLETE CBC W/AUTO DIFF WBC: CPT

## 2020-02-16 PROCEDURE — A9270 NON-COVERED ITEM OR SERVICE: HCPCS | Performed by: HOSPITALIST

## 2020-02-16 PROCEDURE — 700111 HCHG RX REV CODE 636 W/ 250 OVERRIDE (IP): Performed by: INTERNAL MEDICINE

## 2020-02-16 PROCEDURE — 700101 HCHG RX REV CODE 250: Performed by: HOSPITALIST

## 2020-02-16 PROCEDURE — 700102 HCHG RX REV CODE 250 W/ 637 OVERRIDE(OP): Performed by: HOSPITALIST

## 2020-02-16 RX ORDER — CEFDINIR 300 MG/1
300 CAPSULE ORAL 2 TIMES DAILY
Qty: 10 CAP | Refills: 0 | Status: SHIPPED | OUTPATIENT
Start: 2020-02-16 | End: 2020-02-21

## 2020-02-16 RX ORDER — DILTIAZEM HYDROCHLORIDE 120 MG/1
120 CAPSULE, COATED, EXTENDED RELEASE ORAL DAILY
Qty: 30 CAP | Refills: 1 | Status: ON HOLD | OUTPATIENT
Start: 2020-02-17 | End: 2020-02-27

## 2020-02-16 RX ORDER — METRONIDAZOLE 500 MG/1
500 TABLET ORAL EVERY 8 HOURS
Qty: 15 TAB | Refills: 0 | Status: SHIPPED | OUTPATIENT
Start: 2020-02-16 | End: 2020-02-21

## 2020-02-16 RX ADMIN — ONDANSETRON 4 MG: 2 INJECTION INTRAMUSCULAR; INTRAVENOUS at 05:21

## 2020-02-16 RX ADMIN — METRONIDAZOLE 500 MG: 500 INJECTION, SOLUTION INTRAVENOUS at 06:34

## 2020-02-16 RX ADMIN — DILTIAZEM HYDROCHLORIDE 120 MG: 120 CAPSULE, COATED, EXTENDED RELEASE ORAL at 05:21

## 2020-02-16 RX ADMIN — HYDROCODONE BITARTRATE AND ACETAMINOPHEN 1 TABLET: 10; 325 TABLET ORAL at 13:04

## 2020-02-16 RX ADMIN — METOPROLOL TARTRATE 100 MG: 50 TABLET, FILM COATED ORAL at 05:21

## 2020-02-16 RX ADMIN — HYDROCODONE BITARTRATE AND ACETAMINOPHEN 1 TABLET: 10; 325 TABLET ORAL at 05:21

## 2020-02-16 RX ADMIN — APIXABAN 5 MG: 5 TABLET, FILM COATED ORAL at 05:21

## 2020-02-16 RX ADMIN — CEFTRIAXONE SODIUM 2 G: 2 INJECTION, POWDER, FOR SOLUTION INTRAMUSCULAR; INTRAVENOUS at 05:21

## 2020-02-16 RX ADMIN — POTASSIUM CHLORIDE 20 MEQ: 20 TABLET, EXTENDED RELEASE ORAL at 05:21

## 2020-02-16 NOTE — PROGRESS NOTES
Telemetry Shift Summary    Rhythm SR, SB w/ BBB  HR Range 50s-60s  Ectopy RPVC, RPAC  Measurements 0.16/0.14/0.42        Normal Values  Rhythm SR  HR Range    Measurements 0.12-0.20 / 0.06-0.10  / 0.30-0.52

## 2020-02-16 NOTE — PROGRESS NOTES
Gastroenterology Progress Note     Author: Clinton Ray M.D.   Date & Time Created: 2/15/2020 5:28 PM    Chief Complaint:  Abdominal pain    Interval History:  ERCP with stone removal.   Abdominal pain improved.   Tolerating orals.     Review of Systems:  Review of Systems   Constitutional: Negative.    HENT: Negative.    Respiratory: Negative.    Cardiovascular: Negative.    Gastrointestinal: Negative.    Neurological: Negative.    Psychiatric/Behavioral: Negative.        Physical Exam:  Physical Exam    Labs:          Recent Labs     02/13/20  0427 02/14/20  0414 02/15/20  0245   SODIUM 139 142 141   POTASSIUM 3.6 4.1 4.4   CHLORIDE 102 102 102   CO2 26 28 27   BUN 22 22 15   CREATININE 0.85 0.86 0.66   MAGNESIUM 2.1  --   --    CALCIUM 9.0 9.2 9.2     Recent Labs     02/13/20  0427 02/14/20  0414 02/15/20  0245   ALTSGPT 48 57* 49   ASTSGOT 38 54* 32   ALKPHOSPHAT 76 78 76   TBILIRUBIN 0.3 0.4 0.4   GLUCOSE 110* 100* 117*     Recent Labs     02/14/20  0414 02/15/20  0245 02/15/20  0921   RBC 4.98 4.91 4.77   HEMOGLOBIN 14.8 14.6 14.1   HEMATOCRIT 48.3* 47.0 45.5   PLATELETCT 159* 181 184     Recent Labs     02/13/20  0427 02/14/20  0414 02/15/20  0245 02/15/20  0921   WBC 10.9* 13.4* 17.1* 18.2*   NEUTSPOLYS 73.90* 77.00* 86.90*  --    LYMPHOCYTES 15.40* 12.90* 7.00*  --    MONOCYTES 8.90 8.60 5.40  --    EOSINOPHILS 1.10 0.80 0.00  --    BASOPHILS 0.30 0.30 0.20  --    ASTSGOT 38 54* 32  --    ALTSGPT 48 57* 49  --    ALKPHOSPHAT 76 78 76  --    TBILIRUBIN 0.3 0.4 0.4  --      Hemodynamics:  Temp (24hrs), Av.6 °C (97.8 °F), Min:36.4 °C (97.5 °F), Max:36.7 °C (98 °F)  Temperature: 36.7 °C (98 °F)  Pulse  Av.4  Min: 56  Max: 158   Blood Pressure : 128/58     Respiratory:    Respiration: 16, Pulse Oximetry: 95 %        RUL Breath Sounds: Clear, RML Breath Sounds: Clear, RLL Breath Sounds: Clear, BRYAN Breath Sounds: Clear, LLL Breath Sounds: Clear  Fluids:    Intake/Output Summary (Last 24 hours) at  2/15/2020 1728  Last data filed at 2/15/2020 1245  Gross per 24 hour   Intake 580 ml   Output --   Net 580 ml     Weight: 96.2 kg (212 lb 1.3 oz)  GI/Nutrition:  Orders Placed This Encounter   Procedures   • Diet Order Low Fiber(GI Soft), Cardiac     Standing Status:   Standing     Number of Occurrences:   1     Order Specific Question:   Diet:     Answer:   Low Fiber(GI Soft) [2]     Order Specific Question:   Diet:     Answer:   Cardiac [6]     Medical Decision Making, by Problem:  Active Hospital Problems    Diagnosis   • Common bile duct stone [K80.50]   • Gram-negative bacteremia [R78.81]   • SVT (supraventricular tachycardia) (HCC) [I47.1]   • Grade II diastolic dysfunction- echo 2017 with preserved EF [I51.9]   • Sepsis due to undetermined organism (HCC) [A41.9]   • Chronic midline low back pain without sciatica- norco chronic daily use; nv pain and spine [M54.5, G89.29]   • Essential hypertension [I10]   • BMI 37.0-37.9, adult [Z68.37]       Plan:  Choledocholithiasis: s/p ERCP with stone removal. Dietary changes discussed. Importance of this discussed with the patient. Increase in vegetables and fruits and elimination of all processed foods especially processed fats, with marked reduction in animal fats recommended otherwise recurrent stones is highly likely. This was explained to the daughter yesterday and to the patient today. All voced understanding.   Nutrition: Advance to low fat diet.     Patient may be discharged from GI perspective .    Quality-Core Measures

## 2020-02-16 NOTE — CARE PLAN
Problem: Safety  Goal: Will remain free from falls  Outcome: PROGRESSING AS EXPECTED     Problem: Respiratory:  Goal: Respiratory status will improve  Outcome: PROGRESSING AS EXPECTED  Note: Assess need for oxygen. Titrate oxygen as needed.

## 2020-02-16 NOTE — PROGRESS NOTES
Spoke with  regarding advancing patients diet from clear fluids. Per  advance as tolerated. Patient advanced to GI soft, tolerated dinner without difficulty.

## 2020-02-16 NOTE — PROGRESS NOTES
Cardiac monitor summary:  Rhythm: Sinus hao/sinus rhythm  Rate: 52-60  Ectopy: PVC's  Measurements: .16/.12/.42

## 2020-02-16 NOTE — DISCHARGE INSTRUCTIONS
Cholangitis  Introduction  Cholangitis is inflammation of the group of tubes (ducts) that carry digestive juices from the liver, gallbladder, and pancreas to the small intestine. This group of ducts is called the biliary tract. Cholangitis can cause fever, abdominal pain, and yellowish discoloration of the skin, the whites of the eyes, and mucous membranes (jaundice).  Cholangitis can get worse very quickly and cause infection throughout the body (sepsis). It is important to diagnose and treat cholangitis as soon as possible.  What are the causes?  This condition is usually caused by a blockage (obstruction) in the biliary tract. The most common causes of obstruction are:  · Formation of hard particles (stones) in the biliary tract.  · Damage to the biliary tract from a previous surgical or diagnostic procedure.  Other causes of an obstruction include:  · Cysts or tumors in the biliary tract.  · A type of liver disease that affects the biliary tract (primary sclerosing cholangitis).  · Being born with a narrow biliary tract.  When the flow of digestive juices is blocked, bacteria that normally live in the intestine can grow and spread inside the biliary tract.  What increases the risk?  The following factors may make you more likely to develop this condition:  · Being 50?60 years old.  · Having a history of stones in the biliary tract.  · Having had cholangitis in the past.  · Having HIV.  · Having another condition that affects the biliary tract.  · Having had a procedure to diagnose or treat problems with the biliary tract, especially endoscopic retrograde cholangiopancreatography (ERCP). These types of procedures may cause scarring and obstruction that can lead to infection.  What are the signs or symptoms?  The most common symptoms of this condition are fever, abdominal pain, and jaundice. Most of the time, all of these symptoms are present. Other symptoms may  include:  · Chills.  · Tiredness.  · Nausea.  · Dark-colored urine.  · Enio-colored stools.  · Confusion.  · Itchy skin.  How is this diagnosed?  This condition may be diagnosed based on:  · Your symptoms.  · A physical exam.  · Your medical history. Your health care provider may ask whether you have had stones, ERCP, or other procedures involving the biliary tract in the past.  · Blood tests.  · Imaging studies, such as:  ¨ An ultrasound. This uses sound waves to make an image of any obstructions that you have.  ¨ An MRI.  ¨ A CT scan.  · ERCP to check the biliary tract for possible causes of cholangitis. During ERCP, a thin, lighted tube (endoscope) is passed through your mouth and down your throat into the first part of your small intestine (duodenum). A small, plastic tube (cannula) is then passed through the endoscope and directed into your bile duct or pancreatic duct. Dye is then injected through the cannula and X-rays are taken.  How is this treated?     This condition is usually treated at a hospital. Treatment may include:  · Receiving fluids, nutrition, and antibiotic medicines through an IV tube. You may be given antibiotics that kill most of the bacteria known to cause cholangitis (broad spectrum antibiotics).  · ERCP or another surgical procedure to open and drain the biliary tract.  Follow these instructions at home:  Medicines  · Take over-the-counter and prescription medicines only as told by your health care provider.  · Take your antibiotic medicine as told by your health care provider. Do not stop taking the antibiotic even if you start to feel better.  General instructions  · Return to your normal activities as told by your health care provider. Ask your health care provider what activities are safe for you.  · Follow instructions from your health care provider about eating or drinking restrictions.  · Maintain a healthy weight.  · Exercise regularly, as told by your health care  provider.  · Keep all follow-up visits as told by your health care provider. This is important.  Contact a health care provider if:  · You have a fever.  · Your symptoms return or become more severe.  · You suddenly lose weight.  This information is not intended to replace advice given to you by your health care provider. Make sure you discuss any questions you have with your health care provider.  Document Released: 01/13/2017 Document Revised: 05/25/2017 Document Reviewed: 01/06/2016  © 2017 [employer]    Discharge Instructions    Discharged to home by car with relative. Discharged via wheelchair, hospital escort: Yes.  Special equipment needed: Not Applicable    Be sure to schedule a follow-up appointment with your primary care doctor or any specialists as instructed.     Discharge Plan:   Diet Plan: Discussed  Activity Level: Discussed  Confirmed Follow up Appointment: Patient to Call and Schedule Appointment  Confirmed Symptoms Management: Discussed  Medication Reconciliation Updated: Yes  Influenza Vaccine Indication: Not indicated: Previously immunized this influenza season and > 8 years of age    I understand that a diet low in cholesterol, fat, and sodium is recommended for good health. Unless I have been given specific instructions below for another diet, I accept this instruction as my diet prescription.     Special Instructions:   Please follow-up with your primary care MD.  Please resume your previous activity levels as you tolerate.  Please resume your previous diet as you tolerate.  Please report any increase in shortness of breath, rapid heart rate, fever, to your MD immediately or return to ED.    · Is patient discharged on Warfarin / Coumadin?   No     Depression / Suicide Risk    As you are discharged from this [facility] facility, it is important to learn how to keep safe from harming yourself.    Recognize the warning signs:  · Abrupt changes in personality, positive or negative- including  increase in energy   · Giving away possessions  · Change in eating patterns- significant weight changes-  positive or negative  · Change in sleeping patterns- unable to sleep or sleeping all the time   · Unwillingness or inability to communicate  · Depression  · Unusual sadness, discouragement and loneliness  · Talk of wanting to die  · Neglect of personal appearance   · Rebelliousness- reckless behavior  · Withdrawal from people/activities they love  · Confusion- inability to concentrate     If you or a loved one observes any of these behaviors or has concerns about self-harm, here's what you can do:  · Talk about it- your feelings and reasons for harming yourself  · Remove any means that you might use to hurt yourself (examples: pills, rope, extension cords, firearm)  · Get professional help from the community (Mental Health, Substance Abuse, psychological counseling)  · Do not be alone:Call your Safe Contact- someone whom you trust who will be there for you.  · Call your local CRISIS HOTLINE 308-3231 or 907-155-1097  · Call your local Children's Mobile Crisis Response Team [place] (557) 310-6007 or www.EnhanCV  · Call the toll free National Suicide Prevention Hotlines   · National Suicide Prevention Lifeline 259-827-VTRF (1520)  · National Hope Line Network 800-SUICIDE (159-7944)

## 2020-02-16 NOTE — PROGRESS NOTES
[phone] - report from Alexis FRENCH.  Pt resting comfortably in bed.  A&O x 4.  Up to amb to br with sba, steady on feet.  Fall precautions in effect.  Pt calls approp for asst oob.  C/o chronic back pain, pt was medicated for pain at 0520 this am, pt vu is q6hrs, heat pack at bs for prn use. Denies pain, sob, dizziness, nausea. See flowsheet for assess.  poc reviewed with pt, pt vu, all questions answered.  Tele = sr,  Vss.    Good po intake with am meal, concha diet well.    D/c home pending today, pt aware.    1445 - Pt d/c'd to home with family.  A&O x 4.  Up to amb, steady on feet.  Iv d/c'd intact. rx & dx info/handouts given.  Pt vu of all d/c teaching, all questions answered.  Pt escorted to private car via wc & staff.

## 2020-02-16 NOTE — DISCHARGE SUMMARY
Discharge Summary    CHIEF COMPLAINT ON ADMISSION  Chief Complaint   Patient presents with   • Fever     woke up with chills this morning.  Temp 101.2f   • Headache   • Chest Pain     last night; lasted about 30 min; radiated to back legs and arms   • Nausea       Reason for Admission  fever     Admission Date  2/8/2020    CODE STATUS  DNAR/DNI    HPI & HOSPITAL COURSE   is a very pleasant 74-year-old female admitted on 2/20/2020 for evaluation of abdominal pain.  As result patient underwent a right upper quadrant ultrasound which showed evidence of biliary ductal dilatation around 1 cm in diameter, given this finding gastroenterology was consulted, patient was planned to undergo an ERCP unfortunately it took at least several days because patient developed atrial fibrillation with rapid ventricular response prior to getting an ERCP in the operating room.  As result procedure was canceled twice.  During this time cardiology was consulted, we added Cardizem orally to her medications, for which at home she takes Toprol 100 mg twice daily.  We also use intermittent IV Cardizem and digoxin to obtain rate control.  During her hospitalization patient did convert back to normal sinus rhythm, were successfully ablated perform an ERCP with stone extraction.  At this present time patient says that her abdominal pain has subsided. Patient denies fevers/chills, chest pain, shortness of breath or nausea/vommiting.     I prescribed her 5 additional days of oral antibiotics.  In addition I prescribed her with Eliquis as anticoagulation, patient is aware of this for stroke prophylaxis given her elevated Bj score.  I talked her about the risks and benefits.    Therefore, she is discharged in good and stable condition to home with close outpatient follow-up.    The patient met 2-midnight criteria for an inpatient stay at the time of discharge.    Discharge Date  2/16/2020    FOLLOW UP ITEMS POST DISCHARGE  PCP and GI in  1-2 weeks    DISCHARGE DIAGNOSES  Active Problems:    BMI 37.0-37.9, adult POA: Yes    Essential hypertension POA: Yes    Chronic midline low back pain without sciatica- norco chronic daily use; nv pain and spine POA: Yes    Grade II diastolic dysfunction- echo 2017 with preserved EF POA: Yes    SVT (supraventricular tachycardia) (HCC) POA: Unknown  Resolved Problems:    Sepsis due to undetermined organism (HCC) POA: Unknown    Common bile duct stone POA: Unknown    Gram-negative bacteremia POA: Unknown      FOLLOW UP  Future Appointments   Date Time Provider Department Center   7/6/2020 10:40 AM Quincy Angel M.D. [facility]     No follow-up provider specified.    MEDICATIONS ON DISCHARGE     Medication List      START taking these medications      Instructions   apixaban 5mg Tabs  Commonly known as:  ELIQUIS   Take 1 Tab by mouth 2 Times a Day.  Dose:  5 mg     cefdinir 300 MG Caps  Commonly known as:  OMNICEF   Take 1 Cap by mouth 2 times a day for 5 days.  Dose:  300 mg     DILTIAZem  MG Cp24  Start taking on:  February 17, 2020  Commonly known as:  CARDIZEM CD   Take 1 Cap by mouth every day.  Dose:  120 mg     metroNIDAZOLE 500 MG Tabs  Commonly known as:  FLAGYL   Take 1 Tab by mouth every 8 hours for 5 days.  Dose:  500 mg        CONTINUE taking these medications      Instructions   Ambien 10 MG Tabs  Generic drug:  zolpidem   Take 10 mg by mouth at bedtime as needed for Sleep.  Dose:  10 mg     amLODIPine 5 MG Tabs  Commonly known as:  NORVASC   Take 1 Tab by mouth every day.  Dose:  5 mg     CALCIUM 1200 PO   Take 1,200 mg by mouth every day.  Dose:  1,200 mg     FIBER PO   Take 2 Caps by mouth every day.  Dose:  2 Cap     HYDROcodone/acetaminophen  MG Tabs  Commonly known as:  NORCO   TAKE ONE TABLET BY MOUTH FOUR TIMES A DAY AS NEEDED 30 DAY SUPPLY     ipratropium-albuterol  MCG/ACT Aers  Commonly known as:  Combivent Respimat   Inhale 1 Puff by mouth every 6 hours as needed  (shortness of breath).  Dose:  1 Puff     losartan 100 MG Tabs  Commonly known as:  COZAAR   Take 1 Tab by mouth every day.  Dose:  100 mg     Magnesium 400 MG Tabs   Take 400 mg by mouth every day.  Dose:  400 mg     metoprolol 100 MG Tabs  Commonly known as:  LOPRESSOR   Take 1 Tab by mouth 2 times a day.  Dose:  100 mg     simvastatin 20 MG Tabs  Commonly known as:  ZOCOR   Take 1 Tab by mouth every evening.  Dose:  20 mg     Vitamin D 50 MCG (2000 UT) Caps   Take 1 Cap by mouth every day.  Dose:  2,000 Units        STOP taking these medications    triamterene-hctz 37.5-25 MG Tabs  Commonly known as:  MAXZIDE-25/DYAZIDE            Allergies  Allergies   Allergen Reactions   • Codeine Swelling   • Ace Inhibitors      Cough       DIET  Orders Placed This Encounter   Procedures   • Diet Order Low Fiber(GI Soft), Cardiac     Standing Status:   Standing     Number of Occurrences:   1     Order Specific Question:   Diet:     Answer:   Low Fiber(GI Soft) [2]     Order Specific Question:   Diet:     Answer:   Cardiac [6]       ACTIVITY  As tolerated.  Weight bearing as tolerated    CONSULTATIONS  GI     PROCEDURES  ERCP with stone extraction    LABORATORY  Lab Results   Component Value Date    SODIUM 139 02/16/2020    POTASSIUM 4.4 02/16/2020    CHLORIDE 103 02/16/2020    CO2 27 02/16/2020    GLUCOSE 92 02/16/2020    BUN 14 02/16/2020    CREATININE 0.66 02/16/2020    CREATININE 0.80 12/02/2010    GLOMRATE >59 12/02/2010        Lab Results   Component Value Date    WBC 12.2 (H) 02/16/2020    WBC 7.6 12/02/2010    HEMOGLOBIN 13.7 02/16/2020    HEMATOCRIT 45.3 02/16/2020    PLATELETCT 166 02/16/2020        Total time of the discharge process exceeds 33 minutes.

## 2020-02-17 ENCOUNTER — PATIENT OUTREACH (OUTPATIENT)
Dept: HEALTH INFORMATION MANAGEMENT | Facility: OTHER | Age: 75
End: 2020-02-17

## 2020-02-17 PROBLEM — E66.9 OBESITY (BMI 30-39.9): Status: RESOLVED | Noted: 2017-04-04 | Resolved: 2020-02-17

## 2020-02-17 PROBLEM — K80.51 CALCULUS OF BILE DUCT WITHOUT CHOLECYSTITIS WITH OBSTRUCTION: Status: ACTIVE | Noted: 2020-02-17

## 2020-02-17 PROBLEM — I48.0 PAF (PAROXYSMAL ATRIAL FIBRILLATION) (HCC): Status: ACTIVE | Noted: 2020-02-17

## 2020-02-18 ENCOUNTER — TELEPHONE (OUTPATIENT)
Dept: CARDIOLOGY | Facility: MEDICAL CENTER | Age: 75
End: 2020-02-18

## 2020-02-18 NOTE — ANESTHESIA POSTPROCEDURE EVALUATION
Patient: Rufina Macias    Procedure Summary     Date:  02/14/20 Room / Location:   ENDOSCOPIC ULTRASOUND ROOM / SURGERY [facility]    Anesthesia Start:  0918 Anesthesia Stop:  1007    Procedure:  ERCP (ENDOSCOPIC RETROGRADE CHOLANGIOPANCREATOGRAPHY) Diagnosis:       Choledocholithiasis      (choledocholithiasis)    Surgeon:  Clinton Ray M.D. Responsible Provider:  Ranjit Olivier M.D.    Anesthesia Type:  general ASA Status:  3          Final Anesthesia Type: general  Last vitals  BP   Blood Pressure : 158/80    Temp   36.5 °C (97.7 °F)    Pulse   Pulse: 70   Resp   18    SpO2   91 %      Anesthesia Post Evaluation    Patient location during evaluation: PACU  Patient participation: complete - patient participated  Level of consciousness: awake and alert    Airway patency: patent  Anesthetic complications: no  Cardiovascular status: hemodynamically stable  Respiratory status: acceptable  Hydration status: euvolemic    PONV: none           Nurse Pain Score: 5 (NPRS)

## 2020-02-18 NOTE — TELEPHONE ENCOUNTER
Dionne at [facility] is calling about a patient LS consulted on in the hospital recently. She said the patient wants to know if she can take Ibuprofen while on Eliquis. She is asking for you to call the patient at [phone].

## 2020-02-18 NOTE — TELEPHONE ENCOUNTER
Contacted patient.  Informed patient ibuprofen is not recommended to take while on an OAC.  Please use acetaminophen as alternative.  Patient repeated instructions and verbalizes all understanding.

## 2020-02-19 ENCOUNTER — TELEPHONE (OUTPATIENT)
Dept: MEDICAL GROUP | Age: 75
End: 2020-02-19

## 2020-02-19 NOTE — TELEPHONE ENCOUNTER
ESTABLISHED PATIENT PRE-VISIT PLANNING     Patient was NOT contacted to complete PVP.     Note: Patient will not be contacted if there is no indication to call.     1.  Reviewed notes from the last few office visits within the medical group: Yes    2.  If any orders were placed at last visit or intended to be done for this visit (i.e. 6 mos follow-up), do we have Results/Consult Notes?        •  Labs - Labs ordered, completed on 02/08/2020 thru 02/16/2020 and results are in chart.   Note: If patient appointment is for lab review and patient did not complete labs, check with provider if OK to reschedule patient until labs completed.       •  Imaging - Imaging ordered, completed and results are in chart.       •  Referrals - Referral ordered, patient was seen and consult notes are in chart. Care Teams updated  YES.    3. Is this appointment scheduled as a Hospital Follow-Up? Yes, visit was at [facility].     4.  Immunizations were updated in Epic using WebIZ?: Epic matches WebIZ       •  Web Iz Recommendations: HEPATITIS B and SHINGRIX (Shingles)    5.  Patient is due for the following Health Maintenance Topics:   Health Maintenance Due   Topic Date Due   • Annual Pulmonary Function Test / Spirometry  03/18/1951   • IMM HEP B VACCINE (1 of 3 - Risk 3-dose series) 03/18/1964   • IMM ZOSTER VACCINES (2 of 3) 12/10/2013   • Annual Wellness Visit  06/27/2019     6. Orders for overdue Health Maintenance topics pended in Pre-Charting? N\A    7.  AHA (MDX) form printed for Provider? YES    8.  Patient was NOT informed to arrive 15 min prior to their scheduled appointment and bring in their medication bottles.

## 2020-02-21 ENCOUNTER — OFFICE VISIT (OUTPATIENT)
Dept: MEDICAL GROUP | Age: 75
End: 2020-02-21
Payer: MEDICARE

## 2020-02-21 ENCOUNTER — HOSPITAL ENCOUNTER (INPATIENT)
Facility: MEDICAL CENTER | Age: 75
LOS: 10 days | DRG: 175 | End: 2020-03-02
Attending: EMERGENCY MEDICINE | Admitting: INTERNAL MEDICINE
Payer: MEDICARE

## 2020-02-21 ENCOUNTER — APPOINTMENT (OUTPATIENT)
Dept: RADIOLOGY | Facility: MEDICAL CENTER | Age: 75
DRG: 175 | End: 2020-02-21
Attending: EMERGENCY MEDICINE
Payer: MEDICARE

## 2020-02-21 VITALS
DIASTOLIC BLOOD PRESSURE: 80 MMHG | BODY MASS INDEX: 39.27 KG/M2 | SYSTOLIC BLOOD PRESSURE: 140 MMHG | OXYGEN SATURATION: 93 % | HEART RATE: 146 BPM | WEIGHT: 208 LBS | HEIGHT: 61 IN | TEMPERATURE: 98.6 F

## 2020-02-21 DIAGNOSIS — J96.11 CHRONIC RESPIRATORY FAILURE WITH HYPOXIA (HCC): ICD-10-CM

## 2020-02-21 DIAGNOSIS — I48.0 PAROXYSMAL ATRIAL FIBRILLATION (HCC): ICD-10-CM

## 2020-02-21 DIAGNOSIS — I48.0 PAF (PAROXYSMAL ATRIAL FIBRILLATION) (HCC): ICD-10-CM

## 2020-02-21 DIAGNOSIS — J44.1 COPD EXACERBATION (HCC): ICD-10-CM

## 2020-02-21 DIAGNOSIS — J44.9 CHRONIC OBSTRUCTIVE PULMONARY DISEASE, UNSPECIFIED COPD TYPE (HCC): ICD-10-CM

## 2020-02-21 DIAGNOSIS — I26.99 BILATERAL PULMONARY EMBOLISM (HCC): ICD-10-CM

## 2020-02-21 DIAGNOSIS — K80.51 CALCULUS OF BILE DUCT WITHOUT CHOLECYSTITIS WITH OBSTRUCTION: ICD-10-CM

## 2020-02-21 DIAGNOSIS — I48.91 ATRIAL FIBRILLATION WITH RAPID VENTRICULAR RESPONSE (HCC): Primary | ICD-10-CM

## 2020-02-21 PROBLEM — D72.829 LEUKOCYTOSIS: Status: ACTIVE | Noted: 2020-02-21

## 2020-02-21 PROBLEM — E27.8 ADRENAL NODULE (HCC): Status: ACTIVE | Noted: 2020-02-21

## 2020-02-21 PROBLEM — R73.9 HYPERGLYCEMIA: Status: ACTIVE | Noted: 2020-02-21

## 2020-02-21 LAB
ALBUMIN SERPL BCP-MCNC: 3.9 G/DL (ref 3.2–4.9)
ALBUMIN/GLOB SERPL: 1.3 G/DL
ALP SERPL-CCNC: 66 U/L (ref 30–99)
ALT SERPL-CCNC: 23 U/L (ref 2–50)
ANION GAP SERPL CALC-SCNC: 12 MMOL/L (ref 7–16)
APPEARANCE UR: CLEAR
APPEARANCE UR: CLEAR
AST SERPL-CCNC: 24 U/L (ref 12–45)
BASOPHILS # BLD AUTO: 0.3 % (ref 0–1.8)
BASOPHILS # BLD: 0.04 K/UL (ref 0–0.12)
BILIRUB SERPL-MCNC: 0.6 MG/DL (ref 0.1–1.5)
BILIRUB UR QL STRIP.AUTO: NEGATIVE
BUN SERPL-MCNC: 12 MG/DL (ref 8–22)
CALCIUM SERPL-MCNC: 9 MG/DL (ref 8.4–10.2)
CHLORIDE SERPL-SCNC: 100 MMOL/L (ref 96–112)
CO2 SERPL-SCNC: 24 MMOL/L (ref 20–33)
COLOR UR: NORMAL
COLOR UR: YELLOW
CREAT SERPL-MCNC: 0.76 MG/DL (ref 0.5–1.4)
EOSINOPHIL # BLD AUTO: 0.05 K/UL (ref 0–0.51)
EOSINOPHIL NFR BLD: 0.3 % (ref 0–6.9)
ERYTHROCYTE [DISTWIDTH] IN BLOOD BY AUTOMATED COUNT: 42.6 FL (ref 35.9–50)
GLOBULIN SER CALC-MCNC: 2.9 G/DL (ref 1.9–3.5)
GLUCOSE SERPL-MCNC: 143 MG/DL (ref 65–99)
GLUCOSE UR STRIP.AUTO-MCNC: NEGATIVE MG/DL
GLUCOSE UR STRIP.AUTO-MCNC: NEGATIVE MG/DL
HCT VFR BLD AUTO: 48.1 % (ref 37–47)
HGB BLD-MCNC: 16 G/DL (ref 12–16)
IMM GRANULOCYTES # BLD AUTO: 0.12 K/UL (ref 0–0.11)
IMM GRANULOCYTES NFR BLD AUTO: 0.8 % (ref 0–0.9)
KETONES UR STRIP.AUTO-MCNC: NEGATIVE MG/DL
KETONES UR STRIP.AUTO-MCNC: NEGATIVE MG/DL
LACTATE BLD-SCNC: 1.6 MMOL/L (ref 0.5–2)
LEUKOCYTE ESTERASE UR QL STRIP.AUTO: NEGATIVE
LEUKOCYTE ESTERASE UR QL STRIP.AUTO: NEGATIVE
LYMPHOCYTES # BLD AUTO: 1.5 K/UL (ref 1–4.8)
LYMPHOCYTES NFR BLD: 9.6 % (ref 22–41)
MCH RBC QN AUTO: 29.7 PG (ref 27–33)
MCHC RBC AUTO-ENTMCNC: 33.3 G/DL (ref 33.6–35)
MCV RBC AUTO: 89.4 FL (ref 81.4–97.8)
MICRO URNS: NORMAL
MONOCYTES # BLD AUTO: 1.24 K/UL (ref 0–0.85)
MONOCYTES NFR BLD AUTO: 7.9 % (ref 0–13.4)
NEUTROPHILS # BLD AUTO: 12.7 K/UL (ref 2–7.15)
NEUTROPHILS NFR BLD: 81.1 % (ref 44–72)
NITRITE UR QL STRIP.AUTO: NEGATIVE
NITRITE UR QL STRIP.AUTO: NEGATIVE
NRBC # BLD AUTO: 0 K/UL
NRBC BLD-RTO: 0 /100 WBC
NT-PROBNP SERPL IA-MCNC: 3864 PG/ML (ref 0–125)
PH UR STRIP.AUTO: 6 [PH] (ref 5–8)
PH UR STRIP.AUTO: 6 [PH] (ref 5–8)
PLATELET # BLD AUTO: 203 K/UL (ref 164–446)
PMV BLD AUTO: 9.9 FL (ref 9–12.9)
POTASSIUM SERPL-SCNC: 3.8 MMOL/L (ref 3.6–5.5)
PROCALCITONIN SERPL-MCNC: 0.23 NG/ML
PROT SERPL-MCNC: 6.8 G/DL (ref 6–8.2)
PROT UR QL STRIP: NEGATIVE MG/DL
PROT UR QL STRIP: NEGATIVE MG/DL
RBC # BLD AUTO: 5.38 M/UL (ref 4.2–5.4)
RBC UR QL AUTO: NEGATIVE
RBC UR QL AUTO: NEGATIVE
SODIUM SERPL-SCNC: 136 MMOL/L (ref 135–145)
SP GR UR STRIP.AUTO: 1.01 (ref 1–1.03)
SP GR UR STRIP.AUTO: <=1.005
T4 FREE SERPL-MCNC: 1.9 NG/DL (ref 0.58–1.64)
TROPONIN T SERPL-MCNC: 11 NG/L (ref 6–19)
TROPONIN T SERPL-MCNC: 9 NG/L (ref 6–19)
TSH SERPL DL<=0.005 MIU/L-ACNC: 2.05 UIU/ML (ref 0.38–5.33)
WBC # BLD AUTO: 15.7 K/UL (ref 4.8–10.8)

## 2020-02-21 PROCEDURE — 96374 THER/PROPH/DIAG INJ IV PUSH: CPT

## 2020-02-21 PROCEDURE — 81002 URINALYSIS NONAUTO W/O SCOPE: CPT

## 2020-02-21 PROCEDURE — 700105 HCHG RX REV CODE 258: Performed by: EMERGENCY MEDICINE

## 2020-02-21 PROCEDURE — 94760 N-INVAS EAR/PLS OXIMETRY 1: CPT

## 2020-02-21 PROCEDURE — 84484 ASSAY OF TROPONIN QUANT: CPT | Mod: 91

## 2020-02-21 PROCEDURE — 71275 CT ANGIOGRAPHY CHEST: CPT

## 2020-02-21 PROCEDURE — 81003 URINALYSIS AUTO W/O SCOPE: CPT

## 2020-02-21 PROCEDURE — 99291 CRITICAL CARE FIRST HOUR: CPT | Performed by: INTERNAL MEDICINE

## 2020-02-21 PROCEDURE — 93005 ELECTROCARDIOGRAM TRACING: CPT | Performed by: EMERGENCY MEDICINE

## 2020-02-21 PROCEDURE — 770020 HCHG ROOM/CARE - TELE (206)

## 2020-02-21 PROCEDURE — 99215 OFFICE O/P EST HI 40 MIN: CPT | Performed by: FAMILY MEDICINE

## 2020-02-21 PROCEDURE — 51701 INSERT BLADDER CATHETER: CPT

## 2020-02-21 PROCEDURE — 700111 HCHG RX REV CODE 636 W/ 250 OVERRIDE (IP): Performed by: EMERGENCY MEDICINE

## 2020-02-21 PROCEDURE — 83880 ASSAY OF NATRIURETIC PEPTIDE: CPT

## 2020-02-21 PROCEDURE — 84481 FREE ASSAY (FT-3): CPT

## 2020-02-21 PROCEDURE — 700101 HCHG RX REV CODE 250: Performed by: INTERNAL MEDICINE

## 2020-02-21 PROCEDURE — 84439 ASSAY OF FREE THYROXINE: CPT

## 2020-02-21 PROCEDURE — A9270 NON-COVERED ITEM OR SERVICE: HCPCS | Performed by: EMERGENCY MEDICINE

## 2020-02-21 PROCEDURE — 85025 COMPLETE CBC W/AUTO DIFF WBC: CPT

## 2020-02-21 PROCEDURE — 99285 EMERGENCY DEPT VISIT HI MDM: CPT

## 2020-02-21 PROCEDURE — 36415 COLL VENOUS BLD VENIPUNCTURE: CPT

## 2020-02-21 PROCEDURE — 84145 PROCALCITONIN (PCT): CPT

## 2020-02-21 PROCEDURE — 700117 HCHG RX CONTRAST REV CODE 255: Performed by: EMERGENCY MEDICINE

## 2020-02-21 PROCEDURE — 80053 COMPREHEN METABOLIC PANEL: CPT

## 2020-02-21 PROCEDURE — 84443 ASSAY THYROID STIM HORMONE: CPT

## 2020-02-21 PROCEDURE — 700102 HCHG RX REV CODE 250 W/ 637 OVERRIDE(OP): Performed by: EMERGENCY MEDICINE

## 2020-02-21 PROCEDURE — 71045 X-RAY EXAM CHEST 1 VIEW: CPT

## 2020-02-21 PROCEDURE — 83605 ASSAY OF LACTIC ACID: CPT

## 2020-02-21 RX ORDER — METOPROLOL TARTRATE 1 MG/ML
5 INJECTION, SOLUTION INTRAVENOUS
Status: COMPLETED | OUTPATIENT
Start: 2020-02-21 | End: 2020-02-22

## 2020-02-21 RX ORDER — HYDROCODONE BITARTRATE AND ACETAMINOPHEN 10; 325 MG/1; MG/1
1 TABLET ORAL EVERY 4 HOURS PRN
Status: DISCONTINUED | OUTPATIENT
Start: 2020-02-21 | End: 2020-03-02 | Stop reason: HOSPADM

## 2020-02-21 RX ORDER — PREDNISONE 20 MG/1
40 TABLET ORAL DAILY
Qty: 10 TAB | Refills: 0 | Status: ON HOLD | OUTPATIENT
Start: 2020-02-21 | End: 2020-02-27

## 2020-02-21 RX ORDER — HEPARIN SODIUM 1000 [USP'U]/ML
6000 INJECTION, SOLUTION INTRAVENOUS; SUBCUTANEOUS ONCE
Status: COMPLETED | OUTPATIENT
Start: 2020-02-22 | End: 2020-02-22

## 2020-02-21 RX ORDER — ENALAPRILAT 1.25 MG/ML
1.25 INJECTION INTRAVENOUS EVERY 6 HOURS PRN
Status: DISCONTINUED | OUTPATIENT
Start: 2020-02-21 | End: 2020-03-02 | Stop reason: HOSPADM

## 2020-02-21 RX ORDER — METOPROLOL TARTRATE 50 MG/1
100 TABLET, FILM COATED ORAL 2 TIMES DAILY
Status: DISCONTINUED | OUTPATIENT
Start: 2020-02-21 | End: 2020-02-22

## 2020-02-21 RX ORDER — ONDANSETRON 2 MG/ML
4 INJECTION INTRAMUSCULAR; INTRAVENOUS EVERY 4 HOURS PRN
Status: DISCONTINUED | OUTPATIENT
Start: 2020-02-21 | End: 2020-03-02 | Stop reason: HOSPADM

## 2020-02-21 RX ORDER — AMLODIPINE BESYLATE 5 MG/1
5 TABLET ORAL DAILY
Status: DISCONTINUED | OUTPATIENT
Start: 2020-02-22 | End: 2020-02-24

## 2020-02-21 RX ORDER — ONDANSETRON 4 MG/1
4 TABLET, ORALLY DISINTEGRATING ORAL EVERY 4 HOURS PRN
Status: DISCONTINUED | OUTPATIENT
Start: 2020-02-21 | End: 2020-03-02 | Stop reason: HOSPADM

## 2020-02-21 RX ORDER — SODIUM CHLORIDE 9 MG/ML
1000 INJECTION, SOLUTION INTRAVENOUS ONCE
Status: COMPLETED | OUTPATIENT
Start: 2020-02-21 | End: 2020-02-21

## 2020-02-21 RX ORDER — AZITHROMYCIN 250 MG/1
250 TABLET, FILM COATED ORAL DAILY
Status: ON HOLD | COMMUNITY
End: 2020-02-27

## 2020-02-21 RX ORDER — BISACODYL 10 MG
10 SUPPOSITORY, RECTAL RECTAL
Status: DISCONTINUED | OUTPATIENT
Start: 2020-02-21 | End: 2020-03-02 | Stop reason: HOSPADM

## 2020-02-21 RX ORDER — DILTIAZEM HYDROCHLORIDE 5 MG/ML
0.25 INJECTION INTRAVENOUS ONCE
Status: COMPLETED | OUTPATIENT
Start: 2020-02-21 | End: 2020-02-21

## 2020-02-21 RX ORDER — ZOLPIDEM TARTRATE 5 MG/1
10 TABLET ORAL NIGHTLY PRN
Status: DISCONTINUED | OUTPATIENT
Start: 2020-02-21 | End: 2020-03-02 | Stop reason: HOSPADM

## 2020-02-21 RX ORDER — HYDROCODONE BITARTRATE AND ACETAMINOPHEN 10; 325 MG/1; MG/1
1 TABLET ORAL ONCE
Status: COMPLETED | OUTPATIENT
Start: 2020-02-21 | End: 2020-02-21

## 2020-02-21 RX ORDER — ACETAMINOPHEN 325 MG/1
650 TABLET ORAL EVERY 6 HOURS PRN
Status: DISCONTINUED | OUTPATIENT
Start: 2020-02-21 | End: 2020-03-02 | Stop reason: HOSPADM

## 2020-02-21 RX ORDER — AMOXICILLIN 250 MG
2 CAPSULE ORAL 2 TIMES DAILY
Status: DISCONTINUED | OUTPATIENT
Start: 2020-02-21 | End: 2020-03-02 | Stop reason: HOSPADM

## 2020-02-21 RX ORDER — HEPARIN SODIUM 1000 [USP'U]/ML
3200 INJECTION, SOLUTION INTRAVENOUS; SUBCUTANEOUS PRN
Status: DISCONTINUED | OUTPATIENT
Start: 2020-02-21 | End: 2020-02-21

## 2020-02-21 RX ORDER — HEPARIN SODIUM 5000 [USP'U]/100ML
INJECTION, SOLUTION INTRAVENOUS CONTINUOUS
Status: DISCONTINUED | OUTPATIENT
Start: 2020-02-21 | End: 2020-02-21

## 2020-02-21 RX ORDER — DILTIAZEM HYDROCHLORIDE 120 MG/1
120 CAPSULE, COATED, EXTENDED RELEASE ORAL DAILY
Status: DISCONTINUED | OUTPATIENT
Start: 2020-02-22 | End: 2020-02-23

## 2020-02-21 RX ORDER — PREDNISONE 20 MG/1
40 TABLET ORAL DAILY
Status: DISCONTINUED | OUTPATIENT
Start: 2020-02-22 | End: 2020-02-22

## 2020-02-21 RX ORDER — HEPARIN SODIUM 1000 [USP'U]/ML
6000 INJECTION, SOLUTION INTRAVENOUS; SUBCUTANEOUS ONCE
Status: DISCONTINUED | OUTPATIENT
Start: 2020-02-21 | End: 2020-02-21

## 2020-02-21 RX ORDER — POLYETHYLENE GLYCOL 3350 17 G/17G
1 POWDER, FOR SOLUTION ORAL
Status: DISCONTINUED | OUTPATIENT
Start: 2020-02-21 | End: 2020-03-02 | Stop reason: HOSPADM

## 2020-02-21 RX ORDER — HEPARIN SODIUM 5000 [USP'U]/100ML
INJECTION, SOLUTION INTRAVENOUS CONTINUOUS
Status: DISCONTINUED | OUTPATIENT
Start: 2020-02-22 | End: 2020-02-24

## 2020-02-21 RX ORDER — AZITHROMYCIN 250 MG/1
TABLET, FILM COATED ORAL
Qty: 6 TAB | Refills: 0 | Status: SHIPPED | OUTPATIENT
Start: 2020-02-21 | End: 2020-02-21

## 2020-02-21 RX ORDER — SIMVASTATIN 10 MG
10 TABLET ORAL EVERY EVENING
Status: DISCONTINUED | OUTPATIENT
Start: 2020-02-21 | End: 2020-03-02 | Stop reason: HOSPADM

## 2020-02-21 RX ORDER — HEPARIN SODIUM 1000 [USP'U]/ML
3200 INJECTION, SOLUTION INTRAVENOUS; SUBCUTANEOUS PRN
Status: DISCONTINUED | OUTPATIENT
Start: 2020-02-21 | End: 2020-02-24

## 2020-02-21 RX ORDER — LOSARTAN POTASSIUM 25 MG/1
100 TABLET ORAL DAILY
Status: DISCONTINUED | OUTPATIENT
Start: 2020-02-22 | End: 2020-02-24

## 2020-02-21 RX ADMIN — DILTIAZEM HYDROCHLORIDE 23.85 MG: 5 INJECTION INTRAVENOUS at 21:10

## 2020-02-21 RX ADMIN — METOPROLOL TARTRATE 5 MG: 5 INJECTION INTRAVENOUS at 23:20

## 2020-02-21 RX ADMIN — PREDNISONE 60 MG: 50 TABLET ORAL at 20:21

## 2020-02-21 RX ADMIN — SODIUM CHLORIDE 1000 ML: 9 INJECTION, SOLUTION INTRAVENOUS at 19:23

## 2020-02-21 RX ADMIN — HYDROCODONE BITARTRATE AND ACETAMINOPHEN 1 TABLET: 10; 325 TABLET ORAL at 22:13

## 2020-02-21 RX ADMIN — IOHEXOL 82 ML: 350 INJECTION, SOLUTION INTRAVENOUS at 20:50

## 2020-02-21 ASSESSMENT — ENCOUNTER SYMPTOMS
FALLS: 0
NAUSEA: 0
SHORTNESS OF BREATH: 1
DIZZINESS: 0
COUGH: 1
ABDOMINAL PAIN: 0
CHILLS: 0
WEAKNESS: 0
MYALGIAS: 0
TINGLING: 0
STRIDOR: 0
DIARRHEA: 0
HEADACHES: 0
LOSS OF CONSCIOUSNESS: 0
VOMITING: 0
DEPRESSION: 0
FEVER: 0
CONSTIPATION: 0
SPUTUM PRODUCTION: 0
PALPITATIONS: 0

## 2020-02-21 ASSESSMENT — LIFESTYLE VARIABLES
DOES PATIENT WANT TO STOP DRINKING: NO
TOTAL SCORE: 0
TOTAL SCORE: 0
HAVE YOU EVER FELT YOU SHOULD CUT DOWN ON YOUR DRINKING: NO
HOW MANY TIMES IN THE PAST YEAR HAVE YOU HAD 5 OR MORE DRINKS IN A DAY: 0
EVER HAD A DRINK FIRST THING IN THE MORNING TO STEADY YOUR NERVES TO GET RID OF A HANGOVER: NO
HAVE PEOPLE ANNOYED YOU BY CRITICIZING YOUR DRINKING: NO
CONSUMPTION TOTAL: NEGATIVE
EVER FELT BAD OR GUILTY ABOUT YOUR DRINKING: NO
AVERAGE NUMBER OF DAYS PER WEEK YOU HAVE A DRINK CONTAINING ALCOHOL: 0
ON A TYPICAL DAY WHEN YOU DRINK ALCOHOL HOW MANY DRINKS DO YOU HAVE: 0
TOTAL SCORE: 0
EVER_SMOKED: YES
ALCOHOL_USE: NO

## 2020-02-22 LAB
ANION GAP SERPL CALC-SCNC: 13 MMOL/L (ref 7–16)
APTT PPP: 107.3 SEC (ref 24.7–36)
APTT PPP: 193.5 SEC (ref 24.7–36)
APTT PPP: 33.7 SEC (ref 24.7–36)
APTT PPP: 43.8 SEC (ref 24.7–36)
BUN SERPL-MCNC: 10 MG/DL (ref 8–22)
CALCIUM SERPL-MCNC: 8.4 MG/DL (ref 8.4–10.2)
CHLORIDE SERPL-SCNC: 103 MMOL/L (ref 96–112)
CO2 SERPL-SCNC: 20 MMOL/L (ref 20–33)
CREAT SERPL-MCNC: 0.58 MG/DL (ref 0.5–1.4)
EKG IMPRESSION: NORMAL
ERYTHROCYTE [DISTWIDTH] IN BLOOD BY AUTOMATED COUNT: 43.9 FL (ref 35.9–50)
EST. AVERAGE GLUCOSE BLD GHB EST-MCNC: 117 MG/DL
GLUCOSE SERPL-MCNC: 172 MG/DL (ref 65–99)
HBA1C MFR BLD: 5.7 % (ref 0–5.6)
HCT VFR BLD AUTO: 47.9 % (ref 37–47)
HGB BLD-MCNC: 15.4 G/DL (ref 12–16)
INR PPP: 1.36 (ref 0.87–1.13)
MCH RBC QN AUTO: 29.3 PG (ref 27–33)
MCHC RBC AUTO-ENTMCNC: 32.2 G/DL (ref 33.6–35)
MCV RBC AUTO: 91.1 FL (ref 81.4–97.8)
PLATELET # BLD AUTO: 197 K/UL (ref 164–446)
PMV BLD AUTO: 11 FL (ref 9–12.9)
POTASSIUM SERPL-SCNC: 4 MMOL/L (ref 3.6–5.5)
PROTHROMBIN TIME: 17 SEC (ref 12–14.6)
RBC # BLD AUTO: 5.26 M/UL (ref 4.2–5.4)
SODIUM SERPL-SCNC: 136 MMOL/L (ref 135–145)
T3FREE SERPL-MCNC: 3.04 PG/ML (ref 2.4–4.2)
TROPONIN T SERPL-MCNC: 6 NG/L (ref 6–19)
TROPONIN T SERPL-MCNC: 7 NG/L (ref 6–19)
WBC # BLD AUTO: 15 K/UL (ref 4.8–10.8)

## 2020-02-22 PROCEDURE — 85027 COMPLETE CBC AUTOMATED: CPT

## 2020-02-22 PROCEDURE — 99291 CRITICAL CARE FIRST HOUR: CPT | Performed by: INTERNAL MEDICINE

## 2020-02-22 PROCEDURE — 94640 AIRWAY INHALATION TREATMENT: CPT

## 2020-02-22 PROCEDURE — 84484 ASSAY OF TROPONIN QUANT: CPT

## 2020-02-22 PROCEDURE — 85610 PROTHROMBIN TIME: CPT

## 2020-02-22 PROCEDURE — 700111 HCHG RX REV CODE 636 W/ 250 OVERRIDE (IP): Performed by: INTERNAL MEDICINE

## 2020-02-22 PROCEDURE — 83036 HEMOGLOBIN GLYCOSYLATED A1C: CPT

## 2020-02-22 PROCEDURE — A9270 NON-COVERED ITEM OR SERVICE: HCPCS | Performed by: INTERNAL MEDICINE

## 2020-02-22 PROCEDURE — 80048 BASIC METABOLIC PNL TOTAL CA: CPT

## 2020-02-22 PROCEDURE — 700102 HCHG RX REV CODE 250 W/ 637 OVERRIDE(OP): Performed by: INTERNAL MEDICINE

## 2020-02-22 PROCEDURE — 700101 HCHG RX REV CODE 250: Performed by: INTERNAL MEDICINE

## 2020-02-22 PROCEDURE — 85730 THROMBOPLASTIN TIME PARTIAL: CPT | Mod: 91

## 2020-02-22 PROCEDURE — 94760 N-INVAS EAR/PLS OXIMETRY 1: CPT

## 2020-02-22 PROCEDURE — 770022 HCHG ROOM/CARE - ICU (200)

## 2020-02-22 RX ORDER — METOPROLOL TARTRATE 50 MG/1
100 TABLET, FILM COATED ORAL TWICE DAILY
Status: DISCONTINUED | OUTPATIENT
Start: 2020-02-22 | End: 2020-02-22

## 2020-02-22 RX ORDER — WARFARIN SODIUM 5 MG/1
5 TABLET ORAL
Status: COMPLETED | OUTPATIENT
Start: 2020-02-22 | End: 2020-02-22

## 2020-02-22 RX ORDER — DILTIAZEM HYDROCHLORIDE 5 MG/ML
10 INJECTION INTRAVENOUS EVERY 4 HOURS PRN
Status: DISCONTINUED | OUTPATIENT
Start: 2020-02-22 | End: 2020-02-22

## 2020-02-22 RX ORDER — METOPROLOL TARTRATE 50 MG/1
100 TABLET, FILM COATED ORAL TWICE DAILY
Status: DISCONTINUED | OUTPATIENT
Start: 2020-02-22 | End: 2020-03-02 | Stop reason: HOSPADM

## 2020-02-22 RX ADMIN — PREDNISONE 40 MG: 20 TABLET ORAL at 05:19

## 2020-02-22 RX ADMIN — HEPARIN SODIUM 6000 UNITS: 1000 INJECTION, SOLUTION INTRAVENOUS; SUBCUTANEOUS at 01:09

## 2020-02-22 RX ADMIN — METOPROLOL TARTRATE 5 MG: 5 INJECTION INTRAVENOUS at 00:30

## 2020-02-22 RX ADMIN — DILTIAZEM HYDROCHLORIDE 120 MG: 120 CAPSULE, COATED, EXTENDED RELEASE ORAL at 05:18

## 2020-02-22 RX ADMIN — AMLODIPINE BESYLATE 5 MG: 5 TABLET ORAL at 05:18

## 2020-02-22 RX ADMIN — METOPROLOL TARTRATE 100 MG: 50 TABLET, FILM COATED ORAL at 17:04

## 2020-02-22 RX ADMIN — WARFARIN SODIUM 5 MG: 5 TABLET ORAL at 17:43

## 2020-02-22 RX ADMIN — ZOLPIDEM TARTRATE 10 MG: 5 TABLET ORAL at 19:34

## 2020-02-22 RX ADMIN — HEPARIN SODIUM 1300 UNITS/HR: 5000 INJECTION, SOLUTION INTRAVENOUS at 23:25

## 2020-02-22 RX ADMIN — ZOLPIDEM TARTRATE 10 MG: 5 TABLET ORAL at 00:55

## 2020-02-22 RX ADMIN — HYDROCODONE BITARTRATE AND ACETAMINOPHEN 1 TABLET: 10; 325 TABLET ORAL at 11:32

## 2020-02-22 RX ADMIN — HYDROCODONE BITARTRATE AND ACETAMINOPHEN 1 TABLET: 10; 325 TABLET ORAL at 00:55

## 2020-02-22 RX ADMIN — DILTIAZEM HYDROCHLORIDE 10 MG: 5 INJECTION INTRAVENOUS at 05:55

## 2020-02-22 RX ADMIN — HYDROCODONE BITARTRATE AND ACETAMINOPHEN 1 TABLET: 10; 325 TABLET ORAL at 15:25

## 2020-02-22 RX ADMIN — HYDROCODONE BITARTRATE AND ACETAMINOPHEN 1 TABLET: 10; 325 TABLET ORAL at 19:34

## 2020-02-22 RX ADMIN — LOSARTAN POTASSIUM 100 MG: 25 TABLET ORAL at 05:18

## 2020-02-22 RX ADMIN — HYDROCODONE BITARTRATE AND ACETAMINOPHEN 1 TABLET: 10; 325 TABLET ORAL at 23:36

## 2020-02-22 RX ADMIN — HYDROCODONE BITARTRATE AND ACETAMINOPHEN 1 TABLET: 10; 325 TABLET ORAL at 05:26

## 2020-02-22 RX ADMIN — HEPARIN SODIUM 3200 UNITS: 1000 INJECTION, SOLUTION INTRAVENOUS; SUBCUTANEOUS at 21:22

## 2020-02-22 RX ADMIN — METOPROLOL TARTRATE 100 MG: 50 TABLET, FILM COATED ORAL at 09:48

## 2020-02-22 RX ADMIN — HEPARIN SODIUM 1200 UNITS/HR: 5000 INJECTION, SOLUTION INTRAVENOUS at 01:08

## 2020-02-22 RX ADMIN — SIMVASTATIN 10 MG: 10 TABLET, FILM COATED ORAL at 17:43

## 2020-02-22 RX ADMIN — METOPROLOL TARTRATE 5 MG: 5 INJECTION INTRAVENOUS at 00:51

## 2020-02-22 RX ADMIN — Medication 400 MG: at 05:19

## 2020-02-22 RX ADMIN — IPRATROPIUM BROMIDE 0.5 MG: 0.5 SOLUTION RESPIRATORY (INHALATION) at 16:49

## 2020-02-22 RX ADMIN — DILTIAZEM HYDROCHLORIDE 10 MG: 5 INJECTION INTRAVENOUS at 07:45

## 2020-02-22 ASSESSMENT — CHA2DS2 SCORE
CHA2DS2 VASC SCORE: 5
AGE 65 TO 74: YES
VASCULAR DISEASE: NO
AGE 75 OR GREATER: NO
HYPERTENSION: YES
CHF OR LEFT VENTRICULAR DYSFUNCTION: NO
DIABETES: NO
PRIOR STROKE OR TIA OR THROMBOEMBOLISM: YES
SEX: FEMALE

## 2020-02-22 ASSESSMENT — COGNITIVE AND FUNCTIONAL STATUS - GENERAL
PERSONAL GROOMING: A LITTLE
MOVING TO AND FROM BED TO CHAIR: A LITTLE
MOBILITY SCORE: 16
STANDING UP FROM CHAIR USING ARMS: A LITTLE
MOVING FROM LYING ON BACK TO SITTING ON SIDE OF FLAT BED: A LITTLE
DAILY ACTIVITIY SCORE: 19
TURNING FROM BACK TO SIDE WHILE IN FLAT BAD: A LITTLE
HELP NEEDED FOR BATHING: A LITTLE
TOILETING: A LITTLE
SUGGESTED CMS G CODE MODIFIER DAILY ACTIVITY: CK
SUGGESTED CMS G CODE MODIFIER MOBILITY: CK
DRESSING REGULAR LOWER BODY CLOTHING: A LITTLE
DRESSING REGULAR UPPER BODY CLOTHING: A LITTLE
CLIMB 3 TO 5 STEPS WITH RAILING: A LOT
WALKING IN HOSPITAL ROOM: A LOT

## 2020-02-22 ASSESSMENT — ENCOUNTER SYMPTOMS
GASTROINTESTINAL NEGATIVE: 1
CARDIOVASCULAR NEGATIVE: 1
SHORTNESS OF BREATH: 1
PSYCHIATRIC NEGATIVE: 1
EYES NEGATIVE: 1
MUSCULOSKELETAL NEGATIVE: 1
WEAKNESS: 1

## 2020-02-22 ASSESSMENT — LIFESTYLE VARIABLES: EVER_SMOKED: YES

## 2020-02-22 ASSESSMENT — PATIENT HEALTH QUESTIONNAIRE - PHQ9
SUM OF ALL RESPONSES TO PHQ9 QUESTIONS 1 AND 2: 0
SUM OF ALL RESPONSES TO PHQ9 QUESTIONS 1 AND 2: 0
1. LITTLE INTEREST OR PLEASURE IN DOING THINGS: NOT AT ALL
1. LITTLE INTEREST OR PLEASURE IN DOING THINGS: NOT AT ALL
2. FEELING DOWN, DEPRESSED, IRRITABLE, OR HOPELESS: NOT AT ALL
2. FEELING DOWN, DEPRESSED, IRRITABLE, OR HOPELESS: NOT AT ALL

## 2020-02-22 NOTE — CARE PLAN
Problem: Communication  Goal: The ability to communicate needs accurately and effectively will improve  Outcome: PROGRESSING AS EXPECTED  Intervention: Educate patient and significant other/support system about the plan of care, procedures, treatments, medications and allow for questions  Note: Pt VU of heparin gtt and protocol, HR and parameters, cardizem gtt and ICU status. All medications, tests and ICU routines explained to Pt. Fall precautions reviewed.

## 2020-02-22 NOTE — PROGRESS NOTES
Tech from Lab called with critical result of .3 at 1415 Critical lab result read back to tech.   Heparin weight based protocol followed for this result

## 2020-02-22 NOTE — ASSESSMENT & PLAN NOTE
-Obtain hemoglobin A1c  -Possibly worse than her baseline due to steroid  -Start insulin sliding scale if warranted

## 2020-02-22 NOTE — PROGRESS NOTES
"Critical Care Progress Note    Date of admission  2/21/2020    Chief Complaint  74 y.o. female admitted 2/21/2020 with SOB    Hospital Course    \"74 y.o. female who presented 2/21/2020 with shortness of breath.  Patient was just in the hospital, had new onset A. fib RVR, sent home on Eliquis which she states she has been taking.  She states she got out of the hospital on Sunday, on Wednesday she started experiencing shortness of breath.  Just before that she did have a sick contact, cold-like symptoms from her family.  She states she developed nonproductive cough.  She denied any chest pain.  Today she went to see her primary care provider where they noted tachycardia, patient was sent to the ER.  Imaging obtained here did show pulmonary embolism.  Patient does remain in atrial fibrillation with RVR\"      Interval Problem Update  Reviewed last 24 hour events:  Chart review from the past 24 hours includes imaging, laboratory studies, vital signs and notes available.  Pertinent data for today is she is is still in afub RVR      Cardiac:  afib rVR start coumadin   Pulm: 2l NC   Neuro: alert   Heme:  No bleeding  I/O:  Good urine output +332 since admit  ID: no signs of infection  GI/endo:  eating  Labs/Imaging:  reviewed  Lines:  PIVs  Mobility:  OOB with assist  Symptoms:SOB    Review of Systems  Review of Systems   Constitutional: Positive for malaise/fatigue.   HENT: Negative.    Eyes: Negative.    Respiratory: Positive for shortness of breath.    Cardiovascular: Negative.    Gastrointestinal: Negative.    Genitourinary: Negative.    Musculoskeletal: Negative.    Skin: Negative.    Neurological: Positive for weakness.   Endo/Heme/Allergies: Negative.    Psychiatric/Behavioral: Negative.         Vital Signs for last 24 hours   Temp:  [36.7 °C (98.1 °F)-37.2 °C (99 °F)] 37.2 °C (99 °F)  Pulse:  [] 130  Resp:  [16-45] 18  BP: (107-165)/(59-96) 131/87  SpO2:  [92 %-98 %] 97 %         Physical Exam   Physical " Exam  Constitutional:       Appearance: She is ill-appearing.   HENT:      Mouth/Throat:      Mouth: Mucous membranes are moist.   Eyes:      Extraocular Movements: Extraocular movements intact.      Pupils: Pupils are equal, round, and reactive to light.   Cardiovascular:      Rate and Rhythm: Tachycardia present. Rhythm irregular.   Pulmonary:      Effort: Pulmonary effort is normal.      Breath sounds: Normal breath sounds.   Abdominal:      General: Bowel sounds are normal. There is distension.      Palpations: Abdomen is soft.      Tenderness: There is no abdominal tenderness.   Musculoskeletal:         General: No swelling or tenderness.   Skin:     General: Skin is warm and dry.   Neurological:      General: No focal deficit present.      Mental Status: She is alert.   Psychiatric:         Mood and Affect: Mood normal.         Behavior: Behavior normal.         Thought Content: Thought content normal.         Judgment: Judgment normal.         Medications  Current Facility-Administered Medications   Medication Dose Route Frequency Provider Last Rate Last Dose   • MD Alert...Warfarin per Pharmacy   Other PHARMACY TO DOSE Petros Patel M.D.       • DILTIAZem (CARDIZEM) 100 mg in D5W 100 mL Infusion  0-15 mg/hr Intravenous Continuous Petros Patel M.D.   Stopped at 02/22/20 0945   • warfarin (COUMADIN) tablet 5 mg  5 mg Oral ONCE AT 1800 Petros Patel M.D.       • ipratropium (ATROVENT) 0.02 % nebulizer solution 0.5 mg  0.5 mg Nebulization Q6HRS PRN Petros Patel M.D.   0.5 mg at 02/22/20 1649   • metoprolol (LOPRESSOR) tablet 100 mg  100 mg Oral TWICE DAILY Petros Patel M.D.   100 mg at 02/22/20 1704   • amLODIPine (NORVASC) tablet 5 mg  5 mg Oral DAILY Parvez Houston D.O.   5 mg at 02/22/20 0518   • DILTIAZem CD (CARDIZEM CD) capsule 120 mg  120 mg Oral DAILY Parvez Houston D.O.   120 mg at 02/22/20 0518   • HYDROcodone/acetaminophen (NORCO)  MG per tablet 1  Tab  1 Tab Oral Q4HRS PRN Parvez Houston D.O.   1 Tab at 02/22/20 1525   • losartan (COZAAR) tablet 100 mg  100 mg Oral DAILY Parvez Houston D.O.   100 mg at 02/22/20 0518   • magnesium oxide (MAG-OX) tablet 400 mg  400 mg Oral DAILY Parvez Houston D.O.   400 mg at 02/22/20 0519   • predniSONE (DELTASONE) tablet 40 mg  40 mg Oral DAILY EMILY FelicianoO.   40 mg at 02/22/20 0519   • simvastatin (ZOCOR) tablet 10 mg  10 mg Oral Q EVENING Parvez Houston D.O.   Stopped at 02/21/20 2245   • zolpidem (AMBIEN) tablet 10 mg  10 mg Oral HS PRN Parvez Houston D.O.   10 mg at 02/22/20 0055   • senna-docusate (PERICOLACE or SENOKOT S) 8.6-50 MG per tablet 2 Tab  2 Tab Oral BID Parvez Houston D.O.        And   • polyethylene glycol/lytes (MIRALAX) PACKET 1 Packet  1 Packet Oral QDAY PRN Parvez Houston D.O.        And   • magnesium hydroxide (MILK OF MAGNESIA) suspension 30 mL  30 mL Oral QDAY PRN Parvez Houston D.O.        And   • bisacodyl (DULCOLAX) suppository 10 mg  10 mg Rectal QDAY PRN Parvez Houston D.O.       • Respiratory Therapy Consult   Nebulization Continuous RT Parvez Houston D.O.       • acetaminophen (TYLENOL) tablet 650 mg  650 mg Oral Q6HRS PRN Parvez Houston D.O.       • enalaprilat (VASOTEC) injection 1.25 mg  1.25 mg Intravenous Q6HRS PRN Parvez Houston D.O.       • ondansetron (ZOFRAN) syringe/vial injection 4 mg  4 mg Intravenous Q4HRS PRN Parvez Houston D.O.       • ondansetron (ZOFRAN ODT) dispertab 4 mg  4 mg Oral Q4HRS PRN Parvez Houston D.O.       • heparin injection 3,200 Units  3,200 Units Intravenous PRN Parvez Houston D.O.        And   • heparin infusion 25,000 units in 500 mL 0.45% NACL   Intravenous Continuous EMILY FelicianoO. 21 mL/hr at 02/22/20 1423 1,050 Units/hr at 02/22/20 1423   • albuterol (PROVENTIL) 2.5mg/0.5ml nebulizer solution 2.5 mg  2.5 mg Nebulization Q2HRS PRN (RT) Parvez Hos[place]on D.O.           Fluids    Intake/Output Summary (Last 24  hours) at 2/22/2020 1705  Last data filed at 2/22/2020 1600  Gross per 24 hour   Intake 1231.95 ml   Output 1500 ml   Net -268.05 ml       Laboratory          Recent Labs     02/21/20 1837 02/22/20 0152   SODIUM 136 136   POTASSIUM 3.8 4.0   CHLORIDE 100 103   CO2 24 20   BUN 12 10   CREATININE 0.76 0.58   CALCIUM 9.0 8.4     Recent Labs     02/21/20 1837 02/22/20 0152   ALTSGPT 23  --    ASTSGOT 24  --    ALKPHOSPHAT 66  --    TBILIRUBIN 0.6  --    GLUCOSE 143* 172*     Recent Labs     02/21/20 1837 02/22/20 0152   WBC 15.7* 15.0*   NEUTSPOLYS 81.10*  --    LYMPHOCYTES 9.60*  --    MONOCYTES 7.90  --    EOSINOPHILS 0.30  --    BASOPHILS 0.30  --    ASTSGOT 24  --    ALTSGPT 23  --    ALKPHOSPHAT 66  --    TBILIRUBIN 0.6  --      Recent Labs     02/21/20 1837 02/22/20  0020 02/22/20  0152 02/22/20  0700 02/22/20  1330   RBC 5.38  --  5.26  --   --    HEMOGLOBIN 16.0  --  15.4  --   --    HEMATOCRIT 48.1*  --  47.9*  --   --    PLATELETCT 203  --  197  --   --    PROTHROMBTM  --  17.0*  --   --   --    APTT  --  33.7  --  193.5* 107.3*   INR  --  1.36*  --   --   --        Imaging  Personally viewed no I or E  CT scan personally viewed and saw the b/l segmental and subsegmental PE    Assessment/Plan  * Bilateral pulmonary embolism (HCC)- (present on admission)  Assessment & Plan  Heparin drip and start coumadin  Unclear etiology other than maybe sedentary lifestyle  Would call this unprovoked    Colonoscopy in 2018, two polyps  Mammogram 2019 - negative    Atrial fibrillation with RVR (HCC)- (present on admission)  Assessment & Plan  On metoprolol and dilt po  Start diltiazem drip to keep HR less than 120 until po meds help  Change albuterol nebs to only ipratropium  Heparin drip  Start coumadin as unclear if she has failed eloquis or clots before previous admission of afib so will start coumadin INR goal between 2-3    Hyperglycemia- (present on admission)  Assessment & Plan  hbA1c pending    Leucocytosis-  (present on admission)  Assessment & Plan  Reactive as well as on steroids    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 with obstruction- ERCP EXTRACTON/ SPHINCTEROTOMY, recurrent Feb 2020- (present on admission)  Assessment & Plan  Had recent ERCP  Will follow up with GI    Essential hypertension- (present on admission)  Assessment & Plan  Continue home meds  Prn enalaprit    COPD (chronic obstructive pulmonary disease) (HCC)- (present on admission)  Assessment & Plan  No evidence of exacerbation so will stop steroids  Use ipratropium instead of duo nebs due to albuterol increasing HR    Hyperlipidemia- (present on admission)  Assessment & Plan  On statin       VTE:  Heparin  Ulcer: Not Indicated  Lines: PIVs    I have performed a physical exam and reviewed and updated ROS and Plan today (2/22/2020). In review of yesterday's note (2/21/2020), there are no changes except as documented above.     Discussed patient condition and risk of morbidity and/or mortality with RN, RT and Patient  The patient remains critically ill.  Critical care time = 34 minutes in directly providing and coordinating critical care and extensive data review.  No time overlap and excludes procedures.

## 2020-02-22 NOTE — ED TRIAGE NOTES
Pt comes in c/o rapid heart and funny feeling in her chest  Recent hospitalization for gall stone however does not have a gall bladder   Released on Sunday  Daughter was ill  Possibly pt caught cold  Was thinking she was co=adrianna down w/ bronchitis   Was seen a PCP today and they noted her heart rate  New Dx of A-fib  Told to come here for further evaluation of this   EKG being done in triage

## 2020-02-22 NOTE — PROGRESS NOTES
MD paged for HR sustaining 130s-140s with highs in 170s after 3 doses of 5mg IV metoprolol.  IV diltiazem 10mg ordered for HR over 120.  Will continue to monitor.

## 2020-02-22 NOTE — CARE PLAN
Problem: Communication  Goal: The ability to communicate needs accurately and effectively will improve  Outcome: PROGRESSING AS EXPECTED  Intervention: [place] patient and significant other/support system to call light to alert staff of needs  Flowsheets (Taken 2/22/2020 0150)  Oriented to:: All of the Following : Location of Bathroom, Visiting Policy, Unit Routine, Call Light and Bedside Controls, Bedside Rail Policy, Smoking Policy, Rights and Responsibilities, Bedside Report, and Patient Education Notebook     Problem: Safety  Goal: Will remain free from falls  Outcome: PROGRESSING AS EXPECTED  Intervention: Implement fall precautions  Flowsheets (Taken 2/22/2020 0100)  Environmental Precautions:   Treaded Slipper Socks on Patient   Personal Belongings, Wastebasket, Call Bell etc. in Easy Reach   Transferred to Stronger Side   Report Given to Other Health Care Providers Regarding Fall Risk   Bed in Low Position   Communication Sign for Patients & Families   Mobility Assessed & Appropriate Sign Placed

## 2020-02-22 NOTE — H&P
Hospital Medicine History & Physical Note    Date of Service  2/21/2020    Primary Care Physician  Quincy Angel M.D.    Consultants  None    Code Status  Full    Chief Complaint  Shortness of breath    History of Presenting Illness  74 y.o. female who presented 2/21/2020 with shortness of breath.  Patient was just in the hospital, had new onset A. fib RVR, sent home on Eliquis which she states she has been taking.  She states she got out of the hospital on Sunday, on Wednesday she started experiencing shortness of breath.  Just before that she did have a sick contact, cold-like symptoms from her family.  She states she developed nonproductive cough.  She denied any chest pain.  Today she went to see her primary care provider where they noted tachycardia, patient was sent to the ER.  Imaging obtained here did show pulmonary embolism.  Patient does remain in atrial fibrillation with RVR.  I did discuss the case including labs and imaging with the ER physician.    Review of Systems  Review of Systems   Constitutional: Negative for chills, fever and malaise/fatigue.   HENT: Negative for congestion.    Respiratory: Positive for cough and shortness of breath. Negative for sputum production and stridor.    Cardiovascular: Negative for chest pain, palpitations and leg swelling.   Gastrointestinal: Negative for abdominal pain, constipation, diarrhea, nausea and vomiting.   Genitourinary: Negative for dysuria and urgency.   Musculoskeletal: Negative for falls and myalgias.   Neurological: Negative for dizziness, tingling, loss of consciousness, weakness and headaches.   Psychiatric/Behavioral: Negative for depression and suicidal ideas.   All other systems reviewed and are negative.      Past Medical History   has a past medical history of Allergy, Arthritis, Asthma, Back pain, Breath shortness, Bronchitis, CA - cancer of uterus, Cancer (HCC) (2010), Carpal tunnel syndrome, COPD, Diverticula of colon, Heart burn, High  cholesterol, Hyperlipidemia, Hypertension, Pneumonia (1993), and Tremor, hereditary, benign. She also has no past medical history of Addisons disease (HCC), Adrenal disorder, Anemia, Anxiety, Blood transfusion, Clotting disorder, Cushings syndrome, Depression, GERD (gastroesophageal reflux disease), Goiter, Headache(784.0), HIV (human immunodeficiency virus infection), IBD (inflammatory bowel disease), Meningitis, Migraine, OSTEOPOROSIS, Parathyroid disorder (HCC), Pituitary disease (HCC), Substance abuse (HCC), Ulcer, or Urinary tract infection, site not specified.    Surgical History   has a past surgical history that includes hysterectomy, total abdominal (1/18/10); open reduction; abdominal hysterectomy total (Jan 2010); oophorectomy (Jan 2010); ercp (2/15/2018); common bile duct exploration (02/15/2018); ercp w/ insertion stent/tube (02/15/2018); ercp w/sphincterotomy/papill. (02/15/2018); ercp w/removal calculus (02/15/2018); ercp (4/5/2018); ercp w/sphincterotomy/papill. (4/5/2018); ercp w/ insertion stent/tube (4/5/2018); ercp w/removal calculus (4/5/2018); pr ercp,diagnostic (2/14/2020); and aditi by laparoscopy (july, 2015).     Family History  family history includes Cancer in her father, paternal grandfather, and paternal grandmother; Heart Disease (age of onset: 45) in her father; Hypertension in her father; Lung Disease in her father; Stroke (age of onset: 70) in her father.     Social History   reports that she quit smoking about 29 years ago. She has never used smokeless tobacco. She reports that she does not drink alcohol or use drugs.    Allergies  Allergies   Allergen Reactions   • Codeine Swelling   • Ace Inhibitors      Cough       Medications  Prior to Admission Medications   Prescriptions Last Dose Informant Patient Reported? Taking?   Calcium Carbonate-Vit D-Min (CALCIUM 1200 PO) 2/21/2020 at Unknown time Family Member Yes No   Sig: Take 1,200 mg by mouth every day.   DILTIAZem CD (CARDIZEM  CD) 120 MG CAPSULE SR 24 HR 2/21/2020 at Unknown time  No No   Sig: Take 1 Cap by mouth every day.   FIBER PO 2/21/2020 at Unknown time Family Member Yes No   Sig: Take 2 Caps by mouth every day.   Magnesium 400 MG Tab 2/21/2020 at Unknown time Family Member Yes No   Sig: Take 400 mg by mouth every day.   amLODIPine (NORVASC) 5 MG Tab 2/21/2020 at Unknown time Family Member No No   Sig: Take 1 Tab by mouth every day.   apixaban (ELIQUIS) 5mg Tab 2/21/2020 at Unknown time  No No   Sig: Take 1 Tab by mouth 2 Times a Day.   azithromycin (ZITHROMAX) 250 MG Tab   Yes Yes   Sig: Take 250 mg by mouth every day.   hydrocodone/acetaminophen (NORCO)  MG Tab 2/21/2020 at Unknown time Family Member Yes No   Sig: TAKE ONE TABLET BY MOUTH FOUR TIMES A DAY AS NEEDED 30 DAY SUPPLY   ipratropium-albuterol (COMBIVENT RESPIMAT)  MCG/ACT Aero Soln 2/21/2020 at Unknown time Family Member No No   Sig: Inhale 1 Puff by mouth every 6 hours as needed (shortness of breath).   losartan (COZAAR) 100 MG Tab 2/21/2020 at Unknown time Family Member No No   Sig: Take 1 Tab by mouth every day.   metoprolol (LOPRESSOR) 100 MG Tab 2/21/2020 at Unknown time Family Member No No   Sig: Take 1 Tab by mouth 2 times a day.   predniSONE (DELTASONE) 20 MG Tab   No No   Sig: Take 2 Tabs by mouth every day for 5 days.   simvastatin (ZOCOR) 20 MG Tab 2/20/2020 at Unknown time Family Member No No   Sig: Take 1 Tab by mouth every evening.   zolpidem (AMBIEN) 10 MG Tab 2/20/2020 at Unknown time Family Member Yes No   Sig: Take 10 mg by mouth at bedtime as needed for Sleep.      Facility-Administered Medications: None       Physical Exam  Temp:  [36.9 °C (98.5 °F)] 36.9 °C (98.5 °F)  Pulse:  [] 99  Resp:  [16-32] 32  BP: (139-165)/(87-96) 139/87  SpO2:  [94 %-98 %] 95 %    Physical Exam  Vitals signs and nursing note reviewed.   Constitutional:       General: She is not in acute distress.     Appearance: She is well-developed. She is not  diaphoretic.   HENT:      Head: Normocephalic and atraumatic.      Right Ear: External ear normal.      Left Ear: External ear normal.      Nose: Nose normal. No congestion or rhinorrhea.      Mouth/Throat:      Mouth: Mucous membranes are moist.      Pharynx: No oropharyngeal exudate.   Eyes:      General: No scleral icterus.        Right eye: No discharge.         Left eye: No discharge.      Extraocular Movements: Extraocular movements intact.   Neck:      Musculoskeletal: Normal range of motion and neck supple.      Trachea: No tracheal deviation.   Cardiovascular:      Rate and Rhythm: Tachycardia present. Rhythm irregularly irregular.      Heart sounds: No murmur. No friction rub. No gallop.    Pulmonary:      Effort: Pulmonary effort is normal. No respiratory distress.      Breath sounds: No stridor. Wheezing and rhonchi present. No rales.   Chest:      Chest wall: No tenderness.   Abdominal:      General: Bowel sounds are normal. There is no distension.      Palpations: Abdomen is soft.      Tenderness: There is no abdominal tenderness. There is no guarding or rebound.   Musculoskeletal: Normal range of motion.         General: No tenderness.      Right lower leg: No edema.      Left lower leg: No edema.   Lymphadenopathy:      Cervical: No cervical adenopathy.   Skin:     General: Skin is warm and dry.      Coloration: Skin is not jaundiced.      Findings: No erythema or rash.   Neurological:      General: No focal deficit present.      Mental Status: She is alert and oriented to person, place, and time.      Cranial Nerves: No cranial nerve deficit.   Psychiatric:         Mood and Affect: Mood normal.         Behavior: Behavior normal.         Thought Content: Thought content normal.         Judgment: Judgment normal.         Laboratory:  Recent Labs     02/21/20  1837   WBC 15.7*   RBC 5.38   HEMOGLOBIN 16.0   HEMATOCRIT 48.1*   MCV 89.4   MCH 29.7   MCHC 33.3*   RDW 42.6   PLATELETCT 203   MPV 9.9      Recent Labs     02/21/20  1837   SODIUM 136   POTASSIUM 3.8   CHLORIDE 100   CO2 24   GLUCOSE 143*   BUN 12   CREATININE 0.76   CALCIUM 9.0     Recent Labs     02/21/20  1837   ALTSGPT 23   ASTSGOT 24   ALKPHOSPHAT 66   TBILIRUBIN 0.6   GLUCOSE 143*         Recent Labs     02/21/20  1920   NTPROBNP 3864*         Recent Labs     02/21/20  1837   TROPONINT 11       Urinalysis:    Recent Labs     02/21/20 2012   SPECGRAVITY <=1.005   GLUCOSEUR Negative   KETONES Negative   NITRITE Negative   LEUKESTERAS Negative        Imaging:  CT-CTA CHEST PULMONARY ARTERY W/ RECONS   Final Result         1.  Pulmonary embolus involving the right distal main pulmonary artery extending into right middle and lower lobe segmental and subsegmental branches. Left upper and lower lobe subsegmental pulmonary emboli.   2.  Distal esophageal wall thickening, recommend follow-up esophagoscopy for evaluation of esophagitis versus infiltrating esophageal neoplasia.   3.  Hepatomegaly   4.  Left adrenal nodule, indeterminate, recommend follow-up adrenal protocol CT or MRI for further characterization as clinically appropriate.   5.  1.3 cm left lower lobe pulmonary nodule, see nodule follow-up recommendations below.      Low and High Risk: Consider CT at 3 months, PET/CT, or tissue sampling.      Low Risk - Minimal or absent history of smoking and of other known risk factors.      High Risk - History of smoking or of other known risk factors.      Note: These recommendations do not apply to lung cancer screening, patients with immunosuppression, or patients with known primary cancer.      Fleischner Society 2017 Guidelines for Management of Incidentally Detected Pulmonary Nodules in Adults         These findings were discussed with the patient's clinician, LILLIAN LEMUS, on 2/21/2020 9:33 PM.      DX-CHEST-PORTABLE (1 VIEW)   Final Result         1. No acute cardiopulmonary abnormalities are identified.            Assessment/Plan:  I  anticipate this patient will require at least two midnights for appropriate medical management, necessitating inpatient admission.    * Bilateral pulmonary embolism (HCC)- (present on admission)  Assessment & Plan  -Causing her tachycardia  -I am unsure how long the PEs have been present  -I am also unsure if this is a Eliquis failure or if the PEs were already present  -For now, I am stopping the Eliquis and starting a heparin drip  -No mention of heart strain  -There is an elevated BNP, trend troponin    Atrial fibrillation with RVR (HCC)- (present on admission)  Assessment & Plan  -New onset during last hospital stay, recurring again now  -She did not note that she was having significant palpitations  -Possibly due to her PEs  -She also has significant wheeze and rhonchi probably from a viral infection, another possible cause  -She had been sent home on metoprolol and diltiazem, continue  -Start PRN metoprolol  -Adjust as needed  -She was sent home on Eliquis and states she was compliant with this medication, will be stopping it for now and switching to heparin drip    Adrenal nodule (HCC)- (present on admission)  Assessment & Plan  -As well as left pulmonary nodule  -Outpatient follow-up    Hyperglycemia- (present on admission)  Assessment & Plan  -Obtain hemoglobin A1c  -Possibly worse than her baseline due to steroid  -Start insulin sliding scale if warranted    Leukocytosis- (present on admission)  Assessment & Plan  -Possibly due to steroid or reactive from PE and/or viral infection  -I do not feel antibiotics are warranted at this time  -Repeat CBC in the morning    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 with obstruction- ERCP EXTRACTON/ SPHINCTEROTOMY, recurrent Feb 2020- (present on admission)  Assessment & Plan  -She did recently have an ERCP for this  -The read from the CTA does mention distal esophageal wall thickening, I did not note any mentioning of this in the procedure report  -May need to  call GI to ask him about this    Essential hypertension- (present on admission)  Assessment & Plan  -Continue home metoprolol, diltiazem and losartan  -Start PRN enalapril  -Adjust as needed    COPD (chronic obstructive pulmonary disease) (HCC)- (present on admission)  Assessment & Plan  -No acute exacerbation however she recently was placed on steroids and I will continue these  -Start respiratory care per protocol  -She is wheezing but moving good air, does have likely viral infection so is at risk of worsening  -I did personally review her chest x-ray, noted no acute cardiopulmonary process    Hyperlipidemia- (present on admission)  Assessment & Plan  -Continue home statin    Patient is critically ill.   The patient continues to have : bilateral PE, atrial fib with rvr  The vital organ system that is effected is the: cardiac initially  If untreated there is a high chance of deterioration into: cardiogenic shock   The critical care that I am providing today is: heparin gtt  The critical care that has been undertaken is medically complex.   There has been no overlap in critical care time.  Critical care time not including procedures, no overlap: 40 minutes    VTE prophylaxis: Heparin drip

## 2020-02-22 NOTE — ED PROVIDER NOTES
"ED Provider Note    CHIEF COMPLAINT  Chief Complaint   Patient presents with   • Rapid Heart Beat   • Other     feels something is happening in my chest  feels like possible broncitis   • Atrial Fibrillation   • Cough     has Hx of COPD        HPI  Rufina Macias is a 74 y.o. female with a history of paroxysmal atrial fibrillation, COPD, hyperlipidemia, and hypertension who presents with a chief complaint of chest discomfort.  The patient notes that she began developing a nonproductive cough yesterday.  She reports that her daughter is sick with an upper respiratory infection as well.  Today, her symptoms worsened and she developed a sensation that \"something\" was happening in her chest.  She was recently hospitalized for CBD stones and diagnosed with new onset atrial fibrillation.  She was started on Eliquis and diltiazem was added to her metoprolol.  She was discharged 6 days ago with a prescription for antibiotics which she took to completion.  Since discharge she notes that she has been weak with shortness of breath but overall well.  She denies any fevers, chills, chest pain, dizziness, abdominal pain, nausea, vomiting.  She denies a history of DVT or PE, lower extremity edema, or hemoptysis.    REVIEW OF SYSTEMS  See HPI for further details.  Chest discomfort.  Shortness of breath.  All other systems are negative.     PAST MEDICAL HISTORY   has a past medical history of Allergy, Arthritis, Asthma, Back pain, Breath shortness, Bronchitis, CA - cancer of uterus, Cancer (HCC) (), Carpal tunnel syndrome, COPD, Diverticula of colon, Heart burn, High cholesterol, Hyperlipidemia, Hypertension, Pneumonia (), and Tremor, hereditary, benign.    SOCIAL HISTORY  Social History     Tobacco Use   • Smoking status: Former Smoker     Last attempt to quit: 1991     Years since quittin.1   • Smokeless tobacco: Never Used   Substance and Sexual Activity   • Alcohol use: No     Alcohol/week: 0.0 - 2.4 oz     " "Comment: hardly ever   • Drug use: No   • Sexual activity: Never     Partners: Male     Birth control/protection: Post-Menopausal       SURGICAL HISTORY   has a past surgical history that includes hysterectomy, total abdominal (1/18/10); open reduction; abdominal hysterectomy total (Jan 2010); oophorectomy (Jan 2010); ercp (2/15/2018); common bile duct exploration (02/15/2018); ercp w/ insertion stent/tube (02/15/2018); ercp w/sphincterotomy/papill. (02/15/2018); ercp w/removal calculus (02/15/2018); ercp (4/5/2018); ercp w/sphincterotomy/papill. (4/5/2018); ercp w/ insertion stent/tube (4/5/2018); ercp w/removal calculus (4/5/2018); ercp,diagnostic (2/14/2020); and aditi by laparoscopy (july, 2015).    CURRENT MEDICATIONS  Home Medications     Reviewed by Ida Majano R.N. (Registered Nurse) on 02/21/20 at 1806  Med List Status: Partial   Medication Last Dose Status   amLODIPine (NORVASC) 5 MG Tab 2/21/2020 Active   apixaban (ELIQUIS) 5mg Tab 2/21/2020 Active   azithromycin (ZITHROMAX) 250 MG Tab  Active   Calcium Carbonate-Vit D-Min (CALCIUM 1200 PO) 2/21/2020 Active   DILTIAZem CD (CARDIZEM CD) 120 MG CAPSULE SR 24 HR 2/21/2020 Active   FIBER PO 2/21/2020 Active   hydrocodone/acetaminophen (NORCO)  MG Tab 2/21/2020 Active   ipratropium-albuterol (COMBIVENT RESPIMAT)  MCG/ACT Aero Soln 2/21/2020 Active   losartan (COZAAR) 100 MG Tab 2/21/2020 Active   Magnesium 400 MG Tab 2/21/2020 Active   metoprolol (LOPRESSOR) 100 MG Tab 2/21/2020 Active   predniSONE (DELTASONE) 20 MG Tab  Active   simvastatin (ZOCOR) 20 MG Tab 2/20/2020 Active   zolpidem (AMBIEN) 10 MG Tab 2/20/2020 Active                ALLERGIES  Allergies   Allergen Reactions   • Codeine Swelling   • Ace Inhibitors      Cough       PHYSICAL EXAM  VITAL SIGNS: BP (!) 165/93   Pulse 87   Temp 36.9 °C (98.5 °F) (Temporal)   Resp 16   Ht 1.6 m (5' 3\")   Wt 95.3 kg (210 lb 1.6 oz)   SpO2 94%   BMI 37.22 kg/m²    Pulse ox interpretation: " I interpret this pulse ox as normal.  Constitutional: Alert in no apparent distress.  HENT: No signs of trauma, Bilateral external ears normal, Nose normal.  Dry mucous membranes.  Eyes: Pupils are equal and reactive, Conjunctiva normal, Non-icteric.   Neck: Normal range of motion, No tenderness, Supple, No stridor.   Lymphatic: No lymphadenopathy noted.   Cardiovascular: Tachycardic with irregular rhythm, no murmurs.   Thorax & Lungs: Normal breath sounds, No respiratory distress, No wheezing, No chest tenderness.   Abdomen: Bowel sounds normal, Soft, No tenderness, No masses, No pulsatile masses. No peritoneal signs.  Skin: Warm, Dry, No erythema, No rash.   Back: Normal alignment.  Extremities: Intact distal pulses, No edema, No tenderness, No cyanosis.  Musculoskeletal: Good range of motion in all major joints. No tenderness to palpation or major deformities noted.   Neurologic: Alert, Normal motor function, Normal sensory function, No focal deficits noted.   Psychiatric: Affect normal, Judgment normal, Mood normal.       DIAGNOSTIC STUDIES / PROCEDURES    EKG  Results for orders placed or performed during the hospital encounter of 20   EKG   Result Value Ref Range    Report       [facility] Emergency Dept.    Test Date:  2020  Pt Name:    LUANN GAONA                Department: [facility]  MRN:        [id number]                      Room:       3321  Gender:     Female                       Technician: MAURA  :        1945                   Requested By:ER TRIAGE PROTOCOL  Order #:    [id number]                    Reading MD: Samson Nino MD    Measurements  Intervals                                Axis  Rate:       163                          P:  SC:                                      QRS:        57  QRSD:       136                          T:          -6  QT:         301  QTc:        496    Interpretive Statements  Atrial fibrillation with RVR  Right bundle branch  block  Compared to ECG 02/11/2020 11:35:43  Electronically Signed On 2- 0:17:45 PST by Samson Nino MD           LABS  CBC  CMP  Troponin  BNP  TSH  Free T4  Procalcitonin  Urinalysis  T3    RADIOLOGY  Chest x-ray  CTA chest    COURSE & MEDICAL DECISION MAKING  Pertinent Labs & Imaging studies reviewed. (See chart for details)    This is a 74-year-old female with a history of recently diagnosed atrial fibrillation currently compliant on Eliquis which was prescribed just last week who is here in atrial fibrillation with RVR.  She does not have any chest pain nor does he she have shortness of breath above her COPD baseline.  She is requiring her baseline supplemental oxygen.  Her heart rate is quite elevated in the 160s but her blood pressure is in the 160s as well.  Because I initially was uncertain if this was a compensatory mechanism I did not rate control her immediately.  Instead, IV fluids were started and labs were drawn.  She continued to appear stable so was safe for continued observation while the remainder of the work-up was performed.    Records obtained and reviewed: Patient was hospitalized from 2/8/2020 to 2/20/2020 for abdominal pain which was found to be due to retained gallstones.  The patient is 4 years status post cholecystectomy.  The plan was for her to undergo ERCP but she developed atrial fibrillation with RVR and the procedure was canceled twice.  Cardiology was consulted, Cardizem was added to her oral medications including Toprol and she did require IV Cardizem and digoxin to obtain rate control.  She did undergo an echocardiogram with a normal EF and RVSP/normal-sized left atrium.  Prior to discharge she converted back to sinus rhythm and underwent ERCP with stone extraction.  She was discharged on Eliquis, diltiazem, cefdinir, and Flagyl.    The patient does have a leukocytosis to 15.7 with a neutrophilic predominance.  When she was discharged 5 days ago her white blood cell count  was 12.2.  Her metabolic panel reveals only elevated blood glucose to 143 and is otherwise normal.  Troponin is normal at 11.  TSH is within normal limits but her free T4 is elevated to 1.9.  She does not have recent thyroid studies with which to compare but in 2014 her free T4 was noted to be normal.  A T3 was added on.  BNP was slightly elevated to 3864.  Urinalysis was negative and a pro calcitonin was normal.    Chest x-ray was without acute abnormality.    Despite the patient's leukocytosis, I have no source for infection so antibiotics were deferred while I continued the work-up.  A CTA of the chest was ordered to evaluate for PE.  I did reevaluate the patient multiple times at bedside and she ultimately told me that she was feeling worse although she continued to deny any chest pain or shortness of breath beyond her baseline.  Her heart rate remained in the 140s and her blood pressure was in the 130s systolic.  At this time because of her worsening overall discomfort I did elect to rate control her with a bolus of diltiazem pending CTA results.    After the diltiazem, her heart rate improved significantly to the high 90s/low 100s.  She had significant improvement in her overall malaise.  Unfortunately, her CTA chest did reveal bilateral PEs.  It is unclear whether or not these are acute or constitute a failure of her Eliquis as she did not have CTA performed during her prior admission.  Also noted was distal esophageal wall thickening which will require outpatient follow-up.  She is denying any difficulty swallowing.  Further findings on the CT scan included hepatomegaly, a left adrenal nodule, and a left lower lobe pulmonary nodule.    The patient was started on a heparin bolus and drip.  Her Eliquis will be discontinued in the emergency department.  She will require hospitalization and I spoke with the on-call hospitalist to evaluate her for this.  T3 continues to be pending and I will defer management of  potential hyperthyroidism to admitting team pending return of T3 value.    DISPO  Patient was hospitalized in guarded condition by hospitalist, Dr. Houston.    Patient is critically ill.   The patient continues to have: Atrial fibrillation with RVR, bilateral pulmonary emboli  The vital organ system that is affected is the: Cardiovascular system  If untreated there is a high chance of deterioration into: Cardiac arrest, respiratory arrest, and eventually death.   The critical care that I am providing today is: Gathering history, interpretation of lab and imaging results, review of prior records, multiple and frequent re-evaluations at bedside, discussion with hospitalist, in preparation of the medical record.  The critical that has been undertaken is medically complex.   There has been no overlap in critical care time.   Critical Care Time not including procedures: 40 minutes.    HYDRATION  HYDRATION: Based on the patient's presentation of Tachycardia the patient was given IV fluids. IV Hydration was used because oral hydration was not adequate alone. Upon recheck following hydration, the patient was Improved.    FINAL IMPRESSION  1.  Bilateral PE  2.  Esophageal thickening  3.  Hepatomegaly  4.  Left adrenal nodule  5.  Left lower lobe pulmonary nodule  6.  Elevated free T4    Electronically signed by: Samson Nino M.D., 2/21/2020 7:05 PM

## 2020-02-22 NOTE — FLOWSHEET NOTE
02/21/20 2337   Vital Signs   Pulse 86   Respiration 20   Pulse Oximetry 92 %   $ Pulse Oximetry (Spot Check) Yes   Respiratory Assessment   Level of Consciousness Alert   Breath Sounds   RUL Breath Sounds Clear   RML Breath Sounds Clear   RLL Breath Sounds Diminished   BRYAN Breath Sounds Clear   LLL Breath Sounds Diminished   Oxygen   O2 (LPM) 2   O2 Delivery Device Nasal Cannula   Smoking History   Have you ever smoked Yes   Have you smoked in the last 12 months No

## 2020-02-22 NOTE — PROGRESS NOTES
Pt given IV metoprolol for sustaining HR over 120, d/t shingles noted on skin check pt was transferred to ICU. Report given to RN assuming care.

## 2020-02-22 NOTE — PROGRESS NOTES
Telemetry Shift Summary    Rhythm A-fib  HR Range 80s-130s (high of 191)  Ectopy none  Measurements --/0.14/--        Normal Values  Rhythm SR  HR Range    Measurements 0.12-0.20 / 0.06-0.10  / 0.30-0.52

## 2020-02-22 NOTE — ASSESSMENT & PLAN NOTE
-No acute exacerbation however she recently was placed on steroids and I will continue these  -Start respiratory care per protocol  -She is wheezing but moving good air, does have likely viral infection so is at risk of worsening  -I did personally review her chest x-ray, noted no acute cardiopulmonary process

## 2020-02-22 NOTE — ASSESSMENT & PLAN NOTE
-She did recently have an ERCP for this  -The read from the CTA does mention distal esophageal wall thickening, I did not note any mentioning of this in the procedure report  -May need to call GI to ask him about this

## 2020-02-22 NOTE — PROGRESS NOTES
Inpatient Anticoagulation Service Note    Date: 2/22/2020    Reason for Anticoagulation: New Pulmonary Embolism, Atrial Fibrillation   Target INR: 2.0 to 3.0  [id number] VASc Score: 5  HAS-BLED Score: 1   Hemoglobin Value: 15.4  Hematocrit Value: (!) 47.9  Lab Platelet Value: 197    INR from last 7 days     Date/Time INR Value    02/22/20 0020  (!) 1.36        Dose from last 7 days     Date/Time Dose (mg)    02/22/20 0922  5        Significant Interactions: Statin, Corticosteroids  Bridge Therapy: Yes, Weight Based Heparin Infusion  Date of Last VTE Event: 02/21/20  Bridge Therapy Start Date: 02/21/20  Days of Overlap Therapy: 0  INR Value Greater than 2 Prior to Discontinuation of Parenteral Anticoagulation: Yes        Plan:  Coumadin 5 mg PO tonight for INR 1.36     Pharmacist suggested discharge dosing: to be determined, possibly 5 mg daily     Bernie Cui, OsielD

## 2020-02-22 NOTE — ASSESSMENT & PLAN NOTE
-Possibly due to steroid or reactive from PE and/or viral infection  -I do not feel antibiotics are warranted at this time  -Repeat CBC in the morning

## 2020-02-22 NOTE — ASSESSMENT & PLAN NOTE
-Causing her tachycardia  -I am unsure how long the PEs have been present  -I am also unsure if this is a Eliquis failure or if the PEs were already present  -For now, I am stopping the Eliquis and starting a heparin drip  -No mention of heart strain  -There is an elevated BNP, trend troponin

## 2020-02-22 NOTE — PROGRESS NOTES
Report received from NOC, full nursing assessment completed, see flowsheet, Pt awake and alert, HR afib 130-150's, cardizem  IVP given per MD orders see MAR. Pt denies CP or increased SOB, denies pain, POC reviewed and heparin gtt explained to Pt. following weight based heparin protocol. Bed in low position, call light within reach.

## 2020-02-22 NOTE — ASSESSMENT & PLAN NOTE
-New onset during last hospital stay, recurring again now  -She did not note that she was having significant palpitations  -Possibly due to her PEs  -She also has significant wheeze and rhonchi probably from a viral infection, another possible cause  -She had been sent home on metoprolol and diltiazem, continue  -Start PRN metoprolol  -Adjust as needed  -She was sent home on Eliquis and states she was compliant with this medication, will be stopping it for now and switching to heparin drip

## 2020-02-22 NOTE — PROGRESS NOTES
2 RN SKIN CHECK     2 RN skin check completed with 2nd RN.   Devices in place - oxygen, peripheral IV, purewick.  Skin assessed under devices - intact.  Confirmed pressure ulcers found on - n/a.  New potential pressure ulcers noted on - gluteal fold- red and blanchable. Wound consult placed - no.  Pt noted to have an area of crusted shingles on right buttock, pt states this is not new for her.     The following interventions in place - pt educated on repositioning self in bed, will require frequent reminders.

## 2020-02-22 NOTE — PROGRESS NOTES
Subjective:   CC: chest pain    HPI:     Rufina Macias is a 74 y.o. female who is a patient of Dr. Angel. She presents with her daughter. She states that she was admitted to the hospital on 2/8/2020 for evaluation of RUQ abdominal pain radiating to the back. Her right upper quadrant ultrasound showed evidence of biliary ductal dilatation around 1 cm in diameter. She was planned to undergo an ERCP but this took several days because she developed atrial fibrillation with rapid ventricular response prior to the procedure. This a-fib is a new diagnosis for her. The procedure ended up becoming cancelled twice. Cardiology was consulted, and she was treated with Cardizem 120 mg qd together with her existing metoprolol 100 mg BID. IV Cardizem and digoxin were also use for rate control. She did convert back to normal sinus rhythm and underwent ERCP for gallstone extraction. She recovered well and was discharged in stable condition with 5-day course of cefdinir which patient states she completed. She was also prescribed Eliquis as anticoagulation. She was discharged on 2/16/2020. Today, patient c/o acute shortness of breath, cough, and chest tightness. She c/o feeling squeezes. She has COPD and thinks she is developing bronchitis. She requests refill of Z-sonya. She is feeling generally weak and also c/o intermittent palpitation. She no longer has the abdominal pain. She has a follow up appointment with her PCP this coming Monday. She has not followed up with the cardiologist yet.       Current medicines (including changes today)  No current facility-administered medications for this visit.      No current outpatient medications on file.     Facility-Administered Medications Ordered in Other Visits   Medication Dose Route Frequency Provider Last Rate Last Dose   • MD Alert...Warfarin per Pharmacy   Other PHARMACY TO DOSE Petros Patel M.D.       • DILTIAZem (CARDIZEM) 100 mg in D5W 100 mL Infusion  0-15 mg/hr  Intravenous Continuous Petros Patel M.D.   Stopped at 02/22/20 0945   • ipratropium (ATROVENT) 0.02 % nebulizer solution 0.5 mg  0.5 mg Nebulization Q6HRS PRN Petros Patel M.D.   0.5 mg at 02/22/20 1649   • metoprolol (LOPRESSOR) tablet 100 mg  100 mg Oral TWICE DAILY Petros Patel M.D.   100 mg at 02/22/20 1704   • amLODIPine (NORVASC) tablet 5 mg  5 mg Oral DAILY EMILY FelicianoO.   5 mg at 02/22/20 0518   • DILTIAZem CD (CARDIZEM CD) capsule 120 mg  120 mg Oral DAILY EMILY FelicianoO.   120 mg at 02/22/20 0518   • HYDROcodone/acetaminophen (NORCO)  MG per tablet 1 Tab  1 Tab Oral Q4HRS PRN EMILY FelicianoO.   1 Tab at 02/22/20 1934   • losartan (COZAAR) tablet 100 mg  100 mg Oral DAILY EMILY FelicianoO.   100 mg at 02/22/20 0518   • magnesium oxide (MAG-OX) tablet 400 mg  400 mg Oral DAILY EMILY FelicianoO.   400 mg at 02/22/20 0519   • simvastatin (ZOCOR) tablet 10 mg  10 mg Oral Q EVENING EMILY FelicianoO.   10 mg at 02/22/20 1743   • zolpidem (AMBIEN) tablet 10 mg  10 mg Oral HS PRN EMILY FelicianoO.   10 mg at 02/22/20 1934   • senna-docusate (PERICOLACE or SENOKOT S) 8.6-50 MG per tablet 2 Tab  2 Tab Oral BID Parvez Houston D.O.        And   • polyethylene glycol/lytes (MIRALAX) PACKET 1 Packet  1 Packet Oral QDAY PRN Parvez Houston D.O.        And   • magnesium hydroxide (MILK OF MAGNESIA) suspension 30 mL  30 mL Oral QDAY PRN Parvez Houston D.O.        And   • bisacodyl (DULCOLAX) suppository 10 mg  10 mg Rectal QDAY PRN Parvez Houston D.O.       • Respiratory Therapy Consult   Nebulization Continuous RT Parvez Houston D.O.       • acetaminophen (TYLENOL) tablet 650 mg  650 mg Oral Q6HRS PRN Parvez Houston D.O.       • enalaprilat (VASOTEC) injection 1.25 mg  1.25 mg Intravenous Q6HRS PRN Parvez Houston D.O.       • ondansetron (ZOFRAN) syringe/vial injection 4 mg  4 mg Intravenous Q4HRS PRN Parvez Houston D.O.       • ondansetron  "(MELANY ODT) dispertab 4 mg  4 mg Oral Q4HRS PRN Parvez Houston D.O.       • heparin injection 3,200 Units  3,200 Units Intravenous PRN Parvez Houston D.O.   3,200 Units at 02/22/20 2122    And   • heparin infusion 25,000 units in 500 mL 0.45% NACL   Intravenous Continuous Parvez Houston D.O. 26 mL/hr at 02/22/20 2123 1,300 Units/hr at 02/22/20 2123   • albuterol (PROVENTIL) 2.5mg/0.5ml nebulizer solution 2.5 mg  2.5 mg Nebulization Q2HRS PRN (RT) Parvez Houston D.O.         She  has a past medical history of Allergy, Arthritis, Asthma, Back pain, Breath shortness, Bronchitis, CA - cancer of uterus, Cancer (HCC) (2010), Carpal tunnel syndrome, COPD, Diverticula of colon, Heart burn, High cholesterol, Hyperlipidemia, Hypertension, Pneumonia (1993), and Tremor, hereditary, benign. She also has no past medical history of Addisons disease (HCC), Adrenal disorder, Anemia, Anxiety, Blood transfusion, Clotting disorder, Cushings syndrome, Depression, GERD (gastroesophageal reflux disease), Goiter, Headache(784.0), HIV (human immunodeficiency virus infection), IBD (inflammatory bowel disease), Meningitis, Migraine, OSTEOPOROSIS, Parathyroid disorder (HCC), Pituitary disease (HCC), Substance abuse (HCC), Ulcer, or Urinary tract infection, site not specified.    I personally reviewed patient's problem list, allergies, medications, family hx, social hx with patient and update [facility].     REVIEW OF SYSTEMS:  CONSTITUTIONAL:  Denies night sweats, fever, or chills.  RESPIRATORY:  Denies hemoptysis  CARDIOVASCULAR:  Denies pedal edema  See HPI for further details.     Objective:     /80 (BP Location: Right arm, Patient Position: Sitting, BP Cuff Size: Large adult)   Pulse (!) 146   Temp 37 °C (98.6 °F) (Temporal)   Ht 1.549 m (5' 1\")   Wt 94.3 kg (208 lb)   SpO2 93%  Body mass index is 39.3 kg/m².    Physical Exam:  Constitutional: Obese. awake, alert, in no distress, obese.   Skin: Warm, dry, good turgor, no " rashes, bruises, ulcers in visible areas.  Eye: conjunctiva clear, lids neg for edema or lesions.  ENMT: TM and auditory canals wnl. Oral and nasal mucosa wnl. Lips without lesions, good dentition, oropharynx clear.  Neck: Trachea midline, no masses, no thyromegaly. No cervical or supraclavicular lymphadenopathy  Respiratory: Unlabored respiratory effort, mild diffuse inspiratory wheezes with mild pleural rubs.   Cardiovascular: Active A-fib with RVR, no murmur, no pedal edema.    Abdomen: Soft, non-tender to palpation, active BS, no hernia, no hepatosplenomegaly, negative rebound or guarding.   Neuro: CN2-12 grossly intact. Strength 5/5, reflexes 2/4 in all extremities, no sensory deficits.   Psych: Oriented x3, affect and mood wnl, intact judgement and insight.     Assessment and Plan:   The following treatment plan was discussed    1. Atrial fibrillation with rapid ventricular response (HCC)  73 yo female with complex past medical history who was recently diagnosed with new onset A-fib with RVR when she was admitted to the hospital for evaluation of acute RUQ pain. A-fib was controlled with Diltiazem, Metoprolol, and Digoxin in patient. She underwent successful ERCP with stone extraction. She was discharged in stable condition on 2/16/2020 with Diltiazem  mg qd, Metoprolol 100 mg BID, and Eliquis 5 mg BID. She has appointment to see PCP in 3 days. She has not followed up with cardiology. She presents today with intermittent chest pain, SOB, feeling unwell. She was found to have A-fib with RVR in the clinic. Her heart rates went as high as 146 bpm with gentle movement or shifting of the body. I recommend that she goes to ER for prompt attention of her symptoms. She declined ambulance. Pt's daughter will take her to ER.     2. COPD exacerbation (HCC)  She has hx of COPD. She c/o acute development of SOB and cough. She thinks she has bronchitis and requests Rx for Zpak. Lung exam is concerning for possible  COPD exacerbation. I advised that she go to ER for eval and treatment of Afib with RVR. COPD exacerbation will be addressed by ER physician as well. However, she requested Rx for Zpak and Prednisone for future uses. Plans:   - Zpak  - predniSONE (DELTASONE) 20 MG Tab; Take 2 Tabs by mouth every day for 5 days.  Dispense: 10 Tab; Refill: 0    3. PAF (paroxysmal atrial fibrillation) (HCC)- feb 2020- dr jerome; rx eliquis and cardizem  - REFERRAL TO CARDIOLOGY    4. 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 with obstruction- ERCP EXTRACTON/ SPHINCTEROTOMY, recurrent Feb 2020  - REFERRAL TO GASTROENTEROLOGY     Patient was seen for 40 minutes face to face of which greater than 50% of appointment time was spent on counseling and coordination of care regarding the above     Manjula Medley M.D.    Followup: Return for As needed, follow up with PCP.    Please note that this dictation was created using voice recognition software and/or scribes. I have made every reasonable attempt to correct obvious errors, but I expect that there are errors of grammar and possibly content that I did not discover before finalizing the note.     Taye MALHOTRA (Jaydaibe), am scribing for, and in the presence of, Manjula Medley M.D.    Electronically signed by: Taye Menjivar (Jaydaibe), 2/21/2020    Manjula MALHOTRA M.D. personally performed the services described in this documentation, as scribed by Taye Menjivar in my presence, and it is both accurate and complete.

## 2020-02-23 PROBLEM — B02.9 HERPES ZOSTER WITHOUT COMPLICATION: Status: ACTIVE | Noted: 2020-02-23

## 2020-02-23 LAB
ANION GAP SERPL CALC-SCNC: 9 MMOL/L (ref 7–16)
APTT PPP: 68.1 SEC (ref 24.7–36)
APTT PPP: 96.1 SEC (ref 24.7–36)
BASOPHILS # BLD AUTO: 0.1 % (ref 0–1.8)
BASOPHILS # BLD: 0.02 K/UL (ref 0–0.12)
BUN SERPL-MCNC: 12 MG/DL (ref 8–22)
CALCIUM SERPL-MCNC: 8.4 MG/DL (ref 8.4–10.2)
CHLORIDE SERPL-SCNC: 103 MMOL/L (ref 96–112)
CO2 SERPL-SCNC: 27 MMOL/L (ref 20–33)
CREAT SERPL-MCNC: 0.62 MG/DL (ref 0.5–1.4)
EOSINOPHIL # BLD AUTO: 0.01 K/UL (ref 0–0.51)
EOSINOPHIL NFR BLD: 0.1 % (ref 0–6.9)
ERYTHROCYTE [DISTWIDTH] IN BLOOD BY AUTOMATED COUNT: 46.5 FL (ref 35.9–50)
GLUCOSE SERPL-MCNC: 116 MG/DL (ref 65–99)
HCT VFR BLD AUTO: 42.9 % (ref 37–47)
HGB BLD-MCNC: 13.5 G/DL (ref 12–16)
IMM GRANULOCYTES # BLD AUTO: 0.08 K/UL (ref 0–0.11)
IMM GRANULOCYTES NFR BLD AUTO: 0.5 % (ref 0–0.9)
INR PPP: 1.04 (ref 0.87–1.13)
INR PPP: 1.1 (ref 0.87–1.13)
LYMPHOCYTES # BLD AUTO: 0.87 K/UL (ref 1–4.8)
LYMPHOCYTES NFR BLD: 5.8 % (ref 22–41)
MCH RBC QN AUTO: 29.7 PG (ref 27–33)
MCHC RBC AUTO-ENTMCNC: 31.5 G/DL (ref 33.6–35)
MCV RBC AUTO: 94.3 FL (ref 81.4–97.8)
MONOCYTES # BLD AUTO: 0.34 K/UL (ref 0–0.85)
MONOCYTES NFR BLD AUTO: 2.3 % (ref 0–13.4)
NEUTROPHILS # BLD AUTO: 13.79 K/UL (ref 2–7.15)
NEUTROPHILS NFR BLD: 91.2 % (ref 44–72)
NRBC # BLD AUTO: 0 K/UL
NRBC BLD-RTO: 0 /100 WBC
PLATELET # BLD AUTO: 194 K/UL (ref 164–446)
PMV BLD AUTO: 10.1 FL (ref 9–12.9)
POTASSIUM SERPL-SCNC: 4.1 MMOL/L (ref 3.6–5.5)
PROTHROMBIN TIME: 13.7 SEC (ref 12–14.6)
PROTHROMBIN TIME: 14.4 SEC (ref 12–14.6)
RBC # BLD AUTO: 4.55 M/UL (ref 4.2–5.4)
SODIUM SERPL-SCNC: 139 MMOL/L (ref 135–145)
WBC # BLD AUTO: 15.1 K/UL (ref 4.8–10.8)

## 2020-02-23 PROCEDURE — 700111 HCHG RX REV CODE 636 W/ 250 OVERRIDE (IP): Performed by: INTERNAL MEDICINE

## 2020-02-23 PROCEDURE — 700101 HCHG RX REV CODE 250: Performed by: INTERNAL MEDICINE

## 2020-02-23 PROCEDURE — 99291 CRITICAL CARE FIRST HOUR: CPT | Performed by: INTERNAL MEDICINE

## 2020-02-23 PROCEDURE — 85610 PROTHROMBIN TIME: CPT | Mod: 91

## 2020-02-23 PROCEDURE — 94640 AIRWAY INHALATION TREATMENT: CPT

## 2020-02-23 PROCEDURE — A9270 NON-COVERED ITEM OR SERVICE: HCPCS | Performed by: INTERNAL MEDICINE

## 2020-02-23 PROCEDURE — 700102 HCHG RX REV CODE 250 W/ 637 OVERRIDE(OP): Performed by: INTERNAL MEDICINE

## 2020-02-23 PROCEDURE — 85025 COMPLETE CBC W/AUTO DIFF WBC: CPT

## 2020-02-23 PROCEDURE — 700111 HCHG RX REV CODE 636 W/ 250 OVERRIDE (IP)

## 2020-02-23 PROCEDURE — 770022 HCHG ROOM/CARE - ICU (200)

## 2020-02-23 PROCEDURE — 700105 HCHG RX REV CODE 258: Performed by: INTERNAL MEDICINE

## 2020-02-23 PROCEDURE — 85730 THROMBOPLASTIN TIME PARTIAL: CPT

## 2020-02-23 PROCEDURE — 94760 N-INVAS EAR/PLS OXIMETRY 1: CPT

## 2020-02-23 PROCEDURE — 80048 BASIC METABOLIC PNL TOTAL CA: CPT

## 2020-02-23 RX ORDER — PREDNISONE 10 MG/1
TABLET ORAL
Status: COMPLETED
Start: 2020-02-23 | End: 2020-02-23

## 2020-02-23 RX ORDER — PREDNISONE 20 MG/1
40 TABLET ORAL DAILY
Status: DISCONTINUED | OUTPATIENT
Start: 2020-02-23 | End: 2020-02-24

## 2020-02-23 RX ORDER — DILTIAZEM HYDROCHLORIDE 180 MG/1
180 CAPSULE, COATED, EXTENDED RELEASE ORAL DAILY
Status: DISCONTINUED | OUTPATIENT
Start: 2020-02-24 | End: 2020-02-24

## 2020-02-23 RX ORDER — METOPROLOL TARTRATE 1 MG/ML
5 INJECTION, SOLUTION INTRAVENOUS ONCE
Status: COMPLETED | OUTPATIENT
Start: 2020-02-23 | End: 2020-02-23

## 2020-02-23 RX ORDER — WARFARIN SODIUM 5 MG/1
5 TABLET ORAL
Status: COMPLETED | OUTPATIENT
Start: 2020-02-23 | End: 2020-02-23

## 2020-02-23 RX ADMIN — ALBUTEROL SULFATE 2.5 MG: 2.5 SOLUTION RESPIRATORY (INHALATION) at 22:04

## 2020-02-23 RX ADMIN — DILTIAZEM HYDROCHLORIDE 5 MG/HR: 5 INJECTION INTRAVENOUS at 08:47

## 2020-02-23 RX ADMIN — WARFARIN SODIUM 5 MG: 5 TABLET ORAL at 17:22

## 2020-02-23 RX ADMIN — METOPROLOL TARTRATE 5 MG: 1 INJECTION, SOLUTION INTRAVENOUS at 14:00

## 2020-02-23 RX ADMIN — DILTIAZEM HYDROCHLORIDE 120 MG: 120 CAPSULE, COATED, EXTENDED RELEASE ORAL at 05:01

## 2020-02-23 RX ADMIN — ZOLPIDEM TARTRATE 10 MG: 5 TABLET ORAL at 20:38

## 2020-02-23 RX ADMIN — SIMVASTATIN 10 MG: 10 TABLET, FILM COATED ORAL at 17:22

## 2020-02-23 RX ADMIN — Medication 400 MG: at 05:01

## 2020-02-23 RX ADMIN — HYDROCODONE BITARTRATE AND ACETAMINOPHEN 1 TABLET: 10; 325 TABLET ORAL at 12:49

## 2020-02-23 RX ADMIN — HYDROCODONE BITARTRATE AND ACETAMINOPHEN 1 TABLET: 10; 325 TABLET ORAL at 16:39

## 2020-02-23 RX ADMIN — HYDROCODONE BITARTRATE AND ACETAMINOPHEN 1 TABLET: 10; 325 TABLET ORAL at 04:32

## 2020-02-23 RX ADMIN — IPRATROPIUM BROMIDE 0.5 MG: 0.5 SOLUTION RESPIRATORY (INHALATION) at 04:17

## 2020-02-23 RX ADMIN — PREDNISONE 20 MG: 10 TABLET ORAL at 09:33

## 2020-02-23 RX ADMIN — PREDNISONE 20 MG: 20 TABLET ORAL at 09:35

## 2020-02-23 RX ADMIN — DILTIAZEM HYDROCHLORIDE 15 MG/HR: 5 INJECTION INTRAVENOUS at 17:27

## 2020-02-23 RX ADMIN — METOPROLOL TARTRATE 100 MG: 50 TABLET, FILM COATED ORAL at 05:01

## 2020-02-23 RX ADMIN — METOPROLOL TARTRATE 100 MG: 50 TABLET, FILM COATED ORAL at 17:22

## 2020-02-23 RX ADMIN — HYDROCODONE BITARTRATE AND ACETAMINOPHEN 1 TABLET: 10; 325 TABLET ORAL at 08:47

## 2020-02-23 RX ADMIN — HEPARIN SODIUM 1300 UNITS/HR: 5000 INJECTION, SOLUTION INTRAVENOUS at 18:06

## 2020-02-23 RX ADMIN — HYDROCODONE BITARTRATE AND ACETAMINOPHEN 1 TABLET: 10; 325 TABLET ORAL at 20:38

## 2020-02-23 ASSESSMENT — ENCOUNTER SYMPTOMS
EYES NEGATIVE: 1
PSYCHIATRIC NEGATIVE: 1
WEAKNESS: 1
SHORTNESS OF BREATH: 1
GASTROINTESTINAL NEGATIVE: 1
MUSCULOSKELETAL NEGATIVE: 1
CARDIOVASCULAR NEGATIVE: 1

## 2020-02-23 NOTE — CARE PLAN
Problem: Venous Thromboembolism (VTW)/Deep Vein Thrombosis (DVT) Prevention:  Goal: Patient will participate in Venous Thrombosis (VTE)/Deep Vein Thrombosis (DVT)Prevention Measures  Outcome: PROGRESSING AS EXPECTED  Flowsheets  Taken 2/22/2020 2000 by Tyffany M Collette, C.N.A.  Mechanical Prophylaxis: SCDs, Sequential Compression Device  Taken 2/22/2020 2000 by Irene Vargas R.N.  Pharmacologic Prophylaxis Used: Unfractionated Heparin     Problem: Knowledge Deficit  Goal: Knowledge of disease process/condition, treatment plan, diagnostic tests, and medications will improve  Outcome: PROGRESSING AS EXPECTED  Note: Pt verbalizes understanding of education

## 2020-02-23 NOTE — ASSESSMENT & PLAN NOTE
Secondary to acute PE    On metoprolol, dilt, and digoxin   Still with RVR at times  INR at goal today   Monitor on telemetry   If no evidence of sporadic RVR in the next 24 hours then will plan for discharge tomorrow and outpatient cardiology follow up (Monday 3/2)

## 2020-02-23 NOTE — FLOWSHEET NOTE
02/22/20 1653   Events/Summary/Plan   Events/Summary/Plan HR >130, Atrovent given per md   Vital Signs   Pulse (!) 130   Respiration 18   Pulse Oximetry 97 %   $ Pulse Oximetry (Spot Check) Yes   Respiratory Assessment   Level of Consciousness Alert   Chest Exam   Work Of Breathing / Effort Moderate   Breath Sounds   RUL Breath Sounds Expiratory Wheezes   RML Breath Sounds Expiratory Wheezes   RLL Breath Sounds Diminished   BRYAN Breath Sounds Expiratory Wheezes   LLL Breath Sounds Diminished   Secretions   Cough Congested   How Sputum Obtained Cough on Request   Sputum Amount Small   Sputum Color White

## 2020-02-23 NOTE — PROGRESS NOTES
Assumed care of pt at 1900. Assessment as documented. BP soft overnight (see flowsheet). Pt asymptomatic. HR sustained <120 until approx 0400. Morning medications given with good effect. Due to low blood pressures, antihypertensives held pending conversation with the primary team. HR and BP stable at this time.  Will continue to monitor per MD order

## 2020-02-23 NOTE — PROGRESS NOTES
"Critical Care Progress Note    Date of admission  2/21/2020    Chief Complaint  74 y.o. female admitted 2/21/2020 with SOB    Hospital Course    \"74 y.o. female who presented 2/21/2020 with shortness of breath.  Patient was just in the hospital, had new onset A. fib RVR, sent home on Eliquis which she states she has been taking.  She states she got out of the hospital on Sunday, on Wednesday she started experiencing shortness of breath.  Just before that she did have a sick contact, cold-like symptoms from her family.  She states she developed nonproductive cough.  She denied any chest pain.  Today she went to see her primary care provider where they noted tachycardia, patient was sent to the ER.  Imaging obtained here did show pulmonary embolism.  Patient does remain in atrial fibrillation with RVR\"      Interval Problem Update  Reviewed last 24 hour events:  Chart review from the past 24 hours includes imaging, laboratory studies, vital signs and notes available.  Pertinent data for today is she is is back in RVR    Cardiac:  afib rVR on dilt and heparin  Pulm: 2l NC and wheezing  Neuro: alert   Heme:  No bleeding  I/O:  Good urine output +1239 since admit  ID: no signs of infection  GI/endo:  eating  Labs/Imaging:  reviewed  Lines:  PIVs  Mobility:  OOB with assist  Symptoms:SOB    Review of Systems  Review of Systems   Constitutional: Positive for malaise/fatigue.   HENT: Negative.    Eyes: Negative.    Respiratory: Positive for shortness of breath.    Cardiovascular: Negative.    Gastrointestinal: Negative.    Genitourinary: Negative.    Musculoskeletal: Negative.    Skin: Negative.    Neurological: Positive for weakness.   Endo/Heme/Allergies: Negative.    Psychiatric/Behavioral: Negative.         Vital Signs for last 24 hours   Temp:  [36.8 °C (98.2 °F)-37.6 °C (99.7 °F)] 36.8 °C (98.2 °F)  Pulse:  [] 92  Resp:  [14-45] 23  BP: ()/(57-87) 110/67  SpO2:  [93 %-99 %] 95 %         Physical Exam "   Physical Exam  Constitutional:       Appearance: She is ill-appearing.   HENT:      Mouth/Throat:      Mouth: Mucous membranes are moist.   Eyes:      Extraocular Movements: Extraocular movements intact.      Pupils: Pupils are equal, round, and reactive to light.   Cardiovascular:      Rate and Rhythm: Tachycardia present. Rhythm irregular.   Pulmonary:      Effort: Pulmonary effort is normal.      Breath sounds: Wheezing present.   Abdominal:      General: Bowel sounds are normal. There is distension.      Palpations: Abdomen is soft.      Tenderness: There is no abdominal tenderness.   Musculoskeletal:         General: No swelling or tenderness.   Skin:     General: Skin is warm and dry.   Neurological:      General: No focal deficit present.      Mental Status: She is alert.   Psychiatric:         Mood and Affect: Mood normal.         Behavior: Behavior normal.         Thought Content: Thought content normal.         Judgment: Judgment normal.         Medications  Current Facility-Administered Medications   Medication Dose Route Frequency Provider Last Rate Last Dose   • warfarin (COUMADIN) tablet 5 mg  5 mg Oral ONCE AT 1800 Petros Patel M.D.       • [START ON 2/24/2020] DILTIAZem CD (CARDIZEM CD) capsule 180 mg  180 mg Oral DAILY Petros Patel M.D.       • predniSONE (DELTASONE) tablet 40 mg  40 mg Oral DAILY Petros Patel M.D.   20 mg at 02/23/20 0935   • MD Alert...Warfarin per Pharmacy   Other PHARMACY TO DOSE Petros Patel M.D.       • DILTIAZem (CARDIZEM) 100 mg in D5W 100 mL Infusion  0-15 mg/hr Intravenous Continuous Petros Patel M.D. 5 mL/hr at 02/23/20 0847 5 mg/hr at 02/23/20 0847   • ipratropium (ATROVENT) 0.02 % nebulizer solution 0.5 mg  0.5 mg Nebulization Q6HRS PRN Petros Patel M.D.   0.5 mg at 02/23/20 0417   • metoprolol (LOPRESSOR) tablet 100 mg  100 mg Oral TWICE DAILY Petros Patel M.D.   100 mg at 02/23/20 0501   • amLODIPine  (NORVASC) tablet 5 mg  5 mg Oral DAILY EMILY FelicianoO.   Stopped at 02/23/20 0600   • HYDROcodone/acetaminophen (NORCO)  MG per tablet 1 Tab  1 Tab Oral Q4HRS PRN EMILY FelicianoO.   1 Tab at 02/23/20 0847   • losartan (COZAAR) tablet 100 mg  100 mg Oral DAILY EMILY FelicianoORachna   Stopped at 02/23/20 0600   • magnesium oxide (MAG-OX) tablet 400 mg  400 mg Oral DAILY EMILY FelicianoO.   400 mg at 02/23/20 0501   • simvastatin (ZOCOR) tablet 10 mg  10 mg Oral Q EVENING EMILY FelicianoO.   10 mg at 02/22/20 1743   • zolpidem (AMBIEN) tablet 10 mg  10 mg Oral HS PRN EMILY FelicianoO.   10 mg at 02/22/20 1934   • senna-docusate (PERICOLACE or SENOKOT S) 8.6-50 MG per tablet 2 Tab  2 Tab Oral BID Parvez Houston D.O.        And   • polyethylene glycol/lytes (MIRALAX) PACKET 1 Packet  1 Packet Oral QDAY PRN Parvez Houston D.O.        And   • magnesium hydroxide (MILK OF MAGNESIA) suspension 30 mL  30 mL Oral QDAY PRN Parvez Houston D.O.        And   • bisacodyl (DULCOLAX) suppository 10 mg  10 mg Rectal QDAY PRN Parvez Houston D.O.       • Respiratory Therapy Consult   Nebulization Continuous RT Parvez Houston D.O.       • acetaminophen (TYLENOL) tablet 650 mg  650 mg Oral Q6HRS PRN Parvez Houston D.O.       • enalaprilat (VASOTEC) injection 1.25 mg  1.25 mg Intravenous Q6HRS PRN Parvez Houston D.O.       • ondansetron (ZOFRAN) syringe/vial injection 4 mg  4 mg Intravenous Q4HRS PRN Parvez Houston D.O.       • ondansetron (ZOFRAN ODT) dispertab 4 mg  4 mg Oral Q4HRS PRN Parvez Houston D.O.       • heparin injection 3,200 Units  3,200 Units Intravenous PRN EMILY FelicianoO.   3,200 Units at 02/22/20 2122    And   • heparin infusion 25,000 units in 500 mL 0.45% NACL   Intravenous Continuous Parvez Houston D.O. 26 mL/hr at 02/23/20 0400 1,300 Units/hr at 02/23/20 0400   • albuterol (PROVENTIL) 2.5mg/0.5ml nebulizer solution 2.5 mg  2.5 mg Nebulization Q2HRS PRN (RT) Parvez  FOZIA Houston D.O.           Fluids    Intake/Output Summary (Last 24 hours) at 2/23/2020 1009  Last data filed at 2/23/2020 1000  Gross per 24 hour   Intake 2484.23 ml   Output 1050 ml   Net 1434.23 ml       Laboratory          Recent Labs     02/21/20 1837 02/22/20 0152   SODIUM 136 136   POTASSIUM 3.8 4.0   CHLORIDE 100 103   CO2 24 20   BUN 12 10   CREATININE 0.76 0.58   CALCIUM 9.0 8.4     Recent Labs     02/21/20 1837 02/22/20  0152   ALTSGPT 23  --    ASTSGOT 24  --    ALKPHOSPHAT 66  --    TBILIRUBIN 0.6  --    GLUCOSE 143* 172*     Recent Labs     02/21/20 1837 02/22/20 0152   WBC 15.7* 15.0*   NEUTSPOLYS 81.10*  --    LYMPHOCYTES 9.60*  --    MONOCYTES 7.90  --    EOSINOPHILS 0.30  --    BASOPHILS 0.30  --    ASTSGOT 24  --    ALTSGPT 23  --    ALKPHOSPHAT 66  --    TBILIRUBIN 0.6  --      Recent Labs     02/21/20 1837 02/22/20  0020 02/22/20 0152 02/22/20  1330 02/22/20  2030 02/23/20  0330   RBC 5.38  --  5.26  --   --   --   --    HEMOGLOBIN 16.0  --  15.4  --   --   --   --    HEMATOCRIT 48.1*  --  47.9*  --   --   --   --    PLATELETCT 203  --  197  --   --   --   --    PROTHROMBTM  --  17.0*  --   --   --   --  14.4   APTT  --  33.7  --    < > 107.3* 43.8* 68.1*   INR  --  1.36*  --   --   --   --  1.10    < > = values in this interval not displayed.       Imaging  Personally viewed no I or E  CT scan personally viewed and saw the b/l segmental and subsegmental PE    No new images today    Assessment/Plan  * Bilateral pulmonary embolism (HCC)- (present on admission)  Assessment & Plan  Heparin drip till INR therapeutic on coumadin  Unclear etiology other than maybe sedentary lifestyle  Would call this unprovoked    Colonoscopy in 2018, two polyps  Mammogram 2019 - negative    Atrial fibrillation with RVR (HCC)- (present on admission)  Assessment & Plan  On metoprolol and dilt po  Off diltiazem drip during the day but again in 160s  restart diltiazem drip to keep HR less than 120 and increase po  diltiazem to  mg  Heparin drip  On coumadin as unclear if she has failed eloquis or clots before previous admission of afib so will start coumadin INR goal between 2-3- pharmacy managing coumadin    Herpes zoster without complication  Assessment & Plan  Chronic on right butt cheek  Cover till crustates  Standard precautions    Hyperglycemia- (present on admission)  Assessment & Plan  hbA1c 5.7    Leucocytosis- (present on admission)  Assessment & Plan  Rechecking levels today    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 with obstruction- ERCP EXTRACTON/ SPHINCTEROTOMY, recurrent Feb 2020- (present on admission)  Assessment & Plan  Had recent ERCP  Will follow up with GI    Essential hypertension- (present on admission)  Assessment & Plan  Continue home meds  Prn enalaprit    COPD (chronic obstructive pulmonary disease) (HCC)- (present on admission)  Assessment & Plan  No evidence of exacerbation so had stopped steroids yesterday  However, wheezes this am so will restart  Use ipratropium instead of duo nebs due to albuterol increasing HR    Hyperlipidemia- (present on admission)  Assessment & Plan  On statin       VTE:  Heparin  Ulcer: Not Indicated  Lines: PIVs    I have performed a physical exam and reviewed and updated ROS and Plan today (2/23/2020). In review of yesterday's note (2/22/2020), there are no changes except as documented above.     Discussed patient condition and risk of morbidity and/or mortality with RN, RT and Patient  The patient remains critically ill.  Critical care time = 31 minutes in directly providing and coordinating critical care and extensive data review.  No time overlap and excludes procedures.

## 2020-02-23 NOTE — PROGRESS NOTES
Report from Noc RN POC and orders reviewed. Pt A/o X4  Pt in Afib rate 120's . Dr Avila at bedside new orders received for Cardizem gtt. New IV access obtained. Assessment completed. VSS

## 2020-02-24 ENCOUNTER — APPOINTMENT (OUTPATIENT)
Dept: RADIOLOGY | Facility: MEDICAL CENTER | Age: 75
DRG: 175 | End: 2020-02-24
Attending: INTERNAL MEDICINE
Payer: MEDICARE

## 2020-02-24 PROBLEM — R91.1 PULMONARY NODULE: Status: ACTIVE | Noted: 2020-02-24

## 2020-02-24 PROBLEM — D72.829 LEUCOCYTOSIS: Status: RESOLVED | Noted: 2020-02-21 | Resolved: 2020-02-24

## 2020-02-24 LAB
ANION GAP SERPL CALC-SCNC: 8 MMOL/L (ref 7–16)
APTT PPP: 101 SEC (ref 24.7–36)
BASOPHILS # BLD AUTO: 0.1 % (ref 0–1.8)
BASOPHILS # BLD: 0.02 K/UL (ref 0–0.12)
BUN SERPL-MCNC: 9 MG/DL (ref 8–22)
CALCIUM SERPL-MCNC: 8.2 MG/DL (ref 8.4–10.2)
CHLORIDE SERPL-SCNC: 105 MMOL/L (ref 96–112)
CO2 SERPL-SCNC: 26 MMOL/L (ref 20–33)
CREAT SERPL-MCNC: 0.63 MG/DL (ref 0.5–1.4)
EOSINOPHIL # BLD AUTO: 0.01 K/UL (ref 0–0.51)
EOSINOPHIL NFR BLD: 0.1 % (ref 0–6.9)
ERYTHROCYTE [DISTWIDTH] IN BLOOD BY AUTOMATED COUNT: 45.8 FL (ref 35.9–50)
GLUCOSE SERPL-MCNC: 100 MG/DL (ref 65–99)
HCT VFR BLD AUTO: 43.6 % (ref 37–47)
HGB BLD-MCNC: 13.7 G/DL (ref 12–16)
IMM GRANULOCYTES # BLD AUTO: 0.07 K/UL (ref 0–0.11)
IMM GRANULOCYTES NFR BLD AUTO: 0.5 % (ref 0–0.9)
INR PPP: 1.09 (ref 0.87–1.13)
LYMPHOCYTES # BLD AUTO: 1.93 K/UL (ref 1–4.8)
LYMPHOCYTES NFR BLD: 14.1 % (ref 22–41)
MAGNESIUM SERPL-MCNC: 1.6 MG/DL (ref 1.5–2.5)
MCH RBC QN AUTO: 29.4 PG (ref 27–33)
MCHC RBC AUTO-ENTMCNC: 31.4 G/DL (ref 33.6–35)
MCV RBC AUTO: 93.6 FL (ref 81.4–97.8)
MONOCYTES # BLD AUTO: 1.12 K/UL (ref 0–0.85)
MONOCYTES NFR BLD AUTO: 8.2 % (ref 0–13.4)
NEUTROPHILS # BLD AUTO: 10.49 K/UL (ref 2–7.15)
NEUTROPHILS NFR BLD: 77 % (ref 44–72)
NRBC # BLD AUTO: 0 K/UL
NRBC BLD-RTO: 0 /100 WBC
PLATELET # BLD AUTO: 174 K/UL (ref 164–446)
PMV BLD AUTO: 10 FL (ref 9–12.9)
POTASSIUM SERPL-SCNC: 3.8 MMOL/L (ref 3.6–5.5)
PROTHROMBIN TIME: 14.3 SEC (ref 12–14.6)
RBC # BLD AUTO: 4.66 M/UL (ref 4.2–5.4)
SODIUM SERPL-SCNC: 139 MMOL/L (ref 135–145)
WBC # BLD AUTO: 13.6 K/UL (ref 4.8–10.8)

## 2020-02-24 PROCEDURE — 700102 HCHG RX REV CODE 250 W/ 637 OVERRIDE(OP): Performed by: INTERNAL MEDICINE

## 2020-02-24 PROCEDURE — 94760 N-INVAS EAR/PLS OXIMETRY 1: CPT

## 2020-02-24 PROCEDURE — A9270 NON-COVERED ITEM OR SERVICE: HCPCS | Performed by: INTERNAL MEDICINE

## 2020-02-24 PROCEDURE — 83735 ASSAY OF MAGNESIUM: CPT

## 2020-02-24 PROCEDURE — 99233 SBSQ HOSP IP/OBS HIGH 50: CPT | Performed by: INTERNAL MEDICINE

## 2020-02-24 PROCEDURE — 85730 THROMBOPLASTIN TIME PARTIAL: CPT

## 2020-02-24 PROCEDURE — 700101 HCHG RX REV CODE 250: Performed by: INTERNAL MEDICINE

## 2020-02-24 PROCEDURE — 700111 HCHG RX REV CODE 636 W/ 250 OVERRIDE (IP): Performed by: INTERNAL MEDICINE

## 2020-02-24 PROCEDURE — 85610 PROTHROMBIN TIME: CPT

## 2020-02-24 PROCEDURE — 99223 1ST HOSP IP/OBS HIGH 75: CPT | Performed by: INTERNAL MEDICINE

## 2020-02-24 PROCEDURE — 71045 X-RAY EXAM CHEST 1 VIEW: CPT

## 2020-02-24 PROCEDURE — 94640 AIRWAY INHALATION TREATMENT: CPT

## 2020-02-24 PROCEDURE — 80048 BASIC METABOLIC PNL TOTAL CA: CPT

## 2020-02-24 PROCEDURE — 770022 HCHG ROOM/CARE - ICU (200)

## 2020-02-24 PROCEDURE — 85025 COMPLETE CBC W/AUTO DIFF WBC: CPT

## 2020-02-24 RX ORDER — DIGOXIN 125 MCG
125 TABLET ORAL DAILY
Status: DISCONTINUED | OUTPATIENT
Start: 2020-02-25 | End: 2020-03-02 | Stop reason: HOSPADM

## 2020-02-24 RX ORDER — DIGOXIN 0.25 MG/ML
250 INJECTION INTRAMUSCULAR; INTRAVENOUS EVERY 6 HOURS
Status: COMPLETED | OUTPATIENT
Start: 2020-02-24 | End: 2020-02-24

## 2020-02-24 RX ORDER — MAGNESIUM SULFATE HEPTAHYDRATE 40 MG/ML
2 INJECTION, SOLUTION INTRAVENOUS ONCE
Status: COMPLETED | OUTPATIENT
Start: 2020-02-24 | End: 2020-02-24

## 2020-02-24 RX ORDER — FUROSEMIDE 10 MG/ML
10 INJECTION INTRAMUSCULAR; INTRAVENOUS ONCE
Status: COMPLETED | OUTPATIENT
Start: 2020-02-24 | End: 2020-02-24

## 2020-02-24 RX ORDER — DILTIAZEM HYDROCHLORIDE 120 MG/1
240 CAPSULE, COATED, EXTENDED RELEASE ORAL DAILY
Status: DISCONTINUED | OUTPATIENT
Start: 2020-02-25 | End: 2020-02-24

## 2020-02-24 RX ORDER — METOPROLOL TARTRATE 1 MG/ML
INJECTION, SOLUTION INTRAVENOUS
Status: COMPLETED
Start: 2020-02-24 | End: 2020-02-24

## 2020-02-24 RX ORDER — DILTIAZEM HYDROCHLORIDE 120 MG/1
360 CAPSULE, COATED, EXTENDED RELEASE ORAL DAILY
Status: DISCONTINUED | OUTPATIENT
Start: 2020-02-25 | End: 2020-02-24

## 2020-02-24 RX ORDER — WARFARIN SODIUM 7.5 MG/1
7.5 TABLET ORAL
Status: COMPLETED | OUTPATIENT
Start: 2020-02-24 | End: 2020-02-24

## 2020-02-24 RX ORDER — METOPROLOL TARTRATE 1 MG/ML
5 INJECTION, SOLUTION INTRAVENOUS ONCE
Status: COMPLETED | OUTPATIENT
Start: 2020-02-24 | End: 2020-02-24

## 2020-02-24 RX ORDER — DILTIAZEM HYDROCHLORIDE 180 MG/1
180 CAPSULE, COATED, EXTENDED RELEASE ORAL
Status: DISCONTINUED | OUTPATIENT
Start: 2020-02-24 | End: 2020-02-24

## 2020-02-24 RX ORDER — DIGOXIN 0.25 MG/ML
500 INJECTION INTRAMUSCULAR; INTRAVENOUS ONCE
Status: COMPLETED | OUTPATIENT
Start: 2020-02-24 | End: 2020-02-24

## 2020-02-24 RX ADMIN — HYDROCODONE BITARTRATE AND ACETAMINOPHEN 1 TABLET: 10; 325 TABLET ORAL at 05:24

## 2020-02-24 RX ADMIN — HYDROCODONE BITARTRATE AND ACETAMINOPHEN 1 TABLET: 10; 325 TABLET ORAL at 17:47

## 2020-02-24 RX ADMIN — Medication 400 MG: at 05:00

## 2020-02-24 RX ADMIN — HYDROCODONE BITARTRATE AND ACETAMINOPHEN 1 TABLET: 10; 325 TABLET ORAL at 09:32

## 2020-02-24 RX ADMIN — SIMVASTATIN 10 MG: 10 TABLET, FILM COATED ORAL at 17:48

## 2020-02-24 RX ADMIN — MAGNESIUM SULFATE 2 G: 2 INJECTION INTRAVENOUS at 17:52

## 2020-02-24 RX ADMIN — FUROSEMIDE 10 MG: 10 INJECTION, SOLUTION INTRAMUSCULAR; INTRAVENOUS at 13:26

## 2020-02-24 RX ADMIN — IPRATROPIUM BROMIDE 0.5 MG: 0.5 SOLUTION RESPIRATORY (INHALATION) at 20:14

## 2020-02-24 RX ADMIN — ENOXAPARIN SODIUM 100 MG: 100 INJECTION SUBCUTANEOUS at 17:47

## 2020-02-24 RX ADMIN — DILTIAZEM HYDROCHLORIDE 180 MG: 180 CAPSULE, COATED, EXTENDED RELEASE ORAL at 04:57

## 2020-02-24 RX ADMIN — DIGOXIN 500 MCG: 0.25 INJECTION INTRAMUSCULAR; INTRAVENOUS at 11:17

## 2020-02-24 RX ADMIN — WARFARIN SODIUM 7.5 MG: 7.5 TABLET ORAL at 17:48

## 2020-02-24 RX ADMIN — METOPROLOL TARTRATE 100 MG: 50 TABLET, FILM COATED ORAL at 17:47

## 2020-02-24 RX ADMIN — HYDROCODONE BITARTRATE AND ACETAMINOPHEN 1 TABLET: 10; 325 TABLET ORAL at 01:23

## 2020-02-24 RX ADMIN — ZOLPIDEM TARTRATE 10 MG: 5 TABLET ORAL at 21:52

## 2020-02-24 RX ADMIN — AMLODIPINE BESYLATE 5 MG: 5 TABLET ORAL at 06:06

## 2020-02-24 RX ADMIN — METOPROLOL TARTRATE 100 MG: 50 TABLET, FILM COATED ORAL at 05:00

## 2020-02-24 RX ADMIN — DILTIAZEM HYDROCHLORIDE 180 MG: 180 CAPSULE, COATED, EXTENDED RELEASE ORAL at 11:17

## 2020-02-24 RX ADMIN — LOSARTAN POTASSIUM 100 MG: 25 TABLET ORAL at 06:06

## 2020-02-24 RX ADMIN — HYDROCODONE BITARTRATE AND ACETAMINOPHEN 1 TABLET: 10; 325 TABLET ORAL at 21:52

## 2020-02-24 RX ADMIN — ENOXAPARIN SODIUM 100 MG: 100 INJECTION SUBCUTANEOUS at 07:17

## 2020-02-24 RX ADMIN — METOPROLOL TARTRATE 5 MG: 1 INJECTION, SOLUTION INTRAVENOUS at 14:07

## 2020-02-24 RX ADMIN — HYDROCODONE BITARTRATE AND ACETAMINOPHEN 1 TABLET: 10; 325 TABLET ORAL at 13:41

## 2020-02-24 RX ADMIN — DIGOXIN 250 MCG: 0.25 INJECTION INTRAMUSCULAR; INTRAVENOUS at 17:45

## 2020-02-24 RX ADMIN — DIGOXIN 250 MCG: 0.25 INJECTION INTRAMUSCULAR; INTRAVENOUS at 23:46

## 2020-02-24 RX ADMIN — PREDNISONE 40 MG: 20 TABLET ORAL at 05:00

## 2020-02-24 RX ADMIN — ALBUTEROL SULFATE 2.5 MG: 2.5 SOLUTION RESPIRATORY (INHALATION) at 04:41

## 2020-02-24 ASSESSMENT — ENCOUNTER SYMPTOMS
ABDOMINAL PAIN: 0
ORTHOPNEA: 0
CARDIOVASCULAR NEGATIVE: 1
PALPITATIONS: 1
COUGH: 1
DIZZINESS: 0
DEPRESSION: 0
LOSS OF CONSCIOUSNESS: 0
MUSCULOSKELETAL NEGATIVE: 1
GASTROINTESTINAL NEGATIVE: 1
EYES NEGATIVE: 1
PSYCHIATRIC NEGATIVE: 1
WEAKNESS: 1
PND: 0
FALLS: 0
SHORTNESS OF BREATH: 1

## 2020-02-24 NOTE — PROGRESS NOTES
"Pt c/o SOB. Wheezes audible without use of stethoscope. Called RT for breathing treatment. Pt felt \"a little better\" after the treatment. Wheezes decreased, but still audible. SpO2 has been >92%. Notified Dr. Houston. No new orders at this time.   "

## 2020-02-24 NOTE — PROGRESS NOTES
Inpatient Anticoagulation Service Note    Date: 2020    Reason for Anticoagulation: New Pulmonary Embolism, Atrial Fibrillation   Target INR: 2.0 to 3.0  [id number] VASc Score: 5  HAS-BLED Score: 1   Hemoglobin Value: 13.7  Hematocrit Value: 43.6  Lab Platelet Value: 174    INR from last 7 days     Date/Time INR Value    20 0430  1.09    20 0943  1.04    20 0330  1.1    20 0020  (!) 1.36        Dose from last 7 days     Date/Time Dose (mg)    20 1020  7.5    20 0600  5    20 0922  5        Significant Interactions: Statin, Corticosteroids  Bridge Therapy: Yes(Lovenox 100 mg SQ Q12H)  Date of Last VTE Event: 20  Bridge Therapy Start Date: 20  Days of Overlap Therapy: 2  INR Value Greater than 2 Prior to Discontinuation of Parenteral Anticoagulation: Yes        Plan:  Coumadin 7.5 mg PO tonight for INR 1.09  Education Material Provided?: No  Pharmacist suggested discharge dosin-7.5 mg daily to be determined     Bernie Cui, OsielD

## 2020-02-24 NOTE — FLOWSHEET NOTE
This note also relates to the following rows which could not be included:  Pulse - Cannot attach notes to unvalidated device data  Pulse Oximetry - Cannot attach notes to unvalidated device data       02/24/20 0441   Events/Summary/Plan   Skin Inspection Respiratory Device Intact;Red   Location ears   Vital Signs   Respiration (!) 22   $ Pulse Oximetry (Spot Check) Yes   Respiratory Assessment   Level of Consciousness Alert   Chest Exam   Work Of Breathing / Effort Moderate   Breath Sounds   RUL Breath Sounds Expiratory Wheezes   RML Breath Sounds Expiratory Wheezes   RLL Breath Sounds Expiratory Wheezes   BRYAN Breath Sounds Expiratory Wheezes   LLL Breath Sounds Expiratory Wheezes   Secretions   Cough Congested;Moist   Sputum Amount Unable to Evaluate   Sputum Color Unable to Evaluate

## 2020-02-24 NOTE — FLOWSHEET NOTE
02/23/20 2204   Events/Summary/Plan   Events/Summary/Plan SVN   Vital Signs   Pulse 88   Respiration (!) 26   Pulse Oximetry 96 %   $ Pulse Oximetry (Spot Check) Yes   O2 Alarms Set & Reviewed Yes   Respiratory Assessment   Level of Consciousness Alert   Breath Sounds   RUL Breath Sounds Expiratory Wheezes   RML Breath Sounds Expiratory Wheezes   RLL Breath Sounds Expiratory Wheezes   BRYAN Breath Sounds Expiratory Wheezes   LLL Breath Sounds Expiratory Wheezes   Secretions   Cough Congested;Non Productive   Oxygen   O2 (LPM) 2   O2 Delivery Device Nasal Cannula

## 2020-02-24 NOTE — CARE PLAN
Problem: Venous Thromboembolism (VTW)/Deep Vein Thrombosis (DVT) Prevention:  Goal: Patient will participate in Venous Thrombosis (VTE)/Deep Vein Thrombosis (DVT)Prevention Measures  Intervention: Assess and monitor for anticoagulation complications  Note: Pt is on a heparin drip with coumadin bridging. Appropriate labs are being monitored. Will continue to monitor for s/s of bleeding.      Problem: Hemodynamic Status  Goal: Vital Signs and Fluid Balance Management  Intervention: Monitor I & O, Manage IV fluids and IV infusions  Note: Cardizem drip currently down to 10 mg/hr. Pt remains A Fib 70-90s. Will continue to titrate down as tolerated.

## 2020-02-24 NOTE — PROGRESS NOTES
Spiritual Care Note    Patient Information     Patient's Name: Rufina Macias   MRN: [id number]    YOB: 1945   Age and Gender: 74 y.o. female   Service Area: ICU [facility]   Room (and Bed): [facility]/   Ethnicity or Nationality:     Primary Language: English   Sabianist/Spiritual preference: Zoroastrianism   Place of Residence: [place], [place]   Family/Friends/Others Present: no   Clinical Team Present: Nurse   Medical Diagnosis(-es)/Procedure(s): Pulmonary emboli   Code Status: Full Code    Date of Admission: 2/21/2020   Length of Stay: 3 days        Spiritual Care Provider Information:  Name of Spiritual Care Provider: Amalia Ashley  Title of Spiritual Care Provider: Associate   Phone Number: [phone]  E-mail: [email or web address]  Total time : 10 minutes    Spiritual Screen Results:    Gen Nursing  Spiritual Screen  Is your spiritual health or inner well-being important to you as you cope with your medical condition?: No  Would you like to receive a visit from our Spiritual Care team or your own Congregation or spiritual leader?: No  Was spiritual care education provided to the patient?: Declined     Palliative Care  PC Sabianist/Spiritual Screening  Was spiritual care education provided to the patient?: Declined      Encounter/Request Information  Encounter/Request Type   Visited With: Patient  Nature of the Visit: Initial, On shift  Continue Visiting: Yes  Next Follow-up Date: 02/24/20  Crisis Visit: Critical care  Referral From/ Origin of Request: [place] management rounds    Religous Needs/Values       Spiritual Assessment   Spiritual Care Encounters     Observations/Symptoms: (Pt sitting up in bed, alert, difficulty breathing)    Interacton/Conversation:  had met pt before - at that time, as well as now, pt desired  to return for a longer visit. During today's encounter, pt was visibly having difficulty breathing, seemed somewhat restless. Nurse was in room caring  "for pt's needs. Pt asked  to \"come by later.\"  confirmed return visit.    Assessment: Distress  Distress: (Desire for  visit compromised by physical discomfort)    Interventions: Companionship    Outcomes: Value/Dignity/Respect    Plan: (f/u later today)    Notes:            "

## 2020-02-24 NOTE — PROGRESS NOTES
"Critical Care Progress Note    Date of admission  2/21/2020    Chief Complaint  74 y.o. female admitted 2/21/2020 with SOB    Hospital Course    Per Dr. Avila's note:  \"74 y.o. female who presented 2/21/2020 with shortness of breath.  Patient was just in the hospital, had new onset A. fib RVR, sent home on Eliquis which she states she has been taking.  She states she got out of the hospital on Sunday, on Wednesday she started experiencing shortness of breath.  Just before that she did have a sick contact, cold-like symptoms from her family.  She states she developed nonproductive cough.  She denied any chest pain.  Today she went to see her primary care provider where they noted tachycardia, patient was sent to the ER.  Imaging obtained here did show pulmonary embolism.  Patient does remain in atrial fibrillation with RVR\"     Interval Problem Update  Reviewed last 24 hour events:  No acute issues overnight   Off diltiazem drip since 1 am   On 2 liters NC   HR 80-110s  I/O reviewed -> +1 liter   Patient complains of shortness of breath on exertion. Able to walk less than 20 feet. She state her shortness of breath is chronic but has progressively worsening over the past few weeks.     Review of Systems  Review of Systems   Constitutional: Positive for malaise/fatigue.   HENT: Negative.    Eyes: Negative.    Respiratory: Positive for cough and shortness of breath.    Cardiovascular: Negative.    Gastrointestinal: Negative.    Genitourinary: Negative.    Musculoskeletal: Negative.    Skin: Negative.    Neurological: Positive for weakness.   Endo/Heme/Allergies: Negative.    Psychiatric/Behavioral: Negative.         Vital Signs for last 24 hours   Temp:  [36.2 °C (97.2 °F)-37.2 °C (99 °F)] 36.2 °C (97.2 °F)  Pulse:  [] 90  Resp:  [15-34] 15  BP: (110-168)/(62-93) 150/93  SpO2:  [91 %-97 %] 95 %         Physical Exam   Physical Exam  Constitutional:       Appearance: She is ill-appearing.   HENT:      " Mouth/Throat:      Mouth: Mucous membranes are moist.   Eyes:      Extraocular Movements: Extraocular movements intact.      Pupils: Pupils are equal, round, and reactive to light.   Cardiovascular:      Rate and Rhythm: Tachycardia present. Rhythm irregular.   Pulmonary:      Effort: Pulmonary effort is normal.      Breath sounds: Wheezing present.   Abdominal:      General: Bowel sounds are normal. There is distension.      Palpations: Abdomen is soft.      Tenderness: There is no abdominal tenderness.   Musculoskeletal:         General: No swelling or tenderness.   Skin:     General: Skin is warm and dry.   Neurological:      General: No focal deficit present.      Mental Status: She is alert.   Psychiatric:         Mood and Affect: Mood normal.         Behavior: Behavior normal.         Thought Content: Thought content normal.         Judgment: Judgment normal.         Medications  Current Facility-Administered Medications   Medication Dose Route Frequency Provider Last Rate Last Dose   • [START ON 2/25/2020] DILTIAZem CD (CARDIZEM CD) capsule 240 mg  240 mg Oral DAILY Mary Carmen Rogers M.D.       • enoxaparin (LOVENOX) inj 100 mg  100 mg Subcutaneous Q12HRS Mary Carmen Rogers M.D.   100 mg at 02/24/20 0717   • predniSONE (DELTASONE) tablet 40 mg  40 mg Oral DAILY Petros Patel M.D.   40 mg at 02/24/20 0500   • MD Alert...Warfarin per Pharmacy   Other PHARMACY TO DOSE Petros Patel M.D.       • ipratropium (ATROVENT) 0.02 % nebulizer solution 0.5 mg  0.5 mg Nebulization Q6HRS PRN Petros Patel M.D.   0.5 mg at 02/23/20 0417   • metoprolol (LOPRESSOR) tablet 100 mg  100 mg Oral TWICE DAILY Petros Patel M.D.   100 mg at 02/24/20 0500   • HYDROcodone/acetaminophen (NORCO)  MG per tablet 1 Tab  1 Tab Oral Q4HRS PRN SANJU Feliciano.O.   1 Tab at 02/24/20 0524   • losartan (COZAAR) tablet 100 mg  100 mg Oral DAILY SANJU Feliciano.O.   100 mg at 02/24/20 0606   • magnesium oxide (MAG-OX)  tablet 400 mg  400 mg Oral DAILY Parvez Houston D.O.   400 mg at 02/24/20 0500   • simvastatin (ZOCOR) tablet 10 mg  10 mg Oral Q EVENING Parvez Houston D.O.   10 mg at 02/23/20 1722   • zolpidem (AMBIEN) tablet 10 mg  10 mg Oral HS PRN Parvez Houston D.O.   10 mg at 02/23/20 2038   • senna-docusate (PERICOLACE or SENOKOT S) 8.6-50 MG per tablet 2 Tab  2 Tab Oral BID Parvez Houston D.O.   Stopped at 02/24/20 0600    And   • polyethylene glycol/lytes (MIRALAX) PACKET 1 Packet  1 Packet Oral QDAY PRN Parvez Houston D.O.        And   • magnesium hydroxide (MILK OF MAGNESIA) suspension 30 mL  30 mL Oral QDAY PRN Parvez Houston D.O.        And   • bisacodyl (DULCOLAX) suppository 10 mg  10 mg Rectal QDAY PRN Parvez Houston D.O.       • Respiratory Therapy Consult   Nebulization Continuous RT Parvez Houston D.O.       • acetaminophen (TYLENOL) tablet 650 mg  650 mg Oral Q6HRS PRN Parvez Houston D.O.       • enalaprilat (VASOTEC) injection 1.25 mg  1.25 mg Intravenous Q6HRS PRN Parvez Houston D.O.       • ondansetron (ZOFRAN) syringe/vial injection 4 mg  4 mg Intravenous Q4HRS PRN Parvez Houston D.O.       • ondansetron (ZOFRAN ODT) dispertab 4 mg  4 mg Oral Q4HRS PRN Parvez Houston D.O.       • albuterol (PROVENTIL) 2.5mg/0.5ml nebulizer solution 2.5 mg  2.5 mg Nebulization Q2HRS PRN (RT) Parvez Houston D.O.   2.5 mg at 02/24/20 0441       Fluids    Intake/Output Summary (Last 24 hours) at 2/24/2020 0843  Last data filed at 2/24/2020 0800  Gross per 24 hour   Intake 2236.91 ml   Output 2100 ml   Net 136.91 ml       Laboratory          Recent Labs     02/22/20  0152 02/23/20  0943 02/24/20  0430   SODIUM 136 139 139   POTASSIUM 4.0 4.1 3.8   CHLORIDE 103 103 105   CO2 20 27 26   BUN 10 12 9   CREATININE 0.58 0.62 0.63   CALCIUM 8.4 8.4 8.2*     Recent Labs     02/21/20  1837 02/22/20  0152 02/23/20  0943 02/24/20  0430   ALTSGPT 23  --   --   --    ASTSGOT 24  --   --   --    ALKPHOSPHAT 66  --    --   --    TBILIRUBIN 0.6  --   --   --    GLUCOSE 143* 172* 116* 100*     Recent Labs     02/21/20  1837 02/22/20  0152 02/23/20  1410 02/24/20  0430   WBC 15.7* 15.0* 15.1* 13.6*   NEUTSPOLYS 81.10*  --  91.20* 77.00*   LYMPHOCYTES 9.60*  --  5.80* 14.10*   MONOCYTES 7.90  --  2.30 8.20   EOSINOPHILS 0.30  --  0.10 0.10   BASOPHILS 0.30  --  0.10 0.10   ASTSGOT 24  --   --   --    ALTSGPT 23  --   --   --    ALKPHOSPHAT 66  --   --   --    TBILIRUBIN 0.6  --   --   --      Recent Labs     02/22/20  0152  02/23/20  0330 02/23/20  0943 02/23/20  1410 02/24/20  0430   RBC 5.26  --   --   --  4.55 4.66   HEMOGLOBIN 15.4  --   --   --  13.5 13.7   HEMATOCRIT 47.9*  --   --   --  42.9 43.6   PLATELETCT 197  --   --   --  194 174   PROTHROMBTM  --   --  14.4 13.7  --  14.3   APTT  --    < > 68.1* 96.1*  --  101.0*   INR  --   --  1.10 1.04  --  1.09    < > = values in this interval not displayed.       Imaging  Personally viewed no I or E  CT scan personally viewed and saw the b/l segmental and subsegmental PE    CXR reviewed -> mild cephalization, unchanged.     Assessment/Plan  * Bilateral pulmonary embolism (HCC)- (present on admission)  Assessment & Plan  Consider unprovoked   Heparin switch to lovenox    On coumadin, pharmacy to dose   Consider Heparin drip till INR therapeutic on coumadin    Colonoscopy in 2018, two polyps  Mammogram 2019 - negative    Atrial fibrillation with RVR (HCC)- (present on admission)  Assessment & Plan  Secondary to acute PE    On metoprolol and dilt po  Off diltiazem drip early in the morning  Still goes into RVR on exertion   Will add digoxin and increase diltiazem 360 mg daily   Consider all therapies fail and still remains in RVR, then will consult cardiology   Switch heparin drip to lovenox  On coumadin for goal INR 2-3   Mild cephalization on CXR and is net positive 1 liter   Will start gentle diuresis to avoid left atrial dilation which can precipitate A fib RVR      Pulmonary  nodule  Assessment & Plan  -- Considered intermediate pretest for malignancy given risk factors   -- Will need repeat CT chest in 6 months       Herpes zoster without complication  Assessment & Plan  Chronic on right butt cheek  Cover till crustates  Standard precautions    Hyperglycemia- (present on admission)  Assessment & Plan  hbA1c 5.7    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 with obstruction- ERCP EXTRACTON/ SPHINCTEROTOMY, recurrent Feb 2020- (present on admission)  Assessment & Plan  Had recent ERCP  Will follow up with GI    Essential hypertension- (present on admission)  Assessment & Plan  Stop losartan and norvasc to allow uptitration of diltiazem    Goal SBP <140    COPD (chronic obstructive pulmonary disease) (HCC)- (present on admission)  Assessment & Plan  Not in acute exacerbation    Will stop prednisone    Cont atrovent as needed for wheezing or SOB       Hyperlipidemia- (present on admission)  Assessment & Plan  On statin       VTE:  Lovenox and Coumadin  Ulcer: Not Indicated  Lines: PIVs    I have performed a physical exam and reviewed and updated ROS and Plan today (2/24/2020). In review of yesterday's note (2/23/2020), there are no changes except as documented above.     Discussed patient condition and risk of morbidity and/or mortality with RN, RT and Patient

## 2020-02-24 NOTE — DISCHARGE PLANNING
Pt lives with dtr Destinee. Pt previously independent with ADLs, uses a walker. Pt has a living will on file. Pt has no HHC/SNF history. No mental health or substance abuse history. Pt uses [facility] on [address] for Rx. CM team following for d/c planning needs.     Care Transition Team Assessment    Information Source  Information Given By: Patient  Who is responsible for making decisions for patient? : Patient    Readmission Evaluation  Is this a readmission?: Yes - unplanned readmission    Elopement Risk  Legal Hold: No  Ambulatory or Self Mobile in Wheelchair: No-Not an Elopement Risk  Elopement Risk: Not at Risk for Elopement    Interdisciplinary Discharge Planning  Patient or legal guardian wants to designate a caregiver (see row info): No    Discharge Preparedness  What is your plan after discharge?: Uncertain - pending medical team collaboration  What are your discharge supports?: Child  Prior Functional Level: Ambulatory, Independent with Activities of Daily Living, Uses Walker    Functional Assesment  Prior Functional Level: Ambulatory, Independent with Activities of Daily Living, Uses Walker    Finances  Financial Barriers to Discharge: No  Prescription Coverage: Yes    Vision / Hearing Impairment  Vision Impairment : No  Hearing Impairment : No         Advance Directive  Advance Directive?: Living Will    Domestic Abuse  Have you ever been the victim of abuse or violence?: No  Physical Abuse or Sexual Abuse: No  Verbal Abuse or Emotional Abuse: No  Possible Abuse Reported to:: Not Applicable    Psychological Assessment  History of Substance Abuse: None  History of Psychiatric Problems: No  Non-compliant with Treatment: No    Discharge Risks or Barriers  Discharge risks or barriers?: Complex medical needs  Patient risk factors: Readmission, Vulnerable adult    Anticipated Discharge Information  Anticipated discharge disposition: Discharge needs currently unknown

## 2020-02-24 NOTE — PROGRESS NOTES
Critical PTT lab result was 101 at 0611. This value is within the defined limits of the Heparin Weight Based Protocol ordered by the physician for this patient. Heparin Weight Based Protocol will be followed for any adjustments, physician was not notified per protocol instructions.

## 2020-02-24 NOTE — PROGRESS NOTES
Report from Deja RN POC and orders discussed, VSS. Dr Rogers at bedside new orders received. IV heparin discontinued. Pt states pain tolerable at this time. Assessment completed. Pt tolerating breakfast.

## 2020-02-24 NOTE — PROGRESS NOTES
Assumed pt care. AAOx4. Pt c/o same chronic back pain. Will give prn norco when it is available. Assessment completed. Pt denied any needs. Reminded pt to call for assistance. Call light within reach, bed in lowest position, bed alarm on, will continue to monitor.

## 2020-02-24 NOTE — ASSESSMENT & PLAN NOTE
-- Considered intermediate pretest for malignancy given risk factors   -- Will need repeat CT chest in 6 months

## 2020-02-25 ENCOUNTER — APPOINTMENT (OUTPATIENT)
Dept: CARDIOLOGY | Facility: MEDICAL CENTER | Age: 75
DRG: 175 | End: 2020-02-25
Attending: INTERNAL MEDICINE
Payer: MEDICARE

## 2020-02-25 LAB
ANION GAP SERPL CALC-SCNC: 13 MMOL/L (ref 7–16)
BASOPHILS # BLD AUTO: 0.3 % (ref 0–1.8)
BASOPHILS # BLD: 0.04 K/UL (ref 0–0.12)
BUN SERPL-MCNC: 8 MG/DL (ref 8–22)
CALCIUM SERPL-MCNC: 8.9 MG/DL (ref 8.4–10.2)
CHLORIDE SERPL-SCNC: 100 MMOL/L (ref 96–112)
CO2 SERPL-SCNC: 24 MMOL/L (ref 20–33)
CREAT SERPL-MCNC: 0.54 MG/DL (ref 0.5–1.4)
EOSINOPHIL # BLD AUTO: 0.02 K/UL (ref 0–0.51)
EOSINOPHIL NFR BLD: 0.1 % (ref 0–6.9)
ERYTHROCYTE [DISTWIDTH] IN BLOOD BY AUTOMATED COUNT: 47.8 FL (ref 35.9–50)
GLUCOSE SERPL-MCNC: 99 MG/DL (ref 65–99)
HCT VFR BLD AUTO: 51.6 % (ref 37–47)
HGB BLD-MCNC: 16.1 G/DL (ref 12–16)
IMM GRANULOCYTES # BLD AUTO: 0.08 K/UL (ref 0–0.11)
IMM GRANULOCYTES NFR BLD AUTO: 0.6 % (ref 0–0.9)
INR PPP: 1.32 (ref 0.87–1.13)
LV EJECT FRACT MOD 2C 99903: 50.13
LV EJECT FRACT MOD 4C 99902: 59.35
LV EJECT FRACT MOD BP 99901: 54.74
LYMPHOCYTES # BLD AUTO: 2.03 K/UL (ref 1–4.8)
LYMPHOCYTES NFR BLD: 14.3 % (ref 22–41)
MAGNESIUM SERPL-MCNC: 2.4 MG/DL (ref 1.5–2.5)
MCH RBC QN AUTO: 30.1 PG (ref 27–33)
MCHC RBC AUTO-ENTMCNC: 31.2 G/DL (ref 33.6–35)
MCV RBC AUTO: 96.6 FL (ref 81.4–97.8)
MONOCYTES # BLD AUTO: 1.15 K/UL (ref 0–0.85)
MONOCYTES NFR BLD AUTO: 8.1 % (ref 0–13.4)
NEUTROPHILS # BLD AUTO: 10.86 K/UL (ref 2–7.15)
NEUTROPHILS NFR BLD: 76.6 % (ref 44–72)
NRBC # BLD AUTO: 0 K/UL
NRBC BLD-RTO: 0 /100 WBC
PLATELET # BLD AUTO: 161 K/UL (ref 164–446)
PMV BLD AUTO: 11.6 FL (ref 9–12.9)
POTASSIUM SERPL-SCNC: 4.5 MMOL/L (ref 3.6–5.5)
PROTHROMBIN TIME: 16.6 SEC (ref 12–14.6)
RBC # BLD AUTO: 5.34 M/UL (ref 4.2–5.4)
SODIUM SERPL-SCNC: 137 MMOL/L (ref 135–145)
WBC # BLD AUTO: 14.2 K/UL (ref 4.8–10.8)

## 2020-02-25 PROCEDURE — A9270 NON-COVERED ITEM OR SERVICE: HCPCS | Performed by: INTERNAL MEDICINE

## 2020-02-25 PROCEDURE — 83735 ASSAY OF MAGNESIUM: CPT

## 2020-02-25 PROCEDURE — 700101 HCHG RX REV CODE 250: Performed by: INTERNAL MEDICINE

## 2020-02-25 PROCEDURE — 80048 BASIC METABOLIC PNL TOTAL CA: CPT

## 2020-02-25 PROCEDURE — 85610 PROTHROMBIN TIME: CPT

## 2020-02-25 PROCEDURE — 770020 HCHG ROOM/CARE - TELE (206)

## 2020-02-25 PROCEDURE — 700102 HCHG RX REV CODE 250 W/ 637 OVERRIDE(OP): Performed by: INTERNAL MEDICINE

## 2020-02-25 PROCEDURE — 700111 HCHG RX REV CODE 636 W/ 250 OVERRIDE (IP): Performed by: INTERNAL MEDICINE

## 2020-02-25 PROCEDURE — 85025 COMPLETE CBC W/AUTO DIFF WBC: CPT

## 2020-02-25 PROCEDURE — 94760 N-INVAS EAR/PLS OXIMETRY 1: CPT

## 2020-02-25 PROCEDURE — 99233 SBSQ HOSP IP/OBS HIGH 50: CPT | Performed by: INTERNAL MEDICINE

## 2020-02-25 PROCEDURE — 99232 SBSQ HOSP IP/OBS MODERATE 35: CPT | Performed by: INTERNAL MEDICINE

## 2020-02-25 PROCEDURE — 93308 TTE F-UP OR LMTD: CPT | Mod: 26 | Performed by: INTERNAL MEDICINE

## 2020-02-25 PROCEDURE — 93308 TTE F-UP OR LMTD: CPT

## 2020-02-25 PROCEDURE — 94640 AIRWAY INHALATION TREATMENT: CPT

## 2020-02-25 RX ORDER — FUROSEMIDE 10 MG/ML
10 INJECTION INTRAMUSCULAR; INTRAVENOUS ONCE
Status: COMPLETED | OUTPATIENT
Start: 2020-02-25 | End: 2020-02-25

## 2020-02-25 RX ORDER — WARFARIN SODIUM 7.5 MG/1
7.5 TABLET ORAL
Status: COMPLETED | OUTPATIENT
Start: 2020-02-25 | End: 2020-02-25

## 2020-02-25 RX ORDER — PREDNISONE 20 MG/1
40 TABLET ORAL DAILY
Status: COMPLETED | OUTPATIENT
Start: 2020-02-25 | End: 2020-02-29

## 2020-02-25 RX ADMIN — ALBUTEROL SULFATE 2.5 MG: 2.5 SOLUTION RESPIRATORY (INHALATION) at 06:06

## 2020-02-25 RX ADMIN — PREDNISONE 40 MG: 20 TABLET ORAL at 09:58

## 2020-02-25 RX ADMIN — SIMVASTATIN 10 MG: 10 TABLET, FILM COATED ORAL at 17:43

## 2020-02-25 RX ADMIN — HYDROCODONE BITARTRATE AND ACETAMINOPHEN 1 TABLET: 10; 325 TABLET ORAL at 18:52

## 2020-02-25 RX ADMIN — METOPROLOL TARTRATE 100 MG: 50 TABLET, FILM COATED ORAL at 17:43

## 2020-02-25 RX ADMIN — ALBUTEROL SULFATE 2.5 MG: 2.5 SOLUTION RESPIRATORY (INHALATION) at 18:51

## 2020-02-25 RX ADMIN — WARFARIN SODIUM 7.5 MG: 7.5 TABLET ORAL at 17:43

## 2020-02-25 RX ADMIN — HYDROCODONE BITARTRATE AND ACETAMINOPHEN 1 TABLET: 10; 325 TABLET ORAL at 02:46

## 2020-02-25 RX ADMIN — DILTIAZEM HYDROCHLORIDE 120 MG: 30 TABLET, FILM COATED ORAL at 11:36

## 2020-02-25 RX ADMIN — ENOXAPARIN SODIUM 100 MG: 100 INJECTION SUBCUTANEOUS at 05:29

## 2020-02-25 RX ADMIN — Medication 400 MG: at 05:30

## 2020-02-25 RX ADMIN — DIGOXIN 125 MCG: 125 TABLET ORAL at 17:43

## 2020-02-25 RX ADMIN — HYDROCODONE BITARTRATE AND ACETAMINOPHEN 1 TABLET: 10; 325 TABLET ORAL at 06:50

## 2020-02-25 RX ADMIN — HYDROCODONE BITARTRATE AND ACETAMINOPHEN 1 TABLET: 10; 325 TABLET ORAL at 15:03

## 2020-02-25 RX ADMIN — ZOLPIDEM TARTRATE 10 MG: 5 TABLET ORAL at 20:23

## 2020-02-25 RX ADMIN — METOPROLOL TARTRATE 100 MG: 50 TABLET, FILM COATED ORAL at 05:29

## 2020-02-25 RX ADMIN — ENOXAPARIN SODIUM 100 MG: 100 INJECTION SUBCUTANEOUS at 17:43

## 2020-02-25 RX ADMIN — DILTIAZEM HYDROCHLORIDE 120 MG: 30 TABLET, FILM COATED ORAL at 17:43

## 2020-02-25 RX ADMIN — HYDROCODONE BITARTRATE AND ACETAMINOPHEN 1 TABLET: 10; 325 TABLET ORAL at 11:00

## 2020-02-25 RX ADMIN — IPRATROPIUM BROMIDE 0.5 MG: 0.5 SOLUTION RESPIRATORY (INHALATION) at 06:06

## 2020-02-25 RX ADMIN — ONDANSETRON 4 MG: 2 INJECTION INTRAMUSCULAR; INTRAVENOUS at 05:28

## 2020-02-25 RX ADMIN — ALBUTEROL SULFATE 2.5 MG: 2.5 SOLUTION RESPIRATORY (INHALATION) at 10:51

## 2020-02-25 RX ADMIN — DILTIAZEM HYDROCHLORIDE 120 MG: 30 TABLET, FILM COATED ORAL at 05:29

## 2020-02-25 RX ADMIN — ALBUTEROL SULFATE 2.5 MG: 2.5 SOLUTION RESPIRATORY (INHALATION) at 15:56

## 2020-02-25 RX ADMIN — FUROSEMIDE 10 MG: 10 INJECTION, SOLUTION INTRAMUSCULAR; INTRAVENOUS at 11:35

## 2020-02-25 ASSESSMENT — ENCOUNTER SYMPTOMS
GASTROINTESTINAL NEGATIVE: 1
PSYCHIATRIC NEGATIVE: 1
SHORTNESS OF BREATH: 1
EYES NEGATIVE: 1
WEAKNESS: 1
WHEEZING: 1
CARDIOVASCULAR NEGATIVE: 1
COUGH: 1
MUSCULOSKELETAL NEGATIVE: 1

## 2020-02-25 NOTE — FLOWSHEET NOTE
02/25/20 1556   Events/Summary/Plan   Events/Summary/Plan SVN given.   Vital Signs   Pulse 80   Respiration 20   Pulse Oximetry 95 %   $ Pulse Oximetry (Spot Check) Yes   Respiratory Assessment   Level of Consciousness Alert   Chest Exam   Work Of Breathing / Effort Mild   Breath Sounds   RUL Breath Sounds Expiratory Wheezes;Coarse Crackles   RML Breath Sounds Expiratory Wheezes;Diminished   RLL Breath Sounds Diminished   BRYAN Breath Sounds Expiratory Wheezes;Crackles   LLL Breath Sounds Diminished;Expiratory Wheezes   Oxygen   O2 (LPM) 3   O2 Delivery Device Silicone Nasal Cannula

## 2020-02-25 NOTE — PROGRESS NOTES
Received report and assumed care of pt. Pt is resting in bed with family at bedside. Pt is not complaining of any pain at this time. All needs met, safety measures in place.

## 2020-02-25 NOTE — CONSULTS
Cardiology Initial Consultation    Date of Service  2/24/2020    Referring Physician  Mary Carmen Rogers M.D.    Reason for Consultation  Atrial fibrillation    History of Presenting Illness  Rufina Macias is a 74 y.o. female with recent diagnosis of atrial fibrillation who presented 2/21/2020 with dyspnea.  In early February patient was admitted to the hospital with abdominal discomfort thought to be secondary to gallstones.  Patient was started on anticoagulation and diltiazem.  Patient was only home for couple of days when she started having dyspnea after she was exposed to her daughter who had URI-like symptoms.  Patient reports having dyspnea even at rest.  She tried using her inhalers without much improvement.  She denies any associated chest discomfort or palpitations.  She initially presented to urgent care where she was noted to be tachycardic and was sent to the ER.  She is currently in the ICU and reports that her dyspnea has not improved.  She denies palpitations but feels jittery and anxious.    Review of Systems  Review of Systems   Constitutional: Negative for malaise/fatigue.   Respiratory: Positive for cough and shortness of breath.    Cardiovascular: Positive for palpitations. Negative for chest pain, orthopnea, leg swelling and PND.   Gastrointestinal: Negative for abdominal pain.   Musculoskeletal: Negative for falls.   Neurological: Negative for dizziness and loss of consciousness.   Psychiatric/Behavioral: Negative for depression.   All other systems reviewed and are negative.      Past Medical History   has a past medical history of Allergy, Arthritis, Asthma, Back pain, Breath shortness, Bronchitis, CA - cancer of uterus, Cancer (formerly [facility]) (2010), Carpal tunnel syndrome, COPD, Diverticula of colon, Heart burn, High cholesterol, Hyperlipidemia, Hypertension, Pneumonia (1993), and Tremor, hereditary, benign. She also has no past medical history of Addisons disease (formerly [facility]), Adrenal disorder, Anemia,  Anxiety, Blood transfusion, Clotting disorder, Cushings syndrome, Depression, GERD (gastroesophageal reflux disease), Goiter, Headache(784.0), HIV (human immunodeficiency virus infection), IBD (inflammatory bowel disease), Meningitis, Migraine, OSTEOPOROSIS, Parathyroid disorder (HCC), Pituitary disease (HCC), Substance abuse (HCC), Ulcer, or Urinary tract infection, site not specified.    Surgical History   has a past surgical history that includes hysterectomy, total abdominal (1/18/10); open reduction; abdominal hysterectomy total (Jan 2010); oophorectomy (Jan 2010); ercp (2/15/2018); common bile duct exploration (02/15/2018); ercp w/ insertion stent/tube (02/15/2018); ercp w/sphincterotomy/papill. (02/15/2018); ercp w/removal calculus (02/15/2018); ercp (4/5/2018); ercp w/sphincterotomy/papill. (4/5/2018); ercp w/ insertion stent/tube (4/5/2018); ercp w/removal calculus (4/5/2018); pr ercp,diagnostic (2/14/2020); and aditi by laparoscopy (july, 2015).    Family History  family history includes Cancer in her father, paternal grandfather, and paternal grandmother; Heart Disease (age of onset: 45) in her father; Hypertension in her father; Lung Disease in her father; Stroke (age of onset: 70) in her father.    Social History   reports that she quit smoking about 29 years ago. She has never used smokeless tobacco. She reports that she does not drink alcohol or use drugs.    Home Medications   Prior to Admission Medications   Prescriptions Last Dose Informant Patient Reported? Taking?   Calcium Carbonate-Vit D-Min (CALCIUM 1200 PO) 2/21/2020 at 0900 Family Member Yes No   Sig: Take 1,200 mg by mouth every day.   DILTIAZem CD (CARDIZEM CD) 120 MG CAPSULE SR 24 HR 2/20/2020 at 0900  No No   Sig: Take 1 Cap by mouth every day.   FIBER PO 2/20/2020 at 0900 Family Member Yes No   Sig: Take 2 Caps by mouth every day.   Magnesium 400 MG Tab 2/20/2020 at 1000 Family Member Yes No   Sig: Take 400 mg by mouth every day.    amLODIPine (NORVASC) 5 MG Tab 2/21/2020 at 0900 Family Member No No   Sig: Take 1 Tab by mouth every day.   apixaban (ELIQUIS) 5mg Tab 2/21/2020 at 0900  No No   Sig: Take 1 Tab by mouth 2 Times a Day.   azithromycin (ZITHROMAX) 250 MG Tab 2/21/2020 at 0900  Yes Yes   Sig: Take 250 mg by mouth every day.   hydrocodone/acetaminophen (NORCO)  MG Tab 2/21/2020 at 0800 Family Member Yes No   Sig: TAKE ONE TABLET BY MOUTH FOUR TIMES A DAY AS NEEDED 30 DAY SUPPLY   ipratropium-albuterol (COMBIVENT RESPIMAT)  MCG/ACT Aero Soln 2/21/2020 at 1000 Family Member No No   Sig: Inhale 1 Puff by mouth every 6 hours as needed (shortness of breath).   losartan (COZAAR) 100 MG Tab 2/20/2020 at 1000 Family Member No No   Sig: Take 1 Tab by mouth every day.   metoprolol (LOPRESSOR) 100 MG Tab 2/20/2020 at 1000 Family Member No No   Sig: Take 1 Tab by mouth 2 times a day.   predniSONE (DELTASONE) 20 MG Tab  at not started  No No   Sig: Take 2 Tabs by mouth every day for 5 days.   simvastatin (ZOCOR) 20 MG Tab 2/20/2020 at not started Family Member No No   Sig: Take 1 Tab by mouth every evening.   zolpidem (AMBIEN) 10 MG Tab 2/20/2020 at 2130 Family Member Yes No   Sig: Take 10 mg by mouth at bedtime as needed for Sleep.      Facility-Administered Medications: None       Inpatient Medications  • enoxaparin  100 mg Q12HRS   • digoxin  250 mcg Q6HRS    Followed by   • [START ON 2/25/2020] digoxin  125 mcg DAILY AT 1800   • [START ON 2/25/2020] DILTIAZem CD  360 mg DAILY   • DILTIAZem CD  180 mg Q DAY   • MD Alert...Warfarin per Pharmacy   PHARMACY TO DOSE   • ipratropium  0.5 mg Q6HRS PRN   • metoprolol  100 mg TWICE DAILY   • HYDROcodone/acetaminophen  1 Tab Q4HRS PRN   • magnesium oxide  400 mg DAILY   • simvastatin  10 mg Q EVENING   • zolpidem  10 mg HS PRN   • senna-docusate  2 Tab BID    And   • polyethylene glycol/lytes  1 Packet QDAY PRN    And   • magnesium hydroxide  30 mL QDAY PRN    And   • bisacodyl  10 mg QDAY  PRN   • Respiratory Therapy Consult   Continuous RT   • acetaminophen  650 mg Q6HRS PRN   • enalaprilat  1.25 mg Q6HRS PRN   • ondansetron  4 mg Q4HRS PRN   • ondansetron  4 mg Q4HRS PRN   • albuterol  2.5 mg Q2HRS PRN (RT)       Allergies  Allergies   Allergen Reactions   • Codeine Swelling   • Ace Inhibitors      Cough       Vital signs in last 24 hours  Temp:  [36.2 °C (97.2 °F)-37.1 °C (98.7 °F)] 36.2 °C (97.2 °F)  Pulse:  [61-99] 68  Resp:  [15-48] 19  BP: (115-168)/(66-93) 140/88  SpO2:  [93 %-97 %] 94 %    Physical Exam  Physical Exam   Constitutional: She is oriented to person, place, and time and well-developed, well-nourished, and in no distress. No distress.   Speaking in 4-5 word sentences   HENT:   Head: Normocephalic and atraumatic.   Eyes: Conjunctivae are normal. No scleral icterus.   Neck: Normal range of motion. Neck supple. No JVD present.   Cardiovascular: Normal rate, regular rhythm and intact distal pulses. Exam reveals no gallop and no friction rub.   No murmur heard.  Pulmonary/Chest: Effort normal and breath sounds normal. No respiratory distress. She has no wheezes. She has no rales. She exhibits no tenderness.   Poor air movement throughout.   Abdominal: Soft. Bowel sounds are normal. She exhibits no distension. There is no abdominal tenderness.   Musculoskeletal: Normal range of motion.         General: No edema.   Neurological: She is alert and oriented to person, place, and time.   Skin: Skin is warm and dry. No rash noted. She is not diaphoretic.   Psychiatric: Mood, affect and judgment normal.   Nursing note and vitals reviewed.      Lab Review  Recent Labs     02/22/20  0152 02/23/20  1410 02/24/20  0430   WBC 15.0* 15.1* 13.6*   RBC 5.26 4.55 4.66   HEMOGLOBIN 15.4 13.5 13.7   HEMATOCRIT 47.9* 42.9 43.6   PLATELETCT 197 194 174   MCV 91.1 94.3 93.6   MPV 11.0 10.1 10.0     Recent Labs     02/22/20  0152 02/23/20  0943 02/24/20  0430   SODIUM 136 139 139   POTASSIUM 4.0 4.1 3.8    CHLORIDE 103 103 105   CO2 20 27 26   GLUCOSE 172* 116* 100*   BUN 10 12 9   CREATININE 0.58 0.62 0.63      Lab Results   Component Value Date/Time    TROPONINT 6 02/22/2020 10:11 AM       Lab Results   Component Value Date/Time    ASTSGOT 24 02/21/2020 06:37 PM    ALTSGPT 23 02/21/2020 06:37 PM     Lab Results   Component Value Date/Time    CHOLSTRLTOT 136 01/02/2020 01:15 PM    LDL 72 01/02/2020 01:15 PM    HDL 47 01/02/2020 01:15 PM    TRIGLYCERIDE 86 01/02/2020 01:15 PM       Labs reviewed as noted above.  Normal hemoglobin and creatinine.    Cardiac Imaging and Procedures Review  EKG performed 2/21/2020 at 1806 hrs. was personally reviewed and per my interpretation shows atrial fibrillation with rapid ventricular response, right bundle branch block.    Echocardiogram performed 2/11/2020 was personally reviewed and per my interpretation shows normal LV systolic function.  Mild RV dilatation.  Normal function.    Assessment/Plan    Paroxysmal atrial fibrillation:  Bilateral pulmonary embolism:  COPD exacerbation:    Patient continues to be in A. fib with RVR.  She is being treated for COPD exacerbation.  She was also found to have bilateral pulmonary embolism which is likely contributing to her RVR as well.  A limited echocardiogram will be ordered to evaluate her LV function and also for right heart strain.    Patient has received diltiazem  mg in 2 doses today.  Will increase dose to a total of 480 mg starting tomorrow but would recommend using short acting diltiazem for now as I suspect the patient may become bradycardic once her COPD exacerbation improves since she is on 3 AV jing blocking agents.  Continue metoprolol at current dose.  Patient has been started on digoxin load today and her rate appears to be improving.  If she fails to convert to sinus rhythm or if rate control cannot be achieved, we may need to consider a CHARLES guided cardioversion.  However given her current respiratory status I  would not recommend this procedure given the risk for decompensation in her pulmonary status.  Also her procedure success will be lower since she has active COPD exacerbation with pulmonary embolism.  We will tentatively plan for the procedure on Wednesday, 2/26/2020 depending on her respiratory status tomorrow.  She is on therapeutic Lovenox and is being bridged to Coumadin.      Thank you for allowing me to participate in the care of this patient. Please do not hesitate to contact me with any questions.    Falguni Richardson MD, [facility]  Cardiologist  [facility]

## 2020-02-25 NOTE — FLOWSHEET NOTE
02/25/20 1051   Events/Summary/Plan   Events/Summary/Plan SVN given.   Vital Signs   Pulse 78   Respiration (!) 22   Pulse Oximetry 94 %   $ Pulse Oximetry (Spot Check) Yes   Respiratory Assessment   Level of Consciousness Alert   Breath Sounds   RUL Breath Sounds Expiratory Wheezes;Crackles   RML Breath Sounds Expiratory Wheezes;Diminished;Crackles   RLL Breath Sounds Diminished;Expiratory Wheezes   BRYAN Breath Sounds Diminished;Expiratory Wheezes   LLL Breath Sounds Diminished   Oxygen   O2 (LPM) 2.5   O2 Delivery Device Silicone Nasal Cannula

## 2020-02-25 NOTE — PROGRESS NOTES
"Critical Care Progress Note    Date of admission  2/21/2020    Chief Complaint  74 y.o. female admitted 2/21/2020 with SOB    Hospital Course    Per Dr. Avila's note:  \"74 y.o. female who presented 2/21/2020 with shortness of breath.  Patient was just in the hospital, had new onset A. fib RVR, sent home on Eliquis which she states she has been taking.  She states she got out of the hospital on Sunday, on Wednesday she started experiencing shortness of breath.  Just before that she did have a sick contact, cold-like symptoms from her family.  She states she developed nonproductive cough.  She denied any chest pain.  Today she went to see her primary care provider where they noted tachycardia, patient was sent to the ER.  Imaging obtained here did show pulmonary embolism.  Patient does remain in atrial fibrillation with RVR\"     02/24: Diursed with lasix 10 mg IV. Loaded with digoxin and cardiology consulted.     Interval Problem Update  Reviewed last 24 hour events:  No acute issues overnight   Oxygen down to 1 liters NC  HR down to 50-90s this morning   Net negative 1.6 liters    Restarted scheduled atrovent QID  Patient state feeling about the same with minimum phlegm production.     Review of Systems  Review of Systems   Constitutional: Positive for malaise/fatigue.   HENT: Negative.    Eyes: Negative.    Respiratory: Positive for cough, shortness of breath and wheezing.    Cardiovascular: Negative.    Gastrointestinal: Negative.    Genitourinary: Negative.    Musculoskeletal: Negative.    Skin: Negative.    Neurological: Positive for weakness.   Endo/Heme/Allergies: Negative.    Psychiatric/Behavioral: Negative.         Vital Signs for last 24 hours   Temp:  [36.3 °C (97.4 °F)-36.5 °C (97.7 °F)] 36.3 °C (97.4 °F)  Pulse:  [] 93  Resp:  [15-48] 18  BP: (113-161)/(64-96) 127/78  SpO2:  [91 %-96 %] 91 %         Physical Exam   Physical Exam  Constitutional:       Appearance: She is ill-appearing.   HENT:      " Mouth/Throat:      Mouth: Mucous membranes are moist.   Eyes:      Extraocular Movements: Extraocular movements intact.      Pupils: Pupils are equal, round, and reactive to light.   Cardiovascular:      Rate and Rhythm: Tachycardia present. Rhythm irregular.   Pulmonary:      Effort: Pulmonary effort is normal.      Breath sounds: Wheezing present.   Abdominal:      General: Bowel sounds are normal. There is distension.      Palpations: Abdomen is soft.      Tenderness: There is no abdominal tenderness.   Musculoskeletal:         General: No swelling or tenderness.   Skin:     General: Skin is warm and dry.   Neurological:      General: No focal deficit present.      Mental Status: She is alert.   Psychiatric:         Mood and Affect: Mood normal.         Behavior: Behavior normal.         Thought Content: Thought content normal.         Judgment: Judgment normal.         Medications  Current Facility-Administered Medications   Medication Dose Route Frequency Provider Last Rate Last Dose   • albuterol (PROVENTIL) 2.5mg/0.5ml nebulizer solution 2.5 mg  2.5 mg Nebulization 4X/DAY (RT) Mary Carmen Rogers M.D.       • warfarin (COUMADIN) tablet 7.5 mg  7.5 mg Oral ONCE AT 1800 Mary Carmen Rogers M.D.       • predniSONE (DELTASONE) tablet 40 mg  40 mg Oral DAILY Mary Carmen Rogers M.D.       • enoxaparin (LOVENOX) inj 100 mg  100 mg Subcutaneous Q12HRS Mary Carmen Rogers M.D.   100 mg at 02/25/20 0529   • digoxin (LANOXIN) tablet 125 mcg  125 mcg Oral DAILY AT 1800 Mary Carmen Rogers M.D.       • DILTIAZem (CARDIZEM) tablet 120 mg  120 mg Oral Q6HRS Falguni Richardson M.D.   120 mg at 02/25/20 0529   • MD Alert...Warfarin per Pharmacy   Other PHARMACY TO DOSE Petros Patel M.D.       • ipratropium (ATROVENT) 0.02 % nebulizer solution 0.5 mg  0.5 mg Nebulization Q6HRS PRN Petros Patel M.D.   0.5 mg at 02/25/20 0606   • metoprolol (LOPRESSOR) tablet 100 mg  100 mg Oral TWICE DAILY Petros Patel M.D.   100 mg at 02/25/20 0529   •  HYDROcodone/acetaminophen (NORCO)  MG per tablet 1 Tab  1 Tab Oral Q4HRS PRN Parvez Houston D.O.   1 Tab at 02/25/20 0650   • magnesium oxide (MAG-OX) tablet 400 mg  400 mg Oral DAILY EMILY FelicianoORachna   400 mg at 02/25/20 0530   • simvastatin (ZOCOR) tablet 10 mg  10 mg Oral Q EVENING EMILY FelicianoO.   10 mg at 02/24/20 1748   • zolpidem (AMBIEN) tablet 10 mg  10 mg Oral HS PRN EMILY FelicianoO.   10 mg at 02/24/20 2152   • senna-docusate (PERICOLACE or SENOKOT S) 8.6-50 MG per tablet 2 Tab  2 Tab Oral BID Parvez Houston D.O.   Stopped at 02/24/20 0600    And   • polyethylene glycol/lytes (MIRALAX) PACKET 1 Packet  1 Packet Oral QDAY PRN Parvez Houston D.O.        And   • magnesium hydroxide (MILK OF MAGNESIA) suspension 30 mL  30 mL Oral QDAY PRN Parvez Houston D.O.        And   • bisacodyl (DULCOLAX) suppository 10 mg  10 mg Rectal QDAY PRN Parvez Houston D.O.       • Respiratory Therapy Consult   Nebulization Continuous RT Parvez Houston D.O.       • acetaminophen (TYLENOL) tablet 650 mg  650 mg Oral Q6HRS PRN Parvez Houston D.O.       • enalaprilat (VASOTEC) injection 1.25 mg  1.25 mg Intravenous Q6HRS PRN Parvez Houston D.O.       • ondansetron (ZOFRAN) syringe/vial injection 4 mg  4 mg Intravenous Q4HRS PRN EMILY FelicianoORachna   4 mg at 02/25/20 0528   • ondansetron (ZOFRAN ODT) dispertab 4 mg  4 mg Oral Q4HRS PRN Parvez Houston D.O.       • albuterol (PROVENTIL) 2.5mg/0.5ml nebulizer solution 2.5 mg  2.5 mg Nebulization Q2HRS PRN (RT) Parvez Houston D.O.   2.5 mg at 02/25/20 0606       Fluids    Intake/Output Summary (Last 24 hours) at 2/25/2020 0910  Last data filed at 2/25/2020 0730  Gross per 24 hour   Intake 520 ml   Output 2400 ml   Net -1880 ml       Laboratory          Recent Labs     02/23/20  0943 02/24/20  0430 02/24/20  1430 02/25/20  0400   SODIUM 139 139  --  137   POTASSIUM 4.1 3.8  --  4.5   CHLORIDE 103 105  --  100   CO2 27 26  --  24   BUN 12 9  --   8   CREATININE 0.62 0.63  --  0.54   MAGNESIUM  --   --  1.6  --    CALCIUM 8.4 8.2*  --  8.9     Recent Labs     02/23/20  0943 02/24/20  0430 02/25/20  0400   GLUCOSE 116* 100* 99     Recent Labs     02/23/20  1410 02/24/20  0430 02/25/20  0400   WBC 15.1* 13.6* 14.2*   NEUTSPOLYS 91.20* 77.00* 76.60*   LYMPHOCYTES 5.80* 14.10* 14.30*   MONOCYTES 2.30 8.20 8.10   EOSINOPHILS 0.10 0.10 0.10   BASOPHILS 0.10 0.10 0.30     Recent Labs     02/23/20  0330 02/23/20 0943 02/23/20 1410 02/24/20  0430 02/25/20  0400   RBC  --   --  4.55 4.66 5.34   HEMOGLOBIN  --   --  13.5 13.7 16.1*   HEMATOCRIT  --   --  42.9 43.6 51.6*   PLATELETCT  --   --  194 174 161*   PROTHROMBTM 14.4 13.7  --  14.3 16.6*   APTT 68.1* 96.1*  --  101.0*  --    INR 1.10 1.04  --  1.09 1.32*       Imaging  No new imaging     Assessment/Plan  * Bilateral pulmonary embolism (HCC)- (present on admission)  Assessment & Plan  Consider unprovoked   Cont lovenox with bridge to coumadin   INR 1.3 today       Atrial fibrillation with RVR (HCC)- (present on admission)  Assessment & Plan  Secondary to acute PE    On metoprololm dilt, and digoxin   HR improved to the 50-90s   Cardiology consulted; appreciate inputs   Switch heparin drip to lovenox  On coumadin for goal INR 2-3   Monitor on telemetry       Pulmonary nodule  Assessment & Plan  -- Considered intermediate pretest for malignancy given risk factors   -- Will need repeat CT chest in 6 months       Herpes zoster without complication  Assessment & Plan  Chronic on right butt cheek  Cover till crustates  Standard precautions    Hyperglycemia- (present on admission)  Assessment & Plan  hbA1c 5.7    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 with obstruction- ERCP EXTRACTON/ SPHINCTEROTOMY, recurrent Feb 2020- (present on admission)  Assessment & Plan  Had recent ERCP  Will follow up with GI    Essential hypertension- (present on admission)  Assessment & Plan  On metoprolol and diltiazem    Goal SBP  <140    COPD (chronic obstructive pulmonary disease) (HCC)- (present on admission)  Assessment & Plan  Mild wheezing despite negative 1.8 liters   Restart prednisone 40 mg X 5 days   Cont atrovent QID       Hyperlipidemia- (present on admission)  Assessment & Plan  On statin       VTE:  Lovenox and Coumadin  Ulcer: Not Indicated  Lines: PIVs    Stable to transfer to floor today if HR remains <110 on exertion.     I have performed a physical exam and reviewed and updated ROS and Plan today (2/25/2020). In review of yesterday's note (2/24/2020), there are no changes except as documented above.     Discussed patient condition and risk of morbidity and/or mortality with RN, RT, Pharmacy, Charge nurse / hot rounds and Patient

## 2020-02-25 NOTE — PROGRESS NOTES
[facility] Cardiology Follow-up Consult Note  Date of note:    2/25/2020          Patient ID:  Name:   Rufina Macias   YOB: 1945  Age:   74 y.o.  female   MRN:   [id number]    Chief Complaint   Patient presents with   • Rapid Heart Beat   • Other     feels something is happening in my chest  feels like possible broncitis   • Atrial Fibrillation   • Cough     has Hx of COPD        Interim Events:  Patient tolerating rate control well heart rates have been reasonable in the 90s on the higher dose calcium channel blockers and dig with metoprolol    She reports that her breathing is better still very short but less with ambulation and movement in the room      ROS  No NV, No Bleeding, No dizziness   All other review of systems reviewed and negative.    Past medical, surgical, social, and family history reviewed and unchanged from admission except as noted in assessment and plan.    Medications: Reviewed in MAR  Current Facility-Administered Medications   Medication Dose Frequency Provider Last Rate Last Dose   • albuterol (PROVENTIL) 2.5mg/0.5ml nebulizer solution 2.5 mg  2.5 mg 4X/DAY (RT) Mary Carmen Rogers M.D.   2.5 mg at 02/25/20 1051   • warfarin (COUMADIN) tablet 7.5 mg  7.5 mg ONCE AT 1800 Mary Carmen Rogers M.D.       • predniSONE (DELTASONE) tablet 40 mg  40 mg DAILY Mary Carmen Rogers M.D.   40 mg at 02/25/20 0958   • enoxaparin (LOVENOX) inj 100 mg  100 mg Q12HRS Mary Carmen Rogers M.D.   100 mg at 02/25/20 0529   • digoxin (LANOXIN) tablet 125 mcg  125 mcg DAILY AT 1800 Mary Carmen Rogers M.D.       • DILTIAZem (CARDIZEM) tablet 120 mg  120 mg Q6HRS Falguni Richardson M.D.   120 mg at 02/25/20 1136   • MD Alert...Warfarin per Pharmacy   PHARMACY TO DOSE Petros Patel M.D.       • ipratropium (ATROVENT) 0.02 % nebulizer solution 0.5 mg  0.5 mg Q6HRS PRN Petros Patel M.D.   0.5 mg at 02/25/20 0606   • metoprolol (LOPRESSOR) tablet 100 mg  100 mg TWICE DAILY Petros Patel M.D.   100 mg at 02/25/20 0529   •  "HYDROcodone/acetaminophen (NORCO)  MG per tablet 1 Tab  1 Tab Q4HRS PRN Parvez Houston D.O.   1 Tab at 02/25/20 1100   • magnesium oxide (MAG-OX) tablet 400 mg  400 mg DAILY EMILY FelicianoO.   400 mg at 02/25/20 0530   • simvastatin (ZOCOR) tablet 10 mg  10 mg Q EVENING EMILY FelicianoO.   10 mg at 02/24/20 1748   • zolpidem (AMBIEN) tablet 10 mg  10 mg HS PRN EMILY FelicianoO.   10 mg at 02/24/20 2152   • senna-docusate (PERICOLACE or SENOKOT S) 8.6-50 MG per tablet 2 Tab  2 Tab BID Parvez Houston D.O.   Stopped at 02/24/20 0600    And   • polyethylene glycol/lytes (MIRALAX) PACKET 1 Packet  1 Packet QDAY PRN Parvez Houston D.O.        And   • magnesium hydroxide (MILK OF MAGNESIA) suspension 30 mL  30 mL QDAY PRN Parvez Houston D.O.        And   • bisacodyl (DULCOLAX) suppository 10 mg  10 mg QDAY PRN Parvez Houston D.O.       • Respiratory Therapy Consult   Continuous RT Parvez Houston D.O.       • acetaminophen (TYLENOL) tablet 650 mg  650 mg Q6HRS PRN Parvez Houston D.O.       • enalaprilat (VASOTEC) injection 1.25 mg  1.25 mg Q6HRS PRN Parvez Houston D.O.       • ondansetron (ZOFRAN) syringe/vial injection 4 mg  4 mg Q4HRS PRN EMILY FelicianoO.   4 mg at 02/25/20 0528   • ondansetron (ZOFRAN ODT) dispertab 4 mg  4 mg Q4HRS PRN Parvez Houston D.O.       • albuterol (PROVENTIL) 2.5mg/0.5ml nebulizer solution 2.5 mg  2.5 mg Q2HRS PRN (RT) EMILY FelicianoORachna   2.5 mg at 02/25/20 0606   Last reviewed on 2/22/2020  9:26 PM by Ly T.B. Vu, M.D.    Allergies   Allergen Reactions   • Codeine Swelling   • Ace Inhibitors      Cough       Physical Exam  Body mass index is 36.64 kg/m². /58   Pulse 74   Temp 36.4 °C (97.5 °F) (Temporal)   Resp (!) 24   Ht 1.6 m (5' 2.99\")   Wt 93.8 kg (206 lb 12.7 oz)   SpO2 96%    Vitals:    02/25/20 1000 02/25/20 1051 02/25/20 1100 02/25/20 1200   BP: 116/70  109/72 113/58   Pulse: 66 78 74 74   Resp: 20 (!) 22 (!) 21 (!) 24   Temp:   "  36.4 °C (97.5 °F)   TempSrc:    Temporal   SpO2: 95% 94% 96% 96%   Weight:       Height:        Oxygen Therapy:  Pulse Oximetry: 96 %, O2 (LPM): 3, O2 Delivery Device: Silicone Nasal Cannula    General: no apparent distress  Eyes: nl conjunctiva  ENT: OP clear  Neck: no JVD   Lungs: normal respiratory effort, CTAB  Heart: Irregular no murmurs, no rubs or gallops,   EXT mild edema bilateral lower extremities. + pedal pulses. no cyanosis  Abdomen: soft, non tender, non distended,  Neurological: No focal deficits  Psychiatric: Appropriate affect,   Skin: Warm extremities    Labs (personally reviewed and notable for):   Recent Results (from the past 24 hour(s))   MAGNESIUM    Collection Time: 02/24/20  2:30 PM   Result Value Ref Range    Magnesium 1.6 1.5 - 2.5 mg/dL   PROTHROMBIN TIME    Collection Time: 02/25/20  4:00 AM   Result Value Ref Range    PT 16.6 (H) 12.0 - 14.6 sec    INR 1.32 (H) 0.87 - 1.13   CBC WITH DIFFERENTIAL    Collection Time: 02/25/20  4:00 AM   Result Value Ref Range    WBC 14.2 (H) 4.8 - 10.8 K/uL    RBC 5.34 4.20 - 5.40 M/uL    Hemoglobin 16.1 (H) 12.0 - 16.0 g/dL    Hematocrit 51.6 (H) 37.0 - 47.0 %    MCV 96.6 81.4 - 97.8 fL    MCH 30.1 27.0 - 33.0 pg    MCHC 31.2 (L) 33.6 - 35.0 g/dL    RDW 47.8 35.9 - 50.0 fL    Platelet Count 161 (L) 164 - 446 K/uL    MPV 11.6 9.0 - 12.9 fL    Neutrophils-Polys 76.60 (H) 44.00 - 72.00 %    Lymphocytes 14.30 (L) 22.00 - 41.00 %    Monocytes 8.10 0.00 - 13.40 %    Eosinophils 0.10 0.00 - 6.90 %    Basophils 0.30 0.00 - 1.80 %    Immature Granulocytes 0.60 0.00 - 0.90 %    Nucleated RBC 0.00 /100 WBC    Neutrophils (Absolute) 10.86 (H) 2.00 - 7.15 K/uL    Lymphs (Absolute) 2.03 1.00 - 4.80 K/uL    Monos (Absolute) 1.15 (H) 0.00 - 0.85 K/uL    Eos (Absolute) 0.02 0.00 - 0.51 K/uL    Baso (Absolute) 0.04 0.00 - 0.12 K/uL    Immature Granulocytes (abs) 0.08 0.00 - 0.11 K/uL    NRBC (Absolute) 0.00 K/uL   Basic Metabolic Panel    Collection Time: 02/25/20  4:00  AM   Result Value Ref Range    Sodium 137 135 - 145 mmol/L    Potassium 4.5 3.6 - 5.5 mmol/L    Chloride 100 96 - 112 mmol/L    Co2 24 20 - 33 mmol/L    Glucose 99 65 - 99 mg/dL    Bun 8 8 - 22 mg/dL    Creatinine 0.54 0.50 - 1.40 mg/dL    Calcium 8.9 8.4 - 10.2 mg/dL    Anion Gap 13.0 7.0 - 16.0   ESTIMATED GFR    Collection Time: 02/25/20  4:00 AM   Result Value Ref Range    GFR If African American >60 >60 mL/min/1.73 m 2    GFR If Non African American >60 >60 mL/min/1.73 m 2   MAGNESIUM    Collection Time: 02/25/20  5:45 AM   Result Value Ref Range    Magnesium 2.4 1.5 - 2.5 mg/dL       Cardiac Imaging and Procedures Review:    EKG and telemetry tracings personally reviewed      Telemetry shows rate controlled A. 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 with obstruction- ERCP EXTRACTON/ SPHINCTEROTOMY, recurrent Feb 2020 POA: Yes    Hyperglycemia POA: Yes    Adrenal nodule (HCC) POA: Yes    Herpes zoster without complication POA: Unknown    Pulmonary nodule POA: Unknown  Resolved Problems:    Leucocytosis POA: Yes    Atrial fibrillation RVR  Rate control seems to be adequate need to monitor for bradycardia arrhythmias as her clinical condition improves  If she has significant clinical improvement in respiratory status could consider cardioversion perhaps Friday outpatient    Cardiology will follow along    Future Appointments   Date Time Provider Department Center   3/10/2020  3:30 PM SHERRIE Howard. [facility] None   7/6/2020 10:40 AM Quincy Angel M.D. 25M [facility]       It is my pleasure to participate in the care of Ms. Macias.  Please do not hesitate to contact me with questions or  concerns.    Víctor Swain MD PhD [facility]  Cardiologist [facility]    2/25/2020    Please note that this dictation was created using voice recognition software. I have worked with consultants from the vendor as well as technical experts from [facility] to optimize the interface. I have made every reasonable attempt to correct obvious errors, but I expect that there are errors of grammar and possibly content I did not discover before finalizing the note.

## 2020-02-25 NOTE — PROGRESS NOTES
0700-Report from GILLIAN Alvarado. POC reviewed. Patient resting in bed at this time with no needs. C/o some shortness of breath with movement. Call light in place and patient in view of nursing station.

## 2020-02-25 NOTE — PROGRESS NOTES
Patient ambulated hallway with assistance of CNA. Tolerated well. Reports mild shortness of breath. HR remained a fib 80s-90s. Assisted back to bed and positioned for comfort.

## 2020-02-25 NOTE — PROGRESS NOTES
Inpatient Anticoagulation Service Note    Date: 2020  Reason for Anticoagulation: Atrial Fibrillation, New Pulmonary Embolism   [id number] VASc Score: 5  HAS-BLED Score: 1    Hemoglobin Value: (!) 16.1  Hematocrit Value: (!) 51.6  Lab Platelet Value: (!) 161  Target INR: 2.0 to 3.0    INR from last 7 days     Date/Time INR Value    20 0400  (!) 1.32    20 0430  1.09    20 0943  1.04    20 0330  1.1    20 0020  (!) 1.36        Dose from last 7 days     Date/Time Dose (mg)    20 0734  7.5    20 1020  7.5    20 0600  5    20 0922  5        Significant Interactions: Statin  Bridge Therapy: Yes  Date of Last VTE Event: 20  Bridge Therapy Start Date: 20  Days of Overlap Therapy: 4  INR Value Greater than 2 Prior to Discontinuation of Parenteral Anticoagulation: Yes     Comments: Established clinic patient    Plan:  Give 7.5 mg Warfarin today for INR of 1.32  Education Material Provided?: No  Pharmacist suggested discharge dosin-7.5 mg daily to be determined     Clarence Dougherty, Pharmacy Intern

## 2020-02-25 NOTE — FLOWSHEET NOTE
02/25/20 0606   Events/Summary/Plan   Events/Summary/Plan SVN given.   Vital Signs   Pulse 88   Respiration 20   Pulse Oximetry 94 %   $ Pulse Oximetry (Spot Check) Yes   Respiratory Assessment   Level of Consciousness Alert   Chest Exam   Work Of Breathing / Effort Mild   Breath Sounds   RUL Breath Sounds Expiratory Wheezes   RML Breath Sounds Expiratory Wheezes;Diminished   RLL Breath Sounds Diminished   BRYAN Breath Sounds Expiratory Wheezes;Diminished   LLL Breath Sounds Diminished   Secretions   Cough Congested;Non Productive;Strong   How Sputum Obtained Spontaneous   Oxygen   O2 (LPM) 2   O2 Delivery Device Silicone Nasal Cannula

## 2020-02-25 NOTE — FLOWSHEET NOTE
02/24/20 2014   Events/Summary/Plan   Events/Summary/Plan SVN   Vital Signs   Pulse 68   Respiration 19   Pulse Oximetry 94 %   $ Pulse Oximetry (Spot Check) Yes   O2 Alarms Set & Reviewed Yes   Respiratory Assessment   Level of Consciousness Alert   Breath Sounds   RUL Breath Sounds Expiratory Wheezes   RML Breath Sounds Expiratory Wheezes   RLL Breath Sounds Expiratory Wheezes   BRYAN Breath Sounds Expiratory Wheezes   LLL Breath Sounds Expiratory Wheezes   Secretions   Cough Moist;Non Productive   Oxygen   O2 (LPM) 2   O2 Delivery Device Silicone Nasal Cannula

## 2020-02-25 NOTE — CARE PLAN
Problem: Communication  Goal: The ability to communicate needs accurately and effectively will improve  Outcome: PROGRESSING AS EXPECTED  Patient will stay up to date on plan of care. Continuing with plan. Understands need to ambulate today and check HR and O2 status.      Problem: Safety  Goal: Will remain free from injury  Outcome: PROGRESSING AS EXPECTED   Patient calls appropriately when assistance needed.     Problem: Respiratory:  Goal: Respiratory status will improve  Outcome: PROGRESSING SLOWER THAN EXPECTED   Patient remains on 2L and expected to sat > 90%. Will titrate as able. Check O2 sat with ambulation today.

## 2020-02-26 LAB
ANION GAP SERPL CALC-SCNC: 7 MMOL/L (ref 7–16)
BASOPHILS # BLD AUTO: 0.2 % (ref 0–1.8)
BASOPHILS # BLD: 0.02 K/UL (ref 0–0.12)
BUN SERPL-MCNC: 17 MG/DL (ref 8–22)
CALCIUM SERPL-MCNC: 8.6 MG/DL (ref 8.4–10.2)
CHLORIDE SERPL-SCNC: 97 MMOL/L (ref 96–112)
CO2 SERPL-SCNC: 28 MMOL/L (ref 20–33)
CREAT SERPL-MCNC: 0.62 MG/DL (ref 0.5–1.4)
EOSINOPHIL # BLD AUTO: 0 K/UL (ref 0–0.51)
EOSINOPHIL NFR BLD: 0 % (ref 0–6.9)
ERYTHROCYTE [DISTWIDTH] IN BLOOD BY AUTOMATED COUNT: 45.1 FL (ref 35.9–50)
GLUCOSE SERPL-MCNC: 112 MG/DL (ref 65–99)
HCT VFR BLD AUTO: 46.3 % (ref 37–47)
HGB BLD-MCNC: 14.6 G/DL (ref 12–16)
IMM GRANULOCYTES # BLD AUTO: 0.07 K/UL (ref 0–0.11)
IMM GRANULOCYTES NFR BLD AUTO: 0.6 % (ref 0–0.9)
INR PPP: 1.85 (ref 0.87–1.13)
LYMPHOCYTES # BLD AUTO: 1.56 K/UL (ref 1–4.8)
LYMPHOCYTES NFR BLD: 13.1 % (ref 22–41)
MCH RBC QN AUTO: 29.3 PG (ref 27–33)
MCHC RBC AUTO-ENTMCNC: 31.5 G/DL (ref 33.6–35)
MCV RBC AUTO: 92.8 FL (ref 81.4–97.8)
MONOCYTES # BLD AUTO: 0.85 K/UL (ref 0–0.85)
MONOCYTES NFR BLD AUTO: 7.1 % (ref 0–13.4)
NEUTROPHILS # BLD AUTO: 9.43 K/UL (ref 2–7.15)
NEUTROPHILS NFR BLD: 79 % (ref 44–72)
NRBC # BLD AUTO: 0 K/UL
NRBC BLD-RTO: 0 /100 WBC
PLATELET # BLD AUTO: 163 K/UL (ref 164–446)
PMV BLD AUTO: 10.5 FL (ref 9–12.9)
POTASSIUM SERPL-SCNC: 4.5 MMOL/L (ref 3.6–5.5)
PROTHROMBIN TIME: 21.8 SEC (ref 12–14.6)
RBC # BLD AUTO: 4.99 M/UL (ref 4.2–5.4)
SODIUM SERPL-SCNC: 132 MMOL/L (ref 135–145)
WBC # BLD AUTO: 11.9 K/UL (ref 4.8–10.8)

## 2020-02-26 PROCEDURE — 700111 HCHG RX REV CODE 636 W/ 250 OVERRIDE (IP): Performed by: INTERNAL MEDICINE

## 2020-02-26 PROCEDURE — 700102 HCHG RX REV CODE 250 W/ 637 OVERRIDE(OP): Performed by: INTERNAL MEDICINE

## 2020-02-26 PROCEDURE — 99232 SBSQ HOSP IP/OBS MODERATE 35: CPT | Performed by: INTERNAL MEDICINE

## 2020-02-26 PROCEDURE — 85610 PROTHROMBIN TIME: CPT

## 2020-02-26 PROCEDURE — A9270 NON-COVERED ITEM OR SERVICE: HCPCS | Performed by: INTERNAL MEDICINE

## 2020-02-26 PROCEDURE — 99233 SBSQ HOSP IP/OBS HIGH 50: CPT | Performed by: INTERNAL MEDICINE

## 2020-02-26 PROCEDURE — 80048 BASIC METABOLIC PNL TOTAL CA: CPT

## 2020-02-26 PROCEDURE — 770020 HCHG ROOM/CARE - TELE (206)

## 2020-02-26 PROCEDURE — 94760 N-INVAS EAR/PLS OXIMETRY 1: CPT

## 2020-02-26 PROCEDURE — 85025 COMPLETE CBC W/AUTO DIFF WBC: CPT

## 2020-02-26 PROCEDURE — 94640 AIRWAY INHALATION TREATMENT: CPT

## 2020-02-26 PROCEDURE — 700101 HCHG RX REV CODE 250: Performed by: INTERNAL MEDICINE

## 2020-02-26 RX ORDER — WARFARIN SODIUM 7.5 MG/1
7.5 TABLET ORAL
Status: COMPLETED | OUTPATIENT
Start: 2020-02-26 | End: 2020-02-26

## 2020-02-26 RX ADMIN — HYDROCODONE BITARTRATE AND ACETAMINOPHEN 1 TABLET: 10; 325 TABLET ORAL at 16:53

## 2020-02-26 RX ADMIN — IPRATROPIUM BROMIDE 0.5 MG: 0.5 SOLUTION RESPIRATORY (INHALATION) at 14:25

## 2020-02-26 RX ADMIN — ZOLPIDEM TARTRATE 10 MG: 5 TABLET ORAL at 20:58

## 2020-02-26 RX ADMIN — WARFARIN SODIUM 7.5 MG: 7.5 TABLET ORAL at 17:32

## 2020-02-26 RX ADMIN — DILTIAZEM HYDROCHLORIDE 120 MG: 30 TABLET, FILM COATED ORAL at 05:31

## 2020-02-26 RX ADMIN — ENOXAPARIN SODIUM 100 MG: 100 INJECTION SUBCUTANEOUS at 17:31

## 2020-02-26 RX ADMIN — HYDROCODONE BITARTRATE AND ACETAMINOPHEN 1 TABLET: 10; 325 TABLET ORAL at 20:57

## 2020-02-26 RX ADMIN — ENOXAPARIN SODIUM 100 MG: 100 INJECTION SUBCUTANEOUS at 05:31

## 2020-02-26 RX ADMIN — IPRATROPIUM BROMIDE 0.5 MG: 0.5 SOLUTION RESPIRATORY (INHALATION) at 11:22

## 2020-02-26 RX ADMIN — HYDROCODONE BITARTRATE AND ACETAMINOPHEN 1 TABLET: 10; 325 TABLET ORAL at 04:42

## 2020-02-26 RX ADMIN — HYDROCODONE BITARTRATE AND ACETAMINOPHEN 1 TABLET: 10; 325 TABLET ORAL at 12:51

## 2020-02-26 RX ADMIN — HYDROCODONE BITARTRATE AND ACETAMINOPHEN 1 TABLET: 10; 325 TABLET ORAL at 08:42

## 2020-02-26 RX ADMIN — DIGOXIN 125 MCG: 125 TABLET ORAL at 17:32

## 2020-02-26 RX ADMIN — ALBUTEROL SULFATE 2.5 MG: 2.5 SOLUTION RESPIRATORY (INHALATION) at 07:42

## 2020-02-26 RX ADMIN — HYDROCODONE BITARTRATE AND ACETAMINOPHEN 1 TABLET: 10; 325 TABLET ORAL at 00:41

## 2020-02-26 RX ADMIN — DILTIAZEM HYDROCHLORIDE 120 MG: 30 TABLET, FILM COATED ORAL at 00:39

## 2020-02-26 RX ADMIN — IPRATROPIUM BROMIDE 0.5 MG: 0.5 SOLUTION RESPIRATORY (INHALATION) at 19:56

## 2020-02-26 RX ADMIN — PREDNISONE 40 MG: 20 TABLET ORAL at 05:31

## 2020-02-26 RX ADMIN — METOPROLOL TARTRATE 100 MG: 50 TABLET, FILM COATED ORAL at 17:31

## 2020-02-26 RX ADMIN — Medication 400 MG: at 05:31

## 2020-02-26 RX ADMIN — METOPROLOL TARTRATE 100 MG: 50 TABLET, FILM COATED ORAL at 05:31

## 2020-02-26 RX ADMIN — SIMVASTATIN 10 MG: 10 TABLET, FILM COATED ORAL at 17:32

## 2020-02-26 RX ADMIN — DILTIAZEM HYDROCHLORIDE 120 MG: 30 TABLET, FILM COATED ORAL at 12:04

## 2020-02-26 RX ADMIN — DILTIAZEM HYDROCHLORIDE 120 MG: 30 TABLET, FILM COATED ORAL at 17:31

## 2020-02-26 ASSESSMENT — ENCOUNTER SYMPTOMS
CARDIOVASCULAR NEGATIVE: 1
WHEEZING: 1
WEAKNESS: 1
GASTROINTESTINAL NEGATIVE: 1
EYES NEGATIVE: 1
PSYCHIATRIC NEGATIVE: 1
COUGH: 1
SHORTNESS OF BREATH: 1
MUSCULOSKELETAL NEGATIVE: 1

## 2020-02-26 NOTE — FLOWSHEET NOTE
02/26/20 1425   Events/Summary/Plan   Events/Summary/Plan SVN   Skin Inspection Respiratory Device Intact   Location ears   Vital Signs   Pulse 87   Respiration 18   Pulse Oximetry 89 %   $ Pulse Oximetry (Spot Check) Yes   Respiratory Assessment   Level of Consciousness Alert   Breath Sounds   RUL Breath Sounds Expiratory Wheezes   RML Breath Sounds Expiratory Wheezes   RLL Breath Sounds Fine Crackles;Expiratory Wheezes   BRYAN Breath Sounds Expiratory Wheezes   LLL Breath Sounds Fine Crackles;Expiratory Wheezes   Oxygen   O2 (LPM) 1   O2 Delivery Device Silicone Nasal Cannula

## 2020-02-26 NOTE — PROGRESS NOTES
Report from Deja RN POC and orders discussed, Pt a/o x4 sitting up in bed on O2 2liters. Pt denies SOB at this time. Chronic pain pills used pt requesting but not time. Assessment completed. Lungs clear with expiratory wheezing noted bilateral. Pt states she feels better at this time and tolerating breakfast

## 2020-02-26 NOTE — PROGRESS NOTES
CC:   Chief Complaint   Patient presents with   • Rapid Heart Beat   • Other     feels something is happening in my chest  feels like possible broncitis   • Atrial Fibrillation   • Cough     has Hx of COPD        HPI:    74 year old woman presenting with shortness of breath consult for atrial fibrillation with rapid ventricular rate setting of pulmonary embolism, COPD exacerbation, and choledocalithiasis.    Feeling much better today. States walked hallway yesterday without symptoms.    Medications / Drug list prior to admission:  No current facility-administered medications on file prior to encounter.      Current Outpatient Medications on File Prior to Encounter   Medication Sig Dispense Refill   • azithromycin (ZITHROMAX) 250 MG Tab Take 250 mg by mouth every day.     • predniSONE (DELTASONE) 20 MG Tab Take 2 Tabs by mouth every day for 5 days. 10 Tab 0   • apixaban (ELIQUIS) 5mg Tab Take 1 Tab by mouth 2 Times a Day. 60 Tab 1   • DILTIAZem CD (CARDIZEM CD) 120 MG CAPSULE SR 24 HR Take 1 Cap by mouth every day. 30 Cap 1   • zolpidem (AMBIEN) 10 MG Tab Take 10 mg by mouth at bedtime as needed for Sleep.     • simvastatin (ZOCOR) 20 MG Tab Take 1 Tab by mouth every evening. 90 Tab 4   • amLODIPine (NORVASC) 5 MG Tab Take 1 Tab by mouth every day. 90 Tab 4   • losartan (COZAAR) 100 MG Tab Take 1 Tab by mouth every day. 90 Tab 4   • ipratropium-albuterol (COMBIVENT RESPIMAT)  MCG/ACT Aero Soln Inhale 1 Puff by mouth every 6 hours as needed (shortness of breath). 1 Inhaler 5   • metoprolol (LOPRESSOR) 100 MG Tab Take 1 Tab by mouth 2 times a day. 180 Tab 4   • Calcium Carbonate-Vit D-Min (CALCIUM 1200 PO) Take 1,200 mg by mouth every day.     • Magnesium 400 MG Tab Take 400 mg by mouth every day.     • FIBER PO Take 2 Caps by mouth every day.     • hydrocodone/acetaminophen (NORCO)  MG Tab TAKE ONE TABLET BY MOUTH FOUR TIMES A DAY AS NEEDED 30 DAY SUPPLY  0       Current list of administered  Medications:    Current Facility-Administered Medications:   •  albuterol (PROVENTIL) 2.5mg/0.5ml nebulizer solution 2.5 mg, 2.5 mg, Nebulization, 4X/DAY (RT), Mary Carmen Rogers M.D., 2.5 mg at 02/26/20 0742  •  predniSONE (DELTASONE) tablet 40 mg, 40 mg, Oral, DAILY, Mary Carmen Rogers M.D., 40 mg at 02/26/20 0531  •  enoxaparin (LOVENOX) inj 100 mg, 100 mg, Subcutaneous, Q12HRS, Mary Carmen Rogers M.D., 100 mg at 02/26/20 0531  •  [COMPLETED] digoxin (LANOXIN) injection 500 mcg, 500 mcg, Intravenous, Once, 500 mcg at 02/24/20 1117 **FOLLOWED BY** [COMPLETED] digoxin (LANOXIN) injection 250 mcg, 250 mcg, Intravenous, Q6HRS, 250 mcg at 02/24/20 2346 **FOLLOWED BY** digoxin (LANOXIN) tablet 125 mcg, 125 mcg, Oral, DAILY AT 1800, Mary Carmen Rogers M.D., 125 mcg at 02/25/20 1743  •  DILTIAZem (CARDIZEM) tablet 120 mg, 120 mg, Oral, Q6HRS, Falguni Richardson M.D., 120 mg at 02/26/20 0531  •  MD Alert...Warfarin per Pharmacy, , Other, PHARMACY TO DOSE, Petros Patel M.D.  •  ipratropium (ATROVENT) 0.02 % nebulizer solution 0.5 mg, 0.5 mg, Nebulization, Q6HRS PRN, Petros Patel M.D., 0.5 mg at 02/25/20 0606  •  metoprolol (LOPRESSOR) tablet 100 mg, 100 mg, Oral, TWICE DAILY, Petros Patel M.D., 100 mg at 02/26/20 0531  •  HYDROcodone/acetaminophen (NORCO)  MG per tablet 1 Tab, 1 Tab, Oral, Q4HRS PRN, Parvez Houston D.O., 1 Tab at 02/26/20 0442  •  magnesium oxide (MAG-OX) tablet 400 mg, 400 mg, Oral, DAILY, Parvez Houston D.O., 400 mg at 02/26/20 0531  •  simvastatin (ZOCOR) tablet 10 mg, 10 mg, Oral, Q EVENING, Parvez Houston D.O., 10 mg at 02/25/20 1743  •  zolpidem (AMBIEN) tablet 10 mg, 10 mg, Oral, HS PRN, Parvez Houston D.O., 10 mg at 02/25/20 2023  •  senna-docusate (PERICOLACE or SENOKOT S) 8.6-50 MG per tablet 2 Tab, 2 Tab, Oral, BID, Stopped at 02/24/20 0600 **AND** polyethylene glycol/lytes (MIRALAX) PACKET 1 Packet, 1 Packet, Oral, QDAY PRN **AND** magnesium hydroxide (MILK OF MAGNESIA) suspension 30 mL,  30 mL, Oral, QDAY PRN **AND** bisacodyl (DULCOLAX) suppository 10 mg, 10 mg, Rectal, QDAY PRN, Parvez Houston D.O.  •  Respiratory Therapy Consult, , Nebulization, Continuous RT, Parvez Houston D.O.  •  acetaminophen (TYLENOL) tablet 650 mg, 650 mg, Oral, Q6HRS PRN, Parvez oHuston D.O.  •  enalaprilat (VASOTEC) injection 1.25 mg, 1.25 mg, Intravenous, Q6HRS PRN, Parvez Houston D.O.  •  ondansetron (ZOFRAN) syringe/vial injection 4 mg, 4 mg, Intravenous, Q4HRS PRN, EMILY FelicianoO., 4 mg at 02/25/20 0528  •  ondansetron (ZOFRAN ODT) dispertab 4 mg, 4 mg, Oral, Q4HRS PRN, EMILY FelicianoO.  •  albuterol (PROVENTIL) 2.5mg/0.5ml nebulizer solution 2.5 mg, 2.5 mg, Nebulization, Q2HRS PRN (RT), EMILY FelicianoORachna, 2.5 mg at 02/25/20 0606    Past Medical History:   Diagnosis Date   • Allergy    • Arthritis     Spine, neck, hands, ankles and knees   • Asthma     inhalers as needed   • Back pain     and neck   • Breath shortness     at times   • Bronchitis    • CA - cancer of uterus    • Cancer (HCC) 2010    uterine   • Carpal tunnel syndrome    • COPD    • Diverticula of colon    • Heart burn    • High cholesterol    • Hyperlipidemia    • Hypertension    • Pneumonia 1993   • Tremor, hereditary, benign        Past Surgical History:   Procedure Laterality Date   • PB ERCP,DIAGNOSTIC  2/14/2020    Procedure: ERCP (ENDOSCOPIC RETROGRADE CHOLANGIOPANCREATOGRAPHY);  Surgeon: Clinton Ray M.D.;  Location: [facility];  Service: Gastroenterology   • ERCP  4/5/2018    Procedure: ERCP W/POSS BIOPSY;  Surgeon: Quincy Louie M.D.;  Location: [facility];  Service: Gastroenterology   • ERCP W/SPHINCTEROTOMY/PAPILL.  4/5/2018    Procedure: ERCP W/SPHINCTEROTOMY/PAPILL.;  Surgeon: Quincy Louie M.D.;  Location: [facility];  Service: Gastroenterology   • ERCP W/ INSERTION STENT/TUBE  4/5/2018    Procedure: ERCP W/ INSERTION STENT/TUBE - STENT PLACEMENT/REMOVAL;   Surgeon: Quincy Louie M.D.;  Location: [facility];  Service: Gastroenterology   • ERCP W/REMOVAL CALCULUS  2018    Procedure: ERCP W/REMOVAL CALCULUS W/DILATION;  Surgeon: Quincy Louie M.D.;  Location: [facility];  Service: Gastroenterology   • ERCP  2/15/2018    Procedure: ERCP, SPHINCTEROTOMY, STENT PLACEMENT, DEBRIDEMENT;  Surgeon: Quincy Louie M.D.;  Location: [facility];  Service: Gastroenterology   • COMMON BILE DUCT EXPLORATION  02/15/2018   • ERCP W/ INSERTION STENT/TUBE  02/15/2018   • ERCP W/SPHINCTEROTOMY/PAPILL.  02/15/2018   • ERCP W/REMOVAL CALCULUS  02/15/2018   • ARIELLA BY LAPAROSCOPY      [facility]   • HYSTERECTOMY, TOTAL ABDOMINAL  1/18/10    Uterine, Dr. Whelan and Dr. Cam +BSO   • ABDOMINAL HYSTERECTOMY TOTAL  2010    Dr. Cam   • OOPHORECTOMY  2010    BSO   • OPEN REDUCTION      ankle       Family History   Problem Relation Age of Onset   • Heart Disease Father 45        MI   • Lung Disease Father    • Stroke Father 70   • Cancer Father    • Hypertension Father    • Cancer Paternal Grandmother         breast   • Cancer Paternal Grandfather      Patient family history was personally reviewed, no pertinent family history to current presentation    Social History     Socioeconomic History   • Marital status:      Spouse name: Not on file   • Number of children: Not on file   • Years of education: Not on file   • Highest education level: Not on file   Occupational History   • Not on file   Social Needs   • Financial resource strain: Not on file   • Food insecurity     Worry: Not on file     Inability: Not on file   • Transportation needs     Medical: Not on file     Non-medical: Not on file   Tobacco Use   • Smoking status: Former Smoker     Last attempt to quit: 1991     Years since quittin.1   • Smokeless tobacco: Never Used   Substance and Sexual Activity   • Alcohol use: No     Alcohol/week: 0.0 - 2.4  oz     Comment: hardly ever   • Drug use: No   • Sexual activity: Never     Partners: Male     Birth control/protection: Post-Menopausal   Lifestyle   • Physical activity     Days per week: Not on file     Minutes per session: Not on file   • Stress: Not on file   Relationships   • Social connections     Talks on phone: Not on file     Gets together: Not on file     Attends Congregational service: Not on file     Active member of club or organization: Not on file     Attends meetings of clubs or organizations: Not on file     Relationship status: Not on file   • Intimate partner violence     Fear of current or ex partner: Not on file     Emotionally abused: Not on file     Physically abused: Not on file     Forced sexual activity: Not on file   Other Topics Concern   • Not on file   Social History Narrative   • Not on file       ALLERGIES:  Allergies   Allergen Reactions   • Codeine Swelling   • Ace Inhibitors      Cough       Review of systems:  A complete review of symptoms was reviewed with patient. This is reviewed in H&P and PMH. ALL OTHERS reviewed and negative    Physical exam:  Patient Vitals for the past 24 hrs:   BP Temp Temp src Pulse Resp SpO2 Weight   02/26/20 0742 -- -- -- -- 18 96 % --   02/26/20 0730 -- 35.9 °C (96.7 °F) Temporal -- -- -- --   02/26/20 0530 143/91 -- -- -- -- -- --   02/26/20 0417 117/78 36.1 °C (97 °F) Temporal -- 15 99 % 94.5 kg (208 lb 5.4 oz)   02/26/20 0000 124/78 35.9 °C (96.7 °F) Temporal 60 14 97 % --   02/25/20 2021 121/64 35.9 °C (96.7 °F) Temporal 69 16 94 % --   02/25/20 2000 -- -- Temporal -- -- -- --   02/25/20 1851 -- -- -- 66 -- 95 % --   02/25/20 1800 131/69 -- -- 100 -- 95 % --   02/25/20 1600 131/69 36.2 °C (97.2 °F) Temporal 77 18 96 % --   02/25/20 1556 -- -- -- 80 20 95 % --   02/25/20 1200 113/58 36.4 °C (97.5 °F) Temporal 74 (!) 24 96 % --   02/25/20 1100 109/72 -- -- 74 (!) 21 96 % --   02/25/20 1051 -- -- -- 78 (!) 22 94 % --   02/25/20 1000 116/70 -- -- 66 20 95  % --   02/25/20 0900 134/74 -- -- (!) 55 17 94 % --     General: No acute distress.   EYES: no jaundice  HEENT: OP clear   Neck: No bruits No JVD.   CVS:  irreg. S1 + S2. No M/R/G. No edema.  Resp: CTAB. No wheezing or crackles/rhonchi.  Abdomen: Soft, NT, ND,  Skin: Grossly nothing acute no obvious rashes  Neurological: Alert, Moves all extremities, no cranial nerve defects on limited exam  Extremities: Pulse 2+ in b/l LE. No cyanosis.     Data:  Laboratory studies personally reviewed by me:  Recent Results (from the past 24 hour(s))   EC-ECHOCARDIOGRAM LTD W/O CONT    Collection Time: 02/25/20  8:27 AM   Result Value Ref Range    Eject.Frac. MOD BP 54.74     Eject.Frac. MOD 4C 59.35     Eject.Frac. MOD 2C 50.13    PROTHROMBIN TIME    Collection Time: 02/26/20  4:30 AM   Result Value Ref Range    PT 21.8 (H) 12.0 - 14.6 sec    INR 1.85 (H) 0.87 - 1.13   CBC WITH DIFFERENTIAL    Collection Time: 02/26/20  4:30 AM   Result Value Ref Range    WBC 11.9 (H) 4.8 - 10.8 K/uL    RBC 4.99 4.20 - 5.40 M/uL    Hemoglobin 14.6 12.0 - 16.0 g/dL    Hematocrit 46.3 37.0 - 47.0 %    MCV 92.8 81.4 - 97.8 fL    MCH 29.3 27.0 - 33.0 pg    MCHC 31.5 (L) 33.6 - 35.0 g/dL    RDW 45.1 35.9 - 50.0 fL    Platelet Count 163 (L) 164 - 446 K/uL    MPV 10.5 9.0 - 12.9 fL    Neutrophils-Polys 79.00 (H) 44.00 - 72.00 %    Lymphocytes 13.10 (L) 22.00 - 41.00 %    Monocytes 7.10 0.00 - 13.40 %    Eosinophils 0.00 0.00 - 6.90 %    Basophils 0.20 0.00 - 1.80 %    Immature Granulocytes 0.60 0.00 - 0.90 %    Nucleated RBC 0.00 /100 WBC    Neutrophils (Absolute) 9.43 (H) 2.00 - 7.15 K/uL    Lymphs (Absolute) 1.56 1.00 - 4.80 K/uL    Monos (Absolute) 0.85 0.00 - 0.85 K/uL    Eos (Absolute) 0.00 0.00 - 0.51 K/uL    Baso (Absolute) 0.02 0.00 - 0.12 K/uL    Immature Granulocytes (abs) 0.07 0.00 - 0.11 K/uL    NRBC (Absolute) 0.00 K/uL   Basic Metabolic Panel    Collection Time: 02/26/20  4:30 AM   Result Value Ref Range    Sodium 132 (L) 135 - 145 mmol/L     Potassium 4.5 3.6 - 5.5 mmol/L    Chloride 97 96 - 112 mmol/L    Co2 28 20 - 33 mmol/L    Glucose 112 (H) 65 - 99 mg/dL    Bun 17 8 - 22 mg/dL    Creatinine 0.62 0.50 - 1.40 mg/dL    Calcium 8.6 8.4 - 10.2 mg/dL    Anion Gap 7.0 7.0 - 16.0   ESTIMATED GFR    Collection Time: 02/26/20  4:30 AM   Result Value Ref Range    GFR If African American >60 >60 mL/min/1.73 m 2    GFR If Non African American >60 >60 mL/min/1.73 m 2       Tele: AF rates <110    TTE 2/25/20  CONCLUSIONS  Limited Exam for LV and RV Function  Compared to the images of the prior study done 2/11/20 -  there has   been no significant change.   Left ventricular systolic function is normal.  Left ventricular ejection fraction is visually estimated to be 55%.  Right ventricular systolic function is normal.      Principal Problem:    Bilateral pulmonary embolism (HCC) POA: Yes  Active Problems:    Atrial fibrillation with RVR (HCC) POA: Yes    Hyperlipidemia POA: Yes    COPD (chronic obstructive pulmonary disease) (HCC) POA: Yes    Essential hypertension POA: Yes    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 with obstruction- ERCP EXTRACTON/ SPHINCTEROTOMY, recurrent Feb 2020 POA: Yes    Hyperglycemia POA: Yes    Adrenal nodule (HCC) POA: Yes    Herpes zoster without complication POA: Unknown    Pulmonary nodule POA: Unknown  Resolved Problems:    Leucocytosis POA: Yes      Assessment / Plan:    74 year old woman presenting with shortness of breath consult for atrial fibrillation with rapid ventricular rate setting of pulmonary embolism, COPD exacerbation, and choledocalithiasis.    -continue systemic AC; transition to coumadin goal INR 2  -continue rate control  -can follow up outpatient cardiology for possible CHARLES/DCCV  -please call back with further cardiac questions    I personally discussed her case with Dr Rogers    It is my pleasure to participate in the care of Ms. Macias.  Please do not hesitate to contact me with questions or  concerns.    Guillaume Diez MD  Cardiologist [facility]

## 2020-02-26 NOTE — PROGRESS NOTES
Received report and assumed care of pt. Pt is alert and oriented, resting in bed with family at bedside. Pt just received pain medication, reporting minimal pain at this time. Pt is on telemetry monitoring. Safety measures in place. All needs met at this time.

## 2020-02-26 NOTE — PROGRESS NOTES
Pt up ambulating chong with assistance Pt desats to 88% on room air ambulating placed on O2 2liters.

## 2020-02-26 NOTE — PROGRESS NOTES
"Critical Care Progress Note    Date of admission  2/21/2020    Chief Complaint  74 y.o. female admitted 2/21/2020 with SOB    Hospital Course    Per Dr. Avlia's note:  \"74 y.o. female who presented 2/21/2020 with shortness of breath.  Patient was just in the hospital, had new onset A. fib RVR, sent home on Eliquis which she states she has been taking.  She states she got out of the hospital on Sunday, on Wednesday she started experiencing shortness of breath.  Just before that she did have a sick contact, cold-like symptoms from her family.  She states she developed nonproductive cough.  She denied any chest pain.  Today she went to see her primary care provider where they noted tachycardia, patient was sent to the ER.  Imaging obtained here did show pulmonary embolism.  Patient does remain in atrial fibrillation with RVR\"     02/24: Diursed with lasix 10 mg IV. Loaded with digoxin and cardiology consulted.     02/25: HR down to 50-90s. Diuresed with additional lasix 10 mg IV. Walked with PT and HR remains below 100.     Interval Problem Update  Reviewed last 24 hour events:  On 2 liters NC  Walked with PT yesterday without increase in HR   Cardiology evaluated patient and recommended no plan for inpatient CHARLES/DCC  Patient state feeling better with minimum cough    Review of Systems  Review of Systems   Constitutional: Positive for malaise/fatigue.   HENT: Negative.    Eyes: Negative.    Respiratory: Positive for cough, shortness of breath and wheezing.    Cardiovascular: Negative.    Gastrointestinal: Negative.    Genitourinary: Negative.    Musculoskeletal: Negative.    Skin: Negative.    Neurological: Positive for weakness.   Endo/Heme/Allergies: Negative.    Psychiatric/Behavioral: Negative.         Vital Signs for last 24 hours   Temp:  [35.9 °C (96.7 °F)-36.4 °C (97.5 °F)] 35.9 °C (96.7 °F)  Pulse:  [] 60  Resp:  [14-24] 18  BP: (109-143)/(58-91) 143/91  SpO2:  [93 %-99 %] 93 %         Physical Exam "   Physical Exam  Constitutional:       Appearance: She is ill-appearing.   HENT:      Mouth/Throat:      Mouth: Mucous membranes are moist.   Eyes:      Extraocular Movements: Extraocular movements intact.      Pupils: Pupils are equal, round, and reactive to light.   Cardiovascular:      Rate and Rhythm: Rhythm irregular.      Pulses: Normal pulses.      Heart sounds: Normal heart sounds.   Pulmonary:      Effort: Pulmonary effort is normal.   Abdominal:      General: Bowel sounds are normal. There is distension.      Palpations: Abdomen is soft.      Tenderness: There is no abdominal tenderness.   Musculoskeletal:         General: No swelling or tenderness.   Skin:     General: Skin is warm and dry.   Neurological:      General: No focal deficit present.      Mental Status: She is alert.   Psychiatric:         Mood and Affect: Mood normal.         Behavior: Behavior normal.         Thought Content: Thought content normal.         Judgment: Judgment normal.         Medications  Current Facility-Administered Medications   Medication Dose Route Frequency Provider Last Rate Last Dose   • albuterol (PROVENTIL) 2.5mg/0.5ml nebulizer solution 2.5 mg  2.5 mg Nebulization 4X/DAY (RT) Mary Carmen Rogers M.D.   2.5 mg at 02/26/20 0742   • predniSONE (DELTASONE) tablet 40 mg  40 mg Oral DAILY Mary Carmen Rogers M.D.   40 mg at 02/26/20 0531   • enoxaparin (LOVENOX) inj 100 mg  100 mg Subcutaneous Q12HRS Mary Carmen Rogers M.D.   100 mg at 02/26/20 0531   • digoxin (LANOXIN) tablet 125 mcg  125 mcg Oral DAILY AT 1800 Mary Carmen Rogers M.D.   125 mcg at 02/25/20 1743   • DILTIAZem (CARDIZEM) tablet 120 mg  120 mg Oral Q6HRS Falguni Richardson M.D.   120 mg at 02/26/20 0531   • MD Alert...Warfarin per Pharmacy   Other PHARMACY TO DOSE Petros Patel M.D.       • ipratropium (ATROVENT) 0.02 % nebulizer solution 0.5 mg  0.5 mg Nebulization Q6HRS PRN Petros Patel M.D.   0.5 mg at 02/25/20 0606   • metoprolol (LOPRESSOR) tablet 100 mg  100 mg Oral  TWICE DAILY Petros Patel M.D.   100 mg at 02/26/20 0531   • HYDROcodone/acetaminophen (NORCO)  MG per tablet 1 Tab  1 Tab Oral Q4HRS PRN Parvez Houston D.O.   1 Tab at 02/26/20 0842   • magnesium oxide (MAG-OX) tablet 400 mg  400 mg Oral DAILY EMILY FelicianoORachna   400 mg at 02/26/20 0531   • simvastatin (ZOCOR) tablet 10 mg  10 mg Oral Q EVENING EMILY FelicianoO.   10 mg at 02/25/20 1743   • zolpidem (AMBIEN) tablet 10 mg  10 mg Oral HS PRN EMILY FelicianoORachna   10 mg at 02/25/20 2023   • senna-docusate (PERICOLACE or SENOKOT S) 8.6-50 MG per tablet 2 Tab  2 Tab Oral BID Parvez Houston D.O.   Stopped at 02/24/20 0600    And   • polyethylene glycol/lytes (MIRALAX) PACKET 1 Packet  1 Packet Oral QDAY PRN Parvez Houston D.O.        And   • magnesium hydroxide (MILK OF MAGNESIA) suspension 30 mL  30 mL Oral QDAY PRN Parvez Houston D.O.        And   • bisacodyl (DULCOLAX) suppository 10 mg  10 mg Rectal QDAY PRN Parvez Houston D.O.       • Respiratory Therapy Consult   Nebulization Continuous RT Parvez Houston D.O.       • acetaminophen (TYLENOL) tablet 650 mg  650 mg Oral Q6HRS PRN Parvez Hosuton D.O.       • enalaprilat (VASOTEC) injection 1.25 mg  1.25 mg Intravenous Q6HRS PRN Parvez Houston D.O.       • ondansetron (ZOFRAN) syringe/vial injection 4 mg  4 mg Intravenous Q4HRS PRN EMILY FelicianoORachna   4 mg at 02/25/20 0528   • ondansetron (ZOFRAN ODT) dispertab 4 mg  4 mg Oral Q4HRS PRN EMILY FelicianoO.       • albuterol (PROVENTIL) 2.5mg/0.5ml nebulizer solution 2.5 mg  2.5 mg Nebulization Q2HRS PRN (RT) Parvez Houston D.O.   2.5 mg at 02/25/20 0606       Fluids    Intake/Output Summary (Last 24 hours) at 2/26/2020 0907  Last data filed at 2/25/2020 1800  Gross per 24 hour   Intake 320 ml   Output 200 ml   Net 120 ml       Laboratory          Recent Labs     02/24/20  0430 02/24/20  1430 02/25/20  0400 02/25/20  0545 02/26/20  0430   SODIUM 139  --  137  --  132*    POTASSIUM 3.8  --  4.5  --  4.5   CHLORIDE 105  --  100  --  97   CO2 26  --  24  --  28   BUN 9  --  8  --  17   CREATININE 0.63  --  0.54  --  0.62   MAGNESIUM  --  1.6  --  2.4  --    CALCIUM 8.2*  --  8.9  --  8.6     Recent Labs     02/24/20 0430 02/25/20 0400 02/26/20 0430   GLUCOSE 100* 99 112*     Recent Labs     02/24/20 0430 02/25/20 0400 02/26/20 0430   WBC 13.6* 14.2* 11.9*   NEUTSPOLYS 77.00* 76.60* 79.00*   LYMPHOCYTES 14.10* 14.30* 13.10*   MONOCYTES 8.20 8.10 7.10   EOSINOPHILS 0.10 0.10 0.00   BASOPHILS 0.10 0.30 0.20     Recent Labs     02/23/20  0943  02/24/20 0430 02/25/20 0400 02/26/20 0430   RBC  --    < > 4.66 5.34 4.99   HEMOGLOBIN  --    < > 13.7 16.1* 14.6   HEMATOCRIT  --    < > 43.6 51.6* 46.3   PLATELETCT  --    < > 174 161* 163*   PROTHROMBTM 13.7  --  14.3 16.6* 21.8*   APTT 96.1*  --  101.0*  --   --    INR 1.04  --  1.09 1.32* 1.85*    < > = values in this interval not displayed.       Imaging  No new imaging     Assessment/Plan  * Bilateral pulmonary embolism (HCC)- (present on admission)  Assessment & Plan  Consider unprovoked   Cont lovenox with bridge to coumadin   INR 1.85 today       Atrial fibrillation with RVR (HCC)- (present on admission)  Assessment & Plan  Secondary to acute PE    On metoprololm dilt, and digoxin   HR improved to the 50-90s   No plan for inpatient CHARLES with DC per cardiology   Cont lovenox with bridged to coumadin   INR 1.85   Monitor on telemetry       Pulmonary nodule  Assessment & Plan  -- Considered intermediate pretest for malignancy given risk factors   -- Will need repeat CT chest in 6 months       Herpes zoster without complication  Assessment & Plan  Chronic on right butt cheek  Cover till crustates  Standard precautions    Hyperglycemia- (present on admission)  Assessment & Plan  hbA1c 5.7    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 with obstruction- ERCP EXTRACTON/ SPHINCTEROTOMY, recurrent Feb 2020- (present on  admission)  Assessment & Plan  Had recent ERCP  Will follow up with GI    Essential hypertension- (present on admission)  Assessment & Plan  On metoprolol and diltiazem    Goal SBP <140    COPD (chronic obstructive pulmonary disease) (HCC)- (present on admission)  Assessment & Plan  Cont prednisone 40 mg X 5 days   Cont atrovent QID   Cont PT/OT and IS as tolerated       Hyperlipidemia- (present on admission)  Assessment & Plan  On statin       VTE:  Lovenox and Coumadin  Ulcer: Not Indicated  Lines: PIVs    Stable to transfer to floor today if HR remains <110 on exertion.     I have performed a physical exam and reviewed and updated ROS and Plan today (2/26/2020). In review of yesterday's note (2/25/2020), there are no changes except as documented above.     Discussed patient condition and risk of morbidity and/or mortality with RN, RT, Pharmacy, Charge nurse / hot rounds, Patient and Cardiology

## 2020-02-26 NOTE — FLOWSHEET NOTE
This note also relates to the following rows which could not be included:  Pulse - Cannot attach notes to unvalidated device data  Pulse Oximetry - Cannot attach notes to unvalidated device data       02/25/20 1851   Events/Summary/Plan   Skin Inspection Respiratory Device Intact;Red   Location ears   Vital Signs   $ Pulse Oximetry (Spot Check) Yes   Respiratory Assessment   Level of Consciousness Alert   Chest Exam   Work Of Breathing / Effort Mild   Breath Sounds   RUL Breath Sounds Expiratory Wheezes   RML Breath Sounds Expiratory Wheezes   RLL Breath Sounds Diminished   BRYAN Breath Sounds Expiratory Wheezes   LLL Breath Sounds Diminished   Oxygen   O2 (LPM) 3   O2 Delivery Device Silicone Nasal Cannula

## 2020-02-26 NOTE — DISCHARGE PLANNING
LSW spoke with . Pts anti-coag appointment is set for Monday 3/2/20. Information provided in pts AVS

## 2020-02-27 ENCOUNTER — TELEPHONE (OUTPATIENT)
Dept: MEDICAL GROUP | Age: 75
End: 2020-02-27

## 2020-02-27 PROBLEM — R73.9 HYPERGLYCEMIA: Status: RESOLVED | Noted: 2020-02-21 | Resolved: 2020-02-27

## 2020-02-27 LAB
ANION GAP SERPL CALC-SCNC: 10 MMOL/L (ref 7–16)
BASOPHILS # BLD AUTO: 0.2 % (ref 0–1.8)
BASOPHILS # BLD: 0.03 K/UL (ref 0–0.12)
BUN SERPL-MCNC: 15 MG/DL (ref 8–22)
CALCIUM SERPL-MCNC: 8.9 MG/DL (ref 8.4–10.2)
CHLORIDE SERPL-SCNC: 101 MMOL/L (ref 96–112)
CO2 SERPL-SCNC: 27 MMOL/L (ref 20–33)
CREAT SERPL-MCNC: 0.67 MG/DL (ref 0.5–1.4)
EOSINOPHIL # BLD AUTO: 0.03 K/UL (ref 0–0.51)
EOSINOPHIL NFR BLD: 0.2 % (ref 0–6.9)
ERYTHROCYTE [DISTWIDTH] IN BLOOD BY AUTOMATED COUNT: 45.1 FL (ref 35.9–50)
GLUCOSE SERPL-MCNC: 97 MG/DL (ref 65–99)
HCT VFR BLD AUTO: 47.8 % (ref 37–47)
HGB BLD-MCNC: 15.1 G/DL (ref 12–16)
IMM GRANULOCYTES # BLD AUTO: 0.12 K/UL (ref 0–0.11)
IMM GRANULOCYTES NFR BLD AUTO: 0.9 % (ref 0–0.9)
INR PPP: 2.45 (ref 0.87–1.13)
LYMPHOCYTES # BLD AUTO: 3.21 K/UL (ref 1–4.8)
LYMPHOCYTES NFR BLD: 24.6 % (ref 22–41)
MCH RBC QN AUTO: 29.4 PG (ref 27–33)
MCHC RBC AUTO-ENTMCNC: 31.6 G/DL (ref 33.6–35)
MCV RBC AUTO: 93.2 FL (ref 81.4–97.8)
MONOCYTES # BLD AUTO: 0.98 K/UL (ref 0–0.85)
MONOCYTES NFR BLD AUTO: 7.5 % (ref 0–13.4)
NEUTROPHILS # BLD AUTO: 8.67 K/UL (ref 2–7.15)
NEUTROPHILS NFR BLD: 66.6 % (ref 44–72)
NRBC # BLD AUTO: 0 K/UL
NRBC BLD-RTO: 0 /100 WBC
PLATELET # BLD AUTO: 187 K/UL (ref 164–446)
PMV BLD AUTO: 9.7 FL (ref 9–12.9)
POTASSIUM SERPL-SCNC: 4.3 MMOL/L (ref 3.6–5.5)
PROTHROMBIN TIME: 27.2 SEC (ref 12–14.6)
RBC # BLD AUTO: 5.13 M/UL (ref 4.2–5.4)
SODIUM SERPL-SCNC: 138 MMOL/L (ref 135–145)
WBC # BLD AUTO: 13 K/UL (ref 4.8–10.8)

## 2020-02-27 PROCEDURE — A9270 NON-COVERED ITEM OR SERVICE: HCPCS | Performed by: INTERNAL MEDICINE

## 2020-02-27 PROCEDURE — 85610 PROTHROMBIN TIME: CPT

## 2020-02-27 PROCEDURE — 700102 HCHG RX REV CODE 250 W/ 637 OVERRIDE(OP): Performed by: INTERNAL MEDICINE

## 2020-02-27 PROCEDURE — 700101 HCHG RX REV CODE 250: Performed by: INTERNAL MEDICINE

## 2020-02-27 PROCEDURE — 85025 COMPLETE CBC W/AUTO DIFF WBC: CPT

## 2020-02-27 PROCEDURE — 94640 AIRWAY INHALATION TREATMENT: CPT

## 2020-02-27 PROCEDURE — 99233 SBSQ HOSP IP/OBS HIGH 50: CPT | Performed by: INTERNAL MEDICINE

## 2020-02-27 PROCEDURE — 700111 HCHG RX REV CODE 636 W/ 250 OVERRIDE (IP): Performed by: INTERNAL MEDICINE

## 2020-02-27 PROCEDURE — 94760 N-INVAS EAR/PLS OXIMETRY 1: CPT

## 2020-02-27 PROCEDURE — 770020 HCHG ROOM/CARE - TELE (206)

## 2020-02-27 PROCEDURE — 80048 BASIC METABOLIC PNL TOTAL CA: CPT

## 2020-02-27 RX ORDER — DILTIAZEM HYDROCHLORIDE 120 MG/1
240 CAPSULE, COATED, EXTENDED RELEASE ORAL
Status: DISCONTINUED | OUTPATIENT
Start: 2020-02-27 | End: 2020-02-28

## 2020-02-27 RX ORDER — FUROSEMIDE 20 MG/1
10 TABLET ORAL
Status: DISCONTINUED | OUTPATIENT
Start: 2020-02-27 | End: 2020-03-02 | Stop reason: HOSPADM

## 2020-02-27 RX ORDER — DIGOXIN 0.25 MG/ML
125 INJECTION INTRAMUSCULAR; INTRAVENOUS ONCE
Status: COMPLETED | OUTPATIENT
Start: 2020-02-27 | End: 2020-02-27

## 2020-02-27 RX ORDER — FUROSEMIDE 20 MG/1
10 TABLET ORAL
Qty: 60 TAB | Refills: 0 | Status: SHIPPED | OUTPATIENT
Start: 2020-02-27 | End: 2020-03-05 | Stop reason: SDUPTHER

## 2020-02-27 RX ORDER — METOPROLOL TARTRATE 1 MG/ML
5 INJECTION, SOLUTION INTRAVENOUS ONCE
Status: COMPLETED | OUTPATIENT
Start: 2020-02-27 | End: 2020-02-27

## 2020-02-27 RX ORDER — WARFARIN SODIUM 5 MG/1
5 TABLET ORAL DAILY
Qty: 7 TAB | Refills: 0 | Status: SHIPPED | OUTPATIENT
Start: 2020-02-27 | End: 2020-03-05 | Stop reason: SDUPTHER

## 2020-02-27 RX ORDER — DILTIAZEM HYDROCHLORIDE 240 MG/1
240 CAPSULE, COATED, EXTENDED RELEASE ORAL DAILY
Qty: 30 CAP | Refills: 1 | Status: SHIPPED | OUTPATIENT
Start: 2020-02-27 | End: 2020-02-29

## 2020-02-27 RX ORDER — WARFARIN SODIUM 5 MG/1
5 TABLET ORAL DAILY
Status: COMPLETED | OUTPATIENT
Start: 2020-02-27 | End: 2020-02-27

## 2020-02-27 RX ORDER — DIGOXIN 125 MCG
125 TABLET ORAL DAILY
Qty: 30 TAB | Refills: 1 | Status: SHIPPED | OUTPATIENT
Start: 2020-02-27 | End: 2020-04-29 | Stop reason: SDUPTHER

## 2020-02-27 RX ORDER — PREDNISONE 20 MG/1
40 TABLET ORAL DAILY
Qty: 4 TAB | Refills: 0 | Status: SHIPPED | OUTPATIENT
Start: 2020-02-27 | End: 2020-02-29

## 2020-02-27 RX ADMIN — DILTIAZEM HYDROCHLORIDE 120 MG: 30 TABLET, FILM COATED ORAL at 05:07

## 2020-02-27 RX ADMIN — DILTIAZEM HYDROCHLORIDE 120 MG: 30 TABLET, FILM COATED ORAL at 01:07

## 2020-02-27 RX ADMIN — WARFARIN SODIUM 5 MG: 5 TABLET ORAL at 17:53

## 2020-02-27 RX ADMIN — Medication 400 MG: at 05:08

## 2020-02-27 RX ADMIN — IPRATROPIUM BROMIDE 0.5 MG: 0.5 SOLUTION RESPIRATORY (INHALATION) at 19:57

## 2020-02-27 RX ADMIN — SIMVASTATIN 10 MG: 10 TABLET, FILM COATED ORAL at 17:53

## 2020-02-27 RX ADMIN — HYDROCODONE BITARTRATE AND ACETAMINOPHEN 1 TABLET: 10; 325 TABLET ORAL at 05:08

## 2020-02-27 RX ADMIN — IPRATROPIUM BROMIDE 0.5 MG: 0.5 SOLUTION RESPIRATORY (INHALATION) at 11:48

## 2020-02-27 RX ADMIN — IPRATROPIUM BROMIDE 0.5 MG: 0.5 SOLUTION RESPIRATORY (INHALATION) at 06:49

## 2020-02-27 RX ADMIN — HYDROCODONE BITARTRATE AND ACETAMINOPHEN 1 TABLET: 10; 325 TABLET ORAL at 17:50

## 2020-02-27 RX ADMIN — METOPROLOL TARTRATE 100 MG: 50 TABLET, FILM COATED ORAL at 17:51

## 2020-02-27 RX ADMIN — HYDROCODONE BITARTRATE AND ACETAMINOPHEN 1 TABLET: 10; 325 TABLET ORAL at 21:50

## 2020-02-27 RX ADMIN — DILTIAZEM HYDROCHLORIDE 240 MG: 120 CAPSULE, COATED, EXTENDED RELEASE ORAL at 12:58

## 2020-02-27 RX ADMIN — HYDROCODONE BITARTRATE AND ACETAMINOPHEN 1 TABLET: 10; 325 TABLET ORAL at 13:37

## 2020-02-27 RX ADMIN — PREDNISONE 40 MG: 20 TABLET ORAL at 05:08

## 2020-02-27 RX ADMIN — HYDROCODONE BITARTRATE AND ACETAMINOPHEN 1 TABLET: 10; 325 TABLET ORAL at 09:30

## 2020-02-27 RX ADMIN — ZOLPIDEM TARTRATE 10 MG: 5 TABLET ORAL at 21:50

## 2020-02-27 RX ADMIN — DIGOXIN 125 MCG: 125 TABLET ORAL at 17:52

## 2020-02-27 RX ADMIN — METOPROLOL TARTRATE 5 MG: 5 INJECTION INTRAVENOUS at 14:06

## 2020-02-27 RX ADMIN — DIGOXIN 125 MCG: 0.25 INJECTION INTRAMUSCULAR; INTRAVENOUS at 15:28

## 2020-02-27 RX ADMIN — HYDROCODONE BITARTRATE AND ACETAMINOPHEN 1 TABLET: 10; 325 TABLET ORAL at 01:12

## 2020-02-27 RX ADMIN — METOPROLOL TARTRATE 100 MG: 50 TABLET, FILM COATED ORAL at 05:08

## 2020-02-27 RX ADMIN — ENOXAPARIN SODIUM 100 MG: 100 INJECTION SUBCUTANEOUS at 05:16

## 2020-02-27 ASSESSMENT — ENCOUNTER SYMPTOMS
MUSCULOSKELETAL NEGATIVE: 1
SPUTUM PRODUCTION: 0
GASTROINTESTINAL NEGATIVE: 1
CARDIOVASCULAR NEGATIVE: 1
SHORTNESS OF BREATH: 1
EYES NEGATIVE: 1
PSYCHIATRIC NEGATIVE: 1
COUGH: 0
WEAKNESS: 1
WHEEZING: 0

## 2020-02-27 NOTE — FLOWSHEET NOTE
02/27/20 1148   Events/Summary/Plan   Events/Summary/Plan SVN   Skin Inspection Respiratory Device Intact   Vital Signs   Pulse 80   Respiration 18   Pulse Oximetry 94 %   $ Pulse Oximetry (Spot Check) Yes   Respiratory Assessment   Level of Consciousness Alert   Breath Sounds   RUL Breath Sounds Clear;Diminished   RML Breath Sounds Clear;Diminished   RLL Breath Sounds Diminished   BRYAN Breath Sounds Diminished   LLL Breath Sounds Diminished   Oxygen   O2 (LPM) 1   O2 Delivery Device Silicone Nasal Cannula

## 2020-02-27 NOTE — FLOWSHEET NOTE
02/27/20 0651   Events/Summary/Plan   Events/Summary/Plan SVN   Skin Inspection Respiratory Device Intact   Vital Signs   Pulse 67   Respiration 18   Pulse Oximetry 94 %   $ Pulse Oximetry (Spot Check) Yes   Respiratory Assessment   Level of Consciousness Alert   Breath Sounds   RUL Breath Sounds Clear;Expiratory Wheezes   RML Breath Sounds Diminished;Expiratory Wheezes   RLL Breath Sounds Diminished   BRYAN Breath Sounds Clear;Expiratory Wheezes   LLL Breath Sounds Diminished;Expiratory Wheezes   Oxygen   O2 (LPM) 1   O2 Delivery Device Silicone Nasal Cannula

## 2020-02-27 NOTE — FACE TO FACE
"Face to Face Note  -  Durable Medical Equipment    Mary Carmen Rogers M.D. - NPI: [id number]  I certify that this patient is under my care and that they had a durable medical equipment(DME)face to face encounter by myself that meets the physician DME face-to-face encounter requirements with this patient on:    Date of encounter:   Patient:                    MRN:                       YOB: 2020  Rufina Macias  [id number]  1945     The encounter with the patient was in whole, or in part, for the following medical condition, which is the primary reason for durable medical equipment:  COPD    I certify that, based on my findings, the following durable medical equipment is medically necessary:  Oxygen.    HOME O2 Saturation Measurements:(Values must be present for Home Oxygen orders)     Room air sat at rest: 87  Room air sat with amb: 87  With liters of O2: 1, O2 sat at rest with O2: 94  With Liters of O2: 1, O2 sat with amb with O2 : 90  Is the patient mobile?: Yes          My Clinical findings support the need for the above equipment due to:  Hypoxia    Supporting Symptoms: The patient requires supplemental oxygen, as the following interventions have been tried with limited or no improvement: \"Bronchodilators and/or steroid inhalers, \"Oral and/or IV steroids, \"Ambulation with oximetry and \"Incentive spirometry    "

## 2020-02-27 NOTE — PROGRESS NOTES
Inpatient Anticoagulation Service Note    Date: 2/27/2020    Reason for Anticoagulation: Atrial Fibrillation, New Pulmonary Embolism   Target INR: 2.0 to 3.0  [id number] VASc Score: 5  HAS-BLED Score: 1   Hemoglobin Value: 15.1  Hematocrit Value: (!) 47.8  Lab Platelet Value: 187    INR from last 7 days     Date/Time INR Value    02/27/20 0450  (!) 2.45    02/26/20 0430  (!) 1.85    02/25/20 0400  (!) 1.32    02/24/20 0430  1.09    02/23/20 0943  1.04    02/23/20 0330  1.1    02/22/20 0020  (!) 1.36        Dose from last 7 days     Date/Time Dose (mg)    02/27/20 1100  5    02/26/20 1419  7.5    02/25/20 0734  7.5    02/24/20 1020  7.5    02/23/20 0600  5    02/22/20 0922  5        Significant Interactions: Corticosteroids, Statin  Bridge Therapy: Yes  Date of Last VTE Event: 02/21/20  Bridge Therapy Start Date: 02/21/20  Days of Overlap Therapy: 6 (If less than 5 days and overlap therapy discontinued -- document reason (i.e. Bleed Risk))  INR Value Greater than 2 Prior to Discontinuation of Parenteral Anticoagulation: Yes (If still on overlap therapy, if No -- document reason (i.e. Bleed Risk))    Plan:  Warfarin 5 mg PO today  Education Material Provided?: No  Pharmacist suggested discharge dosing: warfarin 5 mg PO daily     Jacob Coleman, PharmD

## 2020-02-27 NOTE — DISCHARGE SUMMARY
Discharge Summary    CHIEF COMPLAINT ON ADMISSION  Chief Complaint   Patient presents with   • Rapid Heart Beat   • Other     feels something is happening in my chest  feels like possible broncitis   • Atrial Fibrillation   • Cough     has Hx of COPD        Reason for Admission  Rapid Heart Rate     Admission Date  2/21/2020    CODE STATUS  Full Code    HPI & HOSPITAL COURSE  This is a 74 y.o. female here with atrial fibrillation with RVR. She has a recent diagnosis of atrial fibrillation and was started on eliquis.  She presented with palpitations and shortness of breath and was found to have atrial fibrillation with RVR.  She underwent CTA chest which showed subsegmental PE.  This was presumed related to Eliquis failure therefore she was changed to a heparin drip and bridged with Coumadin.  Hospital course complicated with persistent A fib with RVR which she was placed on a diltiazem drip. She was loaded with diltiazem PO and added digoxin. Patient was weaned off of the diltiazem drip on 02/24. She was switched from heparin to lovenox while being bridged with coumadin, until her INR was therapeutic.     Echocardiogram showed normal LV and RV function. She developed intermittent wheezing requiring prednisone and gentle diuresis.  Cardiology was consulted and, due to persistent poor control of her atrial fibrillation she was changed to diltiazem twice daily and digoxin was continued.  Her home metoprolol was continued at 100 twice daily as well.  Cardiology recommended outpatient follow up and they will consider outpatient cardioversion if needed. On 02/27/20, patient's INR was 2.45 and she ambulated with desaturation noted to 87% requiring 1 liters NC to maintain SpO2 above 88%.      She will DC home with 1L O2 to use if saturations are below 88%.  She is arranged to follow up at the anticoagulation clinic.  She is discharged on lasix 10 mg prn daily for LE swelling or weight gain.  Patient is instructed to return  to ED for chest pain, intractable N/V, fever/chills, or palpitation.       Therefore, she is discharged in good and stable condition to home with close outpatient follow-up.    The patient met 2-midnight criteria for an inpatient stay at the time of discharge.    Discharge Date  3/2/2020    FOLLOW UP ITEMS POST DISCHARGE  Anticoagulation clinic  PCP follow up within 1-2 weeks   Cardiology within 1 week    DISCHARGE DIAGNOSES  Principal Problem:    Bilateral pulmonary embolism (HCC) POA: Yes  Active Problems:    Atrial fibrillation (HCC) POA: Yes    Hyperlipidemia POA: Yes    COPD (chronic obstructive pulmonary disease) (HCC) POA: Yes    Essential hypertension POA: Yes    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 with obstruction- ERCP EXTRACTON/ SPHINCTEROTOMY, recurrent Feb 2020 POA: Yes    Adrenal nodule (HCC) POA: Yes    Herpes zoster without complication POA: Unknown    Pulmonary nodule POA: Unknown  Resolved Problems:    Leucocytosis POA: Yes    Hyperglycemia POA: Yes      FOLLOW UP  Future Appointments   Date Time Provider Department Center   3/2/2020 11:00 AM [facility] EXAM 1 [facility] None   3/3/2020  3:40 PM Quincy Angel M.D. 25M [facility]   3/10/2020  3:30 PM [facility] None   7/6/2020 10:40 AM Quincy Angel M.D. 25M [facility]       MEDICATIONS ON DISCHARGE     Medication List      START taking these medications      Instructions   digoxin 125 MCG Tabs  Commonly known as:  LANOXIN   Take 1 Tab by mouth every day at 6 PM.  Dose:  125 mcg     furosemide 20 MG Tabs  Commonly known as:  LASIX   Take 0.5 Tabs by mouth 1 time daily as needed (for lower extremity edema or weight gain).  Dose:  10 mg     ipratropium 0.02 % Soln  Commonly known as:  ATROVENT   2.5 mL by Nebulization route every 6 hours as needed (shortness of breath or wheezing).  Dose:  0.5 mg     warfarin 5 MG Tabs  Commonly known as:  COUMADIN   Take 1 Tab by mouth every day at 6 PM.  Dose:  5 mg        CHANGE how you take  these medications      Instructions   DILTIAZem  MG Cp24  What changed:    · medication strength  · how much to take  · when to take this  Commonly known as:  CARDIZEM CD   Take 1 Cap by mouth 2 Times a Day.  Dose:  240 mg        CONTINUE taking these medications      Instructions   CALCIUM 1200 PO   Take 1,200 mg by mouth every day.  Dose:  1,200 mg     FIBER PO   Take 2 Caps by mouth every day.  Dose:  2 Cap     HYDROcodone/acetaminophen  MG Tabs  Commonly known as:  NORCO   TAKE ONE TABLET BY MOUTH FOUR TIMES A DAY AS NEEDED 30 DAY SUPPLY     ipratropium-albuterol  MCG/ACT Aers  Commonly known as:  Combivent Respimat   Inhale 1 Puff by mouth every 6 hours as needed (shortness of breath).  Dose:  1 Puff     Magnesium 400 MG Tabs   Take 400 mg by mouth every day.  Dose:  400 mg     metoprolol 100 MG Tabs  Commonly known as:  LOPRESSOR   Take 1 Tab by mouth 2 times a day.  Dose:  100 mg     simvastatin 20 MG Tabs  Commonly known as:  ZOCOR   Take 1 Tab by mouth every evening.  Dose:  20 mg        STOP taking these medications    Ambien 10 MG Tabs  Generic drug:  zolpidem     amLODIPine 5 MG Tabs  Commonly known as:  NORVASC     apixaban 5mg Tabs  Commonly known as:  ELIQUIS     azithromycin 250 MG Tabs  Commonly known as:  ZITHROMAX     losartan 100 MG Tabs  Commonly known as:  COZAAR     predniSONE 20 MG Tabs  Commonly known as:  DELTASONE            Allergies  Allergies   Allergen Reactions   • Codeine Swelling   • Ace Inhibitors      Cough       DIET  Orders Placed This Encounter   Procedures   • Diet Order Regular     Standing Status:   Standing     Number of Occurrences:   1     Order Specific Question:   Diet:     Answer:   Regular [1]       ACTIVITY  As tolerated.  Weight bearing as tolerated    CONSULTATIONS  Cardiology   Critical Care      PROCEDURES  None     LABORATORY  Lab Results   Component Value Date    SODIUM 140 02/29/2020    POTASSIUM 4.0 02/29/2020    CHLORIDE 100 02/29/2020     CO2 28 02/29/2020    GLUCOSE 93 02/29/2020    BUN 19 02/29/2020    CREATININE 0.71 02/29/2020    CREATININE 0.80 12/02/2010    GLOMRATE >59 12/02/2010        Lab Results   Component Value Date    WBC 13.7 (H) 02/29/2020    WBC 7.6 12/02/2010    HEMOGLOBIN 15.4 02/29/2020    HEMATOCRIT 47.9 (H) 02/29/2020    PLATELETCT 247 02/29/2020        Total time of the discharge process exceeds 40 minutes.

## 2020-02-27 NOTE — PROGRESS NOTES
"Critical Care Progress Note    Date of admission  2/21/2020    Chief Complaint  74 y.o. female admitted 2/21/2020 with SOB    Hospital Course    Per Dr. Avila's note:  \"74 y.o. female who presented 2/21/2020 with shortness of breath.  Patient was just in the hospital, had new onset A. fib RVR, sent home on Eliquis which she states she has been taking.  She states she got out of the hospital on Sunday, on Wednesday she started experiencing shortness of breath.  Just before that she did have a sick contact, cold-like symptoms from her family.  She states she developed nonproductive cough.  She denied any chest pain.  Today she went to see her primary care provider where they noted tachycardia, patient was sent to the ER.  Imaging obtained here did show pulmonary embolism.  Patient does remain in atrial fibrillation with RVR\"     02/24: Diursed with lasix 10 mg IV. Loaded with digoxin and cardiology consulted.     02/25: HR down to 50-90s. Diuresed with additional lasix 10 mg IV. Walked with PT and HR remains below 100.     02/26: No acute issues. INR 1.85.     Interval Problem Update  Reviewed last 24 hour events:  Walked with RN yesterday and indicated patient require 2 liter on NC to maintain SpO2>88%   No acute issues overnight   INR 2.45  Remains in atrial fibrillation with HR ~60s  No complaints this morning. Patient state feeling better and is less short of breath.   Echo showed normal LV and RV function.     Review of Systems  Review of Systems   Constitutional: Positive for malaise/fatigue.   HENT: Negative.    Eyes: Negative.    Respiratory: Positive for shortness of breath. Negative for cough, sputum production and wheezing.    Cardiovascular: Negative.    Gastrointestinal: Negative.    Genitourinary: Negative.    Musculoskeletal: Negative.    Skin: Negative.    Neurological: Positive for weakness.   Endo/Heme/Allergies: Negative.    Psychiatric/Behavioral: Negative.         Vital Signs for last 24 hours "   Temp:  [35.9 °C (96.7 °F)-36.5 °C (97.7 °F)] 36.3 °C (97.3 °F)  Pulse:  [58-93] 67  Resp:  [15-20] 18  BP: (117-145)/(63-84) 125/84  SpO2:  [87 %-98 %] 94 %         Physical Exam   Physical Exam  Constitutional:       Appearance: She is ill-appearing.   HENT:      Mouth/Throat:      Mouth: Mucous membranes are moist.   Eyes:      Extraocular Movements: Extraocular movements intact.      Pupils: Pupils are equal, round, and reactive to light.   Cardiovascular:      Rate and Rhythm: Rhythm irregular.      Pulses: Normal pulses.      Heart sounds: Normal heart sounds.   Pulmonary:      Effort: Pulmonary effort is normal.   Abdominal:      General: Bowel sounds are normal. There is distension.      Palpations: Abdomen is soft.      Tenderness: There is no abdominal tenderness.   Musculoskeletal:         General: No swelling or tenderness.   Skin:     General: Skin is warm and dry.   Neurological:      General: No focal deficit present.      Mental Status: She is alert.   Psychiatric:         Mood and Affect: Mood normal.         Behavior: Behavior normal.         Thought Content: Thought content normal.         Judgment: Judgment normal.         Medications  Current Facility-Administered Medications   Medication Dose Route Frequency Provider Last Rate Last Dose   • ipratropium (ATROVENT) 0.02 % nebulizer solution 0.5 mg  0.5 mg Nebulization 4X/DAY (RT) Mary Carmen Rogers M.D.   0.5 mg at 02/27/20 0649   • ipratropium (ATROVENT) 0.02 % nebulizer solution 0.5 mg  0.5 mg Nebulization Q4H PRN (RT) Mary Carmen Rogers M.D.       • predniSONE (DELTASONE) tablet 40 mg  40 mg Oral DAILY Mary Carmen Rogers M.D.   40 mg at 02/27/20 0508   • enoxaparin (LOVENOX) inj 100 mg  100 mg Subcutaneous Q12HRS Mary Carmen Rogers M.D.   100 mg at 02/27/20 0516   • digoxin (LANOXIN) tablet 125 mcg  125 mcg Oral DAILY AT 1800 Mary Carmen Rogers M.D.   125 mcg at 02/26/20 1732   • DILTIAZem (CARDIZEM) tablet 120 mg  120 mg Oral Q6HRS Falguni Richardson M.D.   120 mg at 02/27/20 0503    • MD Alert...Warfarin per Pharmacy   Other PHARMACY TO DOSE Petros Patel M.D.       • ipratropium (ATROVENT) 0.02 % nebulizer solution 0.5 mg  0.5 mg Nebulization Q6HRS PRN Petros Patel M.D.   0.5 mg at 02/25/20 0606   • metoprolol (LOPRESSOR) tablet 100 mg  100 mg Oral TWICE DAILY Petros Patel M.D.   100 mg at 02/27/20 0508   • HYDROcodone/acetaminophen (NORCO)  MG per tablet 1 Tab  1 Tab Oral Q4HRS PRN EMILY FelicianoO.   1 Tab at 02/27/20 0508   • magnesium oxide (MAG-OX) tablet 400 mg  400 mg Oral DAILY EMILY FelicianoO.   400 mg at 02/27/20 0508   • simvastatin (ZOCOR) tablet 10 mg  10 mg Oral Q EVENING EMILY FelicianoO.   10 mg at 02/26/20 1732   • zolpidem (AMBIEN) tablet 10 mg  10 mg Oral HS PRN EMILY FelicianoO.   10 mg at 02/26/20 2058   • senna-docusate (PERICOLACE or SENOKOT S) 8.6-50 MG per tablet 2 Tab  2 Tab Oral BID EMILY FelicianoO.   Stopped at 02/24/20 0600    And   • polyethylene glycol/lytes (MIRALAX) PACKET 1 Packet  1 Packet Oral QDAY PRN Parvez Houston D.O.        And   • magnesium hydroxide (MILK OF MAGNESIA) suspension 30 mL  30 mL Oral QDAY PRN Parvez Houston D.O.        And   • bisacodyl (DULCOLAX) suppository 10 mg  10 mg Rectal QDAY PRN EMILY FelicianoO.       • Respiratory Therapy Consult   Nebulization Continuous RT Parvez Houston D.O.       • acetaminophen (TYLENOL) tablet 650 mg  650 mg Oral Q6HRS PRN EMILY FelicianoO.       • enalaprilat (VASOTEC) injection 1.25 mg  1.25 mg Intravenous Q6HRS PRN EMILY FelicianoO.       • ondansetron (ZOFRAN) syringe/vial injection 4 mg  4 mg Intravenous Q4HRS PRN Parvez Houston D.O.   4 mg at 02/25/20 0528   • ondansetron (ZOFRAN ODT) dispertab 4 mg  4 mg Oral Q4HRS PRN Parvez Houston D.O.       • albuterol (PROVENTIL) 2.5mg/0.5ml nebulizer solution 2.5 mg  2.5 mg Nebulization Q2HRS PRN (RT) Parvez Houston D.O.   2.5 mg at 02/25/20 0606       Fluids    Intake/Output  Summary (Last 24 hours) at 2/27/2020 0812  Last data filed at 2/26/2020 2100  Gross per 24 hour   Intake 940 ml   Output 1150 ml   Net -210 ml       Laboratory          Recent Labs     02/24/20  1430 02/25/20  0400 02/25/20  0545 02/26/20  0430 02/27/20  0450   SODIUM  --  137  --  132* 138   POTASSIUM  --  4.5  --  4.5 4.3   CHLORIDE  --  100  --  97 101   CO2  --  24  --  28 27   BUN  --  8  --  17 15   CREATININE  --  0.54  --  0.62 0.67   MAGNESIUM 1.6  --  2.4  --   --    CALCIUM  --  8.9  --  8.6 8.9     Recent Labs     02/25/20  0400 02/26/20  0430 02/27/20  0450   GLUCOSE 99 112* 97     Recent Labs     02/25/20  0400 02/26/20  0430 02/27/20  0450   WBC 14.2* 11.9* 13.0*   NEUTSPOLYS 76.60* 79.00* 66.60   LYMPHOCYTES 14.30* 13.10* 24.60   MONOCYTES 8.10 7.10 7.50   EOSINOPHILS 0.10 0.00 0.20   BASOPHILS 0.30 0.20 0.20     Recent Labs     02/25/20  0400 02/26/20  0430 02/27/20  0450   RBC 5.34 4.99 5.13   HEMOGLOBIN 16.1* 14.6 15.1   HEMATOCRIT 51.6* 46.3 47.8*   PLATELETCT 161* 163* 187   PROTHROMBTM 16.6* 21.8* 27.2*   INR 1.32* 1.85* 2.45*       Imaging  Echocardiogram reviewed:  Limited Exam for LV and RV Function  Compared to the images of the prior study done 2/11/20 -  there has   been no significant change.   Left ventricular systolic function is normal.  Left ventricular ejection fraction is visually estimated to be 55%.  Right ventricular systolic function is normal.    Assessment/Plan  * Bilateral pulmonary embolism (HCC)- (present on admission)  Assessment & Plan  Consider unprovoked   Cont coumadin   INR 2.45 today   Pharmacy to dose       Atrial fibrillation with RVR (HCC)- (present on admission)  Assessment & Plan  Secondary to acute PE    On metoprololm dilt, and digoxin   HR remains in the 50-90s   INR 2.45 today    Monitor on telemetry       Pulmonary nodule  Assessment & Plan  -- Considered intermediate pretest for malignancy given risk factors   -- Will need repeat CT chest in 6 months        Herpes zoster without complication  Assessment & Plan  Chronic on right butt cheek  Cover till crustates  Standard precautions    Hyperglycemia- (present on admission)  Assessment & Plan  hbA1c 5.7    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 with obstruction- ERCP EXTRACTON/ SPHINCTEROTOMY, recurrent Feb 2020- (present on admission)  Assessment & Plan  Had recent ERCP  Will follow up with GI    Essential hypertension- (present on admission)  Assessment & Plan  On metoprolol and diltiazem    Goal SBP <140    COPD (chronic obstructive pulmonary disease) (HCC)- (present on admission)  Assessment & Plan  Cont prednisone 40 mg X 5 days   Cont atrovent QID   Cont PT/OT and IS as tolerated       Hyperlipidemia- (present on admission)  Assessment & Plan  On statin       VTE:  Lovenox and Coumadin  Ulcer: Not Indicated  Lines: PIVs    I have performed a physical exam and reviewed and updated ROS and Plan today (2/27/2020). In review of yesterday's note (2/26/2020), there are no changes except as documented above.     Discussed patient condition and risk of morbidity and/or mortality with RN, RT, Pharmacy, Charge nurse / hot rounds, Patient and Cardiology

## 2020-02-27 NOTE — TELEPHONE ENCOUNTER
ESTABLISHED PATIENT PRE-VISIT PLANNING     Patient was NOT contacted to complete PVP.     Note: Patient will not be contacted if there is no indication to call.     1.  Reviewed notes from the last few office visits within the medical group: Yes    2.  If any orders were placed at last visit or intended to be done for this visit (i.e. 6 mos follow-up), do we have Results/Consult Notes?        •  Labs - Labs were not ordered at last office visit.   Note: If patient appointment is for lab review and patient did not complete labs, check with provider if OK to reschedule patient until labs completed.       •  Imaging - Imaging ordered, completed and results are in chart.       •  Referrals - Referral ordered, patient has NOT been seen.    3. Is this appointment scheduled as a Hospital Follow-Up? Yes, visit was at [facility].     4.  Immunizations were updated in Epic using WebIZ?: Epic matches WebIZ       •  Web Iz Recommendations: HEPATITIS B and SHINGRIX (Shingles)    5.  Patient is due for the following Health Maintenance Topics:   Health Maintenance Due   Topic Date Due   • Annual Pulmonary Function Test / Spirometry  03/18/1951   • IMM HEP B VACCINE (1 of 3 - Risk 3-dose series) 03/18/1964   • IMM ZOSTER VACCINES (2 of 3) 12/10/2013   • Annual Wellness Visit  06/27/2019       6. Orders for overdue Health Maintenance topics pended in Pre-Charting? N\A    7.  AHA (MDX) form printed for Provider? YES    8.  Patient was NOT informed to arrive 15 min prior to their scheduled appointment and bring in their medication bottles.

## 2020-02-27 NOTE — FACE TO FACE
"Face to Face Note  -  Durable Medical Equipment    Mary Carmen Rogers M.D. - NPI: [id number]  I certify that this patient is under my care and that they had a durable medical equipment(DME)face to face encounter by myself that meets the physician DME face-to-face encounter requirements with this patient on:    Date of encounter:   Patient:                    MRN:                       YOB: 2020  Rufina Macias  [id number]  1945     The encounter with the patient was in whole, or in part, for the following medical condition, which is the primary reason for durable medical equipment:  COPD    I certify that, based on my findings, the following durable medical equipment is medically necessary:  Nebulizer.    HOME O2 Saturation Measurements:(Values must be present for Home Oxygen orders)  Room air sat at rest: 87  Room air sat with amb: 87  With liters of O2: 1, O2 sat at rest with O2: 94  With Liters of O2: 1, O2 sat with amb with O2 : 90  Is the patient mobile?: Yes    My Clinical findings support the need for the above equipment due to:  Wheezing (Chronic)    Supporting Symptoms: The patient requires supplemental oxygen, as the following interventions have been tried with limited or no improvement: \"Bronchodilators and/or steroid inhalers, \"Positive expiratory pressure therapies, \"Oral and/or IV steroids and \"Ambulation with oximetry    "

## 2020-02-27 NOTE — DISCHARGE PLANNING
Received Choice form at 9134  Agency/Facility Name: [facility]   Referral sent per Choice form @ 2324

## 2020-02-27 NOTE — DISCHARGE PLANNING
Agency[facility]Facility Name: [facility]   Spoke To: Intake  Outcome: pt accepted. Delivery is scheduled for today.

## 2020-02-27 NOTE — CARE PLAN
Problem: Communication  Goal: The ability to communicate needs accurately and effectively will improve  Outcome: PROGRESSING AS EXPECTED  Intervention: Educate patient and significant other/support system about the plan of care, procedures, treatments, medications and allow for questions  Note: Educate pt on plan of care, treatments, medications, encourage questions     Problem: Safety  Goal: Will remain free from injury  Outcome: PROGRESSING AS EXPECTED     Problem: Safety  Goal: Will remain free from falls  Intervention: Implement fall precautions  Note: Educate pt on risk for falls, use of call light for ambulation and needs, importance of non skid socks, clutter free room, belongings within reach

## 2020-02-27 NOTE — PROGRESS NOTES
Patient went back into A fib with RVR (HR ~140s). Cardiology re-consulted for CHARLES with cardioversion. Case discussed with Dr. Gilbert and recommended metoprolol 5 mg IV push now and will reevaluate patient this afternoon. Orders placed. RN notified. Will hold discharge today.     Mary Carmen Rogers MD   Pulmonary Critical Care   [facility]

## 2020-02-27 NOTE — PROGRESS NOTES
Received report from GILLIAN Holland, assumed care of patient. Pt resting comfortably on 1L nasal cannula. Discussed plan of care, bed locked and in low, call light within reach.

## 2020-02-28 LAB
ANION GAP SERPL CALC-SCNC: 10 MMOL/L (ref 7–16)
BASOPHILS # BLD AUTO: 0.2 % (ref 0–1.8)
BASOPHILS # BLD: 0.03 K/UL (ref 0–0.12)
BUN SERPL-MCNC: 14 MG/DL (ref 8–22)
CALCIUM SERPL-MCNC: 9 MG/DL (ref 8.4–10.2)
CHLORIDE SERPL-SCNC: 99 MMOL/L (ref 96–112)
CO2 SERPL-SCNC: 30 MMOL/L (ref 20–33)
CREAT SERPL-MCNC: 0.65 MG/DL (ref 0.5–1.4)
DIGOXIN SERPL-MCNC: 1.5 NG/ML (ref 0.8–2)
EOSINOPHIL # BLD AUTO: 0.03 K/UL (ref 0–0.51)
EOSINOPHIL NFR BLD: 0.2 % (ref 0–6.9)
ERYTHROCYTE [DISTWIDTH] IN BLOOD BY AUTOMATED COUNT: 44.9 FL (ref 35.9–50)
GLUCOSE SERPL-MCNC: 95 MG/DL (ref 65–99)
HCT VFR BLD AUTO: 47.9 % (ref 37–47)
HGB BLD-MCNC: 15.5 G/DL (ref 12–16)
IMM GRANULOCYTES # BLD AUTO: 0.1 K/UL (ref 0–0.11)
IMM GRANULOCYTES NFR BLD AUTO: 0.8 % (ref 0–0.9)
INR PPP: 2.25 (ref 0.87–1.13)
LYMPHOCYTES # BLD AUTO: 3.24 K/UL (ref 1–4.8)
LYMPHOCYTES NFR BLD: 25.6 % (ref 22–41)
MCH RBC QN AUTO: 29.6 PG (ref 27–33)
MCHC RBC AUTO-ENTMCNC: 32.4 G/DL (ref 33.6–35)
MCV RBC AUTO: 91.6 FL (ref 81.4–97.8)
MONOCYTES # BLD AUTO: 0.94 K/UL (ref 0–0.85)
MONOCYTES NFR BLD AUTO: 7.4 % (ref 0–13.4)
NEUTROPHILS # BLD AUTO: 8.33 K/UL (ref 2–7.15)
NEUTROPHILS NFR BLD: 65.8 % (ref 44–72)
NRBC # BLD AUTO: 0 K/UL
NRBC BLD-RTO: 0 /100 WBC
PLATELET # BLD AUTO: 216 K/UL (ref 164–446)
PMV BLD AUTO: 9.5 FL (ref 9–12.9)
POTASSIUM SERPL-SCNC: 4.1 MMOL/L (ref 3.6–5.5)
PROTHROMBIN TIME: 25.4 SEC (ref 12–14.6)
RBC # BLD AUTO: 5.23 M/UL (ref 4.2–5.4)
SODIUM SERPL-SCNC: 139 MMOL/L (ref 135–145)
WBC # BLD AUTO: 12.7 K/UL (ref 4.8–10.8)

## 2020-02-28 PROCEDURE — 94760 N-INVAS EAR/PLS OXIMETRY 1: CPT

## 2020-02-28 PROCEDURE — 85610 PROTHROMBIN TIME: CPT

## 2020-02-28 PROCEDURE — 700102 HCHG RX REV CODE 250 W/ 637 OVERRIDE(OP): Performed by: INTERNAL MEDICINE

## 2020-02-28 PROCEDURE — A9270 NON-COVERED ITEM OR SERVICE: HCPCS | Performed by: INTERNAL MEDICINE

## 2020-02-28 PROCEDURE — 700111 HCHG RX REV CODE 636 W/ 250 OVERRIDE (IP): Performed by: INTERNAL MEDICINE

## 2020-02-28 PROCEDURE — 85025 COMPLETE CBC W/AUTO DIFF WBC: CPT

## 2020-02-28 PROCEDURE — 99232 SBSQ HOSP IP/OBS MODERATE 35: CPT | Performed by: INTERNAL MEDICINE

## 2020-02-28 PROCEDURE — 770020 HCHG ROOM/CARE - TELE (206)

## 2020-02-28 PROCEDURE — 94640 AIRWAY INHALATION TREATMENT: CPT

## 2020-02-28 PROCEDURE — 80162 ASSAY OF DIGOXIN TOTAL: CPT

## 2020-02-28 PROCEDURE — 700101 HCHG RX REV CODE 250: Performed by: INTERNAL MEDICINE

## 2020-02-28 PROCEDURE — 80048 BASIC METABOLIC PNL TOTAL CA: CPT

## 2020-02-28 RX ORDER — METOPROLOL TARTRATE 1 MG/ML
5 INJECTION, SOLUTION INTRAVENOUS ONCE
Status: COMPLETED | OUTPATIENT
Start: 2020-02-28 | End: 2020-02-28

## 2020-02-28 RX ORDER — METOPROLOL TARTRATE 1 MG/ML
5 INJECTION, SOLUTION INTRAVENOUS
Status: DISCONTINUED | OUTPATIENT
Start: 2020-02-28 | End: 2020-02-29

## 2020-02-28 RX ORDER — WARFARIN SODIUM 5 MG/1
5 TABLET ORAL
Status: COMPLETED | OUTPATIENT
Start: 2020-02-28 | End: 2020-02-28

## 2020-02-28 RX ORDER — DILTIAZEM HYDROCHLORIDE 120 MG/1
240 CAPSULE, COATED, EXTENDED RELEASE ORAL 2 TIMES DAILY
Status: DISCONTINUED | OUTPATIENT
Start: 2020-02-28 | End: 2020-03-02 | Stop reason: HOSPADM

## 2020-02-28 RX ADMIN — HYDROCODONE BITARTRATE AND ACETAMINOPHEN 1 TABLET: 10; 325 TABLET ORAL at 05:58

## 2020-02-28 RX ADMIN — ZOLPIDEM TARTRATE 10 MG: 5 TABLET ORAL at 20:26

## 2020-02-28 RX ADMIN — Medication 400 MG: at 05:13

## 2020-02-28 RX ADMIN — DILTIAZEM HYDROCHLORIDE 240 MG: 120 CAPSULE, COATED, EXTENDED RELEASE ORAL at 18:38

## 2020-02-28 RX ADMIN — HYDROCODONE BITARTRATE AND ACETAMINOPHEN 1 TABLET: 10; 325 TABLET ORAL at 01:58

## 2020-02-28 RX ADMIN — HYDROCODONE BITARTRATE AND ACETAMINOPHEN 1 TABLET: 10; 325 TABLET ORAL at 14:44

## 2020-02-28 RX ADMIN — METOPROLOL TARTRATE 100 MG: 50 TABLET, FILM COATED ORAL at 18:39

## 2020-02-28 RX ADMIN — DILTIAZEM HYDROCHLORIDE 240 MG: 120 CAPSULE, COATED, EXTENDED RELEASE ORAL at 05:13

## 2020-02-28 RX ADMIN — PREDNISONE 40 MG: 20 TABLET ORAL at 05:13

## 2020-02-28 RX ADMIN — METOPROLOL TARTRATE 5 MG: 5 INJECTION, SOLUTION INTRAVENOUS at 15:56

## 2020-02-28 RX ADMIN — HYDROCODONE BITARTRATE AND ACETAMINOPHEN 1 TABLET: 10; 325 TABLET ORAL at 10:32

## 2020-02-28 RX ADMIN — IPRATROPIUM BROMIDE 0.5 MG: 0.5 SOLUTION RESPIRATORY (INHALATION) at 07:10

## 2020-02-28 RX ADMIN — WARFARIN SODIUM 5 MG: 5 TABLET ORAL at 19:03

## 2020-02-28 RX ADMIN — DIGOXIN 125 MCG: 125 TABLET ORAL at 18:39

## 2020-02-28 RX ADMIN — SIMVASTATIN 10 MG: 10 TABLET, FILM COATED ORAL at 18:39

## 2020-02-28 RX ADMIN — IPRATROPIUM BROMIDE 0.5 MG: 0.5 SOLUTION RESPIRATORY (INHALATION) at 19:49

## 2020-02-28 RX ADMIN — HYDROCODONE BITARTRATE AND ACETAMINOPHEN 1 TABLET: 10; 325 TABLET ORAL at 19:03

## 2020-02-28 RX ADMIN — METOPROLOL TARTRATE 100 MG: 50 TABLET, FILM COATED ORAL at 05:13

## 2020-02-28 ASSESSMENT — ENCOUNTER SYMPTOMS
GASTROINTESTINAL NEGATIVE: 1
HALLUCINATIONS: 0
WHEEZING: 0
NAUSEA: 0
MUSCULOSKELETAL NEGATIVE: 1
SPUTUM PRODUCTION: 0
EYE DISCHARGE: 0
BLOOD IN STOOL: 0
CARDIOVASCULAR NEGATIVE: 1
CHILLS: 0
MYALGIAS: 0
PALPITATIONS: 0
WEAKNESS: 1
EYE PAIN: 0
COUGH: 0
EYES NEGATIVE: 1
FEVER: 0
ABDOMINAL PAIN: 0
DIZZINESS: 0
PSYCHIATRIC NEGATIVE: 1
HEADACHES: 0
VOMITING: 0
BRUISES/BLEEDS EASILY: 0
SHORTNESS OF BREATH: 1

## 2020-02-28 ASSESSMENT — PATIENT HEALTH QUESTIONNAIRE - PHQ9
2. FEELING DOWN, DEPRESSED, IRRITABLE, OR HOPELESS: NOT AT ALL
1. LITTLE INTEREST OR PLEASURE IN DOING THINGS: NOT AT ALL
SUM OF ALL RESPONSES TO PHQ9 QUESTIONS 1 AND 2: 0

## 2020-02-28 NOTE — PROGRESS NOTES
Pt requested a pain pill and sleeping pill, prn meds available and given, call light in reach, will continue to monitor.

## 2020-02-28 NOTE — PROGRESS NOTES
Report received from Bj RN, care assumed. Notified by MT that patient hao down to low 40's with a 2.9 second pause which is not a new occurrence for patient. When this RN checked on patient patient was asleep. Upon waking patient asymtomatic. Report 4/10 chronic pain to lower back. Safety precautions in place.

## 2020-02-28 NOTE — PROGRESS NOTES
Inpatient Anticoagulation Service Note    Date: 2/28/2020  Reason for Anticoagulation: Atrial Fibrillation, New Pulmonary Embolism   [id number] VASc Score: 5  HAS-BLED Score: 1    Hemoglobin Value: 15.5  Hematocrit Value: (!) 47.9  Lab Platelet Value: 216  Target INR: 2.0 to 3.0    INR from last 7 days     Date/Time INR Value    02/28/20 0506  (!) 2.25    02/27/20 0450  (!) 2.45    02/26/20 0430  (!) 1.85    02/25/20 0400  (!) 1.32    02/24/20 0430  1.09    02/23/20 0943  1.04    02/23/20 0330  1.1    02/22/20 0020  (!) 1.36        Dose from last 7 days     Date/Time Dose (mg)    02/27/20 1100  5    02/26/20 1419  7.5    02/25/20 0734  7.5    02/24/20 1020  7.5    02/23/20 0600  5    02/22/20 0922  5        Significant Interactions: Corticosteroids, Statin  Bridge Therapy: Yes  Date of Last VTE Event: 02/21/20  Bridge Therapy Start Date: 02/21/20  Days of Overlap Therapy: 7  INR Value Greater than 2 Prior to Discontinuation of Parenteral Anticoagulation: Yes    Plan:  Give Warfarin 5 mg PO today for INR of 2.25  Education Material Provided?: No  Pharmacist suggested discharge dosing: Warfarin 5 mg PO daily     Clarence Dougherty, Pharmacy Intern

## 2020-02-28 NOTE — FLOWSHEET NOTE
02/28/20 1043   Events/Summary/Plan   Events/Summary/Plan Tx held. No distress/SOB noted. Pt will call if need it.    Vital Signs   Pulse 72   Respiration 18   Pulse Oximetry 94 %   $ Pulse Oximetry (Spot Check) Yes   Oxygen   O2 (LPM) 1   O2 Delivery Device Silicone Nasal Cannula

## 2020-02-28 NOTE — CARE PLAN
Problem: Respiratory:  Goal: Respiratory status will improve  Outcome: PROGRESSING AS EXPECTED  Note: Monitor lung sounds/respiratory status, administer anticoagulation as ordered.     Problem: Pain Management  Goal: Pain level will decrease to patient's comfort goal  Outcome: PROGRESSING AS EXPECTED  Note: Implement pharmacologic and non-pharmacologic interventions as appropriate.

## 2020-02-28 NOTE — PROGRESS NOTES
Pt has been resting throughout the night, medicated for pain at pt's request twice this shift, hourly rounding performed by myself and the CNA.

## 2020-02-28 NOTE — FLOWSHEET NOTE
02/28/20 1452   Events/Summary/Plan   Events/Summary/Plan Pt stated she feels good at this oment and doesnt need it.    Therapy Not Performed   Type of Therapy Not Performed SVN   Reason Therapy Not Performed Refused   Oximetry   $ Pulse Oximetry (Spot Check) Yes   Oxygen   O2 (LPM) 1   O2 Delivery Device Nasal Cannula   Pulse Oximetry 92 %   Vitals   Respiration 18   Pulse (!) 55

## 2020-02-28 NOTE — PROGRESS NOTES
Patient ambulated 200 feet with 1 L oxygen. HR up to 190's, down to 100-150's after recovering for 5 minutes. Patients only symptom is feeling anxious.  notified, 5 mg IV lopressor ordered; and MD will notifiy cardiologist.

## 2020-02-28 NOTE — PROGRESS NOTES
1400 Pt heart rate 150-180s. Pt asymptomatic on 1L nasal canula. Dr. Rogers notified, cardiology to be consulted.    1500 Cardiology at bedside, orders received. Pt resting in bed in no apparent distress.

## 2020-02-28 NOTE — PROGRESS NOTES
Notified by MT that patients HR up to 180 for a minute, back down to low 100's at this time. Patient states she has not gotten OOB but does feel anxious.  notified. Per  ambulate patient and monitor HR. Also reports 6/10 pain to lower back, medicated with PRN norco. Plan is to ambulate once norco takes effect.

## 2020-02-28 NOTE — PROGRESS NOTES
Cardiology Follow Up Progress Note    Date of Service  2/28/2020    Attending Physician  Mary Carmen Rogers M.D.    Chief Complaint   Atrial fibrillation    HPI  Rufina Macias is a 74 y.o. female admitted 2/21/2020 with hard to control atrial fibrillation with rapid ventricular rate in setting of pulmonary issues of COPD, pulmonary embolism.    Interim Events  Her heart rate increases significantly with ambulation.  At rest, patient is rate controlled.  She has been anticoagulated.  So far she is negative 2.8L.    Review of Systems  Review of Systems   Constitutional: Negative for chills and fever.   HENT: Negative for ear discharge, ear pain, hearing loss and nosebleeds.    Eyes: Negative for pain and discharge.   Respiratory: Positive for shortness of breath. Negative for cough.    Cardiovascular: Negative for chest pain, palpitations and leg swelling.   Gastrointestinal: Negative for abdominal pain, blood in stool, nausea and vomiting.   Genitourinary: Negative for dysuria and hematuria.   Musculoskeletal: Negative for myalgias.   Skin: Negative for rash.   Allergic/Immunologic: Negative for environmental allergies.   Neurological: Negative for dizziness and headaches.   Hematological: Does not bruise/bleed easily.   Psychiatric/Behavioral: Negative for hallucinations and suicidal ideas.       Vital signs in last 24 hours  Temp:  [36 °C (96.8 °F)-36.7 °C (98 °F)] 36.6 °C (97.8 °F)  Pulse:  [55-90] 55  Resp:  [16-20] 18  BP: (142-180)/() 180/100  SpO2:  [91 %-98 %] 92 %    Physical Exam  Physical Exam  Constitutional:       Appearance: She is well-developed.   HENT:      Head: Normocephalic and atraumatic.   Neck:      Vascular: No JVD.   Cardiovascular:      Rate and Rhythm: Normal rate.      Heart sounds: Normal heart sounds. No murmur. No friction rub. No gallop.       Comments: There is presence of an irregularly irregular heartbeats.    Pulmonary:      Effort: No respiratory distress.      Breath sounds:  No wheezing or rales.   Chest:      Chest wall: No tenderness.   Abdominal:      General: There is no distension.      Tenderness: There is no abdominal tenderness. There is no guarding or rebound.   Musculoskeletal:         General: No tenderness.   Lymphadenopathy:      Cervical: No cervical adenopathy.   Skin:     General: Skin is dry.   Neurological:      Mental Status: She is alert and oriented to person, place, and time.         Lab Review  Lab Results   Component Value Date/Time    WBC 12.7 (H) 02/28/2020 05:06 AM    WBC 7.6 12/02/2010 12:00 AM    RBC 5.23 02/28/2020 05:06 AM    RBC 4.92 12/02/2010 12:00 AM    HEMOGLOBIN 15.5 02/28/2020 05:06 AM    HEMATOCRIT 47.9 (H) 02/28/2020 05:06 AM    MCV 91.6 02/28/2020 05:06 AM    MCV 89 12/02/2010 12:00 AM    MCH 29.6 02/28/2020 05:06 AM    MCH 31.3 12/02/2010 12:00 AM    MCHC 32.4 (L) 02/28/2020 05:06 AM    MPV 9.5 02/28/2020 05:06 AM      Lab Results   Component Value Date/Time    SODIUM 139 02/28/2020 05:06 AM    POTASSIUM 4.1 02/28/2020 05:06 AM    CHLORIDE 99 02/28/2020 05:06 AM    CO2 30 02/28/2020 05:06 AM    GLUCOSE 95 02/28/2020 05:06 AM    BUN 14 02/28/2020 05:06 AM    CREATININE 0.65 02/28/2020 05:06 AM    CREATININE 0.80 12/02/2010 12:00 AM    BUNCREATRAT 20 12/02/2010 12:00 AM    GLOMRATE >59 12/02/2010 12:00 AM      Lab Results   Component Value Date/Time    ASTSGOT 24 02/21/2020 06:37 PM    ALTSGPT 23 02/21/2020 06:37 PM     Lab Results   Component Value Date/Time    CHOLSTRLTOT 136 01/02/2020 01:15 PM    LDL 72 01/02/2020 01:15 PM    HDL 47 01/02/2020 01:15 PM    TRIGLYCERIDE 86 01/02/2020 01:15 PM    TROPONINT 6 02/22/2020 10:11 AM       No results for input(s): NTPROBNP in the last 72 hours.    Cardiac Imaging and Procedures Review  Telemetry report showed atrial fibrillation with sporadic RVR.    Echocardiogram:  LVEF of 55 % with global hypokinesis. No significant valvular disease. I have independently interpreted and reviewed echocardiogram's  actual images.     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  She might be having underlying drive that are precipitating atrial fibrillation with RVR.  Therefore, optimize pulmonary embolism treatment, COPD treatment.  In terms of her rate control strategy, I will increase her Cardizem to 240 mg twice a day.  Continue digoxin at 125 mg p.o. once a day.  Continue metoprolol at 100 mg p.o. twice a day.  Continue anticoagulation for stroke risk reduction.  In the future we will consider outpatient CHARLES/cardioversion.    Thank you for allowing me to participate in the care of this patient.      Please contact me with any questions.    Summer Benton M.D.   Cardiologist, [facility]  [phone]

## 2020-02-28 NOTE — FLOWSHEET NOTE
02/28/20 0714   Events/Summary/Plan   Events/Summary/Plan SVN   Skin Inspection Respiratory Device Intact   Interdisciplinary Plan of Care-Goals (Indications)   Bronchodilator Indications Physical Exam / Hyperinflation / Wheezing (bronchospasm)   Interdisciplinary Plan of Care-Outcomes    Bronchodilator Outcome Diminished Wheezing and Volume of Air Movement Increased   Education   Education Yes - Pt. / Family has been Instructed in use of Respiratory Medications and Adverse Reactions;Yes - Pt. / Family has been Instructed in use of Respiratory Equipment   RT Assessment of Delivered Medications   Evaluation of Medication Delivery Daily Yes-- Pt /Family has been Instructed in use of Respiratory Medications and Adverse Reactions   SVN Group   $ SVN Performed Yes   Given By: Mouthpiece   Respiratory WDL   Respiratory (WDL) X   Breath Sounds   RUL Breath Sounds Clear;Inspiratory Wheezes   RML Breath Sounds Diminished   RLL Breath Sounds Diminished   BRYAN Breath Sounds Clear;Inspiratory Wheezes   LLL Breath Sounds Diminished   Pre/Post Intervention Pre Intervention Assessment   Oximetry   $ Pulse Oximetry (Spot Check) Yes   Oxygen   O2 (LPM) 1   O2 Delivery Device Silicone Nasal Cannula   Pulse Oximetry 94 %   Vitals   Respiration 18   Pulse 77

## 2020-02-28 NOTE — FLOWSHEET NOTE
02/27/20 1957   Events/Summary/Plan   Events/Summary/Plan SVN   Skin Inspection Respiratory Device Intact   Location ears   Interdisciplinary Plan of Care-Goals (Indications)   Bronchodilator Indications Physical Exam / Hyperinflation / Wheezing (bronchospasm)   Interdisciplinary Plan of Care-Outcomes    Bronchodilator Outcome Patient at Stable Baseline   Education   Education Yes - Pt. / Family has been Instructed in use of Respiratory Equipment   RT Assessment of Delivered Medications   Evaluation of Medication Delivery Daily Yes-- Pt /Family has been Instructed in use of Respiratory Medications and Adverse Reactions   SVN Group   $ SVN Performed Yes   Given By: Mouthpiece   Date SVN Next Change Due 02/29/20   Breath Sounds   RUL Breath Sounds Diminished   RML Breath Sounds Diminished   RLL Breath Sounds Diminished   BRYAN Breath Sounds Diminished   LLL Breath Sounds Diminished   Secretions   Cough Congested;Non Productive   Oximetry   O2 Alarms Set & Reviewed Yes   Oxygen   O2 (LPM) 1   FiO2% 93 %   O2 Delivery Device Silicone Nasal Cannula

## 2020-02-28 NOTE — PROGRESS NOTES
Report received, chart reviewed, 12 hour chart check completed, orders and POC reviewed. Pt assessed, reports she is feeling better this evening.  Family arrived to visit with the pt, will continue to monitor.

## 2020-02-28 NOTE — PROGRESS NOTES
Pt medicated for chronic back and knee pain. Pt transferred to room 316 on the telemetry unit, all belongings transferred with the pt including glasses, dentures, and home oxygen which was delivered last night.

## 2020-02-29 LAB
ANION GAP SERPL CALC-SCNC: 12 MMOL/L (ref 7–16)
BASOPHILS # BLD AUTO: 0.2 % (ref 0–1.8)
BASOPHILS # BLD: 0.03 K/UL (ref 0–0.12)
BUN SERPL-MCNC: 19 MG/DL (ref 8–22)
CALCIUM SERPL-MCNC: 9 MG/DL (ref 8.4–10.2)
CHLORIDE SERPL-SCNC: 100 MMOL/L (ref 96–112)
CO2 SERPL-SCNC: 28 MMOL/L (ref 20–33)
CREAT SERPL-MCNC: 0.71 MG/DL (ref 0.5–1.4)
EOSINOPHIL # BLD AUTO: 0.06 K/UL (ref 0–0.51)
EOSINOPHIL NFR BLD: 0.4 % (ref 0–6.9)
ERYTHROCYTE [DISTWIDTH] IN BLOOD BY AUTOMATED COUNT: 45.9 FL (ref 35.9–50)
GLUCOSE SERPL-MCNC: 93 MG/DL (ref 65–99)
HCT VFR BLD AUTO: 47.9 % (ref 37–47)
HGB BLD-MCNC: 15.4 G/DL (ref 12–16)
IMM GRANULOCYTES # BLD AUTO: 0.13 K/UL (ref 0–0.11)
IMM GRANULOCYTES NFR BLD AUTO: 0.9 % (ref 0–0.9)
INR PPP: 2.65 (ref 0.87–1.13)
LYMPHOCYTES # BLD AUTO: 3.55 K/UL (ref 1–4.8)
LYMPHOCYTES NFR BLD: 25.9 % (ref 22–41)
MCH RBC QN AUTO: 29.5 PG (ref 27–33)
MCHC RBC AUTO-ENTMCNC: 32.2 G/DL (ref 33.6–35)
MCV RBC AUTO: 91.8 FL (ref 81.4–97.8)
MONOCYTES # BLD AUTO: 1.14 K/UL (ref 0–0.85)
MONOCYTES NFR BLD AUTO: 8.3 % (ref 0–13.4)
NEUTROPHILS # BLD AUTO: 8.8 K/UL (ref 2–7.15)
NEUTROPHILS NFR BLD: 64.3 % (ref 44–72)
NRBC # BLD AUTO: 0 K/UL
NRBC BLD-RTO: 0 /100 WBC
PLATELET # BLD AUTO: 247 K/UL (ref 164–446)
PMV BLD AUTO: 9.6 FL (ref 9–12.9)
POTASSIUM SERPL-SCNC: 4 MMOL/L (ref 3.6–5.5)
PROTHROMBIN TIME: 29 SEC (ref 12–14.6)
RBC # BLD AUTO: 5.22 M/UL (ref 4.2–5.4)
SODIUM SERPL-SCNC: 140 MMOL/L (ref 135–145)
WBC # BLD AUTO: 13.7 K/UL (ref 4.8–10.8)

## 2020-02-29 PROCEDURE — A9270 NON-COVERED ITEM OR SERVICE: HCPCS | Performed by: INTERNAL MEDICINE

## 2020-02-29 PROCEDURE — 700101 HCHG RX REV CODE 250: Performed by: INTERNAL MEDICINE

## 2020-02-29 PROCEDURE — 94760 N-INVAS EAR/PLS OXIMETRY 1: CPT

## 2020-02-29 PROCEDURE — 700102 HCHG RX REV CODE 250 W/ 637 OVERRIDE(OP): Performed by: INTERNAL MEDICINE

## 2020-02-29 PROCEDURE — 700111 HCHG RX REV CODE 636 W/ 250 OVERRIDE (IP): Performed by: INTERNAL MEDICINE

## 2020-02-29 PROCEDURE — 99232 SBSQ HOSP IP/OBS MODERATE 35: CPT | Performed by: INTERNAL MEDICINE

## 2020-02-29 PROCEDURE — 85025 COMPLETE CBC W/AUTO DIFF WBC: CPT

## 2020-02-29 PROCEDURE — 85610 PROTHROMBIN TIME: CPT

## 2020-02-29 PROCEDURE — 80048 BASIC METABOLIC PNL TOTAL CA: CPT

## 2020-02-29 PROCEDURE — 94640 AIRWAY INHALATION TREATMENT: CPT

## 2020-02-29 PROCEDURE — 770020 HCHG ROOM/CARE - TELE (206)

## 2020-02-29 RX ORDER — DILTIAZEM HYDROCHLORIDE 240 MG/1
240 CAPSULE, COATED, EXTENDED RELEASE ORAL 2 TIMES DAILY
Qty: 60 CAP | Refills: 1 | Status: SHIPPED | OUTPATIENT
Start: 2020-02-29 | End: 2020-04-06

## 2020-02-29 RX ORDER — METOPROLOL TARTRATE 1 MG/ML
5 INJECTION, SOLUTION INTRAVENOUS
Status: DISCONTINUED | OUTPATIENT
Start: 2020-02-29 | End: 2020-03-02 | Stop reason: HOSPADM

## 2020-02-29 RX ORDER — WARFARIN SODIUM 2 MG/1
4 TABLET ORAL
Status: COMPLETED | OUTPATIENT
Start: 2020-02-29 | End: 2020-02-29

## 2020-02-29 RX ADMIN — HYDROCODONE BITARTRATE AND ACETAMINOPHEN 1 TABLET: 10; 325 TABLET ORAL at 02:54

## 2020-02-29 RX ADMIN — Medication 400 MG: at 05:21

## 2020-02-29 RX ADMIN — HYDROCODONE BITARTRATE AND ACETAMINOPHEN 1 TABLET: 10; 325 TABLET ORAL at 19:55

## 2020-02-29 RX ADMIN — METOPROLOL TARTRATE 100 MG: 50 TABLET, FILM COATED ORAL at 17:12

## 2020-02-29 RX ADMIN — DILTIAZEM HYDROCHLORIDE 240 MG: 120 CAPSULE, COATED, EXTENDED RELEASE ORAL at 05:21

## 2020-02-29 RX ADMIN — IPRATROPIUM BROMIDE 0.5 MG: 0.5 SOLUTION RESPIRATORY (INHALATION) at 06:59

## 2020-02-29 RX ADMIN — HYDROCODONE BITARTRATE AND ACETAMINOPHEN 1 TABLET: 10; 325 TABLET ORAL at 15:35

## 2020-02-29 RX ADMIN — DIGOXIN 125 MCG: 125 TABLET ORAL at 17:11

## 2020-02-29 RX ADMIN — METOPROLOL TARTRATE 5 MG: 5 INJECTION INTRAVENOUS at 17:04

## 2020-02-29 RX ADMIN — WARFARIN SODIUM 4 MG: 2 TABLET ORAL at 17:13

## 2020-02-29 RX ADMIN — HYDROCODONE BITARTRATE AND ACETAMINOPHEN 1 TABLET: 10; 325 TABLET ORAL at 11:35

## 2020-02-29 RX ADMIN — HYDROCODONE BITARTRATE AND ACETAMINOPHEN 1 TABLET: 10; 325 TABLET ORAL at 07:08

## 2020-02-29 RX ADMIN — PREDNISONE 40 MG: 20 TABLET ORAL at 05:21

## 2020-02-29 RX ADMIN — ZOLPIDEM TARTRATE 10 MG: 5 TABLET ORAL at 19:55

## 2020-02-29 RX ADMIN — DILTIAZEM HYDROCHLORIDE 240 MG: 120 CAPSULE, COATED, EXTENDED RELEASE ORAL at 17:12

## 2020-02-29 RX ADMIN — SIMVASTATIN 10 MG: 10 TABLET, FILM COATED ORAL at 17:12

## 2020-02-29 RX ADMIN — METOPROLOL TARTRATE 100 MG: 50 TABLET, FILM COATED ORAL at 05:23

## 2020-02-29 ASSESSMENT — PATIENT HEALTH QUESTIONNAIRE - PHQ9
1. LITTLE INTEREST OR PLEASURE IN DOING THINGS: NOT AT ALL
SUM OF ALL RESPONSES TO PHQ9 QUESTIONS 1 AND 2: 0
2. FEELING DOWN, DEPRESSED, IRRITABLE, OR HOPELESS: NOT AT ALL

## 2020-02-29 ASSESSMENT — PAIN SCALES - PAIN ASSESSMENT IN ADVANCED DEMENTIA (PAINAD)
BREATHING: NORMAL
BREATHING: NORMAL

## 2020-02-29 ASSESSMENT — FIBROSIS 4 INDEX: FIB4 SCORE: 1.5

## 2020-02-29 NOTE — PROGRESS NOTES
Telemetry Shift Summary     Rhythm AFIB  HR Range   Ectopy R PVCs   Measurements na/0.12/na       Normal Values  Rhythm SR  HR Range    Measurements 0.12-0.20 / 0.06-0.10  / 0.30-0.52    Ongoing care. No acute issues over night. Patient rested well over night without c/o anything. Will continue care and monitoring as ordered.

## 2020-02-29 NOTE — CARE PLAN
Problem: Respiratory:  Goal: Respiratory status will improve  Outcome: PROGRESSING AS EXPECTED  Note: Administer anticoagulation as ordered; monitor breath sounds, breathing effort, vital signs; administer supplemental oxygen as ordered.     Problem: Pain Management  Goal: Pain level will decrease to patient's comfort goal  Outcome: PROGRESSING AS EXPECTED  Intervention: Follow pain managment plan developed in collaboration with patient and Interdisciplinary Team  Note: Administer pharmacologic interventions in addition to non-pharmacologic; monitor pain q4h and following medication administration.

## 2020-02-29 NOTE — PROGRESS NOTES
Cardiac monitor summary:    Rhythm: Afib, BBB  Rate:   Ectopy: PVC's  Measurements: -/.12/-    Patient had one occurrence of HR going up to 190's, back down to 100-110's after IV metoprolol, see previous note

## 2020-02-29 NOTE — RESPIRATORY CARE
Respiratory Therapy Update    Events/Summary/Plan: placed on RA @ 1005 SAT 92% pt in no distress doing well..

## 2020-02-29 NOTE — PROGRESS NOTES
Inpatient Anticoagulation Service Note    Date: 2/29/2020    Reason for Anticoagulation: Atrial Fibrillation, New Pulmonary Embolism   Target INR: 2.0 to 3.0  [id number] VASc Score: 5  HAS-BLED Score: 1   Hemoglobin Value: 15.4  Hematocrit Value: (!) 47.9  Lab Platelet Value: 247    INR from last 7 days     Date/Time INR Value    02/29/20 0351  (!) 2.65    02/28/20 0506  (!) 2.25    02/27/20 0450  (!) 2.45    02/26/20 0430  (!) 1.85    02/25/20 0400  (!) 1.32    02/24/20 0430  1.09    02/23/20 0943  1.04    02/23/20 0330  1.1        Dose from last 7 days     Date/Time Dose (mg)    02/29/20 0351  4    02/28/20 1100  5    02/27/20 1100  5    02/26/20 1419  7.5    02/25/20 0734  7.5    02/24/20 1020  7.5    02/23/20 0600  5        Significant Interactions: Corticosteroids, Statin  Bridge Therapy: No  Date of Last VTE Event: 02/21/20  Bridge Therapy Start Date: 02/21/20  Days of Overlap Therapy: 6 (If less than 5 days and overlap therapy discontinued -- document reason (i.e. Bleed Risk))  INR Value Greater than 2 Prior to Discontinuation of Parenteral Anticoagulation: Yes (If still on overlap therapy, if No -- document reason (i.e. Bleed Risk))    Comments: Established clinic patient    Plan:  Give Coumadin 4 mg tonight for INR 2.65  Education Material Provided?: No  Pharmacist suggested discharge dosing: Warfarin 4-5 mg nightly.     Sandy Pedroza [facility]

## 2020-02-29 NOTE — PROGRESS NOTES
Patients HR up to 200 when OOB to BR. Patient reports palpitations.  paged. HR down to 110-130 with recovery.

## 2020-02-29 NOTE — CARE PLAN
Problem: Communication  Goal: The ability to communicate needs accurately and effectively will improve  Outcome: PROGRESSING AS EXPECTED     Problem: Safety  Goal: Will remain free from injury  Outcome: PROGRESSING AS EXPECTED    Ongoing care. No acute issues at this time. Will continue care and monitoring as ordered. Patient is resting well with no c/o anything at this time. Short time of RVR in 120s a-fib. PO meds given shortly after and it went back to her norm. Will continue care and monitoring.

## 2020-02-29 NOTE — PROGRESS NOTES
Report received from Bj RN, care assumed. Patient resting comfortably in bed, no s/s of distress. Denies palpitations. Medicated with norco for 6/10 chronic lower back pain. Denies other needs at this time. Pleasant and cooperative with care. Safety precautions in place.

## 2020-03-01 LAB
ANION GAP SERPL CALC-SCNC: 9 MMOL/L (ref 7–16)
BASOPHILS # BLD AUTO: 0.2 % (ref 0–1.8)
BASOPHILS # BLD: 0.03 K/UL (ref 0–0.12)
BUN SERPL-MCNC: 20 MG/DL (ref 8–22)
CALCIUM SERPL-MCNC: 8.8 MG/DL (ref 8.4–10.2)
CHLORIDE SERPL-SCNC: 103 MMOL/L (ref 96–112)
CO2 SERPL-SCNC: 28 MMOL/L (ref 20–33)
CREAT SERPL-MCNC: 0.67 MG/DL (ref 0.5–1.4)
EOSINOPHIL # BLD AUTO: 0.08 K/UL (ref 0–0.51)
EOSINOPHIL NFR BLD: 0.5 % (ref 0–6.9)
ERYTHROCYTE [DISTWIDTH] IN BLOOD BY AUTOMATED COUNT: 46 FL (ref 35.9–50)
GLUCOSE SERPL-MCNC: 99 MG/DL (ref 65–99)
HCT VFR BLD AUTO: 48 % (ref 37–47)
HGB BLD-MCNC: 15.2 G/DL (ref 12–16)
IMM GRANULOCYTES # BLD AUTO: 0.19 K/UL (ref 0–0.11)
IMM GRANULOCYTES NFR BLD AUTO: 1.2 % (ref 0–0.9)
INR PPP: 3.38 (ref 0.87–1.13)
LYMPHOCYTES # BLD AUTO: 3.7 K/UL (ref 1–4.8)
LYMPHOCYTES NFR BLD: 23.1 % (ref 22–41)
MCH RBC QN AUTO: 29.3 PG (ref 27–33)
MCHC RBC AUTO-ENTMCNC: 31.7 G/DL (ref 33.6–35)
MCV RBC AUTO: 92.5 FL (ref 81.4–97.8)
MONOCYTES # BLD AUTO: 1.15 K/UL (ref 0–0.85)
MONOCYTES NFR BLD AUTO: 7.2 % (ref 0–13.4)
NEUTROPHILS # BLD AUTO: 10.9 K/UL (ref 2–7.15)
NEUTROPHILS NFR BLD: 67.8 % (ref 44–72)
NRBC # BLD AUTO: 0 K/UL
NRBC BLD-RTO: 0 /100 WBC
PLATELET # BLD AUTO: 218 K/UL (ref 164–446)
PMV BLD AUTO: 9.3 FL (ref 9–12.9)
POTASSIUM SERPL-SCNC: 4.4 MMOL/L (ref 3.6–5.5)
PROTHROMBIN TIME: 35.2 SEC (ref 12–14.6)
RBC # BLD AUTO: 5.19 M/UL (ref 4.2–5.4)
SODIUM SERPL-SCNC: 140 MMOL/L (ref 135–145)
WBC # BLD AUTO: 16.1 K/UL (ref 4.8–10.8)

## 2020-03-01 PROCEDURE — 700102 HCHG RX REV CODE 250 W/ 637 OVERRIDE(OP): Performed by: INTERNAL MEDICINE

## 2020-03-01 PROCEDURE — A9270 NON-COVERED ITEM OR SERVICE: HCPCS | Performed by: INTERNAL MEDICINE

## 2020-03-01 PROCEDURE — A9270 NON-COVERED ITEM OR SERVICE: HCPCS

## 2020-03-01 PROCEDURE — 85025 COMPLETE CBC W/AUTO DIFF WBC: CPT

## 2020-03-01 PROCEDURE — 80048 BASIC METABOLIC PNL TOTAL CA: CPT

## 2020-03-01 PROCEDURE — 94640 AIRWAY INHALATION TREATMENT: CPT

## 2020-03-01 PROCEDURE — 700102 HCHG RX REV CODE 250 W/ 637 OVERRIDE(OP)

## 2020-03-01 PROCEDURE — 94760 N-INVAS EAR/PLS OXIMETRY 1: CPT

## 2020-03-01 PROCEDURE — 99232 SBSQ HOSP IP/OBS MODERATE 35: CPT | Performed by: INTERNAL MEDICINE

## 2020-03-01 PROCEDURE — 700101 HCHG RX REV CODE 250: Performed by: INTERNAL MEDICINE

## 2020-03-01 PROCEDURE — 85610 PROTHROMBIN TIME: CPT

## 2020-03-01 PROCEDURE — 770020 HCHG ROOM/CARE - TELE (206)

## 2020-03-01 RX ORDER — DILTIAZEM HYDROCHLORIDE 120 MG/1
CAPSULE, COATED, EXTENDED RELEASE ORAL
Status: COMPLETED
Start: 2020-03-01 | End: 2020-03-01

## 2020-03-01 RX ORDER — WARFARIN SODIUM 2 MG/1
2 TABLET ORAL
Status: COMPLETED | OUTPATIENT
Start: 2020-03-01 | End: 2020-03-01

## 2020-03-01 RX ADMIN — DILTIAZEM HYDROCHLORIDE 240 MG: 120 CAPSULE, COATED, EXTENDED RELEASE ORAL at 05:04

## 2020-03-01 RX ADMIN — WARFARIN SODIUM 2 MG: 2 TABLET ORAL at 17:23

## 2020-03-01 RX ADMIN — HYDROCODONE BITARTRATE AND ACETAMINOPHEN 1 TABLET: 10; 325 TABLET ORAL at 13:19

## 2020-03-01 RX ADMIN — IPRATROPIUM BROMIDE 0.5 MG: 0.5 SOLUTION RESPIRATORY (INHALATION) at 06:35

## 2020-03-01 RX ADMIN — HYDROCODONE BITARTRATE AND ACETAMINOPHEN 1 TABLET: 10; 325 TABLET ORAL at 05:01

## 2020-03-01 RX ADMIN — DIGOXIN 125 MCG: 125 TABLET ORAL at 17:23

## 2020-03-01 RX ADMIN — DILTIAZEM HYDROCHLORIDE 240 MG: 120 CAPSULE, COATED, EXTENDED RELEASE ORAL at 17:23

## 2020-03-01 RX ADMIN — SIMVASTATIN 10 MG: 10 TABLET, FILM COATED ORAL at 17:23

## 2020-03-01 RX ADMIN — HYDROCODONE BITARTRATE AND ACETAMINOPHEN 1 TABLET: 10; 325 TABLET ORAL at 21:47

## 2020-03-01 RX ADMIN — ZOLPIDEM TARTRATE 10 MG: 5 TABLET ORAL at 21:46

## 2020-03-01 RX ADMIN — METOPROLOL TARTRATE 100 MG: 50 TABLET, FILM COATED ORAL at 17:23

## 2020-03-01 RX ADMIN — HYDROCODONE BITARTRATE AND ACETAMINOPHEN 1 TABLET: 10; 325 TABLET ORAL at 09:19

## 2020-03-01 RX ADMIN — Medication 400 MG: at 05:02

## 2020-03-01 RX ADMIN — HYDROCODONE BITARTRATE AND ACETAMINOPHEN 1 TABLET: 10; 325 TABLET ORAL at 00:55

## 2020-03-01 RX ADMIN — IPRATROPIUM BROMIDE 0.5 MG: 0.5 SOLUTION RESPIRATORY (INHALATION) at 10:26

## 2020-03-01 RX ADMIN — METOPROLOL TARTRATE 100 MG: 50 TABLET, FILM COATED ORAL at 05:01

## 2020-03-01 RX ADMIN — IPRATROPIUM BROMIDE 0.5 MG: 0.5 SOLUTION RESPIRATORY (INHALATION) at 13:52

## 2020-03-01 RX ADMIN — HYDROCODONE BITARTRATE AND ACETAMINOPHEN 1 TABLET: 10; 325 TABLET ORAL at 17:23

## 2020-03-01 ASSESSMENT — COGNITIVE AND FUNCTIONAL STATUS - GENERAL
DRESSING REGULAR LOWER BODY CLOTHING: A LITTLE
CLIMB 3 TO 5 STEPS WITH RAILING: A LITTLE
SUGGESTED CMS G CODE MODIFIER MOBILITY: CJ
DRESSING REGULAR UPPER BODY CLOTHING: A LITTLE
MOBILITY SCORE: 21
DAILY ACTIVITIY SCORE: 19
STANDING UP FROM CHAIR USING ARMS: A LITTLE
SUGGESTED CMS G CODE MODIFIER DAILY ACTIVITY: CK
HELP NEEDED FOR BATHING: A LITTLE
PERSONAL GROOMING: A LITTLE
WALKING IN HOSPITAL ROOM: A LITTLE
TOILETING: A LITTLE

## 2020-03-01 ASSESSMENT — ENCOUNTER SYMPTOMS
DEPRESSION: 0
FOCAL WEAKNESS: 0
VOMITING: 0
DIARRHEA: 0
PALPITATIONS: 1
WEAKNESS: 0
NAUSEA: 0
HEARTBURN: 0
HALLUCINATIONS: 0
SORE THROAT: 0
COUGH: 0
NERVOUS/ANXIOUS: 1
SHORTNESS OF BREATH: 1
DIZZINESS: 0
HEADACHES: 0
BLOOD IN STOOL: 0
CHILLS: 0
MYALGIAS: 0
FEVER: 0
ABDOMINAL PAIN: 0
BACK PAIN: 0

## 2020-03-01 NOTE — CARE PLAN
Problem: Communication  Goal: The ability to communicate needs accurately and effectively will improve  Outcome: PROGRESSING AS EXPECTED  Note: Uses call bell and communicates all needs     Problem: Venous Thromboembolism (VTW)/Deep Vein Thrombosis (DVT) Prevention:  Goal: Patient will participate in Venous Thrombosis (VTE)/Deep Vein Thrombosis (DVT)Prevention Measures  Flowsheets (Taken 2/29/2020 2000)  Mechanical Prophylaxis: SCDs, Sequential Compression Device  Pharmacologic Prophylaxis Used: Warfarin (Coumadin)

## 2020-03-01 NOTE — DISCHARGE INSTRUCTIONS
Discharge Instructions    Discharged to home by car with relative. Discharged via wheelchair, hospital escort: Yes.  Special equipment needed: Not Applicable    Be sure to schedule a follow-up appointment with your primary care doctor or any specialists as instructed.     Discharge Plan:   Diet Plan: Discussed  Activity Level: Discussed  Confirmed Follow up Appointment: Appointment Scheduled  Confirmed Symptoms Management: Discussed  Medication Reconciliation Updated: Yes  Influenza Vaccine Indication: Not indicated: Previously immunized this influenza season and > 8 years of age    I understand that a diet low in cholesterol, fat, and sodium is recommended for good health. Unless I have been given specific instructions below for another diet, I accept this instruction as my diet prescription.   Other diet: Heart healthy, consistent leafy greens    Special Instructions: None    · Is patient discharged on Warfarin / Coumadin?   Yes    You are receiving the drug warfarin. Please understand the importance of monitoring warfarin with scheduled PT/INR blood draws.  Follow-up with the [facility] in one week for INR lab..    IMPORTANT: HOW TO USE THIS INFORMATION:  This is a summary and does NOT have all possible information about this product. This information does not assure that this product is safe, effective, or appropriate for you. This information is not individual medical advice and does not substitute for the advice of your health care professional. Always ask your health care professional for complete information about this product and your specific health needs.      WARFARIN - ORAL (WARF-uh-rin)      COMMON BRAND NAME(S): Coumadin      WARNING:  Warfarin can cause very serious (possibly fatal) bleeding. This is more likely to occur when you first start taking this medication or if you take too much warfarin. To decrease your risk for bleeding, your doctor or other health care provider will monitor you  "closely and check your lab results (INR test) to make sure you are not taking too much warfarin. Keep all medical and laboratory appointments. Tell your doctor right away if you notice any signs of serious bleeding. See also Side Effects section.      USES:  This medication is used to treat blood clots (such as in deep vein thrombosis-DVT or pulmonary embolus-PE) and/or to prevent new clots from forming in your body. Preventing harmful blood clots helps to reduce the risk of a stroke or heart attack. Conditions that increase your risk of developing blood clots include a certain type of irregular heart rhythm (atrial fibrillation), heart valve replacement, recent heart attack, and certain surgeries (such as hip/knee replacement). Warfarin is commonly called a \"blood thinner,\" but the more correct term is \"anticoagulant.\" It helps to keep blood flowing smoothly in your body by decreasing the amount of certain substances (clotting proteins) in your blood.      HOW TO USE:  Read the Medication Guide provided by your pharmacist before you start taking warfarin and each time you get a refill. If you have any questions, ask your doctor or pharmacist. Take this medication by mouth with or without food as directed by your doctor or other health care professional, usually once a day. It is very important to take it exactly as directed. Do not increase the dose, take it more frequently, or stop using it unless directed by your doctor. Dosage is based on your medical condition, laboratory tests (such as INR), and response to treatment. Your doctor or other health care provider will monitor you closely while you are taking this medication to determine the right dose for you. Use this medication regularly to get the most benefit from it. To help you remember, take it at the same time each day. It is important to eat a balanced, consistent diet while taking warfarin. Some foods can affect how warfarin works in your body and may " affect your treatment and dose. Avoid sudden large increases or decreases in your intake of foods high in vitamin K (such as broccoli, cauliflower, cabbage, brussels sprouts, kale, spinach, and other green leafy vegetables, liver, green tea, certain vitamin supplements). If you are trying to lose weight, check with your doctor before you try to go on a diet. Cranberry products may also affect how your warfarin works. Limit the amount of cranberry juice (16 ounces/480 milliliters a day) or other cranberry products you may drink or eat.      SIDE EFFECTS:  Nausea, loss of appetite, or stomach/abdominal pain may occur. If any of these effects persist or worsen, tell your doctor or pharmacist promptly. Remember that your doctor has prescribed this medication because he or she has judged that the benefit to you is greater than the risk of side effects. Many people using this medication do not have serious side effects. This medication can cause serious bleeding if it affects your blood clotting proteins too much (shown by unusually high INR lab results). Even if your doctor stops your medication, this risk of bleeding can continue for up to a week. Tell your doctor right away if you have any signs of serious bleeding, including: unusual pain/swelling/discomfort, unusual/easy bruising, prolonged bleeding from cuts or gums, persistent/frequent nosebleeds, unusually heavy/prolonged menstrual flow, pink/dark urine, coughing up blood, vomit that is bloody or looks like coffee grounds, severe headache, dizziness/fainting, unusual or persistent tiredness/weakness, bloody/black/tarry stools, chest pain, shortness of breath, difficulty swallowing. Tell your doctor right away if any of these unlikely but serious side effects occur: persistent nausea/vomiting, severe stomach/abdominal pain, yellowing eyes/skin. This drug rarely has caused very serious (possibly fatal) problems if its effects lead to small blood clots (usually at  the beginning of treatment). This can lead to severe skin/tissue damage that may require surgery or amputation if left untreated. Patients with certain blood conditions (protein C or S deficiency) may be at greater risk. Get medical help right away if any of these rare but serious side effects occur: painful/red/purplish patches on the skin (such as on the toe, breast, abdomen), change in the amount of urine, vision changes, confusion, slurred speech, weakness on one side of the body. A very serious allergic reaction to this drug is rare. However, get medical help right away if you notice any symptoms of a serious allergic reaction, including: rash, itching/swelling (especially of the face/tongue/throat), severe dizziness, trouble breathing. This is not a complete list of possible side effects. If you notice other effects not listed above, contact your doctor or pharmacist. In the [place] - Call your doctor for medical advice about side effects. You may report side effects to FDA at 0-874-FCT-0089. In [place] - Call your doctor for medical advice about side effects. You may report side effects to [facility] [place] at [phone].      PRECAUTIONS:  Before taking warfarin, tell your doctor or pharmacist if you are allergic to it; or if you have any other allergies. This product may contain inactive ingredients, which can cause allergic reactions or other problems. Talk to your pharmacist for more details. Before using this medication, tell your doctor or pharmacist your medical history, especially of: blood disorders (such as anemia, hemophilia), bleeding problems (such as bleeding of the stomach/intestines, bleeding in the brain), blood vessel disorders (such as aneurysms), recent major injury/surgery, liver disease, alcohol use, mental/mood disorders (including memory problems), frequent falls/injuries. It is important that all your doctors and dentists know that you take warfarin. Before having surgery or any  medical/dental procedures, tell your doctor or dentist that you are taking this medication and about all the products you use (including prescription drugs, nonprescription drugs, and herbal products). Avoid getting injections into the muscles. If you must have an injection into a muscle (for example, a flu shot), it should be given in the arm. This way, it will be easier to check for bleeding and/or apply pressure bandages. This medication may cause stomach bleeding. Daily use of alcohol while using this medicine will increase your risk for stomach bleeding and may also affect how this medication works. Limit or avoid alcoholic beverages. If you have not been eating well, if you have an illness or infection that causes fever, vomiting, or diarrhea for more than 2 days, or if you start using any antibiotic medications, contact your doctor or pharmacist immediately because these conditions can affect how warfarin works. This medication can cause heavy bleeding. To lower the chance of getting cut, bruised, or injured, use great caution with sharp objects like safety razors and nail cutters. Use an electric razor when shaving and a soft toothbrush when brushing your teeth. Avoid activities such as contact sports. If you fall or injure yourself, especially if you hit your head, call your doctor immediately. Your doctor may need to check you. The Food & Drug Administration has stated that generic warfarin products are interchangeable. However, consult your doctor or pharmacist before switching warfarin products. Be careful not to take more than one medication that contains warfarin unless specifically directed by the doctor or health care provider who is monitoring your warfarin treatment. Older adults may be at greater risk for bleeding while using this drug. This medication is not recommended for use during pregnancy because of serious (possibly fatal) harm to an unborn baby. Discuss the use of reliable forms of birth  "control with your doctor. If you become pregnant or think you may be pregnant, tell your doctor immediately. If you are planning pregnancy, discuss a plan for managing your condition with your doctor before you become pregnant. Your doctor may switch the type of medication you use during pregnancy. Very small amounts of this medication may pass into breast milk but is unlikely to harm a nursing infant. Consult your doctor before breast-feeding.      DRUG INTERACTIONS:  Drug interactions may change how your medications work or increase your risk for serious side effects. This document does not contain all possible drug interactions. Keep a list of all the products you use (including prescription/nonprescription drugs and herbal products) and share it with your doctor and pharmacist. Do not start, stop, or change the dosage of any medicines without your doctor's approval. Warfarin interacts with many prescription, nonprescription, vitamin, and herbal products. This includes medications that are applied to the skin or inside the vagina or rectum. The interactions with warfarin usually result in an increase or decrease in the \"blood-thinning\" (anticoagulant) effect. Your doctor or other health care professional should closely monitor you to prevent serious bleeding or clotting problems. While taking warfarin, it is very important to tell your doctor or pharmacist of any changes in medications, vitamins, or herbal products that you are taking. Some products that may interact with this drug include: capecitabine, imatinib, mifepristone. Aspirin, aspirin-like drugs (salicylates), and nonsteroidal anti-inflammatory drugs (NSAIDs such as ibuprofen, naproxen, celecoxib) may have effects similar to warfarin. These drugs may increase the risk of bleeding problems if taken during treatment with warfarin. Carefully check all prescription/nonprescription product labels (including drugs applied to the skin such as pain-relieving " creams) since the products may contain NSAIDs or salicylates. Talk to your doctor about using a different medication (such as acetaminophen) to treat pain/fever. Low-dose aspirin and related drugs (such as clopidogrel, ticlopidine) should be continued if prescribed by your doctor for specific medical reasons such as heart attack or stroke prevention. Consult your doctor or pharmacist for more details. Many herbal products interact with warfarin. Tell your doctor before taking any herbal products, especially bromelains, coenzyme Q10, cranberry, danshen, dong quai, fenugreek, garlic, ginkgo biloba, ginseng, and [place]'s wort, among others. This medication may interfere with a certain laboratory test to measure theophylline levels, possibly causing false test results. Make sure laboratory personnel and all your doctors know you use this drug.      OVERDOSE:  If overdose is suspected, contact a poison control center or emergency room immediately.  residents can call the [place] [employer] Poison Hotline at 1-839.196.7171. [place] residents can call a provincial poison control center. Symptoms of overdose may include: bloody/black/tarry stools, pink/dark urine, unusual/prolonged bleeding.      NOTES:  Do not share this medication with others. Laboratory and/or medical tests (such as INR, complete blood count) must be performed periodically to monitor your progress or check for side effects. Consult your doctor for more details.      MISSED DOSE:  For the best possible benefit, do not miss any doses. If you do miss a dose and remember on the same day, take it as soon as you remember. If you remember on the next day, skip the missed dose and resume your usual dosing schedule. Do not double the dose to catch up because this could increase your risk for bleeding. Keep a record of missed doses to give to your doctor or pharmacist. Contact your doctor or pharmacist if you miss 2 or more doses in a row.      STORAGE:  Store at room  temperature away from light and moisture. Do not store in the bathroom. Keep all medications away from children and pets. Do not flush medications down the toilet or pour them into a drain unless instructed to do so. Properly discard this product when it is  or no longer needed. Consult your pharmacist or local waste disposal company for more details about how to safely discard your product.      MEDICAL ALERT:  Your condition and medication can cause complications in a medical emergency. For information about enrolling in MedicAlert, call 1-855.303.4769 ([place]) or 1-281.671.5965 ([place]).      Information last revised 2010 Copyright(c) 2010 [employer]             Depression / Suicide Risk    As you are discharged from this [facility] facility, it is important to learn how to keep safe from harming yourself.    Recognize the warning signs:  · Abrupt changes in personality, positive or negative- including increase in energy   · Giving away possessions  · Change in eating patterns- significant weight changes-  positive or negative  · Change in sleeping patterns- unable to sleep or sleeping all the time   · Unwillingness or inability to communicate  · Depression  · Unusual sadness, discouragement and loneliness  · Talk of wanting to die  · Neglect of personal appearance   · Rebelliousness- reckless behavior  · Withdrawal from people/activities they love  · Confusion- inability to concentrate     If you or a loved one observes any of these behaviors or has concerns about self-harm, here's what you can do:  · Talk about it- your feelings and reasons for harming yourself  · Remove any means that you might use to hurt yourself (examples: pills, rope, extension cords, firearm)  · Get professional help from the community (Mental Health, Substance Abuse, psychological counseling)  · Do not be alone:Call your Safe Contact- someone whom you trust who will be there for you.  · Call your local CRISIS  HOTLINE 289-2827 or 917-536-0447  · Call your local Children's Mobile Crisis Response Team [place] (276) 212-3490 or www.[place]  · Call the toll free National Suicide Prevention Hotlines   · National Suicide Prevention Lifeline 741-710-ISSU (0792)  · National [employer] Line Network 800-SUICIDE (436-9903)

## 2020-03-01 NOTE — PROGRESS NOTES
Cardiology answering service paged per , per answering service  signed off yesterday, patient to follow up outpatient. Per  give IV metoprolol for sustained HR>130.

## 2020-03-01 NOTE — PROGRESS NOTES
Took report from Irene FRENCH.  Pt is currently still in Afib and is being rate controlled. Pt is resting in bed, no needs at this time. Bed in low locked position, call light in reach. Will continue to monitor.

## 2020-03-01 NOTE — PROGRESS NOTES
"Patient medicated with IV lopressor for sustained -150 for 10 minutes, c/o \"heart racing\" and palpitaitons. Scheduled evening meds also given.  "

## 2020-03-01 NOTE — PROGRESS NOTES
Report received. Assumed care of patient. Patient sitting up in bed eating breakfast. Patient concerned about heart rate, currently at 60, RN monitoring closely. Patient reassured that RN is monitoring this closely. Patient denies any further needs at this time. All safety precautions in place. Will continue to monitor.

## 2020-03-01 NOTE — PROGRESS NOTES
Inpatient Anticoagulation Service Note    Date: 3/1/2020    Reason for Anticoagulation: Atrial Fibrillation, New Pulmonary Embolism   Target INR: 2.0 to 3.0  [id number] VASc Score: 5  HAS-BLED Score: 1   Hemoglobin Value: 15.2  Hematocrit Value: (!) 48  Lab Platelet Value: 218    INR from last 7 days     Date/Time INR Value    03/01/20 1118  (!) 3.38    02/29/20 0351  (!) 2.65    02/28/20 0506  (!) 2.25    02/27/20 0450  (!) 2.45    02/26/20 0430  (!) 1.85    02/25/20 0400  (!) 1.32    02/24/20 0430  1.09        Dose from last 7 days     Date/Time Dose (mg)    03/01/20 1118  2    02/29/20 0351  4    02/28/20 1100  5    02/27/20 1100  5    02/26/20 1419  7.5    02/25/20 0734  7.5    02/24/20 1020  7.5        Significant Interactions: Corticosteroids, Statin  Bridge Therapy: No  Date of Last VTE Event: 02/21/20  Bridge Therapy Start Date: 02/21/20  Days of Overlap Therapy: 6 (If less than 5 days and overlap therapy discontinued -- document reason (i.e. Bleed Risk))  INR Value Greater than 2 Prior to Discontinuation of Parenteral Anticoagulation: Yes (If still on overlap therapy, if No -- document reason (i.e. Bleed Risk))    Comments: Established clinic patient    Plan:  Give Coumadin 2 mg tonight for INR 3.38  Education Material Provided?: No  Pharmacist suggested discharge dosing: To be determined     Sandy Pedroza [facility]

## 2020-03-01 NOTE — PROGRESS NOTES
Telemetry Strip       Measurements from am strip were as follows:  Rhythm: Afib  HR:   Measurements: 0.12  Ectopy: r PVC, r couplet             Normal Values  Rhythm SR  HR Range    Measurements 0.12-0.20 / 0.06-0.10  / 0.30-0.52

## 2020-03-01 NOTE — PROGRESS NOTES
During ambulation in the hallway, patient's HR ranged from 160s-170s, confirmed with monitor tech. Will report this finding to primary RN Ivan

## 2020-03-01 NOTE — PROGRESS NOTES
Hospital Medicine Daily Progress Note    Date of Service  2/29/2020    Chief Complaint  74 y.o. female admitted 2/21/2020 with SOB    Hospital Course    History of hypertension, hyperlipidemia, recent diagnosis of atrial fibrillation started on Eliquis presented with shortness of breath found to have A. fib with RVR was secondary to bilateral pulmonary emboli.  She had acute respiratory failure with hypoxia.  As this was considered Eliquis failure, she was transitioned to Coumadin.  Multiple medication adjustments but still with refractory RVR      Interval Problem Update  2/29: Tried to ambulate at 1500 but HR up to 200s.  Was ok at rest in AM.  No CP, BP stable    Consultants/Specialty  Dr. [facility]    Code Status  Full    Disposition  Home when medically clear    Review of Systems  Review of Systems   Constitutional: Negative for chills, fever and malaise/fatigue.   HENT: Negative for sore throat.    Respiratory: Positive for shortness of breath. Negative for cough.    Cardiovascular: Positive for palpitations. Negative for chest pain.   Gastrointestinal: Negative for abdominal pain, blood in stool, diarrhea, heartburn, nausea and vomiting.   Genitourinary: Negative for dysuria and frequency.   Musculoskeletal: Negative for back pain and myalgias.   Neurological: Negative for dizziness, focal weakness, weakness and headaches.   Psychiatric/Behavioral: Negative for depression and hallucinations. The patient is nervous/anxious.    All other systems reviewed and are negative.       Physical Exam  Temp:  [37 °C (98.6 °F)-37.2 °C (99 °F)] 37.1 °C (98.7 °F)  Pulse:  [60-95] 62  Resp:  [16-22] 18  BP: (135-154)/(77-86) 149/84  SpO2:  [92 %-95 %] 95 %    Physical Exam  Constitutional:       General: She is not in acute distress.  HENT:      Nose: Congestion present.      Comments: Slightly hoarse voice     Mouth/Throat:      Mouth: Mucous membranes are dry.      Comments: Hoarse voice  Neck:      Musculoskeletal: No  muscular tenderness.   Cardiovascular:      Rate and Rhythm: Normal rate. Rhythm irregular.      Pulses: Normal pulses.      Heart sounds: No murmur.   Pulmonary:      Effort: Pulmonary effort is normal.      Breath sounds: Normal breath sounds.   Abdominal:      General: There is no distension.      Palpations: Abdomen is soft.   Musculoskeletal:         General: No swelling.   Lymphadenopathy:      Cervical: No cervical adenopathy.   Skin:     General: Skin is warm and dry.      Findings: No rash.   Neurological:      General: No focal deficit present.      Mental Status: She is alert and oriented to person, place, and time.      Motor: No weakness.   Psychiatric:         Mood and Affect: Mood normal.         Thought Content: Thought content normal.         Fluids    Intake/Output Summary (Last 24 hours) at 3/1/2020 1129  Last data filed at 3/1/2020 0800  Gross per 24 hour   Intake 450 ml   Output 100 ml   Net 350 ml       Laboratory  Recent Labs     02/28/20  0506 02/29/20  0351 03/01/20  0357   WBC 12.7* 13.7* 16.1*   RBC 5.23 5.22 5.19   HEMOGLOBIN 15.5 15.4 15.2   HEMATOCRIT 47.9* 47.9* 48.0*   MCV 91.6 91.8 92.5   MCH 29.6 29.5 29.3   MCHC 32.4* 32.2* 31.7*   RDW 44.9 45.9 46.0   PLATELETCT 216 247 218   MPV 9.5 9.6 9.3     Recent Labs     02/28/20  0506 02/29/20  0351 03/01/20  0357   SODIUM 139 140 140   POTASSIUM 4.1 4.0 4.4   CHLORIDE 99 100 103   CO2 30 28 28   GLUCOSE 95 93 99   BUN 14 19 20   CREATININE 0.65 0.71 0.67   CALCIUM 9.0 9.0 8.8     Recent Labs     02/28/20  0506 02/29/20  0351   INR 2.25* 2.65*               Imaging  EC-ECHOCARDIOGRAM LTD W/O CONT   Final Result      DX-CHEST-PORTABLE (1 VIEW)   Final Result         1.  Pulmonary vascular congestion, no focal infiltrates   2.  Cardiomegaly      CT-CTA CHEST PULMONARY ARTERY W/ RECONS   Final Result         1.  Pulmonary embolus involving the right distal main pulmonary artery extending into right middle and lower lobe segmental and  subsegmental branches. Left upper and lower lobe subsegmental pulmonary emboli.   2.  Distal esophageal wall thickening, recommend follow-up esophagoscopy for evaluation of esophagitis versus infiltrating esophageal neoplasia.   3.  Hepatomegaly   4.  Left adrenal nodule, indeterminate, recommend follow-up adrenal protocol CT or MRI for further characterization as clinically appropriate.   5.  1.3 cm left lower lobe pulmonary nodule, see nodule follow-up recommendations below.      Low and High Risk: Consider CT at 3 months, PET/CT, or tissue sampling.      Low Risk - Minimal or absent history of smoking and of other known risk factors.      High Risk - History of smoking or of other known risk factors.      Note: These recommendations do not apply to lung cancer screening, patients with immunosuppression, or patients with known primary cancer.      Fleischner Society 2017 Guidelines for Management of Incidentally Detected Pulmonary Nodules in Adults         These findings were discussed with the patient's clinician, LILLIAN LEMUS, on 2/21/2020 9:33 PM.      DX-CHEST-PORTABLE (1 VIEW)   Final Result         1. No acute cardiopulmonary abnormalities are identified.           Assessment/Plan  * Bilateral pulmonary embolism (HCC)- (present on admission)  Assessment & Plan  Consider unprovoked, failed eliquis  Cont coumadin for goal INR 2-3      Atrial fibrillation (HCC)- (present on admission)  Assessment & Plan  Secondary to acute PE    On metoprolol, dilt, and digoxin   Still with RVR at times  INR at goal today   Monitor on telemetry   If no evidence of sporadic RVR in the next 24 hours then will plan for discharge tomorrow and outpatient cardiology follow up (Monday 3/2)      Pulmonary nodule  Assessment & Plan  -- Considered intermediate pretest for malignancy given risk factors   -- Will need repeat CT chest in 6 months       Herpes zoster without complication  Assessment & Plan  Chronic on right butt  cheek  Cover till crustates  Standard precautions    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 with obstruction- ERCP EXTRACTON/ SPHINCTEROTOMY, recurrent Feb 2020- (present on admission)  Assessment & Plan  Had recent ERCP  Will follow up with GI    Essential hypertension- (present on admission)  Assessment & Plan  On metoprolol and diltiazem    Goal SBP <140    COPD (chronic obstructive pulmonary disease) (HCC)- (present on admission)  Assessment & Plan  Cont prednisone 40 mg X 5 days (end date 3/1)   Cont atrovent QID   Cont PT/OT and IS as tolerated       Hyperlipidemia- (present on admission)  Assessment & Plan  On statin         VTE prophylaxis: Coumadin

## 2020-03-01 NOTE — PROGRESS NOTES
Cardiac monitor summary:    Rhythm: Afib/BBB  Rate: , low of 39 in the morning and jumped up to 200 in the afternoon (see previous notes)  Ectopy: none  Measurements: -/.12/-

## 2020-03-02 ENCOUNTER — APPOINTMENT (OUTPATIENT)
Dept: VASCULAR LAB | Facility: MEDICAL CENTER | Age: 75
End: 2020-03-02
Payer: MEDICARE

## 2020-03-02 ENCOUNTER — PATIENT OUTREACH (OUTPATIENT)
Dept: HEALTH INFORMATION MANAGEMENT | Facility: OTHER | Age: 75
End: 2020-03-02

## 2020-03-02 VITALS
SYSTOLIC BLOOD PRESSURE: 126 MMHG | TEMPERATURE: 97.5 F | OXYGEN SATURATION: 90 % | RESPIRATION RATE: 18 BRPM | WEIGHT: 209.66 LBS | BODY MASS INDEX: 37.15 KG/M2 | HEIGHT: 63 IN | DIASTOLIC BLOOD PRESSURE: 77 MMHG | HEART RATE: 70 BPM

## 2020-03-02 LAB
ANION GAP SERPL CALC-SCNC: 12 MMOL/L (ref 7–16)
BASOPHILS # BLD AUTO: 0.2 % (ref 0–1.8)
BASOPHILS # BLD: 0.03 K/UL (ref 0–0.12)
BUN SERPL-MCNC: 22 MG/DL (ref 8–22)
CALCIUM SERPL-MCNC: 9 MG/DL (ref 8.4–10.2)
CHLORIDE SERPL-SCNC: 100 MMOL/L (ref 96–112)
CO2 SERPL-SCNC: 27 MMOL/L (ref 20–33)
CREAT SERPL-MCNC: 0.83 MG/DL (ref 0.5–1.4)
EOSINOPHIL # BLD AUTO: 0.18 K/UL (ref 0–0.51)
EOSINOPHIL NFR BLD: 1.4 % (ref 0–6.9)
ERYTHROCYTE [DISTWIDTH] IN BLOOD BY AUTOMATED COUNT: 46.5 FL (ref 35.9–50)
GLUCOSE SERPL-MCNC: 101 MG/DL (ref 65–99)
HCT VFR BLD AUTO: 50.2 % (ref 37–47)
HGB BLD-MCNC: 15.7 G/DL (ref 12–16)
IMM GRANULOCYTES # BLD AUTO: 0.16 K/UL (ref 0–0.11)
IMM GRANULOCYTES NFR BLD AUTO: 1.2 % (ref 0–0.9)
INR PPP: 2.85 (ref 0.87–1.13)
LYMPHOCYTES # BLD AUTO: 3.21 K/UL (ref 1–4.8)
LYMPHOCYTES NFR BLD: 24.6 % (ref 22–41)
MCH RBC QN AUTO: 29.4 PG (ref 27–33)
MCHC RBC AUTO-ENTMCNC: 31.3 G/DL (ref 33.6–35)
MCV RBC AUTO: 94 FL (ref 81.4–97.8)
MONOCYTES # BLD AUTO: 1.08 K/UL (ref 0–0.85)
MONOCYTES NFR BLD AUTO: 8.3 % (ref 0–13.4)
NEUTROPHILS # BLD AUTO: 8.41 K/UL (ref 2–7.15)
NEUTROPHILS NFR BLD: 64.3 % (ref 44–72)
NRBC # BLD AUTO: 0 K/UL
NRBC BLD-RTO: 0 /100 WBC
PLATELET # BLD AUTO: 218 K/UL (ref 164–446)
PMV BLD AUTO: 9.7 FL (ref 9–12.9)
POTASSIUM SERPL-SCNC: 4.5 MMOL/L (ref 3.6–5.5)
PROTHROMBIN TIME: 30.7 SEC (ref 12–14.6)
RBC # BLD AUTO: 5.34 M/UL (ref 4.2–5.4)
SODIUM SERPL-SCNC: 139 MMOL/L (ref 135–145)
WBC # BLD AUTO: 13.1 K/UL (ref 4.8–10.8)

## 2020-03-02 PROCEDURE — 700102 HCHG RX REV CODE 250 W/ 637 OVERRIDE(OP): Performed by: INTERNAL MEDICINE

## 2020-03-02 PROCEDURE — A9270 NON-COVERED ITEM OR SERVICE: HCPCS | Performed by: INTERNAL MEDICINE

## 2020-03-02 PROCEDURE — 85610 PROTHROMBIN TIME: CPT

## 2020-03-02 PROCEDURE — 85025 COMPLETE CBC W/AUTO DIFF WBC: CPT

## 2020-03-02 PROCEDURE — 99223 1ST HOSP IP/OBS HIGH 75: CPT | Performed by: INTERNAL MEDICINE

## 2020-03-02 PROCEDURE — 80048 BASIC METABOLIC PNL TOTAL CA: CPT

## 2020-03-02 PROCEDURE — 99239 HOSP IP/OBS DSCHRG MGMT >30: CPT | Performed by: INTERNAL MEDICINE

## 2020-03-02 RX ORDER — WARFARIN SODIUM 2.5 MG/1
2.5 TABLET ORAL DAILY
Status: DISCONTINUED | OUTPATIENT
Start: 2020-03-02 | End: 2020-03-02 | Stop reason: HOSPADM

## 2020-03-02 RX ADMIN — HYDROCODONE BITARTRATE AND ACETAMINOPHEN 1 TABLET: 10; 325 TABLET ORAL at 14:37

## 2020-03-02 RX ADMIN — HYDROCODONE BITARTRATE AND ACETAMINOPHEN 1 TABLET: 10; 325 TABLET ORAL at 02:18

## 2020-03-02 RX ADMIN — HYDROCODONE BITARTRATE AND ACETAMINOPHEN 1 TABLET: 10; 325 TABLET ORAL at 06:16

## 2020-03-02 RX ADMIN — HYDROCODONE BITARTRATE AND ACETAMINOPHEN 1 TABLET: 10; 325 TABLET ORAL at 10:26

## 2020-03-02 RX ADMIN — DILTIAZEM HYDROCHLORIDE 240 MG: 120 CAPSULE, COATED, EXTENDED RELEASE ORAL at 06:16

## 2020-03-02 RX ADMIN — METOPROLOL TARTRATE 100 MG: 50 TABLET, FILM COATED ORAL at 06:16

## 2020-03-02 RX ADMIN — Medication 400 MG: at 06:16

## 2020-03-02 ASSESSMENT — ENCOUNTER SYMPTOMS
SHORTNESS OF BREATH: 1
MYALGIAS: 0
ABDOMINAL PAIN: 0
BLOOD IN STOOL: 0
CHILLS: 0
HEARTBURN: 0
VOMITING: 0
SORE THROAT: 0
WEAKNESS: 0
DIARRHEA: 0
FOCAL WEAKNESS: 0
NERVOUS/ANXIOUS: 1
NAUSEA: 0
HALLUCINATIONS: 0
BACK PAIN: 0
PALPITATIONS: 1
DIZZINESS: 0
COUGH: 0
HEADACHES: 0
DEPRESSION: 0
FEVER: 0

## 2020-03-02 NOTE — PROGRESS NOTES
"Chief Complaint   Patient presents with   • Atrial Fibrillation   • HTN (Uncontrolled)       Subjective:   Rufina Macias is a 74 y.o. female previous patient of Dr. Flores who presents today for hospital follow up.     Patient was admitted on 2/21/20 with A-Fib with RVR, she was symptomatic with SOB and palpitations. Her rate was controlled with the addition of Diltiazem/Digoxin and continued use of Lopressor 100 mg BID.  A CT of her chest did show bilateral PE thought to be possibly due to Eliquis failure and was therefore placed on Warfarin. Echocardiogram showed normal RV and LV function. It was decided to hold off on TTE/DCCV due to her oxygen needs due to her PE. She was discharged on 3/2/20.    Other past medical history significant for COPD, Arcadia's disease, pituitary disorder, hyperlipidemia, hypertension and HIV.    Today patient states that she is still not back to \"normal\" but is feeling much better. She states that she can feel when her A-Fib is rapid and has only had one event since discharge. She is still on oxygen via nasal cannula at 2 L, states that when she takes it off the lowest she has dropped in O2 saturation was 89%.     Past Medical History:   Diagnosis Date   • Allergy    • Arthritis     Spine, neck, hands, ankles and knees   • Asthma     inhalers as needed   • Back pain     and neck   • Breath shortness     at times   • Bronchitis    • CA - cancer of uterus    • Cancer (HCC) 2010    uterine   • Carpal tunnel syndrome    • COPD    • Diverticula of colon    • Heart burn    • High cholesterol    • Hyperlipidemia    • Hypertension    • Pneumonia 1993   • Tremor, hereditary, benign      Past Surgical History:   Procedure Laterality Date   • PB ERCP,DIAGNOSTIC  2/14/2020    Procedure: ERCP (ENDOSCOPIC RETROGRADE CHOLANGIOPANCREATOGRAPHY);  Surgeon: Clinton Ray M.D.;  Location: SURGERY [facility];  Service: Gastroenterology   • ERCP  4/5/2018    Procedure: ERCP W/POSS BIOPSY;  " Surgeon: Quincy Louie M.D.;  Location: [facility];  Service: Gastroenterology   • ERCP W/SPHINCTEROTOMY/PAPILL.  4/5/2018    Procedure: ERCP W/SPHINCTEROTOMY/PAPILL.;  Surgeon: Quincy Louie M.D.;  Location: [facility];  Service: Gastroenterology   • ERCP W/ INSERTION STENT/TUBE  4/5/2018    Procedure: ERCP W/ INSERTION STENT/TUBE - STENT PLACEMENT/REMOVAL;  Surgeon: Quincy Louie M.D.;  Location: [facility];  Service: Gastroenterology   • ERCP W/REMOVAL CALCULUS  4/5/2018    Procedure: ERCP W/REMOVAL CALCULUS W/DILATION;  Surgeon: Quincy Louie M.D.;  Location: [facility];  Service: Gastroenterology   • ERCP  2/15/2018    Procedure: ERCP, SPHINCTEROTOMY, STENT PLACEMENT, DEBRIDEMENT;  Surgeon: Quincy Louie M.D.;  Location: [facility];  Service: Gastroenterology   • COMMON BILE DUCT EXPLORATION  02/15/2018   • ERCP W/ INSERTION STENT/TUBE  02/15/2018   • ERCP W/SPHINCTEROTOMY/PAPILL.  02/15/2018   • ERCP W/REMOVAL CALCULUS  02/15/2018   • ARIELLA BY LAPAROSCOPY  july, 2015    [facility]   • HYSTERECTOMY, TOTAL ABDOMINAL  1/18/10    Uterine, Dr. Whelan and Dr. Cam +BSO   • ABDOMINAL HYSTERECTOMY TOTAL  Jan 2010    Dr. Cam   • OOPHORECTOMY  Jan 2010    BSO   • OPEN REDUCTION      ankle     Family History   Problem Relation Age of Onset   • Heart Disease Father 45        MI   • Lung Disease Father    • Stroke Father 70   • Cancer Father    • Hypertension Father    • Cancer Paternal Grandmother         breast   • Cancer Paternal Grandfather      Social History     Socioeconomic History   • Marital status:      Spouse name: Not on file   • Number of children: Not on file   • Years of education: Not on file   • Highest education level: Not on file   Occupational History   • Not on file   Social Needs   • Financial resource strain: Not on file   • Food insecurity     Worry: Not on file     Inability: Not on file   •  Transportation needs     Medical: Not on file     Non-medical: Not on file   Tobacco Use   • Smoking status: Former Smoker     Last attempt to quit: 1991     Years since quittin.2   • Smokeless tobacco: Never Used   Substance and Sexual Activity   • Alcohol use: No     Alcohol/week: 0.0 - 2.4 oz     Comment: hardly ever   • Drug use: No   • Sexual activity: Never     Partners: Male     Birth control/protection: Post-Menopausal   Lifestyle   • Physical activity     Days per week: Not on file     Minutes per session: Not on file   • Stress: Not on file   Relationships   • Social connections     Talks on phone: Not on file     Gets together: Not on file     Attends Spiritism service: Not on file     Active member of club or organization: Not on file     Attends meetings of clubs or organizations: Not on file     Relationship status: Not on file   • Intimate partner violence     Fear of current or ex partner: Not on file     Emotionally abused: Not on file     Physically abused: Not on file     Forced sexual activity: Not on file   Other Topics Concern   • Not on file   Social History Narrative   • Not on file     Allergies   Allergen Reactions   • Codeine Swelling   • Ace Inhibitors      Cough     Outpatient Encounter Medications as of 3/10/2020   Medication Sig Dispense Refill   • furosemide (LASIX) 20 MG Tab Take 1 Tab by mouth every day. 30 Tab 4   • warfarin (COUMADIN) 5 MG Tab Take 1 tab by mouth daily or as directed by anticoagulation  clinic 90 Tab 1   • Misc. Devices Misc Portable oxygen tank or backpack at 1 Liter /min to replaces large metal tanks. Include tubing 1 Each 1   • Misc. Devices Misc Home nebulizer to use for NPPB treatments due to severe COPD. FOR USE WITH ATROVENT SOLN. 1 Each 0   • potassium chloride ER (KLOR-CON) 10 MEQ tablet Take 1 Tab by mouth every day. 30 Tab 5   • DILTIAZem CD (CARDIZEM CD) 240 MG CAPSULE SR 24 HR Take 1 Cap by mouth 2 Times a Day. 60 Cap 1   • digoxin (LANOXIN)  125 MCG Tab Take 1 Tab by mouth every day at 6 PM. 30 Tab 1   • simvastatin (ZOCOR) 20 MG Tab Take 1 Tab by mouth every evening. 90 Tab 4   • metoprolol (LOPRESSOR) 100 MG Tab Take 1 Tab by mouth 2 times a day. 180 Tab 4   • Calcium Carbonate-Vit D-Min (CALCIUM 1200 PO) Take 1,200 mg by mouth every day.     • Magnesium 400 MG Tab Take 400 mg by mouth every day.     • FIBER PO Take 2 Caps by mouth every day.     • hydrocodone/acetaminophen (NORCO)  MG Tab TAKE ONE TABLET BY MOUTH FOUR TIMES A DAY AS NEEDED 30 DAY SUPPLY  0   • [DISCONTINUED] furosemide (LASIX) 20 MG Tab Take 0.5 Tabs by mouth every day. 30 Tab 4   • ipratropium (ATROVENT) 0.02 % Solution 2.5 mL by Nebulization route every 6 hours as needed (shortness of breath or wheezing). (Patient not taking: Reported on 3/10/2020) 30 Bullet 1   • [DISCONTINUED] furosemide (LASIX) 20 MG Tab Take 0.5 Tabs by mouth 1 time daily as needed (for lower extremity edema or weight gain). 60 Tab 0   • [DISCONTINUED] warfarin (COUMADIN) 5 MG Tab Take 1 Tab by mouth every day at 6 PM. 7 Tab 0   • ipratropium-albuterol (COMBIVENT RESPIMAT)  MCG/ACT Aero Soln Inhale 1 Puff by mouth every 6 hours as needed (shortness of breath). (Patient not taking: Reported on 3/10/2020) 1 Inhaler 5   • [DISCONTINUED] warfarin (COUMADIN) tablet 2.5 mg      • [DISCONTINUED] ipratropium (ATROVENT) 0.02 % nebulizer solution 0.5 mg      • [DISCONTINUED] Metoprolol Tartrate (LOPRESSOR) injection 5 mg      • [DISCONTINUED] DILTIAZem CD (CARDIZEM CD) capsule 240 mg      • [DISCONTINUED] furosemide (LASIX) tablet 10 mg      • [DISCONTINUED] ipratropium (ATROVENT) 0.02 % nebulizer solution 0.5 mg      • [DISCONTINUED] digoxin (LANOXIN) tablet 125 mcg      • [DISCONTINUED] MD Alert...Warfarin per Pharmacy      • [DISCONTINUED] ipratropium (ATROVENT) 0.02 % nebulizer solution 0.5 mg      • [DISCONTINUED] metoprolol (LOPRESSOR) tablet 100 mg      • [DISCONTINUED] HYDROcodone/acetaminophen  "(NORCO)  MG per tablet 1 Tab      • [DISCONTINUED] magnesium oxide (MAG-OX) tablet 400 mg      • [DISCONTINUED] simvastatin (ZOCOR) tablet 10 mg      • [DISCONTINUED] zolpidem (AMBIEN) tablet 10 mg      • [DISCONTINUED] senna-docusate (PERICOLACE or SENOKOT S) 8.6-50 MG per tablet 2 Tab      • [DISCONTINUED] polyethylene glycol/lytes (MIRALAX) PACKET 1 Packet      • [DISCONTINUED] magnesium hydroxide (MILK OF MAGNESIA) suspension 30 mL      • [DISCONTINUED] bisacodyl (DULCOLAX) suppository 10 mg      • [DISCONTINUED] Respiratory Therapy Consult      • [DISCONTINUED] acetaminophen (TYLENOL) tablet 650 mg      • [DISCONTINUED] enalaprilat (VASOTEC) injection 1.25 mg      • [DISCONTINUED] ondansetron (ZOFRAN) syringe/vial injection 4 mg      • [DISCONTINUED] ondansetron (ZOFRAN ODT) dispertab 4 mg      • [DISCONTINUED] albuterol (PROVENTIL) 2.5mg/0.5ml nebulizer solution 2.5 mg        No facility-administered encounter medications on file as of 3/10/2020.      Review of Systems   Constitutional: Negative for malaise/fatigue and weight loss.   Respiratory: Positive for shortness of breath.    Cardiovascular: Positive for palpitations (Improved). Negative for chest pain, orthopnea, claudication, leg swelling and PND.   Neurological: Negative for dizziness and weakness.   All other systems reviewed and are negative.       Objective:   /88 (BP Location: Left arm, Patient Position: Sitting, BP Cuff Size: Adult)   Pulse 80   Resp 16   Ht 1.549 m (5' 1\")   Wt 96.2 kg (212 lb)   SpO2 92%   BMI 40.06 kg/m²     Physical Exam   Constitutional: She is oriented to person, place, and time. She appears well-developed and well-nourished. No distress.   HENT:   Head: Normocephalic.   Eyes: EOM are normal.   Neck: No JVD present.   Cardiovascular: Normal rate and normal heart sounds. An irregularly irregular rhythm present.   Pulmonary/Chest: No respiratory distress. She has decreased breath sounds in the right lower " field and the left lower field.   Abdominal: Soft. There is no abdominal tenderness.   Musculoskeletal:         General: Edema (Trace BLE) present.   Neurological: She is alert and oriented to person, place, and time.   Skin: Skin is warm and dry.   Psychiatric: She has a normal mood and affect. Her behavior is normal.        Echocardiogram 2/25/20:  Limited Exam for LV and RV Function  Compared to the images of the prior study done 2/11/20 -  there has   been no significant change.   Left ventricular systolic function is normal.  Left ventricular ejection fraction is visually estimated to be 55%.  Right ventricular systolic function is normal.    NM-Cardiac PET Scan 8/7/17:  CONCLUSIONS AND IMPRESSIONS:  Normal perfusion on PET with normal function of the leftventricle.  No evidence of ischemia or infarction.     Assessment:     1. Atrial fibrillation, unspecified type (HCC)  EKG    EKG    DIGOXIN    Basic Metabolic Panel   2. Bilateral leg edema  furosemide (LASIX) 20 MG Tab   3. Acute saddle pulmonary embolism without acute cor pulmonale (HCC)     4. Mixed hyperlipidemia     5. Essential hypertension     6. High risk medication use     7. Anticoagulated         Medical Decision Making:  Today's Assessment / Status / Plan:     A-Fib:  -EKG today shows A-Fib, rate 108 (down from previous EKG showing rate in the 160's).   -Continue Digoxin 125 mcg daily, Diltiazem 240 mg daily, Lopressor 100 mg BID and Magnesium 400 mg daily.   -OAC with Warfarin managed by [facility] (therapeutic INR since 2/27/20).   -Dig level in 7-10 days.     HTN:  -Slightly elevated.  -Lasix increased to 20 mg daily.  -Repeat BMP in 7-10 days.     HLD:  -Stable.  -Continue simvastatin 20 mg daily.     Bilateral PE:  -OAC as above.  -Followed by PCP.     Patient is feeling better overall, however I think that she needs a few more weeks of recovery before DCCV is attempted. Will re-evaluate patient at next apt as scheduled below in  approximately 4 weeks. Discussed 24/7 call access to our office should questions or concerns arise in the mean time. Patient understands and agrees with the plan of care.     Future Appointments   Date Time Provider Department Center   3/12/2020  1:30 PM [facility] PHARMACIST IRINA Mello   4/6/2020  2:00 PM Quincy Angel M.D. 25M [facility]   4/7/2020  7:45 AM Mary Carmen Rogers M.D. PULM None   4/8/2020  1:15 PM SHERRIE Howard. [facility] None   7/6/2020 10:40 AM Quincy Angel M.D. 25M [facility]     Collaborating Provider: Dr. Gerald Espinoza       Please note that this dictation was created using voice recognition software. I have made every reasonable attempt to correct obvious errors, but I expect that there are errors of grammar and possibly content I did not discover before finalizing the note.

## 2020-03-02 NOTE — PROGRESS NOTES
Telemetry Shift Summary    Rhythm Afib  HR Range 61-77 low: 35  Ectopy RPVC  Measurements -/0.14/-        Normal Values  Rhythm SR  HR Range    Measurements 0.12-0.20 / 0.06-0.10  / 0.30-0.52

## 2020-03-02 NOTE — CARE PLAN
Problem: Communication  Goal: The ability to communicate needs accurately and effectively will improve  Outcome: PROGRESSING AS EXPECTED   Pt verbalizes needs  Problem: Safety  Goal: Will remain free from injury  Outcome: PROGRESSING AS EXPECTED  Goal: Will remain free from falls  Outcome: PROGRESSING AS EXPECTED   Safety measures in place

## 2020-03-02 NOTE — PROGRESS NOTES
Received report from Alexis FRENCH. Discussed POC. Pt awake in bed. Bed in low, locked position with call bell in reach.

## 2020-03-02 NOTE — PROGRESS NOTES
Hospital Medicine Daily Progress Note    Date of Service  3/1/2020    Chief Complaint  74 y.o. female admitted 2/21/2020 with SOB    Hospital Course    History of hypertension, hyperlipidemia, recent diagnosis of atrial fibrillation started on Eliquis presented with shortness of breath found to have A. fib with RVR was secondary to bilateral pulmonary emboli.  She had acute respiratory failure with hypoxia.  As this was considered Eliquis failure, she was transitioned to Coumadin.  Multiple medication adjustments but still with refractory RVR      Interval Problem Update  2/29: Tried to ambulate at 1500 but HR up to 200s.  Was ok at rest in AM.  No CP, BP stable  3/1: Heart rate was controlled at rest in the morning, with ambulation in the afternoon again, had heart rates up to 130s to 140s.  She was too anxious to discharge home without seeing cardiology.  Re-consulted cardiology and they will see the patient in the morning    Consultants/Specialty  Dr. Benton / [facility]- Cardiology    Code Status  Full    Disposition  Home when medically clear    Review of Systems  Review of Systems   Constitutional: Negative for chills, fever and malaise/fatigue.   HENT: Negative for sore throat.    Respiratory: Positive for shortness of breath. Negative for cough.    Cardiovascular: Positive for palpitations. Negative for chest pain.   Gastrointestinal: Negative for abdominal pain, blood in stool, diarrhea, heartburn, nausea and vomiting.   Genitourinary: Negative for dysuria and frequency.   Musculoskeletal: Negative for back pain and myalgias.   Neurological: Negative for dizziness, focal weakness, weakness and headaches.   Psychiatric/Behavioral: Negative for depression and hallucinations. The patient is nervous/anxious.    All other systems reviewed and are negative.       Physical Exam  Temp:  [36.3 °C (97.3 °F)-36.7 °C (98 °F)] 36.4 °C (97.5 °F)  Pulse:  [52-88] 70  Resp:  [16-20] 19  BP: (110-171)/(60-77) 126/77  SpO2:  [90  %-94 %] 90 %    Physical Exam  Constitutional:       General: She is not in acute distress.  HENT:      Nose: No congestion.      Mouth/Throat:      Mouth: Mucous membranes are dry.   Neck:      Musculoskeletal: No muscular tenderness.   Cardiovascular:      Rate and Rhythm: Normal rate. Rhythm irregular.      Pulses: Normal pulses.      Heart sounds: No murmur.   Pulmonary:      Effort: Pulmonary effort is normal.      Breath sounds: Normal breath sounds.   Abdominal:      General: There is no distension.      Palpations: Abdomen is soft.   Musculoskeletal:         General: No swelling.   Lymphadenopathy:      Cervical: No cervical adenopathy.   Skin:     General: Skin is warm and dry.      Coloration: Skin is pale.      Findings: No rash.   Neurological:      General: No focal deficit present.      Mental Status: She is alert and oriented to person, place, and time.      Motor: No weakness.   Psychiatric:         Mood and Affect: Mood normal.         Thought Content: Thought content normal.         Fluids    Intake/Output Summary (Last 24 hours) at 3/2/2020 1406  Last data filed at 3/2/2020 1215  Gross per 24 hour   Intake 940 ml   Output --   Net 940 ml       Laboratory  Recent Labs     02/29/20  0351 03/01/20  0357 03/02/20  0358   WBC 13.7* 16.1* 13.1*   RBC 5.22 5.19 5.34   HEMOGLOBIN 15.4 15.2 15.7   HEMATOCRIT 47.9* 48.0* 50.2*   MCV 91.8 92.5 94.0   MCH 29.5 29.3 29.4   MCHC 32.2* 31.7* 31.3*   RDW 45.9 46.0 46.5   PLATELETCT 247 218 218   MPV 9.6 9.3 9.7     Recent Labs     02/29/20  0351 03/01/20  0357 03/02/20  0358   SODIUM 140 140 139   POTASSIUM 4.0 4.4 4.5   CHLORIDE 100 103 100   CO2 28 28 27   GLUCOSE 93 99 101*   BUN 19 20 22   CREATININE 0.71 0.67 0.83   CALCIUM 9.0 8.8 9.0     Recent Labs     02/29/20  0351 03/01/20  1118 03/02/20  0358   INR 2.65* 3.38* 2.85*               Imaging  EC-ECHOCARDIOGRAM LTD W/O CONT   Final Result      DX-CHEST-PORTABLE (1 VIEW)   Final Result         1.  Pulmonary  vascular congestion, no focal infiltrates   2.  Cardiomegaly      CT-CTA CHEST PULMONARY ARTERY W/ RECONS   Final Result         1.  Pulmonary embolus involving the right distal main pulmonary artery extending into right middle and lower lobe segmental and subsegmental branches. Left upper and lower lobe subsegmental pulmonary emboli.   2.  Distal esophageal wall thickening, recommend follow-up esophagoscopy for evaluation of esophagitis versus infiltrating esophageal neoplasia.   3.  Hepatomegaly   4.  Left adrenal nodule, indeterminate, recommend follow-up adrenal protocol CT or MRI for further characterization as clinically appropriate.   5.  1.3 cm left lower lobe pulmonary nodule, see nodule follow-up recommendations below.      Low and High Risk: Consider CT at 3 months, PET/CT, or tissue sampling.      Low Risk - Minimal or absent history of smoking and of other known risk factors.      High Risk - History of smoking or of other known risk factors.      Note: These recommendations do not apply to lung cancer screening, patients with immunosuppression, or patients with known primary cancer.      Fleischner Society 2017 Guidelines for Management of Incidentally Detected Pulmonary Nodules in Adults         These findings were discussed with the patient's clinician, LILLIAN LEMUS, on 2/21/2020 9:33 PM.      DX-CHEST-PORTABLE (1 VIEW)   Final Result         1. No acute cardiopulmonary abnormalities are identified.           Assessment/Plan  * Bilateral pulmonary embolism (HCC)- (present on admission)  Assessment & Plan  Consider unprovoked, failed eliquis  Cont coumadin for goal INR 2-3  Outpatient INR clinic follow up      Atrial fibrillation (HCC)- (present on admission)  Assessment & Plan  RVR is likely secondary to acute PE    On metoprolol, dilt, and digoxin   Still with RVR at times, usually with ambulation in afternoon, too anxious to DC home  INR at goal  Monitor on telemetry   Will monitor overnight and  consult cardiology in AM      Pulmonary nodule  Assessment & Plan  -- Considered intermediate pretest for malignancy given risk factors   -- Will need repeat CT chest in 6 months (9/1/2020)      Herpes zoster without complication  Assessment & Plan  Chronic on right buttock  Cover till crustates  Standard precautions    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 with obstruction- ERCP EXTRACTON/ SPHINCTEROTOMY, recurrent Feb 2020- (present on admission)  Assessment & Plan  Had recent ERCP  Will follow up with GI outpatient    Essential hypertension- (present on admission)  Assessment & Plan  At goal  On metoprolol and diltiazem    Goal SBP <140    COPD (chronic obstructive pulmonary disease) (HCC)- (present on admission)  Assessment & Plan  Completed prednisone  Cont atrovent QID   Cont PT/OT and IS as tolerated   Still on 1L      Hyperlipidemia- (present on admission)  Assessment & Plan  On statin       VTE prophylaxis: Coumadin

## 2020-03-02 NOTE — PROGRESS NOTES
Telemetry Strip     Strip printed: 0708  Measurements from am strip were as follows:  Rhythm:af  HR:63  Measurements:-/.16/-        Telemetry Shift Summary:    Rhythm:af  HR: 60-120s  Ectopy:rpvc        Normal Values  Rhythm SR  HR Range    Measurements 0.12-0.20 / 0.06-0.10  / 0.30-0.52

## 2020-03-02 NOTE — PROGRESS NOTES
RN spoke with patient regarding ambulation and possible discharge home. Patient requesting to stay overnight and see cardiology in the AM. Dr. Gifford notified and in agreement.

## 2020-03-02 NOTE — PROGRESS NOTES
Telemetry summary:  Afib 60s-90s and as high as 187 with ambulation   Occasional PVCs and 3.22 second pause noted  0/.16/0

## 2020-03-02 NOTE — PROGRESS NOTES
Inpatient Anticoagulation Service Note    Date: 3/2/2020    Reason for Anticoagulation: Atrial Fibrillation, New Pulmonary Embolism   Target INR: 2.0 to 3.0  [id number] VASc Score: 5  HAS-BLED Score: 1   Hemoglobin Value: 15.7  Hematocrit Value: (!) 50.2  Lab Platelet Value: 218    INR from last 7 days     Date/Time INR Value    03/02/20 0358  (!) 2.85    03/01/20 1118  (!) 3.38    02/29/20 0351  (!) 2.65    02/28/20 0506  (!) 2.25    02/27/20 0450  (!) 2.45    02/26/20 0430  (!) 1.85    02/25/20 0400  (!) 1.32        Dose from last 7 days     Date/Time Dose (mg)    03/02/20 0905  2.5    03/01/20 1118  2    02/29/20 0351  4    02/28/20 1100  5    02/27/20 1100  5    02/26/20 1419  7.5    02/25/20 0734  7.5    02/24/20 1020  7.5        Significant Interactions: Corticosteroids, Statin  Bridge Therapy: No  Date of Last VTE Event: 02/21/20  Bridge Therapy Start Date: 02/21/20  Days of Overlap Therapy: 6 (If less than 5 days and overlap therapy discontinued -- document reason (i.e. Bleed Risk))  INR Value Greater than 2 Prior to Discontinuation of Parenteral Anticoagulation: Yes (If still on overlap therapy, if No -- document reason (i.e. Bleed Risk))    Comments: Established clinic patient    Plan:  Give Coumadin 2.5 mg tonight for INR 2.8  Education Material Provided?: No  Pharmacist suggested discharge dosing: Most likely 2.5-5 mg daily.     Sandy Pedroza [facility]

## 2020-03-02 NOTE — CONSULTS
Reason for Consult:  Asked by Dr Viri Gifford D.O. to see this patient with AF RVR    CC:   Chief Complaint   Patient presents with   • Rapid Heart Beat   • Other     feels something is happening in my chest  feels like possible broncitis   • Atrial Fibrillation   • Cough     has Hx of COPD        HPI:      74 year old woman with AF returns with shortness of breath found PE c/b AF RVR. Initially on eliquis now switched to coumadin.     Patient now much improved. Still with oxygen. Ambulating with rates <130. At rest rates near 60s. Denies other cardiac sypmtoms..    Medications / Drug list prior to admission:  No current facility-administered medications on file prior to encounter.      Current Outpatient Medications on File Prior to Encounter   Medication Sig Dispense Refill   • simvastatin (ZOCOR) 20 MG Tab Take 1 Tab by mouth every evening. 90 Tab 4   • ipratropium-albuterol (COMBIVENT RESPIMAT)  MCG/ACT Aero Soln Inhale 1 Puff by mouth every 6 hours as needed (shortness of breath). 1 Inhaler 5   • metoprolol (LOPRESSOR) 100 MG Tab Take 1 Tab by mouth 2 times a day. 180 Tab 4   • Calcium Carbonate-Vit D-Min (CALCIUM 1200 PO) Take 1,200 mg by mouth every day.     • Magnesium 400 MG Tab Take 400 mg by mouth every day.     • FIBER PO Take 2 Caps by mouth every day.     • hydrocodone/acetaminophen (NORCO)  MG Tab TAKE ONE TABLET BY MOUTH FOUR TIMES A DAY AS NEEDED 30 DAY SUPPLY  0       Current list of administered Medications:    Current Facility-Administered Medications:   •  warfarin (COUMADIN) tablet 2.5 mg, 2.5 mg, Oral, DAILY AT 1800, Viri Gifford D.O.  •  ipratropium (ATROVENT) 0.02 % nebulizer solution 0.5 mg, 0.5 mg, Nebulization, Q6HRS PRN (RT), Viri Gifford D.O.  •  Metoprolol Tartrate (LOPRESSOR) injection 5 mg, 5 mg, Intravenous, Q5 MIN PRN, Viri Gifford D.O., 5 mg at 02/29/20 1704  •  DILTIAZem CD (CARDIZEM CD) capsule 240 mg, 240 mg, Oral, BID, Summer Benton M.D.,  240 mg at 03/02/20 0616  •  furosemide (LASIX) tablet 10 mg, 10 mg, Oral, QDAY PRN, Mary Carmen Rogers M.D.  •  ipratropium (ATROVENT) 0.02 % nebulizer solution 0.5 mg, 0.5 mg, Nebulization, Q4H PRN (RT), Mary Carmen Rogers M.D., 0.5 mg at 03/01/20 1352  •  [COMPLETED] digoxin (LANOXIN) injection 500 mcg, 500 mcg, Intravenous, Once, 500 mcg at 02/24/20 1117 **FOLLOWED BY** [COMPLETED] digoxin (LANOXIN) injection 250 mcg, 250 mcg, Intravenous, Q6HRS, 250 mcg at 02/24/20 2346 **FOLLOWED BY** digoxin (LANOXIN) tablet 125 mcg, 125 mcg, Oral, DAILY AT 1800, Mary Carmen Rogers M.D., 125 mcg at 03/01/20 1723  •  MD Alert...Warfarin per Pharmacy, , Other, PHARMACY TO DOSE, Petros Patel M.D.  •  ipratropium (ATROVENT) 0.02 % nebulizer solution 0.5 mg, 0.5 mg, Nebulization, Q6HRS PRN, Petros Patel M.D., 0.5 mg at 02/25/20 0606  •  metoprolol (LOPRESSOR) tablet 100 mg, 100 mg, Oral, TWICE DAILY, Petros Patel M.D., 100 mg at 03/02/20 0616  •  HYDROcodone/acetaminophen (NORCO)  MG per tablet 1 Tab, 1 Tab, Oral, Q4HRS PRN, EMILY FelicianoO., 1 Tab at 03/02/20 1026  •  magnesium oxide (MAG-OX) tablet 400 mg, 400 mg, Oral, DAILY, EMILY FelicianoO., 400 mg at 03/02/20 0616  •  simvastatin (ZOCOR) tablet 10 mg, 10 mg, Oral, Q EVENING, EMILY FelicianoO., 10 mg at 03/01/20 1723  •  zolpidem (AMBIEN) tablet 10 mg, 10 mg, Oral, HS PRN, EMILY FelicianoO., 10 mg at 03/01/20 2146  •  senna-docusate (PERICOLACE or SENOKOT S) 8.6-50 MG per tablet 2 Tab, 2 Tab, Oral, BID, Stopped at 02/24/20 0600 **AND** polyethylene glycol/lytes (MIRALAX) PACKET 1 Packet, 1 Packet, Oral, QDAY PRN **AND** magnesium hydroxide (MILK OF MAGNESIA) suspension 30 mL, 30 mL, Oral, QDAY PRN **AND** bisacodyl (DULCOLAX) suppository 10 mg, 10 mg, Rectal, QDAY PRN, Parvez Houston D.O.  •  Respiratory Therapy Consult, , Nebulization, Continuous RT, Parvez Houston D.O.  •  acetaminophen (TYLENOL) tablet 650 mg, 650 mg, Oral, Q6HRS PRN,  Parvez Houston D.O.  •  enalaprilat (VASOTEC) injection 1.25 mg, 1.25 mg, Intravenous, Q6HRS PRN, Parvez Houston D.O., Stopped at 02/27/20 1649  •  ondansetron (ZOFRAN) syringe/vial injection 4 mg, 4 mg, Intravenous, Q4HRS PRN, Parvez Houston D.O., 4 mg at 02/25/20 0528  •  ondansetron (ZOFRAN ODT) dispertab 4 mg, 4 mg, Oral, Q4HRS PRN, Parvez Houston D.O.  •  albuterol (PROVENTIL) 2.5mg/0.5ml nebulizer solution 2.5 mg, 2.5 mg, Nebulization, Q2HRS PRN (RT), Parvez Houston D.O., 2.5 mg at 02/25/20 0606    Past Medical History:   Diagnosis Date   • Allergy    • Arthritis     Spine, neck, hands, ankles and knees   • Asthma     inhalers as needed   • Back pain     and neck   • Breath shortness     at times   • Bronchitis    • CA - cancer of uterus    • Cancer (HCC) 2010    uterine   • Carpal tunnel syndrome    • COPD    • Diverticula of colon    • Heart burn    • High cholesterol    • Hyperlipidemia    • Hypertension    • Pneumonia 1993   • Tremor, hereditary, benign        Past Surgical History:   Procedure Laterality Date   • PB ERCP,DIAGNOSTIC  2/14/2020    Procedure: ERCP (ENDOSCOPIC RETROGRADE CHOLANGIOPANCREATOGRAPHY);  Surgeon: Clinton Ray M.D.;  Location: [facility];  Service: Gastroenterology   • ERCP  4/5/2018    Procedure: ERCP W/POSS BIOPSY;  Surgeon: Quincy Louie M.D.;  Location: [facility];  Service: Gastroenterology   • ERCP W/SPHINCTEROTOMY/PAPILL.  4/5/2018    Procedure: ERCP W/SPHINCTEROTOMY/PAPILL.;  Surgeon: Quincy Louie M.D.;  Location: [facility];  Service: Gastroenterology   • ERCP W/ INSERTION STENT/TUBE  4/5/2018    Procedure: ERCP W/ INSERTION STENT/TUBE - STENT PLACEMENT/REMOVAL;  Surgeon: Quincy Louie M.D.;  Location: [facility];  Service: Gastroenterology   • ERCP W/REMOVAL CALCULUS  4/5/2018    Procedure: ERCP W/REMOVAL CALCULUS W/DILATION;  Surgeon: Quincy Louie M.D.;  [facility];  Service: Gastroenterology   • ERCP  2/15/2018    Procedure: ERCP, SPHINCTEROTOMY, STENT PLACEMENT, DEBRIDEMENT;  Surgeon: Quincy Louie M.D.;  Location: [facility];  Service: Gastroenterology   • COMMON BILE DUCT EXPLORATION  02/15/2018   • ERCP W/ INSERTION STENT/TUBE  02/15/2018   • ERCP W/SPHINCTEROTOMY/PAPILL.  02/15/2018   • ERCP W/REMOVAL CALCULUS  02/15/2018   • ARIELLA BY LAPAROSCOPY      [facility]   • HYSTERECTOMY, TOTAL ABDOMINAL  1/18/10    Uterine, Dr. Whelan and Dr. Cam +BSO   • ABDOMINAL HYSTERECTOMY TOTAL  2010    Dr. Cam   • OOPHORECTOMY  2010    BSO   • OPEN REDUCTION      ankle       Family History   Problem Relation Age of Onset   • Heart Disease Father 45        MI   • Lung Disease Father    • Stroke Father 70   • Cancer Father    • Hypertension Father    • Cancer Paternal Grandmother         breast   • Cancer Paternal Grandfather      Patient family history was personally reviewed, no pertinent family history to current presentation    Social History     Socioeconomic History   • Marital status:      Spouse name: Not on file   • Number of children: Not on file   • Years of education: Not on file   • Highest education level: Not on file   Occupational History   • Not on file   Social Needs   • Financial resource strain: Not on file   • Food insecurity     Worry: Not on file     Inability: Not on file   • Transportation needs     Medical: Not on file     Non-medical: Not on file   Tobacco Use   • Smoking status: Former Smoker     Last attempt to quit: 1991     Years since quittin.1   • Smokeless tobacco: Never Used   Substance and Sexual Activity   • Alcohol use: No     Alcohol/week: 0.0 - 2.4 oz     Comment: hardly ever   • Drug use: No   • Sexual activity: Never     Partners: Male     Birth control/protection: Post-Menopausal   Lifestyle   • Physical activity     Days per week: Not on file     Minutes per session: Not on file   •  Stress: Not on file   Relationships   • Social connections     Talks on phone: Not on file     Gets together: Not on file     Attends Rastafarian service: Not on file     Active member of club or organization: Not on file     Attends meetings of clubs or organizations: Not on file     Relationship status: Not on file   • Intimate partner violence     Fear of current or ex partner: Not on file     Emotionally abused: Not on file     Physically abused: Not on file     Forced sexual activity: Not on file   Other Topics Concern   • Not on file   Social History Narrative   • Not on file       ALLERGIES:  Allergies   Allergen Reactions   • Codeine Swelling   • Ace Inhibitors      Cough       Review of systems:  A complete review of symptoms was reviewed with patient. This is reviewed in H&P and PMH. ALL OTHERS reviewed and negative    Physical exam:  Patient Vitals for the past 24 hrs:   BP Temp Temp src Pulse Resp SpO2   03/02/20 1126 126/77 36.4 °C (97.5 °F) Oral 70 19 90 %   03/02/20 1026 -- -- -- -- 20 --   03/02/20 0708 126/63 36.5 °C (97.7 °F) Oral 62 17 92 %   03/02/20 0616 -- -- -- 69 16 --   03/02/20 0448 123/72 36.4 °C (97.5 °F) Oral (!) 52 16 92 %   03/02/20 0218 -- -- -- -- 16 --   03/02/20 0029 110/60 36.3 °C (97.3 °F) Oral 79 18 94 %   03/01/20 2147 -- -- -- -- 16 --   03/01/20 2014 126/64 36.7 °C (98 °F) Oral (!) 54 18 91 %   03/01/20 1602 (!) 171/68 36.4 °C (97.6 °F) Oral 88 18 92 %   03/01/20 1353 -- -- -- 63 18 --     General: No acute distress.   EYES: no jaundice  HEENT: OP clear   Neck: No bruits No JVD.   CVS:  RRR. S1 + S2. No M/R/G. No edema.  Resp: CTAB. No wheezing or crackles/rhonchi.  Abdomen: Soft, NT, ND,  Skin: Grossly nothing acute no obvious rashes  Neurological: Alert, Moves all extremities, no cranial nerve defects on limited exam  Extremities: Pulse 2+ in b/l LE. No cyanosis.     Data:  Laboratory studies personally reviewed by me:  Recent Results (from the past 24 hour(s))   Prothrombin  Time    Collection Time: 03/02/20  3:58 AM   Result Value Ref Range    PT 30.7 (H) 12.0 - 14.6 sec    INR 2.85 (H) 0.87 - 1.13   CBC WITH DIFFERENTIAL    Collection Time: 03/02/20  3:58 AM   Result Value Ref Range    WBC 13.1 (H) 4.8 - 10.8 K/uL    RBC 5.34 4.20 - 5.40 M/uL    Hemoglobin 15.7 12.0 - 16.0 g/dL    Hematocrit 50.2 (H) 37.0 - 47.0 %    MCV 94.0 81.4 - 97.8 fL    MCH 29.4 27.0 - 33.0 pg    MCHC 31.3 (L) 33.6 - 35.0 g/dL    RDW 46.5 35.9 - 50.0 fL    Platelet Count 218 164 - 446 K/uL    MPV 9.7 9.0 - 12.9 fL    Neutrophils-Polys 64.30 44.00 - 72.00 %    Lymphocytes 24.60 22.00 - 41.00 %    Monocytes 8.30 0.00 - 13.40 %    Eosinophils 1.40 0.00 - 6.90 %    Basophils 0.20 0.00 - 1.80 %    Immature Granulocytes 1.20 (H) 0.00 - 0.90 %    Nucleated RBC 0.00 /100 WBC    Neutrophils (Absolute) 8.41 (H) 2.00 - 7.15 K/uL    Lymphs (Absolute) 3.21 1.00 - 4.80 K/uL    Monos (Absolute) 1.08 (H) 0.00 - 0.85 K/uL    Eos (Absolute) 0.18 0.00 - 0.51 K/uL    Baso (Absolute) 0.03 0.00 - 0.12 K/uL    Immature Granulocytes (abs) 0.16 (H) 0.00 - 0.11 K/uL    NRBC (Absolute) 0.00 K/uL   Basic Metabolic Panel    Collection Time: 03/02/20  3:58 AM   Result Value Ref Range    Sodium 139 135 - 145 mmol/L    Potassium 4.5 3.6 - 5.5 mmol/L    Chloride 100 96 - 112 mmol/L    Co2 27 20 - 33 mmol/L    Glucose 101 (H) 65 - 99 mg/dL    Bun 22 8 - 22 mg/dL    Creatinine 0.83 0.50 - 1.40 mg/dL    Calcium 9.0 8.4 - 10.2 mg/dL    Anion Gap 12.0 7.0 - 16.0   ESTIMATED GFR    Collection Time: 03/02/20  3:58 AM   Result Value Ref Range    GFR If African American >60 >60 mL/min/1.73 m 2    GFR If Non African American >60 >60 mL/min/1.73 m 2     CTA chest 2/21/20  1.  Pulmonary embolus involving the right distal main pulmonary artery extending into right middle and lower lobe segmental and subsegmental branches. Left upper and lower lobe subsegmental pulmonary emboli.  2.  Distal esophageal wall thickening, recommend follow-up esophagoscopy for  evaluation of esophagitis versus infiltrating esophageal neoplasia.  3.  Hepatomegaly  4.  Left adrenal nodule, indeterminate, recommend follow-up adrenal protocol CT or MRI for further characterization as clinically appropriate.  5.  1.3 cm left lower lobe pulmonary nodule, see nodule follow-up recommendations below.    TTE 2/2020  CONCLUSIONS  Limited Exam for LV and RV Function  Compared to the images of the prior study done 2/11/20 -  there has   been no significant change.   Left ventricular systolic function is normal.  Left ventricular ejection fraction is visually estimated to be 55%.  Right ventricular systolic function is normal.    Principal Problem:    Bilateral pulmonary embolism (HCC) POA: Yes  Active Problems:    Atrial fibrillation (HCC) POA: Yes    Hyperlipidemia POA: Yes    COPD (chronic obstructive pulmonary disease) (HCC) POA: Yes    Essential hypertension POA: Yes    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 with obstruction- ERCP EXTRACTON/ SPHINCTEROTOMY, recurrent Feb 2020 POA: Yes    Adrenal nodule (HCC) POA: Yes    Herpes zoster without complication POA: Unknown    Pulmonary nodule POA: Unknown  Resolved Problems:    Leucocytosis POA: Yes    Hyperglycemia POA: Yes      Assessment / Plan:    74 year old woman with AF returns with shortness of breath found PE w/o RV strain c/b AF RVR.     -systemic AC lifelong for AF  -systemic AC for PE  -cont rate control  -f/u outpatient cardiology. Consideration CHARLES/DCCV outpatient.    Please call back for any further cardiac questions.    I personally discussed her case with Dr Gifford    It is my pleasure to participate in the care of Ms. Macias.  Please do not hesitate to contact me with questions or concerns.    Guillaume Diez MD  Cardiologist [facility]

## 2020-03-02 NOTE — PROGRESS NOTES
Patient educated on warfarin utilizing [facility] handout. Patient encouraged to ask questions, no questions at this time.

## 2020-03-03 ENCOUNTER — PATIENT OUTREACH (OUTPATIENT)
Dept: HEALTH INFORMATION MANAGEMENT | Facility: OTHER | Age: 75
End: 2020-03-03

## 2020-03-04 ENCOUNTER — TELEPHONE (OUTPATIENT)
Dept: MEDICAL GROUP | Age: 75
End: 2020-03-04

## 2020-03-04 DIAGNOSIS — J43.0 UNILATERAL EMPHYSEMA (HCC): ICD-10-CM

## 2020-03-04 NOTE — TELEPHONE ENCOUNTER
VOICEMAIL  1. Caller Name: Dionne ([facility] advocate)                      Call Back Number: [phone] EXT [phone]    2. Message: Patient advocate for [facility] called stating patient is scheduled for 3/5/2020 for a hospital follow up, she was in there for some respiratory issues and needs an order for a nebulizer. She stated normally the discharging physician would do the order but it was not done for the patient this time. She needs an order for it to [facility] home care [facility] company.     3. Patient approves office to leave a detailed voicemail/MyChart message: no

## 2020-03-05 ENCOUNTER — ANTICOAGULATION VISIT (OUTPATIENT)
Dept: VASCULAR LAB | Facility: MEDICAL CENTER | Age: 75
End: 2020-03-05
Attending: INTERNAL MEDICINE
Payer: MEDICARE

## 2020-03-05 ENCOUNTER — OFFICE VISIT (OUTPATIENT)
Dept: MEDICAL GROUP | Age: 75
End: 2020-03-05
Payer: MEDICARE

## 2020-03-05 VITALS
SYSTOLIC BLOOD PRESSURE: 126 MMHG | BODY MASS INDEX: 39.57 KG/M2 | HEIGHT: 61 IN | DIASTOLIC BLOOD PRESSURE: 84 MMHG | HEART RATE: 77 BPM | WEIGHT: 209.6 LBS | OXYGEN SATURATION: 95 % | TEMPERATURE: 98.1 F

## 2020-03-05 VITALS — DIASTOLIC BLOOD PRESSURE: 71 MMHG | SYSTOLIC BLOOD PRESSURE: 143 MMHG | HEART RATE: 59 BPM

## 2020-03-05 DIAGNOSIS — R60.0 BILATERAL LEG EDEMA: ICD-10-CM

## 2020-03-05 DIAGNOSIS — E78.2 MIXED HYPERLIPIDEMIA: ICD-10-CM

## 2020-03-05 DIAGNOSIS — J43.0 UNILATERAL EMPHYSEMA (HCC): ICD-10-CM

## 2020-03-05 DIAGNOSIS — J96.21 ACUTE ON CHRONIC RESPIRATORY FAILURE WITH HYPOXIA (HCC): ICD-10-CM

## 2020-03-05 DIAGNOSIS — F11.20 UNCOMPLICATED OPIOID DEPENDENCE (HCC): ICD-10-CM

## 2020-03-05 DIAGNOSIS — E66.01 OBESITY, MORBID, BMI 40.0-49.9 (HCC): ICD-10-CM

## 2020-03-05 DIAGNOSIS — I48.0 PAROXYSMAL ATRIAL FIBRILLATION (HCC): ICD-10-CM

## 2020-03-05 DIAGNOSIS — I48.19 PERSISTENT ATRIAL FIBRILLATION (HCC): ICD-10-CM

## 2020-03-05 DIAGNOSIS — E27.8 ADRENAL NODULE (HCC): ICD-10-CM

## 2020-03-05 DIAGNOSIS — I10 ESSENTIAL HYPERTENSION: ICD-10-CM

## 2020-03-05 DIAGNOSIS — Z09 HOSPITAL DISCHARGE FOLLOW-UP: ICD-10-CM

## 2020-03-05 DIAGNOSIS — I26.99 OTHER ACUTE PULMONARY EMBOLISM WITHOUT ACUTE COR PULMONALE (HCC): ICD-10-CM

## 2020-03-05 DIAGNOSIS — J43.1 PANLOBULAR EMPHYSEMA (HCC): ICD-10-CM

## 2020-03-05 DIAGNOSIS — I26.92 ACUTE SADDLE PULMONARY EMBOLISM WITHOUT ACUTE COR PULMONALE (HCC): ICD-10-CM

## 2020-03-05 LAB — INR PPP: 2.9 (ref 2–3.5)

## 2020-03-05 PROCEDURE — 99213 OFFICE O/P EST LOW 20 MIN: CPT | Performed by: NURSE PRACTITIONER

## 2020-03-05 PROCEDURE — 85610 PROTHROMBIN TIME: CPT

## 2020-03-05 PROCEDURE — 99215 OFFICE O/P EST HI 40 MIN: CPT | Performed by: INTERNAL MEDICINE

## 2020-03-05 RX ORDER — WARFARIN SODIUM 5 MG/1
TABLET ORAL
Qty: 90 TAB | Refills: 1 | Status: SHIPPED | OUTPATIENT
Start: 2020-03-05 | End: 2020-08-24 | Stop reason: SDUPTHER

## 2020-03-05 RX ORDER — POTASSIUM CHLORIDE 750 MG/1
10 TABLET, FILM COATED, EXTENDED RELEASE ORAL DAILY
Qty: 30 TAB | Refills: 5 | Status: SHIPPED | OUTPATIENT
Start: 2020-03-05 | End: 2020-03-17 | Stop reason: SDUPTHER

## 2020-03-05 RX ORDER — FUROSEMIDE 20 MG/1
10 TABLET ORAL DAILY
Qty: 30 TAB | Refills: 4 | Status: SHIPPED | OUTPATIENT
Start: 2020-03-05 | End: 2020-03-10 | Stop reason: SDUPTHER

## 2020-03-05 ASSESSMENT — ENCOUNTER SYMPTOMS
EYES NEGATIVE: 1
PSYCHIATRIC NEGATIVE: 1
NEUROLOGICAL NEGATIVE: 1
GASTROINTESTINAL NEGATIVE: 1
MUSCULOSKELETAL NEGATIVE: 1
CONSTITUTIONAL NEGATIVE: 1

## 2020-03-05 ASSESSMENT — FIBROSIS 4 INDEX: FIB4 SCORE: 1.7

## 2020-03-05 NOTE — PROGRESS NOTES
Subjective:      Rufina Macias is a 74 y.o. female who presents with Hospital Follow-up        HPI     The patient is here for follow up of chronic medical problems listed below. The patient is compliant with medications and having no side effects from them. Denies chest pain, abdominal pain, dyspnea, myalgias, or cough.      Hospital discharge follow-up  Acute saddle pulmonary embolism without acute cor pulmonale (HCC)  Persistent atrial fibrillation- feb 2020- dr jerome; rx eliquis and cardizem  Patient presented to the ED on 2/21/20 for Afib with RVR. Patient was recently diagnosed with Afib and started on Eliquis. CT chest in the ED demonstrated subsegmental PE, presumed related to Eliquis failure and was started on heparin drip and bridged with Coumadin. Hospitalization was complicated with persistent Afib with RVR. Patient was discharged home 10 days later with supplemental oxygen and instructed to use if saturations drop below 88% on ambient air. Eliquis, Ambien, azithromycin, prednisone, amlodipine, and losartan were discontinued in the ED. Patient will follow up with anticoagulation clinic and Cardiology.    Acute on chronic respiratory failure with hypoxia (HCC)  Unilateral emphysema (HCC)  Patient was prescribed ipratropium 0.02% solution and discharged home with supplemental oxygen tanks and instructed to use oxygen to saturations drop below 88% on ambient air. She states she was never given a nebulizer, and will therefore be prescribed one today. Patient is also requesting for portable oxygen that is easier to carry.     Essential hypertension  Patient has a history of chronic hypertension and is taking metoprolol 100 mg BID, and triamterene-HCTZ 37.5-25 mg QD. No medication side effects were reported. She reportedly monitors her blood pressure regularly at home. Blood pressure is mildly elevated today at 126/84.  She reports following a low sodium diet and denies any related complaints  including chronic cough, chest pain, headaches or dizziness.     Mixed hyperlipidemia  The patient has a chronic history of mixed hyperlipidemia. Currently taking simvastatin 20 mg QN. No medication side effects or acute complaints of myalgias, abdominal pain, dizziness, claudication or chest pain.     Obesity, morbid, BMI 40.0-49.9 ([facility])  Currently managed with lifestyle modifications.     Panlobular emphysema ([facility])  Chronic. Patient reports compliancy with ipratropium-albuterol inhaler Q6H PRN. Denies any medication side effects. No complaints of cough, wheezing, or shortness of breath.    Adrenal nodule ([facility])  Known history. No new changes.     Uncomplicated opioid dependence ([facility])- [facility]  Currently being managed by [facility]. No new changes.    Bilateral leg edema  Patient is also prescribed furosemide 20 mg PRN for leg swelling. Patient states her legs have not been swollen and therefore she has not been taking the medication. On exam, the patient was noted to have swelling and was instructed to take the medication.     Patient Active Problem List   Diagnosis   • Mixed hyperlipidemia   • COPD (chronic obstructive pulmonary disease) ([facility])   • Primary insomnia   • Vitamin D deficiency disease   • DDD (degenerative disc disease), lumbar   • Chronic allergic rhinitis   • Thoracic radiculopathy   • Lumbar radiculopathy   • DDD (degenerative disc disease), cervical   • Obesity, morbid, BMI 40.0-49.9 ([facility])   • Essential hypertension   • Chronic midline low back pain without sciatica- norco chronic daily use; [facility]   • Primary osteoarthritis involving multiple joints- to get supartz inj left knee- [facility]   • Panlobular emphysema ([facility])   • Grade II diastolic dysfunction- echo 2017 with preserved EF   • Chronic pain of left knee- dr contreras- celebrex and cortisone inj; dr cade to do  supartz inj   • Ganglion cyst of tendon sheath of right hand- surveillance   • Uncomplicated  opioid dependence (HCC)- [facility]   • Atrial fibrillation (HCC)   • 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 with obstruction- ERCP EXTRACTON/ SPHINCTEROTOMY, recurrent Feb 2020   • Acute saddle pulmonary embolism without acute cor pulmonale (HCC)   • Adrenal nodule (HCC)   • Herpes zoster without complication   • Pulmonary nodule   • Acute on chronic respiratory failure with hypoxia (HCC)       Outpatient Medications Prior to Visit   Medication Sig Dispense Refill   • warfarin (COUMADIN) 5 MG Tab Take 1 tab by mouth daily or as directed by anticoagulation  clinic 90 Tab 1   • DILTIAZem CD (CARDIZEM CD) 240 MG CAPSULE SR 24 HR Take 1 Cap by mouth 2 Times a Day. 60 Cap 1   • digoxin (LANOXIN) 125 MCG Tab Take 1 Tab by mouth every day at 6 PM. 30 Tab 1   • ipratropium (ATROVENT) 0.02 % Solution 2.5 mL by Nebulization route every 6 hours as needed (shortness of breath or wheezing). 30 Bullet 1   • simvastatin (ZOCOR) 20 MG Tab Take 1 Tab by mouth every evening. 90 Tab 4   • ipratropium-albuterol (COMBIVENT RESPIMAT)  MCG/ACT Aero Soln Inhale 1 Puff by mouth every 6 hours as needed (shortness of breath). 1 Inhaler 5   • metoprolol (LOPRESSOR) 100 MG Tab Take 1 Tab by mouth 2 times a day. 180 Tab 4   • Calcium Carbonate-Vit D-Min (CALCIUM 1200 PO) Take 1,200 mg by mouth every day.     • Magnesium 400 MG Tab Take 400 mg by mouth every day.     • FIBER PO Take 2 Caps by mouth every day.     • hydrocodone/acetaminophen (NORCO)  MG Tab TAKE ONE TABLET BY MOUTH FOUR TIMES A DAY AS NEEDED 30 DAY SUPPLY  0   • Misc. Devices [facility] nebulizer to use for NPPB treatments due to severe COPD.  Home oxygen 1-2 L/min, 24/7 for severe COPD and respiratory failure and inability to keep O2 sat above 88% on room air.  Document in hospital as stated below 1 Each 0   • furosemide (LASIX) 20 MG Tab Take 0.5 Tabs by mouth 1 time daily as needed (for lower extremity edema or weight gain). 60 Tab 0     No  "facility-administered medications prior to visit.         Allergies   Allergen Reactions   • Codeine Swelling   • Ace Inhibitors      Cough       Review of Systems   Constitutional: Negative.    HENT: Negative.    Eyes: Negative.    Cardiovascular: Positive for leg swelling.   Gastrointestinal: Negative.    Genitourinary: Negative.    Musculoskeletal: Negative.    Skin: Negative.    Neurological: Negative.    Endo/Heme/Allergies: Negative.    Psychiatric/Behavioral: Negative.    All other systems reviewed and are negative.           Objective:     /84 (BP Location: Left arm, Patient Position: Sitting, BP Cuff Size: Adult)   Pulse 77   Temp 36.7 °C (98.1 °F) (Temporal)   Ht 1.549 m (5' 1\")   Wt 95.1 kg (209 lb 9.6 oz)   SpO2 95%   Breastfeeding No   BMI 39.60 kg/m²     Physical Exam   Constitutional: Oriented to person, place, and time. Appears well-developed and well-nourished. No distress.   Head: Normocephalic and atraumatic.   Right Ear: External ear normal.   Left Ear: External ear normal.   Nose: Nose normal.   Mouth/Throat: Oropharynx is clear and moist. No oropharyngeal exudate.   Eyes: Pupils are equal, round, and reactive to light. Conjunctivae and EOM are normal. Right eye exhibits no discharge. Left eye exhibits no discharge. No scleral icterus.   Neck: Normal range of motion. Neck supple. No JVD present. No tracheal deviation present. No thyromegaly present.   Cardiovascular: Irregular rate of 80-90 bpm and irregular rhythm. normal heart sounds and intact distal pulses.  Exam reveals no gallop and no friction rub.    No murmur heard.  Pulmonary/Chest: Effort normal. No stridor. No respiratory distress. No wheezing or rales. No tenderness.   Abdominal: Soft. Bowel sounds are normal. No distension and no mass. There is no tenderness. There is no rebound and no guarding. No hernia.   Musculoskeletal: Normal range of motion. 3+ bilateral pretibial edema, No tenderness.   Lymphadenopathy: No " cervical adenopathy.   Neurological: Alert and oriented to person, place, and time. Normal reflexes. Normal reflexes. No cranial nerve deficit. Normal muscle tone. Coordination normal.   Skin: Skin is warm and dry. No rash noted. Not diaphoretic. No erythema. No pallor.   Psychiatric: Normal mood and affect. Behavior is normal. Judgment and thought content normal.   Nursing note and vitals reviewed.      Lab Results   Component Value Date/Time    HBA1C 5.7 (H) 02/22/2020 01:52 AM     Lab Results   Component Value Date/Time    SODIUM 139 03/02/2020 03:58 AM    POTASSIUM 4.5 03/02/2020 03:58 AM    CHLORIDE 100 03/02/2020 03:58 AM    CO2 27 03/02/2020 03:58 AM    GLUCOSE 101 (H) 03/02/2020 03:58 AM    BUN 22 03/02/2020 03:58 AM    CREATININE 0.83 03/02/2020 03:58 AM    CREATININE 0.80 12/02/2010 12:00 AM    BUNCREATRAT 20 12/02/2010 12:00 AM    GLOMRATE >59 12/02/2010 12:00 AM    ALKPHOSPHAT 66 02/21/2020 06:37 PM    ASTSGOT 24 02/21/2020 06:37 PM    ALTSGPT 23 02/21/2020 06:37 PM    TBILIRUBIN 0.6 02/21/2020 06:37 PM     Lab Results   Component Value Date/Time    INR 2.90 03/05/2020    INR 2.85 (H) 03/02/2020 03:58 AM    INR 3.38 (H) 03/01/2020 11:18 AM     Lab Results   Component Value Date/Time    CHOLSTRLTOT 136 01/02/2020 01:15 PM    LDL 72 01/02/2020 01:15 PM    HDL 47 01/02/2020 01:15 PM    TRIGLYCERIDE 86 01/02/2020 01:15 PM       No results found for: TESTOSTERONE  Lab Results   Component Value Date/Time    TSH 1.640 12/02/2010 12:00 AM     Lab Results   Component Value Date/Time    FREET4 1.90 (H) 02/21/2020 06:43 PM    FREET4 0.90 10/24/2014 11:56 AM     No results found for: URICACID  No components found for: VITB12  Lab Results   Component Value Date/Time    25HYDROXY 39 09/20/2016 11:36 AM    25HYDROXY 39 10/24/2014 11:56 AM          Assessment/Plan:        1. Hospital discharge follow-up  Patient presented to the ED on 2/21/20 for Afib with RVR and CT chest at the time demonstrated subsegmental PE,  presumed related to Eliquis failure and was started on heparin drip and bridged with Coumadin. Hospitalization was complicated with persistent Afib with RVR and she was discharged home 10 days later with follow up with Cardiology and anticoagulation clinic. Eliquis, Ambien, azithromycin, prednisone, amlodipine, and losartan were discontinued in the ED.    2. Acute saddle pulmonary embolism without acute cor pulmonale (HCC).  The patient is not tolerating the warfarin quite well and is in the Coumadin clinic.  She failed Eliquis in terms of developing thrombo-embolic phenomena while on this for her atrial fib.  Explained that this happens sometimes and now she will need more close monitoring with the warfarin.  See #1. Patient will be referred to Pulmonology.  - REFERRAL TO PULMONOLOGY    3. Persistent atrial fibrillation- feb 2020- dr jerome; rx eliquis and cardizem.  This is improved now with a controlled rate with the diltiazem.  She will continue on this and follow-up with cardiology.  There have been no apparent side effects from the diltiazem and her heart rate is in the 80-90 range controlling her atrial fib which is still present clinically although we did not do an EKG to confirm this today.  See #1.    4. Acute on chronic respiratory failure with hypoxia (HCC)-this is a new problem for her and she now needs oxygen long-term.  Her wheezes have improved and she is in no acute pulmonary distress but clearly needs oxygen due to her desaturation with ambulation as documented in her recent hospitalization.  It dropped down below 89% with walking without oxygen and return to normal with walking above 90 while on oxygen.  She needs a portable device because the large tanks are too heavy to log around.  Patient was prescribed supplemental oxygen after being discharged from hospital and was instructed to use if saturation drop below 88% on ambient air. She will be prescribed nebulizer and portable oxygen.   -  REFERRAL [facility] PULMONOLOGY  [facility] Devices Misc; Portable oxygen tank or backpack at 1 Liter /min to replaces large metal tanks. Include tubing  Dispense: 1 Each; Refill: 1  - Misc. Devices Misc; Home nebulizer to use for NPPB treatments due to severe COPD. FOR USE WITH ATROVENT SOLN.  Dispense: 1 Each; Refill: 0    5. Essential hypertension  Under good control. Continue same regimen of metoprolol 100 mg BID, and triamterene-HCTZ 37.5-25 mg QD.     6. Mixed hyperlipidemia  Under good control. Continue same regimen of simvastatin 20 mg QN.    7. Obesity, morbid, BMI 40.0-49.9 (HCC)   diet/exercise/lose 15 lbs.; patient counseled     8. Panlobular emphysema (HCC)  See #4.  - REFERRAL TO PULMONOLOGY  - [facility] [place]. Devices Misc; Portable oxygen tank or backpack at 1 Liter /min to replaces large metal tanks. Include tubing  Dispense: 1 Each; Refill: 1  - Misc. Devices Misc; Home nebulizer to use for NPPB treatments due to severe COPD. FOR USE WITH ATROVENT SOLN.  Dispense: 1 Each; Refill: 0    9. Adrenal nodule (HCC)  Stable. Will continue to monitor.    10. Uncomplicated opioid dependence ([facility]CC)- [facility]  Stable. Continue to follow up with [facility].     11. Unilateral emphysema (HCC)  Stable. Will continue to monitor.     12. Bilateral leg edema  Patient was previously prescribed furosemide PRN for lower leg edema. She states she has not been taking the medication due to not having any swelling of legs, however on exam she was noted to have 3+ bilateral pretibial edema and was instructed to take the medication. She was advised that furosemide does cause decrease in potassium, and will be prescribed potassium supplements. Will continue to monitor.  - potassium chloride ER (KLOR-CON) 10 MEQ tablet; Take 1 Tab by mouth every day.  Dispense: 30 Tab; Refill: 5  - furosemide (LASIX) 20 MG Tab; Take 0.5 Tabs by mouth every day.  Dispense: 30 Tab; Refill: 4         ILyric (Scribe), am scribing for, and in  the presence of, Quincy Angel M.D..    Electronically signed by: Lyric Robertson (Scribe), 3/5/2020    I, Quincy Angel M.D., personally performed the services described in this documentation, as scribed by Lyric Robertson in my presence, and it is both accurate and complete.     .  40 minute face-to-face encounter took place today, mainly because of the complexity of her multiple problems following her acute hospitalization for rapid atrial fib, acute bilateral PE and DVT with marked leg swelling.  We had explained the need for continued diuresis and evaluating her oxygen needs and providing documentation of this and sending reports to her durable medical equipment company to help her with oxygen needs.  She is unable to tolerate the large tanks to log around so she wants portable tanks and documentation of the need for this was made.  Should continue on her diuretics in view of her marked leg edema from her DVT and we will see her back in follow-up in 2 to 4 weeks to make sure she is compliant with medication and responding to treatment..  More than half of this time was spent in the coordination of care of the above problems, as well as counseling.

## 2020-03-05 NOTE — PROGRESS NOTES
Anticoagulation Summary  As of 3/5/2020    INR goal:   2.0-3.0   TTR:   --   INR used for dosin.90 (3/5/2020)   Warfarin maintenance plan:   5 mg (5 mg x 1) every day   Weekly warfarin total:   35 mg   Plan last modified:   PASQUALE Ferrera (3/5/2020)   Next INR check:   3/9/2020   Target end date:   Indefinite    Indications    Atrial fibrillation (HCC) [I48.91]  Acute pulmonary embolism (HCC) [I26.99]             Anticoagulation Episode Summary     INR check location:       Preferred lab:       Send INR reminders to:       Comments:         Anticoagulation Care Providers     Provider Role Specialty Phone number    Mary Carmen Rogers M.D. Referring Internal Medicine Critical Care [phone]    [facility] Responsible  [phone]        Anticoagulation Patient Findings      HPI:  Rufina Macias seen in clinic today for follow up on anticoagulation therapy in the presence of AF, PE hx.  Pt is new to our clinic and new to warfarin. She was recently diagnosed with AF in February and placed on Eliquis 5 mg BID. She then presented to the ER last week with respiratory type symptoms and noted to have vita PEs. There was concern about an Eliquis failure as she states she took her Eliquis exactly as directed. She was started on heparin and transitioned to warfarin.    CHADSvasc score = 6 (age, female, chf, htn, pe).  HAS-BLED = 1 (age).    Denies ever having blood clots in the past. No hx of CVA.     Education given to patient regarding instructions for taking warfarin, food/drug interactions (keep vitamin k intake consistent), drug/drug interactions (avoid NSAIDs, ASA, notify us of any medication changes) and signs/symptoms of bleeding or thrombosis/stroke. Discussed anticoagulation in detail including dosing, INR levels and safety. Discussed avoidance of HRTs. She doesn't smoke.    Denies any  diet changes.   No current symptoms of bleeding or thrombosis reported.    She is taking warfarin 5  mg as instructed.    Chest CTA 2/21/20  1.  Pulmonary embolus involving the right distal main pulmonary artery extending into right middle and lower lobe segmental and subsegmental branches. Left upper and lower lobe subsegmental pulmonary emboli.  2.  Distal esophageal wall thickening, recommend follow-up esophagoscopy for evaluation of esophagitis versus infiltrating esophageal neoplasia.  3.  Hepatomegaly  4.  Left adrenal nodule, indeterminate, recommend follow-up adrenal protocol CT or MRI for further characterization as clinically appropriate.  5.  1.3 cm left lower lobe pulmonary nodule, see nodule follow-up recommendations below    2/25/20 - ECHO  Limited Exam for LV and RV Function  Compared to the images of the prior study done 2/11/20 -  there has   been no significant change.   Left ventricular systolic function is normal.  Left ventricular ejection fraction is visually estimated to be 55%.  Right ventricular systolic function is normal.    A/P:   INR therapeutic but trending up.   Will decrease one dose then continue current regimen.   BP recorded in vitals.    Follow up appointment on Monday.    Next Appointment: Monday, March 9, 2020 at 3:45 pm @ [facility] (confirmed she has [facility] PCP - Dr Angel.    Jadyn GRANDE

## 2020-03-09 ENCOUNTER — ANTICOAGULATION VISIT (OUTPATIENT)
Dept: MEDICAL GROUP | Facility: MEDICAL CENTER | Age: 75
End: 2020-03-09
Payer: MEDICARE

## 2020-03-09 DIAGNOSIS — I48.91 ATRIAL FIBRILLATION, UNSPECIFIED TYPE (HCC): ICD-10-CM

## 2020-03-09 DIAGNOSIS — Z79.01 LONG TERM (CURRENT) USE OF ANTICOAGULANTS: Primary | ICD-10-CM

## 2020-03-09 LAB
INR BLD: 2.9 (ref 0.9–1.2)
INR PPP: 2.2 (ref 2–3.5)

## 2020-03-09 PROCEDURE — 85610 PROTHROMBIN TIME: CPT | Performed by: INTERNAL MEDICINE

## 2020-03-09 PROCEDURE — 93793 ANTICOAG MGMT PT WARFARIN: CPT | Performed by: INTERNAL MEDICINE

## 2020-03-09 NOTE — PROGRESS NOTES
OP Anticoagulation Service Note    Date: 3/9/2020  There were no vitals filed for this visit.   pt declined vitals    Anticoagulation Summary  As of 3/9/2020    INR goal:   2.0-3.0   TTR:   --   INR used for dosin.20 (3/9/2020)   Warfarin maintenance plan:   2.5 mg (5 mg x 0.5) every Thu; 5 mg (5 mg x 1) all other days   Weekly warfarin total:   32.5 mg   Plan last modified:   Cindy Treviño, PharmD (3/9/2020)   Next INR check:   3/12/2020   Target end date:   Indefinite    Indications    Atrial fibrillation (HCC) [I48.91]  Acute pulmonary embolism (HCC) (Resolved) [I26.99]             Anticoagulation Episode Summary     INR check location:       Preferred lab:       Send INR reminders to:       Comments:         Anticoagulation Care Providers     Provider Role Specialty Phone number    Mary Carmen Rogers M.D. Referring Internal Medicine Critical Care [phone]    [facility] Responsible  [phone]        Anticoagulation Patient Findings      HPI:   Rufina Macias seen in clinic today, on anticoagulation therapy with warfarin (a high risk medication) for atrial fibrillation and PE       Pt is here today to evaluate anticoagulation therapy    Previous INR was  2.9 on 3-5-2020    Pt was instructed to lower dose to 2.5 mg x 1 then continue 5 mg daily    Confirmed warfarin dosing regimen, denies missed or extra doses of coumadin.   Diet has been consistent with foods rich in vitamin K: Yes  Changes in ETOH:  No  Changes in smoking status: No  Changes in medication: No   Cost restriction: No  S/s of bleeding:  No  Falls or accidents since last visit No  Signs/symptoms  thrombosis since the last appt: No    A/P   INR  therapeutic today, will require close follow up as pt is new to warfarin   Continue current warfarin regimen        Pt educated to contact our clinic with any changes in medications or s/s of bleeding or thrombosis. Pt is aware to seek immediate medical attention for falls,  head injury or deep cuts    Follow up appointment in 3 day(s) to reduce risk of adverse events from warfarin  Cindy Treviño, PharmD

## 2020-03-10 ENCOUNTER — TELEPHONE (OUTPATIENT)
Dept: VASCULAR LAB | Facility: MEDICAL CENTER | Age: 75
End: 2020-03-10

## 2020-03-10 ENCOUNTER — OFFICE VISIT (OUTPATIENT)
Dept: CARDIOLOGY | Facility: MEDICAL CENTER | Age: 75
End: 2020-03-10
Payer: MEDICARE

## 2020-03-10 VITALS
BODY MASS INDEX: 40.02 KG/M2 | OXYGEN SATURATION: 92 % | HEART RATE: 80 BPM | RESPIRATION RATE: 16 BRPM | WEIGHT: 212 LBS | HEIGHT: 61 IN | SYSTOLIC BLOOD PRESSURE: 150 MMHG | DIASTOLIC BLOOD PRESSURE: 88 MMHG

## 2020-03-10 DIAGNOSIS — Z79.899 HIGH RISK MEDICATION USE: ICD-10-CM

## 2020-03-10 DIAGNOSIS — Z79.01 ANTICOAGULATED: ICD-10-CM

## 2020-03-10 DIAGNOSIS — I10 ESSENTIAL HYPERTENSION: ICD-10-CM

## 2020-03-10 DIAGNOSIS — R60.0 BILATERAL LEG EDEMA: ICD-10-CM

## 2020-03-10 DIAGNOSIS — I26.92 ACUTE SADDLE PULMONARY EMBOLISM WITHOUT ACUTE COR PULMONALE (HCC): ICD-10-CM

## 2020-03-10 DIAGNOSIS — E78.2 MIXED HYPERLIPIDEMIA: ICD-10-CM

## 2020-03-10 DIAGNOSIS — I48.91 ATRIAL FIBRILLATION, UNSPECIFIED TYPE (HCC): ICD-10-CM

## 2020-03-10 PROCEDURE — 99214 OFFICE O/P EST MOD 30 MIN: CPT | Performed by: NURSE PRACTITIONER

## 2020-03-10 PROCEDURE — 93000 ELECTROCARDIOGRAM COMPLETE: CPT | Performed by: INTERNAL MEDICINE

## 2020-03-10 RX ORDER — FUROSEMIDE 20 MG/1
20 TABLET ORAL DAILY
Qty: 30 TAB | Refills: 4 | Status: SHIPPED | OUTPATIENT
Start: 2020-03-10 | End: 2020-03-17 | Stop reason: SDUPTHER

## 2020-03-10 ASSESSMENT — FIBROSIS 4 INDEX: FIB4 SCORE: 1.7

## 2020-03-10 NOTE — TELEPHONE ENCOUNTER
Initial anticoagulation clinic note and most recent PCP note reviewed    Patient with history of atrial fibrillation.  Apparently had pulmonary embolism while adherent with Eliquis    Will continue indefinite anticoagulation with warfarin pending further recommendations from PCP    Will defer any indicated age appropriate screening for occult malignancy to pcp.    Michael Bloch, MD  Anticoagulation Clinic    Cc:  DENILSON Angel

## 2020-03-11 PROBLEM — Z79.899 HIGH RISK MEDICATION USE: Status: ACTIVE | Noted: 2020-03-11

## 2020-03-11 PROBLEM — Z79.01 ANTICOAGULATED: Status: ACTIVE | Noted: 2020-03-11

## 2020-03-11 LAB — EKG IMPRESSION: NORMAL

## 2020-03-11 ASSESSMENT — ENCOUNTER SYMPTOMS
ORTHOPNEA: 0
SHORTNESS OF BREATH: 1
WEIGHT LOSS: 0
DIZZINESS: 0
PND: 0
WEAKNESS: 0
PALPITATIONS: 1
CLAUDICATION: 0

## 2020-03-12 ENCOUNTER — ANTICOAGULATION VISIT (OUTPATIENT)
Dept: MEDICAL GROUP | Facility: MEDICAL CENTER | Age: 75
End: 2020-03-12
Payer: MEDICARE

## 2020-03-12 DIAGNOSIS — Z79.01 LONG TERM (CURRENT) USE OF ANTICOAGULANTS: ICD-10-CM

## 2020-03-12 DIAGNOSIS — I48.91 ATRIAL FIBRILLATION, UNSPECIFIED TYPE (HCC): ICD-10-CM

## 2020-03-12 LAB — INR PPP: 2.4 (ref 2–3.5)

## 2020-03-12 PROCEDURE — 85610 PROTHROMBIN TIME: CPT | Mod: QW | Performed by: INTERNAL MEDICINE

## 2020-03-12 PROCEDURE — 93793 ANTICOAG MGMT PT WARFARIN: CPT | Performed by: INTERNAL MEDICINE

## 2020-03-12 NOTE — PROGRESS NOTES
OP Anticoagulation Service Note    Date: 3/12/2020  There were no vitals filed for this visit.   pt declined vitals    Anticoagulation Summary  As of 3/12/2020    INR goal:   2.0-3.0   TTR:   --   INR used for dosin.40 (3/12/2020)   Warfarin maintenance plan:   2.5 mg (5 mg x 0.5) every Thu; 5 mg (5 mg x 1) all other days   Weekly warfarin total:   32.5 mg   Plan last modified:   Cindy Treviño, PharmD (3/9/2020)   Next INR check:   3/19/2020   Target end date:   Indefinite    Indications    Atrial fibrillation (HCC) [I48.91]  Acute pulmonary embolism (HCC) (Resolved) [I26.99]  Long term (current) use of anticoagulants [Z79.01]             Anticoagulation Episode Summary     INR check location:       Preferred lab:       Send INR reminders to:       Comments:         Anticoagulation Care Providers     Provider Role Specialty Phone number    Mary Carmen Rogers M.D. Referring Internal Medicine Critical Care [phone]    [facility] Responsible  [phone]        Anticoagulation Patient Findings      HPI:   Rufina Collins Gilberto seen in clinic today, on anticoagulation therapy with warfarin (a high risk medication) for atrial fibrillation, PE     Pt is here today to evaluate anticoagulation therapy    Previous INR was  2.2 on 3-9-20    Pt was instructed to continue current regimen    Confirmed warfarin dosing regimen, denies missed or extra doses of coumadin.   Diet has been consistent with foods rich in vitamin K: Yes  Changes in ETOH:  No  Changes in smoking status: No  Changes in medication: No   Cost restriction: No  S/s of bleeding:  No  Falls or accidents since last visit No  Signs/symptoms  thrombosis since the last appt: No    A/P   INR  therapeutic today,will require close follow up as pt is new to warfarin with recent PE   Continue current warfarin regimen           check referral    Pt educated to contact our clinic with any changes in medications or s/s of bleeding or thrombosis.  Pt is aware to seek immediate medical attention for falls, head injury or deep cuts    Follow up appointment in 1 week(s) to reduce risk of adverse events from warfarin  Cindy Treviño, PharmD

## 2020-03-17 ENCOUNTER — TELEPHONE (OUTPATIENT)
Dept: PULMONOLOGY | Facility: HOSPICE | Age: 75
End: 2020-03-17

## 2020-03-17 ENCOUNTER — TELEPHONE (OUTPATIENT)
Dept: MEDICAL GROUP | Age: 75
End: 2020-03-17

## 2020-03-17 DIAGNOSIS — J43.1 PANLOBULAR EMPHYSEMA (HCC): ICD-10-CM

## 2020-03-17 DIAGNOSIS — J96.21 ACUTE ON CHRONIC RESPIRATORY FAILURE WITH HYPOXIA (HCC): ICD-10-CM

## 2020-03-17 DIAGNOSIS — R60.0 BILATERAL LEG EDEMA: ICD-10-CM

## 2020-03-17 RX ORDER — POTASSIUM CHLORIDE 750 MG/1
10 TABLET, FILM COATED, EXTENDED RELEASE ORAL 2 TIMES DAILY
Qty: 60 TAB | Refills: 3 | Status: SHIPPED | OUTPATIENT
Start: 2020-03-17 | End: 2020-04-06

## 2020-03-17 RX ORDER — FUROSEMIDE 20 MG/1
20 TABLET ORAL 2 TIMES DAILY
Qty: 60 TAB | Refills: 3 | Status: SHIPPED | OUTPATIENT
Start: 2020-03-17 | End: 2020-04-06 | Stop reason: SDUPTHER

## 2020-03-17 NOTE — TELEPHONE ENCOUNTER
VOICEMAIL  1. Caller Name: .[facility] Representative                      Call Back Number: [phone] ext [phone]    2. Message: Patient needs an order for a nebulizer.    3. Patient approves office to leave a detailed voicemail/MyChart message: N\A        Nebulized order has been faxed to [facility] care at [phone]. Scanned into media

## 2020-03-17 NOTE — TELEPHONE ENCOUNTER
Yes.  Increase both your furosemide (Lasix) and potassium to twice a day.  New prescriptions for each sent to pharmacy.  [employer] message sent to patient

## 2020-03-17 NOTE — TELEPHONE ENCOUNTER
Phone Number Called: [phone] (home)       Call outcome: Spoke to patient regarding message below.    Message: Pt called and stated that she has been having leg swelling and that she has been taking Lasix but she doesn't seem to see it helping. She is wondering if she could up her dosage?      Please advise

## 2020-03-19 ENCOUNTER — TELEPHONE (OUTPATIENT)
Dept: CARDIOLOGY | Facility: MEDICAL CENTER | Age: 75
End: 2020-03-19

## 2020-03-19 ENCOUNTER — ANTICOAGULATION VISIT (OUTPATIENT)
Dept: MEDICAL GROUP | Facility: MEDICAL CENTER | Age: 75
End: 2020-03-19
Payer: MEDICARE

## 2020-03-19 DIAGNOSIS — Z79.01 LONG TERM (CURRENT) USE OF ANTICOAGULANTS: Primary | ICD-10-CM

## 2020-03-19 DIAGNOSIS — I48.91 ATRIAL FIBRILLATION, UNSPECIFIED TYPE (HCC): ICD-10-CM

## 2020-03-19 LAB — INR PPP: 1.7 (ref 2–3.5)

## 2020-03-19 PROCEDURE — 85610 PROTHROMBIN TIME: CPT | Performed by: FAMILY MEDICINE

## 2020-03-19 PROCEDURE — 99211 OFF/OP EST MAY X REQ PHY/QHP: CPT | Performed by: FAMILY MEDICINE

## 2020-03-19 NOTE — TELEPHONE ENCOUNTER
Called pt to discuss. She states she has been taking her Lasix 20mg daily in the morning with decent output until the afternoon, then by evening her output decreases and swelling gets worse to BLE.    Upon review of pt's MAR, PCP Dr. Angel had increased her frequency to BID on 03/17/20. She confirms her FV at that date however she was unaware PCP made this frequency change. She still has her original prescription bottle that states 20mg once daily.    Advised pt to start taking Lasix 20mg BID as prescribed by Dr. Angel on 03/17/20 and continue to monitor for worsening swelling. Pt verbalized understanding and was very appreciative of this clarification.

## 2020-03-19 NOTE — PROGRESS NOTES
OP Anticoagulation Service Note    Date: 3/19/2020  There were no vitals filed for this visit.   pt declined vitals    Anticoagulation Summary  As of 3/19/2020    INR goal:   2.0-3.0   TTR:   34.3 % (4 d)   INR used for dosin.70! (3/19/2020)   Warfarin maintenance plan:   5 mg (5 mg x 1) every day   Weekly warfarin total:   35 mg   Plan last modified:   Cindy Treviño, PharmD (3/19/2020)   Next INR check:   3/26/2020   Target end date:   Indefinite    Indications    Atrial fibrillation (HCC) [I48.91]  Acute pulmonary embolism (HCC) (Resolved) [I26.99]  Long term (current) use of anticoagulants [Z79.01]             Anticoagulation Episode Summary     INR check location:       Preferred lab:       Send INR reminders to:       Comments:         Anticoagulation Care Providers     Provider Role Specialty Phone number    Mary Carmen Rogers M.D. Referring Internal Medicine Critical Care [phone]    [facility] Responsible  [phone]        Anticoagulation Patient Findings      HPI:   Rufina Macias seen in clinic today, on anticoagulation therapy with warfarin (a high risk medication) for PE and atrial firbrillation      Pt is here today to evaluate anticoagulation therapy    Previous INR was  2.4 on 3-12-20    Pt was instructed to continue current regimen    Confirmed warfarin dosing regimen, denies missed or extra doses of coumadin.   Diet has been consistent with foods rich in vitamin K: Yes  Changes in ETOH:  No  Changes in smoking status: No  Changes in medication: No   Cost restriction: No  S/s of bleeding:  No  Falls or accidents since last visit No  Signs/symptoms  thrombosis since the last appt: No    A/P   INR  SUB-therapeutic today, will require dose adjust ment today to prevent recurrence of thrombosis or stroke and closer follow up.   Tonight 7.5 mg then increase  warfarin regimen, Resume Lovenox 150 mg once daily d/t recent PE             Pt educated to contact our clinic with  any changes in medications or s/s of bleeding or thrombosis. Pt is aware to seek immediate medical attention for falls, head injury or deep cuts    Follow up appointment in 4 day(s) to reduce risk of adverse events from warfarin  Cindy Treviño, PharmD

## 2020-03-23 ENCOUNTER — ANTICOAGULATION VISIT (OUTPATIENT)
Dept: MEDICAL GROUP | Facility: MEDICAL CENTER | Age: 75
End: 2020-03-23
Payer: MEDICARE

## 2020-03-23 DIAGNOSIS — Z79.01 LONG TERM (CURRENT) USE OF ANTICOAGULANTS: Primary | ICD-10-CM

## 2020-03-23 LAB — INR PPP: 2.4 (ref 2–3.5)

## 2020-03-23 PROCEDURE — 99211 OFF/OP EST MAY X REQ PHY/QHP: CPT | Performed by: INTERNAL MEDICINE

## 2020-03-23 PROCEDURE — 85610 PROTHROMBIN TIME: CPT | Performed by: INTERNAL MEDICINE

## 2020-03-23 NOTE — PROGRESS NOTES
OP Anticoagulation Service Note    Date: 3/23/2020  There were no vitals filed for this visit.   pt declined vitals    Anticoagulation Summary  As of 3/23/2020    INR goal:   2.0-3.0   TTR:   45.0 % (1.1 wk)   INR used for dosin.40 (3/23/2020)   Warfarin maintenance plan:   5 mg (5 mg x 1) every day   Weekly warfarin total:   35 mg   Plan last modified:   Cindy Treviño, PharmD (3/19/2020)   Next INR check:   3/26/2020   Target end date:   Indefinite    Indications    Atrial fibrillation (HCC) [I48.91]  Acute pulmonary embolism (HCC) (Resolved) [I26.99]  Long term (current) use of anticoagulants [Z79.01]             Anticoagulation Episode Summary     INR check location:       Preferred lab:       Send INR reminders to:       Comments:         Anticoagulation Care Providers     Provider Role Specialty Phone number    Mary Carmen Rogers M.D. Referring Internal Medicine Critical Care [phone]    [facility] Responsible  [phone]        Anticoagulation Patient Findings      HPI:   Rufina Macias seen in clinic today, on anticoagulation therapy with warfarin (a high risk medication) for PE and atrial fibrillation      Pt is here today to evaluate anticoagulation therapy    Previous INR was  1.7 on 3/19/20    Pt was instructed to Tonight 7.5 then 5 mg daily, start lovenox 150 mg once daily    Confirmed warfarin dosing regimen, denies missed or extra doses of coumadin.   Diet has been consistent with foods rich in vitamin K: Yes  Changes in ETOH:  No  Changes in smoking status: No  Changes in medication: No   Cost restriction: No  S/s of bleeding:  No  Falls or accidents since last visit No  Signs/symptoms  thrombosis since the last appt: No    A/P   INR  therapeutic today, will require close follow up as previous INR was sub-therapeutic   STOP lovenox, continue warfarin 5 mg daily         Pt educated to contact our clinic with any changes in medications or s/s of bleeding or thrombosis.  Pt is aware to seek immediate medical attention for falls, head injury or deep cuts    Follow up appointment in 3 day(s) to reduce risk of adverse events from warfarin  Cindy Treviño, PharmD

## 2020-03-26 ENCOUNTER — ANTICOAGULATION VISIT (OUTPATIENT)
Dept: MEDICAL GROUP | Facility: MEDICAL CENTER | Age: 75
End: 2020-03-26
Payer: MEDICARE

## 2020-03-26 DIAGNOSIS — I48.91 ATRIAL FIBRILLATION, UNSPECIFIED TYPE (HCC): ICD-10-CM

## 2020-03-26 DIAGNOSIS — Z79.01 LONG TERM (CURRENT) USE OF ANTICOAGULANTS: Primary | ICD-10-CM

## 2020-03-26 LAB — INR PPP: 2.3 (ref 2–3.5)

## 2020-03-26 PROCEDURE — 85610 PROTHROMBIN TIME: CPT | Performed by: FAMILY MEDICINE

## 2020-03-26 PROCEDURE — 93793 ANTICOAG MGMT PT WARFARIN: CPT | Performed by: FAMILY MEDICINE

## 2020-03-26 NOTE — PROGRESS NOTES
OP Anticoagulation Service Note    Date: 3/26/2020  There were no vitals filed for this visit.   pt declined vitals    Anticoagulation Summary  As of 3/26/2020    INR goal:   2.0-3.0   TTR:   59.3 % (1.6 wk)   INR used for dosin.30 (3/26/2020)   Warfarin maintenance plan:   5 mg (5 mg x 1) every day   Weekly warfarin total:   35 mg   Plan last modified:   Cindy Treviño, PharmD (3/19/2020)   Next INR check:   2020   Target end date:   Indefinite    Indications    Atrial fibrillation (HCC) [I48.91]  Acute pulmonary embolism (HCC) (Resolved) [I26.99]  Long term (current) use of anticoagulants [Z79.01]             Anticoagulation Episode Summary     INR check location:       Preferred lab:       Send INR reminders to:       Comments:         Anticoagulation Care Providers     Provider Role Specialty Phone number    Mary Carmen Rogers M.D. Referring Internal Medicine Critical Care [phone]    [facility] Responsible  [phone]        Anticoagulation Patient Findings      HPI:   Rufina Macias seen in clinic today, on anticoagulation therapy with warfarin (a high risk medication) for atrial fibrillation, hx of PE       Pt is here today to evaluate anticoagulation therapy    Previous INR was  2.4 on 3-23-20    Pt was instructed to continue current regimen    Confirmed warfarin dosing regimen, denies missed or extra doses of coumadin.   Diet has been consistent with foods rich in vitamin K: Yes  Changes in ETOH:  No  Changes in smoking status: No  Changes in medication: No   Cost restriction: No  S/s of bleeding:  No  Falls or accidents since last visit No  Signs/symptoms  thrombosis since the last appt: No    A/P   INR  therapeutic today,   Continue current warfarin regimen           check referral    Pt educated to contact our clinic with any changes in medications or s/s of bleeding or thrombosis. Pt is aware to seek immediate medical attention for falls, head injury or deep  cuts    Follow up appointment in 1 week(s) to reduce risk of adverse events from warfarin   Cindy Treviño, PharmD

## 2020-03-30 ENCOUNTER — TELEPHONE (OUTPATIENT)
Dept: MEDICAL GROUP | Age: 75
End: 2020-03-30

## 2020-03-30 DIAGNOSIS — R60.0 BILATERAL LEG EDEMA: ICD-10-CM

## 2020-03-30 NOTE — TELEPHONE ENCOUNTER
VOICEMAIL  1. Caller Name: [facility] patient advocate                   Call Back Number: [phone] ext [phone]    2. Message: Rufina is experiencing leg swelling she did take your advise on taking the lasix but that it only worked for a few days. Haydee would like us to follow up with her on this.    3. Patient approves office to leave a detailed voicemail/MyChart message: N\A      Please advise

## 2020-03-30 NOTE — TELEPHONE ENCOUNTER
Patient needs Doppler ultrasound of the legs to further evaluate her leg edema.  Ordered.  Please call patient and get to let her know and to help arrange for this testing.  This needs to be done to rule out any blood clots in the legs.  Patient should also continue to elevate her legs as much as possible and wear support hose.

## 2020-03-31 NOTE — TELEPHONE ENCOUNTER
Phone Number Called: [phone] (home)       Call outcome: Spoke to patient regarding message below.    Message: Spoke to pt and informed her of Dr. Angel's message gave pt the phone number for the [facility] [phone] so that she is able to schedule appointment with them.

## 2020-04-01 ENCOUNTER — TELEPHONE (OUTPATIENT)
Dept: CARDIOLOGY | Facility: MEDICAL CENTER | Age: 75
End: 2020-04-01

## 2020-04-01 ENCOUNTER — HOSPITAL ENCOUNTER (OUTPATIENT)
Dept: RADIOLOGY | Facility: MEDICAL CENTER | Age: 75
End: 2020-04-01
Attending: INTERNAL MEDICINE
Payer: MEDICARE

## 2020-04-01 DIAGNOSIS — R60.0 BILATERAL LEG EDEMA: ICD-10-CM

## 2020-04-01 PROCEDURE — 93970 EXTREMITY STUDY: CPT

## 2020-04-02 ENCOUNTER — HOSPITAL ENCOUNTER (OUTPATIENT)
Dept: LAB | Facility: MEDICAL CENTER | Age: 75
End: 2020-04-02
Attending: NURSE PRACTITIONER
Payer: MEDICARE

## 2020-04-02 ENCOUNTER — PATIENT OUTREACH (OUTPATIENT)
Dept: HEALTH INFORMATION MANAGEMENT | Facility: OTHER | Age: 75
End: 2020-04-02

## 2020-04-02 ENCOUNTER — ANTICOAGULATION VISIT (OUTPATIENT)
Dept: MEDICAL GROUP | Facility: MEDICAL CENTER | Age: 75
End: 2020-04-02
Payer: MEDICARE

## 2020-04-02 DIAGNOSIS — Z79.01 LONG TERM (CURRENT) USE OF ANTICOAGULANTS: Primary | ICD-10-CM

## 2020-04-02 DIAGNOSIS — I48.91 ATRIAL FIBRILLATION, UNSPECIFIED TYPE (HCC): ICD-10-CM

## 2020-04-02 LAB
ANION GAP SERPL CALC-SCNC: 13 MMOL/L (ref 7–16)
BUN SERPL-MCNC: 14 MG/DL (ref 8–22)
CALCIUM SERPL-MCNC: 9.7 MG/DL (ref 8.5–10.5)
CHLORIDE SERPL-SCNC: 100 MMOL/L (ref 96–112)
CO2 SERPL-SCNC: 26 MMOL/L (ref 20–33)
CREAT SERPL-MCNC: 0.96 MG/DL (ref 0.5–1.4)
DIGOXIN SERPL-MCNC: 0.7 NG/ML (ref 0.8–2)
GLUCOSE SERPL-MCNC: 89 MG/DL (ref 65–99)
INR PPP: 2 (ref 2–3.5)
POTASSIUM SERPL-SCNC: 4.6 MMOL/L (ref 3.6–5.5)
SODIUM SERPL-SCNC: 139 MMOL/L (ref 135–145)

## 2020-04-02 PROCEDURE — 80048 BASIC METABOLIC PNL TOTAL CA: CPT

## 2020-04-02 PROCEDURE — 99211 OFF/OP EST MAY X REQ PHY/QHP: CPT | Performed by: INTERNAL MEDICINE

## 2020-04-02 PROCEDURE — 85610 PROTHROMBIN TIME: CPT | Performed by: INTERNAL MEDICINE

## 2020-04-02 PROCEDURE — 80162 ASSAY OF DIGOXIN TOTAL: CPT

## 2020-04-02 PROCEDURE — 36415 COLL VENOUS BLD VENIPUNCTURE: CPT

## 2020-04-02 NOTE — PROGRESS NOTES
OP Anticoagulation Service Note    Date: 2020  There were no vitals filed for this visit.   pt declined vitals    Anticoagulation Summary  As of 2020    INR goal:   2.0-3.0   TTR:   74.7 % (2.6 wk)   INR used for dosin.00 (2020)   Warfarin maintenance plan:   7.5 mg (5 mg x 1.5) every Thu; 5 mg (5 mg x 1) all other days   Weekly warfarin total:   37.5 mg   Plan last modified:   Cindy Treviño, PharmD (2020)   Next INR check:   2020   Target end date:   Indefinite    Indications    Atrial fibrillation (HCC) [I48.91]  Acute pulmonary embolism (HCC) (Resolved) [I26.99]  Long term (current) use of anticoagulants [Z79.01]             Anticoagulation Episode Summary     INR check location:       Preferred lab:       Send INR reminders to:       Comments:         Anticoagulation Care Providers     Provider Role Specialty Phone number    Mary Carmen Rogers M.D. Referring Internal Medicine Critical Care [phone]    [facility] Responsible  [phone]        Anticoagulation Patient Findings      HPI:   Rufina Macias seen in clinic today, on anticoagulation therapy with warfarin (a high risk medication) for atrial fibrillation and PE       Pt is here today to evaluate anticoagulation therapy    Previous INR was  2.3 on 3-26-20    Pt was instructed to continue current regimen    Confirmed warfarin dosing regimen, denies missed or extra doses of coumadin.   Diet has been consistent with foods rich in vitamin K: Yes  Changes in ETOH:  No  Changes in smoking status: No  Changes in medication: No   Cost restriction: No  S/s of bleeding:  No  Falls or accidents since last visit No  Signs/symptoms  thrombosis since the last appt: No    A/P   INR  therapeutic today,  will require close follow up as pt is relatively new to warfarin  Increase weekly regimen as INR has dropped down to low end of range.        Pt educated to contact our clinic with any changes in medications or s/s of  bleeding or thrombosis. Pt is aware to seek immediate medical attention for falls, head injury or deep cuts    Follow up appointment in 1 week(s) to reduce risk of adverse events from warfarin  Cindy Treviño, PharmD

## 2020-04-06 ENCOUNTER — OFFICE VISIT (OUTPATIENT)
Dept: MEDICAL GROUP | Age: 75
End: 2020-04-06
Payer: MEDICARE

## 2020-04-06 VITALS
DIASTOLIC BLOOD PRESSURE: 78 MMHG | BODY MASS INDEX: 41.16 KG/M2 | TEMPERATURE: 99.6 F | OXYGEN SATURATION: 95 % | HEART RATE: 102 BPM | SYSTOLIC BLOOD PRESSURE: 140 MMHG | WEIGHT: 218 LBS | HEIGHT: 61 IN

## 2020-04-06 DIAGNOSIS — G89.29 CHRONIC PAIN OF LEFT KNEE: ICD-10-CM

## 2020-04-06 DIAGNOSIS — E78.2 MIXED HYPERLIPIDEMIA: ICD-10-CM

## 2020-04-06 DIAGNOSIS — E66.01 OBESITY, MORBID, BMI 40.0-49.9 (HCC): ICD-10-CM

## 2020-04-06 DIAGNOSIS — I51.7 MILD CONCENTRIC LEFT VENTRICULAR HYPERTROPHY (LVH): ICD-10-CM

## 2020-04-06 DIAGNOSIS — J96.21 ACUTE ON CHRONIC RESPIRATORY FAILURE WITH HYPOXIA (HCC): ICD-10-CM

## 2020-04-06 DIAGNOSIS — M25.562 CHRONIC PAIN OF LEFT KNEE: ICD-10-CM

## 2020-04-06 DIAGNOSIS — J43.1 PANLOBULAR EMPHYSEMA (HCC): ICD-10-CM

## 2020-04-06 DIAGNOSIS — K80.51 CALCULUS OF BILE DUCT WITHOUT CHOLECYSTITIS WITH OBSTRUCTION: ICD-10-CM

## 2020-04-06 DIAGNOSIS — I10 ESSENTIAL HYPERTENSION: ICD-10-CM

## 2020-04-06 DIAGNOSIS — I51.89 GRADE II DIASTOLIC DYSFUNCTION: ICD-10-CM

## 2020-04-06 DIAGNOSIS — I48.20 CHRONIC ATRIAL FIBRILLATION (HCC): ICD-10-CM

## 2020-04-06 DIAGNOSIS — Z79.01 LONG TERM (CURRENT) USE OF ANTICOAGULANTS: ICD-10-CM

## 2020-04-06 DIAGNOSIS — I26.92 ACUTE SADDLE PULMONARY EMBOLISM WITHOUT ACUTE COR PULMONALE (HCC): ICD-10-CM

## 2020-04-06 DIAGNOSIS — R60.0 BILATERAL LEG EDEMA: ICD-10-CM

## 2020-04-06 PROCEDURE — 99214 OFFICE O/P EST MOD 30 MIN: CPT | Performed by: INTERNAL MEDICINE

## 2020-04-06 RX ORDER — FUROSEMIDE 20 MG/1
20 TABLET ORAL 2 TIMES DAILY
Qty: 60 TAB | Refills: 3 | Status: SHIPPED | OUTPATIENT
Start: 2020-04-06 | End: 2020-04-06 | Stop reason: SDUPTHER

## 2020-04-06 RX ORDER — FUROSEMIDE 40 MG/1
40 TABLET ORAL 2 TIMES DAILY
Qty: 60 TAB | Refills: 1 | Status: SHIPPED | OUTPATIENT
Start: 2020-04-06 | End: 2020-04-06 | Stop reason: SDUPTHER

## 2020-04-06 RX ORDER — FUROSEMIDE 40 MG/1
40 TABLET ORAL 2 TIMES DAILY
Qty: 60 TAB | Refills: 1 | Status: SHIPPED | OUTPATIENT
Start: 2020-04-06 | End: 2020-06-12

## 2020-04-06 RX ORDER — METOPROLOL TARTRATE 100 MG/1
100 TABLET ORAL 2 TIMES DAILY
Qty: 180 TAB | Refills: 4 | Status: SHIPPED | OUTPATIENT
Start: 2020-04-06 | End: 2021-01-01

## 2020-04-06 RX ORDER — POTASSIUM CHLORIDE 750 MG/1
10 TABLET, FILM COATED, EXTENDED RELEASE ORAL 2 TIMES DAILY
Qty: 60 TAB | Refills: 3 | Status: SHIPPED | OUTPATIENT
Start: 2020-04-06 | End: 2020-04-06

## 2020-04-06 RX ORDER — FUROSEMIDE 20 MG/1
20 TABLET ORAL 2 TIMES DAILY
Qty: 60 TAB | Refills: 3 | Status: SHIPPED | OUTPATIENT
Start: 2020-04-06 | End: 2020-04-06

## 2020-04-06 RX ORDER — POTASSIUM CHLORIDE 1.5 G/1.58G
20 POWDER, FOR SOLUTION ORAL 2 TIMES DAILY
Qty: 60 PACKET | Refills: 1 | Status: SHIPPED | OUTPATIENT
Start: 2020-04-06 | End: 2020-04-13 | Stop reason: CLARIF

## 2020-04-06 RX ORDER — POTASSIUM CHLORIDE 1.5 G/1.58G
20 POWDER, FOR SOLUTION ORAL 2 TIMES DAILY
Qty: 30 PACKET | Refills: 3 | Status: SHIPPED | OUTPATIENT
Start: 2020-04-06 | End: 2020-04-06

## 2020-04-06 ASSESSMENT — ENCOUNTER SYMPTOMS
EYES NEGATIVE: 1
CARDIOVASCULAR NEGATIVE: 1
FALLS: 0
GASTROINTESTINAL NEGATIVE: 1
CONSTITUTIONAL NEGATIVE: 1
COUGH: 0
SPUTUM PRODUCTION: 0
PSYCHIATRIC NEGATIVE: 1
HEMOPTYSIS: 0
WHEEZING: 1
SHORTNESS OF BREATH: 1
MYALGIAS: 0
BACK PAIN: 0
NECK PAIN: 0
NEUROLOGICAL NEGATIVE: 1

## 2020-04-06 ASSESSMENT — FIBROSIS 4 INDEX: FIB4 SCORE: 1.72

## 2020-04-06 NOTE — PROGRESS NOTES
Subjective:      Rufina Macias is a 75 y.o. female who presents with Oxygen Dependency (wanting to discuss questions and concerns about Oxygen Tank )        HPI    The patient is here for followup of chronic medical problems listed below. The patient is compliant with medications and having no side effects from them. Denies chest pain, abdominal pain, dyspnea, myalgias, or cough.    The patient has a chronic history of mixed hyperlipdemia. Currently taking Zocor 20 mg daily. No medication side effects or acute complaints of myalgias, abdominal pain, dizziness, claudication or chest pain. Lipid panel was last completed on 01/02/20 as shown below.     Ref. Range 1/2/2020 13:15   Cholesterol,Tot Latest Ref Range: 100 - 199 mg/dL 136   Triglycerides Latest Ref Range: 0 - 149 mg/dL 86   HDL Latest Ref Range: >=40 mg/dL 47   LDL Latest Ref Range: <100 mg/dL 72     Patient has a history of chronic hypertension and is taking Lopressor 100 mg. No medication side effects were reported. She reportedly monitors her blood pressure regularly at home. Blood pressure is 140/78 today.  She reports following a low sodium diet and denies any related complaints including chronic cough, chest pain, headaches or dizziness.     Patient presented to the ED on 2/21/20 for Afib with RVR. Patient was recently diagnosed with Afib and started on Eliquis. CT chest in the ED demonstrated subsegmental PE, presumed related to Eliquis failure and was started on heparin drip and bridged with Coumadin. Hospitalization was complicated with persistent Afib with RVR. Patient was discharged home 10 days later with supplemental oxygen and instructed to use if saturations drop below 88% on ambient air. Eliquis, Ambien, azithromycin, prednisone, amlodipine, and losartan were discontinued in the ED. Patient will follow up with anticoagulation clinic and Cardiology. She has an appointment with Dr. Bowman (Cardiology) on 04/08/20.    Patient was  prescribed ipratropium 0.02% solution and uses supplemental oxygen tanks and instructed to use oxygen to saturations drop below 88% on ambient air. She states she was never given a nebulizer, and will therefore be prescribed one today. Patient is also requesting for portable oxygen that is easier to carry. She has an appointment with Dr. Rogers (Pulmonary) on 04/07/20.    Patient complains of bilateral lower extremity edema. She has been taking 20 mg of Lasix with no alleviation to her swelling. She is compliant with her medications, but is unsure why her edema continues to persist.         Patient Active Problem List   Diagnosis   • Mixed hyperlipidemia   • COPD (chronic obstructive pulmonary disease) ([facility])   • Primary insomnia   • Vitamin D deficiency disease   • DDD (degenerative disc disease), lumbar   • Chronic allergic rhinitis   • Thoracic radiculopathy   • Lumbar radiculopathy   • DDD (degenerative disc disease), cervical   • Obesity, morbid, BMI 40.0-49.9 ([facility])   • Essential hypertension   • Chronic midline low back pain without sciatica- norco chronic daily use; nv pain and spine   • Primary osteoarthritis involving multiple joints- to get supartz inj left knee- nv pain and spine   • Panlobular emphysema (HCC)   • Grade II diastolic dysfunction- echo 2017 with preserved EF   • Chronic pain of left knee- dr contreras- celebrex and cortisone inj; dr cade to do  supartz inj   • Ganglion cyst of tendon sheath of right hand- surveillance   • Uncomplicated opioid dependence (HCC)- nv pain and spine   • Atrial fibrillation (HCC)   • 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 with obstruction- ERCP EXTRACTON/ SPHINCTEROTOMY, recurrent Feb 2020   • Acute saddle pulmonary embolism without acute cor pulmonale (HCC)   • Adrenal nodule (HCC)   • Herpes zoster without complication   • Pulmonary nodule   • Acute on chronic respiratory failure with hypoxia (HCC)   • Long term (current) use of anticoagulants   • High risk  medication use   • Anticoagulated   • Mild concentric left ventricular hypertrophy (LVH)   • Bilateral leg edema       Outpatient Medications Prior to Visit   Medication Sig Dispense Refill   • warfarin (COUMADIN) 5 MG Tab Take 1 tab by mouth daily or as directed by anticoagulation  clinic 90 Tab 1   • Misc. Devices Misc Portable oxygen tank or backpack at 1 Liter /min to replaces large metal tanks. Include tubing 1 Each 1   • Misc. Devices [facility] nebulizer to use for NPPB treatments due to severe COPD. FOR USE WITH ATROVENT SOLN. 1 Each 0   • digoxin (LANOXIN) 125 MCG Tab Take 1 Tab by mouth every day at 6 PM. 30 Tab 1   • ipratropium (ATROVENT) 0.02 % Solution 2.5 mL by Nebulization route every 6 hours as needed (shortness of breath or wheezing). 30 Bullet 1   • simvastatin (ZOCOR) 20 MG Tab Take 1 Tab by mouth every evening. 90 Tab 4   • Calcium Carbonate-Vit D-Min (CALCIUM 1200 PO) Take 1,200 mg by mouth every day.     • Magnesium 400 MG Tab Take 400 mg by mouth every day.     • FIBER PO Take 2 Caps by mouth every day.     • hydrocodone/acetaminophen (NORCO)  MG Tab TAKE ONE TABLET BY MOUTH FOUR TIMES A DAY AS NEEDED 30 DAY SUPPLY  0   • potassium chloride ER (KLOR-CON) 10 MEQ tablet Take 1 Tab by mouth 2 times a day. 60 Tab 3   • furosemide (LASIX) 20 MG Tab Take 1 Tab by mouth 2 times a day. 60 Tab 3   • DILTIAZem CD (CARDIZEM CD) 240 MG CAPSULE SR 24 HR Take 1 Cap by mouth 2 Times a Day. 60 Cap 1   • ipratropium-albuterol (COMBIVENT RESPIMAT)  MCG/ACT Aero Soln Inhale 1 Puff by mouth every 6 hours as needed (shortness of breath). 1 Inhaler 5   • metoprolol (LOPRESSOR) 100 MG Tab Take 1 Tab by mouth 2 times a day. 180 Tab 4     No facility-administered medications prior to visit.         Allergies   Allergen Reactions   • Codeine Swelling   • Ace Inhibitors      Cough       Review of Systems   Constitutional: Negative.    HENT: Negative.    Eyes: Negative.    Respiratory: Positive for shortness  "of breath and wheezing. Negative for cough, hemoptysis and sputum production.    Cardiovascular: Negative.    Gastrointestinal: Negative.    Genitourinary: Negative.    Musculoskeletal: Negative for back pain, falls, joint pain, myalgias and neck pain.        Bilateral lower extremity edema.   Skin: Negative.    Neurological: Negative.    Endo/Heme/Allergies: Negative.    Psychiatric/Behavioral: Negative.         Objective:     /78 (BP Location: Left arm, Patient Position: Sitting, BP Cuff Size: Adult)   Pulse (!) 102   Temp 37.6 °C (99.6 °F) (Temporal)   Ht 1.549 m (5' 1\")   Wt 98.9 kg (218 lb)   SpO2 95%   BMI 41.19 kg/m²     Physical Exam   Constitutional: Oriented to person, place, and time. Appears well-developed and well-nourished. No distress.   Head: Normocephalic and atraumatic.   Right Ear: External ear normal.   Left Ear: External ear normal.   Nose: Nose normal.   Mouth/Throat: Oropharynx is clear and moist. No oropharyngeal exudate.   Eyes: Pupils are equal, round, and reactive to light. Conjunctivae and EOM are normal. Right eye exhibits no discharge. Left eye exhibits no discharge. No scleral icterus.   Neck: Normal range of motion. Neck supple. No JVD present. No tracheal deviation present. No thyromegaly present.   Cardiovascular: Normal rate, Regularly irregular rhythm between 80 and 90, normal heart sounds and intact distal pulses.  Exam reveals no gallop and no friction rub.    No murmur heard.  Pulmonary/Chest: Effort normal. No stridor. No respiratory distress. No wheezing or rales. No tenderness.   Abdominal: Soft. Bowel sounds are normal. No distension and no mass. There is no tenderness. There is no rebound and no guarding. No hernia.   Musculoskeletal: Normal range of motion No edema or tenderness.   Lymphadenopathy: No cervical adenopathy.   Neurological: Alert and oriented to person, place, and time. Normal reflexes. Normal reflexes. No cranial nerve deficit. Normal muscle " tone. Coordination normal.   Skin: Skin is warm and dry. No rash noted. Not diaphoretic. No erythema. No pallor.   Psychiatric: Normal mood and affect. Behavior is normal. Judgment and thought content normal.   Nursing note and vitals reviewed.      Lab Results   Component Value Date/Time    HBA1C 5.7 (H) 02/22/2020 01:52 AM     Lab Results   Component Value Date/Time    SODIUM 139 04/02/2020 02:46 PM    POTASSIUM 4.6 04/02/2020 02:46 PM    CHLORIDE 100 04/02/2020 02:46 PM    CO2 26 04/02/2020 02:46 PM    GLUCOSE 89 04/02/2020 02:46 PM    BUN 14 04/02/2020 02:46 PM    CREATININE 0.96 04/02/2020 02:46 PM    CREATININE 0.80 12/02/2010 12:00 AM    BUNCREATRAT 20 12/02/2010 12:00 AM    GLOMRATE >59 12/02/2010 12:00 AM    ALKPHOSPHAT 66 02/21/2020 06:37 PM    ASTSGOT 24 02/21/2020 06:37 PM    ALTSGPT 23 02/21/2020 06:37 PM    TBILIRUBIN 0.6 02/21/2020 06:37 PM     Lab Results   Component Value Date/Time    INR 2.00 04/02/2020 02:23 PM    INR 2.30 03/26/2020 01:52 PM    INR 2.40 03/23/2020 01:29 PM     Lab Results   Component Value Date/Time    CHOLSTRLTOT 136 01/02/2020 01:15 PM    LDL 72 01/02/2020 01:15 PM    HDL 47 01/02/2020 01:15 PM    TRIGLYCERIDE 86 01/02/2020 01:15 PM       No results found for: TESTOSTERONE  Lab Results   Component Value Date/Time    TSH 1.640 12/02/2010 12:00 AM     Lab Results   Component Value Date/Time    FREET4 1.90 (H) 02/21/2020 06:43 PM    FREET4 0.90 10/24/2014 11:56 AM     No results found for: URICACID  No components found for: VITB12  Lab Results   Component Value Date/Time    25HYDROXY 39 09/20/2016 11:36 AM    25HYDROXY 39 10/24/2014 11:56 AM          Assessment/Plan:     1. Mixed hyperlipidemia  Well-controlled. Continue current regimen of Zocor 20 mg. Reviewed the risks and benefits of treatment and potential side effects of medication.  - Obtained and reviewed lipid panel dated 01/02/20 which reveals triglycerides of 86, LDL of 72, and HDL of 47.  - Recommended they follow low  fat, low carbohydrate and high fiber diet. Additionally, patient was asked to exercise regularly including frequent cardio.  - Recheck lab 1-2 weeks before next follow up visit.    2. Long term (current) use of anticoagulants  The patient has not been tolerating the warfarin and continues to be seen in the Coumadin clinic.     3. 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 with obstruction- ERCP EXTRACTON/ SPHINCTEROTOMY, recurrent Feb 2020  Patient was seen in the ED on 02/08/20 with evidence of biliary duct dilation of 1 cm. She denies any symptoms during today's visit.     4. Acute on chronic respiratory failure with hypoxia (HCC)-now under good control and fairly well compensated.      5. Chronic atrial fibrillation (HCC)-good rate control but still clinically in atrial fib with irregularly irregular rhythm, on beta-blocker and calcium channel blocker.  I am wondering if he returned to her ARB might mitigate some of the effects of the calcium channel blocker if it is in fact contributing to her edema.  But we will leave this up to the cardiology service.      6. Acute saddle pulmonary embolism without acute cor pulmonale (HCC)-    apparent failure on Eliquis (which she was taking at the time of diagnosis of her PE for atrial fib), and subsequently switched to warfarin, and doing well at this time with no current dyspnea or chest pain since the switch.  She will therefore remain on warfarin for the time being.  However we can leave this up to her cardiologist and pulmonologist if they wish to switch back to the novel anticoagulant in the future.      7. Panloular emphysema (HCC)-doing much better with regard to her chronic shortness of breath on the portable oxygen conserving device.  Follow-up with pulmonology  The patient has not been tolerating the warfarin and continues to be seen in the Coumadin clinic. She failed Eliquis in terms of developing thrombo-embolic phenomena while on this for her atrial fib.   Explained that this happens sometimes and now she will need more close monitoring with the warfarin. Her atrial fib has improved with a controlled rate with the diltiazem.  She will continue on this and follow-up with cardiology.  There have been no apparent side effects from the diltiazem and her heart rate is in the 80-90 range controlling her atrial fib which is still present clinically although we did not do an EKG to confirm this today. Patient has appointment with pulmonology on 04/07/20 and with cardiology on 04/08/20.    8. Obesity, morbid, BMI 40.0-49.9 ([facility])  Counseled patient on weight loss and dietary changes.     9. Grade II diastolic dysfunction- echo 2017 with preserved EF  10. Mild concentric left ventricular hypertrophy (LVH)  Patient will follow-up with cardiology on 04/08/20.    11. Bilateral leg edema-not clear if this is secondary to her calcium channel blocker or 2 to insufficient diuretic therapy for her underlying diastolic LV dysfunction.   Discussed plan with patient that included increasing Lasix dosage to 40 mg twice daily and Klor-Con dosage to 20 mEq twice daily. Patient verbalized agreement.   - furosemide (LASIX) 40 MG Tab; Take 1 Tab by mouth 2 Times a Day.  Dispense: 60 Tab; Refill: 1  - potassium chloride ER (KLOR-CON) 10 MEQ tablet; Take 1 Tab by mouth 2 times a day.  Dispense: 60 Tab; Refill: 3  - potassium chloride (KLOR-CON) 20 MEQ Pack; Take 1 Packet by mouth 2 times a day.  Dispense: 30 Packet; Refill: 3  - Basic Metabolic Panel; Future    12. Chronic pain of left knee  Under good control. Continue same regimen.    13. Essential hypertension  Chronic, stable history. Under borderline control with current medication regimen: Lopressor 100 mg. No changes in dosage today. Blood pressure today was 140/78. Patient was asked to continue monitoring her blood pressure at home. She was encouraged to follow a low sodium diet.     I considered adding ARB which she has been on the past.   But we will leave this up to cardiology.  In the meantime she needs a better diuresis to get rid of her fluid retention, and her Lasix dosages were doubled as noted below, from Lasix 20 mg twice daily to 40 mg twice daily.    - Repeat labs in 1-2 weeks prior to their next appointment.   - metoprolol (LOPRESSOR) 100 MG Tab; Take 1 Tab by mouth 2 times a day.  Dispense: 180 Tab; Refill: 4      Addendum: Because of patient's marked 4+ pretibial edema bilaterally, which she claims she did not have when she left the hospital a few months ago, as well as her 9 pound weight gain in the last few weeks which I attribute to the edema, but increase her Lasix from 20 mg twice daily to 40 mg twice daily and also increased her potassium supplement along with this.    She will be seeing the pulmonary doctor tomorrow and have cardiology provider in 2 days, so they can further assess her current symptoms and medications with regard to her cardio pulmonary status, and make amendments to this decision if they feel indicated.        40 minute face-to-face encounter took place today.  More than half of this time was spent in the coordination of care of the above problems, as well as counseling.       IRanjit (Shaw), am scribing for, and in the presence of, Quincy Angel M.D..    Electronically signed by: Ranjit Almonte (Shaw), 4/6/2020    Quincy MALHOTRA M.D., personally performed the services described in this documentation, as scribed by Ranjit Almonte in my presence, and it is both accurate and complete.

## 2020-04-07 ENCOUNTER — TELEPHONE (OUTPATIENT)
Dept: MEDICAL GROUP | Age: 75
End: 2020-04-07

## 2020-04-07 ENCOUNTER — OFFICE VISIT (OUTPATIENT)
Dept: PULMONOLOGY | Facility: HOSPICE | Age: 75
End: 2020-04-07
Payer: MEDICARE

## 2020-04-07 VITALS
HEIGHT: 62 IN | WEIGHT: 214 LBS | BODY MASS INDEX: 39.38 KG/M2 | HEART RATE: 84 BPM | OXYGEN SATURATION: 94 % | SYSTOLIC BLOOD PRESSURE: 110 MMHG | DIASTOLIC BLOOD PRESSURE: 68 MMHG

## 2020-04-07 DIAGNOSIS — I48.20 CHRONIC ATRIAL FIBRILLATION (HCC): ICD-10-CM

## 2020-04-07 DIAGNOSIS — R06.02 SHORTNESS OF BREATH: ICD-10-CM

## 2020-04-07 DIAGNOSIS — R60.0 BILATERAL LEG EDEMA: ICD-10-CM

## 2020-04-07 DIAGNOSIS — R91.1 PULMONARY NODULE: ICD-10-CM

## 2020-04-07 DIAGNOSIS — I26.92 ACUTE SADDLE PULMONARY EMBOLISM WITHOUT ACUTE COR PULMONALE (HCC): ICD-10-CM

## 2020-04-07 DIAGNOSIS — J96.11 CHRONIC RESPIRATORY FAILURE WITH HYPOXIA (HCC): ICD-10-CM

## 2020-04-07 DIAGNOSIS — Z28.21 IMMUNIZATION CONSENT NOT GIVEN: ICD-10-CM

## 2020-04-07 DIAGNOSIS — Z87.891 FORMER TOBACCO USE: ICD-10-CM

## 2020-04-07 PROCEDURE — 94761 N-INVAS EAR/PLS OXIMETRY MLT: CPT | Performed by: INTERNAL MEDICINE

## 2020-04-07 PROCEDURE — G0009 ADMIN PNEUMOCOCCAL VACCINE: HCPCS | Performed by: INTERNAL MEDICINE

## 2020-04-07 PROCEDURE — 90732 PPSV23 VACC 2 YRS+ SUBQ/IM: CPT | Performed by: INTERNAL MEDICINE

## 2020-04-07 PROCEDURE — 99215 OFFICE O/P EST HI 40 MIN: CPT | Mod: 25 | Performed by: INTERNAL MEDICINE

## 2020-04-07 RX ORDER — ALBUTEROL SULFATE 90 UG/1
2 AEROSOL, METERED RESPIRATORY (INHALATION) EVERY 6 HOURS PRN
Qty: 8.5 G | Refills: 2 | Status: SHIPPED | OUTPATIENT
Start: 2020-04-07 | End: 2021-01-01

## 2020-04-07 RX ORDER — ZOLPIDEM TARTRATE 10 MG/1
10 TABLET ORAL NIGHTLY PRN
COMMUNITY
Start: 2020-03-15

## 2020-04-07 ASSESSMENT — FIBROSIS 4 INDEX: FIB4 SCORE: 1.72

## 2020-04-07 NOTE — PROCEDURES
Multi-Ox Readings  Multi Ox #1 Room air   O2 sat % at rest 95   O2 sat % on exertion 92   O2 sat average on exertion     Multi Ox #2     O2 sat % at rest     O2 sat % on exertion     O2 sat average on exertion       Oxygen Use 1   Oxygen Frequency 24/7   Duration of need     Is the patient mobile within the home?     CPAP Use?     BIPAP Use?     Servo Titration

## 2020-04-07 NOTE — TELEPHONE ENCOUNTER
MEDICATION PRIOR AUTHORIZATION NEEDED:    1. Name of Medication: Klor-con powder     2. Requested By (Name of Pharmacy): [facility]      3. Is insurance on file current? [facility]    4. What is the name & phone number of the 3rd party payor? Call [phone] Patient ID [id number]

## 2020-04-07 NOTE — PROGRESS NOTES
Chief Complaint   Patient presents with   • Hospital Follow-up     [facility] ED 02/21/20-03/02/2020. Pulm, consult 02/22/20   • Results     Lab 02/2020-3/2020, CXR 02/24/2020, CT-CTA chest 02/21/2020       Problems:   1. Shortness of breath    2. Former tobacco use    3. Immunization consent not given    4. Chronic respiratory failure with hypoxia (HCC)    5. Pulmonary nodule    6. Bilateral leg edema    7. Chronic atrial fibrillation (HCC)    8. Acute saddle pulmonary embolism without acute cor pulmonale (HCC)        Assessment/Plan:   # Unprovoked PE   -- On coumadin    -- Follow up in AC clinic   -- Goal INR 2-3    # Atrial fibrillation    -- Intermittent palpitation from time to time   -- On metoprolol, digoxin, and diltiazem    -- Pending cardiology evaluation tomorrow     # Bilateral LE swelling   -- Appears to be hypervolemic on exam and clinical HPI suggest volume overload given increased weight gain with small dose of lasix   -- Lasix increased to 40 mg daily by PCP   -- Educated patient about low Na diet and to avoid canned food and soft drinks     # Chronic hypoxia respiratory failure   -- Ambulatory pulse oximetry -> 95% on room air and on 92% on exertion    -- Advised patient to stop using oxygen therapy at this time      # Former smoker   -- Hx of self reported COPD with no formal diagnosis of COPD   -- Obtain PFTs to confirm airflow obstruction   -- Advised patient to stop using duoneb if she does not have shortness of breath or wheezing as this can precipitate her atrial fibrillation   -- Stop combivent   -- Prescribed albuterol as needed SOB/wheezing     # Immunizations   -- Due for PPSV23 today   -- Up to date on prevnar and influenza vaccinations     # LLL nodule   # Pulmonary nodules   -- Review of CT chest showed 1.2 cm LLL nodule. Differentials include malignancy vs inflammatory vs infectious.   -- Calculated Cali clinic score for LLL nodule is 44% which places patient at intermediate pretest  probability   -- Discussed with patient about the possible etiologies as above. Per discussion with patient, she does not wish to undergo any form of chemoradiation or surgical treatment if this was to be cancerous. She rather not know what it is and does not want a PET scan or repeat CT chest.     HPI:  MS. Macias is a 75 year old female with history of atrial fibrillation, unprovoked PE, and history of self reported COPD presents today for hospital follow up. Patient was previously admitted to [facility] ICU for unprovoked PE and atrial fibrillation. Hospital course complicated with persistent atrial fibrillation with RVR which she was started on metoprolol, diltiazem, and digoxin for rate controlled. She was diuresed and cardiology consulted and recommended outpatient follow up. Her wheezing remains persistent which she completed a course of prednisone. She was discharged on 1 liters NC and coumadin. Today, she reports feeling better overall beside her LE swelling which has worsen over the past few weeks. Associated with weight gain. Venous duplex done on 04/01 showed no DVT. She also state having intermittent palpitation which last about 45 minutes. Denies cough, fever/chills, chest pain, N/V, or abdominal pain. She was evaluated by her PCP yesterday which her lasix was increased to 40 mg once daily. Awaiting to see cardiology tomorrow.    Review of her CT chest showed multiple filling defects in segmental and subsegmental bilaterally and LLL nodule. No prior CT chest for comparison.     Patient is a former smoker (20 pack years), quit 25 years ago. Never had PFTs.      Past Medical History:   Diagnosis Date   • Allergy    • Arthritis     Spine, neck, hands, ankles and knees   • Asthma     inhalers as needed   • Back pain     and neck   • Breath shortness     at times   • Bronchitis    • CA - cancer of uterus    • Cancer (HCC) 2010    uterine   • Carpal tunnel syndrome    • COPD    • Diverticula of  colon    • Heart burn    • High cholesterol    • Hyperlipidemia    • Hypertension    • Pneumonia 1993   • Tremor, hereditary, benign    • Wheezing        Past Surgical History:   Procedure Laterality Date   • PB ERCP,DIAGNOSTIC  2/14/2020    Procedure: ERCP (ENDOSCOPIC RETROGRADE CHOLANGIOPANCREATOGRAPHY);  Surgeon: Clinton Ray M.D.;  Location: [facility];  Service: Gastroenterology   • ERCP  4/5/2018    Procedure: ERCP W/POSS BIOPSY;  Surgeon: Quincy Louie M.D.;  Location: [facility];  Service: Gastroenterology   • ERCP W/SPHINCTEROTOMY/PAPILL.  4/5/2018    Procedure: ERCP W/SPHINCTEROTOMY/PAPILL.;  Surgeon: Quincy Louie M.D.;  Location: [facility];  Service: Gastroenterology   • ERCP W/ INSERTION STENT/TUBE  4/5/2018    Procedure: ERCP W/ INSERTION STENT/TUBE - STENT PLACEMENT/REMOVAL;  Surgeon: Quincy Louie M.D.;  Location: [facility];  Service: Gastroenterology   • ERCP W/REMOVAL CALCULUS  4/5/2018    Procedure: ERCP W/REMOVAL CALCULUS W/DILATION;  Surgeon: Quincy Louie M.D.;  Location: [facility];  Service: Gastroenterology   • ERCP  2/15/2018    Procedure: ERCP, SPHINCTEROTOMY, STENT PLACEMENT, DEBRIDEMENT;  Surgeon: Quincy Louie M.D.;  Location: [facility];  Service: Gastroenterology   • COMMON BILE DUCT EXPLORATION  02/15/2018   • ERCP W/ INSERTION STENT/TUBE  02/15/2018   • ERCP W/SPHINCTEROTOMY/PAPILL.  02/15/2018   • ERCP W/REMOVAL CALCULUS  02/15/2018   • ARIELLA BY LAPAROSCOPY  july, 2015    [facility]   • HYSTERECTOMY, TOTAL ABDOMINAL  1/18/10    Uterine, Dr. Whelan and Dr. Cam +BSO   • ABDOMINAL HYSTERECTOMY TOTAL  Jan 2010    Dr. Cam   • OOPHORECTOMY  Jan 2010    BSO   • OPEN REDUCTION      ankle       ROS:   Constitutional: Denies fevers, chills, night sweats, fatigue or weight loss  Eyes: Denies vision loss, pain, drainage, double vision  Ears, Nose, Throat: Denies earache, tinnitus,  hoarseness  Cardiovascular: Denies chest pain, tightness, palpitations  Respiratory: See HPI  GI: Denies abdominal pain, nausea, vomiting, diarrhea  : Denies frequent urination, hematuria, painful urination  Musculoskeletal: Denies back pain, painful joints, sore muscles  Neurological: Denies headaches, seizures  Skin: Denies rashes, color changes  Psychiatric: Denies depression or thoughts of suicide  Hematologic: Denies bleeding tendency or clotting tendency  Allergic/Immunologic: Denies rhinitis, skin sensitivity    Social History     Socioeconomic History   • Marital status:      Spouse name: Not on file   • Number of children: Not on file   • Years of education: Not on file   • Highest education level: Not on file   Occupational History   • Not on file   Social Needs   • Financial resource strain: Not on file   • Food insecurity     Worry: Not on file     Inability: Not on file   • Transportation needs     Medical: Not on file     Non-medical: Not on file   Tobacco Use   • Smoking status: Former Smoker     Packs/day: 3.00     Years: 20.00     Pack years: 60.00     Types: Cigarettes     Last attempt to quit: 1991     Years since quittin.2   • Smokeless tobacco: Never Used   Substance and Sexual Activity   • Alcohol use: No     Alcohol/week: 0.0 - 2.4 oz     Comment: hardly ever   • Drug use: No   • Sexual activity: Never     Partners: Male     Birth control/protection: Post-Menopausal   Lifestyle   • Physical activity     Days per week: Not on file     Minutes per session: Not on file   • Stress: Not on file   Relationships   • Social connections     Talks on phone: Not on file     Gets together: Not on file     Attends Orthodoxy service: Not on file     Active member of club or organization: Not on file     Attends meetings of clubs or organizations: Not on file     Relationship status: Not on file   • Intimate partner violence     Fear of current or ex partner: Not on file     Emotionally  abused: Not on file     Physically abused: Not on file     Forced sexual activity: Not on file   Other Topics Concern   • Not on file   Social History Narrative   • Not on file     Codeine and Ace inhibitors  Current Outpatient Medications on File Prior to Visit   Medication Sig Dispense Refill   • zolpidem (AMBIEN) 10 MG Tab TK 1 T PO HS PRF 28 DAYS     • metoprolol (LOPRESSOR) 100 MG Tab Take 1 Tab by mouth 2 times a day. 180 Tab 4   • potassium chloride (KLOR-CON) 20 MEQ Pack Take 1 Packet by mouth 2 times a day. 60 Packet 1   • furosemide (LASIX) 40 MG Tab Take 1 Tab by mouth 2 Times a Day. 60 Tab 1   • warfarin (COUMADIN) 5 MG Tab Take 1 tab by mouth daily or as directed by anticoagulation  clinic 90 Tab 1   • Misc. Devices Misc Portable oxygen tank or backpack at 1 Liter /min to replaces large metal tanks. Include tubing 1 Each 1   • Misc. Devices Misc Home nebulizer to use for NPPB treatments due to severe COPD. FOR USE WITH ATROVENT SOLN. 1 Each 0   • digoxin (LANOXIN) 125 MCG Tab Take 1 Tab by mouth every day at 6 PM. 30 Tab 1   • ipratropium (ATROVENT) 0.02 % Solution 2.5 mL by Nebulization route every 6 hours as needed (shortness of breath or wheezing). 30 Bullet 1   • simvastatin (ZOCOR) 20 MG Tab Take 1 Tab by mouth every evening. 90 Tab 4   • Calcium Carbonate-Vit D-Min (CALCIUM 1200 PO) Take 1,200 mg by mouth every day.     • Magnesium 400 MG Tab Take 400 mg by mouth every day.     • FIBER PO Take 2 Caps by mouth every day.     • hydrocodone/acetaminophen (NORCO)  MG Tab TAKE ONE TABLET BY MOUTH FOUR TIMES A DAY AS NEEDED 30 DAY SUPPLY  0   • ipratropium-albuterol (COMBIVENT RESPIMAT)  MCG/ACT Aero Soln Inhale 1 Puff by mouth every 6 hours as needed (shortness of breath). (Patient not taking: Reported on 4/7/2020) 1 Inhaler 5     No current facility-administered medications on file prior to visit.      /68 (BP Location: Left arm, Patient Position: Sitting, BP Cuff Size: Adult)    "Pulse 84   Ht 1.575 m (5' 2\")   Wt 97.1 kg (214 lb)   SpO2 94%   Family History   Problem Relation Age of Onset   • Heart Disease Father 45        MI   • Lung Disease Father    • Stroke Father 70   • Cancer Father    • Hypertension Father    • Cancer Paternal Grandmother         breast   • Cancer Paternal Grandfather      Immunization History   Administered Date(s) Administered   • Influenza (IM) Preservative Free 10/07/2011   • Influenza Seasonal Injectable 10/09/2012   • Influenza TIV (IM) 10/29/2012, 10/11/2013   • Influenza Vaccine Adult HD 10/03/2014, 09/25/2015, 10/04/2016, 10/26/2017, 10/05/2018, 09/30/2019   • Pneumococcal Conjugate Vaccine (Prevnar/PCV-13) 03/23/2016   • Pneumococcal polysaccharide vaccine (PPSV-23) 08/31/2012   • Tdap Vaccine 06/25/2015   • Zoster Vaccine Live (ZVL) (Zostavax) 10/15/2013         Physical Exam:  General: Obese, NAD, speaking in full sentence.   HEENT: PERRLA, EOMI, no scleral icterus, no nasal or oral lesions  Neck: No thyromegaly, no adenopathy, no bruits. Difficulty to assess JVD.   Mallampatti: Grade II  Lungs: Decrease breath sounds, no wheezes or crackles  Heart: Irregular rate and rhythm, no gallops or murmurs  Abdomen: Soft, benign, no organomegaly  Extremities: No clubbing, cyanosis. +3 pitting edema up to mid-thigh.   Neurologic: Cranial nerve, motor, and sensory exam are normal    Diagnostic Test:   CT chest personally reviewed:   Multiple filling defects in segmental and subsegmental bilaterally and LLL nodule. No prior CT chest for comparison.    Mary Carmen Rogers MD   Pulmonary Critical Care   [facility]       " Detail Level: Generalized

## 2020-04-07 NOTE — TELEPHONE ENCOUNTER
"VOICEMAIL  1. Caller Name: Rufina Macias                        Call Back Number: [phone] (home)       2. Message: Wondering what her ultrasound results were Dr. Angel did not mention it during appointment.    3. Patient approves office to leave a detailed voicemail/MyChart message: N\A    Phone Number Called: [phone] (home)       Call outcome: Spoke to patient regarding message below.    Message: Informed pt that Dr. Angel sent a mychart message on April 1st and that he said \"Good news.  No blood clots seen\" Pt understood           "

## 2020-04-08 ENCOUNTER — OFFICE VISIT (OUTPATIENT)
Dept: CARDIOLOGY | Facility: MEDICAL CENTER | Age: 75
End: 2020-04-08
Payer: MEDICARE

## 2020-04-08 VITALS — BODY MASS INDEX: 39.38 KG/M2 | WEIGHT: 214 LBS | HEIGHT: 62 IN

## 2020-04-08 DIAGNOSIS — I26.92 ACUTE SADDLE PULMONARY EMBOLISM WITHOUT ACUTE COR PULMONALE (HCC): ICD-10-CM

## 2020-04-08 DIAGNOSIS — I48.19 PERSISTENT ATRIAL FIBRILLATION (HCC): ICD-10-CM

## 2020-04-08 DIAGNOSIS — Z79.899 HIGH RISK MEDICATION USE: ICD-10-CM

## 2020-04-08 DIAGNOSIS — E78.2 MIXED HYPERLIPIDEMIA: ICD-10-CM

## 2020-04-08 DIAGNOSIS — Z79.01 LONG TERM (CURRENT) USE OF ANTICOAGULANTS: ICD-10-CM

## 2020-04-08 PROCEDURE — 99442 PR PHYSICIAN TELEPHONE EVALUATION 11-20 MIN: CPT | Mod: CR | Performed by: NURSE PRACTITIONER

## 2020-04-08 RX ORDER — DILTIAZEM HYDROCHLORIDE 240 MG/1
240 CAPSULE, COATED, EXTENDED RELEASE ORAL 2 TIMES DAILY
COMMUNITY
End: 2020-04-28 | Stop reason: SDUPTHER

## 2020-04-08 ASSESSMENT — ENCOUNTER SYMPTOMS
WEAKNESS: 0
WEIGHT LOSS: 0
DIZZINESS: 0
CLAUDICATION: 0
SHORTNESS OF BREATH: 1
ORTHOPNEA: 0
PALPITATIONS: 1
PND: 0

## 2020-04-08 ASSESSMENT — FIBROSIS 4 INDEX: FIB4 SCORE: 1.72

## 2020-04-08 NOTE — PROGRESS NOTES
Chief Complaint   Patient presents with   • Atrial Fibrillation   • HTN (Controlled)     Chief Complaint   Patient presents with   • Atrial Fibrillation   • HTN (Controlled)       Subjective:   Rufina Macias is a 74 y.o. female previous patient of Dr. Flores who presents today for hospital follow up.     Patient was last seen by myself on 3/10/20 for hospital follow up after her admission on 2/21/20 with A-Fib with RVR, she was symptomatic with SOB and palpitations.  A CT of her chest did show bilateral PE thought to be possibly due to Eliquis failure and was therefore placed on Warfarin. Echocardiogram showed normal RV and LV function. It was decided to hold off on TTE/DCCV due to her oxygen needs due to her PE. She was discharged on 3/2/20.  Rufina still had significant oxygen demands and it was felt that previously suggested DCCV should be held off until her oxygen needs improved. EKG showed improved rate control.     Other past medical history significant for COPD, Sheldon's disease, pituitary disorder, hyperlipidemia, hypertension and HIV.    Today patient states that her primary concern is her chronic pain, she has an appointment tomorrow with her pain specialist. Reports that she is no longer using home oxygen. Her shortness of breath has significantly improved, some PALMER. Palpitations are well controlled, only has some brief episodes in the evening at times. She does not have a home BP cuff but states her BP and pulse have been stable at all of her follow up visit.     Past Medical History:   Diagnosis Date   • Allergy    • Arthritis     Spine, neck, hands, ankles and knees   • Asthma     inhalers as needed   • Back pain     and neck   • Breath shortness     at times   • Bronchitis    • CA - cancer of uterus    • Cancer (HCC) 2010    uterine   • Carpal tunnel syndrome    • COPD    • Diverticula of colon    • Heart burn    • High cholesterol    • Hyperlipidemia    • Hypertension    • Pneumonia 1993   •  Tremor, hereditary, benign    • Wheezing      Past Surgical History:   Procedure Laterality Date   • PB ERCP,DIAGNOSTIC  2/14/2020    Procedure: ERCP (ENDOSCOPIC RETROGRADE CHOLANGIOPANCREATOGRAPHY);  Surgeon: Clintno Ray M.D.;  Location: [facility];  Service: Gastroenterology   • ERCP  4/5/2018    Procedure: ERCP W/POSS BIOPSY;  Surgeon: Quincy Louie M.D.;  Location: [facility];  Service: Gastroenterology   • ERCP W/SPHINCTEROTOMY/PAPILL.  4/5/2018    Procedure: ERCP W/SPHINCTEROTOMY/PAPILL.;  Surgeon: Quincy Louie M.D.;  Location: [facility];  Service: Gastroenterology   • ERCP W/ INSERTION STENT/TUBE  4/5/2018    Procedure: ERCP W/ INSERTION STENT/TUBE - STENT PLACEMENT/REMOVAL;  Surgeon: Quincy Louie M.D.;  Location: [facility];  Service: Gastroenterology   • ERCP W/REMOVAL CALCULUS  4/5/2018    Procedure: ERCP W/REMOVAL CALCULUS W/DILATION;  Surgeon: Quincy Louie M.D.;  Location: [facility];  Service: Gastroenterology   • ERCP  2/15/2018    Procedure: ERCP, SPHINCTEROTOMY, STENT PLACEMENT, DEBRIDEMENT;  Surgeon: Quincy Louie M.D.;  Location: [facility];  Service: Gastroenterology   • COMMON BILE DUCT EXPLORATION  02/15/2018   • ERCP W/ INSERTION STENT/TUBE  02/15/2018   • ERCP W/SPHINCTEROTOMY/PAPILL.  02/15/2018   • ERCP W/REMOVAL CALCULUS  02/15/2018   • ARIELLA BY LAPAROSCOPY  july, 2015    [facility]   • HYSTERECTOMY, TOTAL ABDOMINAL  1/18/10    Uterine, Dr. Whelan and Dr. Cam +BSO   • ABDOMINAL HYSTERECTOMY TOTAL  Jan 2010    Dr. Cam   • OOPHORECTOMY  Jan 2010    BSO   • OPEN REDUCTION      ankle     Family History   Problem Relation Age of Onset   • Heart Disease Father 45        MI   • Lung Disease Father    • Stroke Father 70   • Cancer Father    • Hypertension Father    • Cancer Paternal Grandmother         breast   • Cancer Paternal Grandfather      Social History     Socioeconomic History    • Marital status:      Spouse name: Not on file   • Number of children: Not on file   • Years of education: Not on file   • Highest education level: Not on file   Occupational History   • Not on file   Social Needs   • Financial resource strain: Not on file   • Food insecurity     Worry: Not on file     Inability: Not on file   • Transportation needs     Medical: Not on file     Non-medical: Not on file   Tobacco Use   • Smoking status: Former Smoker     Packs/day: 3.00     Years: 20.00     Pack years: 60.00     Types: Cigarettes     Last attempt to quit: 1991     Years since quittin.2   • Smokeless tobacco: Never Used   Substance and Sexual Activity   • Alcohol use: No     Alcohol/week: 0.0 - 2.4 oz     Comment: hardly ever   • Drug use: No   • Sexual activity: Never     Partners: Male     Birth control/protection: Post-Menopausal   Lifestyle   • Physical activity     Days per week: Not on file     Minutes per session: Not on file   • Stress: Not on file   Relationships   • Social connections     Talks on phone: Not on file     Gets together: Not on file     Attends [employer]ism service: Not on file     Active member of club or organization: Not on file     Attends meetings of clubs or organizations: Not on file     Relationship status: Not on file   • Intimate partner violence     Fear of current or ex partner: Not on file     Emotionally abused: Not on file     Physically abused: Not on file     Forced sexual activity: Not on file   Other Topics Concern   • Not on file   Social History Narrative   • Not on file     Allergies   Allergen Reactions   • Codeine Swelling   • Ace Inhibitors      Cough     Outpatient Encounter Medications as of 2020   Medication Sig Dispense Refill   • DILTIAZem CD (CARDIZEM CD) 240 MG CAPSULE SR 24 HR Take 240 mg by mouth 2 Times a Day.     • zolpidem (AMBIEN) 10 MG Tab TK 1 T PO HS PRF 28 DAYS     • albuterol 108 (90 Base) MCG/ACT Aero Soln inhalation aerosol  Inhale 2 Puffs by mouth every 6 hours as needed for Shortness of Breath. 8.5 g 2   • metoprolol (LOPRESSOR) 100 MG Tab Take 1 Tab by mouth 2 times a day. 180 Tab 4   • potassium chloride (KLOR-CON) 20 MEQ Pack Take 1 Packet by mouth 2 times a day. 60 Packet 1   • furosemide (LASIX) 40 MG Tab Take 1 Tab by mouth 2 Times a Day. 60 Tab 1   • warfarin (COUMADIN) 5 MG Tab Take 1 tab by mouth daily or as directed by anticoagulation  clinic 90 Tab 1   • Misc. Devices Misc Portable oxygen tank or backpack at 1 Liter /min to replaces large metal tanks. Include tubing 1 Each 1   • Misc. Devices [facility] [place]bulizer to use for NPPB treatments due to severe COPD. FOR USE WITH ATROVENT SOLN. 1 Each 0   • digoxin (LANOXIN) 125 MCG Tab Take 1 Tab by mouth every day at 6 PM. 30 Tab 1   • ipratropium (ATROVENT) 0.02 % Solution 2.5 mL by Nebulization route every 6 hours as needed (shortness of breath or wheezing). 30 Bullet 1   • simvastatin (ZOCOR) 20 MG Tab Take 1 Tab by mouth every evening. 90 Tab 4   • Calcium Carbonate-Vit D-Min (CALCIUM 1200 PO) Take 1,200 mg by mouth every day.     • Magnesium 400 MG Tab Take 400 mg by mouth every day.     • FIBER PO Take 2 Caps by mouth every day.     • hydrocodone/acetaminophen (NORCO)  MG Tab TAKE ONE TABLET BY MOUTH FOUR TIMES A DAY AS NEEDED 30 DAY SUPPLY  0   • [DISCONTINUED] potassium chloride ER (KLOR-CON) 10 MEQ tablet Take 1 Tab by mouth 2 times a day. 60 Tab 3   • [DISCONTINUED] furosemide (LASIX) 20 MG Tab Take 1 Tab by mouth 2 times a day. 60 Tab 3   • [DISCONTINUED] DILTIAZem CD (CARDIZEM CD) 240 MG CAPSULE SR 24 HR Take 1 Cap by mouth 2 Times a Day. 60 Cap 1   • [DISCONTINUED] ipratropium-albuterol (COMBIVENT RESPIMAT)  MCG/ACT Aero Soln Inhale 1 Puff by mouth every 6 hours as needed (shortness of breath). (Patient not taking: Reported on 4/7/2020) 1 Inhaler 5   • [DISCONTINUED] metoprolol (LOPRESSOR) 100 MG Tab Take 1 Tab by mouth 2 times a day. 180 Tab 4     No  "facility-administered encounter medications on file as of 4/8/2020.      Review of Systems   Constitutional: Negative for malaise/fatigue and weight loss.   Respiratory: Positive for shortness of breath.    Cardiovascular: Positive for palpitations (Improved). Negative for chest pain, orthopnea, claudication, leg swelling and PND.   Neurological: Negative for dizziness and weakness.   All other systems reviewed and are negative.       Objective:   Ht 1.575 m (5' 2\")   Wt 97.1 kg (214 lb)   BMI 39.14 kg/m²     Physical Exam   Constitutional: She is oriented to person, place, and time. She appears well-developed and well-nourished. No distress.   HENT:   Head: Normocephalic.   Eyes: EOM are normal.   Neck: No JVD present.   Cardiovascular: Normal rate and normal heart sounds. An irregularly irregular rhythm present.   Pulmonary/Chest: No respiratory distress. She has decreased breath sounds in the right lower field and the left lower field.   Abdominal: Soft. There is no abdominal tenderness.   Musculoskeletal:         General: Edema (Trace BLE) present.   Neurological: She is alert and oriented to person, place, and time.   Skin: Skin is warm and dry.   Psychiatric: She has a normal mood and affect. Her behavior is normal.        Echocardiogram 2/25/20:  Limited Exam for LV and RV Function  Compared to the images of the prior study done 2/11/20 -  there has   been no significant change.   Left ventricular systolic function is normal.  Left ventricular ejection fraction is visually estimated to be 55%.  Right ventricular systolic function is normal.    NM-Cardiac PET Scan 8/7/17:  CONCLUSIONS AND IMPRESSIONS:  Normal perfusion on PET with normal function of the leftventricle.  No evidence of ischemia or infarction.     Assessment:     1. Persistent atrial fibrillation (HCC)     2. Acute saddle pulmonary embolism without acute cor pulmonale (HCC)     3. Mixed hyperlipidemia     4. Long term (current) use of " anticoagulants     5. High risk medication use         Medical Decision Making:  Today's Assessment / Status / Plan:     A-Fib:  -EKG today shows A-Fib, rate 108 (down from previous EKG showing rate in the 160's).   -Continue Digoxin 125 mcg daily, Diltiazem 240 mg daily, Lopressor 100 mg BID and Magnesium 400 mg daily.   -OAC with Warfarin managed by [facility] (therapeutic INR since 2/27/20).   -Dig level in 7-10 days.     HTN:  -Slightly elevated.  -Lasix increased to 20 mg daily.  -Repeat BMP in 7-10 days.     HLD:  -Stable.  -Continue simvastatin 20 mg daily.     Bilateral PE:  -OAC as above.  -Followed by PCP.     Patient is feeling better overall, however I think that she needs a few more weeks of recovery before DCCV is attempted. Will re-evaluate patient at next apt as scheduled below in approximately 4 weeks. Discussed 24/7 call access to our office should questions or concerns arise in the mean time. Patient understands and agrees with the plan of care.     Future Appointments   Date Time Provider Department Center   3/12/2020  1:30 PM [facility] PHARMACIST IRINA Mello   4/6/2020  2:00 PM Qiuncy Angel M.D. 25M ARLYN Horta   4/7/2020  7:45 AM Mary Carmen Rogers M.D. PULM None   4/8/2020  1:15 PM TEJA Howard [facility] None   7/6/2020 10:40 AM Quincy Angel M.D. 25M [facility]     Collaborating Provider: Dr. Gerald Espinoza       Please note that this dictation was created using voice recognition software. I have made every reasonable attempt to correct obvious errors, but I expect that there are errors of grammar and possibly content I did not discover before finalizing the note.              Subjective:   Rufina Macias is a 75 y.o. female who presents today     Past Medical History:   Diagnosis Date   • Allergy    • Arthritis     Spine, neck, hands, ankles and knees   • Asthma     inhalers as needed   • Back pain     and neck   • Breath shortness     at times   • Bronchitis     • CA - cancer of uterus    • Cancer (HCC) 2010    uterine   • Carpal tunnel syndrome    • COPD    • Diverticula of colon    • Heart burn    • High cholesterol    • Hyperlipidemia    • Hypertension    • Pneumonia 1993   • Tremor, hereditary, benign    • Wheezing      Past Surgical History:   Procedure Laterality Date   • PB ERCP,DIAGNOSTIC  2/14/2020    Procedure: ERCP (ENDOSCOPIC RETROGRADE CHOLANGIOPANCREATOGRAPHY);  Surgeon: Clinton Ray M.D.;  Location: [facility];  Service: Gastroenterology   • ERCP  4/5/2018    Procedure: ERCP W/POSS BIOPSY;  Surgeon: Quincy Louie M.D.;  Location: [facility];  Service: Gastroenterology   • ERCP W/SPHINCTEROTOMY/PAPILL.  4/5/2018    Procedure: ERCP W/SPHINCTEROTOMY/PAPILL.;  Surgeon: Quincy Louie M.D.;  Location: [facility];  Service: Gastroenterology   • ERCP W/ INSERTION STENT/TUBE  4/5/2018    Procedure: ERCP W/ INSERTION STENT/TUBE - STENT PLACEMENT/REMOVAL;  Surgeon: Quincy Louie M.D.;  Location: [facility];  Service: Gastroenterology   • ERCP W/REMOVAL CALCULUS  4/5/2018    Procedure: ERCP W/REMOVAL CALCULUS W/DILATION;  Surgeon: Quincy Louie M.D.;  Location: [facility];  Service: Gastroenterology   • ERCP  2/15/2018    Procedure: ERCP, SPHINCTEROTOMY, STENT PLACEMENT, DEBRIDEMENT;  Surgeon: Quincy Louie M.D.;  Location: [facility];  Service: Gastroenterology   • COMMON BILE DUCT EXPLORATION  02/15/2018   • ERCP W/ INSERTION STENT/TUBE  02/15/2018   • ERCP W/SPHINCTEROTOMY/PAPILL.  02/15/2018   • ERCP W/REMOVAL CALCULUS  02/15/2018   • ARIELLA BY LAPAROSCOPY  july, 2015    [facility]   • HYSTERECTOMY, TOTAL ABDOMINAL  1/18/10    Uterine, Dr. Whelan and Dr. Cam +BSO   • ABDOMINAL HYSTERECTOMY TOTAL  Jan 2010    Dr. Cam   • OOPHORECTOMY  Jan 2010    BSO   • OPEN REDUCTION      ankle     Family History   Problem Relation Age of Onset   • Heart Disease Father 45         MI   • Lung Disease Father    • Stroke Father 70   • Cancer Father    • Hypertension Father    • Cancer Paternal Grandmother         breast   • Cancer Paternal Grandfather      Social History     Socioeconomic History   • Marital status:      Spouse name: Not on file   • Number of children: Not on file   • Years of education: Not on file   • Highest education level: Not on file   Occupational History   • Not on file   Social Needs   • Financial resource strain: Not on file   • Food insecurity     Worry: Not on file     Inability: Not on file   • Transportation needs     Medical: Not on file     Non-medical: Not on file   Tobacco Use   • Smoking status: Former Smoker     Packs/day: 3.00     Years: 20.00     Pack years: 60.00     Types: Cigarettes     Last attempt to quit: 1991     Years since quittin.2   • Smokeless tobacco: Never Used   Substance and Sexual Activity   • Alcohol use: No     Alcohol/week: 0.0 - 2.4 oz     Comment: hardly ever   • Drug use: No   • Sexual activity: Never     Partners: Male     Birth control/protection: Post-Menopausal   Lifestyle   • Physical activity     Days per week: Not on file     Minutes per session: Not on file   • Stress: Not on file   Relationships   • Social connections     Talks on phone: Not on file     Gets together: Not on file     Attends Roman [employer] service: Not on file     Active member of club or organization: Not on file     Attends meetings of clubs or organizations: Not on file     Relationship status: Not on file   • Intimate partner violence     Fear of current or ex partner: Not on file     Emotionally abused: Not on file     Physically abused: Not on file     Forced sexual activity: Not on file   Other Topics Concern   • Not on file   Social History Narrative   • Not on file     Allergies   Allergen Reactions   • Codeine Swelling   • Ace Inhibitors      Cough     Outpatient Encounter Medications as of 2020   Medication Sig Dispense Refill    • DILTIAZem CD (CARDIZEM CD) 240 MG CAPSULE SR 24 HR Take 240 mg by mouth 2 Times a Day.     • zolpidem (AMBIEN) 10 MG Tab TK 1 T PO HS PRF 28 DAYS     • albuterol 108 (90 Base) MCG/ACT Aero Soln inhalation aerosol Inhale 2 Puffs by mouth every 6 hours as needed for Shortness of Breath. 8.5 g 2   • metoprolol (LOPRESSOR) 100 MG Tab Take 1 Tab by mouth 2 times a day. 180 Tab 4   • potassium chloride (KLOR-CON) 20 MEQ Pack Take 1 Packet by mouth 2 times a day. 60 Packet 1   • furosemide (LASIX) 40 MG Tab Take 1 Tab by mouth 2 Times a Day. 60 Tab 1   • warfarin (COUMADIN) 5 MG Tab Take 1 tab by mouth daily or as directed by anticoagulation  clinic 90 Tab 1   • Misc. Devices Misc Portable oxygen tank or backpack at 1 Liter /min to replaces large metal tanks. Include tubing 1 Each 1   • Misc. Devices [facility] [place] Home nebulizer to use for NPPB treatments due to severe COPD. FOR USE WITH ATROVENT SOLN. 1 Each 0   • digoxin (LANOXIN) 125 MCG Tab Take 1 Tab by mouth every day at 6 PM. 30 Tab 1   • ipratropium (ATROVENT) 0.02 % Solution 2.5 mL by Nebulization route every 6 hours as needed (shortness of breath or wheezing). 30 Bullet 1   • simvastatin (ZOCOR) 20 MG Tab Take 1 Tab by mouth every evening. 90 Tab 4   • Calcium Carbonate-Vit D-Min (CALCIUM 1200 PO) Take 1,200 mg by mouth every day.     • Magnesium 400 MG Tab Take 400 mg by mouth every day.     • FIBER PO Take 2 Caps by mouth every day.     • hydrocodone/acetaminophen (NORCO)  MG Tab TAKE ONE TABLET BY MOUTH FOUR TIMES A DAY AS NEEDED 30 DAY SUPPLY  0   • [DISCONTINUED] potassium chloride ER (KLOR-CON) 10 MEQ tablet Take 1 Tab by mouth 2 times a day. 60 Tab 3   • [DISCONTINUED] furosemide (LASIX) 20 MG Tab Take 1 Tab by mouth 2 times a day. 60 Tab 3   • [DISCONTINUED] DILTIAZem CD (CARDIZEM CD) 240 MG CAPSULE SR 24 HR Take 1 Cap by mouth 2 Times a Day. 60 Cap 1   • [DISCONTINUED] ipratropium-albuterol (COMBIVENT RESPIMAT)  MCG/ACT Aero Soln Inhale 1 Puff by  "mouth every 6 hours as needed (shortness of breath). (Patient not taking: Reported on 4/7/2020) 1 Inhaler 5   • [DISCONTINUED] metoprolol (LOPRESSOR) 100 MG Tab Take 1 Tab by mouth 2 times a day. 180 Tab 4     No facility-administered encounter medications on file as of 4/8/2020.      Review of Systems   Constitutional: Positive for malaise/fatigue. Negative for weight loss.   Respiratory: Positive for shortness of breath (SIgnificantly improved, some PALMER).    Cardiovascular: Positive for palpitations (Brief, mild ). Negative for chest pain, orthopnea, claudication, leg swelling and PND.   Musculoskeletal: Positive for joint pain.   Neurological: Negative for dizziness and weakness.   All other systems reviewed and are negative.       Objective:   Ht 1.575 m (5' 2\")   Wt 97.1 kg (214 lb)   BMI 39.14 kg/m²     Physical Exam     Limited TTE 2/25/20:  CONCLUSIONS  Limited Exam for LV and RV Function  Compared to the images of the prior study done 2/11/20 -  there has   been no significant change.   Left ventricular systolic function is normal.  Left ventricular ejection fraction is visually estimated to be 55%.  Right ventricular systolic function is normal.    Assessment:     1. Persistent atrial fibrillation (HCC)     2. Acute saddle pulmonary embolism without acute cor pulmonale (HCC)     3. Mixed hyperlipidemia     4. Long term (current) use of anticoagulants     5. High risk medication use         Medical Decision Making:  Today's Assessment / Status / Plan:   A-Fib:  -EKG at last visit showed A-Fib, rate 108 (down from previous EKG showing rate in the 160's).   -Continue Digoxin 125 mcg daily, Diltiazem 240 mg daily, Lopressor 100 mg BID and Magnesium 400 mg daily.   -OAC with Warfarin managed by [facility] (therapeutic INR since 2/27/20).   -Dig level on 4/2/20 was 0.7, Cr 0.96 and GFR 57.   -DCCV discussed, patient would like to continue holding off at this time.      HTN:  -Stable per review of " pulmonary note yesterday.     -Continue Diltiazem and lopressor as above and lasix 20 mg daily.       HLD:  -Stable.  -Continue simvastatin 20 mg daily.      Bilateral PE:  -OAC as above.  -Followed by PCP.     LLL Nodules:  -Followed by pulmonary medicine who is concerned for malignancy, however patient is not interested in chemio     Patient is feeling better significantly better overall. Discussed 24/7 call access to our office should questions or concerns arise in the mean time. Patient would like to establish care with Dr. Guillaume Diez in the future, will have office arrange a follow up visit.     Future Appointments   Date Time Provider Department Center   4/9/2020  2:30 PM [facility] PHARMACIST IRINA Mello   5/6/2020  1:20 PM Quincy Angel M.D. 25M A[facility]YN Horta   6/12/2020  1:20 PM Guillaume Diez M.D. [facility] None   7/6/2020 10:40 AM Quincy Angel M.D. 25M [facility]   8/24/2020 11:45 AM [id number] PULM None   8/24/2020 12:45 PM Mary Carmen Rogers M.D. PULM None     Collaborating Provider: Mimi Guzmán A.PRachnaRFAMILIA         Telephone Appointment Visit   As a means of avoiding spread of COVID-19, this visit is being conducted by telephone. This telephone visit was initiated by the patient and they verbally consented.    Time at start of call: 10:55    Reason for Call:  Lab follow up, symptom follow up    Patient Comments / History:   See Above    Labs / Images Reviewed   See Above    Assessment and Plan:     1. Persistent atrial fibrillation (HCC)    2. Acute saddle pulmonary embolism without acute cor pulmonale (HCC)    3. Mixed hyperlipidemia    4. Long term (current) use of anticoagulants    5. High risk medication use    Other orders  - DILTIAZem CD (CARDIZEM CD) 240 MG CAPSULE SR 24 HR; Take 240 mg by mouth 2 Times a Day.      Follow-up: Return in about 2 months (around 6/8/2020).    Time at end of call: 11:10  Total Time Spent: 20 minutes     TARIK HowardRFAMILIA

## 2020-04-08 NOTE — PROGRESS NOTES
A 74-year-old female was admitted to [facility] from 2/8/20 to 2/16/20 for fever and chest pain. Patient readmitted from 2/21/2020 to 3/2/2020 to treat pulmonary embolism. IHD visited the patient bedside. The patient was discharged home.     The patient was ordered to start/continue to take the following medications: Warfarin Sodium (Coumadin), Ipratropium Bromide (Atrovent), Furosemide (Lasix), and Digoxin (Lanoxin). The patient successfully filled all medications.     Per discharge orders, patient was instructed to see her PCP, and cardiologist. Patient saw her PCP 3/5/20 and her cardiologist on 3/10/20. Patient utilizes [employer] through [employer].    The Patient Navigator was able to successfully engage with patient post-discharge and throughout the case. Navigator helped patient obtain small OCD tanks and a nebulizer. Navigator helped coordinate several [employer] rides for all of patient's appointments and also referred patient to the [facility] paramedicine program.

## 2020-04-09 ENCOUNTER — ANTICOAGULATION VISIT (OUTPATIENT)
Dept: MEDICAL GROUP | Facility: MEDICAL CENTER | Age: 75
End: 2020-04-09
Payer: MEDICARE

## 2020-04-09 DIAGNOSIS — I48.19 PERSISTENT ATRIAL FIBRILLATION (HCC): ICD-10-CM

## 2020-04-09 DIAGNOSIS — Z79.01 LONG TERM (CURRENT) USE OF ANTICOAGULANTS: Primary | ICD-10-CM

## 2020-04-09 LAB — INR PPP: 1.9 (ref 2–3.5)

## 2020-04-09 PROCEDURE — 99211 OFF/OP EST MAY X REQ PHY/QHP: CPT | Performed by: INTERNAL MEDICINE

## 2020-04-09 PROCEDURE — 85610 PROTHROMBIN TIME: CPT | Performed by: INTERNAL MEDICINE

## 2020-04-09 NOTE — PROGRESS NOTES
OP Anticoagulation Service Note    Date: 2020  There were no vitals filed for this visit.   pt declined vitals    Anticoagulation Summary  As of 2020    INR goal:   2.0-3.0   TTR:   54.2 % (3.6 wk)   INR used for dosin.90! (2020)   Warfarin maintenance plan:   7.5 mg (5 mg x 1.5) every Thu; 5 mg (5 mg x 1) all other days   Weekly warfarin total:   37.5 mg   Plan last modified:   Cindy Treviño, PharmD (2020)   Next INR check:   2020   Target end date:   Indefinite    Indications    Atrial fibrillation (HCC) [I48.91]  Acute pulmonary embolism (HCC) (Resolved) [I26.99]  Long term (current) use of anticoagulants [Z79.01]             Anticoagulation Episode Summary     INR check location:       Preferred lab:       Send INR reminders to:       Comments:         Anticoagulation Care Providers     Provider Role Specialty Phone number    Mary Carmen Rogers M.D. Referring Internal Medicine Critical Care [phone]    [facility] Responsible  [phone]        Anticoagulation Patient Findings      HPI:   Rufina Macias seen in clinic today, on anticoagulation therapy with warfarin (a high risk medication) for atrial fibrillation and PE       Pt is here today to evaluate anticoagulation therapy    Previous INR was  2.0 on 20    Pt was instructed to begin increased weekly regimen    Confirmed warfarin dosing regimen, denies missed or extra doses of coumadin.   Diet has been consistent with foods rich in vitamin K:  No - pt had N/V this wee and diarrhea. Just starting to feel better.   Changes in ETOH:  No  Changes in smoking status: No  Changes in medication: No   Cost restriction: No  S/s of bleeding:  No  Falls or accidents since last visit No  Signs/symptoms  thrombosis since the last appt: No    A/P   INR SUB therapeutic today,   Will continue current dosing as anticipate INR to go up d/t N/V and poor appetite           Pt educated to contact our clinic with any  changes in medications or s/s of bleeding or thrombosis. Pt is aware to seek immediate medical attention for falls, head injury or deep cuts    Follow up appointment in 1 week(s) to reduce risk of adverse events from warfarin   Cindy Treviño, PharmD

## 2020-04-13 RX ORDER — POTASSIUM CHLORIDE 750 MG/1
10 TABLET, FILM COATED, EXTENDED RELEASE ORAL 4 TIMES DAILY
Qty: 120 TAB | Refills: 5 | Status: SHIPPED | OUTPATIENT
Start: 2020-04-13 | End: 2020-05-13

## 2020-04-13 NOTE — TELEPHONE ENCOUNTER
Okay.  We will go back to the tablets at 10 mEq, but she will have to take 4 a day of these.  Please tell her that this is because the insurance formulary does not cover the powder, but they will cover the tablets.  She has taken this size tablet before without difficulty, but she was only taking 2 a day then.  Instead of increasing her to a larger 20 mEq  pill twice a day which she would have difficulty swallowing, we will just have her take 4 a day of the 10 mEq pills..    New prescription sent for potassium chloride 10 mEq   tablets, 4 times a day, #120 with 5 refills.  (Replaces the 20 mEq powder twice daily prescription)    If she cannot swallow the 10 mill equivalent tablets she will just have to grind them up or chew them, or take them with applesauce.    Please let pharmacy know as well.

## 2020-04-16 ENCOUNTER — ANTICOAGULATION VISIT (OUTPATIENT)
Dept: MEDICAL GROUP | Facility: MEDICAL CENTER | Age: 75
End: 2020-04-16
Payer: MEDICARE

## 2020-04-16 DIAGNOSIS — I48.19 PERSISTENT ATRIAL FIBRILLATION (HCC): ICD-10-CM

## 2020-04-16 DIAGNOSIS — Z79.01 LONG TERM (CURRENT) USE OF ANTICOAGULANTS: Primary | ICD-10-CM

## 2020-04-16 LAB — INR PPP: 3.1 (ref 2–3.5)

## 2020-04-16 PROCEDURE — 85610 PROTHROMBIN TIME: CPT | Performed by: INTERNAL MEDICINE

## 2020-04-16 PROCEDURE — 99211 OFF/OP EST MAY X REQ PHY/QHP: CPT | Performed by: INTERNAL MEDICINE

## 2020-04-16 NOTE — PROGRESS NOTES
OP Anticoagulation Service Note    Date: 4/16/2020  There were no vitals filed for this visit.   pt declined vitals    Anticoagulation Summary  As of 4/16/2020    INR goal:   2.0-3.0   TTR:   60.5 % (1.1 mo)   INR used for dosing:   3.10! (4/16/2020)   Warfarin maintenance plan:   7.5 mg (5 mg x 1.5) every Thu; 5 mg (5 mg x 1) all other days   Weekly warfarin total:   37.5 mg   Plan last modified:   Cindy Treviño, PharmD (4/2/2020)   Next INR check:   4/23/2020   Target end date:   Indefinite    Indications    Atrial fibrillation (HCC) [I48.91]  Acute pulmonary embolism (HCC) (Resolved) [I26.99]  Long term (current) use of anticoagulants [Z79.01]             Anticoagulation Episode Summary     INR check location:       Preferred lab:       Send INR reminders to:       Comments:         Anticoagulation Care Providers     Provider Role Specialty Phone number    Mary Carmen Rogers M.D. Referring Internal Medicine Critical Care [phone]    [facility]ticoagulation Services Responsible  [phone]        Anticoagulation Patient Findings      HPI:   Rufina Macias seen in clinic today, on anticoagulation therapy with warfarin (a high risk medication) for atrial fibrillation and hx of PE     Pt is here today to evaluate anticoagulation therapy    Previous INR was  1.9 on 4-9-20    Pt was instructed to continue current regimen    Confirmed warfarin dosing regimen, denies missed or extra doses of coumadin.   Diet has been consistent with foods rich in vitamin K: Yes - appetite is back to normal  Changes in ETOH:  No  Changes in smoking status: No  Changes in medication: No   Cost restriction: No  S/s of bleeding:  No  Falls or accidents since last visit No  Signs/symptoms  thrombosis since the last appt: No    A/P   INR  supratherapeutic today,    Will lower todays warfarin dose then continue current regimen       Pt educated to contact our clinic with any changes in medications or s/s of bleeding or thrombosis. Pt is  aware to seek immediate medical attention for falls, head injury or deep cuts    Follow up appointment in 1 week(s) to reduce risk of adverse events from warfarin   Cindy Treviño, PharmD

## 2020-04-21 ENCOUNTER — HOSPITAL ENCOUNTER (OUTPATIENT)
Dept: RADIOLOGY | Facility: MEDICAL CENTER | Age: 75
End: 2020-04-21
Attending: PAIN MEDICINE
Payer: MEDICARE

## 2020-04-21 DIAGNOSIS — M54.40 BILATERAL LOW BACK PAIN WITH SCIATICA, SCIATICA LATERALITY UNSPECIFIED, UNSPECIFIED CHRONICITY: ICD-10-CM

## 2020-04-21 PROCEDURE — 72100 X-RAY EXAM L-S SPINE 2/3 VWS: CPT

## 2020-04-21 NOTE — TELEPHONE ENCOUNTER
Phone Number Called: [phone] (home)     Call outcome: Spoke to patient regarding message below.    Message: Patient informed of Dr. Angel's message. She is going to go to the pharmacy today and will let us know if she has any issues.

## 2020-04-23 ENCOUNTER — ANTICOAGULATION VISIT (OUTPATIENT)
Dept: MEDICAL GROUP | Facility: MEDICAL CENTER | Age: 75
End: 2020-04-23
Payer: MEDICARE

## 2020-04-23 DIAGNOSIS — Z79.01 LONG TERM (CURRENT) USE OF ANTICOAGULANTS: Primary | ICD-10-CM

## 2020-04-23 DIAGNOSIS — I48.19 PERSISTENT ATRIAL FIBRILLATION (HCC): ICD-10-CM

## 2020-04-23 LAB — INR PPP: 2.8 (ref 2–3.5)

## 2020-04-23 PROCEDURE — 93793 ANTICOAG MGMT PT WARFARIN: CPT | Performed by: INTERNAL MEDICINE

## 2020-04-23 PROCEDURE — 85610 PROTHROMBIN TIME: CPT | Performed by: INTERNAL MEDICINE

## 2020-04-23 NOTE — PROGRESS NOTES
OP Anticoagulation Service Note    Date: 2020  There were no vitals filed for this visit.   pt declined vitals    Anticoagulation Summary  As of 2020    INR goal:   2.0-3.0   TTR:   61.6 % (1.3 mo)   INR used for dosin.80 (2020)   Warfarin maintenance plan:   5 mg (5 mg x 1) every day   Weekly warfarin total:   35 mg   Plan last modified:   Cindy Treviño, PharmD (2020)   Next INR check:   2020   Target end date:   Indefinite    Indications    Atrial fibrillation (HCC) [I48.91]  Acute pulmonary embolism (HCC) (Resolved) [I26.99]  Long term (current) use of anticoagulants [Z79.01]             Anticoagulation Episode Summary     INR check location:       Preferred lab:       Send INR reminders to:       Comments:         Anticoagulation Care Providers     Provider Role Specialty Phone number    Mary Carmen Rogers M.D. Referring Internal Medicine Critical Care [phone]    [facility] Responsible  [phone]        Anticoagulation Patient Findings      HPI:   Rufina Macias seen in clinic today, on anticoagulation therapy with warfarin (a high risk medication) for atrial fibrillation and hx of PE       Pt is here today to evaluate anticoagulation therapy    Previous INR was  3.1 on 4-15-20    Pt was instructed to lower dosing regimen    Confirmed warfarin dosing regimen, denies missed or extra doses of coumadin.   Diet has been consistent with foods rich in vitamin K: Yes  Changes in ETOH:  No  Changes in smoking status: No  Changes in medication: No   Cost restriction: No  S/s of bleeding:  No  Falls or accidents since last visit No  Signs/symptoms  thrombosis since the last appt: No    A/P   INR  -therapeutic today,  will require close follow up as previous INR was supra-therapeutic   Continue current warfarin regimen            Pt educated to contact our clinic with any changes in medications or s/s of bleeding or thrombosis. Pt is aware to seek immediate  medical attention for falls, head injury or deep cuts    Follow up appointment in 2 week(s) to reduce risk of adverse events from warfarin   Cindy Treviño, PharmD

## 2020-04-28 ENCOUNTER — TELEPHONE (OUTPATIENT)
Dept: CARDIOLOGY | Facility: MEDICAL CENTER | Age: 75
End: 2020-04-28

## 2020-04-28 DIAGNOSIS — I10 ESSENTIAL HYPERTENSION: Primary | ICD-10-CM

## 2020-04-28 RX ORDER — DILTIAZEM HYDROCHLORIDE 240 MG/1
240 CAPSULE, COATED, EXTENDED RELEASE ORAL 2 TIMES DAILY
Qty: 200 CAP | Refills: 3 | Status: ON HOLD | OUTPATIENT
Start: 2020-04-28 | End: 2020-09-24

## 2020-04-28 NOTE — TELEPHONE ENCOUNTER
JS pt requesting a refill/renewal of DILTIAZem CD (CARDIZEM CD) 240 MG CAPSULE SR 24 HR    Pharmacy: [id number] - [place], [place] - [address] AT [facility]

## 2020-04-29 DIAGNOSIS — I48.19 PERSISTENT ATRIAL FIBRILLATION (HCC): Primary | ICD-10-CM

## 2020-04-30 RX ORDER — DIGOXIN 125 MCG
125 TABLET ORAL DAILY
Qty: 90 TAB | Refills: 3 | Status: SHIPPED | OUTPATIENT
Start: 2020-04-30 | End: 2020-06-12

## 2020-05-07 ENCOUNTER — ANTICOAGULATION VISIT (OUTPATIENT)
Dept: MEDICAL GROUP | Facility: MEDICAL CENTER | Age: 75
End: 2020-05-07
Payer: MEDICARE

## 2020-05-07 DIAGNOSIS — Z79.01 LONG TERM (CURRENT) USE OF ANTICOAGULANTS: Primary | ICD-10-CM

## 2020-05-07 DIAGNOSIS — I48.19 PERSISTENT ATRIAL FIBRILLATION (HCC): ICD-10-CM

## 2020-05-07 LAB — INR PPP: 2 (ref 2–3.5)

## 2020-05-07 PROCEDURE — 85610 PROTHROMBIN TIME: CPT | Performed by: INTERNAL MEDICINE

## 2020-05-07 PROCEDURE — 93793 ANTICOAG MGMT PT WARFARIN: CPT | Performed by: INTERNAL MEDICINE

## 2020-05-07 NOTE — PROGRESS NOTES
OP Anticoagulation Service Note    Date: 2020  There were no vitals filed for this visit.   pt declined vitals    Anticoagulation Summary  As of 2020    INR goal:   2.0-3.0   TTR:   71.6 % (1.8 mo)   INR used for dosin.00 (2020)   Warfarin maintenance plan:   5 mg (5 mg x 1) every day   Weekly warfarin total:   35 mg   Plan last modified:   Cindy Treviño, PharmD (2020)   Next INR check:   2020   Target end date:   Indefinite    Indications    Atrial fibrillation (HCC) [I48.91]  Acute pulmonary embolism (HCC) (Resolved) [I26.99]  Long term (current) use of anticoagulants [Z79.01]             Anticoagulation Episode Summary     INR check location:       Preferred lab:       Send INR reminders to:       Comments:         Anticoagulation Care Providers     Provider Role Specialty Phone number    Mary Carmen Rogers M.D. Referring Internal Medicine Critical Care [phone]    [facility] Responsible  [phone]        Anticoagulation Patient Findings      HPI:   Rufina Macias seen in clinic today, on anticoagulation therapy with warfarin (a high risk medication) for atrial fibrillation and PE    Pt is here today to evaluate anticoagulation therapy    Previous INR was  2.8 on 20    Pt was instructed to continue current regimen    Confirmed warfarin dosing regimen, denies missed or extra doses of coumadin.   Diet has been consistent with foods rich in vitamin K: No- poor appetite d/t nausea and diarrhea  Changes in ETOH:  No  Changes in smoking status: No  Changes in medication: No   Cost restriction: No  S/s of bleeding:  No  Falls or accidents since last visit No  Signs/symptoms  thrombosis since the last appt: No    A/P   INR  therapeutic today,   Continue current warfarin regimen           check referral    Pt educated to contact our clinic with any changes in medications or s/s of bleeding or thrombosis. Pt is aware to seek immediate medical attention for  falls, head injury or deep cuts    Follow up appointment in 2 week(s) to reduce risk of adverse events from warfarin   Cindy Treviño, PharmD

## 2020-05-08 ENCOUNTER — HOSPITAL ENCOUNTER (OUTPATIENT)
Dept: LAB | Facility: MEDICAL CENTER | Age: 75
End: 2020-05-08
Attending: INTERNAL MEDICINE
Payer: MEDICARE

## 2020-05-08 DIAGNOSIS — R60.0 BILATERAL LEG EDEMA: ICD-10-CM

## 2020-05-08 LAB
ANION GAP SERPL CALC-SCNC: 14 MMOL/L (ref 7–16)
BUN SERPL-MCNC: 22 MG/DL (ref 8–22)
CALCIUM SERPL-MCNC: 9 MG/DL (ref 8.5–10.5)
CHLORIDE SERPL-SCNC: 100 MMOL/L (ref 96–112)
CO2 SERPL-SCNC: 24 MMOL/L (ref 20–33)
CREAT SERPL-MCNC: 1.06 MG/DL (ref 0.5–1.4)
GLUCOSE SERPL-MCNC: 126 MG/DL (ref 65–99)
POTASSIUM SERPL-SCNC: 4.5 MMOL/L (ref 3.6–5.5)
SODIUM SERPL-SCNC: 138 MMOL/L (ref 135–145)

## 2020-05-08 PROCEDURE — 36415 COLL VENOUS BLD VENIPUNCTURE: CPT

## 2020-05-08 PROCEDURE — 80048 BASIC METABOLIC PNL TOTAL CA: CPT

## 2020-05-12 ENCOUNTER — TELEPHONE (OUTPATIENT)
Dept: MEDICAL GROUP | Age: 75
End: 2020-05-12

## 2020-05-12 NOTE — TELEPHONE ENCOUNTER
ESTABLISHED PATIENT PRE-VISIT PLANNING     Patient was NOT contacted to complete PVP.     Note: Patient will not be contacted if there is no indication to call.     1.  Reviewed notes from the last few office visits within the medical group: Yes    2.  If any orders were placed at last visit or intended to be done for this visit (i.e. 6 mos follow-up), do we have Results/Consult Notes?        •  Labs - Labs ordered, completed on 5/8/2020 and results are in chart.   Note: If patient appointment is for lab review and patient did not complete labs, check with provider if OK to reschedule patient until labs completed.       •  Imaging - Imaging was not ordered at last office visit.       •  Referrals - Referral ordered, patient has NOT been seen.    3. Is this appointment scheduled as a Hospital Follow-Up? No    4.  Immunizations were updated in Epic using WebIZ?: Epic matches WebIZ       •  Web Iz Recommendations: HEPATITIS B and SHINGRIX (Shingles)    5.  Patient is due for the following Health Maintenance Topics:   Health Maintenance Due   Topic Date Due   • Annual Pulmonary Function Test / Spirometry  03/18/1951   • IMM HEP B VACCINE (1 of 3 - Risk 3-dose series) 03/18/1964   • IMM ZOSTER VACCINES (2 of 3) 12/10/2013   • Annual Wellness Visit  06/27/2019           6. Orders for overdue Health Maintenance topics pended in Pre-Charting? N\A    7.  AHA (MDX) form printed for Provider? No, already completed    8.  Patient was NOT informed to arrive 15 min prior to their scheduled appointment and bring in their medication bottles.

## 2020-05-14 ENCOUNTER — OFFICE VISIT (OUTPATIENT)
Dept: MEDICAL GROUP | Age: 75
End: 2020-05-14
Payer: MEDICARE

## 2020-05-14 VITALS
HEIGHT: 62 IN | SYSTOLIC BLOOD PRESSURE: 120 MMHG | HEART RATE: 66 BPM | BODY MASS INDEX: 38.46 KG/M2 | DIASTOLIC BLOOD PRESSURE: 72 MMHG | TEMPERATURE: 98.3 F | OXYGEN SATURATION: 94 % | WEIGHT: 209 LBS

## 2020-05-14 DIAGNOSIS — I26.92 ACUTE SADDLE PULMONARY EMBOLISM WITHOUT ACUTE COR PULMONALE (HCC): ICD-10-CM

## 2020-05-14 DIAGNOSIS — E78.2 MIXED HYPERLIPIDEMIA: ICD-10-CM

## 2020-05-14 DIAGNOSIS — F51.01 PRIMARY INSOMNIA: ICD-10-CM

## 2020-05-14 DIAGNOSIS — E55.9 VITAMIN D DEFICIENCY: ICD-10-CM

## 2020-05-14 DIAGNOSIS — J43.1 PANLOBULAR EMPHYSEMA (HCC): ICD-10-CM

## 2020-05-14 DIAGNOSIS — E66.01 OBESITY, MORBID, BMI 40.0-49.9 (HCC): ICD-10-CM

## 2020-05-14 DIAGNOSIS — I10 ESSENTIAL HYPERTENSION: ICD-10-CM

## 2020-05-14 DIAGNOSIS — R91.8 MULTIPLE PULMONARY NODULES DETERMINED BY COMPUTED TOMOGRAPHY OF LUNG: ICD-10-CM

## 2020-05-14 DIAGNOSIS — J96.21 ACUTE ON CHRONIC RESPIRATORY FAILURE WITH HYPOXIA (HCC): ICD-10-CM

## 2020-05-14 DIAGNOSIS — F11.20 UNCOMPLICATED OPIOID DEPENDENCE (HCC): ICD-10-CM

## 2020-05-14 DIAGNOSIS — I48.19 OTHER PERSISTENT ATRIAL FIBRILLATION (HCC): ICD-10-CM

## 2020-05-14 PROCEDURE — 99214 OFFICE O/P EST MOD 30 MIN: CPT | Performed by: INTERNAL MEDICINE

## 2020-05-14 ASSESSMENT — ENCOUNTER SYMPTOMS
NEUROLOGICAL NEGATIVE: 1
EYES NEGATIVE: 1
CARDIOVASCULAR NEGATIVE: 1
CONSTITUTIONAL NEGATIVE: 1
RESPIRATORY NEGATIVE: 1
MUSCULOSKELETAL NEGATIVE: 1
PSYCHIATRIC NEGATIVE: 1
GASTROINTESTINAL NEGATIVE: 1

## 2020-05-14 ASSESSMENT — FIBROSIS 4 INDEX: FIB4 SCORE: 1.72

## 2020-05-14 NOTE — PROGRESS NOTES
Subjective:      Rufina Macias is a 75 y.o. female who presents with Follow-Up and Lab Results  The patient is here for followup of chronic medical problems listed below. The patient is compliant with medications and having no side effects from them. Denies chest pain, abdominal pain, dyspnea, myalgias, or cough.   Patient Active Problem List    Diagnosis Date Noted   • Acute saddle pulmonary embolism without acute cor pulmonale ([facility]) 02/21/2020     Priority: High   • Atrial fibrillation ([facility]) 02/08/2020     Priority: High   • Mild concentric left ventricular hypertrophy (LVH) 04/06/2020   • Bilateral leg edema 04/06/2020   • High risk medication use 03/11/2020   • Anticoagulated 03/11/2020   • Long term (current) use of anticoagulants 03/09/2020   • Acute on chronic respiratory failure with hypoxia ([facility]) 03/05/2020   • Multiple pulmonary nodules determined by computed tomography of lung 02/24/2020   • Herpes zoster without complication 02/23/2020   • Adrenal nodule ([facility]) 02/21/2020   • 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 with obstruction- ERCP EXTRACTON/ SPHINCTEROTOMY, recurrent Feb 2020 02/17/2020   • Uncomplicated opioid dependence ([facility])- [place] pain and spine 01/06/2020   • Ganglion cyst of tendon sheath of right hand- surveillance 07/08/2019   • Panlobular emphysema ([facility]) 01/08/2019   • Grade II diastolic dysfunction- echo 2017 with preserved EF 01/08/2019   • Chronic pain of left knee- dr contreras- celebrex and cortisone inj; dr cade to do  supartz inj 01/08/2019   • Primary osteoarthritis involving multiple joints- to get supartz inj left knee- [facility] 06/26/2018   • Chronic midline low back pain without sciatica- norco chronic daily use; [facility] 04/04/2017   • Essential hypertension 08/31/2015   • Obesity, morbid, BMI 40.0-49.9 ([facility]) 06/26/2015   • DDD (degenerative disc disease), cervical 04/07/2015   • Thoracic radiculopathy 03/09/2015   • Lumbar radiculopathy 03/09/2015   •  Vitamin D deficiency disease 10/11/2013   • DDD (degenerative disc disease), lumbar 10/11/2013   • Chronic allergic rhinitis 10/11/2013   • Primary insomnia 11/29/2012   • COPD (chronic obstructive pulmonary disease) ([facility]) 08/31/2012   • Mixed hyperlipidemia 10/28/2010     Allergies   Allergen Reactions   • Codeine Swelling   • Ace Inhibitors      Cough     Outpatient Medications Prior to Visit   Medication Sig Dispense Refill   • POTASSIUM PO Take  by mouth.     • digoxin (LANOXIN) 125 MCG Tab Take 1 Tab by mouth every day at 6 PM. 90 Tab 3   • DILTIAZem CD (CARDIZEM CD) 240 MG CAPSULE SR 24 HR Take 1 Cap by mouth 2 Times a Day. 200 Cap 3   • zolpidem (AMBIEN) 10 MG Tab TK 1 T PO HS PRF 28 DAYS     • albuterol 108 (90 Base) MCG/ACT Aero Soln inhalation aerosol Inhale 2 Puffs by mouth every 6 hours as needed for Shortness of Breath. 8.5 g 2   • metoprolol (LOPRESSOR) 100 MG Tab Take 1 Tab by mouth 2 times a day. 180 Tab 4   • furosemide (LASIX) 40 MG Tab Take 1 Tab by mouth 2 Times a Day. 60 Tab 1   • warfarin (COUMADIN) 5 MG Tab Take 1 tab by mouth daily or as directed by anticoagulation  clinic 90 Tab 1   • Misc. Devices [facility] nebulizer to use for NPPB treatments due to severe COPD. FOR USE WITH ATROVENT SOLN. 1 Each 0   • ipratropium (ATROVENT) 0.02 % Solution 2.5 mL by Nebulization route every 6 hours as needed (shortness of breath or wheezing). 30 Bullet 1   • simvastatin (ZOCOR) 20 MG Tab Take 1 Tab by mouth every evening. 90 Tab 4   • Calcium Carbonate-Vit D-Min (CALCIUM 1200 PO) Take 1,200 mg by mouth every day.     • Magnesium 400 MG Tab Take 400 mg by mouth every day.     • FIBER PO Take 2 Caps by mouth every day.     • hydrocodone/acetaminophen (NORCO)  MG Tab TAKE ONE TABLET BY MOUTH FOUR TIMES A DAY AS NEEDED 30 DAY SUPPLY  0   • Misc. Devices Misc Portable oxygen tank or backpack at 1 Liter /min to replaces large metal tanks. Include tubing (Patient not taking: Reported on 5/14/2020) 1 Each 1  "    No facility-administered medications prior to visit.                HPI    Review of Systems   Constitutional: Negative.    HENT: Negative.    Eyes: Negative.    Respiratory: Negative.    Cardiovascular: Negative.    Gastrointestinal: Negative.    Genitourinary: Negative.    Musculoskeletal: Negative.    Skin: Negative.    Neurological: Negative.    Endo/Heme/Allergies: Negative.    Psychiatric/Behavioral: Negative.           Objective:     /72 (BP Location: Left arm, Patient Position: Sitting, BP Cuff Size: Adult)   Pulse 66   Temp 36.8 °C (98.3 °F) (Temporal)   Ht 1.575 m (5' 2\")   Wt 94.8 kg (209 lb)   SpO2 94%   BMI 38.23 kg/m²      Physical Exam  Vitals signs reviewed.   Constitutional:       General: She is not in acute distress.     Appearance: She is well-developed. She is not diaphoretic.   HENT:      Head: Normocephalic and atraumatic.      Right Ear: External ear normal.      Left Ear: External ear normal.      Nose: Nose normal.      Mouth/Throat:      Pharynx: No oropharyngeal exudate.   Eyes:      General: No scleral icterus.        Right eye: No discharge.         Left eye: No discharge.      Conjunctiva/sclera: Conjunctivae normal.      Pupils: Pupils are equal, round, and reactive to light.   Neck:      Musculoskeletal: Normal range of motion and neck supple.      Thyroid: No thyromegaly.      Vascular: No JVD.      Trachea: No tracheal deviation.   Cardiovascular:      Rate and Rhythm: Normal rate and regular rhythm.      Heart sounds: Normal heart sounds. No murmur. No friction rub. No gallop.    Pulmonary:      Effort: Pulmonary effort is normal. No respiratory distress.      Breath sounds: Normal breath sounds. No stridor. No wheezing or rales.   Chest:      Chest wall: No tenderness.   Abdominal:      General: Bowel sounds are normal. There is no distension.      Palpations: Abdomen is soft. There is no mass.      Tenderness: There is no abdominal tenderness. There is no " guarding or rebound.   Musculoskeletal: Normal range of motion.         General: No tenderness.   Lymphadenopathy:      Cervical: No cervical adenopathy.   Skin:     General: Skin is warm and dry.      Coloration: Skin is not pale.      Findings: No erythema or rash.   Neurological:      Mental Status: She is alert and oriented to person, place, and time.      Cranial Nerves: No cranial nerve deficit.      Motor: No abnormal muscle tone.      Coordination: Coordination normal.      Deep Tendon Reflexes: Reflexes are normal and symmetric. Reflexes normal.   Psychiatric:         Behavior: Behavior normal.         Thought Content: Thought content normal.         Judgment: Judgment normal.       Hospital Outpatient Visit on 05/08/2020   Component Date Value   • Sodium 05/08/2020 138    • Potassium 05/08/2020 4.5    • Chloride 05/08/2020 100    • Co2 05/08/2020 24    • Glucose 05/08/2020 126*   • Bun 05/08/2020 22    • Creatinine 05/08/2020 1.06    • Calcium 05/08/2020 9.0    • Anion Gap 05/08/2020 14.0    • GFR If  05/08/2020 >60    • GFR If Non  Ameri* 05/08/2020 51*   Anticoagulation Visit on 05/07/2020   Component Date Value   • INR 05/07/2020 2.00    Anticoagulation Visit on 04/23/2020   Component Date Value   • INR 04/23/2020 2.80    Anticoagulation Visit on 04/16/2020   Component Date Value   • INR 04/16/2020 3.10       Lab Results   Component Value Date/Time    HBA1C 5.7 (H) 02/22/2020 01:52 AM     Lab Results   Component Value Date/Time    SODIUM 138 05/08/2020 12:59 PM    POTASSIUM 4.5 05/08/2020 12:59 PM    CHLORIDE 100 05/08/2020 12:59 PM    CO2 24 05/08/2020 12:59 PM    GLUCOSE 126 (H) 05/08/2020 12:59 PM    BUN 22 05/08/2020 12:59 PM    CREATININE 1.06 05/08/2020 12:59 PM    CREATININE 0.80 12/02/2010 12:00 AM    BUNCREATRAT 20 12/02/2010 12:00 AM    GLOMRATE >59 12/02/2010 12:00 AM    ALKPHOSPHAT 66 02/21/2020 06:37 PM    ASTSGOT 24 02/21/2020 06:37 PM    ALTSGPT 23 02/21/2020 06:37  PM    TBILIRUBIN 0.6 02/21/2020 06:37 PM     Lab Results   Component Value Date/Time    INR 2.00 05/07/2020 01:44 PM    INR 2.80 04/23/2020 02:13 PM    INR 3.10 04/16/2020 02:35 PM     Lab Results   Component Value Date/Time    CHOLSTRLTOT 136 01/02/2020 01:15 PM    LDL 72 01/02/2020 01:15 PM    HDL 47 01/02/2020 01:15 PM    TRIGLYCERIDE 86 01/02/2020 01:15 PM       No results found for: TESTOSTERONE  Lab Results   Component Value Date/Time    TSH 1.640 12/02/2010 12:00 AM     Lab Results   Component Value Date/Time    FREET4 1.90 (H) 02/21/2020 06:43 PM    FREET4 0.90 10/24/2014 11:56 AM     No results found for: URICACID  No components found for: VITB12  Lab Results   Component Value Date/Time    25HYDROXY 39 09/20/2016 11:36 AM    25HYDROXY 39 10/24/2014 11:56 AM               Assessment/Plan:       1. Acute on chronic respiratory failure with hypoxia (HCC)    Under good control. Continue same regimen.      2. Acute saddle pulmonary embolism without acute cor pulmonale (HCC)     Under good control. Continue same regimen.  3. Other persistent atrial fibrillation (HCC)    Under good control. Continue same regimen.  Continue on rate control with diltiazem and beta-blocker as well as anticoagulation with Coumadin.  Asked patient to discuss with cardiologist whether not she could wean herself off the diltiazem as it may be contributing to her leg edema.    4. Vitamin D deficiency disease   Under good control. Continue same regimen.    5. Uncomplicated opioid dependence (HCC)- [facility] pain and spine   Under good control. Continue same regimen.    6. Primary insomnia   Under good control. Continue same regimen.      7. Panlobular emphysema (HCC)        8. Obesity, morbid, BMI 40.0-49.9 (HCC)    diet/exercise/lose 15 lbs.; patient counseled      9. Mixed hyperlipidemiaUnder good control. Continue same regimen.       - TSH; Future  - Comp Metabolic Panel; Future  - Lipid Profile; Future  - CBC WITH DIFFERENTIAL; Future    10.  Essential hypertension  Under good control on current regimen.  Continue unchanged.  Low-salt diet.  11. Multiple pulmonary nodules determined by computed tomography of lung       Followed by pulmonary.  To intermediate nodule seen on CT scan.  Need follow-up every 3 to 6 months.  Followed by [facility].  Radiologist cannot distinguish between neoplastic and inflammatory.  Cough chest pain or masses.

## 2020-05-21 ENCOUNTER — ANTICOAGULATION VISIT (OUTPATIENT)
Dept: MEDICAL GROUP | Facility: MEDICAL CENTER | Age: 75
End: 2020-05-21
Payer: MEDICARE

## 2020-05-21 DIAGNOSIS — Z79.01 LONG TERM (CURRENT) USE OF ANTICOAGULANTS: Primary | ICD-10-CM

## 2020-05-21 DIAGNOSIS — I48.19 OTHER PERSISTENT ATRIAL FIBRILLATION (HCC): ICD-10-CM

## 2020-05-21 LAB — INR PPP: 2.7 (ref 2–3.5)

## 2020-05-21 PROCEDURE — 85610 PROTHROMBIN TIME: CPT | Performed by: INTERNAL MEDICINE

## 2020-05-21 PROCEDURE — 93793 ANTICOAG MGMT PT WARFARIN: CPT | Performed by: INTERNAL MEDICINE

## 2020-05-21 RX ORDER — FAMOTIDINE 20 MG/1
20 TABLET, FILM COATED ORAL DAILY
COMMUNITY

## 2020-05-21 RX ORDER — URSODIOL 300 MG/1
300 CAPSULE ORAL 2 TIMES DAILY
COMMUNITY
End: 2020-06-12

## 2020-05-21 NOTE — PROGRESS NOTES
OP Anticoagulation Service Note    Date: 2020  There were no vitals filed for this visit.  Unable to assess vitals since this was a drive-up visit d/t COVID-19    Anticoagulation Summary  As of 2020    INR goal:   2.0-3.0   TTR:   77.5 % (2.2 mo)   INR used for dosin.70 (2020)   Warfarin maintenance plan:   5 mg (5 mg x 1) every day   Weekly warfarin total:   35 mg   Plan last modified:   Cindy Treviño, PharmD (2020)   Next INR check:   2020   Target end date:   Indefinite    Indications    Atrial fibrillation (HCC) [I48.91]  Acute pulmonary embolism (HCC) (Resolved) [I26.99]  Long term (current) use of anticoagulants [Z79.01]             Anticoagulation Episode Summary     INR check location:       Preferred lab:       Send INR reminders to:       Comments:         Anticoagulation Care Providers     Provider Role Specialty Phone number    Mary Carmen Rogers M.D. Referring Internal Medicine Critical Care [phone]    [facility] Anticoagulation Services Responsible  [phone]        Anticoagulation Patient Findings  Patient Findings     Positives:   Change in medications (ursodiol and famotidine), Change in diet/appetite (v8 instead of Ensure)          HPI:   Rufina Pateledge seen in clinic today, on anticoagulation therapy with warfarin (a high risk medication) for atrial fib and PE, CHADS-VASC = 6    Pt is here today to evaluate anticoagulation therapy    Previous INR was  2.0 on 2020    Pt was instructed to continue regimen    Confirmed warfarin dosing regimen, denies missed or extra doses of coumadin.   Diet has been consistent with foods rich in vitamin K: Yes  Changes in ETOH:  No  Changes in smoking status: No  Changes in medication: Yes   Cost restriction: No  S/s of bleeding:  No  Falls or accidents since last visit No  Signs/symptoms  thrombosis since the last appt: No    A/P   INR  therapeutic today    Pt is to continue with current warfarin dosing regimen.     2021  check referral    Pt educated to contact our clinic with any changes in medications or s/s of bleeding or thrombosis. Pt is aware to seek immediate medical attention for falls, head injury or deep cuts    Follow up appointment in 3 week(s) to reduce risk of adverse events from warfarin   Cristela Benoit, OsielD

## 2020-06-11 ENCOUNTER — ANTICOAGULATION VISIT (OUTPATIENT)
Dept: MEDICAL GROUP | Facility: MEDICAL CENTER | Age: 75
End: 2020-06-11
Payer: MEDICARE

## 2020-06-11 DIAGNOSIS — Z79.01 LONG TERM (CURRENT) USE OF ANTICOAGULANTS: Primary | ICD-10-CM

## 2020-06-11 LAB — INR PPP: 3.2 (ref 2–3.5)

## 2020-06-11 PROCEDURE — 85610 PROTHROMBIN TIME: CPT | Performed by: PHYSICIAN ASSISTANT

## 2020-06-11 PROCEDURE — 99211 OFF/OP EST MAY X REQ PHY/QHP: CPT | Performed by: PHYSICIAN ASSISTANT

## 2020-06-11 NOTE — PROGRESS NOTES
OP Anticoagulation Service Note    Date: 6/11/2020  There were no vitals filed for this visit.   pt declined vitals    Anticoagulation Summary  As of 6/11/2020    INR goal:   2.0-3.0   TTR:   73.3 % (2.9 mo)   INR used for dosing:   3.20! (6/11/2020)   Warfarin maintenance plan:   5 mg (5 mg x 1) every day   Weekly warfarin total:   35 mg   Plan last modified:   Cindy Treviño, PharmD (4/23/2020)   Next INR check:   6/25/2020   Target end date:   Indefinite    Indications    Atrial fibrillation (HCC) [I48.91]  Acute pulmonary embolism (HCC) (Resolved) [I26.99]  Long term (current) use of anticoagulants [Z79.01]             Anticoagulation Episode Summary     INR check location:       Preferred lab:       Send INR reminders to:       Comments:         Anticoagulation Care Providers     Provider Role Specialty Phone number    Mary Carmen Rogers M.D. Referring Internal Medicine Critical Care [phone]    [facility] Responsible  [phone]        Anticoagulation Patient Findings      HPI:   Rufina Macias seen in clinic today, on anticoagulation therapy with warfarin (a high risk medication) for atrial fibrillation and hx of PE       Pt is here today to evaluate anticoagulation therapy    Previous INR was  2.7 on 5-21-20    Pt was instructed to continue current regimen    Confirmed warfarin dosing regimen, denies missed or extra doses of coumadin.   Diet has been consistent with foods rich in vitamin K: No, stopped boost and switched to V8 juice, she will add more v8 juice to her week.   Changes in ETOH:  No  Changes in smoking status: No  Changes in medication: No   Cost restriction: No  S/s of bleeding:  No  Falls or accidents since last visit No  Signs/symptoms  thrombosis since the last appt: No    A/P   INR  supra-therapeutic today, will require dose adjust ment today to prevent bleeding complications  and closer follow up.   Tonight no warfarin then resume usual regimen       Pt educated  to contact our clinic with any changes in medications or s/s of bleeding or thrombosis. Pt is aware to seek immediate medical attention for falls, head injury or deep cuts    Follow up appointment in 2 week(s) to reduce risk of adverse events from warfarin  Cindy Treviño, PharmD

## 2020-06-12 ENCOUNTER — OFFICE VISIT (OUTPATIENT)
Dept: CARDIOLOGY | Facility: MEDICAL CENTER | Age: 75
End: 2020-06-12
Payer: MEDICARE

## 2020-06-12 VITALS
HEIGHT: 62 IN | WEIGHT: 204.59 LBS | RESPIRATION RATE: 16 BRPM | HEART RATE: 72 BPM | SYSTOLIC BLOOD PRESSURE: 124 MMHG | OXYGEN SATURATION: 95 % | BODY MASS INDEX: 37.65 KG/M2 | DIASTOLIC BLOOD PRESSURE: 80 MMHG

## 2020-06-12 DIAGNOSIS — I10 ESSENTIAL HYPERTENSION: ICD-10-CM

## 2020-06-12 DIAGNOSIS — M79.89 LEG SWELLING: ICD-10-CM

## 2020-06-12 DIAGNOSIS — I48.91 ATRIAL FIBRILLATION, UNSPECIFIED TYPE (HCC): ICD-10-CM

## 2020-06-12 DIAGNOSIS — Z79.01 ANTICOAGULATED: ICD-10-CM

## 2020-06-12 DIAGNOSIS — I25.10 CORONARY ARTERY CALCIFICATION SEEN ON CAT SCAN: ICD-10-CM

## 2020-06-12 DIAGNOSIS — I70.0 AORTIC ATHEROSCLEROSIS (HCC): ICD-10-CM

## 2020-06-12 DIAGNOSIS — E78.2 MIXED HYPERLIPIDEMIA: ICD-10-CM

## 2020-06-12 PROBLEM — I51.89 GRADE II DIASTOLIC DYSFUNCTION: Status: RESOLVED | Noted: 2019-01-08 | Resolved: 2020-06-12

## 2020-06-12 LAB — EKG IMPRESSION: NORMAL

## 2020-06-12 PROCEDURE — 93000 ELECTROCARDIOGRAM COMPLETE: CPT | Performed by: INTERNAL MEDICINE

## 2020-06-12 PROCEDURE — 99215 OFFICE O/P EST HI 40 MIN: CPT | Performed by: INTERNAL MEDICINE

## 2020-06-12 RX ORDER — ROSUVASTATIN CALCIUM 20 MG/1
20 TABLET, COATED ORAL EVERY EVENING
Qty: 30 TAB | Refills: 11 | Status: SHIPPED | OUTPATIENT
Start: 2020-06-12 | End: 2020-06-12 | Stop reason: SDUPTHER

## 2020-06-12 RX ORDER — TORSEMIDE 20 MG/1
20 TABLET ORAL DAILY
Qty: 30 TAB | Refills: 3 | Status: SHIPPED | OUTPATIENT
Start: 2020-06-12 | End: 2021-01-01 | Stop reason: SDUPTHER

## 2020-06-12 RX ORDER — ROSUVASTATIN CALCIUM 20 MG/1
20 TABLET, COATED ORAL EVERY EVENING
Qty: 90 TAB | Refills: 3 | Status: SHIPPED | OUTPATIENT
Start: 2020-06-12 | End: 2020-06-15 | Stop reason: SDUPTHER

## 2020-06-12 ASSESSMENT — ENCOUNTER SYMPTOMS
CONSTIPATION: 0
DYSPNEA ON EXERTION: 1
FEVER: 0
DEPRESSION: 0
VOMITING: 0
BACK PAIN: 0
ALTERED MENTAL STATUS: 0
BLURRED VISION: 0
SYNCOPE: 0
FLANK PAIN: 0
WEIGHT LOSS: 0
DIARRHEA: 0
ORTHOPNEA: 0
PND: 0
DIZZINESS: 0
DECREASED APPETITE: 0
CLAUDICATION: 0
IRREGULAR HEARTBEAT: 1
NEAR-SYNCOPE: 0
HEARTBURN: 0
ABDOMINAL PAIN: 0
SHORTNESS OF BREATH: 0
WEIGHT GAIN: 0
COUGH: 0
PALPITATIONS: 0
NAUSEA: 0

## 2020-06-12 ASSESSMENT — FIBROSIS 4 INDEX: FIB4 SCORE: 1.72

## 2020-06-12 NOTE — PATIENT INSTRUCTIONS
Change furosemide to torsemide. Check lab work in 3-5 days.    Possible cardioversion in August. Reschedule visit in August.    Stop digoxin.     Added rosuvastatin. Recheck cholesterol annually.

## 2020-06-12 NOTE — PROGRESS NOTES
Cardiology Note    Chief Complaint   Patient presents with   • Atrial Fibrillation       History of Present Illness: Rufina Macias is a 75 y.o. female PMH AF 02/2020, PE 02/2020 while on eliquis converted to coumadin, COPD (30 pack years; quit 1995), HLD, HTN who presents for follow up.    Shortness of breath. Using inhaler but avoiding nebulizer due to tachycardia. Overall improving slowly, however, thinks more resistant to lasix which has been uptitrated without noticeable improvement. She is wondering about options for control of atrial fibrillation.    Review of Systems   Constitution: Negative for decreased appetite, fever, malaise/fatigue, weight gain and weight loss.   HENT: Negative for congestion and nosebleeds.    Eyes: Negative for blurred vision.   Cardiovascular: Positive for dyspnea on exertion and irregular heartbeat. Negative for chest pain, claudication, leg swelling, near-syncope, orthopnea, palpitations, paroxysmal nocturnal dyspnea and syncope.   Respiratory: Negative for cough and shortness of breath.    Endocrine: Negative for cold intolerance and heat intolerance.   Skin: Negative for rash.   Musculoskeletal: Negative for back pain.   Gastrointestinal: Negative for abdominal pain, constipation, diarrhea, heartburn, melena, nausea and vomiting.   Genitourinary: Negative for dysuria, flank pain and hematuria.   Neurological: Negative for dizziness.   Psychiatric/Behavioral: Negative for altered mental status and depression.         Past Medical History:   Diagnosis Date   • Allergy    • Arthritis     Spine, neck, hands, ankles and knees   • Asthma     inhalers as needed   • Back pain     and neck   • Breath shortness     at times   • Bronchitis    • CA - cancer of uterus    • Cancer (HCC) 2010    uterine   • Carpal tunnel syndrome    • COPD    • Coronary artery calcification seen on CAT scan 6/12/2020   • Diverticula of colon    • Heart burn    • High cholesterol    • Hyperlipidemia    •  Hypertension    • Pneumonia 1993   • Tremor, hereditary, benign    • Wheezing          Past Surgical History:   Procedure Laterality Date   • PB ERCP,DIAGNOSTIC  2/14/2020    Procedure: ERCP (ENDOSCOPIC RETROGRADE CHOLANGIOPANCREATOGRAPHY);  Surgeon: Clinton Ray M.D.;  Location: [facility];  Service: Gastroenterology   • ERCP  4/5/2018    Procedure: ERCP W/POSS BIOPSY;  Surgeon: Quincy Louie M.D.;  Location: [facility];  Service: Gastroenterology   • ERCP W/SPHINCTEROTOMY/PAPILL.  4/5/2018    Procedure: ERCP W/SPHINCTEROTOMY/PAPILL.;  Surgeon: Quincy Louie M.D.;  Location: [facility];  Service: Gastroenterology   • ERCP W/ INSERTION STENT/TUBE  4/5/2018    Procedure: ERCP W/ INSERTION STENT/TUBE - STENT PLACEMENT/REMOVAL;  Surgeon: Quincy Louie M.D.;  Location: [facility];  Service: Gastroenterology   • ERCP W/REMOVAL CALCULUS  4/5/2018    Procedure: ERCP W/REMOVAL CALCULUS W/DILATION;  Surgeon: Quincy Louie M.D.;  Location: [facility];  Service: Gastroenterology   • ERCP  2/15/2018    Procedure: ERCP, SPHINCTEROTOMY, STENT PLACEMENT, DEBRIDEMENT;  Surgeon: Quincy Louie M.D.;  Location: [facility];  Service: Gastroenterology   • COMMON BILE DUCT EXPLORATION  02/15/2018   • ERCP W/ INSERTION STENT/TUBE  02/15/2018   • ERCP W/SPHINCTEROTOMY/PAPILL.  02/15/2018   • ERCP W/REMOVAL CALCULUS  02/15/2018   • ARIELLA BY LAPAROSCOPY  july, 2015    [facility]   • HYSTERECTOMY, TOTAL ABDOMINAL  1/18/10    Uterine, Dr. Whelan and Dr. Cam +BSO   • ABDOMINAL HYSTERECTOMY TOTAL  Jan 2010    Dr. Cam   • OOPHORECTOMY  Jan 2010    BSO   • OPEN REDUCTION      ankle         Current Outpatient Medications   Medication Sig Dispense Refill   • rosuvastatin (CRESTOR) 20 MG Tab Take 1 Tab by mouth every evening. 90 Tab 3   • torsemide (DEMADEX) 20 MG Tab Take 1 Tab by mouth every day. 30 Tab 3   • famotidine (PEPCID) 20 MG Tab  Take 20 mg by mouth every day.     • POTASSIUM PO Take  by mouth.     • DILTIAZem CD (CARDIZEM CD) 240 MG CAPSULE SR 24 HR Take 1 Cap by mouth 2 Times a Day. 200 Cap 3   • zolpidem (AMBIEN) 10 MG Tab TK 1 T PO HS PRF 28 DAYS     • albuterol 108 (90 Base) MCG/ACT Aero Soln inhalation aerosol Inhale 2 Puffs by mouth every 6 hours as needed for Shortness of Breath. 8.5 g 2   • metoprolol (LOPRESSOR) 100 MG Tab Take 1 Tab by mouth 2 times a day. 180 Tab 4   • warfarin (COUMADIN) 5 MG Tab Take 1 tab by mouth daily or as directed by anticoagulation  clinic 90 Tab 1   • ipratropium (ATROVENT) 0.02 % Solution 2.5 mL by Nebulization route every 6 hours as needed (shortness of breath or wheezing). 30 Bullet 1   • Calcium Carbonate-Vit D-Min (CALCIUM 1200 PO) Take 1,200 mg by mouth every day.     • Magnesium 400 MG Tab Take 400 mg by mouth every day.     • FIBER PO Take 2 Caps by mouth every day.     • hydrocodone/acetaminophen (NORCO)  MG Tab TAKE ONE TABLET BY MOUTH FOUR TIMES A DAY AS NEEDED 30 DAY SUPPLY  0   • Misc. Devices Misc Home nebulizer to use for NPPB treatments due to severe COPD. FOR USE WITH ATROVENT SOLN. 1 Each 0     No current facility-administered medications for this visit.          Allergies   Allergen Reactions   • Codeine Swelling   • Ace Inhibitors      Cough         Family History   Problem Relation Age of Onset   • Heart Disease Father 45        MI   • Lung Disease Father    • Stroke Father 70   • Cancer Father    • Hypertension Father    • Cancer Paternal Grandmother         breast   • Cancer Paternal Grandfather          Social History     Socioeconomic History   • Marital status:      Spouse name: Not on file   • Number of children: Not on file   • Years of education: Not on file   • Highest education level: Not on file   Occupational History   • Not on file   Social Needs   • Financial resource strain: Not on file   • Food insecurity     Worry: Not on file     Inability: Not on file  "  • Transportation needs     Medical: Not on file     Non-medical: Not on file   Tobacco Use   • Smoking status: Former Smoker     Packs/day: 3.00     Years: 20.00     Pack years: 60.00     Types: Cigarettes     Last attempt to quit: 1991     Years since quittin.4   • Smokeless tobacco: Never Used   Substance and Sexual Activity   • Alcohol use: No     Alcohol/week: 0.0 - 2.4 oz     Comment: hardly ever   • Drug use: No   • Sexual activity: Never     Partners: Male     Birth control/protection: Post-Menopausal   Lifestyle   • Physical activity     Days per week: Not on file     Minutes per session: Not on file   • Stress: Not on file   Relationships   • Social connections     Talks on phone: Not on file     Gets together: Not on file     Attends Sikhism service: Not on file     Active member of club or organization: Not on file     Attends meetings of clubs or organizations: Not on file     Relationship status: Not on file   • Intimate partner violence     Fear of current or ex partner: Not on file     Emotionally abused: Not on file     Physically abused: Not on file     Forced sexual activity: Not on file   Other Topics Concern   • Not on file   Social History Narrative   • Not on file         Physical Exam:  Ambulatory Vitals  /80 (BP Location: Left arm, Patient Position: Sitting, BP Cuff Size: Adult)   Pulse 72   Resp 16   Ht 1.575 m (5' 2\")   Wt 92.8 kg (204 lb 9.4 oz)   SpO2 95%    BP Readings from Last 4 Encounters:   20 124/80   20 120/72   20 110/68   20 140/78     Weight/BMI:   Vitals:    20 1312   BP: 124/80   Weight: 92.8 kg (204 lb 9.4 oz)   Height: 1.575 m (5' 2\")    Body mass index is 37.42 kg/m².  Wt Readings from Last 4 Encounters:   20 92.8 kg (204 lb 9.4 oz)   20 94.8 kg (209 lb)   20 97.1 kg (214 lb)   20 97.1 kg (214 lb)       Physical Exam   Constitutional: She is oriented to person, place, and time and well-developed, " well-nourished, and in no distress. No distress.   HENT:   Head: Normocephalic and atraumatic.   Eyes: Pupils are equal, round, and reactive to light. Conjunctivae are normal.   Neck: Normal range of motion. Neck supple. No JVD present.   Cardiovascular: Normal rate, regular rhythm, normal heart sounds and intact distal pulses. Exam reveals no gallop and no friction rub.   No murmur heard.  Pulmonary/Chest: Effort normal and breath sounds normal. No respiratory distress. She has no wheezes. She has no rales. She exhibits no tenderness.   Abdominal: Soft. Bowel sounds are normal. She exhibits no distension.   Musculoskeletal:         General: No edema.   Neurological: She is alert and oriented to person, place, and time.   Skin: Skin is warm and dry.   Psychiatric: Affect and judgment normal.       Lab Data Review:  Lab Results   Component Value Date/Time    CHOLSTRLTOT 136 01/02/2020 01:15 PM    LDL 72 01/02/2020 01:15 PM    HDL 47 01/02/2020 01:15 PM    TRIGLYCERIDE 86 01/02/2020 01:15 PM       Lab Results   Component Value Date/Time    SODIUM 138 05/08/2020 12:59 PM    POTASSIUM 4.5 05/08/2020 12:59 PM    CHLORIDE 100 05/08/2020 12:59 PM    CO2 24 05/08/2020 12:59 PM    GLUCOSE 126 (H) 05/08/2020 12:59 PM    BUN 22 05/08/2020 12:59 PM    CREATININE 1.06 05/08/2020 12:59 PM    CREATININE 0.80 12/02/2010 12:00 AM    BUNCREATRAT 20 12/02/2010 12:00 AM    GLOMRATE >59 12/02/2010 12:00 AM     CrCl cannot be calculated (Patient's most recent lab result is older than the maximum 7 days allowed.).  Lab Results   Component Value Date/Time    ALKPHOSPHAT 66 02/21/2020 06:37 PM    ASTSGOT 24 02/21/2020 06:37 PM    ALTSGPT 23 02/21/2020 06:37 PM    TBILIRUBIN 0.6 02/21/2020 06:37 PM      Lab Results   Component Value Date/Time    WBC 13.1 (H) 03/02/2020 03:58 AM    WBC 7.6 12/02/2010 12:00 AM     Lab Results   Component Value Date/Time    HBA1C 5.7 (H) 02/22/2020 01:52 AM     No components found for: TROP      Cardiac Imaging  and Procedures Review:      EKG 6/12/20 atrial fibrillation, RBBB    CTA chest 02/21/2020  1.  Pulmonary embolus involving the right distal main pulmonary artery extending into right middle and lower lobe segmental and subsegmental branches. Left upper and lower lobe subsegmental pulmonary emboli.  2.  Distal esophageal wall thickening, recommend follow-up esophagoscopy for evaluation of esophagitis versus infiltrating esophageal neoplasia.  3.  Hepatomegaly  4.  Left adrenal nodule, indeterminate, recommend follow-up adrenal protocol CT or MRI for further characterization as clinically appropriate.  5.  1.3 cm left lower lobe pulmonary nodule, see nodule follow-up recommendations below.  [diffuse coronary calcification and aortic atherosclerosis]    TTE 02/25/2020  CONCLUSIONS  Limited Exam for LV and RV Function  Compared to the images of the prior study done 2/11/20 -  there has   been no significant change.   Left ventricular systolic function is normal.  Left ventricular ejection fraction is visually estimated to be 55%.  Right ventricular systolic function is normal.  Right Ventricle  Normal right ventricular size. Right ventricular systolic function is   normal.    TTE 02/11/2020  CONCLUSIONS  Normal left ventricular systolic function. Left ventricular ejection   fraction is visually estimated to be 60%.   Normal diastolic function.  Right ventricular systolic pressure is estimated to be 25 mmHg.  Right Ventricle  Mildly dilated right ventricle. Normal right ventricular systolic   Function.    Nuclear PET stress 08/2017  SCINTOGRAPHIC FINDINGS:   Normal left ventricular perfusion with stress and rest images.  There is no evidence of ischemia or scar.   GATED WALL MOTION FINDINGS:   The left ventricle wall motion is normal with stress and rest  imagings.  Measured resting ejection fraction is 58 %.      CONCLUSIONS AND IMPRESSIONS:    Normal perfusion on PET with normal function of the left ventricle.  No  evidence of ischemia or infarction.     Medical Decision Making:  Problem List Items Addressed This Visit     Atrial fibrillation (HCC)    Relevant Medications    rosuvastatin (CRESTOR) 20 MG Tab    torsemide (DEMADEX) 20 MG Tab    Other Relevant Orders    EKG (Completed)    proBrain Natriuretic Peptide, NT    Mixed hyperlipidemia    Relevant Medications    rosuvastatin (CRESTOR) 20 MG Tab    torsemide (DEMADEX) 20 MG Tab    Essential hypertension    Relevant Medications    rosuvastatin (CRESTOR) 20 MG Tab    torsemide (DEMADEX) 20 MG Tab    Anticoagulated    Coronary artery calcification seen on CAT scan    Relevant Medications    rosuvastatin (CRESTOR) 20 MG Tab    torsemide (DEMADEX) 20 MG Tab    Aortic atherosclerosis (HCC)    Relevant Medications    rosuvastatin (CRESTOR) 20 MG Tab    torsemide (DEMADEX) 20 MG Tab    Leg swelling    Relevant Orders    Basic Metabolic Panel    proBrain Natriuretic Peptide, NT        Unprovoked PE with doac failure - Still symptomatic but improving. Continue coumadin. Will allow 6 months therapy prior to consideration CHARLES/DCCV.    Persistent AF - chadsvasc 5 - probably also contributing and symptomatic. Continue coumadin for cva prevention. Continue rate control. Will plan CHARLES/DCCV at completion of 6 months anticaogulation for AF.    HTN - controlled. Continue regimen.    Coronary calcification / aortic atherosclerosis / ascvd / HLD - continue coumadin. No need for additional antiplatelets. Add high intensity statin.    Leg swelling - slowly improving. May be resistant to lasix. Change to torsemide. Repeat BMP    It was my pleasure to meet with Ms. Macias.

## 2020-06-15 DIAGNOSIS — E78.2 MIXED HYPERLIPIDEMIA: Primary | ICD-10-CM

## 2020-06-15 RX ORDER — ROSUVASTATIN CALCIUM 20 MG/1
20 TABLET, COATED ORAL EVERY EVENING
Qty: 100 TAB | Refills: 3 | Status: SHIPPED | OUTPATIENT
Start: 2020-06-15 | End: 2021-01-01 | Stop reason: SDUPTHER

## 2020-06-25 ENCOUNTER — HOSPITAL ENCOUNTER (OUTPATIENT)
Dept: LAB | Facility: MEDICAL CENTER | Age: 75
End: 2020-06-25
Attending: INTERNAL MEDICINE
Payer: MEDICARE

## 2020-06-25 ENCOUNTER — ANTICOAGULATION VISIT (OUTPATIENT)
Dept: MEDICAL GROUP | Facility: MEDICAL CENTER | Age: 75
End: 2020-06-25
Payer: MEDICARE

## 2020-06-25 DIAGNOSIS — I26.99 ACUTE PULMONARY EMBOLISM, UNSPECIFIED PULMONARY EMBOLISM TYPE, UNSPECIFIED WHETHER ACUTE COR PULMONALE PRESENT (HCC): ICD-10-CM

## 2020-06-25 DIAGNOSIS — I48.91 ATRIAL FIBRILLATION, UNSPECIFIED TYPE (HCC): ICD-10-CM

## 2020-06-25 DIAGNOSIS — M79.89 LEG SWELLING: ICD-10-CM

## 2020-06-25 DIAGNOSIS — Z79.01 LONG TERM (CURRENT) USE OF ANTICOAGULANTS: Primary | ICD-10-CM

## 2020-06-25 LAB — INR PPP: 2.9 (ref 2–3.5)

## 2020-06-25 PROCEDURE — 83880 ASSAY OF NATRIURETIC PEPTIDE: CPT

## 2020-06-25 PROCEDURE — 93793 ANTICOAG MGMT PT WARFARIN: CPT | Performed by: INTERNAL MEDICINE

## 2020-06-25 PROCEDURE — 85610 PROTHROMBIN TIME: CPT | Performed by: INTERNAL MEDICINE

## 2020-06-25 PROCEDURE — 80048 BASIC METABOLIC PNL TOTAL CA: CPT

## 2020-06-25 PROCEDURE — 36415 COLL VENOUS BLD VENIPUNCTURE: CPT

## 2020-06-25 NOTE — PROGRESS NOTES
OP Anticoagulation Service Note    Date: 2020  There were no vitals filed for this visit.   pt declined vitals    Anticoagulation Summary  As of 2020    INR goal:   2.0-3.0   TTR:   67.9 % (3.4 mo)   INR used for dosin.90 (2020)   Warfarin maintenance plan:   5 mg (5 mg x 1) every day   Weekly warfarin total:   35 mg   Plan last modified:   Cindy Treviño, PharmD (2020)   Next INR check:   2020   Target end date:   Indefinite    Indications    Atrial fibrillation (HCC) [I48.91]  Acute pulmonary embolism (HCC) [I26.99]  Long term (current) use of anticoagulants [Z79.01]             Anticoagulation Episode Summary     INR check location:       Preferred lab:       Send INR reminders to:       Comments:         Anticoagulation Care Providers     Provider Role Specialty Phone number    Mary Carmen Rogers M.D. Referring Internal Medicine Critical Care [phone]    [facility] Responsible  [phone]        Anticoagulation Patient Findings      HPI:   Rufina Macias seen in clinic today, on anticoagulation therapy with warfarin (a high risk medication) for atrial fibrillation and hx of PE       Pt is here today to evaluate anticoagulation therapy    Previous INR was  3.2 on 20    Pt was instructed to increase V8 juice and continue current regimen    Confirmed warfarin dosing regimen, denies missed or extra doses of coumadin.   Diet has been consistent with foods rich in vitamin K: Yes - doing V8 juice every other day  Changes in ETOH:  No  Changes in smoking status: No  Changes in medication: No   Cost restriction: No  S/s of bleeding:  No  Falls or accidents since last visit No  Signs/symptoms  thrombosis since the last appt: No    A/P   INR  therapeutic today,  Continue current warfarin regimen        Pt educated to contact our clinic with any changes in medications or s/s of bleeding or thrombosis. Pt is aware to seek immediate medical attention for falls,  head injury or deep cuts    Follow up appointment in 3 week(s) to reduce risk of adverse events from warfarin  Cindy Treviño, PharmD

## 2020-06-26 LAB
ANION GAP SERPL CALC-SCNC: 12 MMOL/L (ref 7–16)
BUN SERPL-MCNC: 21 MG/DL (ref 8–22)
CALCIUM SERPL-MCNC: 9.3 MG/DL (ref 8.5–10.5)
CHLORIDE SERPL-SCNC: 100 MMOL/L (ref 96–112)
CO2 SERPL-SCNC: 26 MMOL/L (ref 20–33)
CREAT SERPL-MCNC: 0.81 MG/DL (ref 0.5–1.4)
GLUCOSE SERPL-MCNC: 109 MG/DL (ref 65–99)
NT-PROBNP SERPL IA-MCNC: 1880 PG/ML (ref 0–125)
POTASSIUM SERPL-SCNC: 4.4 MMOL/L (ref 3.6–5.5)
SODIUM SERPL-SCNC: 138 MMOL/L (ref 135–145)

## 2020-07-10 ENCOUNTER — TELEPHONE (OUTPATIENT)
Dept: CARDIOLOGY | Facility: MEDICAL CENTER | Age: 75
End: 2020-07-10

## 2020-07-10 DIAGNOSIS — Z79.899 HIGH RISK MEDICATION USE: ICD-10-CM

## 2020-07-10 DIAGNOSIS — I48.19 PERSISTENT ATRIAL FIBRILLATION (HCC): ICD-10-CM

## 2020-07-10 DIAGNOSIS — M79.89 LEG SWELLING: ICD-10-CM

## 2020-07-10 NOTE — TELEPHONE ENCOUNTER
ELLYN pt says the water pill isn't working with her. She would like a call back to discuss at [phone]

## 2020-07-10 NOTE — TELEPHONE ENCOUNTER
You  Guillaume Diez M.D. 5 hours ago (9:27 AM)       Pt wants to know if torsemide dose needs to be increased.      Guillaume Diez M.D.  You 5 hours ago (9:36 AM)       Sure, she can try increasing torsemide to 40mg bid until Monday and then 20mg daily thereafter. Repeat BMP and pBNP early next week at her convenience. If noticeable improvement and labs remain stable may consider keeping at 40mg daily. Thanks Laura!      Called pt and instructed to increase her torsemide dosing to BID until Monday, and then complete labs early next week. Advised that she update us on if this improved her swelling or not, because if it did and labs are stable we may increase her regular dose.

## 2020-07-10 NOTE — TELEPHONE ENCOUNTER
Returned call. Pt reports that when she started taking torsemide she did notice a marked increase in urination and some improvement in her edema. Her SOB also improved a little. She feels that she has come to a standstill though and is not seeing continued improvement. She is wondering if the torsemide dose needs to be increased.     To ELLYN

## 2020-07-16 ENCOUNTER — ANTICOAGULATION VISIT (OUTPATIENT)
Dept: MEDICAL GROUP | Facility: MEDICAL CENTER | Age: 75
End: 2020-07-16
Payer: MEDICARE

## 2020-07-16 DIAGNOSIS — Z79.01 LONG TERM (CURRENT) USE OF ANTICOAGULANTS: Primary | ICD-10-CM

## 2020-07-16 LAB — INR PPP: 2.7 (ref 2–3.5)

## 2020-07-16 PROCEDURE — 85610 PROTHROMBIN TIME: CPT | Performed by: INTERNAL MEDICINE

## 2020-07-16 PROCEDURE — 93793 ANTICOAG MGMT PT WARFARIN: CPT | Performed by: INTERNAL MEDICINE

## 2020-07-16 NOTE — PROGRESS NOTES
OP Anticoagulation Service Note    Date: 2020  There were no vitals filed for this visit.   pt declined vitals    Anticoagulation Summary  As of 2020    INR goal:   2.0-3.0   TTR:   73.3 % (4.1 mo)   INR used for dosin.70 (2020)   Warfarin maintenance plan:   5 mg (5 mg x 1) every day   Weekly warfarin total:   35 mg   Plan last modified:   Cindy Treviño, PharmD (2020)   Next INR check:   2020   Target end date:   Indefinite    Indications    Atrial fibrillation (HCC) [I48.91]  Acute pulmonary embolism (HCC) [I26.99]  Long term (current) use of anticoagulants [Z79.01]             Anticoagulation Episode Summary     INR check location:       Preferred lab:       Send INR reminders to:       Comments:         Anticoagulation Care Providers     Provider Role Specialty Phone number    Mary Carmen Rogers M.D. Referring Internal Medicine Critical Care [phone]    [facility] Responsible  [phone]        Anticoagulation Patient Findings      HPI:   Rufina Macias seen in clinic today, on anticoagulation therapy with warfarin (a high risk medication) for atrial fibrillation and PE       Pt is here today to evaluate anticoagulation therapy    Previous INR was  2.9 on 20    Pt was instructed to continue current regimen    Confirmed warfarin dosing regimen, denies missed or extra doses of coumadin.   Diet has been consistent with foods rich in vitamin K: Yes  Changes in ETOH:  No  Changes in smoking status: No  Changes in medication: No   Cost restriction: No  S/s of bleeding:  Yes - she reports bleeding from her hemorroids, small amount. Pt to watch and monitor. Advised pt if lots of blood with bowl movement or bleeding does not stop she needs to seek immediate medical attention  Falls or accidents since last visit No  Signs/symptoms  thrombosis since the last appt: No    A/P   INR  therapeutic today  Continue current warfarin regimen        Pt educated to  contact our clinic with any changes in medications or s/s of bleeding or thrombosis. Pt is aware to seek immediate medical attention for falls, head injury or deep cuts    Follow up appointment in 4 week(s) to reduce risk of adverse events from warfarin   Cindy Treviño, PharmD

## 2020-08-10 ENCOUNTER — TELEPHONE (OUTPATIENT)
Dept: CARDIOLOGY | Facility: MEDICAL CENTER | Age: 75
End: 2020-08-10

## 2020-08-10 NOTE — TELEPHONE ENCOUNTER
Called patient regarding blood work previously ordered by [facility]. She states she will complete this blood work tomorrow when she does in to get her labs done ordered by Dr. Angel.     Of note, she also states that she decided to only take torsemide qd rather than bid after discussing with GILLIAN Sanchez on 7/10/20. Confirmed appt date and time.

## 2020-08-11 ENCOUNTER — HOSPITAL ENCOUNTER (OUTPATIENT)
Dept: LAB | Facility: MEDICAL CENTER | Age: 75
End: 2020-08-11
Attending: INTERNAL MEDICINE
Payer: MEDICARE

## 2020-08-11 DIAGNOSIS — E78.2 MIXED HYPERLIPIDEMIA: ICD-10-CM

## 2020-08-11 DIAGNOSIS — M79.89 LEG SWELLING: ICD-10-CM

## 2020-08-11 DIAGNOSIS — I48.19 PERSISTENT ATRIAL FIBRILLATION (HCC): ICD-10-CM

## 2020-08-11 DIAGNOSIS — Z79.899 HIGH RISK MEDICATION USE: ICD-10-CM

## 2020-08-11 LAB
BASOPHILS # BLD AUTO: 0.3 % (ref 0–1.8)
BASOPHILS # BLD: 0.04 K/UL (ref 0–0.12)
EOSINOPHIL # BLD AUTO: 0.08 K/UL (ref 0–0.51)
EOSINOPHIL NFR BLD: 0.6 % (ref 0–6.9)
ERYTHROCYTE [DISTWIDTH] IN BLOOD BY AUTOMATED COUNT: 47.8 FL (ref 35.9–50)
HCT VFR BLD AUTO: 48.4 % (ref 37–47)
HGB BLD-MCNC: 15.2 G/DL (ref 12–16)
IMM GRANULOCYTES # BLD AUTO: 0.05 K/UL (ref 0–0.11)
IMM GRANULOCYTES NFR BLD AUTO: 0.3 % (ref 0–0.9)
LYMPHOCYTES # BLD AUTO: 2.47 K/UL (ref 1–4.8)
LYMPHOCYTES NFR BLD: 17.1 % (ref 22–41)
MCH RBC QN AUTO: 29.3 PG (ref 27–33)
MCHC RBC AUTO-ENTMCNC: 31.4 G/DL (ref 33.6–35)
MCV RBC AUTO: 93.4 FL (ref 81.4–97.8)
MONOCYTES # BLD AUTO: 0.85 K/UL (ref 0–0.85)
MONOCYTES NFR BLD AUTO: 5.9 % (ref 0–13.4)
NEUTROPHILS # BLD AUTO: 10.97 K/UL (ref 2–7.15)
NEUTROPHILS NFR BLD: 75.8 % (ref 44–72)
NRBC # BLD AUTO: 0 K/UL
NRBC BLD-RTO: 0 /100 WBC
PLATELET # BLD AUTO: 173 K/UL (ref 164–446)
PMV BLD AUTO: 10.5 FL (ref 9–12.9)
RBC # BLD AUTO: 5.18 M/UL (ref 4.2–5.4)
WBC # BLD AUTO: 14.5 K/UL (ref 4.8–10.8)

## 2020-08-11 PROCEDURE — 80061 LIPID PANEL: CPT

## 2020-08-11 PROCEDURE — 80053 COMPREHEN METABOLIC PANEL: CPT

## 2020-08-11 PROCEDURE — 36415 COLL VENOUS BLD VENIPUNCTURE: CPT

## 2020-08-11 PROCEDURE — 84443 ASSAY THYROID STIM HORMONE: CPT

## 2020-08-11 PROCEDURE — 80048 BASIC METABOLIC PNL TOTAL CA: CPT

## 2020-08-11 PROCEDURE — 83880 ASSAY OF NATRIURETIC PEPTIDE: CPT

## 2020-08-11 PROCEDURE — 85025 COMPLETE CBC W/AUTO DIFF WBC: CPT

## 2020-08-12 LAB
ALBUMIN SERPL BCP-MCNC: 4.2 G/DL (ref 3.2–4.9)
ALBUMIN/GLOB SERPL: 1.5 G/DL
ALP SERPL-CCNC: 76 U/L (ref 30–99)
ALT SERPL-CCNC: 24 U/L (ref 2–50)
ANION GAP SERPL CALC-SCNC: 14 MMOL/L (ref 7–16)
ANION GAP SERPL CALC-SCNC: 18 MMOL/L (ref 7–16)
AST SERPL-CCNC: 22 U/L (ref 12–45)
BILIRUB SERPL-MCNC: 0.6 MG/DL (ref 0.1–1.5)
BUN SERPL-MCNC: 26 MG/DL (ref 8–22)
BUN SERPL-MCNC: 27 MG/DL (ref 8–22)
CALCIUM SERPL-MCNC: 9.5 MG/DL (ref 8.5–10.5)
CALCIUM SERPL-MCNC: 9.5 MG/DL (ref 8.5–10.5)
CHLORIDE SERPL-SCNC: 100 MMOL/L (ref 96–112)
CHLORIDE SERPL-SCNC: 99 MMOL/L (ref 96–112)
CHOLEST SERPL-MCNC: 101 MG/DL (ref 100–199)
CO2 SERPL-SCNC: 22 MMOL/L (ref 20–33)
CO2 SERPL-SCNC: 25 MMOL/L (ref 20–33)
CREAT SERPL-MCNC: 1.1 MG/DL (ref 0.5–1.4)
CREAT SERPL-MCNC: 1.12 MG/DL (ref 0.5–1.4)
FASTING STATUS PATIENT QL REPORTED: NORMAL
GLOBULIN SER CALC-MCNC: 2.8 G/DL (ref 1.9–3.5)
GLUCOSE SERPL-MCNC: 96 MG/DL (ref 65–99)
GLUCOSE SERPL-MCNC: 99 MG/DL (ref 65–99)
HDLC SERPL-MCNC: 41 MG/DL
LDLC SERPL CALC-MCNC: 41 MG/DL
NT-PROBNP SERPL IA-MCNC: 1942 PG/ML (ref 0–125)
POTASSIUM SERPL-SCNC: 4.7 MMOL/L (ref 3.6–5.5)
POTASSIUM SERPL-SCNC: 4.7 MMOL/L (ref 3.6–5.5)
PROT SERPL-MCNC: 7 G/DL (ref 6–8.2)
SODIUM SERPL-SCNC: 138 MMOL/L (ref 135–145)
SODIUM SERPL-SCNC: 140 MMOL/L (ref 135–145)
TRIGL SERPL-MCNC: 94 MG/DL (ref 0–149)
TSH SERPL DL<=0.005 MIU/L-ACNC: 2.61 UIU/ML (ref 0.38–5.33)

## 2020-08-13 ENCOUNTER — ANTICOAGULATION VISIT (OUTPATIENT)
Dept: MEDICAL GROUP | Facility: MEDICAL CENTER | Age: 75
End: 2020-08-13
Payer: MEDICARE

## 2020-08-13 DIAGNOSIS — Z79.01 LONG TERM (CURRENT) USE OF ANTICOAGULANTS: Primary | ICD-10-CM

## 2020-08-13 LAB — INR PPP: 2.9 (ref 2–3.5)

## 2020-08-13 PROCEDURE — 85610 PROTHROMBIN TIME: CPT | Performed by: INTERNAL MEDICINE

## 2020-08-13 PROCEDURE — 93793 ANTICOAG MGMT PT WARFARIN: CPT | Performed by: INTERNAL MEDICINE

## 2020-08-13 NOTE — PROGRESS NOTES
OP Anticoagulation Service Note    Date: 2020  There were no vitals filed for this visit.   pt declined vitals    Anticoagulation Summary  As of 2020    INR goal:  2.0-3.0   TTR:  78.3 % (5 mo)   INR used for dosin.90 (2020)   Warfarin maintenance plan:  5 mg (5 mg x 1) every day   Weekly warfarin total:  35 mg   Plan last modified:  Cindy Treviño, PharmD (2020)   Next INR check:  2020   Target end date:  Indefinite    Indications    Atrial fibrillation (HCC) [I48.91]  Acute pulmonary embolism (HCC) [I26.99]  Long term (current) use of anticoagulants [Z79.01]             Anticoagulation Episode Summary     INR check location:      Preferred lab:      Send INR reminders to:      Comments:        Anticoagulation Care Providers     Provider Role Specialty Phone number    Mary Carmen Rogers M.D. Referring Internal Medicine Critical Care [phone]    [facility] Responsible  [phone]        Anticoagulation Patient Findings      HPI:   Rufina Collins Gilberto seen in clinic today, on anticoagulation therapy with warfarin (a high risk medication) for atrial fibrillation and hx of PE       Pt is here today to evaluate anticoagulation therapy    Previous INR was  2.7 on 20    Pt was instructed to continue current regimen    Confirmed warfarin dosing regimen, denies missed or extra doses of coumadin.   Diet has been consistent with foods rich in vitamin K: Yes  Changes in ETOH:  No  Changes in smoking status: No  Changes in medication: No   Cost restriction: No  S/s of bleeding:  No  Falls or accidents since last visit No  Signs/symptoms  thrombosis since the last appt: No    A/P   INR  therapeutic today,   Continue current warfarin regimen           check referral    Pt educated to contact our clinic with any changes in medications or s/s of bleeding or thrombosis. Pt is aware to seek immediate medical attention for falls, head injury or deep cuts    Follow up  appointment in 6 week(s) to reduce risk of adverse events from warfarin  Cindy Treviño, PharmD

## 2020-08-14 ENCOUNTER — OFFICE VISIT (OUTPATIENT)
Dept: MEDICAL GROUP | Age: 75
End: 2020-08-14
Payer: MEDICARE

## 2020-08-14 ENCOUNTER — TELEPHONE (OUTPATIENT)
Dept: MEDICAL GROUP | Age: 75
End: 2020-08-14

## 2020-08-14 VITALS
OXYGEN SATURATION: 92 % | BODY MASS INDEX: 38.83 KG/M2 | HEART RATE: 64 BPM | WEIGHT: 211 LBS | SYSTOLIC BLOOD PRESSURE: 120 MMHG | TEMPERATURE: 97.7 F | DIASTOLIC BLOOD PRESSURE: 70 MMHG | HEIGHT: 62 IN

## 2020-08-14 DIAGNOSIS — J43.1 PANLOBULAR EMPHYSEMA (HCC): ICD-10-CM

## 2020-08-14 DIAGNOSIS — E78.2 MIXED HYPERLIPIDEMIA: ICD-10-CM

## 2020-08-14 DIAGNOSIS — R60.0 BILATERAL LEG EDEMA: ICD-10-CM

## 2020-08-14 DIAGNOSIS — I48.91 ATRIAL FIBRILLATION, UNSPECIFIED TYPE (HCC): ICD-10-CM

## 2020-08-14 DIAGNOSIS — E55.9 VITAMIN D DEFICIENCY: ICD-10-CM

## 2020-08-14 DIAGNOSIS — I26.99 ACUTE PULMONARY EMBOLISM, UNSPECIFIED PULMONARY EMBOLISM TYPE, UNSPECIFIED WHETHER ACUTE COR PULMONALE PRESENT (HCC): ICD-10-CM

## 2020-08-14 DIAGNOSIS — I10 ESSENTIAL HYPERTENSION: ICD-10-CM

## 2020-08-14 PROCEDURE — 99214 OFFICE O/P EST MOD 30 MIN: CPT | Performed by: INTERNAL MEDICINE

## 2020-08-14 ASSESSMENT — ENCOUNTER SYMPTOMS
CONSTITUTIONAL NEGATIVE: 1
MUSCULOSKELETAL NEGATIVE: 1
EYES NEGATIVE: 1
CARDIOVASCULAR NEGATIVE: 1
GASTROINTESTINAL NEGATIVE: 1
NEUROLOGICAL NEGATIVE: 1
PSYCHIATRIC NEGATIVE: 1
RESPIRATORY NEGATIVE: 1

## 2020-08-14 ASSESSMENT — FIBROSIS 4 INDEX: FIB4 SCORE: 1.95

## 2020-08-14 NOTE — TELEPHONE ENCOUNTER
ESTABLISHED PATIENT PRE-VISIT PLANNING     Patient was NOT contacted to complete PVP.     Note: Patient will not be contacted if there is no indication to call.     1.  Reviewed notes from the last few office visits within the medical group: Yes    2.  If any orders were placed at last visit or intended to be done for this visit (i.e. 6 mos follow-up), do we have Results/Consult Notes?        •  Labs - Labs ordered, completed on 08/11/2020 and results are in chart.   Note: If patient appointment is for lab review and patient did not complete labs, check with provider if OK to reschedule patient until labs completed.       •  Imaging - Imaging was not ordered at last office visit.       •  Referrals - No referrals were ordered at last office visit.    3. Is this appointment scheduled as a Hospital Follow-Up? No    4.  Immunizations were updated in Epic using WebIZ?: Epic matches WebIZ       •  Web Iz Recommendations: HEPATITIS B and SHINGRIX (Shingles)    5.  Patient is due for the following Health Maintenance Topics:   Health Maintenance Due   Topic Date Due   • Annual Pulmonary Function Test / Spirometry  03/18/1951   • IMM HEP B VACCINE (1 of 3 - Risk 3-dose series) 03/18/1964   • IMM ZOSTER VACCINES (2 of 3) 12/10/2013   • Annual Wellness Visit  06/27/2019       6. Orders for overdue Health Maintenance topics pended in Pre-Charting? N\A    7.  AHA (MDX) form printed for Provider? No, already completed    8.  Patient was NOT informed to arrive 15 min prior to their scheduled appointment and bring in their medication bottles.

## 2020-08-15 NOTE — PROGRESS NOTES
Subjective:      Rufina Macias is a 75 y.o. female who presents with Follow-Up and Lab Results  The patient is here for followup of chronic medical problems listed below. The patient is compliant with medications and having no side effects from them. Denies chest pain, abdominal pain, dyspnea, myalgias, or cough.   Patient Active Problem List    Diagnosis Date Noted   • Other pulmonary embolism without acute cor pulmonale ([facility]) 02/21/2020     Priority: High   • Atrial fibrillation ([facility]) 02/08/2020     Priority: High   • Coronary artery calcification seen on CAT scan 06/12/2020   • Aortic atherosclerosis ([facility]) 06/12/2020   • Leg swelling 06/12/2020   • Mild concentric left ventricular hypertrophy (LVH) 04/06/2020   • Bilateral leg edema 04/06/2020   • High risk medication use 03/11/2020   • Anticoagulated 03/11/2020   • Long term (current) use of anticoagulants 03/09/2020   • Acute pulmonary embolism ([facility]) 03/05/2020   • Acute on chronic respiratory failure with hypoxia ([facility]) 03/05/2020   • Multiple pulmonary nodules determined by computed tomography of lung 02/24/2020   • Herpes zoster without complication 02/23/2020   • Adrenal nodule ([facility]) 02/21/2020   • 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 with obstruction- ERCP EXTRACTON/ SPHINCTEROTOMY, recurrent Feb 2020 02/17/2020   • Uncomplicated opioid dependence ([facility])- nv pain and spine 01/06/2020   • Ganglion cyst of tendon sheath of right hand- surveillance 07/08/2019   • Panlobular emphysema ([facility]) 01/08/2019   • Chronic pain of left knee- dr contreras- celebrex and cortisone inj; dr cade to do  supartz inj 01/08/2019   • Primary osteoarthritis involving multiple joints- to get supartz inj left knee- [facility] 06/26/2018   • Chronic midline low back pain without sciatica- norco chronic daily use; [facility] 04/04/2017   • Essential hypertension 08/31/2015   • Obesity, morbid, BMI 40.0-49.9 ([facility]) 06/26/2015   • DDD (degenerative disc disease),  cervical 04/07/2015   • Thoracic radiculopathy 03/09/2015   • Lumbar radiculopathy 03/09/2015   • Vitamin D deficiency disease 10/11/2013   • DDD (degenerative disc disease), lumbar 10/11/2013   • Chronic allergic rhinitis 10/11/2013   • Primary insomnia 11/29/2012   • COPD (chronic obstructive pulmonary disease) ([facility]) 08/31/2012   • Mixed hyperlipidemia 10/28/2010     Allergies   Allergen Reactions   • Codeine Swelling   • Ace Inhibitors      Cough     Outpatient Medications Prior to Visit   Medication Sig Dispense Refill   • VITAMIN D PO Take  by mouth.     • rosuvastatin (CRESTOR) 20 MG Tab Take 1 Tab by mouth every evening. 100 Tab 3   • torsemide (DEMADEX) 20 MG Tab Take 1 Tab by mouth every day. 30 Tab 3   • famotidine (PEPCID) 20 MG Tab Take 20 mg by mouth every day.     • POTASSIUM PO Take  by mouth.     • DILTIAZem CD (CARDIZEM CD) 240 MG CAPSULE SR 24 HR Take 1 Cap by mouth 2 Times a Day. 200 Cap 3   • zolpidem (AMBIEN) 10 MG Tab TK 1 T PO HS PRF 28 DAYS     • albuterol 108 (90 Base) MCG/ACT Aero Soln inhalation aerosol Inhale 2 Puffs by mouth every 6 hours as needed for Shortness of Breath. 8.5 g 2   • metoprolol (LOPRESSOR) 100 MG Tab Take 1 Tab by mouth 2 times a day. 180 Tab 4   • warfarin (COUMADIN) 5 MG Tab Take 1 tab by mouth daily or as directed by anticoagulation  clinic 90 Tab 1   • Calcium Carbonate-Vit D-Min (CALCIUM 1200 PO) Take 1,200 mg by mouth every day.     • Magnesium 400 MG Tab Take 400 mg by mouth every day.     • FIBER PO Take 2 Caps by mouth every day.     • Misc. Devices [facility] nebulizer to use for NPPB treatments due to severe COPD. FOR USE WITH ATROVENT SOLN. (Patient not taking: Reported on 8/14/2020) 1 Each 0   • ipratropium (ATROVENT) 0.02 % Solution 2.5 mL by Nebulization route every 6 hours as needed (shortness of breath or wheezing). (Patient not taking: Reported on 8/14/2020) 30 Bullet 1   • hydrocodone/acetaminophen (NORCO)  MG Tab TAKE ONE TABLET BY MOUTH FOUR  TIMES A DAY AS NEEDED 30 DAY SUPPLY  0     No facility-administered medications prior to visit.      Allergies   Allergen Reactions   • Codeine Swelling   • Ace Inhibitors      Cough     Anticoagulation Visit on 08/13/2020   Component Date Value   • INR 08/13/2020 2.90    Hospital Outpatient Visit on 08/11/2020   Component Date Value   • NT-proBNP 08/11/2020 1942*   • Sodium 08/11/2020 140    • Potassium 08/11/2020 4.7    • Chloride 08/11/2020 100    • Co2 08/11/2020 22    • Glucose 08/11/2020 96    • Bun 08/11/2020 27*   • Creatinine 08/11/2020 1.12    • Calcium 08/11/2020 9.5    • Anion Gap 08/11/2020 18.0*   • GFR If  08/11/2020 57*   • GFR If Non  [facility]* 08/11/2020 47*   Hospital Outpatient Visit on 08/11/2020   Component Date Value   • WBC 08/11/2020 14.5*   • RBC 08/11/2020 5.18    • Hemoglobin 08/11/2020 15.2    • Hematocrit 08/11/2020 48.4*   • MCV 08/11/2020 93.4    • MCH 08/11/2020 29.3    • MCHC 08/11/2020 31.4*   • RDW 08/11/2020 47.8    • Platelet Count 08/11/2020 173    • MPV 08/11/2020 10.5    • Neutrophils-Polys 08/11/2020 75.80*   • Lymphocytes 08/11/2020 17.10*   • Monocytes 08/11/2020 5.90    • Eosinophils 08/11/2020 0.60    • Basophils 08/11/2020 0.30    • Immature Granulocytes 08/11/2020 0.30    • Nucleated RBC 08/11/2020 0.00    • Neutrophils (Absolute) 08/11/2020 10.97*   • Lymphs (Absolute) 08/11/2020 2.47    • Monos (Absolute) 08/11/2020 0.85    • Eos (Absolute) 08/11/2020 0.08    • Baso (Absolute) 08/11/2020 0.04    • Immature Granulocytes (a* 08/11/2020 0.05    • NRBC (Absolute) 08/11/2020 0.00    • Cholesterol,Tot 08/11/2020 101    • Triglycerides 08/11/2020 94    • HDL 08/11/2020 41    • LDL 08/11/2020 41    • Sodium 08/11/2020 138    • Potassium 08/11/2020 4.7    • Chloride 08/11/2020 99    • Co2 08/11/2020 25    • Anion Gap 08/11/2020 14.0    • Glucose 08/11/2020 99    • Bun 08/11/2020 26*   • Creatinine 08/11/2020 1.10    • Calcium 08/11/2020 9.5    • AST(SGOT)  08/11/2020 22    • ALT(SGPT) 08/11/2020 24    • Alkaline Phosphatase 08/11/2020 76    • Total Bilirubin 08/11/2020 0.6    • Albumin 08/11/2020 4.2    • Total Protein 08/11/2020 7.0    • Globulin 08/11/2020 2.8    • A-G Ratio 08/11/2020 1.5    • TSH 08/11/2020 2.610    • Fasting Status 08/11/2020 Fasting    • GFR If  08/11/2020 59*   • GFR If Non  [employer]* 08/11/2020 48*   Anticoagulation Visit on 07/16/2020   Component Date Value   • INR 07/16/2020 2.70       Lab Results   Component Value Date/Time    HBA1C 5.7 (H) 02/22/2020 01:52 AM     Lab Results   Component Value Date/Time    SODIUM 140 08/11/2020 11:07 AM    SODIUM 138 08/11/2020 11:07 AM    POTASSIUM 4.7 08/11/2020 11:07 AM    POTASSIUM 4.7 08/11/2020 11:07 AM    CHLORIDE 100 08/11/2020 11:07 AM    CHLORIDE 99 08/11/2020 11:07 AM    CO2 22 08/11/2020 11:07 AM    CO2 25 08/11/2020 11:07 AM    GLUCOSE 96 08/11/2020 11:07 AM    GLUCOSE 99 08/11/2020 11:07 AM    BUN 27 (H) 08/11/2020 11:07 AM    BUN 26 (H) 08/11/2020 11:07 AM    CREATININE 1.12 08/11/2020 11:07 AM    CREATININE 1.10 08/11/2020 11:07 AM    CREATININE 0.80 12/02/2010 12:00 AM    BUNCREATRAT 20 12/02/2010 12:00 AM    GLOMRATE >59 12/02/2010 12:00 AM    ALKPHOSPHAT 76 08/11/2020 11:07 AM    ASTSGOT 22 08/11/2020 11:07 AM    ALTSGPT 24 08/11/2020 11:07 AM    TBILIRUBIN 0.6 08/11/2020 11:07 AM     Lab Results   Component Value Date/Time    INR 2.90 08/13/2020 04:36 PM    INR 2.70 07/16/2020 01:37 PM    INR 2.90 06/25/2020 01:48 PM     Lab Results   Component Value Date/Time    CHOLSTRLTOT 101 08/11/2020 11:07 AM    LDL 41 08/11/2020 11:07 AM    HDL 41 08/11/2020 11:07 AM    TRIGLYCERIDE 94 08/11/2020 11:07 AM       No results found for: TESTOSTERONE  Lab Results   Component Value Date/Time    TSH 1.640 12/02/2010 12:00 AM     Lab Results   Component Value Date/Time    FREET4 1.90 (H) 02/21/2020 06:43 PM    FREET4 0.90 10/24/2014 11:56 AM     No results found for: URICACID  No  "components found for: VITB12  Lab Results   Component Value Date/Time    25HYDROXY 39 09/20/2016 11:36 AM    25HYDROXY 39 10/24/2014 11:56 AM               HPI    Review of Systems   Constitutional: Negative.    HENT: Negative.    Eyes: Negative.    Respiratory: Negative.    Cardiovascular: Negative.    Gastrointestinal: Negative.    Genitourinary: Negative.    Musculoskeletal: Negative.    Skin: Negative.    Neurological: Negative.    Endo/Heme/Allergies: Negative.    Psychiatric/Behavioral: Negative.           Objective:     /70 (BP Location: Right arm, Patient Position: Sitting, BP Cuff Size: Adult)   Pulse 64   Temp 36.5 °C (97.7 °F) (Temporal)   Ht 1.575 m (5' 2\")   Wt 95.7 kg (211 lb)   SpO2 92%   BMI 38.59 kg/m²      Physical Exam  Vitals signs reviewed.   Constitutional:       General: She is not in acute distress.     Appearance: She is well-developed. She is not diaphoretic.   HENT:      Head: Normocephalic and atraumatic.      Right Ear: External ear normal.      Left Ear: External ear normal.      Nose: Nose normal.      Mouth/Throat:      Pharynx: No oropharyngeal exudate.   Eyes:      General: No scleral icterus.        Right eye: No discharge.         Left eye: No discharge.      Conjunctiva/sclera: Conjunctivae normal.      Pupils: Pupils are equal, round, and reactive to light.   Neck:      Musculoskeletal: Normal range of motion and neck supple.      Thyroid: No thyromegaly.      Vascular: No JVD.      Trachea: No tracheal deviation.   Cardiovascular:      Rate and Rhythm: Normal rate and regular rhythm.      Heart sounds: Normal heart sounds. No murmur. No friction rub. No gallop.    Pulmonary:      Effort: Pulmonary effort is normal. No respiratory distress.      Breath sounds: Normal breath sounds. No stridor. No wheezing or rales.   Chest:      Chest wall: No tenderness.   Abdominal:      General: Bowel sounds are normal. There is no distension.      Palpations: Abdomen is soft. " There is no mass.      Tenderness: There is no abdominal tenderness. There is no guarding or rebound.   Musculoskeletal: Normal range of motion.         General: No tenderness.   Lymphadenopathy:      Cervical: No cervical adenopathy.   Skin:     General: Skin is warm and dry.      Coloration: Skin is not pale.      Findings: No erythema or rash.   Neurological:      Mental Status: She is alert and oriented to person, place, and time.      Cranial Nerves: No cranial nerve deficit.      Motor: No abnormal muscle tone.      Coordination: Coordination normal.      Deep Tendon Reflexes: Reflexes are normal and symmetric. Reflexes normal.   Psychiatric:         Behavior: Behavior normal.         Thought Content: Thought content normal.         Judgment: Judgment normal.                 Assessment/Plan:        1. Atrial fibrillation, unspecified type (HCC)     Under good control. Continue same regimen.    2. Acute pulmonary embolism, unspecified pulmonary embolism type, unspecified whether acute cor pulmonale present (HCC)  Under good control. Continue same regimen.       3. Bilateral leg edema  Under good control. Continue same regimen.       4. Panlobular emphysema (HCC)   Under good control. Continue same regimen.         5. Essential hypertension  Under good control. Continue same regimen.       6. Mixed hyperlipidemia  Under good control. Continue same regimen.       7. Vitamin D deficiency disease       Under good control. Continue same regimen.

## 2020-08-17 ENCOUNTER — OFFICE VISIT (OUTPATIENT)
Dept: CARDIOLOGY | Facility: MEDICAL CENTER | Age: 75
End: 2020-08-17
Payer: MEDICARE

## 2020-08-17 VITALS
RESPIRATION RATE: 14 BRPM | HEART RATE: 70 BPM | WEIGHT: 200 LBS | SYSTOLIC BLOOD PRESSURE: 130 MMHG | DIASTOLIC BLOOD PRESSURE: 88 MMHG | OXYGEN SATURATION: 94 % | HEIGHT: 62 IN | BODY MASS INDEX: 36.8 KG/M2

## 2020-08-17 DIAGNOSIS — E78.2 MIXED HYPERLIPIDEMIA: ICD-10-CM

## 2020-08-17 DIAGNOSIS — Z79.01 ANTICOAGULATED: ICD-10-CM

## 2020-08-17 DIAGNOSIS — R60.0 BILATERAL LEG EDEMA: ICD-10-CM

## 2020-08-17 DIAGNOSIS — I48.19 OTHER PERSISTENT ATRIAL FIBRILLATION (HCC): ICD-10-CM

## 2020-08-17 DIAGNOSIS — I25.10 CORONARY ARTERY CALCIFICATION SEEN ON CAT SCAN: ICD-10-CM

## 2020-08-17 DIAGNOSIS — I70.0 AORTIC ATHEROSCLEROSIS (HCC): ICD-10-CM

## 2020-08-17 DIAGNOSIS — I27.82 OTHER CHRONIC PULMONARY EMBOLISM WITHOUT ACUTE COR PULMONALE (HCC): ICD-10-CM

## 2020-08-17 PROCEDURE — 99214 OFFICE O/P EST MOD 30 MIN: CPT | Performed by: INTERNAL MEDICINE

## 2020-08-17 RX ORDER — HYDROCODONE BITARTRATE AND ACETAMINOPHEN 10; 325 MG/1; MG/1
1-2 TABLET ORAL EVERY 6 HOURS PRN
Status: ON HOLD | COMMUNITY
End: 2020-09-14

## 2020-08-17 RX ORDER — POTASSIUM CHLORIDE 750 MG/1
40 TABLET, FILM COATED, EXTENDED RELEASE ORAL DAILY
COMMUNITY
Start: 2020-08-06 | End: 2021-01-01 | Stop reason: SDUPTHER

## 2020-08-17 ASSESSMENT — ENCOUNTER SYMPTOMS
CONSTIPATION: 0
NEAR-SYNCOPE: 0
DEPRESSION: 0
SYNCOPE: 0
PND: 0
DIZZINESS: 0
FLANK PAIN: 0
DECREASED APPETITE: 0
CLAUDICATION: 0
BACK PAIN: 0
DYSPNEA ON EXERTION: 1
DIARRHEA: 0
SHORTNESS OF BREATH: 0
VOMITING: 0
WEIGHT GAIN: 0
WEIGHT LOSS: 0
PALPITATIONS: 0
ALTERED MENTAL STATUS: 0
NAUSEA: 0
IRREGULAR HEARTBEAT: 0
COUGH: 0
BLURRED VISION: 0
ABDOMINAL PAIN: 0
HEARTBURN: 0
ORTHOPNEA: 0
FEVER: 0

## 2020-08-17 ASSESSMENT — FIBROSIS 4 INDEX: FIB4 SCORE: 1.95

## 2020-08-17 NOTE — PROGRESS NOTES
Cardiology Note    Chief Complaint   Patient presents with   • Atrial Fibrillation       History of Present Illness: Rufina Macias is a 75 y.o. female PMH AF 02/2020, PE 02/2020 while on eliquis converted to coumadin, COPD (30 pack years; quit 1995), HLD, HTN who presents for follow up.    States has much more energy than last visit. Still with some dyspnea. She does not feel AF palpitations. She walks short distances with walker. Leg swelling improved on torsemide. She does not really wear compression stockings; she is not sure if can still find them.     Review of Systems   Constitution: Negative for decreased appetite, fever, malaise/fatigue, weight gain and weight loss.   HENT: Negative for congestion and nosebleeds.    Eyes: Negative for blurred vision.   Cardiovascular: Positive for dyspnea on exertion. Negative for chest pain, claudication, irregular heartbeat, leg swelling, near-syncope, orthopnea, palpitations, paroxysmal nocturnal dyspnea and syncope.   Respiratory: Negative for cough and shortness of breath.    Endocrine: Negative for cold intolerance and heat intolerance.   Skin: Negative for rash.   Musculoskeletal: Negative for back pain.   Gastrointestinal: Negative for abdominal pain, constipation, diarrhea, heartburn, melena, nausea and vomiting.   Genitourinary: Negative for dysuria, flank pain and hematuria.   Neurological: Negative for dizziness.   Psychiatric/Behavioral: Negative for altered mental status and depression.         Past Medical History:   Diagnosis Date   • Allergy    • Arthritis     Spine, neck, hands, ankles and knees   • Asthma     inhalers as needed   • Back pain     and neck   • Breath shortness     at times   • Bronchitis    • CA - cancer of uterus    • Cancer (HCC) 2010    uterine   • Carpal tunnel syndrome    • COPD    • Coronary artery calcification seen on CAT scan 6/12/2020   • Diverticula of colon    • Heart burn    • High cholesterol    • Hyperlipidemia    •  Hypertension    • Pneumonia 1993   • Tremor, hereditary, benign    • Wheezing          Past Surgical History:   Procedure Laterality Date   • PB ERCP,DIAGNOSTIC  2/14/2020    Procedure: ERCP (ENDOSCOPIC RETROGRADE CHOLANGIOPANCREATOGRAPHY);  Surgeon: Clinton Ray M.D.;  Location: [facility];  Service: Gastroenterology   • ERCP  4/5/2018    Procedure: ERCP W/POSS BIOPSY;  Surgeon: Quincy Louie M.D.;  Location: [facility];  Service: Gastroenterology   • ERCP W/SPHINCTEROTOMY/PAPILL.  4/5/2018    Procedure: ERCP W/SPHINCTEROTOMY/PAPILL.;  Surgeon: Quincy Louie M.D.;  Location: [facility];  Service: Gastroenterology   • ERCP W/ INSERTION STENT/TUBE  4/5/2018    Procedure: ERCP W/ INSERTION STENT/TUBE - STENT PLACEMENT/REMOVAL;  Surgeon: Quincy Louie M.D.;  Location: [facility];  Service: Gastroenterology   • ERCP W/REMOVAL CALCULUS  4/5/2018    Procedure: ERCP W/REMOVAL CALCULUS W/DILATION;  Surgeon: Quincy Louie M.D.;  Location: [facility];  Service: Gastroenterology   • ERCP  2/15/2018    Procedure: ERCP, SPHINCTEROTOMY, STENT PLACEMENT, DEBRIDEMENT;  Surgeon: Quincy Louie M.D.;  Location: [facility];  Service: Gastroenterology   • COMMON BILE DUCT EXPLORATION  02/15/2018   • ERCP W/ INSERTION STENT/TUBE  02/15/2018   • ERCP W/SPHINCTEROTOMY/PAPILL.  02/15/2018   • ERCP W/REMOVAL CALCULUS  02/15/2018   • ARIELLA BY LAPAROSCOPY  july, 2015    [facility]   • HYSTERECTOMY, TOTAL ABDOMINAL  1/18/10    Uterine, Dr. Whelan and Dr. Cam +BSO   • ABDOMINAL HYSTERECTOMY TOTAL  Jan 2010    Dr. Cam   • OOPHORECTOMY  Jan 2010    BSO   • OPEN REDUCTION      ankle         Current Outpatient Medications   Medication Sig Dispense Refill   • potassium chloride ER (KLOR-CON) 10 MEQ tablet Take  by mouth 4 times a day. Take 1 tablet by mouth four times a day     • HYDROcodone/acetaminophen (NORCO)  MG Tab Take 1-2 Tabs by  mouth every 6 hours as needed.     • Elastic Bandages & Supports (MEDICAL COMPRESSION STOCKINGS) Misc 2 Each by Does not apply route every day. 2 Each 0   • VITAMIN D PO Take  by mouth.     • rosuvastatin (CRESTOR) 20 MG Tab Take 1 Tab by mouth every evening. 100 Tab 3   • torsemide (DEMADEX) 20 MG Tab Take 1 Tab by mouth every day. 30 Tab 3   • famotidine (PEPCID) 20 MG Tab Take 20 mg by mouth every day.     • DILTIAZem CD (CARDIZEM CD) 240 MG CAPSULE SR 24 HR Take 1 Cap by mouth 2 Times a Day. 200 Cap 3   • zolpidem (AMBIEN) 10 MG Tab TK 1 T PO HS PRF 28 DAYS     • albuterol 108 (90 Base) MCG/ACT Aero Soln inhalation aerosol Inhale 2 Puffs by mouth every 6 hours as needed for Shortness of Breath. 8.5 g 2   • metoprolol (LOPRESSOR) 100 MG Tab Take 1 Tab by mouth 2 times a day. 180 Tab 4   • warfarin (COUMADIN) 5 MG Tab Take 1 tab by mouth daily or as directed by anticoagulation  clinic 90 Tab 1   • Calcium Carbonate-Vit D-Min (CALCIUM 1200 PO) Take 1,200 mg by mouth every day.     • Magnesium 400 MG Tab Take 400 mg by mouth every day.     • FIBER PO Take 2 Caps by mouth every day.       No current facility-administered medications for this visit.          Allergies   Allergen Reactions   • Codeine Swelling   • Ace Inhibitors      Cough         Family History   Problem Relation Age of Onset   • Heart Disease Father 45        MI   • Lung Disease Father    • Stroke Father 70   • Cancer Father    • Hypertension Father    • Cancer Paternal Grandmother         breast   • Cancer Paternal Grandfather          Social History     Socioeconomic History   • Marital status:      Spouse name: Not on file   • Number of children: Not on file   • Years of education: Not on file   • Highest education level: Not on file   Occupational History   • Not on file   Social Needs   • Financial resource strain: Not on file   • Food insecurity     Worry: Not on file     Inability: Not on file   • Transportation needs     Medical: Not  "on file     Non-medical: Not on file   Tobacco Use   • Smoking status: Former Smoker     Packs/day: 3.00     Years: 20.00     Pack years: 60.00     Types: Cigarettes     Quit date: 1991     Years since quittin.6   • Smokeless tobacco: Never Used   Substance and Sexual Activity   • Alcohol use: No     Alcohol/week: 0.0 - 2.4 oz     Comment: hardly ever   • Drug use: No   • Sexual activity: Never     Partners: Male     Birth control/protection: Post-Menopausal   Lifestyle   • Physical activity     Days per week: Not on file     Minutes per session: Not on file   • Stress: Not on file   Relationships   • Social connections     Talks on phone: Not on file     Gets together: Not on file     Attends Evangelical service: Not on file     Active member of club or organization: Not on file     Attends meetings of clubs or organizations: Not on file     Relationship status: Not on file   • Intimate partner violence     Fear of current or ex partner: Not on file     Emotionally abused: Not on file     Physically abused: Not on file     Forced sexual activity: Not on file   Other Topics Concern   • Not on file   Social History Narrative   • Not on file         Physical Exam:  Ambulatory Vitals  /88 (BP Location: Left arm, Patient Position: Sitting, BP Cuff Size: Adult)   Pulse 70   Resp 14   Ht 1.575 m (5' 2\")   Wt 90.7 kg (200 lb)   SpO2 94%    BP Readings from Last 4 Encounters:   20 130/88   20 120/70   20 124/80   20 120/72     Weight/BMI:   Vitals:    20 1107   BP: 130/88   Weight: 90.7 kg (200 lb)   Height: 1.575 m (5' 2\")    Body mass index is 36.58 kg/m².  Wt Readings from Last 4 Encounters:   20 90.7 kg (200 lb)   20 95.7 kg (211 lb)   20 92.8 kg (204 lb 9.4 oz)   20 94.8 kg (209 lb)       Physical Exam   Constitutional: She is oriented to person, place, and time and well-developed, well-nourished, and in no distress. No distress.   HENT:   Head: " Normocephalic and atraumatic.   Eyes: Pupils are equal, round, and reactive to light. Conjunctivae are normal.   Neck: Normal range of motion. Neck supple. No JVD present.   Cardiovascular: Normal rate, regular rhythm, normal heart sounds and intact distal pulses. Exam reveals no gallop and no friction rub.   No murmur heard.  Pulmonary/Chest: Effort normal and breath sounds normal. No respiratory distress. She has no wheezes. She has no rales. She exhibits no tenderness.   Abdominal: Soft. Bowel sounds are normal. She exhibits no distension.   Musculoskeletal:         General: Edema present.   Neurological: She is alert and oriented to person, place, and time.   Skin: Skin is warm and dry.   Psychiatric: Affect and judgment normal.       Lab Data Review:  Lab Results   Component Value Date/Time    CHOLSTRLTOT 101 08/11/2020 11:07 AM    LDL 41 08/11/2020 11:07 AM    HDL 41 08/11/2020 11:07 AM    TRIGLYCERIDE 94 08/11/2020 11:07 AM       Lab Results   Component Value Date/Time    SODIUM 140 08/11/2020 11:07 AM    SODIUM 138 08/11/2020 11:07 AM    POTASSIUM 4.7 08/11/2020 11:07 AM    POTASSIUM 4.7 08/11/2020 11:07 AM    CHLORIDE 100 08/11/2020 11:07 AM    CHLORIDE 99 08/11/2020 11:07 AM    CO2 22 08/11/2020 11:07 AM    CO2 25 08/11/2020 11:07 AM    GLUCOSE 96 08/11/2020 11:07 AM    GLUCOSE 99 08/11/2020 11:07 AM    BUN 27 (H) 08/11/2020 11:07 AM    BUN 26 (H) 08/11/2020 11:07 AM    CREATININE 1.12 08/11/2020 11:07 AM    CREATININE 1.10 08/11/2020 11:07 AM    CREATININE 0.80 12/02/2010 12:00 AM    BUNCREATRAT 20 12/02/2010 12:00 AM    GLOMRATE >59 12/02/2010 12:00 AM     Estimated Creatinine Clearance: 45.4 mL/min (by C-G formula based on SCr of 1.12 mg/dL).  Lab Results   Component Value Date/Time    ALKPHOSPHAT 76 08/11/2020 11:07 AM    ASTSGOT 22 08/11/2020 11:07 AM    ALTSGPT 24 08/11/2020 11:07 AM    TBILIRUBIN 0.6 08/11/2020 11:07 AM      Lab Results   Component Value Date/Time    WBC 14.5 (H) 08/11/2020 11:07 AM     WBC 7.6 12/02/2010 12:00 AM     Lab Results   Component Value Date/Time    HBA1C 5.7 (H) 02/22/2020 01:52 AM     No components found for: TROP      Cardiac Imaging and Procedures Review:      EKG 6/12/20 atrial fibrillation, RBBB    CTA chest 02/21/2020  1.  Pulmonary embolus involving the right distal main pulmonary artery extending into right middle and lower lobe segmental and subsegmental branches. Left upper and lower lobe subsegmental pulmonary emboli.  2.  Distal esophageal wall thickening, recommend follow-up esophagoscopy for evaluation of esophagitis versus infiltrating esophageal neoplasia.  3.  Hepatomegaly  4.  Left adrenal nodule, indeterminate, recommend follow-up adrenal protocol CT or MRI for further characterization as clinically appropriate.  5.  1.3 cm left lower lobe pulmonary nodule, see nodule follow-up recommendations below.  [diffuse coronary calcification and aortic atherosclerosis]    TTE 02/25/2020  CONCLUSIONS  Limited Exam for LV and RV Function  Compared to the images of the prior study done 2/11/20 -  there has   been no significant change.   Left ventricular systolic function is normal.  Left ventricular ejection fraction is visually estimated to be 55%.  Right ventricular systolic function is normal.  Right Ventricle  Normal right ventricular size. Right ventricular systolic function is   normal.    TTE 02/11/2020  CONCLUSIONS  Normal left ventricular systolic function. Left ventricular ejection   fraction is visually estimated to be 60%.   Normal diastolic function.  Right ventricular systolic pressure is estimated to be 25 mmHg.  Right Ventricle  Mildly dilated right ventricle. Normal right ventricular systolic   Function.    Nuclear PET stress 08/2017  SCINTOGRAPHIC FINDINGS:   Normal left ventricular perfusion with stress and rest images.  There is no evidence of ischemia or scar.   GATED WALL MOTION FINDINGS:   The left ventricle wall motion is normal with stress and rest   imagings.  Measured resting ejection fraction is 58 %.      CONCLUSIONS AND IMPRESSIONS:    Normal perfusion on PET with normal function of the left ventricle.  No evidence of ischemia or infarction.     Medical Decision Making:  Problem List Items Addressed This Visit     Atrial fibrillation (HCC)    Relevant Orders    CL-CARDIOVERSION    EC-CHARLES W/O CONT    Other pulmonary embolism without acute cor pulmonale (HCC)    Mixed hyperlipidemia    Anticoagulated    Bilateral leg edema    Relevant Orders    Basic Metabolic Panel    Coronary artery calcification seen on CAT scan    Aortic atherosclerosis (HCC)        Unprovoked PE with doac failure - Still symptomatic but improving. Continue coumadin.     Persistent AF - chadsvasc 5 - probably also contributing to dyspnea. Continue coumadin for cva prevention. Continue rate control. Will plan CHARLES/DCCV.    HTN - controlled. Continue regimen.    Coronary calcification / aortic atherosclerosis / ascvd / HLD - continue coumadin. No need for additional antiplatelets for primary prevention. Continue high intensity statin. LDL at goal <70.    Leg swelling - slowly improving. Continue torsemide. Repeat BMP. Encouraged compression stockings. There probably is a component of HFpEF given elevated pBNP. Will repeat TTE when sinus.     It was my pleasure to meet with Ms. Macias.

## 2020-08-18 ENCOUNTER — TELEPHONE (OUTPATIENT)
Dept: CARDIOLOGY | Facility: MEDICAL CENTER | Age: 75
End: 2020-08-18

## 2020-08-18 NOTE — TELEPHONE ENCOUNTER
Patient is scheduled on 9- for a CHARLES/CV w/anesthesia with Dr. Boston. No meds to stop and patient to check in at 10:00 for a 12:00 procedure. H&P was done on 8- by Dr. Diez. Pre admit to call patient.

## 2020-08-21 ENCOUNTER — NON-PROVIDER VISIT (OUTPATIENT)
Dept: PULMONOLOGY | Facility: HOSPICE | Age: 75
End: 2020-08-21
Attending: INTERNAL MEDICINE
Payer: MEDICARE

## 2020-08-21 DIAGNOSIS — Z87.891 FORMER TOBACCO USE: ICD-10-CM

## 2020-08-21 DIAGNOSIS — R06.02 SHORTNESS OF BREATH: ICD-10-CM

## 2020-08-21 PROCEDURE — 94729 DIFFUSING CAPACITY: CPT | Performed by: INTERNAL MEDICINE

## 2020-08-21 PROCEDURE — 94726 PLETHYSMOGRAPHY LUNG VOLUMES: CPT | Performed by: INTERNAL MEDICINE

## 2020-08-21 PROCEDURE — 94060 EVALUATION OF WHEEZING: CPT | Performed by: INTERNAL MEDICINE

## 2020-08-21 ASSESSMENT — PULMONARY FUNCTION TESTS
FEV1: .77
FEV1/FVC_PERCENT_CHANGE: 633
FEV1_PREDICTED: 1.93
FEV1_PERCENT_PREDICTED: 33
FEV1/FVC: 62
FEV1/FVC_PERCENT_LLN: 65
FEV1/FVC_PERCENT_PREDICTED: 77
FEV1/FVC: 70.64
FEV1/FVC_PERCENT_PREDICTED: 79
FEV1/FVC_PERCENT_PREDICTED: 90
FEV1/FVC_PREDICTED: 78
FEV1_PERCENT_CHANGE: 3
FVC_PREDICTED: 2.5
FVC: 1.09
FEV1: .65
FEV1/FVC: 61
FVC: 1.05
FEV1/FVC: 70
FEV1_LLN: 1.61
FVC_LLN: 2.09
FVC_PERCENT_PREDICTED: 43
FEV1/FVC_PERCENT_CHANGE: 14
FEV1/FVC_PERCENT_PREDICTED: 91
FVC_PERCENT_PREDICTED: 42
FEV1_PERCENT_PREDICTED: 39
FEV1/FVC_PERCENT_PREDICTED: 78
FEV1_PERCENT_CHANGE: 19

## 2020-08-21 NOTE — PROCEDURES
Good patient effort & cooperation. The results of this test meet the ATS/ERS standards for acceptability and repeatability. Predicted equations for Spirometry are GLI-2012, ITS for lung volumes, and GLI-2017 for DLCO. The DLCO was uncorrected for Hgb. A bronchodilator of Albuterol HFA-2 puffs via spacer was administered.    Lung function testing was completed on August 21, 2020.  Mid flows are severely reduced at 22% predicted.  FEV1 is low at 33% predicted.  FEV1 FVC ratio is reduced to 61%.  Bronchodilator response was definite.  Lung volumes also demonstrate additional restriction, total lung capacity is 78%.  Reduced expiratory reserve volume at 33% reflects elevated body mass index at 38.  Oxygen transfer is normal.  Flow volume loop confirms the combined severe obstructive pattern with superimposed restriction, good effort noted

## 2020-08-24 ENCOUNTER — OFFICE VISIT (OUTPATIENT)
Dept: PULMONOLOGY | Facility: HOSPICE | Age: 75
End: 2020-08-24
Payer: MEDICARE

## 2020-08-24 VITALS
SYSTOLIC BLOOD PRESSURE: 136 MMHG | WEIGHT: 202.5 LBS | OXYGEN SATURATION: 93 % | HEART RATE: 73 BPM | DIASTOLIC BLOOD PRESSURE: 84 MMHG | HEIGHT: 62 IN | BODY MASS INDEX: 37.26 KG/M2

## 2020-08-24 DIAGNOSIS — J44.9 CHRONIC OBSTRUCTIVE PULMONARY DISEASE, UNSPECIFIED COPD TYPE (HCC): ICD-10-CM

## 2020-08-24 DIAGNOSIS — I48.91 ATRIAL FIBRILLATION, UNSPECIFIED TYPE (HCC): ICD-10-CM

## 2020-08-24 DIAGNOSIS — R60.0 BILATERAL LEG EDEMA: ICD-10-CM

## 2020-08-24 DIAGNOSIS — R91.1 PULMONARY NODULE: ICD-10-CM

## 2020-08-24 DIAGNOSIS — J45.40 MODERATE PERSISTENT ASTHMA, UNSPECIFIED WHETHER COMPLICATED: ICD-10-CM

## 2020-08-24 DIAGNOSIS — Z79.01 CHRONIC ANTICOAGULATION: ICD-10-CM

## 2020-08-24 PROCEDURE — 99213 OFFICE O/P EST LOW 20 MIN: CPT | Performed by: INTERNAL MEDICINE

## 2020-08-24 RX ORDER — WARFARIN SODIUM 5 MG/1
TABLET ORAL
Qty: 90 TAB | Refills: 1 | Status: SHIPPED | OUTPATIENT
Start: 2020-08-24 | End: 2021-01-01 | Stop reason: SDUPTHER

## 2020-08-24 RX ORDER — MONTELUKAST SODIUM 10 MG/1
10 TABLET ORAL EVERY EVENING
Qty: 30 TAB | Refills: 6 | Status: ON HOLD | OUTPATIENT
Start: 2020-08-24 | End: 2020-09-13

## 2020-08-24 ASSESSMENT — FIBROSIS 4 INDEX: FIB4 SCORE: 1.95

## 2020-08-24 NOTE — PROGRESS NOTES
Chief Complaint   Patient presents with   • Follow-Up     SOB. Last seen 04/07/2020   • Results     PFT 08/21/20       Problems:   1. Chronic obstructive pulmonary disease, unspecified COPD type (HCC)    2. Moderate persistent asthma, unspecified whether complicated    3. Atrial fibrillation, unspecified type (HCC)    4. Bilateral leg edema    5. Pulmonary nodule        Assessment/Plan:   # Asthma COPD overlap syndrome    -- Review of PFTs supportive of severe airflow obstruction with significant response to bronchodilator   -- GOLD stage C (mMRC 2)   -- Start trelegy once daily and continue singulair    -- If symptoms does not improve then will offer pulmonary rehab   -- Discussed about the importance of using face mask while at work to prevent environmental exposure  -- Up to date on vaccinations      # Atrial fibrillation    -- On metoprolol, digoxin, and diltiazem    -- Scheduled for CHARLES with cardioversion on 9/10/2020     # Bilateral LE swelling   -- Secondary to volume overload    -- Cont low salt diet and lasix       # Pulmonary nodule  -- Review of CT chest showed 1.2 cm LLL nodule. Differentials include malignancy vs inflammatory vs infectious.   -- Calculated Aromas clinic score for LLL nodule is 44% which places patient at intermediate pretest probability   -- Discussed with patient about the possible etiologies as above and opted to not undergo any form of chemoradiation or surgical treatment if this was to be cancerous. She rather not know what it is and does not want a PET scan or repeat CT chest.     RTC 4-6 months     HPI:  MS. Macias is a 75 year old female with history of atrial fibrillation, unprovoked PE, and history of self reported COPD presents for hospital follow up. Patient was previously admitted to [facility] ICU for unprovoked PE and atrial fibrillation. Hospital course complicated with persistent atrial fibrillation with RVR which she was started on metoprolol, diltiazem, and  digoxin for rate controlled. She was diuresed and cardiology consulted and recommended outpatient follow up. Her wheezing remains persistent which she completed a course of prednisone.      Review of her CT chest showed multiple filling defects in segmental and subsegmental bilaterally and LLL nodule. No prior CT chest for comparison.      Patient is a former smoker (20 pack years), quit 25 years ago. Never had PFTs.      Interval Follow Up Visit 08/24/2020:   Patient presents for follow up. PFTs on 08/21/2020 showed severe airflow obstruction with significant response to bronchodilator, mild restrictive defect due to extrinsic compression from her underlying body habitus. She state feeling better and stronger but has days she will feel more short of breath with intermittent productive cough. She is able to walk about half a block before she has to stop and rest. She also reports having chronic LE swelling. Denies chest pain, N/V, fever/chills, or abdominal pain. She is scheduled for CHARLES with cardioversion scheduled on 9/10/2020.      Past Medical History:   Diagnosis Date   • Allergy    • Arthritis     Spine, neck, hands, ankles and knees   • Asthma     inhalers as needed   • Back pain     and neck   • Breath shortness     at times   • Bronchitis    • CA - cancer of uterus    • Cancer (HCC) 2010    uterine   • Carpal tunnel syndrome    • COPD    • Coronary artery calcification seen on CAT scan 6/12/2020   • Diverticula of colon    • Heart burn    • High cholesterol    • Hyperlipidemia    • Hypertension    • Pneumonia 1993   • Tremor, hereditary, benign    • Wheezing        Past Surgical History:   Procedure Laterality Date   • PB ERCP,DIAGNOSTIC  2/14/2020    Procedure: ERCP (ENDOSCOPIC RETROGRADE CHOLANGIOPANCREATOGRAPHY);  Surgeon: Clinton Ray M.D.;  Location: SURGERY [facility];  Service: Gastroenterology   • ERCP  4/5/2018    Procedure: ERCP W/POSS BIOPSY;  Surgeon: Quincy Louie M.D.;  Location:  [facility];  Service: Gastroenterology   • ERCP W/SPHINCTEROTOMY/PAPILL.  4/5/2018    Procedure: ERCP W/SPHINCTEROTOMY/PAPILL.;  Surgeon: Quincy Louie M.D.;  Location: [facility];  Service: Gastroenterology   • ERCP W/ INSERTION STENT/TUBE  4/5/2018    Procedure: ERCP W/ INSERTION STENT/TUBE - STENT PLACEMENT/REMOVAL;  Surgeon: Quincy Louie M.D.;  Location: [facility];  Service: Gastroenterology   • ERCP W/REMOVAL CALCULUS  4/5/2018    Procedure: ERCP W/REMOVAL CALCULUS W/DILATION;  Surgeon: Quincy Louie M.D.;  Location: [facility];  Service: Gastroenterology   • ERCP  2/15/2018    Procedure: ERCP, SPHINCTEROTOMY, STENT PLACEMENT, DEBRIDEMENT;  Surgeon: Quincy Louie M.D.;  Location: [facility];  Service: Gastroenterology   • COMMON BILE DUCT EXPLORATION  02/15/2018   • ERCP W/ INSERTION STENT/TUBE  02/15/2018   • ERCP W/SPHINCTEROTOMY/PAPILL.  02/15/2018   • ERCP W/REMOVAL CALCULUS  02/15/2018   • ARIELLA BY LAPAROSCOPY  july, 2015    [facility]   • HYSTERECTOMY, TOTAL ABDOMINAL  1/18/10    Uterine, Dr. Whelan and Dr. Cam +BSO   • ABDOMINAL HYSTERECTOMY TOTAL  Jan 2010    Dr. Cam   • OOPHORECTOMY  Jan 2010    BSO   • OPEN REDUCTION      ankle       ROS:   Constitutional: Denies fevers, chills, night sweats, fatigue or weight loss  Eyes: Denies vision loss, pain, drainage, double vision  Ears, Nose, Throat: Denies earache, tinnitus, hoarseness  Cardiovascular: Denies chest pain, tightness, palpitations  Respiratory: See HPI  Sleep: See HPI   GI: Denies abdominal pain, nausea, vomiting, diarrhea  : Denies frequent urination, hematuria, painful urination  Musculoskeletal: Denies back pain, painful joints, sore muscles  Neurological: Denies headaches, seizures  Skin: Denies rashes, color changes  Psychiatric: Denies depression or thoughts of suicide  Hematologic: Denies bleeding tendency or clotting tendency  Allergic/Immunologic:  Denies rhinitis, skin sensitivity    Social History     Socioeconomic History   • Marital status:      Spouse name: Not on file   • Number of children: Not on file   • Years of education: Not on file   • Highest education level: Not on file   Occupational History   • Not on file   Social Needs   • Financial resource strain: Not on file   • Food insecurity     Worry: Not on file     Inability: Not on file   • Transportation needs     Medical: Not on file     Non-medical: Not on file   Tobacco Use   • Smoking status: Former Smoker     Packs/day: 3.00     Years: 20.00     Pack years: 60.00     Types: Cigarettes     Quit date: 1991     Years since quittin.6   • Smokeless tobacco: Never Used   Substance and Sexual Activity   • Alcohol use: No     Alcohol/week: 0.0 - 2.4 oz     Comment: hardly ever   • Drug use: No   • Sexual activity: Never     Partners: Male     Birth control/protection: Post-Menopausal   Lifestyle   • Physical activity     Days per week: Not on file     Minutes per session: Not on file   • Stress: Not on file   Relationships   • Social connections     Talks on phone: Not on file     Gets together: Not on file     Attends Baptism service: Not on file     Active member of club or organization: Not on file     Attends meetings of clubs or organizations: Not on file     Relationship status: Not on file   • Intimate partner violence     Fear of current or ex partner: Not on file     Emotionally abused: Not on file     Physically abused: Not on file     Forced sexual activity: Not on file   Other Topics Concern   • Not on file   Social History Narrative   • Not on file     Codeine and Ace inhibitors  Current Outpatient Medications on File Prior to Visit   Medication Sig Dispense Refill   • potassium chloride ER (KLOR-CON) 10 MEQ tablet Take  by mouth 4 times a day. Take 1 tablet by mouth four times a day     • HYDROcodone/acetaminophen (NORCO)  MG Tab Take 1-2 Tabs by mouth every 6  "hours as needed.     • Elastic Bandages & Supports (MEDICAL COMPRESSION STOCKINGS) Misc 2 Each by Does not apply route every day. 2 Each 0   • VITAMIN D PO Take  by mouth.     • rosuvastatin (CRESTOR) 20 MG Tab Take 1 Tab by mouth every evening. 100 Tab 3   • torsemide (DEMADEX) 20 MG Tab Take 1 Tab by mouth every day. 30 Tab 3   • famotidine (PEPCID) 20 MG Tab Take 20 mg by mouth every day.     • DILTIAZem CD (CARDIZEM CD) 240 MG CAPSULE SR 24 HR Take 1 Cap by mouth 2 Times a Day. 200 Cap 3   • zolpidem (AMBIEN) 10 MG Tab TK 1 T PO HS PRF 28 DAYS     • albuterol 108 (90 Base) MCG/ACT Aero Soln inhalation aerosol Inhale 2 Puffs by mouth every 6 hours as needed for Shortness of Breath. 8.5 g 2   • metoprolol (LOPRESSOR) 100 MG Tab Take 1 Tab by mouth 2 times a day. 180 Tab 4   • warfarin (COUMADIN) 5 MG Tab Take 1 tab by mouth daily or as directed by anticoagulation  clinic 90 Tab 1   • Calcium Carbonate-Vit D-Min (CALCIUM 1200 PO) Take 1,200 mg by mouth every day.     • Magnesium 400 MG Tab Take 400 mg by mouth every day.     • FIBER PO Take 2 Caps by mouth every day.       No current facility-administered medications on file prior to visit.      /84 (BP Location: Left arm, Patient Position: Sitting, BP Cuff Size: Adult)   Pulse 73   Ht 1.575 m (5' 2\")   Wt 91.9 kg (202 lb 8 oz)   SpO2 93%   Family History   Problem Relation Age of Onset   • Heart Disease Father 45        MI   • Lung Disease Father    • Stroke Father 70   • Cancer Father    • Hypertension Father    • Cancer Paternal Grandmother         breast   • Cancer Paternal Grandfather      Immunization History   Administered Date(s) Administered   • Influenza (IM) Preservative Free 10/07/2011   • Influenza Seasonal Injectable - Historical Data 10/09/2012   • Influenza TIV (IM) 10/29/2012, 10/11/2013   • Influenza Vaccine Adult HD 10/03/2014, 09/25/2015, 10/04/2016, 10/26/2017, 10/05/2018, 09/30/2019   • Pneumococcal Conjugate Vaccine (Prevnar/PCV-13) " 03/23/2016   • Pneumococcal polysaccharide vaccine (PPSV-23) 08/31/2012, 04/07/2020   • Tdap Vaccine 06/25/2015   • Zoster Vaccine Live (ZVL) (Zostavax) - HISTORICAL DATA 10/15/2013         Physical Exam:  General: Obese, NAD, speaking in full sentence.   HEENT: PERRLA, EOMI, no scleral icterus, no nasal or oral lesions  Neck: No thyromegaly, no adenopathy, no bruits  Mallampatti: Grade II  Lungs: Equal breath sounds, no wheezes or crackles  Heart: Irregular irregular rate and rhythm.   Abdomen: Soft, benign, no organomegaly  Extremities: No clubbing, cyanosis. +3 pitting edema.   Neurologic: Cranial nerve, motor, and sensory exam are normal    Mary Carmen Rogers MD   Pulmonary Critical Care   Count includes the [facility]

## 2020-08-25 ENCOUNTER — HOSPITAL ENCOUNTER (OUTPATIENT)
Facility: MEDICAL CENTER | Age: 75
End: 2020-08-25
Attending: INTERNAL MEDICINE | Admitting: INTERNAL MEDICINE
Payer: MEDICARE

## 2020-09-02 ENCOUNTER — TELEPHONE (OUTPATIENT)
Dept: PULMONOLOGY | Facility: HOSPICE | Age: 75
End: 2020-09-02

## 2020-09-02 NOTE — TELEPHONE ENCOUNTER
The patient called and stated that she is having a heart procedure on 9/10.  She is going to wait until after that to start the Montelukast.

## 2020-09-08 ENCOUNTER — PRE-ADMISSION TESTING (OUTPATIENT)
Dept: ADMISSIONS | Facility: MEDICAL CENTER | Age: 75
End: 2020-09-08
Attending: INTERNAL MEDICINE
Payer: MEDICARE

## 2020-09-08 VITALS — HEIGHT: 62 IN | WEIGHT: 202.82 LBS | BODY MASS INDEX: 37.32 KG/M2

## 2020-09-08 DIAGNOSIS — Z01.812 PRE-OPERATIVE LABORATORY EXAMINATION: ICD-10-CM

## 2020-09-08 DIAGNOSIS — Z01.810 PRE-OPERATIVE CARDIOVASCULAR EXAMINATION: ICD-10-CM

## 2020-09-08 LAB
ANION GAP SERPL CALC-SCNC: 15 MMOL/L (ref 7–16)
BUN SERPL-MCNC: 16 MG/DL (ref 8–22)
CALCIUM SERPL-MCNC: 9.6 MG/DL (ref 8.5–10.5)
CHLORIDE SERPL-SCNC: 97 MMOL/L (ref 96–112)
CO2 SERPL-SCNC: 24 MMOL/L (ref 20–33)
COVID ORDER STATUS COVID19: NORMAL
CREAT SERPL-MCNC: 0.83 MG/DL (ref 0.5–1.4)
EKG IMPRESSION: NORMAL
ERYTHROCYTE [DISTWIDTH] IN BLOOD BY AUTOMATED COUNT: 47.6 FL (ref 35.9–50)
GLUCOSE SERPL-MCNC: 99 MG/DL (ref 65–99)
HCT VFR BLD AUTO: 49.1 % (ref 37–47)
HGB BLD-MCNC: 15.2 G/DL (ref 12–16)
INR PPP: 1.98 (ref 0.87–1.13)
MCH RBC QN AUTO: 29.2 PG (ref 27–33)
MCHC RBC AUTO-ENTMCNC: 31 G/DL (ref 33.6–35)
MCV RBC AUTO: 94.4 FL (ref 81.4–97.8)
PLATELET # BLD AUTO: 169 K/UL (ref 164–446)
PMV BLD AUTO: 9.8 FL (ref 9–12.9)
POTASSIUM SERPL-SCNC: 4.3 MMOL/L (ref 3.6–5.5)
PROTHROMBIN TIME: 23.2 SEC (ref 12–14.6)
RBC # BLD AUTO: 5.2 M/UL (ref 4.2–5.4)
SODIUM SERPL-SCNC: 136 MMOL/L (ref 135–145)
WBC # BLD AUTO: 11.6 K/UL (ref 4.8–10.8)

## 2020-09-08 PROCEDURE — 85610 PROTHROMBIN TIME: CPT

## 2020-09-08 PROCEDURE — 93005 ELECTROCARDIOGRAM TRACING: CPT

## 2020-09-08 PROCEDURE — 36415 COLL VENOUS BLD VENIPUNCTURE: CPT

## 2020-09-08 PROCEDURE — 85027 COMPLETE CBC AUTOMATED: CPT

## 2020-09-08 PROCEDURE — 93010 ELECTROCARDIOGRAM REPORT: CPT | Performed by: INTERNAL MEDICINE

## 2020-09-08 PROCEDURE — 80048 BASIC METABOLIC PNL TOTAL CA: CPT

## 2020-09-08 PROCEDURE — U0003 INFECTIOUS AGENT DETECTION BY NUCLEIC ACID (DNA OR RNA); SEVERE ACUTE RESPIRATORY SYNDROME CORONAVIRUS 2 (SARS-COV-2) (CORONAVIRUS DISEASE [COVID-19]), AMPLIFIED PROBE TECHNIQUE, MAKING USE OF HIGH THROUGHPUT TECHNOLOGIES AS DESCRIBED BY CMS-2020-01-R: HCPCS

## 2020-09-08 ASSESSMENT — FIBROSIS 4 INDEX: FIB4 SCORE: 1.95

## 2020-09-09 ENCOUNTER — TELEPHONE (OUTPATIENT)
Dept: CARDIOLOGY | Facility: MEDICAL CENTER | Age: 75
End: 2020-09-09

## 2020-09-09 LAB
SARS-COV-2 RNA RESP QL NAA+PROBE: NOTDETECTED
SPECIMEN SOURCE: NORMAL

## 2020-09-09 NOTE — TELEPHONE ENCOUNTER
Isabel Boston M.D.  Beata Neil, Med Ass't   Cc: Guillaume Diez M.D.             Maulik Cota,   This pt of VR scheduled for CHARLES cardioversion on Thursday.  INR is slightly low.  I we will still plan to do the procedure but she should have repeated INR on the morning of the procedure to make sure is not dropping lower in which case she may need bridging.  I do not know if there is a system in place to deal with this.  I can deal with it on Thursday if need be.   Tx,   LS     FYI to Guillaume.

## 2020-09-09 NOTE — TELEPHONE ENCOUNTER
Called pre admit at [phone]. They informed me that the current protocol is that if pt has 2 or more symptoms within the last 14 days that don't have any other explanation, then pt should be rescheduled. Called pt to discuss. She reports that she felt fine at her pre admit appt yesterday, but around 9pm last night she developed chills. She didn't check her temp but she felt like she had a fever. She was also nauseous and had a backache, though she says she does have chronic back pain. She went to sleep, and woke up this morning sweating, but feeling a little better. She doesn't have chills or nausea anymore, but she still feels weak. We discussed that with these symptoms she should reschedule CHARLES/CV. Also informed her that if symptoms return or persist she should contact her PCP.

## 2020-09-09 NOTE — TELEPHONE ENCOUNTER
Laura,    This patient is scheduled for her CHARLES/cardioversion tomorrow with Dr. Boston. I called her to change the arrival time at 9:00am. She stated that last night had the chills and did not feel good. She did state that she is feeling better today. Can you please call her to triage if we can proceed with this procedure tomorrow?    Thank You,  Beata

## 2020-09-09 NOTE — TELEPHONE ENCOUNTER
Cale,    Please sent over updated orders to draw an INR on arrival tomorrow.    Thank You,  Beaat

## 2020-09-10 ENCOUNTER — APPOINTMENT (OUTPATIENT)
Dept: CARDIOLOGY | Facility: MEDICAL CENTER | Age: 75
End: 2020-09-10
Attending: INTERNAL MEDICINE
Payer: MEDICARE

## 2020-09-11 ENCOUNTER — HOSPITAL ENCOUNTER (OUTPATIENT)
Facility: MEDICAL CENTER | Age: 75
End: 2020-09-11
Attending: NURSE PRACTITIONER
Payer: MEDICARE

## 2020-09-11 ENCOUNTER — TELEPHONE (OUTPATIENT)
Dept: URGENT CARE | Facility: CLINIC | Age: 75
End: 2020-09-11

## 2020-09-11 ENCOUNTER — OFFICE VISIT (OUTPATIENT)
Dept: URGENT CARE | Facility: CLINIC | Age: 75
End: 2020-09-11
Payer: MEDICARE

## 2020-09-11 VITALS
BODY MASS INDEX: 36.8 KG/M2 | SYSTOLIC BLOOD PRESSURE: 118 MMHG | OXYGEN SATURATION: 95 % | HEART RATE: 90 BPM | DIASTOLIC BLOOD PRESSURE: 72 MMHG | WEIGHT: 200 LBS | RESPIRATION RATE: 16 BRPM | HEIGHT: 62 IN | TEMPERATURE: 96.8 F

## 2020-09-11 DIAGNOSIS — R30.0 DYSURIA: ICD-10-CM

## 2020-09-11 DIAGNOSIS — R82.2 BILIRUBINURIA: ICD-10-CM

## 2020-09-11 LAB
ALBUMIN SERPL BCP-MCNC: 3.7 G/DL (ref 3.2–4.9)
ALBUMIN/GLOB SERPL: 1.1 G/DL
ALP SERPL-CCNC: 229 U/L (ref 30–99)
ALT SERPL-CCNC: 146 U/L (ref 2–50)
ANION GAP SERPL CALC-SCNC: 14 MMOL/L (ref 7–16)
APPEARANCE UR: NORMAL
AST SERPL-CCNC: 147 U/L (ref 12–45)
BASOPHILS # BLD AUTO: 0.2 % (ref 0–1.8)
BASOPHILS # BLD: 0.02 K/UL (ref 0–0.12)
BILIRUB SERPL-MCNC: 2.4 MG/DL (ref 0.1–1.5)
BILIRUB UR STRIP-MCNC: NORMAL MG/DL
BUN SERPL-MCNC: 19 MG/DL (ref 8–22)
CALCIUM SERPL-MCNC: 9 MG/DL (ref 8.5–10.5)
CHLORIDE SERPL-SCNC: 98 MMOL/L (ref 96–112)
CO2 SERPL-SCNC: 25 MMOL/L (ref 20–33)
COLOR UR AUTO: NORMAL
CREAT SERPL-MCNC: 0.87 MG/DL (ref 0.5–1.4)
EOSINOPHIL # BLD AUTO: 0.04 K/UL (ref 0–0.51)
EOSINOPHIL NFR BLD: 0.3 % (ref 0–6.9)
ERYTHROCYTE [DISTWIDTH] IN BLOOD BY AUTOMATED COUNT: 49.2 FL (ref 35.9–50)
GLOBULIN SER CALC-MCNC: 3.3 G/DL (ref 1.9–3.5)
GLUCOSE SERPL-MCNC: 132 MG/DL (ref 65–99)
GLUCOSE UR STRIP.AUTO-MCNC: NORMAL MG/DL
HAV IGM SERPL QL IA: ABNORMAL
HBV CORE IGM SER QL: ABNORMAL
HBV SURFACE AG SER QL: ABNORMAL
HCT VFR BLD AUTO: 48.1 % (ref 37–47)
HCV AB SER QL: REACTIVE
HGB BLD-MCNC: 14.9 G/DL (ref 12–16)
IMM GRANULOCYTES # BLD AUTO: 0.05 K/UL (ref 0–0.11)
IMM GRANULOCYTES NFR BLD AUTO: 0.4 % (ref 0–0.9)
KETONES UR STRIP.AUTO-MCNC: NORMAL MG/DL
LEUKOCYTE ESTERASE UR QL STRIP.AUTO: NORMAL
LYMPHOCYTES # BLD AUTO: 1.16 K/UL (ref 1–4.8)
LYMPHOCYTES NFR BLD: 9 % (ref 22–41)
MCH RBC QN AUTO: 29.6 PG (ref 27–33)
MCHC RBC AUTO-ENTMCNC: 31 G/DL (ref 33.6–35)
MCV RBC AUTO: 95.4 FL (ref 81.4–97.8)
MONOCYTES # BLD AUTO: 0.8 K/UL (ref 0–0.85)
MONOCYTES NFR BLD AUTO: 6.2 % (ref 0–13.4)
NEUTROPHILS # BLD AUTO: 10.78 K/UL (ref 2–7.15)
NEUTROPHILS NFR BLD: 83.9 % (ref 44–72)
NITRITE UR QL STRIP.AUTO: NORMAL
NRBC # BLD AUTO: 0 K/UL
NRBC BLD-RTO: 0 /100 WBC
PH UR STRIP.AUTO: 5.5 [PH] (ref 5–8)
PLATELET # BLD AUTO: 158 K/UL (ref 164–446)
PMV BLD AUTO: 10.8 FL (ref 9–12.9)
POTASSIUM SERPL-SCNC: 4.1 MMOL/L (ref 3.6–5.5)
PROT SERPL-MCNC: 7 G/DL (ref 6–8.2)
PROT UR QL STRIP: 100 MG/DL
RBC # BLD AUTO: 5.04 M/UL (ref 4.2–5.4)
RBC UR QL AUTO: NORMAL
SODIUM SERPL-SCNC: 137 MMOL/L (ref 135–145)
SP GR UR STRIP.AUTO: 1.02
UROBILINOGEN UR STRIP-MCNC: 4 MG/DL
WBC # BLD AUTO: 12.9 K/UL (ref 4.8–10.8)

## 2020-09-11 PROCEDURE — 80053 COMPREHEN METABOLIC PANEL: CPT

## 2020-09-11 PROCEDURE — 87077 CULTURE AEROBIC IDENTIFY: CPT

## 2020-09-11 PROCEDURE — 87086 URINE CULTURE/COLONY COUNT: CPT

## 2020-09-11 PROCEDURE — 99214 OFFICE O/P EST MOD 30 MIN: CPT | Performed by: NURSE PRACTITIONER

## 2020-09-11 PROCEDURE — 85025 COMPLETE CBC W/AUTO DIFF WBC: CPT

## 2020-09-11 PROCEDURE — 87522 HEPATITIS C REVRS TRNSCRPJ: CPT

## 2020-09-11 PROCEDURE — 87186 SC STD MICRODIL/AGAR DIL: CPT

## 2020-09-11 PROCEDURE — 80074 ACUTE HEPATITIS PANEL: CPT

## 2020-09-11 PROCEDURE — 81002 URINALYSIS NONAUTO W/O SCOPE: CPT | Performed by: NURSE PRACTITIONER

## 2020-09-11 RX ORDER — CEFDINIR 300 MG/1
300 CAPSULE ORAL 2 TIMES DAILY
Qty: 10 CAP | Refills: 0 | Status: ON HOLD | OUTPATIENT
Start: 2020-09-11 | End: 2020-09-24

## 2020-09-11 ASSESSMENT — ENCOUNTER SYMPTOMS
FLANK PAIN: 0
FEVER: 0
CHILLS: 0

## 2020-09-11 ASSESSMENT — PAIN SCALES - GENERAL: PAINLEVEL: NO PAIN

## 2020-09-11 ASSESSMENT — FIBROSIS 4 INDEX: FIB4 SCORE: 1.99

## 2020-09-11 NOTE — PROGRESS NOTES
Subjective:      Rufina Macias is a 75 y.o. female who presents with Other (x 2 days discoloration in urine no pain or frequency- no new meds  )    Past Medical History:   Diagnosis Date   • Allergy    • Arrhythmia    • Arthritis     Spine, neck, hands, ankles and knees   • Asthma     inhalers as needed   • Back pain     and neck   • Blood clotting disorder ([facility]) 2020    lung   • Breath shortness     at times, uses O2 1l prn Preferred   • Bronchitis    • CA - cancer of uterus    • Cancer ([facility])     uterine   • Carpal tunnel syndrome    • COPD    • Coronary artery calcification seen on CAT scan 2020   • Dental disorder     full dentures   • Diverticula of colon    • Emphysema of lung ([facility])    • Heart burn    • High cholesterol    • Hyperlipidemia    • Hypertension    • Pneumonia    • Tremor, hereditary, benign    • Wheezing      Social History     Socioeconomic History   • Marital status:      Spouse name: Not on file   • Number of children: Not on file   • Years of education: Not on file   • Highest education level: Not on file   Occupational History   • Not on file   Social Needs   • Financial resource strain: Not on file   • Food insecurity     Worry: Not on file     Inability: Not on file   • Transportation needs     Medical: Not on file     Non-medical: Not on file   Tobacco Use   • Smoking status: Former Smoker     Packs/day: 3.00     Years: 20.00     Pack years: 60.00     Types: Cigarettes     Quit date: 1991     Years since quittin.7   • Smokeless tobacco: Never Used   Substance and Sexual Activity   • Alcohol use: No     Alcohol/week: 0.0 - 2.4 oz     Comment: hardly ever   • Drug use: No   • Sexual activity: Never     Partners: Male     Birth control/protection: Post-Menopausal   Lifestyle   • Physical activity     Days per week: Not on file     Minutes per session: Not on file   • Stress: Not on file   Relationships   • Social connections     Talks on phone: Not on  "file     Gets together: Not on file     Attends Denominational service: Not on file     Active member of club or organization: Not on file     Attends meetings of clubs or organizations: Not on file     Relationship status: Not on file   • Intimate partner violence     Fear of current or ex partner: Not on file     Emotionally abused: Not on file     Physically abused: Not on file     Forced sexual activity: Not on file   Other Topics Concern   • Not on file   Social History Narrative   • Not on file     Family History   Problem Relation Age of Onset   • Heart Disease Father 45        MI   • Lung Disease Father    • Stroke Father 70   • Cancer Father    • Hypertension Father    • Cancer Paternal Grandmother         breast   • Cancer Paternal Grandfather        Allergies: Codeine and Ace inhibitors    Patient is a 75-year-old female who presents today with complaint of change in color in her urine, frequency, and incontinence.  Symptoms started over the last week.  She endorses subjective fever and chills at home, however is afebrile upon check-in to the urgent care.  No nausea or vomiting.  No shortness of breath or difficulty breathing.        Other  This is a new problem. The current episode started in the past 7 days. The problem occurs constantly. The problem has been unchanged. Associated symptoms include urinary symptoms. Pertinent negatives include no chills or fever. Nothing aggravates the symptoms. She has tried nothing for the symptoms. The treatment provided no relief.       Review of Systems   Constitutional: Negative for chills and fever.   Genitourinary: Positive for dysuria, frequency and urgency. Negative for flank pain and hematuria.   All other systems reviewed and are negative.         Objective:     /72   Pulse 90   Temp 36 °C (96.8 °F)   Resp 16   Ht 1.575 m (5' 2\")   Wt 90.7 kg (200 lb)   SpO2 95%   BMI 36.58 kg/m²      Physical Exam  Vitals signs reviewed.   Constitutional:       " Appearance: Normal appearance.   Neck:      Musculoskeletal: Normal range of motion and neck supple.   Cardiovascular:      Rate and Rhythm: Normal rate and regular rhythm.      Heart sounds: Normal heart sounds.   Pulmonary:      Effort: Pulmonary effort is normal.      Breath sounds: Normal breath sounds.   Abdominal:      Palpations: Abdomen is soft.      Tenderness: There is no abdominal tenderness. There is no right CVA tenderness, left CVA tenderness, guarding or rebound.   Musculoskeletal: Normal range of motion.   Skin:     General: Skin is warm and dry.   Neurological:      Mental Status: She is alert and oriented to person, place, and time.   Psychiatric:         Mood and Affect: Mood normal.         Behavior: Behavior normal.         Thought Content: Thought content normal.         Judgment: Judgment normal.       UA: positive blood, trace leukocytes, moderate bilirubin           Assessment/Plan:        1. Dysuria  2.  Bilirubinuria    Urine culture  Omnicef  CBC, CMP, Hepatitis panel   Follow up also with primary physician   Strict ER precautions for fever greater than 100.5, developing flank pain, flulike symptoms

## 2020-09-12 NOTE — TELEPHONE ENCOUNTER
Patient notified by phone of all lab results.  Patient's white blood cell count is elevated, along with elevated neutrophil percentage plus absolute neutrophil count is elevated.  Liver enzymes are elevated, hep C antibodies are positive, though this does not appear to be a new problem as evidenced by labs from 2 years ago.  Patient states, however, that she is not aware of any recent diagnosis of hepatitis C.  Based on labs and presentation here in the office, I believe that the patient now requires a higher level of care than I can reasonably provide through the urgent care.  I have advised her that I would like for her to proceed to ER for further evaluation at this time.  Patient verbalized understanding and agreement, states she will proceed to [facility] at [facility] for further evaluation at this time.

## 2020-09-13 ENCOUNTER — APPOINTMENT (OUTPATIENT)
Dept: RADIOLOGY | Facility: MEDICAL CENTER | Age: 75
DRG: 444 | End: 2020-09-13
Attending: EMERGENCY MEDICINE
Payer: MEDICARE

## 2020-09-13 ENCOUNTER — HOSPITAL ENCOUNTER (INPATIENT)
Facility: MEDICAL CENTER | Age: 75
LOS: 11 days | DRG: 444 | End: 2020-09-24
Attending: EMERGENCY MEDICINE | Admitting: HOSPITALIST
Payer: MEDICARE

## 2020-09-13 DIAGNOSIS — J43.1 PANLOBULAR EMPHYSEMA (HCC): ICD-10-CM

## 2020-09-13 DIAGNOSIS — R10.11 RIGHT UPPER QUADRANT ABDOMINAL PAIN: ICD-10-CM

## 2020-09-13 DIAGNOSIS — I48.91 ATRIAL FIBRILLATION, UNSPECIFIED TYPE (HCC): ICD-10-CM

## 2020-09-13 DIAGNOSIS — E66.01 OBESITY, MORBID, BMI 40.0-49.9 (HCC): ICD-10-CM

## 2020-09-13 DIAGNOSIS — Z79.899 HIGH RISK MEDICATION USE: ICD-10-CM

## 2020-09-13 DIAGNOSIS — N30.00 ACUTE CYSTITIS WITHOUT HEMATURIA: ICD-10-CM

## 2020-09-13 DIAGNOSIS — I27.82 OTHER CHRONIC PULMONARY EMBOLISM WITHOUT ACUTE COR PULMONALE (HCC): ICD-10-CM

## 2020-09-13 DIAGNOSIS — I48.91 ATRIAL FIBRILLATION WITH RVR (HCC): ICD-10-CM

## 2020-09-13 LAB
ALBUMIN SERPL BCP-MCNC: 4 G/DL (ref 3.2–4.9)
ALBUMIN/GLOB SERPL: 1.1 G/DL
ALP SERPL-CCNC: 343 U/L (ref 30–99)
ALT SERPL-CCNC: 175 U/L (ref 2–50)
ANION GAP SERPL CALC-SCNC: 15 MMOL/L (ref 7–16)
APPEARANCE UR: CLEAR
AST SERPL-CCNC: 176 U/L (ref 12–45)
BACTERIA #/AREA URNS HPF: ABNORMAL /HPF
BASOPHILS # BLD AUTO: 0.2 % (ref 0–1.8)
BASOPHILS # BLD: 0.02 K/UL (ref 0–0.12)
BILIRUB SERPL-MCNC: 3 MG/DL (ref 0.1–1.5)
BILIRUB UR QL STRIP.AUTO: ABNORMAL
BUN SERPL-MCNC: 13 MG/DL (ref 8–22)
CALCIUM SERPL-MCNC: 9.5 MG/DL (ref 8.4–10.2)
CHLORIDE SERPL-SCNC: 94 MMOL/L (ref 96–112)
CO2 SERPL-SCNC: 29 MMOL/L (ref 20–33)
COLOR UR: YELLOW
COVID ORDER STATUS COVID19: NORMAL
CREAT SERPL-MCNC: 0.79 MG/DL (ref 0.5–1.4)
EKG IMPRESSION: NORMAL
EOSINOPHIL # BLD AUTO: 0.04 K/UL (ref 0–0.51)
EOSINOPHIL NFR BLD: 0.3 % (ref 0–6.9)
EPI CELLS #/AREA URNS HPF: ABNORMAL /HPF
ERYTHROCYTE [DISTWIDTH] IN BLOOD BY AUTOMATED COUNT: 45.4 FL (ref 35.9–50)
GLOBULIN SER CALC-MCNC: 3.7 G/DL (ref 1.9–3.5)
GLUCOSE SERPL-MCNC: 117 MG/DL (ref 65–99)
GLUCOSE UR STRIP.AUTO-MCNC: NEGATIVE MG/DL
HCT VFR BLD AUTO: 50.6 % (ref 37–47)
HGB BLD-MCNC: 16.2 G/DL (ref 12–16)
HYALINE CASTS #/AREA URNS LPF: ABNORMAL /LPF
IMM GRANULOCYTES # BLD AUTO: 0.08 K/UL (ref 0–0.11)
IMM GRANULOCYTES NFR BLD AUTO: 0.6 % (ref 0–0.9)
INR PPP: 3.2 (ref 0.87–1.13)
KETONES UR STRIP.AUTO-MCNC: ABNORMAL MG/DL
LEUKOCYTE ESTERASE UR QL STRIP.AUTO: ABNORMAL
LIPASE SERPL-CCNC: 43 U/L (ref 7–58)
LYMPHOCYTES # BLD AUTO: 2.17 K/UL (ref 1–4.8)
LYMPHOCYTES NFR BLD: 16.4 % (ref 22–41)
MAGNESIUM SERPL-MCNC: 2.2 MG/DL (ref 1.5–2.5)
MCH RBC QN AUTO: 28.8 PG (ref 27–33)
MCHC RBC AUTO-ENTMCNC: 32 G/DL (ref 33.6–35)
MCV RBC AUTO: 89.9 FL (ref 81.4–97.8)
MICRO URNS: ABNORMAL
MONOCYTES # BLD AUTO: 0.82 K/UL (ref 0–0.85)
MONOCYTES NFR BLD AUTO: 6.2 % (ref 0–13.4)
MUCOUS THREADS #/AREA URNS HPF: ABNORMAL /HPF
NEUTROPHILS # BLD AUTO: 10.14 K/UL (ref 2–7.15)
NEUTROPHILS NFR BLD: 76.3 % (ref 44–72)
NITRITE UR QL STRIP.AUTO: NEGATIVE
NRBC # BLD AUTO: 0 K/UL
NRBC BLD-RTO: 0 /100 WBC
NT-PROBNP SERPL IA-MCNC: 4084 PG/ML (ref 0–125)
PH UR STRIP.AUTO: 6.5 [PH] (ref 5–8)
PLATELET # BLD AUTO: 194 K/UL (ref 164–446)
PMV BLD AUTO: 9.9 FL (ref 9–12.9)
POTASSIUM SERPL-SCNC: 3.9 MMOL/L (ref 3.6–5.5)
PROT SERPL-MCNC: 7.7 G/DL (ref 6–8.2)
PROT UR QL STRIP: 100 MG/DL
PROTHROMBIN TIME: 32.2 SEC (ref 12–14.6)
RBC # BLD AUTO: 5.63 M/UL (ref 4.2–5.4)
RBC # URNS HPF: ABNORMAL /HPF
RBC UR QL AUTO: NEGATIVE
SARS-COV-2 RNA RESP QL NAA+PROBE: NOTDETECTED
SODIUM SERPL-SCNC: 138 MMOL/L (ref 135–145)
SP GR UR STRIP.AUTO: 1.02
SPECIMEN SOURCE: NORMAL
T4 FREE SERPL-MCNC: 1.68 NG/DL (ref 0.93–1.7)
TROPONIN T SERPL-MCNC: 10 NG/L (ref 6–19)
TROPONIN T SERPL-MCNC: 9 NG/L (ref 6–19)
TSH SERPL DL<=0.005 MIU/L-ACNC: 2.31 UIU/ML (ref 0.38–5.33)
WBC # BLD AUTO: 13.3 K/UL (ref 4.8–10.8)
WBC #/AREA URNS HPF: ABNORMAL /HPF

## 2020-09-13 PROCEDURE — 700102 HCHG RX REV CODE 250 W/ 637 OVERRIDE(OP): Performed by: HOSPITALIST

## 2020-09-13 PROCEDURE — 85025 COMPLETE CBC W/AUTO DIFF WBC: CPT

## 2020-09-13 PROCEDURE — 84484 ASSAY OF TROPONIN QUANT: CPT

## 2020-09-13 PROCEDURE — 83880 ASSAY OF NATRIURETIC PEPTIDE: CPT

## 2020-09-13 PROCEDURE — 96365 THER/PROPH/DIAG IV INF INIT: CPT

## 2020-09-13 PROCEDURE — 700105 HCHG RX REV CODE 258: Performed by: HOSPITALIST

## 2020-09-13 PROCEDURE — A9270 NON-COVERED ITEM OR SERVICE: HCPCS | Performed by: HOSPITALIST

## 2020-09-13 PROCEDURE — 96375 TX/PRO/DX INJ NEW DRUG ADDON: CPT

## 2020-09-13 PROCEDURE — C9803 HOPD COVID-19 SPEC COLLECT: HCPCS | Performed by: EMERGENCY MEDICINE

## 2020-09-13 PROCEDURE — 99291 CRITICAL CARE FIRST HOUR: CPT

## 2020-09-13 PROCEDURE — 700111 HCHG RX REV CODE 636 W/ 250 OVERRIDE (IP): Performed by: HOSPITALIST

## 2020-09-13 PROCEDURE — 81001 URINALYSIS AUTO W/SCOPE: CPT

## 2020-09-13 PROCEDURE — 83690 ASSAY OF LIPASE: CPT

## 2020-09-13 PROCEDURE — 84443 ASSAY THYROID STIM HORMONE: CPT

## 2020-09-13 PROCEDURE — 71045 X-RAY EXAM CHEST 1 VIEW: CPT

## 2020-09-13 PROCEDURE — 85610 PROTHROMBIN TIME: CPT

## 2020-09-13 PROCEDURE — 80053 COMPREHEN METABOLIC PANEL: CPT

## 2020-09-13 PROCEDURE — 83735 ASSAY OF MAGNESIUM: CPT

## 2020-09-13 PROCEDURE — 99223 1ST HOSP IP/OBS HIGH 75: CPT | Performed by: HOSPITALIST

## 2020-09-13 PROCEDURE — 770022 HCHG ROOM/CARE - ICU (200)

## 2020-09-13 PROCEDURE — 93005 ELECTROCARDIOGRAM TRACING: CPT | Performed by: EMERGENCY MEDICINE

## 2020-09-13 PROCEDURE — 700105 HCHG RX REV CODE 258: Performed by: EMERGENCY MEDICINE

## 2020-09-13 PROCEDURE — 700111 HCHG RX REV CODE 636 W/ 250 OVERRIDE (IP): Performed by: EMERGENCY MEDICINE

## 2020-09-13 PROCEDURE — 84439 ASSAY OF FREE THYROXINE: CPT

## 2020-09-13 PROCEDURE — 96376 TX/PRO/DX INJ SAME DRUG ADON: CPT

## 2020-09-13 RX ORDER — DILTIAZEM HYDROCHLORIDE 5 MG/ML
10 INJECTION INTRAVENOUS ONCE
Status: COMPLETED | OUTPATIENT
Start: 2020-09-13 | End: 2020-09-13

## 2020-09-13 RX ORDER — SODIUM CHLORIDE 9 MG/ML
1000 INJECTION, SOLUTION INTRAVENOUS ONCE
Status: COMPLETED | OUTPATIENT
Start: 2020-09-13 | End: 2020-09-13

## 2020-09-13 RX ORDER — METOPROLOL TARTRATE 50 MG/1
100 TABLET, FILM COATED ORAL 2 TIMES DAILY
Status: DISCONTINUED | OUTPATIENT
Start: 2020-09-13 | End: 2020-09-24 | Stop reason: HOSPADM

## 2020-09-13 RX ORDER — ONDANSETRON 4 MG/1
4 TABLET, ORALLY DISINTEGRATING ORAL EVERY 4 HOURS PRN
Status: DISCONTINUED | OUTPATIENT
Start: 2020-09-13 | End: 2020-09-24 | Stop reason: HOSPADM

## 2020-09-13 RX ORDER — TORSEMIDE 20 MG/1
20 TABLET ORAL DAILY
Status: DISCONTINUED | OUTPATIENT
Start: 2020-09-14 | End: 2020-09-18

## 2020-09-13 RX ORDER — ROSUVASTATIN CALCIUM 10 MG/1
20 TABLET, COATED ORAL EVERY EVENING
Status: DISCONTINUED | OUTPATIENT
Start: 2020-09-13 | End: 2020-09-24 | Stop reason: HOSPADM

## 2020-09-13 RX ORDER — OXYCODONE HYDROCHLORIDE 5 MG/1
2.5 TABLET ORAL
Status: DISCONTINUED | OUTPATIENT
Start: 2020-09-13 | End: 2020-09-15

## 2020-09-13 RX ORDER — DILTIAZEM HYDROCHLORIDE 5 MG/ML
INJECTION INTRAVENOUS
Status: COMPLETED
Start: 2020-09-13 | End: 2020-09-13

## 2020-09-13 RX ORDER — FLUTICASONE PROPIONATE 44 UG/1
2 AEROSOL, METERED RESPIRATORY (INHALATION) DAILY
Status: DISCONTINUED | OUTPATIENT
Start: 2020-09-14 | End: 2020-09-24 | Stop reason: HOSPADM

## 2020-09-13 RX ORDER — ACETAMINOPHEN 325 MG/1
650 TABLET ORAL EVERY 6 HOURS PRN
Status: DISCONTINUED | OUTPATIENT
Start: 2020-09-13 | End: 2020-09-19

## 2020-09-13 RX ORDER — ONDANSETRON 2 MG/ML
4 INJECTION INTRAMUSCULAR; INTRAVENOUS EVERY 4 HOURS PRN
Status: DISCONTINUED | OUTPATIENT
Start: 2020-09-13 | End: 2020-09-24 | Stop reason: HOSPADM

## 2020-09-13 RX ORDER — ZOLPIDEM TARTRATE 5 MG/1
10 TABLET ORAL NIGHTLY PRN
Status: DISCONTINUED | OUTPATIENT
Start: 2020-09-13 | End: 2020-09-24 | Stop reason: HOSPADM

## 2020-09-13 RX ORDER — OXYCODONE HYDROCHLORIDE 5 MG/1
5 TABLET ORAL
Status: DISCONTINUED | OUTPATIENT
Start: 2020-09-13 | End: 2020-09-15

## 2020-09-13 RX ORDER — SODIUM CHLORIDE, SODIUM LACTATE, POTASSIUM CHLORIDE, CALCIUM CHLORIDE 600; 310; 30; 20 MG/100ML; MG/100ML; MG/100ML; MG/100ML
INJECTION, SOLUTION INTRAVENOUS CONTINUOUS
Status: ACTIVE | OUTPATIENT
Start: 2020-09-13 | End: 2020-09-14

## 2020-09-13 RX ORDER — WARFARIN SODIUM 2.5 MG/1
2.5 TABLET ORAL ONCE
Status: COMPLETED | OUTPATIENT
Start: 2020-09-13 | End: 2020-09-13

## 2020-09-13 RX ORDER — MORPHINE SULFATE 4 MG/ML
4 INJECTION, SOLUTION INTRAMUSCULAR; INTRAVENOUS ONCE
Status: COMPLETED | OUTPATIENT
Start: 2020-09-13 | End: 2020-09-13

## 2020-09-13 RX ORDER — BISACODYL 10 MG
10 SUPPOSITORY, RECTAL RECTAL
Status: DISCONTINUED | OUTPATIENT
Start: 2020-09-13 | End: 2020-09-24 | Stop reason: HOSPADM

## 2020-09-13 RX ORDER — ONDANSETRON 2 MG/ML
4 INJECTION INTRAMUSCULAR; INTRAVENOUS ONCE
Status: COMPLETED | OUTPATIENT
Start: 2020-09-13 | End: 2020-09-13

## 2020-09-13 RX ORDER — MONTELUKAST SODIUM 10 MG/1
10 TABLET ORAL EVERY EVENING
Status: DISCONTINUED | OUTPATIENT
Start: 2020-09-13 | End: 2020-09-13

## 2020-09-13 RX ORDER — ALBUTEROL SULFATE 90 UG/1
2 AEROSOL, METERED RESPIRATORY (INHALATION) EVERY 6 HOURS PRN
Status: DISCONTINUED | OUTPATIENT
Start: 2020-09-13 | End: 2020-09-24 | Stop reason: HOSPADM

## 2020-09-13 RX ORDER — AMOXICILLIN 250 MG
2 CAPSULE ORAL 2 TIMES DAILY
Status: DISCONTINUED | OUTPATIENT
Start: 2020-09-13 | End: 2020-09-24 | Stop reason: HOSPADM

## 2020-09-13 RX ORDER — FAMOTIDINE 20 MG/1
20 TABLET, FILM COATED ORAL DAILY
Status: DISCONTINUED | OUTPATIENT
Start: 2020-09-14 | End: 2020-09-24 | Stop reason: HOSPADM

## 2020-09-13 RX ORDER — POTASSIUM CHLORIDE 750 MG/1
40 TABLET, FILM COATED, EXTENDED RELEASE ORAL DAILY
Status: DISCONTINUED | OUTPATIENT
Start: 2020-09-14 | End: 2020-09-14

## 2020-09-13 RX ORDER — POLYETHYLENE GLYCOL 3350 17 G/17G
1 POWDER, FOR SOLUTION ORAL
Status: DISCONTINUED | OUTPATIENT
Start: 2020-09-13 | End: 2020-09-24 | Stop reason: HOSPADM

## 2020-09-13 RX ORDER — MORPHINE SULFATE 4 MG/ML
2 INJECTION, SOLUTION INTRAMUSCULAR; INTRAVENOUS
Status: DISCONTINUED | OUTPATIENT
Start: 2020-09-13 | End: 2020-09-15

## 2020-09-13 RX ADMIN — WARFARIN SODIUM 2.5 MG: 2.5 TABLET ORAL at 21:51

## 2020-09-13 RX ADMIN — CEFTRIAXONE SODIUM 2 G: 2 INJECTION, POWDER, FOR SOLUTION INTRAMUSCULAR; INTRAVENOUS at 18:08

## 2020-09-13 RX ADMIN — SODIUM CHLORIDE, POTASSIUM CHLORIDE, SODIUM LACTATE AND CALCIUM CHLORIDE 1000 ML: 600; 310; 30; 20 INJECTION, SOLUTION INTRAVENOUS at 21:04

## 2020-09-13 RX ADMIN — SODIUM CHLORIDE 1000 ML: 9 INJECTION, SOLUTION INTRAVENOUS at 18:32

## 2020-09-13 RX ADMIN — DILTIAZEM HYDROCHLORIDE 10 MG: 5 INJECTION INTRAVENOUS at 19:28

## 2020-09-13 RX ADMIN — MORPHINE SULFATE 4 MG: 4 INJECTION INTRAVENOUS at 18:08

## 2020-09-13 RX ADMIN — DILTIAZEM HYDROCHLORIDE 10 MG: 5 INJECTION INTRAVENOUS at 18:44

## 2020-09-13 RX ADMIN — ZOLPIDEM TARTRATE 10 MG: 5 TABLET ORAL at 21:19

## 2020-09-13 RX ADMIN — METOPROLOL TARTRATE 100 MG: 50 TABLET, FILM COATED ORAL at 21:19

## 2020-09-13 RX ADMIN — DILTIAZEM HYDROCHLORIDE 5 MG/HR: 5 INJECTION INTRAVENOUS at 21:00

## 2020-09-13 RX ADMIN — ROSUVASTATIN CALCIUM 20 MG: 10 TABLET, FILM COATED ORAL at 21:19

## 2020-09-13 RX ADMIN — OXYCODONE HYDROCHLORIDE 5 MG: 5 TABLET ORAL at 21:20

## 2020-09-13 RX ADMIN — ONDANSETRON 4 MG: 2 INJECTION INTRAMUSCULAR; INTRAVENOUS at 18:08

## 2020-09-13 ASSESSMENT — LIFESTYLE VARIABLES
EVER FELT BAD OR GUILTY ABOUT YOUR DRINKING: NO
EVER HAD A DRINK FIRST THING IN THE MORNING TO STEADY YOUR NERVES TO GET RID OF A HANGOVER: NO
TOTAL SCORE: 0
CONSUMPTION TOTAL: NEGATIVE
AVERAGE NUMBER OF DAYS PER WEEK YOU HAVE A DRINK CONTAINING ALCOHOL: 0
ON A TYPICAL DAY WHEN YOU DRINK ALCOHOL HOW MANY DRINKS DO YOU HAVE: 0
HAVE YOU EVER FELT YOU SHOULD CUT DOWN ON YOUR DRINKING: NO
ALCOHOL_USE: NO
HOW MANY TIMES IN THE PAST YEAR HAVE YOU HAD 5 OR MORE DRINKS IN A DAY: 0
TOTAL SCORE: 0
HAVE PEOPLE ANNOYED YOU BY CRITICIZING YOUR DRINKING: NO
TOTAL SCORE: 0

## 2020-09-13 ASSESSMENT — COGNITIVE AND FUNCTIONAL STATUS - GENERAL
DAILY ACTIVITIY SCORE: 24
CLIMB 3 TO 5 STEPS WITH RAILING: A LOT
SUGGESTED CMS G CODE MODIFIER DAILY ACTIVITY: CH
STANDING UP FROM CHAIR USING ARMS: A LITTLE
MOBILITY SCORE: 20
WALKING IN HOSPITAL ROOM: A LITTLE
SUGGESTED CMS G CODE MODIFIER MOBILITY: CJ

## 2020-09-13 ASSESSMENT — ENCOUNTER SYMPTOMS
WHEEZING: 0
PALPITATIONS: 1
COUGH: 0
ABDOMINAL PAIN: 1
DIAPHORESIS: 0
PSYCHIATRIC NEGATIVE: 1
HEARTBURN: 0
BLOOD IN STOOL: 0
SEIZURES: 0
SHORTNESS OF BREATH: 1
NERVOUS/ANXIOUS: 0
DIZZINESS: 0
NAUSEA: 1
CONSTIPATION: 0
NEUROLOGICAL NEGATIVE: 1
CHILLS: 0
HEMOPTYSIS: 0
FEVER: 1
LOSS OF CONSCIOUSNESS: 0
BRUISES/BLEEDS EASILY: 0
DIARRHEA: 0
BACK PAIN: 1
DOUBLE VISION: 0
FOCAL WEAKNESS: 0
VOMITING: 0
EYES NEGATIVE: 1
HEADACHES: 0

## 2020-09-13 ASSESSMENT — PAIN DESCRIPTION - PAIN TYPE
TYPE: ACUTE PAIN

## 2020-09-13 ASSESSMENT — CHA2DS2 SCORE
SEX: FEMALE
HYPERTENSION: YES
VASCULAR DISEASE: NO
AGE 75 OR GREATER: YES
PRIOR STROKE OR TIA OR THROMBOEMBOLISM: NO
DIABETES: NO
CHA2DS2 VASC SCORE: 4
AGE 65 TO 74: NO
CHF OR LEFT VENTRICULAR DYSFUNCTION: NO

## 2020-09-13 ASSESSMENT — PATIENT HEALTH QUESTIONNAIRE - PHQ9
SUM OF ALL RESPONSES TO PHQ9 QUESTIONS 1 AND 2: 0
2. FEELING DOWN, DEPRESSED, IRRITABLE, OR HOPELESS: NOT AT ALL
1. LITTLE INTEREST OR PLEASURE IN DOING THINGS: NOT AT ALL
SUM OF ALL RESPONSES TO PHQ9 QUESTIONS 1 AND 2: 0
2. FEELING DOWN, DEPRESSED, IRRITABLE, OR HOPELESS: NOT AT ALL
1. LITTLE INTEREST OR PLEASURE IN DOING THINGS: NOT AT ALL

## 2020-09-13 ASSESSMENT — FIBROSIS 4 INDEX
FIB4 SCORE: 5.14
FIB4 SCORE: 5.77

## 2020-09-13 NOTE — ED TRIAGE NOTES
"Complains of diffuse abdominalgia referred to her flank bilaterally, and persisting for the past 5 days.  She was seen at [facility] initially, and reported frequency, incontinence, and change in urine color at that time.  She is currently on Omnicef 300 mg BID.   Chief Complaint   Patient presents with   • Abdominal Pain   • Flank Pain     BP (!) 168/108   Pulse 88   Temp 36.9 °C (98.5 °F) (Temporal)   Resp 20   Ht 1.575 m (5' 2\")   Wt 89.5 kg (197 lb 5 oz)   SpO2 95%   BMI 36.09 kg/m²      "

## 2020-09-14 ENCOUNTER — APPOINTMENT (OUTPATIENT)
Dept: RADIOLOGY | Facility: MEDICAL CENTER | Age: 75
DRG: 444 | End: 2020-09-14
Attending: HOSPITALIST
Payer: MEDICARE

## 2020-09-14 PROBLEM — N30.01 ACUTE CYSTITIS WITH HEMATURIA: Status: ACTIVE | Noted: 2020-09-14

## 2020-09-14 LAB
ANION GAP SERPL CALC-SCNC: 11 MMOL/L (ref 7–16)
BUN SERPL-MCNC: 9 MG/DL (ref 8–22)
CALCIUM SERPL-MCNC: 8.4 MG/DL (ref 8.4–10.2)
CHLORIDE SERPL-SCNC: 98 MMOL/L (ref 96–112)
CHOLEST SERPL-MCNC: 80 MG/DL (ref 100–199)
CO2 SERPL-SCNC: 28 MMOL/L (ref 20–33)
CREAT SERPL-MCNC: 0.49 MG/DL (ref 0.5–1.4)
ERYTHROCYTE [DISTWIDTH] IN BLOOD BY AUTOMATED COUNT: 47.3 FL (ref 35.9–50)
GLUCOSE SERPL-MCNC: 98 MG/DL (ref 65–99)
HCT VFR BLD AUTO: 41.8 % (ref 37–47)
HDLC SERPL-MCNC: 25 MG/DL
HGB BLD-MCNC: 13.2 G/DL (ref 12–16)
INR PPP: 4 (ref 0.87–1.13)
LDLC SERPL CALC-MCNC: 33 MG/DL
MCH RBC QN AUTO: 29.1 PG (ref 27–33)
MCHC RBC AUTO-ENTMCNC: 31.6 G/DL (ref 33.6–35)
MCV RBC AUTO: 92.3 FL (ref 81.4–97.8)
PLATELET # BLD AUTO: 121 K/UL (ref 164–446)
PMV BLD AUTO: 11.3 FL (ref 9–12.9)
POTASSIUM SERPL-SCNC: 4.3 MMOL/L (ref 3.6–5.5)
PROTHROMBIN TIME: 38.4 SEC (ref 12–14.6)
RBC # BLD AUTO: 4.53 M/UL (ref 4.2–5.4)
SODIUM SERPL-SCNC: 137 MMOL/L (ref 135–145)
TRIGL SERPL-MCNC: 110 MG/DL (ref 0–149)
TROPONIN T SERPL-MCNC: 10 NG/L (ref 6–19)
WBC # BLD AUTO: 9.4 K/UL (ref 4.8–10.8)

## 2020-09-14 PROCEDURE — A9270 NON-COVERED ITEM OR SERVICE: HCPCS | Performed by: HOSPITALIST

## 2020-09-14 PROCEDURE — 80061 LIPID PANEL: CPT

## 2020-09-14 PROCEDURE — 85027 COMPLETE CBC AUTOMATED: CPT

## 2020-09-14 PROCEDURE — 700105 HCHG RX REV CODE 258: Performed by: HOSPITALIST

## 2020-09-14 PROCEDURE — 94640 AIRWAY INHALATION TREATMENT: CPT

## 2020-09-14 PROCEDURE — 700111 HCHG RX REV CODE 636 W/ 250 OVERRIDE (IP): Performed by: INTERNAL MEDICINE

## 2020-09-14 PROCEDURE — 700102 HCHG RX REV CODE 250 W/ 637 OVERRIDE(OP): Performed by: INTERNAL MEDICINE

## 2020-09-14 PROCEDURE — 85610 PROTHROMBIN TIME: CPT

## 2020-09-14 PROCEDURE — A9270 NON-COVERED ITEM OR SERVICE: HCPCS | Performed by: INTERNAL MEDICINE

## 2020-09-14 PROCEDURE — 94760 N-INVAS EAR/PLS OXIMETRY 1: CPT

## 2020-09-14 PROCEDURE — 74181 MRI ABDOMEN W/O CONTRAST: CPT

## 2020-09-14 PROCEDURE — 84484 ASSAY OF TROPONIN QUANT: CPT

## 2020-09-14 PROCEDURE — 99233 SBSQ HOSP IP/OBS HIGH 50: CPT | Performed by: INTERNAL MEDICINE

## 2020-09-14 PROCEDURE — 700111 HCHG RX REV CODE 636 W/ 250 OVERRIDE (IP): Performed by: HOSPITALIST

## 2020-09-14 PROCEDURE — 80048 BASIC METABOLIC PNL TOTAL CA: CPT

## 2020-09-14 PROCEDURE — 770020 HCHG ROOM/CARE - TELE (206)

## 2020-09-14 PROCEDURE — 700102 HCHG RX REV CODE 250 W/ 637 OVERRIDE(OP): Performed by: HOSPITALIST

## 2020-09-14 RX ORDER — LORAZEPAM 0.5 MG/1
0.5 TABLET ORAL ONCE
Status: COMPLETED | OUTPATIENT
Start: 2020-09-14 | End: 2020-09-14

## 2020-09-14 RX ORDER — MONTELUKAST SODIUM 10 MG/1
10 TABLET ORAL EVERY EVENING
COMMUNITY
End: 2020-09-30

## 2020-09-14 RX ORDER — HYDROCODONE BITARTRATE AND ACETAMINOPHEN 10; 325 MG/1; MG/1
1 TABLET ORAL EVERY 6 HOURS PRN
COMMUNITY
End: 2021-01-01

## 2020-09-14 RX ORDER — POTASSIUM CHLORIDE 20 MEQ/1
40 TABLET, EXTENDED RELEASE ORAL DAILY
Status: DISCONTINUED | OUTPATIENT
Start: 2020-09-14 | End: 2020-09-18

## 2020-09-14 RX ORDER — LORAZEPAM 2 MG/ML
1 INJECTION INTRAMUSCULAR ONCE
Status: COMPLETED | OUTPATIENT
Start: 2020-09-14 | End: 2020-09-14

## 2020-09-14 RX ADMIN — TORSEMIDE 20 MG: 20 TABLET ORAL at 07:28

## 2020-09-14 RX ADMIN — UMECLIDINIUM BROMIDE AND VILANTEROL TRIFENATATE 1 PUFF: 62.5; 25 POWDER RESPIRATORY (INHALATION) at 07:17

## 2020-09-14 RX ADMIN — LORAZEPAM 1 MG: 2 INJECTION INTRAMUSCULAR; INTRAVENOUS at 15:13

## 2020-09-14 RX ADMIN — CEFTRIAXONE SODIUM 1 G: 1 INJECTION, POWDER, FOR SOLUTION INTRAMUSCULAR; INTRAVENOUS at 09:51

## 2020-09-14 RX ADMIN — LORAZEPAM 0.5 MG: 0.5 TABLET ORAL at 10:39

## 2020-09-14 RX ADMIN — OXYCODONE HYDROCHLORIDE 5 MG: 5 TABLET ORAL at 08:13

## 2020-09-14 RX ADMIN — METOPROLOL TARTRATE 100 MG: 50 TABLET, FILM COATED ORAL at 06:26

## 2020-09-14 RX ADMIN — OXYCODONE HYDROCHLORIDE 5 MG: 5 TABLET ORAL at 19:46

## 2020-09-14 RX ADMIN — ROSUVASTATIN CALCIUM 20 MG: 10 TABLET, FILM COATED ORAL at 17:13

## 2020-09-14 RX ADMIN — FAMOTIDINE 20 MG: 20 TABLET ORAL at 06:27

## 2020-09-14 RX ADMIN — ZOLPIDEM TARTRATE 5 MG: 5 TABLET ORAL at 19:46

## 2020-09-14 RX ADMIN — MORPHINE SULFATE 2 MG: 4 INJECTION INTRAVENOUS at 04:30

## 2020-09-14 RX ADMIN — FLUTICASONE PROPIONATE 88 MCG: 44 AEROSOL, METERED RESPIRATORY (INHALATION) at 07:33

## 2020-09-14 RX ADMIN — METOPROLOL TARTRATE 100 MG: 50 TABLET, FILM COATED ORAL at 17:12

## 2020-09-14 RX ADMIN — DILTIAZEM HYDROCHLORIDE 60 MG: 30 TABLET, FILM COATED ORAL at 23:40

## 2020-09-14 RX ADMIN — SENNOSIDES-DOCUSATE SODIUM TAB 8.6-50 MG 2 TABLET: 8.6-5 TAB at 17:13

## 2020-09-14 RX ADMIN — OXYCODONE HYDROCHLORIDE 5 MG: 5 TABLET ORAL at 14:55

## 2020-09-14 RX ADMIN — OXYCODONE HYDROCHLORIDE 5 MG: 5 TABLET ORAL at 01:49

## 2020-09-14 RX ADMIN — DILTIAZEM HYDROCHLORIDE 60 MG: 30 TABLET, FILM COATED ORAL at 15:16

## 2020-09-14 RX ADMIN — OXYCODONE HYDROCHLORIDE 5 MG: 5 TABLET ORAL at 23:59

## 2020-09-14 ASSESSMENT — ENCOUNTER SYMPTOMS
NEUROLOGICAL NEGATIVE: 1
GASTROINTESTINAL NEGATIVE: 1
EYES NEGATIVE: 1
CONSTITUTIONAL NEGATIVE: 1
CARDIOVASCULAR NEGATIVE: 1
PSYCHIATRIC NEGATIVE: 1
MUSCULOSKELETAL NEGATIVE: 1
RESPIRATORY NEGATIVE: 1

## 2020-09-14 ASSESSMENT — PAIN DESCRIPTION - PAIN TYPE
TYPE: ACUTE PAIN;CHRONIC PAIN
TYPE: ACUTE PAIN

## 2020-09-14 ASSESSMENT — FIBROSIS 4 INDEX: FIB4 SCORE: 8.25

## 2020-09-14 NOTE — PROGRESS NOTES
Gastroenterology Progress Note     Author: Dann Galeana M.D.   Date & Time Created: 2020 3:32 PM    Chief Complaint:  Biliary obstruction    Interval History:  Patient has been transferred out of ICU as RVR has improved/resolved.  MRCP is in process.  LFTs unchanged.      Review of Systems:  ROS    Physical Exam:  Physical Exam    Labs:          Recent Labs     20  0500   SODIUM 138 137   POTASSIUM 3.9 4.3   CHLORIDE 94* 98   CO2 29 28   BUN 13 9   CREATININE 0.79 0.49*   MAGNESIUM 2.2  --    CALCIUM 9.5 8.4     Recent Labs     20  0500   ALTSGPT 175*  --    ASTSGOT 176*  --    ALKPHOSPHAT 343*  --    TBILIRUBIN 3.0*  --    LIPASE 43  --    GLUCOSE 117* 98     Recent Labs     20  0500   RBC 5.63* 4.53   HEMOGLOBIN 16.2* 13.2   HEMATOCRIT 50.6* 41.8   PLATELETCT 194 121*   PROTHROMBTM 32.2* 38.4*   INR 3.20* 4.00*     Recent Labs     20  0500   WBC 13.3* 9.4   NEUTSPOLYS 76.30*  --    LYMPHOCYTES 16.40*  --    MONOCYTES 6.20  --    EOSINOPHILS 0.30  --    BASOPHILS 0.20  --    ASTSGOT 176*  --    ALTSGPT 175*  --    ALKPHOSPHAT 343*  --    TBILIRUBIN 3.0*  --      Hemodynamics:  Temp (24hrs), Av.9 °C (98.5 °F), Min:36.5 °C (97.7 °F), Max:37.2 °C (99 °F)  Temperature: 36.8 °C (98.2 °F)  Pulse  Av.7  Min: 70  Max: 142   Blood Pressure : 151/93     Respiratory:    Respiration: 18, Pulse Oximetry: 98 %     $ MDI/DPI Given: MDI/DPI x 1  RUL Breath Sounds: Clear, RML Breath Sounds: Clear, RLL Breath Sounds: Diminished, BRYAN Breath Sounds: Clear, LLL Breath Sounds: Diminished  Fluids:    Intake/Output Summary (Last 24 hours) at 2020 1532  Last data filed at 2020 1021  Gross per 24 hour   Intake 2789.47 ml   Output 1450 ml   Net 1339.47 ml     Weight: 90.4 kg (199 lb 4.7 oz)  GI/Nutrition:  Orders Placed This Encounter   Procedures   • Diet Order     Standing Status:   Standing     Number of Occurrences:   1      Order Specific Question:   Diet:     Answer:   Cardiac [6]     Order Specific Question:   Second Modifier:     Answer:   Regular [1]     Medical Decision Making, by Problem:  Active Hospital Problems    Diagnosis   • Other pulmonary embolism without acute cor pulmonale ([facility]) [I26.99]   • Atrial fibrillation ([facility]) [I48.91]   • Acute cystitis with hematuria [N30.01]   • Acute on chronic respiratory failure with hypoxia ([facility]) [J96.21]   • 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 with obstruction- ERCP EXTRACTON/ SPHINCTEROTOMY, recurrent Feb 2020 [K80.51]   • Essential hypertension [I10]   • Obesity, morbid, BMI 40.0-49.9 ([facility]) [E66.01]   • Mixed hyperlipidemia [E78.2]       Plan:  Presumed recurrent de-latasha CBD stones - Awaiting MRCP results.  Continue antibiotics.  Anticoagulation - patient started on Warfarin, INR now 4.0.  Discussed with Dr. Avila - patient will need to be placed on heparin gtt (d/c Warfarin) in anticipation of ERCP.    Quality-Core Measures

## 2020-09-14 NOTE — PROGRESS NOTES
Report received from Jenny GUTIERREZ RN ICU. Pt already in new room 301-2 from [facility].

## 2020-09-14 NOTE — PROGRESS NOTES
Inpatient Anticoagulation Service Note    Date: 9/14/2020  Reason for Anticoagulation: Atrial Fibrillation   [id number] VASc Score: 4  HAS-BLED Score: 1    Hemoglobin Value: 13.2  Hematocrit Value: 41.8  Lab Platelet Value: (!) 121  Target INR: 2.0 to 3.0    INR from last 7 days     Date/Time INR Value    09/14/20 0500  (!) 4    09/13/20 1645  (!) 3.2        Dose from last 7 days     Date/Time Dose (mg)    09/14/20 0600  0    09/13/20 2121  2.5        Significant Interactions: Statin  Bridge Therapy: No (If less than 5 days and overlap therapy discontinued -- document reason (i.e. Bleed Risk))    (If still on overlap therapy, if No -- document reason (i.e. Bleed Risk))    Reversal Agent Administered: Not Applicable  Comments: Warfarin continued for afib. H/H high and plts WNL. INR only slighty out of range, will give half of normal dose to ease back into therapeutic range.    Plan:  Hold warfarin dose today due to increasing INR and recent start of Abx on 09/13.   Education Material Provided?: No(chronic therapy)  Pharmacist suggested discharge dosing: Resume home regimen of 5mg daily upon discharge     Coral Walter, Pharmacy Intern  Radha Pedroza

## 2020-09-14 NOTE — CONSULTS
DATE OF SERVICE:  09/13/2020    GASTROINTESTINAL CONSULTATION    REFERRING PHYSICIAN:  Kiran Crenshaw MD from the emergency department.    REASON FOR CONSULTATION:  Elevated liver tests.    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  Prior to   that she had an ERCP in 2018, again with removal of multiple stones numbering   between 10 and 15 at that time.  Over the last several days, the patient has   been noticing a band-like pain around her lower ribcage, which she believes is   very similar to her previous stone pain.  Her laboratories demonstrate   elevations of her liver tests with , , alkaline phosphatase 343,   total bilirubin of 3.0.  Of note, her liver tests were completely normal back   in August of this year.  MRCP has been ordered, but has not yet been   completed.  Also of interest, the patient has a positive UTI confirmed by   urinalysis, and also is experiencing a significant tachycardia as well.  Her   white cell count is elevated at 13.3.    PAST MEDICAL HISTORY:  Significant for recurrent common duct stones as above.    She has a history of hypertension, history of tremor, history of COPD,   history of dyslipidemia, history of colonic diverticulosis.    PAST SURGICAL HISTORY:  Includes a cholecystectomy in 2015, 2 subsequent ERCPs   as described above in 2018 and 2020 and total abdominal hysterectomy.    MEDICATIONS:  Amlodipine, Cozaar, triamterene/hydrochlorothiazide,   simvastatin, Norco p.r.n., Ambien, fluticasone, vitamin D.    ALLERGIES:  TO CODEINE AND ACE INHIBITORS.    SOCIAL HISTORY:  Significant for previous smoking, quitting in 1991.  She does   not consume alcohol to any significant degree.    FAMILY HISTORY:  Negative for any GI disease.    REVIEW OF SYSTEMS:  Significant for ongoing band-like pain  from her epigastric   region radiating around to her right side.  She currently does not have any   fevers or chills, but did tell me that she had these a couple of days ago, at   which time she was diagnosed with a UTI.  Otherwise, she reports a rapid heart   rate, denies any chest pain or shortness of breath.  Denies any dysuria or   hematuria.  Currently, no diarrhea, constipation, or blood in the stools.  No   lower extremity swelling, numbness, tingling, pain or weakness.    PHYSICAL EXAMINATION:  VITAL SIGNS:  Temperature is 36.9, pulse 95, respirations 20, blood pressure   168/98.  GENERAL:  This is a well-developed female with some icterus who is alert and   oriented and able to answer my questions appropriately.  HEENT:  Sclerae icteric.  LUNGS:  Clear.  HEART:  Regular rhythm with a rate greater than 100.  ABDOMEN:  Soft.  There is some tenderness in the right upper quadrant with   minimal guarding and no rebound.  Bowel sounds are present.  EXTREMITIES:  No rashes or edema present.  Pulses are present in the distal   extremities bilaterally.    LABORATORY DATA:  As above in HPI.    IMAGING:  MRCP pending.    ASSESSMENT AND PLAN:  1.  Elevated liver tests consistent with recurrent biliary obstruction.  2.  History of recurrent choledocholithiasis, de latasha stones.  3.  Urinary tract infection.  4.  Atrial fibrillation with rapid ventricular response.  5.  History of hypertension, dyslipidemia, uterine cancer, asthma.    DISCUSSION:  The patient is a 75-year-old female who appears to be once again   experiencing a blockage of her bile duct associated with biliary stones.    Another possibility might be that she has a urinary tract sepsis, resulting in   a septic hepatopathy, though given her history, this seems somewhat less   likely.  She will be placed on antibiotics for coverage of UTI and including   cholangitis, and we will await her MRCP.  The ERCP, if required based on MRCP   findings would have to be  timed appropriately given her clinical status   regarding her current rapid ventricular rate, possible need for   anticoagulation, etc.  We anticipate should she require an ERCP that this be   performed in the next day or two.    Thank you for allowing me to participate in the care of this patient.  We will   follow her carefully and plan the ERCP if indicated as soon as possible.       ____________________________________     ELVIS LARSEN MD    WP / TRENT    DD:  09/13/2020 18:34:52  DT:  09/13/2020 19:35:56    D#:  [id number]  Job#:  [id number]

## 2020-09-14 NOTE — ASSESSMENT & PLAN NOTE
- s/p ERCP 9/17 with stone removal and CBD stent placed. LFTs and bilirubin trending down  - Possible cholangitis s/p ERCP, received 6 days of Ceftriaxone. Leukocytosis trending down and afebrile, stop Abx

## 2020-09-14 NOTE — PROGRESS NOTES
1426 Updated Dr. Avila via [employer] regarding pt sustaining >100 HR. Resting between 120s-130s with a new high of 198. No new orders received    1448 Updated Dr. Avila via [employer] regarding another one time dose of ativan for MRI. First MRI was unable to be completed. No new orders received .    1435 Called ICU to speak with Dr. Avila, was informed she is in a meeting. Requested a call back    1449 Received message via [employer] from Dr. Avila, new orders received    1455 Dr. Avila at bedside, new orders received

## 2020-09-14 NOTE — PROGRESS NOTES
Critical Care Progress Note    Date of admission  9/13/2020    Chief Complaint  75 y.o. female admitted 9/13/2020 with abdominal pain    Hospital Course    74 yo female with known hx of afib with previous admission of RVR, hx of PE in [facility] on anticoagulation, complicated gall stone hx with s/p gall bladder surgery 5 years ago and has had multiple problems with common bile duct stones with ERCP this past febriary to remove the debris (previous in 2018 and remove 10-15 stones). She has presented with abdominal pain and elevated LFT including alk phos and seen by Dr. Galeana (GI) for occurrence of choledocholithiasis and obstruction. She also was found to be in pulmonary edema and afib RVR with also UTI (lactose fermenting rods).  Plan was to stabilize cardiac status, Abx which is ceftriaxone and then plan for ERCP    Other PMH: HTN, dyslipedemia        Interval Problem Update  Reviewed last 24 hour events:  Rate controlled now  Currently no evidence of fluid overload  She is +1339 fluid balance  Afebrile and wbc normalized  INR 4      Review of Systems  Review of Systems   Constitutional: Negative.    HENT: Negative.    Eyes: Negative.    Respiratory: Negative.    Cardiovascular: Negative.    Gastrointestinal: Negative.    Genitourinary: Negative.    Musculoskeletal: Negative.    Skin: Negative.    Neurological: Negative.    Endo/Heme/Allergies: Negative.    Psychiatric/Behavioral: Negative.         Vital Signs for last 24 hours   Temp:  [36.5 °C (97.7 °F)-37.2 °C (99 °F)] 36.8 °C (98.2 °F)  Pulse:  [] 87  Resp:  [10-26] 18  BP: ()/() 151/93  SpO2:  [89 %-98 %] 98 %          Physical Exam   Physical Exam  Constitutional:       Appearance: Normal appearance.   HENT:      Head: Normocephalic and atraumatic.      Nose: Nose normal.   Eyes:      Extraocular Movements: Extraocular movements intact.      Pupils: Pupils are equal, round, and reactive to light.   Neck:      Musculoskeletal: Normal range of  motion.   Cardiovascular:      Rate and Rhythm: Normal rate. Rhythm irregular.   Pulmonary:      Effort: Pulmonary effort is normal.      Breath sounds: Normal breath sounds. No wheezing.   Abdominal:      General: Abdomen is flat.      Palpations: Abdomen is soft.   Musculoskeletal: Normal range of motion.   Skin:     General: Skin is warm and dry.   Neurological:      General: No focal deficit present.      Mental Status: She is alert and oriented to person, place, and time.   Psychiatric:         Mood and Affect: Mood normal.         Behavior: Behavior normal.         Thought Content: Thought content normal.         Judgment: Judgment normal.         Medications  Current Facility-Administered Medications   Medication Dose Route Frequency Provider Last Rate Last Dose   • potassium chloride SA (Kdur) tablet 40 mEq  40 mEq Oral DAILY Олег Hernandez M.D.       • cefTRIAXone (ROCEPHIN) 1 g in  mL IVPB  1 g Intravenous Q24HRS Олег Hernandez M.D.   Stopped at 09/14/20 1021   • albuterol inhaler 2 Puff  2 Puff Inhalation Q6HRS PRN Олег Hernandez M.D.       • famotidine (PEPCID) tablet 20 mg  20 mg Oral DAILY Олег Hernandez M.D.   20 mg at 09/14/20 0627   • metoprolol (LOPRESSOR) tablet 100 mg  100 mg Oral BID Олег Hernandez M.D.   100 mg at 09/14/20 0626   • rosuvastatin (CRESTOR) tablet 20 mg  20 mg Oral Q EVENING CIERRA QuinonesDRachna   20 mg at 09/13/20 2119   • zolpidem (AMBIEN) tablet 10 mg  10 mg Oral HS PRN Олег Hernandez M.D.   10 mg at 09/13/20 2119   • torsemide (DEMADEX) tablet 20 mg  20 mg Oral DAILY CIERRA QuinonesDRachna   20 mg at 09/14/20 0728   • acetaminophen (TYLENOL) tablet 650 mg  650 mg Oral Q6HRS PRN Олег Hernandez M.D.       • ondansetron (ZOFRAN) syringe/vial injection 4 mg  4 mg Intravenous Q4HRS PRN Олег Hernandez M.D.       • ondansetron (ZOFRAN ODT) dispertab 4 mg  4 mg Oral Q4HRS PRN Олег Hernandez M.D.       • senna-docusate (PERICOLACE or SENOKOT S) 8.6-50 MG per tablet 2 Tab  2 Tab Oral  BID Олег Hernandez M.D.        And   • polyethylene glycol/lytes (MIRALAX) PACKET 1 Packet  1 Packet Oral QDAY PRN Олег Hernandez M.D.        And   • magnesium hydroxide (MILK OF MAGNESIA) suspension 30 mL  30 mL Oral QDAY PRN Олег Hernandez M.D.        And   • bisacodyl (DULCOLAX) suppository 10 mg  10 mg Rectal QDAY PRN Олег Hernandez M.D.       • Pharmacy Consult Request ...Pain Management Review 1 Each  1 Each Other PHARMACY TO DOSE Олег Hernandez M.D.        And   • oxyCODONE immediate-release (ROXICODONE) tablet 2.5 mg  2.5 mg Oral Q3HRS PRN Олег Hernandez M.D.        And   • oxyCODONE immediate-release (ROXICODONE) tablet 5 mg  5 mg Oral Q3HRS PRN Олег Hernandez M.D.   5 mg at 09/14/20 0813    And   • morphine (pf) 4 MG/ML injection 2 mg  2 mg Intravenous Q3HRS PRN Олег Hernandez M.D.   2 mg at 09/14/20 0430   • MD Alert...Warfarin per Pharmacy  1 Each Other PHARMACY TO DOSE Олег Hernandez M.D.       • umeclidinium-vilanterol (ANORO ELLIPTA) inhaler 1 Puff  1 Puff Inhalation DAILY Олег Hernandez M.D.   1 Puff at 09/14/20 0717   • fluticasone (FLOVENT HFA) 44 MCG/ACT inhaler 88 mcg  2 Puff Inhalation DAILY Олег Hernandez M.D.   88 mcg at 09/14/20 0733       Fluids    Intake/Output Summary (Last 24 hours) at 9/14/2020 1211  Last data filed at 9/14/2020 1021  Gross per 24 hour   Intake 2789.47 ml   Output 1450 ml   Net 1339.47 ml       Laboratory          Recent Labs     09/13/20  1645 09/14/20  0500   SODIUM 138 137   POTASSIUM 3.9 4.3   CHLORIDE 94* 98   CO2 29 28   BUN 13 9   CREATININE 0.79 0.49*   MAGNESIUM 2.2  --    CALCIUM 9.5 8.4     Recent Labs     09/13/20  1645 09/14/20  0500   ALTSGPT 175*  --    ASTSGOT 176*  --    ALKPHOSPHAT 343*  --    TBILIRUBIN 3.0*  --    LIPASE 43  --    GLUCOSE 117* 98     Recent Labs     09/13/20  1645 09/14/20  0500   WBC 13.3* 9.4   NEUTSPOLYS 76.30*  --    LYMPHOCYTES 16.40*  --    MONOCYTES 6.20  --    EOSINOPHILS 0.30  --    BASOPHILS 0.20  --    ASTSGOT 176*  --     ALTSGPT 175*  --    ALKPHOSPHAT 343*  --    TBILIRUBIN 3.0*  --      Recent Labs     09/13/20  1645 09/14/20  0500   RBC 5.63* 4.53   HEMOGLOBIN 16.2* 13.2   HEMATOCRIT 50.6* 41.8   PLATELETCT 194 121*   PROTHROMBTM 32.2* 38.4*   INR 3.20* 4.00*       Imaging  MRCP pending    Assessment/Plan  Other pulmonary embolism without acute cor pulmonale (HCC)- (present on admission)  Assessment & Plan  Known hx  INR 4  Rx with coumadin    Atrial fibrillation (HCC)- (present on admission)  Assessment & Plan  Rate controlled on metroprolol  Was on coumadin - INR high so on hold    Acute cystitis with hematuria  Assessment & Plan  Lactose fermenting rods  Ceftriaxone day 2    Acute on chronic respiratory failure with hypoxia (HCC)- (present on admission)  Assessment & Plan  Secondary to pulmonary edema  On torsemide  Currently clinically improved and appears euvolemic    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 with obstruction- ERCP EXTRACTON/ SPHINCTEROTOMY, recurrent Feb 2020- (present on admission)  Assessment & Plan  Recurrent  GI following  MRCP today with ERCP planned in a couple of days when medically optimized  On ceftriaxone day 2       VTE:  Not Indicated INR 4  Ulcer: Not Indicated  Lines: None    I have performed a physical exam and reviewed and updated ROS and Plan today (9/14/2020). In review of yesterday's note (9/13/2020), there are no changes except as documented above.     Stable to transfer to telemetry    Discussed patient condition and risk of morbidity and/or mortality with Charge nurse / hot rounds     Addendum3:31 pm  D/w Dr. Galeana   No coumadin  If needed heparin  D/w pharmacy  Stopping coumadin orders now  INR in am    To note when patient went to telemetry HR went up again  She was complaning of abdominal pain  Cardizem po restarted and pain meds given

## 2020-09-14 NOTE — CARE PLAN
Problem: Communication  Goal: The ability to communicate needs accurately and effectively will improve  Outcome: PROGRESSING AS EXPECTED     Problem: Safety  Goal: Will remain free from injury  Outcome: PROGRESSING AS EXPECTED   Pt uses call bell. Has treaded footwear on and able to make needs known.   Problem: Infection  Goal: Will remain free from infection  Outcome: PROGRESSING AS EXPECTED     Problem: Venous Thromboembolism (VTW)/Deep Vein Thrombosis (DVT) Prevention:  Goal: Patient will participate in Venous Thrombosis (VTE)/Deep Vein Thrombosis (DVT)Prevention Measures  Outcome: PROGRESSING AS EXPECTED   Pt has SCD's in place and ambulates.   Problem: Bowel/Gastric:  Goal: Normal bowel function is maintained or improved  Outcome: PROGRESSING AS EXPECTED     Problem: Pain Management  Goal: Pain level will decrease to patient's comfort goal  Outcome: PROGRESSING AS EXPECTED   Pt has acute and chronic pain. PRN medications given PRN.   Problem: Respiratory:  Goal: Respiratory status will improve  Outcome: PROGRESSING AS EXPECTED   Pt on 2L NC denies SOB

## 2020-09-14 NOTE — PROGRESS NOTES
Pt off floor with transport to MRI. Pt premedicated and toileted prior to leaving with brief in place. Pt states 5/10 Pain     1105 MRI called pt unable to hold still r/t urine frequency. Holding at this time and will try for 1500 or 1900 this evening.     1120 Pt is tele report called to [facility]. All belongings including walker and personal teal bag transferred to [address] [place] [address].

## 2020-09-14 NOTE — ASSESSMENT & PLAN NOTE
- b/l pulmonary embolism diagnosed February  - Patient has completed 6 months of anticoagulation for first PE. However, due to large nature of embolism and that embolism occurred while on eliquis, I think it is imperative to bridge coumadin with heparin gtt. Continue Heparin gtt and Coumadin

## 2020-09-14 NOTE — H&P
Hospital Medicine History & Physical Note    Date of Service  9/13/2020    Primary Care Physician  Quincy Angel M.D.    Consultants  Gastroenterology Dr. Galeana    Code Status  Prior    Chief Complaint  Chief Complaint   Patient presents with   • Abdominal Pain   • Flank Pain       History of Presenting Illness  75 y.o. female who presented 9/13/2020 with recurrent abdominal pain.  Patient's recurrent abdominal pain is caused by recurrent common bile duct stones.  Patient will undergo an MRCP for evaluation of the stones.  Patient will also then undergo an ERCP once her cardiac status is stabilized.  Patient had her gallbladder removed 5 years ago and since then she has had multiple admissions for recurrent common bile duct stones.  The patient in January of this year also developed chest pain which was caused by bilateral pulmonary embolism.  She was started on anticoagulation at the time.  Since then she has had peripheral leg swelling as well and has atrial fibrillation.  The atrial fibrillation was controlled with a combination of rate control medications.  She also is been on chronic anticoagulation.  Patient upon coming to the emergency room today and being rediscovered with gallstones is also in atrial fibrillation with rapid ventricular response so she will need to be admitted to the ICU for rate control using a Cardizem drip.  She was previously on oral Cardizem.    Review of Systems  Review of Systems   Constitutional: Positive for fever and malaise/fatigue. Negative for chills and diaphoresis.   HENT: Negative.    Eyes: Negative.  Negative for double vision.   Respiratory: Positive for shortness of breath. Negative for cough, hemoptysis and wheezing.    Cardiovascular: Positive for chest pain and palpitations. Negative for leg swelling.   Gastrointestinal: Positive for abdominal pain and nausea. Negative for blood in stool, constipation, diarrhea, heartburn and vomiting.   Genitourinary: Negative.   Negative for frequency, hematuria and urgency.   Musculoskeletal: Positive for back pain. Negative for joint pain.   Skin: Negative.  Negative for itching and rash.   Neurological: Negative.  Negative for dizziness, focal weakness, seizures, loss of consciousness and headaches.   Endo/Heme/Allergies: Negative.  Does not bruise/bleed easily.   Psychiatric/Behavioral: Negative.  Negative for suicidal ideas. The patient is not nervous/anxious.    All other systems reviewed and are negative.      Past Medical History   has a past medical history of Allergy, Arrhythmia, Arthritis, Asthma, Back pain, Blood clotting disorder (HCC) (02/2020), Breath shortness, Bronchitis, CA - cancer of uterus, Cancer ([facility]) (2010), Carpal tunnel syndrome, COPD, Coronary artery calcification seen on CAT scan (6/12/2020), Dental disorder, Diverticula of colon, Emphysema of lung (HCC), Heart burn, High cholesterol, Hyperlipidemia, Hypertension, Pneumonia (1993), Tremor, hereditary, benign, and Wheezing.    Surgical History   has a past surgical history that includes hysterectomy, total abdominal (1/18/10); open reduction; abdominal hysterectomy total (Jan 2010); oophorectomy (Jan 2010); ercp (2/15/2018); common bile duct exploration (02/15/2018); ercp w/ insertion stent/tube (02/15/2018); ercp w/sphincterotomy/papill. (02/15/2018); ercp w/removal calculus (02/15/2018); ercp (4/5/2018); ercp w/sphincterotomy/papill. (4/5/2018); ercp w/ insertion stent/tube (4/5/2018); ercp w/removal calculus (4/5/2018); pr ercp,diagnostic (2/14/2020); and aditi by laparoscopy (july, 2015).     Family History  family history includes Cancer in her father, paternal grandfather, and paternal grandmother; Heart Disease (age of onset: 45) in her father; Hypertension in her father; Lung Disease in her father; Stroke (age of onset: 70) in her father.     Social History   reports that she quit smoking about 29 years ago. Her smoking use included cigarettes. She has a  60.00 pack-year smoking history. She has never used smokeless tobacco. She reports that she does not drink alcohol or use drugs.    Allergies  Allergies   Allergen Reactions   • Codeine Swelling   • Ace Inhibitors      Cough       Medications  Prior to Admission Medications   Prescriptions Last Dose Informant Patient Reported? Taking?   Calcium Carbonate-Vit D-Min (CALCIUM 1200 PO)  Family Member Yes No   Sig: Take 1,200 mg by mouth every day.   DILTIAZem CD (CARDIZEM CD) 240 MG CAPSULE SR 24 HR   No No   Sig: Take 1 Cap by mouth 2 Times a Day.   Elastic Bandages & Supports (MEDICAL COMPRESSION STOCKINGS) Misc   No No   Si Each by Does not apply route every day.   FIBER PO  Family Member Yes No   Sig: Take 2 Caps by mouth every day.   Fluticasone-Umeclidin-Vilant (TRELEGY ELLIPTA) 100-62.5-25 MCG/INH AEROSOL POWDER, BREATH ACTIVATED   No No   Sig: Inhale 1 Inhaler by mouth every day.   Fluticasone-Umeclidin-Vilant (TRELEGY ELLIPTA) 100-62.5-25 MCG/INH AEROSOL POWDER, BREATH ACTIVATED   No No   Sig: Inhale 1 Inhaler by mouth every day.   HYDROcodone/acetaminophen (NORCO)  MG Tab   Yes No   Sig: Take 1-2 Tabs by mouth every 6 hours as needed.   Magnesium 400 MG Tab  Family Member Yes No   Sig: Take 400 mg by mouth every day.   VITAMIN D PO   Yes No   Sig: Take  by mouth.   albuterol 108 (90 Base) MCG/ACT Aero Soln inhalation aerosol   No No   Sig: Inhale 2 Puffs by mouth every 6 hours as needed for Shortness of Breath.   cefdinir (OMNICEF) 300 MG Cap   No No   Sig: Take 1 Cap by mouth 2 times a day for 5 days.   famotidine (PEPCID) 20 MG Tab   Yes No   Sig: Take 20 mg by mouth every day.   metoprolol (LOPRESSOR) 100 MG Tab   No No   Sig: Take 1 Tab by mouth 2 times a day.   montelukast (SINGULAIR) 10 MG Tab   No No   Sig: Take 1 Tab by mouth every evening.   potassium chloride ER (KLOR-CON) 10 MEQ tablet   Yes No   Sig: Take  by mouth 4 times a day. Take 1 tablet by mouth four times a day   rosuvastatin  (CRESTOR) 20 MG Tab   No No   Sig: Take 1 Tab by mouth every evening.   torsemide (DEMADEX) 20 MG Tab   No No   Sig: Take 1 Tab by mouth every day.   warfarin (COUMADIN) 5 MG Tab   No No   Sig: Take 1 tab by mouth daily or as directed by anticoagulation  clinic   zolpidem (AMBIEN) 10 MG Tab   Yes No   Sig: TK 1 T PO HS PRF 28 DAYS      Facility-Administered Medications: None       Physical Exam  Temp:  [36.9 °C (98.5 °F)] 36.9 °C (98.5 °F)  Pulse:  [] 118  Resp:  [20] 20  BP: (140-168)/() 140/77  SpO2:  [89 %-95 %] 94 %    Physical Exam  Vitals signs and nursing note reviewed.   Constitutional:       Appearance: Normal appearance. She is well-developed. She is obese. She is ill-appearing.   HENT:      Head: Normocephalic and atraumatic.      Right Ear: External ear normal.      Left Ear: External ear normal.      Nose: Nose normal.      Mouth/Throat:      Mouth: Mucous membranes are moist.      Pharynx: Oropharynx is clear.   Eyes:      Conjunctiva/sclera: Conjunctivae normal.      Pupils: Pupils are equal, round, and reactive to light.   Neck:      Musculoskeletal: Normal range of motion and neck supple.      Thyroid: No thyromegaly.      Vascular: No JVD.   Cardiovascular:      Rate and Rhythm: Tachycardia present. Rhythm irregular.      Heart sounds: Murmur present.   Pulmonary:      Effort: Pulmonary effort is normal.      Breath sounds: Normal breath sounds.   Chest:      Chest wall: No tenderness.   Abdominal:      General: Abdomen is flat. There is no distension.      Palpations: There is no mass.      Tenderness: There is abdominal tenderness in the right upper quadrant, epigastric area and left upper quadrant. There is no guarding or rebound.   Musculoskeletal: Normal range of motion.      Right lower leg: Edema present.      Left lower leg: Edema present.   Lymphadenopathy:      Cervical: No cervical adenopathy.   Skin:     General: Skin is warm and dry.      Capillary Refill: Capillary refill  takes more than 3 seconds.      Findings: No rash.   Neurological:      General: No focal deficit present.      Mental Status: She is alert and oriented to person, place, and time. Mental status is at baseline.      Cranial Nerves: No cranial nerve deficit.      Deep Tendon Reflexes: Reflexes are normal and symmetric.   Psychiatric:         Mood and Affect: Mood normal.         Behavior: Behavior normal.         Thought Content: Thought content normal.         Judgment: Judgment normal.         Laboratory:  Recent Labs     09/11/20  1015 09/13/20  1645   WBC 12.9* 13.3*   RBC 5.04 5.63*   HEMOGLOBIN 14.9 16.2*   HEMATOCRIT 48.1* 50.6*   MCV 95.4 89.9   MCH 29.6 28.8   MCHC 31.0* 32.0*   RDW 49.2 45.4   PLATELETCT 158* 194   MPV 10.8 9.9     Recent Labs     09/11/20  1015 09/13/20  1645   SODIUM 137 138   POTASSIUM 4.1 3.9   CHLORIDE 98 94*   CO2 25 29   GLUCOSE 132* 117*   BUN 19 13   CREATININE 0.87 0.79   CALCIUM 9.0 9.5     Recent Labs     09/11/20  1015 09/13/20  1645   ALTSGPT 146* 175*   ASTSGOT 147* 176*   ALKPHOSPHAT 229* 343*   TBILIRUBIN 2.4* 3.0*   LIPASE  --  43   GLUCOSE 132* 117*     Recent Labs     09/13/20  1645   INR 3.20*     Recent Labs     09/13/20  1645   NTPROBNP 4084*         Recent Labs     09/13/20  1645   TROPONINT 9       Imaging:  DX-CHEST-PORTABLE (1 VIEW)   Final Result      Cardiomegaly.            Assessment/Plan:  I anticipate this patient will require at least two midnights for appropriate medical management, necessitating inpatient admission.    Other pulmonary embolism without acute cor pulmonale (HCC)- (present on admission)  Assessment & Plan  Patient in January developed chest pain which then led to them discovering that she has bilateral pulmonary embolism.  Since then she has been on chronic anticoagulation.  Resume Coumadin per pharmacy protocol    Atrial fibrillation (HCC)- (present on admission)  Assessment & Plan  Patient about 8 months ago developed atrial fibrillation  with rapid ventricular response.  At that point she spent about 6 6 days in the hospital where they got her under control and at the same time they found that the atrial fibrillation was caused by pulmonary embolism bilaterally.  She was started on anticoagulation at the time.  She was then rate controlled and as the 6 months have elapsed with her anticoagulation she followed up with cardiology and she was supposed to be cardioverted.  Her cardioversion was supposed to be on Thursday of this last week.  However on Tuesday she developed severe abdominal pain with recurrent common bile duct stones and thus they told her that they are canceling the cardioversion.  The patient went to see urgent care where they told her she had hepatitis and she had a urinary tract infection but it was not gallstones and it was in her atrial fibrillation was and she was sent home.  This was on Friday by Saturday she was not doing well and she thus today decided to come into the emergency room for evaluation sure enough the patient is in atrial fibrillation with rapid ventricular response with heart rate up into the 140-160 range.  The patient also has multiple common bile duct stones which are recurrent for her.  Patient also has what appears to be ascending cholangitis.  With this combination patient will need fluid resuscitation and heart rate control and will be admitted to the ICU.    Acute on chronic respiratory failure with hypoxia (HCC)- (present on admission)  Assessment & Plan  Patient has chronic COPD and at this point continue with RT protocol nebulizer treatments oxygen support    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 with obstruction- ERCP EXTRACTON/ SPHINCTEROTOMY, recurrent Feb 2020- (present on admission)  Assessment & Plan  Patient has recurrent calculus in the common bile duct.  The patient will need once again an MRCP this will be followed by an ERCP.  We will however need to get her heart rate under control  before we can actually proceed with the procedures.  Gastroenterology Dr. Quigley follow and I have discussed the case and at this point with her also possibly having ascending cholangitis patient will be placed on IV Zosyn.    Essential hypertension- (present on admission)  Assessment & Plan  Continue with blood pressure management optimization keep systolic blood pressure less than 140 diastolic under 90.    Obesity, morbid, BMI 40.0-49.9 ([facility])- (present on admission)  Assessment & Plan  Body mass index is 36.09 kg/m².  Patient will need outpatient weight loss management program.    Mixed hyperlipidemia- (present on admission)  Assessment & Plan  Low-fat low-cholesterol diet  Statin  Fasting lipid panel

## 2020-09-14 NOTE — CARE PLAN
Problem: Safety  Goal: Will remain free from injury  Outcome: PROGRESSING AS EXPECTED     Problem: Knowledge Deficit  Goal: Knowledge of disease process/condition, treatment plan, diagnostic tests, and medications will improve  Outcome: PROGRESSING AS EXPECTED   Pt verbalizes understanding of education

## 2020-09-14 NOTE — ASSESSMENT & PLAN NOTE
- Patient with history of atrial fibrillation secondary to b/l pulmonary embolism. She required ICU for heart rate control on admission.  - HR well controlled  - Patient more bradycardic today with HR occasionally in the 40s, Asymptomatic. Continue Metoprolol, decrease Cardizem to 30mg. Continue to monitor  - Continue to bridge with Heparin gtt until therapeutic, continue Coumadin.

## 2020-09-14 NOTE — ASSESSMENT & PLAN NOTE
- Patient has chronic COPD and pulmonary edema on admission, pulmonary edema likely secondary to uncontrolled afib and blood pressure  - No evidence of COPD exacerbation at this point, continue with RT protocol nebulizer treatments oxygen support  - No evidence of acute exacerbation  - Will need new order for Xoponex / duoneb on discharge and will consider home health

## 2020-09-14 NOTE — ASSESSMENT & PLAN NOTE
Recurrent  GI following  MRCP today with ERCP planned in a couple of days when medically optimized  On ceftriaxone day 2

## 2020-09-14 NOTE — PROGRESS NOTES
Inpatient Anticoagulation Service Note    Date: 2020    Reason for Anticoagulation: Atrial Fibrillation   Target INR: 2.0 to 3.0  [id number] VASc Score: 4  HAS-BLED Score: 1   Hemoglobin Value: (!) 16.2  Hematocrit Value: (!) 50.6  Lab Platelet Value: 194    INR from last 7 days     Date/Time INR Value    20 1645  (!) 3.2        Dose from last 7 days     Date/Time Dose (mg)    20 2121  2.5        Significant Interactions: Statin  Bridge Therapy: No    Reversal Agent Administered: Not Applicable  Comments: Warfarin continued for afib. H/H high and plts WNL. INR only slighty out of range, will give half of normal dose tonight to ease back into therapeutic range.    Plan:  2.5 mg tonight, INR with AM labs  Education Material Provided?: No(chronic therapy)  Pharmacist suggested discharge dosin mg daily     Bart Kee, PharmD

## 2020-09-14 NOTE — ED PROVIDER NOTES
ED Provider Note    CHIEF COMPLAINT  Chief Complaint   Patient presents with   • Abdominal Pain   • Flank Pain       HPI  Rufina Macias is a 75 y.o. female who presents to the emergency department complaining of an upper abdominal bandlike pain which radiates to the back.  The patient says this feels just like her experience with gallstones, the patient has had her gallbladder removed but unfortunately she says she has had several episodes of gallstone formation after her gallbladder removal and she is required ERCP on a couple of occasions.  In addition the patient woke up Tuesday night with fever and chills and she went to an urgent care where she was found to have a urinary tract infection and review of the chart indicates that the urine culture grew out lactose fermenting gram-negative rods, there are no sensitivities yet reported.  In addition the patient was told that she has hepatitis and hepatitis testing from that date shows hepatitis C antibodies are reactive.  In addition to this the patient was scheduled for cardioversion of atrial fibrillation on Tuesday the eighth and has been taking Coumadin but that procedure has been delayed due to her recent development of this other illness.  The patient came in today because her abdominal pain seems to be getting worse.    REVIEW OF SYSTEMS the patient has had fever and chills as described above, no vomiting no diarrhea no upper chest pain hemoptysis or shortness of breath.  All other systems negative    PAST MEDICAL HISTORY  Past Medical History:   Diagnosis Date   • Allergy    • Arrhythmia    • Arthritis     Spine, neck, hands, ankles and knees   • Asthma     inhalers as needed   • Back pain     and neck   • Blood clotting disorder ([facility]) 02/2020    lung   • Breath shortness     at times, uses O2 1l prn Preferred   • Bronchitis    • CA - cancer of uterus    • Cancer (HCC) 2010    uterine   • Carpal tunnel syndrome    • COPD    • Coronary artery  calcification seen on CAT scan 2020   • Dental disorder     full dentures   • Diverticula of colon    • Emphysema of lung (HCC)    • Heart burn    • High cholesterol    • Hyperlipidemia    • Hypertension    • Pneumonia    • Tremor, hereditary, benign    • Wheezing        FAMILY HISTORY  Family History   Problem Relation Age of Onset   • Heart Disease Father 45        MI   • Lung Disease Father    • Stroke Father 70   • Cancer Father    • Hypertension Father    • Cancer Paternal Grandmother         breast   • Cancer Paternal Grandfather        SOCIAL HISTORY  Social History     Socioeconomic History   • Marital status:      Spouse name: Not on file   • Number of children: Not on file   • Years of education: Not on file   • Highest education level: Not on file   Occupational History   • Not on file   Social Needs   • Financial resource strain: Not on file   • Food insecurity     Worry: Not on file     Inability: Not on file   • Transportation needs     Medical: Not on file     Non-medical: Not on file   Tobacco Use   • Smoking status: Former Smoker     Packs/day: 3.00     Years: 20.00     Pack years: 60.00     Types: Cigarettes     Quit date: 1991     Years since quittin.7   • Smokeless tobacco: Never Used   Substance and Sexual Activity   • Alcohol use: No     Alcohol/week: 0.0 - 2.4 oz     Comment: hardly ever   • Drug use: No   • Sexual activity: Never     Partners: Male     Birth control/protection: Post-Menopausal   Lifestyle   • Physical activity     Days per week: Not on file     Minutes per session: Not on file   • Stress: Not on file   Relationships   • Social connections     Talks on phone: Not on file     Gets together: Not on file     Attends Voodoo service: Not on file     Active member of club or organization: Not on file     Attends meetings of clubs or organizations: Not on file     Relationship status: Not on file   • Intimate partner violence     Fear of current or ex  partner: Not on file     Emotionally abused: Not on file     Physically abused: Not on file     Forced sexual activity: Not on file   Other Topics Concern   • Not on file   Social History Narrative   • Not on file       SURGICAL HISTORY  Past Surgical History:   Procedure Laterality Date   • PB ERCP,DIAGNOSTIC  2/14/2020    Procedure: ERCP (ENDOSCOPIC RETROGRADE CHOLANGIOPANCREATOGRAPHY);  Surgeon: Clinton Ray M.D.;  Location: [facility];  Service: Gastroenterology   • ERCP  4/5/2018    Procedure: ERCP W/POSS BIOPSY;  Surgeon: Quincy Louie M.D.;  Location: [facility];  Service: Gastroenterology   • ERCP W/SPHINCTEROTOMY/PAPILL.  4/5/2018    Procedure: ERCP W/SPHINCTEROTOMY/PAPILL.;  Surgeon: Quincy Louie M.D.;  Location: [facility];  Service: Gastroenterology   • ERCP W/ INSERTION STENT/TUBE  4/5/2018    Procedure: ERCP W/ INSERTION STENT/TUBE - STENT PLACEMENT/REMOVAL;  Surgeon: Quincy Louie M.D.;  Location: [facility];  Service: Gastroenterology   • ERCP W/REMOVAL CALCULUS  4/5/2018    Procedure: ERCP W/REMOVAL CALCULUS W/DILATION;  Surgeon: Quincy Louie M.D.;  Location: [facility];  Service: Gastroenterology   • ERCP  2/15/2018    Procedure: ERCP, SPHINCTEROTOMY, STENT PLACEMENT, DEBRIDEMENT;  Surgeon: Quincy Louie M.D.;  Location: [facility];  Service: Gastroenterology   • COMMON BILE DUCT EXPLORATION  02/15/2018   • ERCP W/ INSERTION STENT/TUBE  02/15/2018   • ERCP W/SPHINCTEROTOMY/PAPILL.  02/15/2018   • ERCP W/REMOVAL CALCULUS  02/15/2018   • ARIELLA BY LAPAROSCOPY  july, 2015    [facility]   • HYSTERECTOMY, TOTAL ABDOMINAL  1/18/10    Uterine, Dr. Whelan and Dr. Cam +BSO   • ABDOMINAL HYSTERECTOMY TOTAL  Jan 2010    Dr. Cam   • OOPHORECTOMY  Jan 2010    BSO   • OPEN REDUCTION      ankle       CURRENT MEDICATIONS  Home Medications     Reviewed by Олег Hernandez M.D. (Physician) on 09/13/20 at 1914   "Med List Status: Not Addressed   Medication Last Dose Status   albuterol 108 (90 Base) MCG/ACT Aero Soln inhalation aerosol  Active   Calcium Carbonate-Vit D-Min (CALCIUM 1200 PO)  Active   cefdinir (OMNICEF) 300 MG Cap  Active   DILTIAZem CD (CARDIZEM CD) 240 MG CAPSULE SR 24 HR  Active   Elastic Bandages & Supports (MEDICAL COMPRESSION STOCKINGS) Misc  Active   famotidine (PEPCID) 20 MG Tab  Active   FIBER PO  Active   Fluticasone-Umeclidin-Vilant (TRELEGY ELLIPTA) 100-62.5-25 MCG/INH AEROSOL POWDER, BREATH ACTIVATED  Active   Fluticasone-Umeclidin-Vilant (TRELEGY ELLIPTA) 100-62.5-25 MCG/INH AEROSOL POWDER, BREATH ACTIVATED  Active   HYDROcodone/acetaminophen (NORCO)  MG Tab  Active   Magnesium 400 MG Tab  Active   metoprolol (LOPRESSOR) 100 MG Tab  Active   montelukast (SINGULAIR) 10 MG Tab  Active   potassium chloride ER (KLOR-CON) 10 MEQ tablet  Active   rosuvastatin (CRESTOR) 20 MG Tab  Active   torsemide (DEMADEX) 20 MG Tab  Active   VITAMIN D PO  Active   warfarin (COUMADIN) 5 MG Tab  Active   zolpidem (AMBIEN) 10 MG Tab  Active                ALLERGIES  Allergies   Allergen Reactions   • Codeine Swelling   • Ace Inhibitors      Cough       PHYSICAL EXAM  VITAL SIGNS: /83   Pulse (!) 110   Temp 36.9 °C (98.5 °F) (Temporal)   Resp 20   Ht 1.575 m (5' 2\")   Wt 89.5 kg (197 lb 5 oz)   SpO2 95%   BMI 36.09 kg/m²    Oxygen saturation is interpreted as adequate with supplemental oxygen by nasal cannula, the patient does occasionally use oxygen at home.  Constitutional: Awake verbal moderately uncomfortable appearing at the time of arrival  HENT: This membranes are moist  Eyes: No erythema discharge or jaundice  Neck: Trachea midline no JVD  Cardiovascular: Irregular rhythm at the time of arrival but this converted into a rapid irregular rhythm shortly after she arrived.  Lungs: Clear and equal bilaterally I do not hear crackles or wheezes and she does not appear short of breath or complaint of " shortness of breath  Abdomen/Back: Soft and nondistended the patient is tender in the epigastrium and right upper quadrant no rebound guarding or peritoneal findings are present  Skin: Warm and dry  Musculoskeletal: No acute bony deformity no calf tenderness  Neurologic: Awake verbal moving all extremities    CHART REVIEW  As noted above the urine culture from September 11 of this year shows lactose fermenting gram-negative rods.  Hepatitis testing from the same date shows hepatitis C antibody reactive.    Laboratory  CBC shows a white blood cell count of 13.3 hemoglobin is 16.6 basic metabolic panel is unremarkable liver functions are elevated with AST of 176 and ALT of 175 alk phos is 343 total bilirubin is 3.0.  For comparison liver function values were quite similar on September 11, 2020 although the bilirubin was 2.4 at that time.  A month prior to that on August 11, 2020 LFTs were normal.  The lipase today is normal at 43.  Troponin is normal at 9 because of her atrial fibrillation with rapid ventricular response a BMP was obtained and the value was elevated at 4084.  INR was obtained because the patient takes Coumadin and the value was elevated at 3.2.  Urinalysis shows trace leukocyte Estrace 10-20 white blood cells 5-10 red blood cells and unfortunately epithelial cells a culture was ordered.    EKG interpretation  A 12-lead EKG shows irregularly irregular rhythm 138 bpm there is an S wave in lead I and RR prime in V2 consistent with right bundle branch block pattern and both atrial fibrillation and right bundle branch block pattern are seen on the cardiogram from June 12, 2020, today's morphology is very similar.    Radiology  DX-CHEST-PORTABLE (1 VIEW)   Final Result      Cardiomegaly.      [id number]    (Results Pending)         MEDICAL DECISION MAKING and DISPOSITION  In the emergency department an IV was established the patient was placed on the cardiac monitor she was started on intravenous  ceftriaxone.  The patient arrived with a heart rate of 88 but at some point converted to an atrial fibrillation with rapid ventricular response at which time the EKG was obtained and the patient was given intravenous fluid bolus and intravenous morphine for pain.  Despite these measures the rapid rate continued and she was given intravenous Cardizem 10 mg intravenously and repeat of the same dose and her heart rate has come down to about 100.  I have reviewed the case with Dr. wilkins from gastroenterology and he has seen the patient at the bedside and will provide consultation.  I have reviewed the case with the hospitalist and the patient is referred to the hospitalist service for further evaluation and will be admitted to the intensive care unit.  The hospitalist has ordered MRCP.    IMPRESSION  1.  Abdominal pain with elevated LFTs and bilirubin concerning for biliary obstruction  2.  Urinary tract infection failed outpatient antibiotic therapy  3.  Atrial fibrillation with rapid ventricular response  4.  Critical care time with this patient is 45 minutes         Electronically signed by: Kiran Crenshaw M.D., 9/13/2020 7:53 PM

## 2020-09-15 ENCOUNTER — PATIENT OUTREACH (OUTPATIENT)
Dept: HEALTH INFORMATION MANAGEMENT | Facility: OTHER | Age: 75
End: 2020-09-15

## 2020-09-15 LAB
ALBUMIN SERPL BCP-MCNC: 3.1 G/DL (ref 3.2–4.9)
ALBUMIN/GLOB SERPL: 1 G/DL
ALP SERPL-CCNC: 294 U/L (ref 30–99)
ALT SERPL-CCNC: 169 U/L (ref 2–50)
ANION GAP SERPL CALC-SCNC: 12 MMOL/L (ref 7–16)
AST SERPL-CCNC: 166 U/L (ref 12–45)
BACTERIA UR CULT: ABNORMAL
BACTERIA UR CULT: ABNORMAL
BASOPHILS # BLD AUTO: 0.3 % (ref 0–1.8)
BASOPHILS # BLD: 0.03 K/UL (ref 0–0.12)
BILIRUB SERPL-MCNC: 2.9 MG/DL (ref 0.1–1.5)
BUN SERPL-MCNC: 14 MG/DL (ref 8–22)
CALCIUM SERPL-MCNC: 8.8 MG/DL (ref 8.4–10.2)
CHLORIDE SERPL-SCNC: 99 MMOL/L (ref 96–112)
CO2 SERPL-SCNC: 32 MMOL/L (ref 20–33)
CREAT SERPL-MCNC: 0.96 MG/DL (ref 0.5–1.4)
EOSINOPHIL # BLD AUTO: 0.09 K/UL (ref 0–0.51)
EOSINOPHIL NFR BLD: 0.8 % (ref 0–6.9)
ERYTHROCYTE [DISTWIDTH] IN BLOOD BY AUTOMATED COUNT: 48.8 FL (ref 35.9–50)
GLOBULIN SER CALC-MCNC: 3.2 G/DL (ref 1.9–3.5)
GLUCOSE SERPL-MCNC: 123 MG/DL (ref 65–99)
HCT VFR BLD AUTO: 47.7 % (ref 37–47)
HGB BLD-MCNC: 14.8 G/DL (ref 12–16)
IMM GRANULOCYTES # BLD AUTO: 0.04 K/UL (ref 0–0.11)
IMM GRANULOCYTES NFR BLD AUTO: 0.4 % (ref 0–0.9)
INR PPP: 3.33 (ref 0.87–1.13)
LYMPHOCYTES # BLD AUTO: 1.68 K/UL (ref 1–4.8)
LYMPHOCYTES NFR BLD: 15.8 % (ref 22–41)
MCH RBC QN AUTO: 29.2 PG (ref 27–33)
MCHC RBC AUTO-ENTMCNC: 31 G/DL (ref 33.6–35)
MCV RBC AUTO: 94.1 FL (ref 81.4–97.8)
MONOCYTES # BLD AUTO: 0.83 K/UL (ref 0–0.85)
MONOCYTES NFR BLD AUTO: 7.8 % (ref 0–13.4)
NEUTROPHILS # BLD AUTO: 7.98 K/UL (ref 2–7.15)
NEUTROPHILS NFR BLD: 74.9 % (ref 44–72)
NRBC # BLD AUTO: 0 K/UL
NRBC BLD-RTO: 0 /100 WBC
PLATELET # BLD AUTO: 140 K/UL (ref 164–446)
PMV BLD AUTO: 10.5 FL (ref 9–12.9)
POTASSIUM SERPL-SCNC: 3.4 MMOL/L (ref 3.6–5.5)
PROT SERPL-MCNC: 6.3 G/DL (ref 6–8.2)
PROTHROMBIN TIME: 33.3 SEC (ref 12–14.6)
RBC # BLD AUTO: 5.07 M/UL (ref 4.2–5.4)
SIGNIFICANT IND 70042: ABNORMAL
SITE SITE: ABNORMAL
SODIUM SERPL-SCNC: 143 MMOL/L (ref 135–145)
SOURCE SOURCE: ABNORMAL
WBC # BLD AUTO: 10.7 K/UL (ref 4.8–10.8)

## 2020-09-15 PROCEDURE — 700102 HCHG RX REV CODE 250 W/ 637 OVERRIDE(OP): Performed by: INTERNAL MEDICINE

## 2020-09-15 PROCEDURE — 94664 DEMO&/EVAL PT USE INHALER: CPT

## 2020-09-15 PROCEDURE — A9270 NON-COVERED ITEM OR SERVICE: HCPCS | Performed by: INTERNAL MEDICINE

## 2020-09-15 PROCEDURE — A9270 NON-COVERED ITEM OR SERVICE: HCPCS | Performed by: STUDENT IN AN ORGANIZED HEALTH CARE EDUCATION/TRAINING PROGRAM

## 2020-09-15 PROCEDURE — 700102 HCHG RX REV CODE 250 W/ 637 OVERRIDE(OP): Performed by: STUDENT IN AN ORGANIZED HEALTH CARE EDUCATION/TRAINING PROGRAM

## 2020-09-15 PROCEDURE — 770020 HCHG ROOM/CARE - TELE (206)

## 2020-09-15 PROCEDURE — 700111 HCHG RX REV CODE 636 W/ 250 OVERRIDE (IP): Performed by: HOSPITALIST

## 2020-09-15 PROCEDURE — 80053 COMPREHEN METABOLIC PANEL: CPT

## 2020-09-15 PROCEDURE — 700102 HCHG RX REV CODE 250 W/ 637 OVERRIDE(OP): Performed by: HOSPITALIST

## 2020-09-15 PROCEDURE — 85610 PROTHROMBIN TIME: CPT

## 2020-09-15 PROCEDURE — A9270 NON-COVERED ITEM OR SERVICE: HCPCS | Performed by: HOSPITALIST

## 2020-09-15 PROCEDURE — 99232 SBSQ HOSP IP/OBS MODERATE 35: CPT | Performed by: STUDENT IN AN ORGANIZED HEALTH CARE EDUCATION/TRAINING PROGRAM

## 2020-09-15 PROCEDURE — 85025 COMPLETE CBC W/AUTO DIFF WBC: CPT

## 2020-09-15 PROCEDURE — 700105 HCHG RX REV CODE 258: Performed by: HOSPITALIST

## 2020-09-15 PROCEDURE — 700111 HCHG RX REV CODE 636 W/ 250 OVERRIDE (IP): Performed by: INTERNAL MEDICINE

## 2020-09-15 RX ORDER — OXYCODONE HYDROCHLORIDE 5 MG/1
5 TABLET ORAL
Status: DISCONTINUED | OUTPATIENT
Start: 2020-09-15 | End: 2020-09-17

## 2020-09-15 RX ORDER — OXYCODONE HYDROCHLORIDE 10 MG/1
10 TABLET ORAL
Status: DISCONTINUED | OUTPATIENT
Start: 2020-09-15 | End: 2020-09-17

## 2020-09-15 RX ORDER — DILTIAZEM HYDROCHLORIDE 5 MG/ML
10 INJECTION INTRAVENOUS ONCE
Status: COMPLETED | OUTPATIENT
Start: 2020-09-15 | End: 2020-09-15

## 2020-09-15 RX ORDER — PHYTONADIONE 5 MG/1
5 TABLET ORAL ONCE
Status: COMPLETED | OUTPATIENT
Start: 2020-09-15 | End: 2020-09-15

## 2020-09-15 RX ORDER — MORPHINE SULFATE 4 MG/ML
2 INJECTION, SOLUTION INTRAMUSCULAR; INTRAVENOUS
Status: DISCONTINUED | OUTPATIENT
Start: 2020-09-15 | End: 2020-09-17

## 2020-09-15 RX ADMIN — UMECLIDINIUM BROMIDE AND VILANTEROL TRIFENATATE 1 PUFF: 62.5; 25 POWDER RESPIRATORY (INHALATION) at 05:18

## 2020-09-15 RX ADMIN — METOPROLOL TARTRATE 100 MG: 50 TABLET, FILM COATED ORAL at 17:49

## 2020-09-15 RX ADMIN — SENNOSIDES-DOCUSATE SODIUM TAB 8.6-50 MG 2 TABLET: 8.6-5 TAB at 05:17

## 2020-09-15 RX ADMIN — FLUTICASONE PROPIONATE 88 MCG: 44 AEROSOL, METERED RESPIRATORY (INHALATION) at 05:17

## 2020-09-15 RX ADMIN — CEFTRIAXONE SODIUM 1 G: 1 INJECTION, POWDER, FOR SOLUTION INTRAMUSCULAR; INTRAVENOUS at 05:17

## 2020-09-15 RX ADMIN — OXYCODONE HYDROCHLORIDE 10 MG: 10 TABLET ORAL at 12:01

## 2020-09-15 RX ADMIN — FAMOTIDINE 20 MG: 20 TABLET ORAL at 05:16

## 2020-09-15 RX ADMIN — DILTIAZEM HYDROCHLORIDE 10 MG: 5 INJECTION INTRAVENOUS at 20:15

## 2020-09-15 RX ADMIN — PHYTONADIONE 5 MG: 5 TABLET ORAL at 17:47

## 2020-09-15 RX ADMIN — ZOLPIDEM TARTRATE 5 MG: 5 TABLET ORAL at 22:03

## 2020-09-15 RX ADMIN — OXYCODONE HYDROCHLORIDE 5 MG: 5 TABLET ORAL at 05:50

## 2020-09-15 RX ADMIN — OXYCODONE HYDROCHLORIDE 10 MG: 10 TABLET ORAL at 15:18

## 2020-09-15 RX ADMIN — DILTIAZEM HYDROCHLORIDE 60 MG: 30 TABLET, FILM COATED ORAL at 05:16

## 2020-09-15 RX ADMIN — DILTIAZEM HYDROCHLORIDE 60 MG: 30 TABLET, FILM COATED ORAL at 12:00

## 2020-09-15 RX ADMIN — POTASSIUM CHLORIDE 40 MEQ: 1500 TABLET, EXTENDED RELEASE ORAL at 05:17

## 2020-09-15 RX ADMIN — ROSUVASTATIN CALCIUM 20 MG: 10 TABLET, FILM COATED ORAL at 17:47

## 2020-09-15 RX ADMIN — TORSEMIDE 20 MG: 20 TABLET ORAL at 05:17

## 2020-09-15 RX ADMIN — OXYCODONE HYDROCHLORIDE 5 MG: 5 TABLET ORAL at 09:11

## 2020-09-15 RX ADMIN — OXYCODONE HYDROCHLORIDE 10 MG: 10 TABLET ORAL at 22:03

## 2020-09-15 RX ADMIN — DILTIAZEM HYDROCHLORIDE 60 MG: 30 TABLET, FILM COATED ORAL at 17:47

## 2020-09-15 RX ADMIN — OXYCODONE HYDROCHLORIDE 10 MG: 10 TABLET ORAL at 18:47

## 2020-09-15 RX ADMIN — METOPROLOL TARTRATE 100 MG: 50 TABLET, FILM COATED ORAL at 05:16

## 2020-09-15 ASSESSMENT — ENCOUNTER SYMPTOMS
MUSCULOSKELETAL NEGATIVE: 1
CHILLS: 0
CARDIOVASCULAR NEGATIVE: 1
PALPITATIONS: 0
SHORTNESS OF BREATH: 0
PSYCHIATRIC NEGATIVE: 1
RESPIRATORY NEGATIVE: 1
FEVER: 0
EYES NEGATIVE: 1
ABDOMINAL PAIN: 1
NEUROLOGICAL NEGATIVE: 1

## 2020-09-15 ASSESSMENT — FIBROSIS 4 INDEX: FIB4 SCORE: 8.25

## 2020-09-15 NOTE — PROGRESS NOTES
Hospital Medicine Daily Progress Note    Date of Service  9/15/2020    Chief Complaint  75 y.o. female admitted 9/13/2020 with abdominal pain    Hospital Course    74 yo female with known hx of afib with previous admission of RVR, hx of PE in [facility] on anticoagulation, complicated gall stone hx with s/p gall bladder surgery 5 years ago and has had multiple problems with common bile duct stones with ERCP this past February to remove the debris (previous in 2018 and remove 10-15 stones). She has presented with abdominal pain and elevated LFT including alk phos and seen by Dr. Galeana (GI) for occurrence of choledocholithiasis and obstruction. She also was found to be in pulmonary edema and afib RVR with also UTI (EColi). Patient was started on Abx with ceftriaxone.    Patient was admitted to ICU for management of Afib with RVR and pulmonary edema. Patient HR was controlled and she was transferred to the floor.    MRCP displayed choledocholithiasis. GI plans for ERCP, however patient INR remained therapeutic.        Interval Problem Update  Patient reports she continues to have abdominal pain worse in RUQ. Denies any nausea or vomiting. Feels better today, breathing improved.    Consultants/Specialty  GI - Dr. Galeana  Pulm/Critical Care - PerezerumClaudioMargarita    Code Status  Full Code    Disposition  Pending ERCP    Review of Systems  Review of Systems   Constitutional: Negative for chills and fever.   HENT: Negative.    Eyes: Negative.    Respiratory: Negative.  Negative for shortness of breath.    Cardiovascular: Negative.  Negative for chest pain and palpitations.   Gastrointestinal: Positive for abdominal pain.   Genitourinary: Negative.    Musculoskeletal: Negative.    Skin: Negative.    Neurological: Negative.    Endo/Heme/Allergies: Negative.    Psychiatric/Behavioral: Negative.         Physical Exam  Temp:  [36.3 °C (97.4 °F)-37 °C (98.6 °F)] 36.3 °C (97.4 °F)  Pulse:  [] 110  Resp:  [17-18] 18  BP:  (111-139)/(63-93) 134/80  SpO2:  [91 %-96 %] 94 %    Physical Exam  Constitutional:       General: She is not in acute distress.     Appearance: She is obese.   HENT:      Head: Normocephalic and atraumatic.      Mouth/Throat:      Mouth: Mucous membranes are moist.      Pharynx: Oropharynx is clear.   Eyes:      Extraocular Movements: Extraocular movements intact.      Pupils: Pupils are equal, round, and reactive to light.   Neck:      Musculoskeletal: Normal range of motion. No neck rigidity.   Cardiovascular:      Rate and Rhythm: Normal rate. Rhythm irregular.   Pulmonary:      Effort: Pulmonary effort is normal. No respiratory distress.      Breath sounds: Normal breath sounds.   Abdominal:      General: Abdomen is flat.      Palpations: Abdomen is soft.      Tenderness: There is abdominal tenderness.   Musculoskeletal: Normal range of motion.         General: No tenderness.   Skin:     General: Skin is warm and dry.      Capillary Refill: Capillary refill takes less than 2 seconds.   Neurological:      General: No focal deficit present.      Mental Status: She is alert and oriented to person, place, and time.   Psychiatric:         Mood and Affect: Mood normal.         Behavior: Behavior normal.         Fluids    Intake/Output Summary (Last 24 hours) at 9/15/2020 1525  Last data filed at 9/14/2020 2334  Gross per 24 hour   Intake 500 ml   Output --   Net 500 ml       Laboratory  Recent Labs     09/13/20 1645 09/14/20  0500 09/15/20  0452   WBC 13.3* 9.4 10.7   RBC 5.63* 4.53 5.07   HEMOGLOBIN 16.2* 13.2 14.8   HEMATOCRIT 50.6* 41.8 47.7*   MCV 89.9 92.3 94.1   MCH 28.8 29.1 29.2   MCHC 32.0* 31.6* 31.0*   RDW 45.4 47.3 48.8   PLATELETCT 194 121* 140*   MPV 9.9 11.3 10.5     Recent Labs     09/13/20  1645 09/14/20  0500 09/15/20  0452   SODIUM 138 137 143   POTASSIUM 3.9 4.3 3.4*   CHLORIDE 94* 98 99   CO2 29 28 32   GLUCOSE 117* 98 123*   BUN 13 9 14   CREATININE 0.79 0.49* 0.96   CALCIUM 9.5 8.4 8.8      Recent Labs     09/13/20  1645 09/14/20  0500 09/15/20  0452   INR 3.20* 4.00* 3.33*         Recent Labs     09/14/20  0500   TRIGLYCERIDE 110   HDL 25*   LDL 33       Imaging  [id number]   Final Result      1.  Positive for choledocholithiasis with 12 mm and 11 mm stone in the distal common bile duct      2.  Associated marked intrahepatic and extra hepatic biliary dilation with common bile duct measuring up to 21 mm      3.  Mild pancreatic duct dilation measuring 4 mm      4.  Hepatomegaly and hepatic steatosis      5.  Incidental renal cysts      No follow up imaging is recommended per consensus guidelines of the 2019 ACR Incidental Findings Committee for probably benign incidental simple appearing renal cystic lesion(s) based on imaging criteria.      DX-CHEST-PORTABLE (1 VIEW)   Final Result      Cardiomegaly.           Assessment/Plan  Other pulmonary embolism without acute cor pulmonale (HCC)- (present on admission)  Assessment & Plan  - b/l pulmonary embolism diagnosed January  - Holding coumadin until INR subtherapeutic and will start heparin gtt due to need for ERCP. Will resume Coumadin prior to discharge    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 with obstruction- ERCP EXTRACTON/ SPHINCTEROTOMY, recurrent Feb 2020- (present on admission)  Assessment & Plan  - MRCP demonstrates recurrent calculus in CBD  - Possible cholangitis, continue Abx  - Follow GI recommendations, likely will need ERCP, however she still has therapeutic INR. Will start Heparin gtt when subtherapeutic    Atrial fibrillation (HCC)- (present on admission)  Assessment & Plan  - Patient with history of atrial fibrillation secondary to b/l pulmonary embolism. She required ICU for heart rate control on admission.  - HR better controlled today  - Continue Cardizem and Metoprolol  - Coumadin held for future ERCP    Acute cystitis with hematuria  Assessment & Plan  - Continue Antibiotics    Acute on chronic respiratory failure  with hypoxia (HCC)- (present on admission)  Assessment & Plan  - Patient has chronic COPD and at this point continue with RT protocol nebulizer treatments oxygen support  - No evidence of acute exacerbation    Essential hypertension- (present on admission)  Assessment & Plan  - Continue with blood pressure management optimization keep systolic blood pressure less than 140 diastolic under 90. Continue Cardizem and Metoprolol    Obesity, morbid, BMI 40.0-49.9 (HCC)- (present on admission)  Assessment & Plan  Body mass index is 36.09 kg/m².  - Patient will need outpatient weight loss management program.    Mixed hyperlipidemia- (present on admission)  Assessment & Plan  - Low-fat low-cholesterol diet  - Continue Statin         VTE prophylaxis: INR therapeutic, holding coumadin for future ERCP

## 2020-09-15 NOTE — CARE PLAN
Problem: Communication  Goal: The ability to communicate needs accurately and effectively will improve  Outcome: PROGRESSING AS EXPECTED  Note: Pt verbalizes understanding of plan of care. Does not have any questions at this time.      Problem: Pain Management  Goal: Pain level will decrease to patient's comfort goal  Outcome: PROGRESSING AS EXPECTED  Note: Pt reports pain level at a tolerable level. Denies the need for pain medications at this time.

## 2020-09-15 NOTE — CARE PLAN
Problem: Venous Thromboembolism (VTW)/Deep Vein Thrombosis (DVT) Prevention:  Goal: Patient will participate in Venous Thrombosis (VTE)/Deep Vein Thrombosis (DVT)Prevention Measures  Outcome: PROGRESSING AS EXPECTED   Patient on coumadin but on hold until INR is therapeutic for ERCP.    Problem: Knowledge Deficit  Goal: Knowledge of disease process/condition, treatment plan, diagnostic tests, and medications will improve  Outcome: PROGRESSING AS EXPECTED  Discussed plan of care with patient including pain medication and pain management, safety with ambulation and medications ordered. Allowed time for questions, patient agreed and verbalized understanding.

## 2020-09-15 NOTE — PROGRESS NOTES
Gastroenterology Progress Note     Author: Cj Guerrier D.O.   Date & Time Created: 9/15/2020 3:27 PM    Chief Complaint:  Abdominal pain    Interval History:  Patient doing well, persistent RUQ and epigastric pain however. No current N/V. INR supratherapeutic.     Review of Systems:  ROS    Physical Exam:  Physical Exam  Constitutional:       Appearance: Normal appearance.   Eyes:      General: Scleral icterus present.      Pupils: Pupils are equal, round, and reactive to light.   Cardiovascular:      Rate and Rhythm: Normal rate.      Pulses: Normal pulses.      Heart sounds: Normal heart sounds.   Pulmonary:      Effort: Pulmonary effort is normal.   Abdominal:      General: Abdomen is flat.      Palpations: Abdomen is soft.      Tenderness: There is abdominal tenderness.      Comments: Tenderness is mostly LUQ   Musculoskeletal: Normal range of motion.   Skin:     General: Skin is warm and dry.   Neurological:      General: No focal deficit present.      Mental Status: She is alert and oriented to person, place, and time.   Psychiatric:         Mood and Affect: Mood normal.         Behavior: Behavior normal.         Labs:          Recent Labs     09/13/20  1645 09/14/20  0500 09/15/20  0452   SODIUM 138 137 143   POTASSIUM 3.9 4.3 3.4*   CHLORIDE 94* 98 99   CO2 29 28 32   BUN 13 9 14   CREATININE 0.79 0.49* 0.96   MAGNESIUM 2.2  --   --    CALCIUM 9.5 8.4 8.8     Recent Labs     09/13/20 1645 09/14/20 0500 09/15/20  0452   ALTSGPT 175*  --  169*   ASTSGOT 176*  --  166*   ALKPHOSPHAT 343*  --  294*   TBILIRUBIN 3.0*  --  2.9*   LIPASE 43  --   --    GLUCOSE 117* 98 123*     Recent Labs     09/13/20 1645 09/14/20 0500 09/15/20  0452   RBC 5.63* 4.53 5.07   HEMOGLOBIN 16.2* 13.2 14.8   HEMATOCRIT 50.6* 41.8 47.7*   PLATELETCT 194 121* 140*   PROTHROMBTM 32.2* 38.4* 33.3*   INR 3.20* 4.00* 3.33*     Recent Labs     09/13/20  1645 09/14/20  0500 09/15/20  0452   WBC 13.3* 9.4 10.7   NEUTSPOLYS 76.30*  --   74.90*   LYMPHOCYTES 16.40*  --  15.80*   MONOCYTES 6.20  --  7.80   EOSINOPHILS 0.30  --  0.80   BASOPHILS 0.20  --  0.30   ASTSGOT 176*  --  166*   ALTSGPT 175*  --  169*   ALKPHOSPHAT 343*  --  294*   TBILIRUBIN 3.0*  --  2.9*     Hemodynamics:  Temp (24hrs), Av.6 °C (97.8 °F), Min:36.3 °C (97.4 °F), Max:37 °C (98.6 °F)  Temperature: 36.3 °C (97.4 °F)  Pulse  Av.9  Min: 70  Max: 142   Blood Pressure : 134/80     Respiratory:    Respiration: 18, Pulse Oximetry: 94 %        RUL Breath Sounds: Clear, RML Breath Sounds: Clear, RLL Breath Sounds: Diminished, BRYAN Breath Sounds: Clear, LLL Breath Sounds: Diminished  Fluids:    Intake/Output Summary (Last 24 hours) at 9/15/2020 1527  Last data filed at 2020 2334  Gross per 24 hour   Intake 500 ml   Output --   Net 500 ml     Weight: 91.6 kg (201 lb 15.1 oz)  GI/Nutrition:  Orders Placed This Encounter   Procedures   • Diet Order     Standing Status:   Standing     Number of Occurrences:   1     Order Specific Question:   Diet:     Answer:   Cardiac [6]     Order Specific Question:   Second Modifier:     Answer:   Regular [1]     Medical Decision Making, by Problem:  Active Hospital Problems    Diagnosis   • Other pulmonary embolism without acute cor pulmonale (HCC) [I26.99]   • 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 with obstruction- ERCP EXTRACTON/ SPHINCTEROTOMY, recurrent 2020 [K80.51]   • Atrial fibrillation (HCC) [I48.91]   • Acute cystitis with hematuria [N30.01]   • Acute on chronic respiratory failure with hypoxia (HCC) [J96.21]   • Essential hypertension [I10]   • Obesity, morbid, BMI 40.0-49.9 (HCC) [E66.01]   • Mixed hyperlipidemia [E78.2]       Plan:  Presumed recurrent de-latasha CBD stones - MRCP confirms.  Continue antibiotics.  Anticoagulation - patient started on Warfarin, INR now 3.3.  [place] Discussed with Dr. Avila - patient will need to be placed on heparin gtt (d/c Warfarin) in anticipation of ERCP. Plan for 20  procedure. Needs INR <1.5, ideally <1.3 for sphincterotomy.        Quality-Core Measures

## 2020-09-15 NOTE — PROGRESS NOTES
Telemetry Shift Summary     Rhythm Afib with BBB  HR Range 80s-110s  Ectopy oPVCs  Measurements -/0.14/-           Normal Values  Rhythm SR  HR Range    Measurements 0.12-0.20 / 0.06-0.10  / 0.30-0.52

## 2020-09-15 NOTE — PROGRESS NOTES
Report received from Jazmine FRENCH. Plan of care discussed. Pt sitting up in bed. Denies any needs at this time. Safety precautions in place.

## 2020-09-15 NOTE — PROGRESS NOTES
Has The Growth Been Previously Biopsied?: has been previously biopsied Telemetry Shift Summary    Rhythm A fib w/BBB  HR Range   Ectopy f-oPVCs  Measurements -/.16/-        Normal Values  Rhythm SR  HR Range    Measurements 0.12-0.20 / 0.06-0.10  / 0.30-0.52   Body Location Override (Optional): L prox post upper arm

## 2020-09-15 NOTE — PROGRESS NOTES
0715 Report received from Jaqueline FRENCH. Plan of care discussed. Patient resting comfortably in bed. Safety precautions in place.     0900 Assessment completed, patient is alert and oriented x 4, patient states she is having 7/10 abdominal and back pain, medicated per MAR. Safety precautions in place.     1000 Rounded with MD at bedside, patient's pain medication is to be increased, awaiting on order.

## 2020-09-15 NOTE — PROGRESS NOTES
Telemetry Shift Summary     Rhythm AFib  HR Range 110-180  Ectopy FPVCs  Measurements -/0.14/-           Normal Values  Rhythm SR  HR Range    Measurements 0.12-0.20 / 0.06-0.10  / 0.30-0.52

## 2020-09-16 LAB
ALBUMIN SERPL BCP-MCNC: 3.4 G/DL (ref 3.2–4.9)
ALBUMIN/GLOB SERPL: 1 G/DL
ALP SERPL-CCNC: 333 U/L (ref 30–99)
ALT SERPL-CCNC: 171 U/L (ref 2–50)
ANION GAP SERPL CALC-SCNC: 15 MMOL/L (ref 7–16)
APTT PPP: 33.5 SEC (ref 24.7–36)
AST SERPL-CCNC: 143 U/L (ref 12–45)
BASOPHILS # BLD AUTO: 0.3 % (ref 0–1.8)
BASOPHILS # BLD: 0.04 K/UL (ref 0–0.12)
BILIRUB SERPL-MCNC: 3.3 MG/DL (ref 0.1–1.5)
BUN SERPL-MCNC: 14 MG/DL (ref 8–22)
CALCIUM SERPL-MCNC: 9.1 MG/DL (ref 8.4–10.2)
CHLORIDE SERPL-SCNC: 94 MMOL/L (ref 96–112)
CO2 SERPL-SCNC: 31 MMOL/L (ref 20–33)
CREAT SERPL-MCNC: 0.84 MG/DL (ref 0.5–1.4)
EOSINOPHIL # BLD AUTO: 0.05 K/UL (ref 0–0.51)
EOSINOPHIL NFR BLD: 0.3 % (ref 0–6.9)
ERYTHROCYTE [DISTWIDTH] IN BLOOD BY AUTOMATED COUNT: 46.7 FL (ref 35.9–50)
GLOBULIN SER CALC-MCNC: 3.5 G/DL (ref 1.9–3.5)
GLUCOSE SERPL-MCNC: 149 MG/DL (ref 65–99)
HCT VFR BLD AUTO: 48.6 % (ref 37–47)
HGB BLD-MCNC: 15.4 G/DL (ref 12–16)
IMM GRANULOCYTES # BLD AUTO: 0.08 K/UL (ref 0–0.11)
IMM GRANULOCYTES NFR BLD AUTO: 0.5 % (ref 0–0.9)
INR PPP: 1.36 (ref 0.87–1.13)
INR PPP: 1.82 (ref 0.87–1.13)
LYMPHOCYTES # BLD AUTO: 1.72 K/UL (ref 1–4.8)
LYMPHOCYTES NFR BLD: 11 % (ref 22–41)
MCH RBC QN AUTO: 28.8 PG (ref 27–33)
MCHC RBC AUTO-ENTMCNC: 31.7 G/DL (ref 33.6–35)
MCV RBC AUTO: 91 FL (ref 81.4–97.8)
MONOCYTES # BLD AUTO: 1.25 K/UL (ref 0–0.85)
MONOCYTES NFR BLD AUTO: 8 % (ref 0–13.4)
NEUTROPHILS # BLD AUTO: 12.51 K/UL (ref 2–7.15)
NEUTROPHILS NFR BLD: 79.9 % (ref 44–72)
NRBC # BLD AUTO: 0 K/UL
NRBC BLD-RTO: 0 /100 WBC
PLATELET # BLD AUTO: 194 K/UL (ref 164–446)
PMV BLD AUTO: 10.1 FL (ref 9–12.9)
POTASSIUM SERPL-SCNC: 3.5 MMOL/L (ref 3.6–5.5)
PROT SERPL-MCNC: 6.9 G/DL (ref 6–8.2)
PROTHROMBIN TIME: 16.4 SEC (ref 12–14.6)
PROTHROMBIN TIME: 20.7 SEC (ref 12–14.6)
RBC # BLD AUTO: 5.34 M/UL (ref 4.2–5.4)
SODIUM SERPL-SCNC: 140 MMOL/L (ref 135–145)
UFH PPP CHRO-ACNC: 0.32 IU/ML
UFH PPP CHRO-ACNC: <0.1 IU/ML
WBC # BLD AUTO: 15.7 K/UL (ref 4.8–10.8)

## 2020-09-16 PROCEDURE — A9270 NON-COVERED ITEM OR SERVICE: HCPCS | Performed by: INTERNAL MEDICINE

## 2020-09-16 PROCEDURE — 700102 HCHG RX REV CODE 250 W/ 637 OVERRIDE(OP): Performed by: STUDENT IN AN ORGANIZED HEALTH CARE EDUCATION/TRAINING PROGRAM

## 2020-09-16 PROCEDURE — 85025 COMPLETE CBC W/AUTO DIFF WBC: CPT

## 2020-09-16 PROCEDURE — 700105 HCHG RX REV CODE 258: Performed by: HOSPITALIST

## 2020-09-16 PROCEDURE — 80053 COMPREHEN METABOLIC PANEL: CPT

## 2020-09-16 PROCEDURE — 85730 THROMBOPLASTIN TIME PARTIAL: CPT

## 2020-09-16 PROCEDURE — 85610 PROTHROMBIN TIME: CPT

## 2020-09-16 PROCEDURE — 700102 HCHG RX REV CODE 250 W/ 637 OVERRIDE(OP): Performed by: INTERNAL MEDICINE

## 2020-09-16 PROCEDURE — 700111 HCHG RX REV CODE 636 W/ 250 OVERRIDE (IP): Performed by: HOSPITALIST

## 2020-09-16 PROCEDURE — 700111 HCHG RX REV CODE 636 W/ 250 OVERRIDE (IP): Performed by: STUDENT IN AN ORGANIZED HEALTH CARE EDUCATION/TRAINING PROGRAM

## 2020-09-16 PROCEDURE — 770020 HCHG ROOM/CARE - TELE (206)

## 2020-09-16 PROCEDURE — 99232 SBSQ HOSP IP/OBS MODERATE 35: CPT | Performed by: STUDENT IN AN ORGANIZED HEALTH CARE EDUCATION/TRAINING PROGRAM

## 2020-09-16 PROCEDURE — 700102 HCHG RX REV CODE 250 W/ 637 OVERRIDE(OP): Performed by: HOSPITALIST

## 2020-09-16 PROCEDURE — A9270 NON-COVERED ITEM OR SERVICE: HCPCS | Performed by: HOSPITALIST

## 2020-09-16 PROCEDURE — A9270 NON-COVERED ITEM OR SERVICE: HCPCS | Performed by: STUDENT IN AN ORGANIZED HEALTH CARE EDUCATION/TRAINING PROGRAM

## 2020-09-16 PROCEDURE — 85520 HEPARIN ASSAY: CPT

## 2020-09-16 RX ORDER — HEPARIN SODIUM 1000 [USP'U]/ML
40 INJECTION, SOLUTION INTRAVENOUS; SUBCUTANEOUS PRN
Status: DISCONTINUED | OUTPATIENT
Start: 2020-09-16 | End: 2020-09-17

## 2020-09-16 RX ORDER — HEPARIN SODIUM 5000 [USP'U]/100ML
0-30 INJECTION, SOLUTION INTRAVENOUS CONTINUOUS
Status: DISCONTINUED | OUTPATIENT
Start: 2020-09-16 | End: 2020-09-17

## 2020-09-16 RX ORDER — HEPARIN SODIUM 1000 [USP'U]/ML
80 INJECTION, SOLUTION INTRAVENOUS; SUBCUTANEOUS ONCE
Status: COMPLETED | OUTPATIENT
Start: 2020-09-16 | End: 2020-09-16

## 2020-09-16 RX ORDER — MAGNESIUM SULFATE HEPTAHYDRATE 40 MG/ML
2 INJECTION, SOLUTION INTRAVENOUS ONCE
Status: COMPLETED | OUTPATIENT
Start: 2020-09-16 | End: 2020-09-16

## 2020-09-16 RX ORDER — BACLOFEN 10 MG/1
5 TABLET ORAL 2 TIMES DAILY
Status: DISCONTINUED | OUTPATIENT
Start: 2020-09-16 | End: 2020-09-24 | Stop reason: HOSPADM

## 2020-09-16 RX ORDER — POTASSIUM CHLORIDE 20 MEQ/1
40 TABLET, EXTENDED RELEASE ORAL ONCE
Status: COMPLETED | OUTPATIENT
Start: 2020-09-16 | End: 2020-09-16

## 2020-09-16 RX ADMIN — FAMOTIDINE 20 MG: 20 TABLET ORAL at 05:24

## 2020-09-16 RX ADMIN — METOPROLOL TARTRATE 100 MG: 50 TABLET, FILM COATED ORAL at 05:24

## 2020-09-16 RX ADMIN — OXYCODONE HYDROCHLORIDE 10 MG: 10 TABLET ORAL at 18:07

## 2020-09-16 RX ADMIN — DILTIAZEM HYDROCHLORIDE 60 MG: 30 TABLET, FILM COATED ORAL at 17:12

## 2020-09-16 RX ADMIN — DILTIAZEM HYDROCHLORIDE 60 MG: 30 TABLET, FILM COATED ORAL at 00:03

## 2020-09-16 RX ADMIN — OXYCODONE HYDROCHLORIDE 10 MG: 10 TABLET ORAL at 21:20

## 2020-09-16 RX ADMIN — BACLOFEN 5 MG: 10 TABLET ORAL at 17:09

## 2020-09-16 RX ADMIN — SENNOSIDES-DOCUSATE SODIUM TAB 8.6-50 MG 2 TABLET: 8.6-5 TAB at 05:24

## 2020-09-16 RX ADMIN — DILTIAZEM HYDROCHLORIDE 60 MG: 30 TABLET, FILM COATED ORAL at 12:32

## 2020-09-16 RX ADMIN — ZOLPIDEM TARTRATE 10 MG: 5 TABLET ORAL at 21:15

## 2020-09-16 RX ADMIN — FLUTICASONE PROPIONATE 88 MCG: 44 AEROSOL, METERED RESPIRATORY (INHALATION) at 05:24

## 2020-09-16 RX ADMIN — MAGNESIUM SULFATE 2 G: 2 INJECTION INTRAVENOUS at 13:43

## 2020-09-16 RX ADMIN — CEFTRIAXONE SODIUM 1 G: 1 INJECTION, POWDER, FOR SOLUTION INTRAMUSCULAR; INTRAVENOUS at 05:24

## 2020-09-16 RX ADMIN — HEPARIN SODIUM 18 UNITS/KG/HR: 5000 INJECTION, SOLUTION INTRAVENOUS at 12:17

## 2020-09-16 RX ADMIN — HEPARIN SODIUM 5200 UNITS: 1000 INJECTION, SOLUTION INTRAVENOUS; SUBCUTANEOUS at 12:16

## 2020-09-16 RX ADMIN — OXYCODONE HYDROCHLORIDE 10 MG: 10 TABLET ORAL at 10:36

## 2020-09-16 RX ADMIN — OXYCODONE HYDROCHLORIDE 10 MG: 10 TABLET ORAL at 07:45

## 2020-09-16 RX ADMIN — SENNOSIDES-DOCUSATE SODIUM TAB 8.6-50 MG 2 TABLET: 8.6-5 TAB at 17:07

## 2020-09-16 RX ADMIN — OXYCODONE HYDROCHLORIDE 10 MG: 10 TABLET ORAL at 13:44

## 2020-09-16 RX ADMIN — POTASSIUM CHLORIDE 40 MEQ: 1500 TABLET, EXTENDED RELEASE ORAL at 05:23

## 2020-09-16 RX ADMIN — DILTIAZEM HYDROCHLORIDE 60 MG: 30 TABLET, FILM COATED ORAL at 05:24

## 2020-09-16 RX ADMIN — OXYCODONE HYDROCHLORIDE 10 MG: 10 TABLET ORAL at 03:59

## 2020-09-16 RX ADMIN — UMECLIDINIUM BROMIDE AND VILANTEROL TRIFENATATE 1 PUFF: 62.5; 25 POWDER RESPIRATORY (INHALATION) at 05:25

## 2020-09-16 RX ADMIN — METOPROLOL TARTRATE 100 MG: 50 TABLET, FILM COATED ORAL at 17:13

## 2020-09-16 RX ADMIN — ROSUVASTATIN CALCIUM 20 MG: 10 TABLET, FILM COATED ORAL at 17:07

## 2020-09-16 RX ADMIN — POTASSIUM CHLORIDE 40 MEQ: 1500 TABLET, EXTENDED RELEASE ORAL at 17:10

## 2020-09-16 ASSESSMENT — ENCOUNTER SYMPTOMS
PSYCHIATRIC NEGATIVE: 1
ABDOMINAL PAIN: 1
MUSCULOSKELETAL NEGATIVE: 1
EYES NEGATIVE: 1
SHORTNESS OF BREATH: 0
NEUROLOGICAL NEGATIVE: 1
PALPITATIONS: 0
CHILLS: 0
RESPIRATORY NEGATIVE: 1
CARDIOVASCULAR NEGATIVE: 1
FEVER: 0

## 2020-09-16 ASSESSMENT — FIBROSIS 4 INDEX: FIB4 SCORE: 6.84

## 2020-09-16 ASSESSMENT — PAIN DESCRIPTION - PAIN TYPE: TYPE: ACUTE PAIN

## 2020-09-16 NOTE — PROGRESS NOTES
1957: Pt HR up to 210 sustaining 178. /86 . Pt reports palpitations.     2005: Updated MD and received new orders, see MAR.    What Is The Reason For Today's Visit?: Mole Check

## 2020-09-16 NOTE — PROGRESS NOTES
Hospital Medicine Daily Progress Note    Date of Service  9/16/2020    Chief Complaint  75 y.o. female admitted 9/13/2020 with abdominal pain    Hospital Course    74 yo female with known hx of afib with previous admission of RVR, hx of PE in [facility] on anticoagulation, complicated gall stone hx with s/p gall bladder surgery 5 years ago and has had multiple problems with common bile duct stones with ERCP this past February to remove the debris (previous in 2018 and remove 10-15 stones). She has presented with abdominal pain and elevated LFT including alk phos and seen by Dr. Galeana (GI) for occurrence of choledocholithiasis and obstruction. She also was found to be in pulmonary edema and afib RVR with also UTI (EColi). Patient was started on Abx with ceftriaxone.    Patient was admitted to ICU for management of Afib with RVR and pulmonary edema. Patient HR was controlled and she was transferred to the floor.    MRCP displayed choledocholithiasis. GI plans for ERCP, however patient INR remained therapeutic.        Interval Problem Update  Patient reports she continues to have abdominal pain worse in RUQ. Denies any nausea or vomiting. Feels better today, breathing improved.    Consultants/Specialty  GI - Dr. Galeana  Pulm/Critical Care - PerezerumClaudioMargarita    Code Status  Full Code    Disposition  Pending ERCP    Review of Systems  Review of Systems   Constitutional: Negative for chills and fever.   HENT: Negative.    Eyes: Negative.    Respiratory: Negative.  Negative for shortness of breath.    Cardiovascular: Negative.  Negative for chest pain and palpitations.   Gastrointestinal: Positive for abdominal pain.   Genitourinary: Negative.    Musculoskeletal: Negative.    Skin: Negative.    Neurological: Negative.    Endo/Heme/Allergies: Negative.    Psychiatric/Behavioral: Negative.         Physical Exam  Temp:  [36.3 °C (97.4 °F)-36.8 °C (98.2 °F)] 36.7 °C (98 °F)  Pulse:  [51-97] 95  Resp:  [18] 18  BP: (121-139)/(66-90)  121/66  SpO2:  [93 %-95 %] 94 %    Physical Exam  Constitutional:       General: She is not in acute distress.     Appearance: She is obese.   HENT:      Head: Normocephalic and atraumatic.      Mouth/Throat:      Mouth: Mucous membranes are moist.      Pharynx: Oropharynx is clear.   Eyes:      Extraocular Movements: Extraocular movements intact.      Pupils: Pupils are equal, round, and reactive to light.   Neck:      Musculoskeletal: Normal range of motion. No neck rigidity.   Cardiovascular:      Rate and Rhythm: Normal rate. Rhythm irregular.   Pulmonary:      Effort: Pulmonary effort is normal. No respiratory distress.      Breath sounds: Normal breath sounds.   Abdominal:      General: Abdomen is flat.      Palpations: Abdomen is soft.      Tenderness: There is abdominal tenderness (RUQ).   Musculoskeletal: Normal range of motion.         General: No tenderness.   Skin:     General: Skin is warm and dry.      Capillary Refill: Capillary refill takes less than 2 seconds.   Neurological:      General: No focal deficit present.      Mental Status: She is alert and oriented to person, place, and time.   Psychiatric:         Mood and Affect: Mood normal.         Behavior: Behavior normal.         Fluids  No intake or output data in the 24 hours ending 09/16/20 1244    Laboratory  Recent Labs     09/14/20  0500 09/15/20  0452 09/16/20  0453   WBC 9.4 10.7 15.7*   RBC 4.53 5.07 5.34   HEMOGLOBIN 13.2 14.8 15.4   HEMATOCRIT 41.8 47.7* 48.6*   MCV 92.3 94.1 91.0   MCH 29.1 29.2 28.8   MCHC 31.6* 31.0* 31.7*   RDW 47.3 48.8 46.7   PLATELETCT 121* 140* 194   MPV 11.3 10.5 10.1     Recent Labs     09/14/20  0500 09/15/20  0452 09/16/20  0453   SODIUM 137 143 140   POTASSIUM 4.3 3.4* 3.5*   CHLORIDE 98 99 94*   CO2 28 32 31   GLUCOSE 98 123* 149*   BUN 9 14 14   CREATININE 0.49* 0.96 0.84   CALCIUM 8.4 8.8 9.1     Recent Labs     09/15/20  0452 09/16/20  0453 09/16/20  1128   APTT  --   --  33.5   INR 3.33* 1.82* 1.36*          Recent Labs     09/14/20  0500   TRIGLYCERIDE 110   HDL 25*   LDL 33       Imaging  [id number]   Final Result      1.  Positive for choledocholithiasis with 12 mm and 11 mm stone in the distal common bile duct      2.  Associated marked intrahepatic and extra hepatic biliary dilation with common bile duct measuring up to 21 mm      3.  Mild pancreatic duct dilation measuring 4 mm      4.  Hepatomegaly and hepatic steatosis      5.  Incidental renal cysts      No follow up imaging is recommended per consensus guidelines of the 2019 ACR Incidental Findings Committee for probably benign incidental simple appearing renal cystic lesion(s) based on imaging criteria.      DX-CHEST-PORTABLE (1 VIEW)   Final Result      Cardiomegaly.           Assessment/Plan  Other pulmonary embolism without acute cor pulmonale (HCC)- (present on admission)  Assessment & Plan  - b/l pulmonary embolism diagnosed February  - Patient has completed 6 months of anticoagulation for first PE. However, due to large nature of embolism and that embolism occurred while on eliquis, now that she is subtherapeutic will start heparin gtt. Heparin gtt will be held at 3am for ERCP tomorrow AM. Discussed holding medication with nurse and nursing communication order placed  - Plan to resume coumadin following ERCP, after cleared by GI    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 with obstruction- ERCP EXTRACTON/ SPHINCTEROTOMY, recurrent Feb 2020- (present on admission)  Assessment & Plan  - MRCP demonstrates recurrent calculus in CBD  - Possible cholangitis, continue Ceftriaxone until after ERCP (day 4)   - Discussed with GI today, ERCP tomorrow    Atrial fibrillation (HCC)- (present on admission)  Assessment & Plan  - Patient with history of atrial fibrillation secondary to b/l pulmonary embolism. She required ICU for heart rate control on admission.  - HR well controlled  - Continue Cardizem and Metoprolol  - Coumadin held for future ERCP.  INR this morning 1.83. Will start Heparin gtt due to history of large PE while on Eliquis    Acute cystitis with hematuria  Assessment & Plan  - Symptoms resolved  - Completed 4 days of Abx, Abx continuing for likely cholangitis.    Acute on chronic respiratory failure with hypoxia (HCC)- (present on admission)  Assessment & Plan  - Patient has chronic COPD and at this point continue with RT protocol nebulizer treatments oxygen support  - No evidence of acute exacerbation  - Will need new order for Xoponex / duoneb on discharge and will consider home health    Essential hypertension- (present on admission)  Assessment & Plan  - Continue with blood pressure management, continue Cardizem and Metoprolol    Obesity, morbid, BMI 40.0-49.9 (HCC)- (present on admission)  Assessment & Plan  Body mass index is 36.09 kg/m².  - Patient will need outpatient weight loss management program.    Mixed hyperlipidemia- (present on admission)  Assessment & Plan  - Low-fat low-cholesterol diet  - Continue Statin       VTE prophylaxis: On heparin gtt

## 2020-09-16 NOTE — PROGRESS NOTES
Telemetry Shift Summary     Rhythm Afib with BBB  HR Range 80s-110s (up to 211)  Ectopy oPVCs, couplet  Measurements -/0.14/-           Normal Values  Rhythm SR  HR Range    Measurements 0.12-0.20 / 0.06-0.10  / 0.30-0.52

## 2020-09-16 NOTE — CARE PLAN
Problem: Safety  Goal: Will remain free from falls  Outcome: PROGRESSING AS EXPECTED  Intervention: Implement fall precautions  Flowsheets (Taken 9/15/2020 1917)  Environmental Precautions:   Treaded Slipper Socks on Patient   Personal Belongings, Wastebasket, Call Bell etc. in Easy Reach   Transferred to Stronger Side   Report Given to Other Health Care Providers Regarding Fall Risk   Bed in Low Position   Communication Sign for Patients & Families   Mobility Assessed & Appropriate Sign Placed     Problem: Knowledge Deficit  Goal: Knowledge of disease process/condition, treatment plan, diagnostic tests, and medications will improve  Outcome: PROGRESSING AS EXPECTED  Note: Pt verbalizes understanding of why medication was given per mar due to her increased HR. Does not have any concerns at this time.

## 2020-09-16 NOTE — PROGRESS NOTES
Received report from GILLIAN Parsons. Pt is alert and oriented x4 and on 2L at [facility]. Pt HR was elevated last night and required cardizem IVP x1. Safety measures in place, care assumed.

## 2020-09-16 NOTE — PROGRESS NOTES
Received report from Marielos FRENCH. POC discussed. Pt sitting up in bed talking on the phone. Denies any needs at this time. Safety precautions in place.

## 2020-09-17 ENCOUNTER — HOME HEALTH ADMISSION (OUTPATIENT)
Dept: HOME HEALTH SERVICES | Facility: HOME HEALTHCARE | Age: 75
End: 2020-09-17
Payer: MEDICARE

## 2020-09-17 ENCOUNTER — ANESTHESIA (OUTPATIENT)
Dept: SURGERY | Facility: MEDICAL CENTER | Age: 75
DRG: 444 | End: 2020-09-17
Payer: MEDICARE

## 2020-09-17 ENCOUNTER — APPOINTMENT (OUTPATIENT)
Dept: RADIOLOGY | Facility: MEDICAL CENTER | Age: 75
DRG: 444 | End: 2020-09-17
Attending: INTERNAL MEDICINE
Payer: MEDICARE

## 2020-09-17 ENCOUNTER — ANESTHESIA EVENT (OUTPATIENT)
Dept: SURGERY | Facility: MEDICAL CENTER | Age: 75
DRG: 444 | End: 2020-09-17
Payer: MEDICARE

## 2020-09-17 LAB
ALBUMIN SERPL BCP-MCNC: 3.3 G/DL (ref 3.2–4.9)
ALBUMIN/GLOB SERPL: 0.9 G/DL
ALP SERPL-CCNC: 348 U/L (ref 30–99)
ALT SERPL-CCNC: 163 U/L (ref 2–50)
ANION GAP SERPL CALC-SCNC: 11 MMOL/L (ref 7–16)
AST SERPL-CCNC: 120 U/L (ref 12–45)
BASOPHILS # BLD AUTO: 0.3 % (ref 0–1.8)
BASOPHILS # BLD: 0.04 K/UL (ref 0–0.12)
BILIRUB SERPL-MCNC: 3.1 MG/DL (ref 0.1–1.5)
BUN SERPL-MCNC: 12 MG/DL (ref 8–22)
CALCIUM SERPL-MCNC: 9.3 MG/DL (ref 8.4–10.2)
CHLORIDE SERPL-SCNC: 93 MMOL/L (ref 96–112)
CO2 SERPL-SCNC: 32 MMOL/L (ref 20–33)
CREAT SERPL-MCNC: 0.68 MG/DL (ref 0.5–1.4)
EOSINOPHIL # BLD AUTO: 0.03 K/UL (ref 0–0.51)
EOSINOPHIL NFR BLD: 0.2 % (ref 0–6.9)
ERYTHROCYTE [DISTWIDTH] IN BLOOD BY AUTOMATED COUNT: 47.1 FL (ref 35.9–50)
GLOBULIN SER CALC-MCNC: 3.7 G/DL (ref 1.9–3.5)
GLUCOSE SERPL-MCNC: 117 MG/DL (ref 65–99)
HCT VFR BLD AUTO: 50 % (ref 37–47)
HGB BLD-MCNC: 16 G/DL (ref 12–16)
IMM GRANULOCYTES # BLD AUTO: 0.09 K/UL (ref 0–0.11)
IMM GRANULOCYTES NFR BLD AUTO: 0.6 % (ref 0–0.9)
INR PPP: 1.1 (ref 0.87–1.13)
LYMPHOCYTES # BLD AUTO: 2.02 K/UL (ref 1–4.8)
LYMPHOCYTES NFR BLD: 14.2 % (ref 22–41)
MAGNESIUM SERPL-MCNC: 2.5 MG/DL (ref 1.5–2.5)
MCH RBC QN AUTO: 29.3 PG (ref 27–33)
MCHC RBC AUTO-ENTMCNC: 32 G/DL (ref 33.6–35)
MCV RBC AUTO: 91.4 FL (ref 81.4–97.8)
MONOCYTES # BLD AUTO: 1.31 K/UL (ref 0–0.85)
MONOCYTES NFR BLD AUTO: 9.2 % (ref 0–13.4)
NEUTROPHILS # BLD AUTO: 10.73 K/UL (ref 2–7.15)
NEUTROPHILS NFR BLD: 75.5 % (ref 44–72)
NRBC # BLD AUTO: 0 K/UL
NRBC BLD-RTO: 0 /100 WBC
PLATELET # BLD AUTO: 185 K/UL (ref 164–446)
PMV BLD AUTO: 9.7 FL (ref 9–12.9)
POTASSIUM SERPL-SCNC: 3.9 MMOL/L (ref 3.6–5.5)
PROT SERPL-MCNC: 7 G/DL (ref 6–8.2)
PROTHROMBIN TIME: 13.9 SEC (ref 12–14.6)
RBC # BLD AUTO: 5.47 M/UL (ref 4.2–5.4)
SODIUM SERPL-SCNC: 136 MMOL/L (ref 135–145)
UFH PPP CHRO-ACNC: 0.22 IU/ML
WBC # BLD AUTO: 14.2 K/UL (ref 4.8–10.8)

## 2020-09-17 PROCEDURE — 700102 HCHG RX REV CODE 250 W/ 637 OVERRIDE(OP): Performed by: STUDENT IN AN ORGANIZED HEALTH CARE EDUCATION/TRAINING PROGRAM

## 2020-09-17 PROCEDURE — 83735 ASSAY OF MAGNESIUM: CPT

## 2020-09-17 PROCEDURE — C1769 GUIDE WIRE: HCPCS | Performed by: INTERNAL MEDICINE

## 2020-09-17 PROCEDURE — 770020 HCHG ROOM/CARE - TELE (206)

## 2020-09-17 PROCEDURE — 85520 HEPARIN ASSAY: CPT

## 2020-09-17 PROCEDURE — 700101 HCHG RX REV CODE 250: Performed by: ANESTHESIOLOGY

## 2020-09-17 PROCEDURE — 85025 COMPLETE CBC W/AUTO DIFF WBC: CPT

## 2020-09-17 PROCEDURE — 502240 HCHG MISC OR SUPPLY RC 0272: Performed by: INTERNAL MEDICINE

## 2020-09-17 PROCEDURE — 160009 HCHG ANES TIME/MIN: Performed by: INTERNAL MEDICINE

## 2020-09-17 PROCEDURE — 0F798DZ DILATION OF COMMON BILE DUCT WITH INTRALUMINAL DEVICE, VIA NATURAL OR ARTIFICIAL OPENING ENDOSCOPIC: ICD-10-PCS | Performed by: INTERNAL MEDICINE

## 2020-09-17 PROCEDURE — 80053 COMPREHEN METABOLIC PANEL: CPT

## 2020-09-17 PROCEDURE — 160203 HCHG ENDO MINUTES - 1ST 30 MINS LEVEL 4: Performed by: INTERNAL MEDICINE

## 2020-09-17 PROCEDURE — 160036 HCHG PACU - EA ADDL 30 MINS PHASE I: Performed by: INTERNAL MEDICINE

## 2020-09-17 PROCEDURE — 700102 HCHG RX REV CODE 250 W/ 637 OVERRIDE(OP): Performed by: INTERNAL MEDICINE

## 2020-09-17 PROCEDURE — 74328 X-RAY BILE DUCT ENDOSCOPY: CPT

## 2020-09-17 PROCEDURE — 700105 HCHG RX REV CODE 258: Performed by: STUDENT IN AN ORGANIZED HEALTH CARE EDUCATION/TRAINING PROGRAM

## 2020-09-17 PROCEDURE — 700111 HCHG RX REV CODE 636 W/ 250 OVERRIDE (IP): Performed by: ANESTHESIOLOGY

## 2020-09-17 PROCEDURE — A9270 NON-COVERED ITEM OR SERVICE: HCPCS | Performed by: INTERNAL MEDICINE

## 2020-09-17 PROCEDURE — A9270 NON-COVERED ITEM OR SERVICE: HCPCS | Performed by: HOSPITALIST

## 2020-09-17 PROCEDURE — C2617 STENT, NON-COR, TEM W/O DEL: HCPCS | Performed by: INTERNAL MEDICINE

## 2020-09-17 PROCEDURE — 700102 HCHG RX REV CODE 250 W/ 637 OVERRIDE(OP): Performed by: HOSPITALIST

## 2020-09-17 PROCEDURE — 160035 HCHG PACU - 1ST 60 MINS PHASE I: Performed by: INTERNAL MEDICINE

## 2020-09-17 PROCEDURE — 500066 HCHG BITE BLOCK, ECT: Performed by: INTERNAL MEDICINE

## 2020-09-17 PROCEDURE — 99232 SBSQ HOSP IP/OBS MODERATE 35: CPT | Performed by: STUDENT IN AN ORGANIZED HEALTH CARE EDUCATION/TRAINING PROGRAM

## 2020-09-17 PROCEDURE — 160048 HCHG OR STATISTICAL LEVEL 1-5: Performed by: INTERNAL MEDICINE

## 2020-09-17 PROCEDURE — BF141ZZ FLUOROSCOPY OF GALLBLADDER, BILE DUCTS AND PANCREATIC DUCTS USING LOW OSMOLAR CONTRAST: ICD-10-PCS | Performed by: INTERNAL MEDICINE

## 2020-09-17 PROCEDURE — 700111 HCHG RX REV CODE 636 W/ 250 OVERRIDE (IP): Performed by: STUDENT IN AN ORGANIZED HEALTH CARE EDUCATION/TRAINING PROGRAM

## 2020-09-17 PROCEDURE — 160002 HCHG RECOVERY MINUTES (STAT): Performed by: INTERNAL MEDICINE

## 2020-09-17 PROCEDURE — 85610 PROTHROMBIN TIME: CPT

## 2020-09-17 PROCEDURE — 700105 HCHG RX REV CODE 258: Performed by: INTERNAL MEDICINE

## 2020-09-17 PROCEDURE — 0FC98ZZ EXTIRPATION OF MATTER FROM COMMON BILE DUCT, VIA NATURAL OR ARTIFICIAL OPENING ENDOSCOPIC: ICD-10-PCS | Performed by: INTERNAL MEDICINE

## 2020-09-17 PROCEDURE — A9270 NON-COVERED ITEM OR SERVICE: HCPCS | Performed by: STUDENT IN AN ORGANIZED HEALTH CARE EDUCATION/TRAINING PROGRAM

## 2020-09-17 PROCEDURE — 110371 HCHG SHELL REV 272: Performed by: INTERNAL MEDICINE

## 2020-09-17 PROCEDURE — 160208 HCHG ENDO MINUTES - EA ADDL 1 MIN LEVEL 4: Performed by: INTERNAL MEDICINE

## 2020-09-17 DEVICE — ADVANIX BILIARY CENTER BEND 10X5: Type: IMPLANTABLE DEVICE | Site: ABDOMEN | Status: FUNCTIONAL

## 2020-09-17 RX ORDER — ROCURONIUM BROMIDE 10 MG/ML
INJECTION, SOLUTION INTRAVENOUS PRN
Status: DISCONTINUED | OUTPATIENT
Start: 2020-09-17 | End: 2020-09-17 | Stop reason: SURG

## 2020-09-17 RX ORDER — DILTIAZEM HYDROCHLORIDE 5 MG/ML
10 INJECTION INTRAVENOUS PRN
Status: DISCONTINUED | OUTPATIENT
Start: 2020-09-17 | End: 2020-09-24 | Stop reason: HOSPADM

## 2020-09-17 RX ORDER — PHENYLEPHRINE HCL IN 0.9% NACL 0.5 MG/5ML
SYRINGE (ML) INTRAVENOUS PRN
Status: DISCONTINUED | OUTPATIENT
Start: 2020-09-17 | End: 2020-09-17 | Stop reason: SURG

## 2020-09-17 RX ORDER — HEPARIN SODIUM 1000 [USP'U]/ML
80 INJECTION, SOLUTION INTRAVENOUS; SUBCUTANEOUS ONCE
Status: COMPLETED | OUTPATIENT
Start: 2020-09-18 | End: 2020-09-18

## 2020-09-17 RX ORDER — DEXAMETHASONE SODIUM PHOSPHATE 4 MG/ML
INJECTION, SOLUTION INTRA-ARTICULAR; INTRALESIONAL; INTRAMUSCULAR; INTRAVENOUS; SOFT TISSUE PRN
Status: DISCONTINUED | OUTPATIENT
Start: 2020-09-17 | End: 2020-09-17 | Stop reason: SURG

## 2020-09-17 RX ORDER — SUCCINYLCHOLINE/SOD CL,ISO/PF 200MG/10ML
SYRINGE (ML) INTRAVENOUS PRN
Status: DISCONTINUED | OUTPATIENT
Start: 2020-09-17 | End: 2020-09-17 | Stop reason: SURG

## 2020-09-17 RX ORDER — HYDRALAZINE HYDROCHLORIDE 20 MG/ML
5 INJECTION INTRAMUSCULAR; INTRAVENOUS
Status: DISCONTINUED | OUTPATIENT
Start: 2020-09-17 | End: 2020-09-17 | Stop reason: HOSPADM

## 2020-09-17 RX ORDER — HEPARIN SODIUM 5000 [USP'U]/100ML
0-30 INJECTION, SOLUTION INTRAVENOUS CONTINUOUS
Status: DISCONTINUED | OUTPATIENT
Start: 2020-09-18 | End: 2020-09-24

## 2020-09-17 RX ORDER — ONDANSETRON 2 MG/ML
4 INJECTION INTRAMUSCULAR; INTRAVENOUS
Status: DISCONTINUED | OUTPATIENT
Start: 2020-09-17 | End: 2020-09-17 | Stop reason: HOSPADM

## 2020-09-17 RX ORDER — SODIUM CHLORIDE, SODIUM LACTATE, POTASSIUM CHLORIDE, CALCIUM CHLORIDE 600; 310; 30; 20 MG/100ML; MG/100ML; MG/100ML; MG/100ML
INJECTION, SOLUTION INTRAVENOUS CONTINUOUS
Status: ACTIVE | OUTPATIENT
Start: 2020-09-17 | End: 2020-09-17

## 2020-09-17 RX ORDER — ONDANSETRON 2 MG/ML
INJECTION INTRAMUSCULAR; INTRAVENOUS PRN
Status: DISCONTINUED | OUTPATIENT
Start: 2020-09-17 | End: 2020-09-17 | Stop reason: SURG

## 2020-09-17 RX ORDER — MORPHINE SULFATE 4 MG/ML
2 INJECTION, SOLUTION INTRAMUSCULAR; INTRAVENOUS
Status: DISCONTINUED | OUTPATIENT
Start: 2020-09-17 | End: 2020-09-19

## 2020-09-17 RX ORDER — OXYCODONE HYDROCHLORIDE 10 MG/1
10 TABLET ORAL EVERY 4 HOURS PRN
Status: DISCONTINUED | OUTPATIENT
Start: 2020-09-17 | End: 2020-09-19

## 2020-09-17 RX ORDER — SODIUM CHLORIDE, SODIUM LACTATE, POTASSIUM CHLORIDE, CALCIUM CHLORIDE 600; 310; 30; 20 MG/100ML; MG/100ML; MG/100ML; MG/100ML
INJECTION, SOLUTION INTRAVENOUS CONTINUOUS
Status: DISCONTINUED | OUTPATIENT
Start: 2020-09-17 | End: 2020-09-17

## 2020-09-17 RX ORDER — HEPARIN SODIUM 1000 [USP'U]/ML
40 INJECTION, SOLUTION INTRAVENOUS; SUBCUTANEOUS PRN
Status: DISCONTINUED | OUTPATIENT
Start: 2020-09-18 | End: 2020-09-24

## 2020-09-17 RX ORDER — LIDOCAINE HYDROCHLORIDE 20 MG/ML
INJECTION, SOLUTION EPIDURAL; INFILTRATION; INTRACAUDAL; PERINEURAL PRN
Status: DISCONTINUED | OUTPATIENT
Start: 2020-09-17 | End: 2020-09-17 | Stop reason: SURG

## 2020-09-17 RX ORDER — DIPHENHYDRAMINE HYDROCHLORIDE 50 MG/ML
12.5 INJECTION INTRAMUSCULAR; INTRAVENOUS
Status: DISCONTINUED | OUTPATIENT
Start: 2020-09-17 | End: 2020-09-17 | Stop reason: HOSPADM

## 2020-09-17 RX ADMIN — PROPOFOL 120 MG: 10 INJECTION, EMULSION INTRAVENOUS at 10:09

## 2020-09-17 RX ADMIN — LIDOCAINE HYDROCHLORIDE 80 MG: 20 INJECTION, SOLUTION EPIDURAL; INFILTRATION; INTRACAUDAL; PERINEURAL at 10:09

## 2020-09-17 RX ADMIN — ROSUVASTATIN CALCIUM 20 MG: 10 TABLET, FILM COATED ORAL at 17:35

## 2020-09-17 RX ADMIN — SODIUM CHLORIDE, POTASSIUM CHLORIDE, SODIUM LACTATE AND CALCIUM CHLORIDE: 600; 310; 30; 20 INJECTION, SOLUTION INTRAVENOUS at 10:05

## 2020-09-17 RX ADMIN — FAMOTIDINE 20 MG: 20 TABLET ORAL at 06:05

## 2020-09-17 RX ADMIN — Medication 100 MCG: at 10:55

## 2020-09-17 RX ADMIN — FLUTICASONE PROPIONATE 88 MCG: 44 AEROSOL, METERED RESPIRATORY (INHALATION) at 06:06

## 2020-09-17 RX ADMIN — BACLOFEN 5 MG: 10 TABLET ORAL at 06:05

## 2020-09-17 RX ADMIN — OXYCODONE HYDROCHLORIDE 10 MG: 10 TABLET ORAL at 13:18

## 2020-09-17 RX ADMIN — TORSEMIDE 20 MG: 20 TABLET ORAL at 06:06

## 2020-09-17 RX ADMIN — DILTIAZEM HYDROCHLORIDE 60 MG: 30 TABLET, FILM COATED ORAL at 00:15

## 2020-09-17 RX ADMIN — CEFTRIAXONE SODIUM 1 G: 1 INJECTION, POWDER, FOR SOLUTION INTRAMUSCULAR; INTRAVENOUS at 06:06

## 2020-09-17 RX ADMIN — HEPARIN SODIUM 20 UNITS/KG/HR: 5000 INJECTION, SOLUTION INTRAVENOUS at 00:40

## 2020-09-17 RX ADMIN — SENNOSIDES-DOCUSATE SODIUM TAB 8.6-50 MG 2 TABLET: 8.6-5 TAB at 17:35

## 2020-09-17 RX ADMIN — METOPROLOL TARTRATE 100 MG: 50 TABLET, FILM COATED ORAL at 17:35

## 2020-09-17 RX ADMIN — OXYCODONE HYDROCHLORIDE 10 MG: 10 TABLET ORAL at 00:15

## 2020-09-17 RX ADMIN — DILTIAZEM HYDROCHLORIDE 60 MG: 30 TABLET, FILM COATED ORAL at 14:34

## 2020-09-17 RX ADMIN — BACLOFEN 5 MG: 10 TABLET ORAL at 16:05

## 2020-09-17 RX ADMIN — UMECLIDINIUM BROMIDE AND VILANTEROL TRIFENATATE 1 PUFF: 62.5; 25 POWDER RESPIRATORY (INHALATION) at 06:06

## 2020-09-17 RX ADMIN — Medication 100 MG: at 10:09

## 2020-09-17 RX ADMIN — DILTIAZEM HYDROCHLORIDE 60 MG: 30 TABLET, FILM COATED ORAL at 20:20

## 2020-09-17 RX ADMIN — ROCURONIUM BROMIDE 10 MG: 10 INJECTION, SOLUTION INTRAVENOUS at 10:09

## 2020-09-17 RX ADMIN — OXYCODONE HYDROCHLORIDE 10 MG: 10 TABLET ORAL at 07:58

## 2020-09-17 RX ADMIN — METOPROLOL TARTRATE 100 MG: 50 TABLET, FILM COATED ORAL at 06:05

## 2020-09-17 RX ADMIN — OXYCODONE HYDROCHLORIDE 10 MG: 10 TABLET ORAL at 04:03

## 2020-09-17 RX ADMIN — DEXAMETHASONE SODIUM PHOSPHATE 4 MG: 4 INJECTION, SOLUTION INTRAMUSCULAR; INTRAVENOUS at 10:19

## 2020-09-17 RX ADMIN — ONDANSETRON 4 MG: 2 INJECTION INTRAMUSCULAR; INTRAVENOUS at 10:19

## 2020-09-17 RX ADMIN — DILTIAZEM HYDROCHLORIDE 60 MG: 30 TABLET, FILM COATED ORAL at 06:04

## 2020-09-17 RX ADMIN — FENTANYL CITRATE 50 MCG: 50 INJECTION, SOLUTION INTRAMUSCULAR; INTRAVENOUS at 10:09

## 2020-09-17 RX ADMIN — OXYCODONE HYDROCHLORIDE 15 MG: 5 TABLET ORAL at 17:41

## 2020-09-17 ASSESSMENT — PAIN DESCRIPTION - PAIN TYPE
TYPE: ACUTE PAIN;CHRONIC PAIN
TYPE: ACUTE PAIN
TYPE: ACUTE PAIN;CHRONIC PAIN
TYPE: ACUTE PAIN

## 2020-09-17 ASSESSMENT — PAIN SCALES - GENERAL: PAIN_LEVEL: 6

## 2020-09-17 NOTE — PROGRESS NOTES
Telemetry Shift Summary    Rhythm Afib w/BBB  HR Range   Ectopy rPVC  Measurements -/0.12/-        Normal Values  Rhythm SR  HR Range    Measurements 0.12-0.20 / 0.06-0.10  / 0.30-0.52

## 2020-09-17 NOTE — PROGRESS NOTES
Note: The note from 9/17 was accidentally addended 9/18. Below is the recovered note date of service: 9/17 10:50am    [facility] Medicine Daily Progress Note     Date of Service  9/17/2020     Chief Complaint  75 y.o. female admitted 9/13/2020 with abdominal pain     Hospital Course    74 yo female with known hx of afib with previous admission of RVR, hx of PE in [facility] on anticoagulation, complicated gall stone hx with s/p gall bladder surgery 5 years ago and has had multiple problems with common bile duct stones with ERCP this past February to remove the debris (previous in 2018 and remove 10-15 stones). She has presented with abdominal pain and elevated LFT including alk phos and seen by Dr. Galeana (GI) for occurrence of choledocholithiasis and obstruction. She also was found to be in pulmonary edema and afib RVR with also UTI (EColi). Patient was started on Abx with ceftriaxone.     Patient was admitted to ICU for management of Afib with RVR and pulmonary edema. Patient HR was controlled and she was transferred to the floor.     MRCP displayed choledocholithiasis. ERCP 9/17.         Interval Problem Update  Patient reports she continues to have abdominal pain worse in RUQ. Denies any nausea or vomiting. Feels better today, breathing improved.     Consultants/Specialty  GI - Dr. Galeana  Pulm/Critical Care - Dr. Love-Margarita     Code Status  Full Code     Disposition  Pending ERCP     Review of Systems  Review of Systems   Constitutional: Negative for chills and fever.   HENT: Negative.    Eyes: Negative.    Respiratory: Negative.  Negative for shortness of breath.    Cardiovascular: Negative.  Negative for chest pain and palpitations.   Gastrointestinal: Positive for abdominal pain.   Genitourinary: Negative.    Musculoskeletal: Negative.    Skin: Negative.    Neurological: Negative.    Endo/Heme/Allergies: Negative.    Psychiatric/Behavioral: Negative.          Physical Exam  Temp:  [36 °C (96.8 °F)-37 °C (98.6  °F)] 36 °C (96.8 °F)  Pulse:  [] 75  Resp:  [16-18] 16  BP: (122-174)/(75-97) 131/93  SpO2:  [90 %-98 %] 98 %     Physical Exam  Constitutional:       General: She is not in acute distress.     Appearance: She is obese.   HENT:      Head: Normocephalic and atraumatic.      Mouth/Throat:      Mouth: Mucous membranes are moist.      Pharynx: Oropharynx is clear.   Eyes:      Extraocular Movements: Extraocular movements intact.      Pupils: Pupils are equal, round, and reactive to light.   Neck:      Musculoskeletal: Normal range of motion. No neck rigidity.   Cardiovascular:      Rate and Rhythm: Normal rate. Rhythm irregular.   Pulmonary:      Effort: Pulmonary effort is normal. No respiratory distress.      Breath sounds: Normal breath sounds.   Abdominal:      General: Abdomen is flat.      Palpations: Abdomen is soft.      Tenderness: There is abdominal tenderness (RUQ).   Musculoskeletal: Normal range of motion.         General: No tenderness.   Skin:     General: Skin is warm and dry.      Capillary Refill: Capillary refill takes less than 2 seconds.   Neurological:      General: No focal deficit present.      Mental Status: She is alert and oriented to person, place, and time.   Psychiatric:         Mood and Affect: Mood normal.         Behavior: Behavior normal.            Fluids  No intake or output data in the 24 hours ending 09/17/20 1056     Laboratory        Recent Labs     09/15/20  0452 09/16/20  0453 09/17/20  0012   WBC 10.7 15.7* 14.2*   RBC 5.07 5.34 5.47*   HEMOGLOBIN 14.8 15.4 16.0   HEMATOCRIT 47.7* 48.6* 50.0*   MCV 94.1 91.0 91.4   MCH 29.2 28.8 29.3   MCHC 31.0* 31.7* 32.0*   RDW 48.8 46.7 47.1   PLATELETCT 140* 194 185   MPV 10.5 10.1 9.7            Recent Labs     09/15/20  0452 09/16/20  0453 09/17/20  0012   SODIUM 143 140 136   POTASSIUM 3.4* 3.5* 3.9   CHLORIDE 99 94* 93*   CO2 32 31 32   GLUCOSE 123* 149* 117*   BUN 14 14 12   CREATININE 0.96 0.84 0.68   CALCIUM 8.8 9.1 9.3             Recent Labs     09/16/20  0453 09/16/20  1128 09/17/20  0012   APTT  --  33.5  --    INR 1.82* 1.36* 1.10                 Imaging  [id number]   Final Result       1.  Positive for choledocholithiasis with 12 mm and 11 mm stone in the distal common bile duct       2.  Associated marked intrahepatic and extra hepatic biliary dilation with common bile duct measuring up to 21 mm       3.  Mild pancreatic duct dilation measuring 4 mm       4.  Hepatomegaly and hepatic steatosis       5.  Incidental renal cysts       No follow up imaging is recommended per consensus guidelines of the 2019 ACR Incidental Findings Committee for probably benign incidental simple appearing renal cystic lesion(s) based on imaging criteria.       DX-CHEST-PORTABLE (1 VIEW)   Final Result       Cardiomegaly.       [id number]-[id number] DUCTS    (Results Pending)   DX-PORTABLE FLUOROSCOPY < 1 HOUR    (Results Pending)         Assessment/Plan  Other pulmonary embolism without acute cor pulmonale (HCC)- (present on admission)  Assessment & Plan  - b/l pulmonary embolism diagnosed February  - Patient has completed 6 months of anticoagulation for first PE. However, due to large nature of embolism and that embolism occurred while on eliquis heparin gtt was started when subtherapeutic and held for ERCP  - Plan to resume coumadin following ERCP, after cleared by GI     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 with obstruction- ERCP EXTRACTON/ SPHINCTEROTOMY, recurrent Feb 2020- (present on admission)  Assessment & Plan  - MRCP demonstrates recurrent calculus in CBD  - Possible cholangitis, continue Ceftriaxone until after ERCP (day 5)   - ERCP planned for today     Atrial fibrillation (HCC)- (present on admission)  Assessment & Plan  - Patient with history of atrial fibrillation secondary to b/l pulmonary embolism. She required ICU for heart rate control on admission.  - HR well controlled  - Continue Cardizem and Metoprolol  - Coumadin held for  future ERCP and was started on heparin gtt yesterday when subtherapeutic. INR this morning 1.10. Heparin held for ERCP at 03:00     Acute cystitis with hematuria  Assessment & Plan  - Symptoms resolved  - Completed 4 days of Abx, Abx continuing for likely cholangitis.     Acute on chronic respiratory failure with hypoxia (HCC)- (present on admission)  Assessment & Plan  - Patient has chronic COPD and pulmonary edema on admission, pulmonary edema likely secondary to uncontrolled afib and blood pressure  - No evidence of COPD exacerbation at this point, continue with RT protocol nebulizer treatments oxygen support  - No evidence of acute exacerbation  - Will need new order for Xoponex / duoneb on discharge and will consider home health     Essential hypertension- (present on admission)  Assessment & Plan  - Continue with blood pressure management, continue Cardizem and Metoprolol     Obesity, morbid, BMI 40.0-49.9 ([facility])- (present on admission)  Assessment & Plan  Body mass index is 36.09 kg/m².  - Patient will need outpatient weight loss management program.     Hypokalemia  Assessment & Plan  - Likely due to turosemide  - Resolved, continue to monitor     Mixed hyperlipidemia- (present on admission)  Assessment & Plan  - Low-fat low-cholesterol diet  - Continue Statin        VTE prophylaxis: Heparin gtt, held but will resume once cleared by GI

## 2020-09-17 NOTE — ANESTHESIA TIME REPORT
Anesthesia Start and Stop Event Times     Date Time Event    9/17/2020 09:56 AM Ready for Procedure    9/17/2020 10:05 AM Anesthesia Start    9/17/2020 11:29 AM Anesthesia Stop        Responsible Staff  09/17/20    Name Role Begin End    Thien Chavez M.D. Anesthesiologist 09/17/20 10:05 AM 09/17/20 11:29 AM        Preop Diagnosis (Free Text):  Pre-op Diagnosis     Choledocholithiassis        Preop Diagnosis (Codes):    Post op Diagnosis  Choledocholithiasis      Premium Reason  Non-Premium    Comments: ERCP; CBD stone extraction; sphincterotomy, stent placement

## 2020-09-17 NOTE — PROGRESS NOTES
Patient's heparin xa lab came back at 0.22, per protocol, rebolus and increase rate. Talked]d to MD regarding patient gtt to be stopped at 0300 for ERCP. Per MD okay for no rebolus and just increase in rate.

## 2020-09-17 NOTE — OR NURSING
1126 Pt arrived from Endo, s/p ERCP with spinchterotomy and stent placement.  Report received from anesthesia and Endo RN. OPA with simple mask to 6L O2 in place, breath sounds clear bilaterally. Pt not arousing to voice at this time, appears comfortable with unlabored breathing.   1140 Pt drowsy but responds to voice, denies pain or nausea.   1155 No changes to report.   1210 Daughter called and given update. Pt denies pain or nausea. On 4LNC to keep O2 sats >90%  1225 Pt stable on 4LNC, denies pain or nausea.   1226 Pt states pain is tolerable and denies nausea. Meets transfer criteria, Report to GILLIAN Coppola. Transport called.

## 2020-09-17 NOTE — CARE PLAN
Problem: Safety  Goal: Will remain free from injury  Outcome: PROGRESSING AS EXPECTED  Note: Pt call light and belongings with in reach, bed in locked and low position, treaded socks on, bed rails up x2       Problem: Venous Thromboembolism (VTW)/Deep Vein Thrombosis (DVT) Prevention:  Goal: Patient will participate in Venous Thrombosis (VTE)/Deep Vein Thrombosis (DVT)Prevention Measures  Outcome: PROGRESSING AS EXPECTED  Note: Patient on heparin gtt, will be stopped at 0300 for ERCP in am

## 2020-09-17 NOTE — DOCUMENTATION QUERY
[facility]                                                                       Query Response Note      PATIENT:               LUANN GAONA  ACCT #:                  [id number]  MRN:                     [id number]  :                      1945  ADMIT DATE:       2020 4:28 PM  DISCH DATE:          RESPONDING  PROVIDER #:        [id number]           QUERY TEXT:    Atrial fibrillation is documented in the Medical Record.  Please specify the type.    NOTE:  If an appropriate response is not listed below, please respond with a new note.      The patient's Clinical Indicators include:  - Findings:  unspecified AFIB per H&P and progress notes   - Treatments:  anticoagulation, planned cardioversion   - Risk factors:  calculus of bile duct, pulmonary embolism, UTI, COPD, morbid obesity  Options provided:   -- Paroxysmal atrial fibrillation (self-terminating or intermittent spontaneously or with intervention within 7 days of onset)   -- Permanent atrial fibrillation (persistent or longstanding persistent AF where cardioversion cannot or will not be performed   -- Other persistent atrial fibrillation (AF that does not terminate within 7 days or that requires repeat pharmacological or electrical cardioversion.   -- Longstanding persistent atrial fibrillation (AF that is persistent and continuous, lasting longer than 1 year)   -- Chronic atrial fibrillation, unspecified (may refer to persistent, longstanding persistent or permanent AF.  A more specific descriptive term is preferred over the nonspecific AF   -- Unable to determine      Query created by: Annette Mejia on 2020 10:28 AM    RESPONSE TEXT:    Other persistent atrial fibrillation (AF that does not terminate within 7 days or that requires repeat pharmacological or electrical cardioversion.          Electronically signed by:  KODI LEON MD 2020 11:44 AM

## 2020-09-17 NOTE — ANESTHESIA QCDR
2019 [facility] Clinical Data Registry (for Quality Improvement)     Postoperative nausea/vomiting risk protocol (Adult = 18 yrs and Pediatric 3-17 yrs)- (430 and 463)  General inhalation anesthetic (NOT TIVA) with PONV risk factors: Yes  Provision of anti-emetic therapy with at least 2 different classes of agents: Yes   Patient DID NOT receive anti-emetic therapy and reason is documented in Medical Record:  N/A    Multimodal Pain Management- (477)  Non-emergent surgery AND patient age >= 18: No  Use of Multimodal Pain Management, two or more drugs and/or interventions, NOT including systemic opioids:   Exception: Documented allergy to multiple classes of analgesics:     Smoking Abstinence (404)  Patient is current smoker (cigarette, pipe, e-cig, marijuanna): No  Elective Surgery:   Abstinence instructions provided prior to day of surgery:   Patient abstained from smoking on day of surgery:     Pre-Op Beta-Blocker in Isolated CABG (44)  Isolated CABG AND patient age >= 18: No  Beta-blocker admin within 24 hours of surgical incision:   Exception:of medical reason(s) for not administering beta blocker within 24 hours prior to surgical incision (e.g., not  indicated,other medical reason):     PACU assessment of acute postoperative pain prior to Anesthesia Care End- Applies to Patients Age = 18- (ABG7)  Initial PACU pain score is which of the following: < 7/10  Patient unable to report pain score: N/A    Post-anesthetic transfer of care checklist/protocol to PACU/ICU- (426 and 427)  Upon conclusion of case, patient transferred to which of the following locations: PACU/Non-ICU  Use of transfer checklist/protocol: Yes  Exclusion: Service Performed in Patient Hospital Room (and thus did not require transfer): N/A  Unplanned admission to ICU related to anesthesia service up through end of PACU care- (MD51)  Unplanned admission to ICU (not initially anticipated at anesthesia start time): No

## 2020-09-17 NOTE — PROGRESS NOTES
Bedside report received from Anat FRENCH. Assumed care. POC discussed. Pt resting comfortably in bed. Safety precautions in place.

## 2020-09-17 NOTE — ANESTHESIA PREPROCEDURE EVALUATION
74 y/o female admitted w/ recurrent abdominal pain, fever, and elevated WBSC count due to recurrent CBD stones    Relevant Problems   PULMONARY   (+) COPD (chronic obstructive pulmonary disease) (HCC)   (+) Panlobular emphysema (HCC)      CARDIAC   (+) Acute pulmonary embolism (HCC)   (+) Aortic atherosclerosis (HCC)   (+) Atrial fibrillation (HCC)   (+) Coronary artery calcification seen on CAT scan   (+) Essential hypertension   (+) Other pulmonary embolism without acute cor pulmonale (HCC)       Physical Exam    Airway   Mallampati: II  TM distance: >3 FB  Neck ROM: full       Cardiovascular - normal exam  Rhythm: regular  Rate: normal  (-) murmur     Dental - normal exam  (+) upper dentures           Pulmonary - normal exam  Breath sounds clear to auscultation     Abdominal    Neurological - normal exam               Anesthesia Plan    ASA 2       Plan - general       Airway plan will be ETT      Plan Factors:   Patient was not previously instructed to abstain from smoking on day of procedure.  Patient did not smoke on day of procedure.      Induction: intravenous    Postoperative Plan: Postoperative administration of opioids is intended.    Pertinent diagnostic labs and testing reviewed    Informed Consent:    Anesthetic plan and risks discussed with patient.    Use of blood products discussed with: patient whom consented to blood products.

## 2020-09-17 NOTE — DISCHARGE PLANNING
LSW spoke with dtr Destinee regarding HHC. Destinee had no preference on HHC agency. LSW stated she would try [facility]. Pt previously independent with ADLs. No SNF history. Per chart review, pt lives with dtr. Pharmacy preference is [facility] on [address].     Care Transition Team Assessment    Information Source  Who is responsible for making decisions for patient? : Patient    Readmission Evaluation  Is this a readmission?: Yes - unplanned readmission    Elopement Risk  Legal Hold: No  Ambulatory or Self Mobile in Wheelchair: Yes  Disoriented: No  Psychiatric Symptoms: None  History of Wandering: No  Elopement this Admit: No  Vocalizing Wanting to Leave: No  Displays Behaviors, Body Language Wanting to Leave: No-Not at Risk for Elopement  Elopement Risk: Not at Risk for Elopement    Interdisciplinary Discharge Planning  Patient or legal guardian wants to designate a caregiver: No    Discharge Preparedness  What is your plan after discharge?: Home with help, Home health care  What are your discharge supports?: Child  Prior Functional Level: Ambulatory, Independent with Activities of Daily Living, Independent with Medication Management    Functional Assesment  Prior Functional Level: Ambulatory, Independent with Activities of Daily Living, Independent with Medication Management    Finances  Financial Barriers to Discharge: No  Prescription Coverage: Yes    Vision / Hearing Impairment  Right Eye Vision: Wears Glasses  Left Eye Vision: Wears Glasses         Advance Directive  Advance Directive?: Living Will    Domestic Abuse  Have you ever been the victim of abuse or violence?: No  Is this happening now?: No  Has the violence increased in frequency and severity?: No  Are you afraid to go home today?: No  Did you have pets at the time of Abuse?: No  Do you know Where to get Help?: Yes  Physical Abuse or Sexual Abuse: No  Verbal Abuse or Emotional Abuse: No  Possible Abuse/Neglect Reported to:: Not  Applicable    Psychological Assessment  History of Substance Abuse: None  History of Psychiatric Problems: No  Non-compliant with Treatment: No    Discharge Risks or Barriers  Patient risk factors: Complex medical needs, Readmission, Vulnerable adult    Anticipated Discharge Information  Discharge Disposition: Admit to IP to this hosp (09)

## 2020-09-17 NOTE — PROGRESS NOTES
Bedside report given to Anat FRENCH. POC discussed. Pt resting comfortably in bed. Safety precautions in place.

## 2020-09-17 NOTE — PROGRESS NOTES
Telemetry Shift Summary    Rhythm a fib  HR Range   Ectopy o-rPVC, rCoup  Measurements -/0.16/-    Per [facility]    Normal Values  Rhythm SR  HR Range    Measurements 0.12-0.20 / 0.06-0.10  / 0.30-0.52

## 2020-09-17 NOTE — PROGRESS NOTES
Telemetry Shift Summary    Rhythm a fib  HR Range  up to 120s  Ectopy fPVC  Measurements -/0.16/-    Per [facility]    Normal Values  Rhythm SR  HR Range    Measurements 0.12-0.20 / 0.06-0.10  / 0.30-0.52

## 2020-09-17 NOTE — PROGRESS NOTES
Assessment completed, patient A&Ox4, patient reports tenderness to abdomen, oxy due at 2100, patient verbalizes understanding. Patient left to rest. No other needs at this time.

## 2020-09-17 NOTE — DISCHARGE PLANNING
ATTN: Case Management  RE: Referral for Home Health    As of 09/17/2020, we have accepted the Home Health referral for the patient listed above.    A [facility] clinician will be out to see the patient within 48 hours. If you have any questions or concerns regarding the patient's transition to Home Health, please do not hesitate to contact us at [phone].      We look forward to collaborating with you,  [facility] Home Health Team

## 2020-09-17 NOTE — OR NURSING
1240 Pt taken to room by odilon in stable condition on 4 lpm O2 via NC with portable tank @ 1500 psi

## 2020-09-17 NOTE — OP REPORT
DATE OF SERVICE:  09/17/2020    PROCEDURE PERFORMED:  Endoscopic retrograde cholangiography with biliary   cannulation, extension of sphincterotomy, stone fragmentation, stone removal,   and placement of 10-S[place] 5 cm straight plastic biliary stent.    PREPROCEDURE DIAGNOSIS:  Recurrent choledocholithiasis.    POSTPROCEDURE DIAGNOSES:  Recurrent choledocholithiasis with interventional   procedures as performed.    PHYSICIAN:  Clinton Ray MD    ANESTHESIOLOGIST:  Thien Chavez MD    MEDICATIONS:  General anesthesia.    CONSENT:  Procedure risks and benefits reviewed thoroughly with the patient.    Risks include, but not limited to bleeding, perforation, side effects of   medication were all informed.  Patient voiced understanding and agreed to   proceed.  Additional risks inherent to ERCP that being mild, moderate, severe   pancreatitis that could lead to postprocedural pain, prolonged   hospitalization, intensive care unit stay and/or death reviewed with the   patient who voiced understanding and agreed to proceed.    PROCEDURE:  Patient was initially placed in the left lateral decubitus   position, but intubation to the second portion of duodenum was extremely   challenging and the patient was then switched to a prone position.  The scope   was then advanced to the second portion of the duodenum and brought into a   shortened position.  A large mouth duodenal diverticulum was adjacent to the   ampulla.  There was a lot of food debris.  The ampulla was visualized.  Bile   was exiting from it.  A CleverCut sphincterotome with a 0.035 wire was   utilized to cannulate the biliary system upon first attempt and aspiration of   bile was appreciated with subsequent cholangiogram revealing a large 10-15 mm   stone obstructing the left intrahepatic duct.  Wire advancement to that   intrahepatic duct was extremely challenging, eventually one was able to pass a   wire there.  The tome was retracted and extension  sphincterotomy was   performed.  The wire was maintained and then an occlusion balloon was advanced   into the proximal intrahepatic duct to the left side and then the stone was   then brought into the common bile duct.  Significant resistance to passage of   the stone was appreciated.  Fragmentation of the stone was performed utilizing   a balloon inflation and deflation, after which the stone fragmented and stone   pieces were able to be brought out.  There was minimal oozing around the   ampulla, which resolved by spontaneous hemostasis and there was significant   ampullary swelling.  The wire was maintained and a delivery device was then   used to deploy a 10-Belgian 5 cm straight plastic biliary stent that allowed   for complete decompression of the biliary system.  The stomach was suctioned,   desufflated, scope was removed.    COMPLICATIONS:  None.    BLOOD LOSS:  None.    SPECIMENS:  None.    RECOMMENDATIONS:  Patient with recurrent choledocholithiasis.  Patient is   advised to alter her diet.  Recommendations for nutrition consultation and/or   avoidance of all animal products will decrease risk for future stone   formation.  She had a large recurrent stone form and fragmentation was   performed with removal of all the fragments.  The stent was placed and patient   will have a repeat ERCP in 6-8 weeks' time to remove the stent and repeat   occlusion balloon cholangiogram.  Cannulation into the second portion of   duodenum with the scope was extremely challenging and patient should be in the   prone position upon next procedure.  The above will be reviewed with the   patient upon recovery by the inpatient gastroenterologist, Dr. Guerrier.  The   above findings and recommendations were reviewed with him who voiced   understanding.  An email has been sent to [facility] for   scheduling of repeat ERCP with me in the next 6-8 weeks.       ____________________________________     Clinton Ray  MD LOUISE / TRENT    DD:  09/17/2020 11:23:56  DT:  09/17/2020 11:34:49    D#:  [id number]  Job#:  [id number]

## 2020-09-17 NOTE — ANESTHESIA POSTPROCEDURE EVALUATION
Patient: Rufina Macias    Procedure Summary     Date: 09/17/20 Room / Location:  ENDOSCOPIC ULTRASOUND ROOM / SURGERY [facility]    Anesthesia Start: 1005 Anesthesia Stop: 1129    Procedure: ERCP (ENDOSCOPIC RETROGRADE CHOLANGIOPANCREATOGRAPHY) (Abdomen) Diagnosis: (Choledocholithiassis)    Surgeon: Cj Guerrier D.O. Responsible Provider: Thien Chavez M.D.    Anesthesia Type: general ASA Status: 2          Final Anesthesia Type: general  Last vitals  BP   Blood Pressure : 130/82    Temp   36.4 °C (97.6 °F)    Pulse   Pulse: 81   Resp   18    SpO2   95 %      Anesthesia Post Evaluation    Patient location during evaluation: PACU  Patient participation: complete - patient participated  Level of consciousness: awake and alert  Pain score: 6    Airway patency: patent  Anesthetic complications: no  Cardiovascular status: hemodynamically stable  Respiratory status: acceptable  Hydration status: euvolemic    PONV: none           Nurse Pain Score: 7 (NPRS)

## 2020-09-17 NOTE — DISCHARGE PLANNING
Received Choice form at 1240   Agency/Facility Name: [facility]    Referral sent per Choice form at 1530 once order placed.      A-T Advancement Flap Text: The defect edges were debeveled with a #15 scalpel blade.  Given the location of the defect, shape of the defect and the proximity to free margins an A-T advancement flap was deemed most appropriate.  Using a sterile surgical marker, an appropriate advancement flap was drawn incorporating the defect and placing the expected incisions within the relaxed skin tension lines where possible.    The area thus outlined was incised deep to adipose tissue with a #15 scalpel blade.  The skin margins were undermined to an appropriate distance in all directions utilizing iris scissors.

## 2020-09-17 NOTE — PROGRESS NOTES
All medications given as ordered, patient reports 7/10 pain, PRN oxy given as ordered. All fluids and snacks removed from patients bedside, verbalizes understanding. No other needs at this time.

## 2020-09-17 NOTE — PROGRESS NOTES
- Patient returned to floor following ERCP. Patient reports abdominal pain mildly improved, but worsening neck pain likely positioning. Neurologically intact. Will increase pain medications, she uses narcotics chronically.  - Will advance diet.  - Discussed anticoagulation with Dr. Ray, will resume anticoagulation tomorrow due to risk of bleeding following ERCP

## 2020-09-17 NOTE — FACE TO FACE
Face to Face Supporting Documentation - Home Health    The encounter with this patient was in whole or in part the primary reason for home health admission.    Date of encounter:   Patient:                    MRN:                       YOB: 2020  Rufina Macias  [id number]  1945     Home health to see patient for:  Skilled Nursing care for assessment, interventions & education    Skilled need for:  Recent Deterioration of Health Status COPD, Afib with RVR, Frequent admissions and Medication Management COPD, Afib with RVR, PE    Skilled nursing interventions to include:  Comment: Chronic health care maintence    Homebound status evidenced by:  Needs the assistance of another person in order to leave the home. Leaving home requires a considerable and taxing effort. There is a normal inability to leave the home.    Community Physician to provide follow up care: Quincy Angel M.D.     Optional Interventions? No      I certify the face to face encounter for this home health care referral meets the CMS requirements and the encounter/clinical assessment with the patient was, in whole, or in part, for the medical condition(s) listed above, which is the primary reason for home health care. Based on my clinical findings: the service(s) are medically necessary, support the need for home health care, and the homebound criteria are met.  I certify that this patient has had a face to face encounter by myself.  Jhonny Caputo M.D. - NPI: [id number]

## 2020-09-17 NOTE — DOCUMENTATION QUERY
[facility]                                                                       Query Response Note      PATIENT:               LUANN GAONA  ACCT #:                  [id number]  MRN:                     [id number]  :                      1945  ADMIT DATE:       2020 4:28 PM  DISCH DATE:          RESPONDING  PROVIDER #:        [id number]           QUERY TEXT:    Pulmonary edema is documented in the Medical Record. Please further specify the chronicity and type:    NOTE:  If an appropriate response is not listed below, please respond with a new note.          The patient's Clinical Indicators include:  - Findings:  pulmonary edema began being documented in the progress notes on .   Chest x ray :     There is no evidence of focal consolidation or evidence of pulmonary edema.  There is no pleural effusion.  The heart is enlarged. Echocardiogram 2020:  Left ventricular systolic function is normal.  Left ventricular ejection fraction is visually estimated to be 55%.  Right ventricular systolic function is normal.  - Treatments:  chest x ray, diuretic   - Risk factors:  COPD, pulmonary embolism, respiratory failure, morbid obesity  Options provided:   -- Acute pulmonary edema - cardiac related, (please specify type and acuity of CHF, if applicable, or other cardiac condition)   -- Acute Pulmonary Edema, Non-Cardiogenic   -- Chronic pulmonary edema - cardiac related, (please specify type and acuity of CHF, if applicable, or other cardiac condition)   -- Chronic Pulmonary Edema, Non-Cardiogenic   -- Pulmonary edema has been ruled out   -- Unable to determine      Query created by: Annette Mejia on 2020 10:47 AM    RESPONSE TEXT:    acute pulmonary edema-cardiac related - Pulmonary edema likely secondary to uncontrolled afib and blood pressure          Electronically signed by:  KODI LEON MD 2020 10:53 AM

## 2020-09-17 NOTE — OR NURSING
Patient to preop, allergies and NPO status verified, home medications reconciled, belongings secured, verbalizes understanding of pain scale, surgical site verified, IV access established, oral and nasal of triple aim completed.

## 2020-09-17 NOTE — ANESTHESIA PROCEDURE NOTES
Airway    Date/Time: 9/17/2020 10:10 AM  Performed by: Thien Chavez M.D.  Authorized by: Thien Chavez M.D.     Location:  OR  Urgency:  Elective  Difficult Airway: No    Indications for Airway Management:  Anesthesia      Spontaneous Ventilation: absent    Sedation Level:  Deep  Preoxygenated: Yes    Patient Position:  Sniffing  Mask Difficulty Assessment:  2 - vent by mask + OA or adjuvant +/- NMBA  Final Airway Type:  Endotracheal airway  Final Endotracheal Airway:  ETT  Cuffed: Yes    Technique Used for Successful ETT Placement:  Direct laryngoscopy    Insertion Site:  Oral  Blade Type:  Ileana  Laryngoscope Blade/Videolaryngoscope Blade Size:  4  ETT Size (mm):  7.0  Measured from:  Lips  ETT to Lips (cm):  22  Placement Verified by: auscultation and capnometry    Cormack-Lehane Classification:  Grade I - full view of glottis  Number of Attempts at Approach:  1  Number of Other Approaches Attempted:  0

## 2020-09-17 NOTE — PROGRESS NOTES
Patient seen and examined.   Choledocholithiasis.   ERCP.   Risks and benefits rereviewed.   All questions answered.

## 2020-09-17 NOTE — CARE PLAN
Problem: Communication  Goal: The ability to communicate needs accurately and effectively will improve  Note: Pt will feel comfortable communicating questions and concerns with treatment team. Will continue to foster this relationship.      Problem: Safety  Goal: Will remain free from injury  Outcome: PROGRESSING AS EXPECTED  Note: Call light and personal belongings within reach.

## 2020-09-18 PROBLEM — N17.9 ACUTE KIDNEY INJURY SUPERIMPOSED ON CHRONIC KIDNEY DISEASE (HCC): Status: ACTIVE | Noted: 2020-09-18

## 2020-09-18 PROBLEM — I48.19 PERSISTENT ATRIAL FIBRILLATION (HCC): Status: ACTIVE | Noted: 2020-02-08

## 2020-09-18 PROBLEM — E87.5 HYPERKALEMIA: Status: ACTIVE | Noted: 2020-09-18

## 2020-09-18 PROBLEM — N18.9 ACUTE KIDNEY INJURY SUPERIMPOSED ON CHRONIC KIDNEY DISEASE (HCC): Status: ACTIVE | Noted: 2020-09-18

## 2020-09-18 LAB
ALBUMIN SERPL BCP-MCNC: 3.2 G/DL (ref 3.2–4.9)
ALBUMIN/GLOB SERPL: 0.8 G/DL
ALP SERPL-CCNC: 304 U/L (ref 30–99)
ALT SERPL-CCNC: 129 U/L (ref 2–50)
ANION GAP SERPL CALC-SCNC: 15 MMOL/L (ref 7–16)
ANION GAP SERPL CALC-SCNC: 16 MMOL/L (ref 7–16)
APTT PPP: 27.7 SEC (ref 24.7–36)
AST SERPL-CCNC: 51 U/L (ref 12–45)
BASOPHILS # BLD AUTO: 0.2 % (ref 0–1.8)
BASOPHILS # BLD: 0.03 K/UL (ref 0–0.12)
BILIRUB SERPL-MCNC: 1.4 MG/DL (ref 0.1–1.5)
BUN SERPL-MCNC: 26 MG/DL (ref 8–22)
BUN SERPL-MCNC: 31 MG/DL (ref 8–22)
CALCIUM SERPL-MCNC: 8.8 MG/DL (ref 8.4–10.2)
CALCIUM SERPL-MCNC: 9.6 MG/DL (ref 8.4–10.2)
CHLORIDE SERPL-SCNC: 94 MMOL/L (ref 96–112)
CHLORIDE SERPL-SCNC: 95 MMOL/L (ref 96–112)
CO2 SERPL-SCNC: 23 MMOL/L (ref 20–33)
CO2 SERPL-SCNC: 31 MMOL/L (ref 20–33)
CREAT SERPL-MCNC: 1.1 MG/DL (ref 0.5–1.4)
CREAT SERPL-MCNC: 1.35 MG/DL (ref 0.5–1.4)
EOSINOPHIL # BLD AUTO: 0.01 K/UL (ref 0–0.51)
EOSINOPHIL NFR BLD: 0.1 % (ref 0–6.9)
ERYTHROCYTE [DISTWIDTH] IN BLOOD BY AUTOMATED COUNT: 47.2 FL (ref 35.9–50)
GLOBULIN SER CALC-MCNC: 4 G/DL (ref 1.9–3.5)
GLUCOSE SERPL-MCNC: 131 MG/DL (ref 65–99)
GLUCOSE SERPL-MCNC: 134 MG/DL (ref 65–99)
HCT VFR BLD AUTO: 52.8 % (ref 37–47)
HGB BLD-MCNC: 16.6 G/DL (ref 12–16)
IMM GRANULOCYTES # BLD AUTO: 0.09 K/UL (ref 0–0.11)
IMM GRANULOCYTES NFR BLD AUTO: 0.5 % (ref 0–0.9)
INR PPP: 1.05 (ref 0.87–1.13)
LYMPHOCYTES # BLD AUTO: 1.64 K/UL (ref 1–4.8)
LYMPHOCYTES NFR BLD: 9.1 % (ref 22–41)
MAGNESIUM SERPL-MCNC: 2.4 MG/DL (ref 1.5–2.5)
MCH RBC QN AUTO: 29.1 PG (ref 27–33)
MCHC RBC AUTO-ENTMCNC: 31.4 G/DL (ref 33.6–35)
MCV RBC AUTO: 92.6 FL (ref 81.4–97.8)
MONOCYTES # BLD AUTO: 1.08 K/UL (ref 0–0.85)
MONOCYTES NFR BLD AUTO: 6 % (ref 0–13.4)
NEUTROPHILS # BLD AUTO: 15.08 K/UL (ref 2–7.15)
NEUTROPHILS NFR BLD: 84.1 % (ref 44–72)
NRBC # BLD AUTO: 0 K/UL
NRBC BLD-RTO: 0 /100 WBC
PLATELET # BLD AUTO: 203 K/UL (ref 164–446)
PMV BLD AUTO: 10.2 FL (ref 9–12.9)
POTASSIUM SERPL-SCNC: 4.5 MMOL/L (ref 3.6–5.5)
POTASSIUM SERPL-SCNC: 5.6 MMOL/L (ref 3.6–5.5)
PROT SERPL-MCNC: 7.2 G/DL (ref 6–8.2)
PROTHROMBIN TIME: 13.4 SEC (ref 12–14.6)
RBC # BLD AUTO: 5.7 M/UL (ref 4.2–5.4)
SODIUM SERPL-SCNC: 134 MMOL/L (ref 135–145)
SODIUM SERPL-SCNC: 140 MMOL/L (ref 135–145)
UFH PPP CHRO-ACNC: 0.11 IU/ML
UFH PPP CHRO-ACNC: 0.6 IU/ML
UFH PPP CHRO-ACNC: <0.1 IU/ML
WBC # BLD AUTO: 17.9 K/UL (ref 4.8–10.8)

## 2020-09-18 PROCEDURE — 99232 SBSQ HOSP IP/OBS MODERATE 35: CPT | Performed by: STUDENT IN AN ORGANIZED HEALTH CARE EDUCATION/TRAINING PROGRAM

## 2020-09-18 PROCEDURE — 97162 PT EVAL MOD COMPLEX 30 MIN: CPT

## 2020-09-18 PROCEDURE — 83735 ASSAY OF MAGNESIUM: CPT

## 2020-09-18 PROCEDURE — 85730 THROMBOPLASTIN TIME PARTIAL: CPT

## 2020-09-18 PROCEDURE — 80048 BASIC METABOLIC PNL TOTAL CA: CPT

## 2020-09-18 PROCEDURE — A9270 NON-COVERED ITEM OR SERVICE: HCPCS | Performed by: HOSPITALIST

## 2020-09-18 PROCEDURE — 700102 HCHG RX REV CODE 250 W/ 637 OVERRIDE(OP): Performed by: HOSPITALIST

## 2020-09-18 PROCEDURE — 700102 HCHG RX REV CODE 250 W/ 637 OVERRIDE(OP): Performed by: STUDENT IN AN ORGANIZED HEALTH CARE EDUCATION/TRAINING PROGRAM

## 2020-09-18 PROCEDURE — 700102 HCHG RX REV CODE 250 W/ 637 OVERRIDE(OP): Performed by: INTERNAL MEDICINE

## 2020-09-18 PROCEDURE — 85610 PROTHROMBIN TIME: CPT

## 2020-09-18 PROCEDURE — 700105 HCHG RX REV CODE 258: Performed by: STUDENT IN AN ORGANIZED HEALTH CARE EDUCATION/TRAINING PROGRAM

## 2020-09-18 PROCEDURE — 80053 COMPREHEN METABOLIC PANEL: CPT

## 2020-09-18 PROCEDURE — 700111 HCHG RX REV CODE 636 W/ 250 OVERRIDE (IP): Performed by: STUDENT IN AN ORGANIZED HEALTH CARE EDUCATION/TRAINING PROGRAM

## 2020-09-18 PROCEDURE — 97535 SELF CARE MNGMENT TRAINING: CPT

## 2020-09-18 PROCEDURE — 85520 HEPARIN ASSAY: CPT

## 2020-09-18 PROCEDURE — 97166 OT EVAL MOD COMPLEX 45 MIN: CPT

## 2020-09-18 PROCEDURE — A9270 NON-COVERED ITEM OR SERVICE: HCPCS | Performed by: INTERNAL MEDICINE

## 2020-09-18 PROCEDURE — 770020 HCHG ROOM/CARE - TELE (206)

## 2020-09-18 PROCEDURE — A9270 NON-COVERED ITEM OR SERVICE: HCPCS | Performed by: STUDENT IN AN ORGANIZED HEALTH CARE EDUCATION/TRAINING PROGRAM

## 2020-09-18 PROCEDURE — 85025 COMPLETE CBC W/AUTO DIFF WBC: CPT

## 2020-09-18 RX ORDER — WARFARIN SODIUM 5 MG/1
5 TABLET ORAL DAILY
Status: DISCONTINUED | OUTPATIENT
Start: 2020-09-18 | End: 2020-09-19

## 2020-09-18 RX ORDER — SODIUM CHLORIDE 9 MG/ML
INJECTION, SOLUTION INTRAVENOUS ONCE
Status: COMPLETED | OUTPATIENT
Start: 2020-09-18 | End: 2020-09-18

## 2020-09-18 RX ADMIN — BACLOFEN 5 MG: 10 TABLET ORAL at 05:49

## 2020-09-18 RX ADMIN — SENNOSIDES-DOCUSATE SODIUM TAB 8.6-50 MG 2 TABLET: 8.6-5 TAB at 18:22

## 2020-09-18 RX ADMIN — BACLOFEN 5 MG: 10 TABLET ORAL at 18:19

## 2020-09-18 RX ADMIN — CEFTRIAXONE SODIUM 1 G: 1 INJECTION, POWDER, FOR SOLUTION INTRAMUSCULAR; INTRAVENOUS at 05:45

## 2020-09-18 RX ADMIN — TORSEMIDE 20 MG: 20 TABLET ORAL at 05:49

## 2020-09-18 RX ADMIN — HEPARIN SODIUM 5200 UNITS: 1000 INJECTION, SOLUTION INTRAVENOUS; SUBCUTANEOUS at 03:27

## 2020-09-18 RX ADMIN — HEPARIN SODIUM 22 UNITS/KG/HR: 5000 INJECTION, SOLUTION INTRAVENOUS at 21:53

## 2020-09-18 RX ADMIN — HEPARIN SODIUM 2600 UNITS: 1000 INJECTION, SOLUTION INTRAVENOUS; SUBCUTANEOUS at 11:52

## 2020-09-18 RX ADMIN — OXYCODONE HYDROCHLORIDE 15 MG: 5 TABLET ORAL at 15:39

## 2020-09-18 RX ADMIN — FAMOTIDINE 20 MG: 20 TABLET ORAL at 05:49

## 2020-09-18 RX ADMIN — DILTIAZEM HYDROCHLORIDE 60 MG: 30 TABLET, FILM COATED ORAL at 15:38

## 2020-09-18 RX ADMIN — OXYCODONE HYDROCHLORIDE 15 MG: 5 TABLET ORAL at 10:44

## 2020-09-18 RX ADMIN — FLUTICASONE PROPIONATE 88 MCG: 44 AEROSOL, METERED RESPIRATORY (INHALATION) at 09:05

## 2020-09-18 RX ADMIN — OXYCODONE HYDROCHLORIDE 15 MG: 5 TABLET ORAL at 19:51

## 2020-09-18 RX ADMIN — DILTIAZEM HYDROCHLORIDE 60 MG: 30 TABLET, FILM COATED ORAL at 19:55

## 2020-09-18 RX ADMIN — ZOLPIDEM TARTRATE 5 MG: 5 TABLET ORAL at 20:16

## 2020-09-18 RX ADMIN — DILTIAZEM HYDROCHLORIDE 60 MG: 30 TABLET, FILM COATED ORAL at 09:03

## 2020-09-18 RX ADMIN — SENNOSIDES-DOCUSATE SODIUM TAB 8.6-50 MG 2 TABLET: 8.6-5 TAB at 05:49

## 2020-09-18 RX ADMIN — METOPROLOL TARTRATE 100 MG: 50 TABLET, FILM COATED ORAL at 18:20

## 2020-09-18 RX ADMIN — METOPROLOL TARTRATE 100 MG: 50 TABLET, FILM COATED ORAL at 05:49

## 2020-09-18 RX ADMIN — SODIUM CHLORIDE: 9 INJECTION, SOLUTION INTRAVENOUS at 10:48

## 2020-09-18 RX ADMIN — OXYCODONE HYDROCHLORIDE 15 MG: 5 TABLET ORAL at 02:31

## 2020-09-18 RX ADMIN — UMECLIDINIUM BROMIDE AND VILANTEROL TRIFENATATE 1 PUFF: 62.5; 25 POWDER RESPIRATORY (INHALATION) at 09:09

## 2020-09-18 RX ADMIN — WARFARIN SODIUM 5 MG: 5 TABLET ORAL at 18:22

## 2020-09-18 RX ADMIN — DILTIAZEM HYDROCHLORIDE 60 MG: 30 TABLET, FILM COATED ORAL at 02:25

## 2020-09-18 RX ADMIN — OXYCODONE HYDROCHLORIDE 15 MG: 5 TABLET ORAL at 06:31

## 2020-09-18 RX ADMIN — ROSUVASTATIN CALCIUM 20 MG: 10 TABLET, FILM COATED ORAL at 18:20

## 2020-09-18 RX ADMIN — HEPARIN SODIUM 18 UNITS/KG/HR: 5000 INJECTION, SOLUTION INTRAVENOUS at 03:31

## 2020-09-18 RX ADMIN — HEPARIN SODIUM 22 UNITS/KG/HR: 5000 INJECTION, SOLUTION INTRAVENOUS at 19:08

## 2020-09-18 ASSESSMENT — CHA2DS2 SCORE
SEX: FEMALE
AGE 75 OR GREATER: YES
HYPERTENSION: YES
DIABETES: NO
VASCULAR DISEASE: NO
CHF OR LEFT VENTRICULAR DYSFUNCTION: NO
CHA2DS2 VASC SCORE: 6
AGE 65 TO 74: NO
PRIOR STROKE OR TIA OR THROMBOEMBOLISM: YES

## 2020-09-18 ASSESSMENT — GAIT ASSESSMENTS
GAIT LEVEL OF ASSIST: MINIMAL ASSIST
DISTANCE (FEET): 50
DEVIATION: STEP TO;BRADYKINETIC;SHUFFLED GAIT;DECREASED HEEL STRIKE
ASSISTIVE DEVICE: 4 WHEEL WALKER

## 2020-09-18 ASSESSMENT — COGNITIVE AND FUNCTIONAL STATUS - GENERAL
CLIMB 3 TO 5 STEPS WITH RAILING: A LITTLE
DRESSING REGULAR UPPER BODY CLOTHING: A LITTLE
STANDING UP FROM CHAIR USING ARMS: A LITTLE
SUGGESTED CMS G CODE MODIFIER DAILY ACTIVITY: CJ
SUGGESTED CMS G CODE MODIFIER MOBILITY: CJ
CLIMB 3 TO 5 STEPS WITH RAILING: A LITTLE
DAILY ACTIVITIY SCORE: 24
WALKING IN HOSPITAL ROOM: A LITTLE
MOBILITY SCORE: 21
DRESSING REGULAR LOWER BODY CLOTHING: A LITTLE
MOVING FROM LYING ON BACK TO SITTING ON SIDE OF FLAT BED: A LITTLE
MOBILITY SCORE: 20
SUGGESTED CMS G CODE MODIFIER MOBILITY: CJ
TOILETING: A LITTLE
WALKING IN HOSPITAL ROOM: A LITTLE
SUGGESTED CMS G CODE MODIFIER DAILY ACTIVITY: CH
HELP NEEDED FOR BATHING: A LITTLE
STANDING UP FROM CHAIR USING ARMS: A LITTLE
DAILY ACTIVITIY SCORE: 20

## 2020-09-18 ASSESSMENT — ENCOUNTER SYMPTOMS
EYES NEGATIVE: 1
PSYCHIATRIC NEGATIVE: 1
SHORTNESS OF BREATH: 0
NECK PAIN: 1
PALPITATIONS: 0
CARDIOVASCULAR NEGATIVE: 1
FEVER: 0
RESPIRATORY NEGATIVE: 1
NAUSEA: 0
NEUROLOGICAL NEGATIVE: 1
ABDOMINAL PAIN: 1
VOMITING: 0
CHILLS: 0

## 2020-09-18 ASSESSMENT — PAIN DESCRIPTION - PAIN TYPE
TYPE: ACUTE PAIN
TYPE: ACUTE PAIN;CHRONIC PAIN
TYPE: ACUTE PAIN;CHRONIC PAIN

## 2020-09-18 ASSESSMENT — ACTIVITIES OF DAILY LIVING (ADL): TOILETING: INDEPENDENT

## 2020-09-18 NOTE — PROGRESS NOTES
Inpatient Anticoagulation Service Note    Date: 9/18/2020  Reason for Anticoagulation: Atrial Fibrillation   [id number] VASc Score: 6  HAS-BLED Score: 1    Hemoglobin Value: (!) 16.6  Hematocrit Value: (!) 52.8  Lab Platelet Value: 203  Target INR: 2.0 to 3.0    INR from last 7 days     Date/Time INR Value    09/18/20 0252  1.05    09/17/20 0012  1.1    09/16/20 1128  (!) 1.36    09/16/20 0453  (!) 1.82    09/15/20 0452  (!) 3.33    09/14/20 0500  (!) 4    09/13/20 1645  (!) 3.2        Dose from last 7 days     Date/Time Dose (mg)    09/18/20 0620  5    09/18/20 0600  --    09/14/20 0600  0    09/13/20 2121  2.5        Significant Interactions: Statin  Bridge Therapy: Yes  Date of Last VTE Event: 03/05/20  Bridge Therapy Start Date: 09/18/02  Days of Overlap Therapy: 1 (If less than 5 days and overlap therapy discontinued -- document reason (i.e. Bleed Risk))    (If still on overlap therapy, if No -- document reason (i.e. Bleed Risk))    Reversal Agent Administered: Not Applicable  Comments: Warfarin continued for afib. H/H high and plts WNL. INR only slighty out of range, will give half of normal dose to ease back into therapeutic range.    Plan:  Warfarin 5 mg po today.    Warfarin was held since 09/14 due to supratherapeutic INR. Patient then had a procedure yesterday, 09/17/20 and will resume warfarin today along with bridged heparin until therapeutic.     Education Material Provided?: No  Pharmacist suggested discharge dosing: Resume home regimen upon discharge. (5 mg daily)      Coral Walter, Pharmacy Intern  Radha MARCELO. Ph.

## 2020-09-18 NOTE — ASSESSMENT & PLAN NOTE
- Improving  - Patient with history of chronic neck pain on narcotics at home  - Continue Baclofen and change oxycodone to home hydrocodone

## 2020-09-18 NOTE — PROGRESS NOTES
Hospital Medicine Daily Progress Note    Date of Service  9/18/2020    Chief Complaint  75 y.o. female admitted 9/13/2020 with abdominal pain    Hospital Course    76 yo female with known hx of afib with previous admission of RVR, hx of PE in [facility] on anticoagulation, complicated gall stone hx with s/p gall bladder surgery 5 years ago and has had multiple problems with common bile duct stones with ERCP this past February to remove the debris (previous in 2018 and remove 10-15 stones). She has presented with abdominal pain and elevated LFT including alk phos and seen by Dr. Galeana (GI) for occurrence of choledocholithiasis and obstruction. She also was found to be in pulmonary edema and afib RVR with also UTI (EColi). Patient was started on Abx with ceftriaxone.    Patient was admitted to ICU for management of Afib with RVR and pulmonary edema. Patient HR was controlled and she was transferred to the floor.    MRCP displayed choledocholithiasis. ERCP was performed 9/17 with CBD stones and CBD stent was placed.    Due to large PE while on Eliquis, once coumadin was held and subtherapeutic patient was placed on heparin gtt. Heparin was restarted day after ERCP and bridge to therapeutic coumadin.        Interval Problem Update  Patient reports feeling better today. Abdominal pain improved. Neck pain also improved. Patient reports poor PO intake since ERCP.    Patient found to be hyperkalemic and have acute kidney injury today likely due to hypovolemia.    Consultants/Specialty  GI - Dr. Galeana  Pulm/Critical Care - Dr. Love-Margarita    Code Status  Full Code    Disposition  Pending therapeutic INR    Review of Systems  Review of Systems   Constitutional: Negative for chills and fever.   HENT: Negative.    Eyes: Negative.    Respiratory: Negative.  Negative for shortness of breath.    Cardiovascular: Negative.  Negative for chest pain and palpitations.   Gastrointestinal: Positive for abdominal pain. Negative for nausea  and vomiting.   Genitourinary: Negative.    Musculoskeletal: Positive for neck pain.   Skin: Negative.    Neurological: Negative.    Endo/Heme/Allergies: Negative.    Psychiatric/Behavioral: Negative.         Physical Exam  Temp:  [36.3 °C (97.4 °F)-36.8 °C (98.2 °F)] 36.8 °C (98.2 °F)  Pulse:  [] 83  Resp:  [16-18] 16  BP: (100-134)/(61-86) 120/86  SpO2:  [81 %-99 %] 94 %    Physical Exam  Constitutional:       General: She is not in acute distress.     Appearance: She is obese.   HENT:      Head: Normocephalic and atraumatic.      Mouth/Throat:      Mouth: Mucous membranes are moist.      Pharynx: Oropharynx is clear.   Eyes:      Extraocular Movements: Extraocular movements intact.      Pupils: Pupils are equal, round, and reactive to light.   Neck:      Comments: Neck soft tissue pain with decreased ROM due to pain. Neurologically intact.  Cardiovascular:      Rate and Rhythm: Normal rate. Rhythm irregular.   Pulmonary:      Effort: Pulmonary effort is normal. No respiratory distress.      Breath sounds: Normal breath sounds.   Abdominal:      General: Abdomen is flat.      Palpations: Abdomen is soft.      Comments: Improved RUQ pain   Musculoskeletal: Normal range of motion.         General: No tenderness.   Skin:     General: Skin is warm and dry.      Capillary Refill: Capillary refill takes less than 2 seconds.   Neurological:      General: No focal deficit present.      Mental Status: She is alert and oriented to person, place, and time.   Psychiatric:         Mood and Affect: Mood normal.         Behavior: Behavior normal.         Fluids    Intake/Output Summary (Last 24 hours) at 9/18/2020 1158  Last data filed at 9/18/2020 0800  Gross per 24 hour   Intake 360 ml   Output --   Net 360 ml       Laboratory  Recent Labs     09/16/20  0453 09/17/20  0012 09/18/20  0252   WBC 15.7* 14.2* 17.9*   RBC 5.34 5.47* 5.70*   HEMOGLOBIN 15.4 16.0 16.6*   HEMATOCRIT 48.6* 50.0* 52.8*   MCV 91.0 91.4 92.6   MCH  28.8 29.3 29.1   MCHC 31.7* 32.0* 31.4*   RDW 46.7 47.1 47.2   PLATELETCT 194 185 203   MPV 10.1 9.7 10.2     Recent Labs     09/16/20  0453 09/17/20  0012 09/18/20  0252   SODIUM 140 136 140   POTASSIUM 3.5* 3.9 5.6*   CHLORIDE 94* 93* 94*   CO2 31 32 31   GLUCOSE 149* 117* 131*   BUN 14 12 26*   CREATININE 0.84 0.68 1.35   CALCIUM 9.1 9.3 9.6     Recent Labs     09/16/20  1128 09/17/20  0012 09/18/20  0252   APTT 33.5  --  27.7   INR 1.36* 1.10 1.05               Imaging  [id number] STENT - TUBE   Final Result      Common bile duct stent placed. Considerable biliary dilatation confirmed      DX-PORTABLE FLUORO > 1 HOUR   Final Result      Portable fluoroscopy utilized for 2.2 minutes.         INTERPRETING LOCATION: [address] [place] [address], [facility], [facility]      [id number]   Final Result      1.  Positive for choledocholithiasis with 12 mm and 11 mm stone in the distal common bile duct      2.  Associated marked intrahepatic and extra hepatic biliary dilation with common bile duct measuring up to 21 mm      3.  Mild pancreatic duct dilation measuring 4 mm      4.  Hepatomegaly and hepatic steatosis      5.  Incidental renal cysts      No follow up imaging is recommended per consensus guidelines of the 2019 ACR Incidental Findings Committee for probably benign incidental simple appearing renal cystic lesion(s) based on imaging criteria.      DX-CHEST-PORTABLE (1 VIEW)   Final Result      Cardiomegaly.           Assessment/Plan  Other pulmonary embolism without acute cor pulmonale (HCC)- (present on admission)  Assessment & Plan  - b/l pulmonary embolism diagnosed February  - Patient has completed 6 months of anticoagulation for first PE. However, due to large nature of embolism and that embolism occurred while on eliquis, I think it is imperative to bridge coumadin with heparin gtt. Heparin gtt restarted after ERCP. Coumadin to start today    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 with obstruction- ERCP EXTRACTON/  SPHINCTEROTOMY, recurrent Feb 2020- (present on admission)  Assessment & Plan  - s/p ERCP 9/17 with stone removal and CBD stent placed. LFTs and bilirubin trending down  - Possible cholangitis, continue Ceftriaxone due to leukocytosis (day 6). May discontinue tomorrow if leukocytosis trending down.      Persistent atrial fibrillation (HCC)- (present on admission)  Assessment & Plan  - Patient with history of atrial fibrillation secondary to b/l pulmonary embolism. She required ICU for heart rate control on admission.  - HR well controlled  - Continue Cardizem and Metoprolol  - Heparin gtt resumed this morning, will resume Coumadin today    Acute kidney injury superimposed on chronic kidney disease (HCC)  Assessment & Plan  - Patient was NPO for procedure yesterday and she reports decreased PO intake overnight  - Likely secondary to hypovolemia, will give 1L fluid over afternoon. Hold torsemide. Repeat labs today    Hyperkalemia  Assessment & Plan  - Patient previously hypokalemic likely secondary to torsemide use and was replete daily with oral potassium  - With acute kidney injury potassium elevated today. Potassium replacement discontinued this morning. Will repeat labs today.    Acute cystitis with hematuria  Assessment & Plan  - Symptoms resolved  - Completed 4 days of Abx, Abx continuing for likely cholangitis.    Acute on chronic respiratory failure with hypoxia (HCC)- (present on admission)  Assessment & Plan  - Patient has chronic COPD and pulmonary edema on admission, pulmonary edema likely secondary to uncontrolled afib and blood pressure  - No evidence of COPD exacerbation at this point, continue with RT protocol nebulizer treatments oxygen support  - No evidence of acute exacerbation  - Will need new order for Xoponex / duoneb on discharge and will consider home health    Essential hypertension- (present on admission)  Assessment & Plan  - Continue with blood pressure management, continue Cardizem and  Metoprolol    Obesity, morbid, BMI 40.0-49.9 ([facility])- (present on admission)  Assessment & Plan  Body mass index is 36.09 kg/m².  - Patient will need outpatient weight loss management program.    Musculoskeletal neck pain  Assessment & Plan  - Patient with history of chronic neck pain  - Evaluated and neurologically intact without alarm symptoms. Likely from bed or positioning during ERCP  - Baclofen added and increased pain medication    Mixed hyperlipidemia- (present on admission)  Assessment & Plan  - Low-fat low-cholesterol diet  - Continue Statin       VTE prophylaxis: Heparin gtt and resume coumadin for Afib and history of PE

## 2020-09-18 NOTE — ASSESSMENT & PLAN NOTE
- Patient previously hypokalemic likely secondary to torsemide use and was replete daily with oral potassium  - Resolved

## 2020-09-18 NOTE — THERAPY
Occupational Therapy   Initial Evaluation     Patient Name: Rufina Macias  Age:  75 y.o., Sex:  female  Medical Record #: [id number]  Today's Date: 9/18/2020     Precautions  Precautions: Fall Risk    Assessment  Patient is 75 y.o. female, admit with increasing abdominal pain- RVR, A- fib. Pt  with a pmhx significant for PE- in February. Pt lives with her daughter - who works during the day. Pt presents with c/o generalized pain and neck pain. Despite c/o pain and generalized deconditioning , Pt is at the Supervised to Min A level for ADLS and functional mobility.  Pt will benefit from Acute OT services as well as home health OT . Pt was encouraged to get up for meals and AMB to  or BSC to increase independence with mobility.    Plan    Recommend Occupational Therapy 3 times per week until therapy goals are met for the following treatments:  Self Care/Activities of Daily Living, Therapeutic Activities and Therapeutic Exercises.    DC Equipment Recommendations: None  Discharge Recommendations: Recommend home health for continued occupational therapy services      09/18/20 1444   Prior Living Situation   Prior Services Intermittent Physical Support for ADL Per Family   Housing / Facility 1 Story House   Steps Into Home 1   Steps In Home 0   Bathroom Set up Bathtub / Shower Combination   Equipment Owned Tub / Shower Seat;4-Wheel Walker   Lives with - Patient's Self Care Capacity Adult Children   Comments Daughter works during the day.   Prior Level of ADL Function   Self Feeding Independent   Grooming / Hygiene Independent   Bathing Independent   Dressing Independent   Toileting Independent   Prior Level of IADL Function   Medication Management Unable To Determine At This Time   Laundry Requires Assist   Kitchen Mobility Independent   Finances Unable To Determine At This Time   Home Management Independent   Shopping Independent   Prior Level Of Mobility Supervision With Device in Community   Driving /  Transportation Relatives / Others Provide Transportation   Occupation (Pre-Hospital Vocational) Retired Due To Age   Leisure Interests Reading   History of Falls   History of Falls No   Precautions   Precautions Fall Risk   Pain 0 - 10 Group   Therapist Pain Assessment During Activity;Nurse Notified  (Minimal c/o generalized whole body and neck pain)   Balance Assessment   Sitting Balance (Static) Good   Sitting Balance (Dynamic) Fair +   Standing Balance (Static) Fair +   Standing Balance (Dynamic) Fair   Weight Shift Sitting Good   Weight Shift Standing Fair   Bed Mobility    Supine to Sit Supervised   Sit to Supine Supervised   Scooting Modified Independent   ADL Assessment   Eating Modified Independent   Grooming Supervision;Seated   Upper Body Dressing Supervision   Lower Body Dressing Minimal Assist   Toileting Supervision   Functional Mobility   Sit to Stand Supervised   Bed, Chair, Wheelchair Transfer Supervised   Toilet Transfers Supervised   Transfer Method Stand Step   Mobility 4 WW   Patient / Family Goals   Patient / Family Goal #1 Home with family   Short Term Goals   Short Term Goal # 1 Pt will be able to tolerate sitting up in a chair, 3x daily for meals.   Short Term Goal # 2 Pt will be able to complete functional transfers with distant Supervision   Short Term Goal # 3 Pt will be able to complete FB dressing  with Supervision

## 2020-09-18 NOTE — PROGRESS NOTES
Warfarin ordered for 0600 but was missed because the med was not available to be pulled in the [facility]. Pharmacy was consulted and stated that the warfarin was to be given at 1800 per their policy. Order was verified with Dr. Caputo to be given at 1800 tonight.

## 2020-09-18 NOTE — CARE PLAN
Problem: Safety  Goal: Will remain free from injury  Outcome: PROGRESSING AS EXPECTED  Note: Pt call light and belongings with in reach, bed in locked and low position, treaded socks on, bed rails up x2       Problem: Pain Management  Goal: Pain level will decrease to patient's comfort goal  Outcome: PROGRESSING AS EXPECTED  Note: Patients pain managed with PRN pain medications.

## 2020-09-18 NOTE — THERAPY
Physical Therapy   Initial Evaluation     Patient Name: Rufina Macias  Age:  75 y.o., Sex:  female  Medical Record #: [id number]  Today's Date: 9/18/2020     Precautions: Fall Risk    Assessment  Patient is 75 y.o. female who was recently admitted for A-fib, abdominal pain, and recurrent choledocholithiasis. Pt is now s/p Endoscopic retrograde cholangiography with biliary   cannulation, extension of sphincterotomy, stone fragmentation, stone removal, and stent placement. Pt presented to PT with impaired balance, impaired gait, impaired coordination, weakness, pain, and dec activity tolerance. These impairments are affecting the patients ability to safely mobility at her prior level of function and puts her at a high fall risk. With current functional mobility pt would benefit from post acute therapy services prior to d/c home, however, if pt is able to increased mobilization to 3x/day and manage going up/down 1 step pt may be able to d/c home with HH therapy services. Will continue to follow while in house.     Plan    Recommend Physical Therapy 5 times per week until therapy goals are met for the following treatments:  Bed Mobility, Community Re-integration, Equipment, Gait Training, Manual Therapy, Neuro Re-Education / Balance, Self Care/Home Evaluation, Stair Training, Therapeutic Activities and Therapeutic Exercises    DC Equipment Recommendations: Unable to determine at this time  Discharge Recommendations: Other -(depends on progress while in hospital ) See in bold abov     Objective     09/18/20 1530   Prior Living Situation   Prior Services Intermittent Physical Support for ADL Per Family   Housing / Facility 1 Story House   Steps Into Home 1   Steps In Home 0   Bathroom Set up Bathtub / Shower Combination   Equipment Owned Tub / Shower Seat;4-Wheel Walker   Lives with - Patient's Self Care Capacity Adult Children   Comments pt reports her dtr works during the day    Prior Level of Functional Mobility    Bed Mobility Independent   Transfer Status Independent   Ambulation Independent   Distance Ambulation (Feet)   (household distances)   Assistive Devices Used 4-Wheel Walker   Stairs Independent   Comments pt reports of an IPLOF    Gait Analysis   Gait Level Of Assist Minimal Assist   Assistive Device 4 Wheel Walker   Distance (Feet) 50   # of Times Distance was Traveled 1   Deviation Step To;Bradykinetic;Shuffled Gait;Decreased Heel Strike   Weight Bearing Status fwb   Comments primairly limited by weakness and fatigue    Bed Mobility    Supine to Sit Supervised   Sit to Supine Supervised   Scooting Modified Independent   Comments HOB elevated and rails up    Functional Mobility   Sit to Stand Minimal Assist   Bed, Chair, Wheelchair Transfer Minimal Assist   Patient / Family Goals    Patient / Family Goal #1 to go home   Short Term Goals    Short Term Goal # 1 pt will go sit<>stand w/mod I in 6tx w/fww for safe d/c home   Short Term Goal # 2 pt will transfer bed<>chair w/Mod I in 6tx w/4WW for safe d/c home    Short Term Goal # 3 pt will ambulate for 150ft w/4ww w/Mod I in 6tx for safe d/c home    Short Term Goal # 4 pt will go up/down 1 step w/spv in 6tx for safe d/c home

## 2020-09-18 NOTE — PROGRESS NOTES
Telemetry Shift Summary    Rhythm Afib w/BBB  HR Range   Ectopy rPVC  Measurements -/0.14/-        Normal Values  Rhythm SR  HR Range    Measurements 0.12-0.20 / 0.06-0.10  / 0.30-0.52

## 2020-09-18 NOTE — CARE PLAN
Problem: Communication  Goal: The ability to communicate needs accurately and effectively will improve  Outcome: PROGRESSING AS EXPECTED  Note: Pt will feel comfortable communicating questions and concerns with treatment team. Will continue to foster this relationship.       Problem: Pain Management  Goal: Pain level will decrease to patient's comfort goal  Outcome: PROGRESSING AS EXPECTED  Note: Medicated per MAR

## 2020-09-18 NOTE — PROGRESS NOTES
Assessment completed, patient A&Ox4, all medications given as ordered. Patient fatigued, left to rest. No other needs at this time.

## 2020-09-19 LAB
ALBUMIN SERPL BCP-MCNC: 2.6 G/DL (ref 3.2–4.9)
ALBUMIN/GLOB SERPL: 0.8 G/DL
ALP SERPL-CCNC: 197 U/L (ref 30–99)
ALT SERPL-CCNC: 76 U/L (ref 2–50)
ANION GAP SERPL CALC-SCNC: 9 MMOL/L (ref 7–16)
AST SERPL-CCNC: 32 U/L (ref 12–45)
BASOPHILS # BLD AUTO: 0.1 % (ref 0–1.8)
BASOPHILS # BLD: 0.02 K/UL (ref 0–0.12)
BILIRUB SERPL-MCNC: 0.9 MG/DL (ref 0.1–1.5)
BUN SERPL-MCNC: 31 MG/DL (ref 8–22)
CALCIUM SERPL-MCNC: 8.6 MG/DL (ref 8.4–10.2)
CHLORIDE SERPL-SCNC: 96 MMOL/L (ref 96–112)
CO2 SERPL-SCNC: 32 MMOL/L (ref 20–33)
CREAT SERPL-MCNC: 0.82 MG/DL (ref 0.5–1.4)
EOSINOPHIL # BLD AUTO: 0.01 K/UL (ref 0–0.51)
EOSINOPHIL NFR BLD: 0.1 % (ref 0–6.9)
ERYTHROCYTE [DISTWIDTH] IN BLOOD BY AUTOMATED COUNT: 47.8 FL (ref 35.9–50)
GLOBULIN SER CALC-MCNC: 3.3 G/DL (ref 1.9–3.5)
GLUCOSE SERPL-MCNC: 110 MG/DL (ref 65–99)
HCT VFR BLD AUTO: 45.5 % (ref 37–47)
HGB BLD-MCNC: 14.3 G/DL (ref 12–16)
IMM GRANULOCYTES # BLD AUTO: 0.07 K/UL (ref 0–0.11)
IMM GRANULOCYTES NFR BLD AUTO: 0.4 % (ref 0–0.9)
INR PPP: 1.05 (ref 0.87–1.13)
LYMPHOCYTES # BLD AUTO: 2.43 K/UL (ref 1–4.8)
LYMPHOCYTES NFR BLD: 15.3 % (ref 22–41)
MCH RBC QN AUTO: 29.3 PG (ref 27–33)
MCHC RBC AUTO-ENTMCNC: 31.4 G/DL (ref 33.6–35)
MCV RBC AUTO: 93.2 FL (ref 81.4–97.8)
MONOCYTES # BLD AUTO: 1.22 K/UL (ref 0–0.85)
MONOCYTES NFR BLD AUTO: 7.7 % (ref 0–13.4)
NEUTROPHILS # BLD AUTO: 12.15 K/UL (ref 2–7.15)
NEUTROPHILS NFR BLD: 76.4 % (ref 44–72)
NRBC # BLD AUTO: 0 K/UL
NRBC BLD-RTO: 0 /100 WBC
PLATELET # BLD AUTO: 181 K/UL (ref 164–446)
PMV BLD AUTO: 10.9 FL (ref 9–12.9)
POTASSIUM SERPL-SCNC: 4.1 MMOL/L (ref 3.6–5.5)
PROT SERPL-MCNC: 5.9 G/DL (ref 6–8.2)
PROTHROMBIN TIME: 13.4 SEC (ref 12–14.6)
RBC # BLD AUTO: 4.88 M/UL (ref 4.2–5.4)
SODIUM SERPL-SCNC: 137 MMOL/L (ref 135–145)
UFH PPP CHRO-ACNC: 0.34 IU/ML
WBC # BLD AUTO: 15.9 K/UL (ref 4.8–10.8)

## 2020-09-19 PROCEDURE — 770020 HCHG ROOM/CARE - TELE (206)

## 2020-09-19 PROCEDURE — A9270 NON-COVERED ITEM OR SERVICE: HCPCS | Performed by: STUDENT IN AN ORGANIZED HEALTH CARE EDUCATION/TRAINING PROGRAM

## 2020-09-19 PROCEDURE — 85025 COMPLETE CBC W/AUTO DIFF WBC: CPT

## 2020-09-19 PROCEDURE — 85520 HEPARIN ASSAY: CPT

## 2020-09-19 PROCEDURE — A9270 NON-COVERED ITEM OR SERVICE: HCPCS | Performed by: HOSPITALIST

## 2020-09-19 PROCEDURE — 700111 HCHG RX REV CODE 636 W/ 250 OVERRIDE (IP): Performed by: STUDENT IN AN ORGANIZED HEALTH CARE EDUCATION/TRAINING PROGRAM

## 2020-09-19 PROCEDURE — 80053 COMPREHEN METABOLIC PANEL: CPT

## 2020-09-19 PROCEDURE — 99232 SBSQ HOSP IP/OBS MODERATE 35: CPT | Performed by: STUDENT IN AN ORGANIZED HEALTH CARE EDUCATION/TRAINING PROGRAM

## 2020-09-19 PROCEDURE — 700102 HCHG RX REV CODE 250 W/ 637 OVERRIDE(OP): Performed by: STUDENT IN AN ORGANIZED HEALTH CARE EDUCATION/TRAINING PROGRAM

## 2020-09-19 PROCEDURE — 700101 HCHG RX REV CODE 250: Performed by: HOSPITALIST

## 2020-09-19 PROCEDURE — A9270 NON-COVERED ITEM OR SERVICE: HCPCS | Performed by: INTERNAL MEDICINE

## 2020-09-19 PROCEDURE — 700102 HCHG RX REV CODE 250 W/ 637 OVERRIDE(OP): Performed by: INTERNAL MEDICINE

## 2020-09-19 PROCEDURE — 700102 HCHG RX REV CODE 250 W/ 637 OVERRIDE(OP): Performed by: HOSPITALIST

## 2020-09-19 PROCEDURE — 85610 PROTHROMBIN TIME: CPT

## 2020-09-19 RX ORDER — TORSEMIDE 20 MG/1
20 TABLET ORAL
Status: DISCONTINUED | OUTPATIENT
Start: 2020-09-20 | End: 2020-09-24 | Stop reason: HOSPADM

## 2020-09-19 RX ORDER — WARFARIN SODIUM 5 MG/1
5 TABLET ORAL
Status: DISCONTINUED | OUTPATIENT
Start: 2020-09-19 | End: 2020-09-19

## 2020-09-19 RX ORDER — WARFARIN SODIUM 7.5 MG/1
7.5 TABLET ORAL
Status: COMPLETED | OUTPATIENT
Start: 2020-09-19 | End: 2020-09-19

## 2020-09-19 RX ORDER — HYDROCODONE BITARTRATE AND ACETAMINOPHEN 5; 325 MG/1; MG/1
2 TABLET ORAL EVERY 4 HOURS PRN
Status: DISCONTINUED | OUTPATIENT
Start: 2020-09-19 | End: 2020-09-24 | Stop reason: HOSPADM

## 2020-09-19 RX ORDER — HYDROCODONE BITARTRATE AND ACETAMINOPHEN 5; 325 MG/1; MG/1
1 TABLET ORAL EVERY 4 HOURS PRN
Status: DISCONTINUED | OUTPATIENT
Start: 2020-09-19 | End: 2020-09-24 | Stop reason: HOSPADM

## 2020-09-19 RX ADMIN — DILTIAZEM HYDROCHLORIDE 60 MG: 30 TABLET, FILM COATED ORAL at 02:04

## 2020-09-19 RX ADMIN — HYDROCODONE BITARTRATE AND ACETAMINOPHEN 2 TABLET: 5; 325 TABLET ORAL at 20:52

## 2020-09-19 RX ADMIN — OXYCODONE HYDROCHLORIDE 15 MG: 5 TABLET ORAL at 02:10

## 2020-09-19 RX ADMIN — POLYETHYLENE GLYCOL 3350 1 PACKET: 17 POWDER, FOR SOLUTION ORAL at 09:36

## 2020-09-19 RX ADMIN — FAMOTIDINE 20 MG: 20 TABLET ORAL at 05:10

## 2020-09-19 RX ADMIN — SENNOSIDES-DOCUSATE SODIUM TAB 8.6-50 MG 2 TABLET: 8.6-5 TAB at 05:12

## 2020-09-19 RX ADMIN — WARFARIN SODIUM 7.5 MG: 7.5 TABLET ORAL at 17:21

## 2020-09-19 RX ADMIN — OXYCODONE HYDROCHLORIDE 15 MG: 5 TABLET ORAL at 09:29

## 2020-09-19 RX ADMIN — DILTIAZEM HYDROCHLORIDE 60 MG: 30 TABLET, FILM COATED ORAL at 09:24

## 2020-09-19 RX ADMIN — DILTIAZEM HYDROCHLORIDE 30 MG: 30 TABLET, FILM COATED ORAL at 14:53

## 2020-09-19 RX ADMIN — FLUTICASONE PROPIONATE 88 MCG: 44 AEROSOL, METERED RESPIRATORY (INHALATION) at 05:15

## 2020-09-19 RX ADMIN — OXYCODONE HYDROCHLORIDE 15 MG: 5 TABLET ORAL at 05:11

## 2020-09-19 RX ADMIN — HYDROCODONE BITARTRATE AND ACETAMINOPHEN 2 TABLET: 5; 325 TABLET ORAL at 16:36

## 2020-09-19 RX ADMIN — DILTIAZEM HYDROCHLORIDE 30 MG: 30 TABLET, FILM COATED ORAL at 21:01

## 2020-09-19 RX ADMIN — HEPARIN SODIUM 22 UNITS/KG/HR: 5000 INJECTION, SOLUTION INTRAVENOUS at 14:57

## 2020-09-19 RX ADMIN — METOPROLOL TARTRATE 100 MG: 50 TABLET, FILM COATED ORAL at 05:12

## 2020-09-19 RX ADMIN — SENNOSIDES-DOCUSATE SODIUM TAB 8.6-50 MG 2 TABLET: 8.6-5 TAB at 17:22

## 2020-09-19 RX ADMIN — BACLOFEN 5 MG: 10 TABLET ORAL at 05:12

## 2020-09-19 RX ADMIN — UMECLIDINIUM BROMIDE AND VILANTEROL TRIFENATATE 1 PUFF: 62.5; 25 POWDER RESPIRATORY (INHALATION) at 05:15

## 2020-09-19 RX ADMIN — METOPROLOL TARTRATE 100 MG: 50 TABLET, FILM COATED ORAL at 17:22

## 2020-09-19 RX ADMIN — BACLOFEN 5 MG: 10 TABLET ORAL at 17:22

## 2020-09-19 RX ADMIN — ROSUVASTATIN CALCIUM 20 MG: 10 TABLET, FILM COATED ORAL at 17:21

## 2020-09-19 RX ADMIN — ZOLPIDEM TARTRATE 10 MG: 5 TABLET ORAL at 20:52

## 2020-09-19 ASSESSMENT — ENCOUNTER SYMPTOMS
PALPITATIONS: 0
NEUROLOGICAL NEGATIVE: 1
EYES NEGATIVE: 1
PSYCHIATRIC NEGATIVE: 1
RESPIRATORY NEGATIVE: 1
CHILLS: 0
FEVER: 0
VOMITING: 0
CARDIOVASCULAR NEGATIVE: 1
SHORTNESS OF BREATH: 0
NAUSEA: 0
NECK PAIN: 0

## 2020-09-19 ASSESSMENT — FIBROSIS 4 INDEX: FIB4 SCORE: 1.66

## 2020-09-19 ASSESSMENT — PAIN DESCRIPTION - PAIN TYPE
TYPE: ACUTE PAIN
TYPE: ACUTE PAIN

## 2020-09-19 NOTE — PROGRESS NOTES
Telemetry Shift Summary    Rhythm afib BBB  HR Range 70s-110s (briefly 200s c ambulation)  Ectopy rare PVCs  Measurements --/0.14/--        Normal Values  Rhythm SR  HR Range    Measurements 0.12-0.20 / 0.06-0.10  / 0.30-0.52

## 2020-09-19 NOTE — CARE PLAN
Problem: Safety  Goal: Will remain free from falls  Outcome: PROGRESSING AS EXPECTED  Intervention: Implement fall precautions  Flowsheets (Taken 9/18/2020 2000)  Environmental Precautions:   Treaded Slipper Socks on Patient   Personal Belongings, Wastebasket, Call Bell etc. in Easy Reach   Transferred to Stronger Side   Report Given to Other Health Care Providers Regarding Fall Risk   Bed in Low Position   Communication Sign for Patients & Families   Mobility Assessed & Appropriate Sign Placed     Problem: Infection  Goal: Will remain free from infection  Outcome: PROGRESSING AS EXPECTED  Note: Pt educated on hand and oral hygiene and their roll in infection prevention.  Standard precautions used in pt care.

## 2020-09-19 NOTE — PROGRESS NOTES
Inpatient Anticoagulation Service Note    Date: 2020    Reason for Anticoagulation: Atrial Fibrillation   Target INR: 2.0 to 3.0  [id number] VASc Score: 6  HAS-BLED Score: 1   Hemoglobin Value: (!) 16.6  Hematocrit Value: (!) 52.8  Lab Platelet Value: 203    INR from last 7 days     Date/Time INR Value    20 0331  1.05    20 0252  1.05    20 0012  1.1    20 1128  (!) 1.36    20 0453  (!) 1.82    09/15/20 0452  (!) 3.33    20 0500  (!) 4    20 1645  (!) 3.2        Dose from last 7 days     Date/Time Dose (mg)    20 0700  7.5    20 0620  5    20 0600  --    20 0600  0    20 2121  2.5        Significant Interactions: Statin  Bridge Therapy: Yes  Date of Last VTE Event: 20  Bridge Therapy Start Date: 02  Days of Overlap Therapy: 2    Reversal Agent Administered: phytonadione 5 mg given 9/15/20    Plan:  Warfarin 7.5 mg PO tonight for INR 1.05 as d/w Dr. Caputo.  Education Material Provided?: No  Pharmacist suggested discharge dosin mg daily     Bernie Cui, OsielD

## 2020-09-19 NOTE — PROGRESS NOTES
Telemetry Shift Summary    Rhythm a fib  HR Range  up tp 188  Ectopy rPVC  Measurements -/0.14/-    Per Kathya MT    Normal Values  Rhythm SR  HR Range    Measurements 0.12-0.20 / 0.06-0.10  / 0.30-0.52

## 2020-09-19 NOTE — PROGRESS NOTES
Hospital Medicine Daily Progress Note    Date of Service  9/19/2020    Chief Complaint  75 y.o. female admitted 9/13/2020 with abdominal pain    Hospital Course    74 yo female with known hx of afib with previous admission of RVR, hx of PE in [facility] on anticoagulation, complicated gall stone hx with s/p gall bladder surgery 5 years ago and has had multiple problems with common bile duct stones with ERCP this past February to remove the debris (previous in 2018 and remove 10-15 stones). She has presented with abdominal pain and elevated LFT including alk phos and seen by Dr. Galeana (GI) for occurrence of choledocholithiasis and obstruction. She also was found to be in pulmonary edema and afib RVR with also UTI (EColi). Patient was started on Abx with ceftriaxone.    Patient was admitted to ICU for management of Afib with RVR and pulmonary edema. Patient HR was controlled and she was transferred to the floor.    MRCP displayed choledocholithiasis. ERCP was performed 9/17 with CBD stones and CBD stent was placed.    Due to large PE while on Eliquis, once coumadin was held and subtherapeutic patient was placed on heparin gtt. Heparin was restarted day after ERCP and bridge to therapeutic coumadin.        Interval Problem Update  No acute events overnight. Neck pain and abdominal pain improved. Patient requests to restart home hydrocodone dose.    Consultants/Specialty  GI - Dr. Galeana  [facility]    Code Status  Full Code    Disposition  Pending therapeutic INR    Review of Systems  Review of Systems   Constitutional: Negative for chills and fever.   HENT: Negative.    Eyes: Negative.    Respiratory: Negative.  Negative for shortness of breath.    Cardiovascular: Negative.  Negative for chest pain and palpitations.   Gastrointestinal: Negative for nausea and vomiting.   Genitourinary: Negative.    Musculoskeletal: Negative for neck pain.   Skin: Negative.    Neurological: Negative.     Endo/Heme/Allergies: Negative.    Psychiatric/Behavioral: Negative.         Physical Exam  Temp:  [36.2 °C (97.2 °F)-36.4 °C (97.6 °F)] 36.4 °C (97.5 °F)  Pulse:  [] 100  Resp:  [16-18] 16  BP: (106-163)/(59-85) 106/63  SpO2:  [94 %-99 %] 96 %    Physical Exam  Constitutional:       General: She is not in acute distress.     Appearance: She is obese.   HENT:      Head: Normocephalic and atraumatic.      Mouth/Throat:      Mouth: Mucous membranes are moist.      Pharynx: Oropharynx is clear.   Eyes:      Extraocular Movements: Extraocular movements intact.      Pupils: Pupils are equal, round, and reactive to light.   Neck:      Comments: Neck soft tissue pain with decreased ROM due to pain. Neurologically intact.  Cardiovascular:      Rate and Rhythm: Normal rate. Rhythm irregular.   Pulmonary:      Effort: Pulmonary effort is normal. No respiratory distress.      Breath sounds: Normal breath sounds.   Abdominal:      Palpations: Abdomen is soft.      Tenderness: There is no guarding.      Comments: Improved RUQ pain   Musculoskeletal: Normal range of motion.         General: No tenderness.   Skin:     General: Skin is warm and dry.      Capillary Refill: Capillary refill takes less than 2 seconds.   Neurological:      General: No focal deficit present.      Mental Status: She is alert and oriented to person, place, and time.   Psychiatric:         Mood and Affect: Mood normal.         Behavior: Behavior normal.         Fluids    Intake/Output Summary (Last 24 hours) at 9/19/2020 1243  Last data filed at 9/18/2020 1745  Gross per 24 hour   Intake --   Output 125 ml   Net -125 ml       Laboratory  Recent Labs     09/17/20  0012 09/18/20  0252 09/19/20  0331   WBC 14.2* 17.9* 15.9*   RBC 5.47* 5.70* 4.88   HEMOGLOBIN 16.0 16.6* 14.3   HEMATOCRIT 50.0* 52.8* 45.5   MCV 91.4 92.6 93.2   MCH 29.3 29.1 29.3   MCHC 32.0* 31.4* 31.4*   RDW 47.1 47.2 47.8   PLATELETCT 185 203 181   MPV 9.7 10.2 10.9     Recent Labs      09/18/20  0252 09/18/20  1748 09/19/20  0331   SODIUM 140 134* 137   POTASSIUM 5.6* 4.5 4.1   CHLORIDE 94* 95* 96   CO2 31 23 32   GLUCOSE 131* 134* 110*   BUN 26* 31* 31*   CREATININE 1.35 1.10 0.82   CALCIUM 9.6 8.8 8.6     Recent Labs     09/17/20  0012 09/18/20  0252 09/19/20  0331   APTT  --  27.7  --    INR 1.10 1.05 1.05               Imaging  [id number] STENT - TUBE   Final Result      Common bile duct stent placed. Considerable biliary dilatation confirmed      DX-PORTABLE FLUORO > 1 HOUR   Final Result      Portable fluoroscopy utilized for 2.2 minutes.         INTERPRETING LOCATION: [address] [place] [address], [facility], [facility]      [id number]   Final Result      1.  Positive for choledocholithiasis with 12 mm and 11 mm stone in the distal common bile duct      2.  Associated marked intrahepatic and extra hepatic biliary dilation with common bile duct measuring up to 21 mm      3.  Mild pancreatic duct dilation measuring 4 mm      4.  Hepatomegaly and hepatic steatosis      5.  Incidental renal cysts      No follow up imaging is recommended per consensus guidelines of the 2019 ACR Incidental Findings Committee for probably benign incidental simple appearing renal cystic lesion(s) based on imaging criteria.      DX-CHEST-PORTABLE (1 VIEW)   Final Result      Cardiomegaly.           Assessment/Plan  Other pulmonary embolism without acute cor pulmonale (HCC)- (present on admission)  Assessment & Plan  - b/l pulmonary embolism diagnosed February  - Patient has completed 6 months of anticoagulation for first PE. However, due to large nature of embolism and that embolism occurred while on eliquis, I think it is imperative to bridge coumadin with heparin gtt. Continue Heparin gtt and Coumadin    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 with obstruction- ERCP EXTRACTON/ SPHINCTEROTOMY, recurrent Feb 2020- (present on admission)  Assessment & Plan  - s/p ERCP 9/17 with stone removal and CBD stent placed. LFTs and  bilirubin trending down  - Possible cholangitis s/p ERCP, received 6 days of Ceftriaxone. Leukocytosis trending down and afebrile, stop Abx      Persistent atrial fibrillation (HCC)- (present on admission)  Assessment & Plan  - Patient with history of atrial fibrillation secondary to b/l pulmonary embolism. She required ICU for heart rate control on admission.  - HR well controlled  - Patient more bradycardic today with HR occasionally in the 40s, Asymptomatic. Continue Metoprolol, decrease Cardizem to 30mg. Continue to monitor  - Continue to bridge with Heparin gtt until therapeutic, continue Coumadin.    Acute kidney injury superimposed on chronic kidney disease ([facility])  Assessment & Plan  - Resolved  - Resume home torsemide tomorrow    Hyperkalemia  Assessment & Plan  - Patient previously hypokalemic likely secondary to torsemide use and was replete daily with oral potassium  - Resolved    Acute cystitis with hematuria  Assessment & Plan  - Resolved    Acute on chronic respiratory failure with hypoxia ([facility])- (present on admission)  Assessment & Plan  - Patient has chronic COPD and pulmonary edema on admission, pulmonary edema likely secondary to uncontrolled afib and blood pressure  - No evidence of COPD exacerbation at this point, continue with RT protocol nebulizer treatments oxygen support  - No evidence of acute exacerbation  - Will need new order for Xoponex / duoneb on discharge and will consider home health    Essential hypertension- (present on admission)  Assessment & Plan  - Continue with blood pressure management, continue Cardizem and Metoprolol    Obesity, morbid, BMI 40.0-49.9 ([facility])- (present on admission)  Assessment & Plan  Body mass index is 36.09 kg/m².  - Patient will need outpatient weight loss management program.    Musculoskeletal neck pain  Assessment & Plan  - Improving  - Patient with history of chronic neck pain on narcotics at home  - Continue Baclofen and change oxycodone to home  hydrocodone    Mixed hyperlipidemia- (present on admission)  Assessment & Plan  - Low-fat low-cholesterol diet  - Continue Statin       VTE prophylaxis: Heparin gtt and coumadin for Afib and PE

## 2020-09-19 NOTE — PROGRESS NOTES
Report received from Lino FRENCH. Plan of care discussed. Patient resting comfortably in bed, denies any needs at this time. Heparin gtt verified. Safety precautions in place.

## 2020-09-19 NOTE — PROGRESS NOTES
Per CINTIA Mccloud, patient's HR is in A fib sustaining 50-60s, has hit 42 and continues to dip down to 40s. Dr. Caputo notified and will decrease frequency of PO Cardizem.

## 2020-09-19 NOTE — PROGRESS NOTES
Telemetry Shift Summary    Rhythm A fib w/BBB  HR Range 70-90s, low 42, high 142 (with ambulation)  Ectopy rTriplets, rPVCs  Measurements -/.16/-        Normal Values  Rhythm SR  HR Range    Measurements 0.12-0.20 / 0.06-0.10  / 0.30-0.52

## 2020-09-19 NOTE — CARE PLAN
Problem: Safety  Goal: Will remain free from injury  Outcome: PROGRESSING AS EXPECTED   Patient's call light is within reach, hourly rounding in place, patient uses call light appropriately.     Problem: Knowledge Deficit  Goal: Knowledge of disease process/condition, treatment plan, diagnostic tests, and medications will improve  Outcome: PROGRESSING AS EXPECTED   Discussed plan of care with patient with patient including pain medication and A fib mediations ordered. Allowed time for questions, patient agreed and verbalized understanding.

## 2020-09-19 NOTE — PROGRESS NOTES
Report received from GILLIAN Coppola. Plan of care discussed. Patient resting comfortably in bed, declines any further needs at this time. Safety precautions in place.

## 2020-09-19 NOTE — PROGRESS NOTES
Patient is resting comfortably in cardiac chair, respirations are even and unlabored. Safety precautions in place.

## 2020-09-19 NOTE — PROGRESS NOTES
Dr. Caputo notified of patient's scheduled PO Cardizem having parameters of holding if patient's HR is less than 80. Patient's current HR is in A fib 75. MD changed parameters for PO Cardizem and requested RN to administer medication, see MAR.     Assessment completed, patient is alert and oriented x 4. Patient is groggy this morning but easily aroused. Patient stated she was wondering if she should go back on her norco but is requesting one more dose of oxy 15 for 6/10 back and neck pain, medicated per MAR and updated MD, who stated he will change order later in the day. Safety precautions in place.

## 2020-09-20 LAB
ALBUMIN SERPL BCP-MCNC: 2.8 G/DL (ref 3.2–4.9)
ALBUMIN/GLOB SERPL: 0.8 G/DL
ALP SERPL-CCNC: 187 U/L (ref 30–99)
ALT SERPL-CCNC: 61 U/L (ref 2–50)
ANION GAP SERPL CALC-SCNC: 10 MMOL/L (ref 7–16)
AST SERPL-CCNC: 26 U/L (ref 12–45)
BASOPHILS # BLD AUTO: 0.2 % (ref 0–1.8)
BASOPHILS # BLD: 0.02 K/UL (ref 0–0.12)
BILIRUB SERPL-MCNC: 0.9 MG/DL (ref 0.1–1.5)
BUN SERPL-MCNC: 24 MG/DL (ref 8–22)
CALCIUM SERPL-MCNC: 8.7 MG/DL (ref 8.4–10.2)
CHLORIDE SERPL-SCNC: 96 MMOL/L (ref 96–112)
CO2 SERPL-SCNC: 30 MMOL/L (ref 20–33)
CREAT SERPL-MCNC: 0.78 MG/DL (ref 0.5–1.4)
EOSINOPHIL # BLD AUTO: 0.04 K/UL (ref 0–0.51)
EOSINOPHIL NFR BLD: 0.3 % (ref 0–6.9)
ERYTHROCYTE [DISTWIDTH] IN BLOOD BY AUTOMATED COUNT: 48.1 FL (ref 35.9–50)
GLOBULIN SER CALC-MCNC: 3.3 G/DL (ref 1.9–3.5)
GLUCOSE SERPL-MCNC: 110 MG/DL (ref 65–99)
HCT VFR BLD AUTO: 46.5 % (ref 37–47)
HGB BLD-MCNC: 14.4 G/DL (ref 12–16)
IMM GRANULOCYTES # BLD AUTO: 0.06 K/UL (ref 0–0.11)
IMM GRANULOCYTES NFR BLD AUTO: 0.5 % (ref 0–0.9)
INR PPP: 1.16 (ref 0.87–1.13)
LYMPHOCYTES # BLD AUTO: 2.6 K/UL (ref 1–4.8)
LYMPHOCYTES NFR BLD: 21 % (ref 22–41)
MAGNESIUM SERPL-MCNC: 2.2 MG/DL (ref 1.5–2.5)
MCH RBC QN AUTO: 29 PG (ref 27–33)
MCHC RBC AUTO-ENTMCNC: 31 G/DL (ref 33.6–35)
MCV RBC AUTO: 93.8 FL (ref 81.4–97.8)
MONOCYTES # BLD AUTO: 0.97 K/UL (ref 0–0.85)
MONOCYTES NFR BLD AUTO: 7.8 % (ref 0–13.4)
NEUTROPHILS # BLD AUTO: 8.69 K/UL (ref 2–7.15)
NEUTROPHILS NFR BLD: 70.2 % (ref 44–72)
NRBC # BLD AUTO: 0 K/UL
NRBC BLD-RTO: 0 /100 WBC
PLATELET # BLD AUTO: 173 K/UL (ref 164–446)
PMV BLD AUTO: 10.4 FL (ref 9–12.9)
POTASSIUM SERPL-SCNC: 3.7 MMOL/L (ref 3.6–5.5)
PROT SERPL-MCNC: 6.1 G/DL (ref 6–8.2)
PROTHROMBIN TIME: 14.5 SEC (ref 12–14.6)
RBC # BLD AUTO: 4.96 M/UL (ref 4.2–5.4)
SODIUM SERPL-SCNC: 136 MMOL/L (ref 135–145)
UFH PPP CHRO-ACNC: 0.58 IU/ML
WBC # BLD AUTO: 12.4 K/UL (ref 4.8–10.8)

## 2020-09-20 PROCEDURE — 85610 PROTHROMBIN TIME: CPT

## 2020-09-20 PROCEDURE — 770020 HCHG ROOM/CARE - TELE (206)

## 2020-09-20 PROCEDURE — 700102 HCHG RX REV CODE 250 W/ 637 OVERRIDE(OP): Performed by: STUDENT IN AN ORGANIZED HEALTH CARE EDUCATION/TRAINING PROGRAM

## 2020-09-20 PROCEDURE — A9270 NON-COVERED ITEM OR SERVICE: HCPCS | Performed by: STUDENT IN AN ORGANIZED HEALTH CARE EDUCATION/TRAINING PROGRAM

## 2020-09-20 PROCEDURE — 99232 SBSQ HOSP IP/OBS MODERATE 35: CPT | Performed by: STUDENT IN AN ORGANIZED HEALTH CARE EDUCATION/TRAINING PROGRAM

## 2020-09-20 PROCEDURE — A9270 NON-COVERED ITEM OR SERVICE: HCPCS | Performed by: HOSPITALIST

## 2020-09-20 PROCEDURE — 83735 ASSAY OF MAGNESIUM: CPT

## 2020-09-20 PROCEDURE — 700111 HCHG RX REV CODE 636 W/ 250 OVERRIDE (IP): Performed by: STUDENT IN AN ORGANIZED HEALTH CARE EDUCATION/TRAINING PROGRAM

## 2020-09-20 PROCEDURE — 700102 HCHG RX REV CODE 250 W/ 637 OVERRIDE(OP): Performed by: HOSPITALIST

## 2020-09-20 PROCEDURE — 85025 COMPLETE CBC W/AUTO DIFF WBC: CPT

## 2020-09-20 PROCEDURE — 85520 HEPARIN ASSAY: CPT

## 2020-09-20 PROCEDURE — 80053 COMPREHEN METABOLIC PANEL: CPT

## 2020-09-20 RX ORDER — POTASSIUM CHLORIDE 20 MEQ/1
20 TABLET, EXTENDED RELEASE ORAL DAILY
Status: DISCONTINUED | OUTPATIENT
Start: 2020-09-20 | End: 2020-09-24 | Stop reason: HOSPADM

## 2020-09-20 RX ORDER — WARFARIN SODIUM 5 MG/1
5 TABLET ORAL ONCE
Status: COMPLETED | OUTPATIENT
Start: 2020-09-20 | End: 2020-09-20

## 2020-09-20 RX ADMIN — BACLOFEN 5 MG: 10 TABLET ORAL at 05:34

## 2020-09-20 RX ADMIN — DILTIAZEM HYDROCHLORIDE 30 MG: 30 TABLET, FILM COATED ORAL at 14:25

## 2020-09-20 RX ADMIN — METOPROLOL TARTRATE 100 MG: 50 TABLET, FILM COATED ORAL at 05:33

## 2020-09-20 RX ADMIN — HYDROCODONE BITARTRATE AND ACETAMINOPHEN 2 TABLET: 5; 325 TABLET ORAL at 06:55

## 2020-09-20 RX ADMIN — FAMOTIDINE 20 MG: 20 TABLET ORAL at 05:34

## 2020-09-20 RX ADMIN — HEPARIN SODIUM 22 UNITS/KG/HR: 5000 INJECTION, SOLUTION INTRAVENOUS at 06:55

## 2020-09-20 RX ADMIN — TORSEMIDE 20 MG: 20 TABLET ORAL at 05:35

## 2020-09-20 RX ADMIN — ROSUVASTATIN CALCIUM 20 MG: 10 TABLET, FILM COATED ORAL at 17:23

## 2020-09-20 RX ADMIN — DILTIAZEM HYDROCHLORIDE 30 MG: 30 TABLET, FILM COATED ORAL at 20:17

## 2020-09-20 RX ADMIN — HYDROCODONE BITARTRATE AND ACETAMINOPHEN 2 TABLET: 5; 325 TABLET ORAL at 21:33

## 2020-09-20 RX ADMIN — HYDROCODONE BITARTRATE AND ACETAMINOPHEN 2 TABLET: 5; 325 TABLET ORAL at 17:28

## 2020-09-20 RX ADMIN — WARFARIN SODIUM 5 MG: 5 TABLET ORAL at 18:30

## 2020-09-20 RX ADMIN — METOPROLOL TARTRATE 100 MG: 50 TABLET, FILM COATED ORAL at 17:23

## 2020-09-20 RX ADMIN — HYDROCODONE BITARTRATE AND ACETAMINOPHEN 2 TABLET: 5; 325 TABLET ORAL at 02:17

## 2020-09-20 RX ADMIN — POTASSIUM CHLORIDE 20 MEQ: 1500 TABLET, EXTENDED RELEASE ORAL at 11:49

## 2020-09-20 RX ADMIN — ZOLPIDEM TARTRATE 10 MG: 5 TABLET ORAL at 21:33

## 2020-09-20 RX ADMIN — BACLOFEN 5 MG: 10 TABLET ORAL at 17:24

## 2020-09-20 RX ADMIN — UMECLIDINIUM BROMIDE AND VILANTEROL TRIFENATATE 1 PUFF: 62.5; 25 POWDER RESPIRATORY (INHALATION) at 05:36

## 2020-09-20 RX ADMIN — DILTIAZEM HYDROCHLORIDE 30 MG: 30 TABLET, FILM COATED ORAL at 02:17

## 2020-09-20 RX ADMIN — DILTIAZEM HYDROCHLORIDE 30 MG: 30 TABLET, FILM COATED ORAL at 08:08

## 2020-09-20 RX ADMIN — FLUTICASONE PROPIONATE 88 MCG: 44 AEROSOL, METERED RESPIRATORY (INHALATION) at 05:36

## 2020-09-20 RX ADMIN — HYDROCODONE BITARTRATE AND ACETAMINOPHEN 2 TABLET: 5; 325 TABLET ORAL at 13:19

## 2020-09-20 ASSESSMENT — PAIN DESCRIPTION - PAIN TYPE: TYPE: ACUTE PAIN

## 2020-09-20 ASSESSMENT — ENCOUNTER SYMPTOMS
CHILLS: 0
VOMITING: 0
SHORTNESS OF BREATH: 0
CARDIOVASCULAR NEGATIVE: 1
NECK PAIN: 0
EYES NEGATIVE: 1
PALPITATIONS: 0
NAUSEA: 0
PSYCHIATRIC NEGATIVE: 1
FEVER: 0
RESPIRATORY NEGATIVE: 1
NEUROLOGICAL NEGATIVE: 1

## 2020-09-20 ASSESSMENT — FIBROSIS 4 INDEX: FIB4 SCORE: 1.52

## 2020-09-20 NOTE — PROGRESS NOTES
Report received from GILLIAN Bustillos. Plan of care discussed. Patient resting comfortably in bed, declines any further needs at this time. Safety precautions in place.

## 2020-09-20 NOTE — PROGRESS NOTES
Telemetry Shift Summary    Rhythm A-fib BBB  HR Range 70s-120s  Ectopy rare PVCs  Measurements --/0.16/--        Normal Values  Rhythm SR  HR Range    Measurements 0.12-0.20 / 0.06-0.10  / 0.30-0.52

## 2020-09-20 NOTE — PROGRESS NOTES
Report received from Lino FRENCH. Plan of care discussed. Patient resting comfortably in bed, patient requesting pain medication, Lino RN to give. Heparin gtt verified and new bag placed. Safety precautions in place.

## 2020-09-20 NOTE — CARE PLAN
Problem: Bowel/Gastric:  Goal: Normal bowel function is maintained or improved  Outcome: PROGRESSING AS EXPECTED  Patient had BM last night and this morning after not being able to have BM in several days.     Problem: Knowledge Deficit  Goal: Knowledge of disease process/condition, treatment plan, diagnostic tests, and medications will improve  Outcome: PROGRESSING AS EXPECTED   Discussed plan of care with patient including heparin gtt, pain management and safety. Allowed time for questions, patient agreed and verbalized understanding.

## 2020-09-20 NOTE — CARE PLAN
Problem: Communication  Goal: The ability to communicate needs accurately and effectively will improve  Outcome: PROGRESSING AS EXPECTED  Intervention: Tanana patient and significant other/support system to call light to alert staff of needs  Flowsheets (Taken 9/19/2020 5891)  Oriented to:: All of the Following : Location of Bathroom, Visiting Policy, Unit Routine, Call Light and Bedside Controls, Bedside Rail Policy, Smoking Policy, Rights and Responsibilities, Bedside Report, and Patient Education Notebook     Problem: Venous Thromboembolism (VTW)/Deep Vein Thrombosis (DVT) Prevention:  Goal: Patient will participate in Venous Thrombosis (VTE)/Deep Vein Thrombosis (DVT)Prevention Measures  Outcome: PROGRESSING AS EXPECTED  Flowsheets (Taken 9/19/2020 2000)  Pharmacologic Prophylaxis Used: Unfractionated Heparin

## 2020-09-20 NOTE — PROGRESS NOTES
Hospital Medicine Daily Progress Note    Date of Service  9/20/2020    Chief Complaint  75 y.o. female admitted 9/13/2020 with abdominal pain    Hospital Course    76 yo female with known hx of afib with previous admission of RVR, hx of PE in [facility] on anticoagulation, complicated gall stone hx with s/p gall bladder surgery 5 years ago and has had multiple problems with common bile duct stones with ERCP this past February to remove the debris (previous in 2018 and remove 10-15 stones). She has presented with abdominal pain and elevated LFT including alk phos and seen by Dr. Galeana (GI) for occurrence of choledocholithiasis and obstruction. She also was found to be in pulmonary edema and afib RVR with also UTI (EColi). Patient was started on Abx with ceftriaxone.    Patient was admitted to ICU for management of Afib with RVR and pulmonary edema. Patient HR was controlled and she was transferred to the floor.    MRCP displayed choledocholithiasis. ERCP was performed 9/17 with CBD stones and CBD stent was placed.    Due to large PE while on Eliquis, once coumadin was held and subtherapeutic patient was placed on heparin gtt. Heparin was restarted day after ERCP and bridge to therapeutic coumadin.        Interval Problem Update  No acute events overnight. Patient reports feeling well today and denies any current complaints.    Consultants/Specialty  GI - Dr. Galeana  Pulm/Critical Care - Dr. Love-Margarita    Code Status  Full Code    Disposition  Pending therapeutic INR    Review of Systems  Review of Systems   Constitutional: Negative for chills and fever.   HENT: Negative.    Eyes: Negative.    Respiratory: Negative.  Negative for shortness of breath.    Cardiovascular: Negative.  Negative for chest pain and palpitations.   Gastrointestinal: Negative for nausea and vomiting.   Genitourinary: Negative.    Musculoskeletal: Negative for neck pain.   Skin: Negative.    Neurological: Negative.    Endo/Heme/Allergies:  Negative.    Psychiatric/Behavioral: Negative.         Physical Exam  Temp:  [36.2 °C (97.2 °F)-37.1 °C (98.7 °F)] 37.1 °C (98.7 °F)  Pulse:  [] 100  Resp:  [16-18] 18  BP: (112-141)/(56-91) 133/71  SpO2:  [90 %-99 %] 97 %    Physical Exam  Constitutional:       General: She is not in acute distress.     Appearance: She is obese.   HENT:      Head: Normocephalic and atraumatic.      Mouth/Throat:      Mouth: Mucous membranes are moist.      Pharynx: Oropharynx is clear.   Eyes:      Extraocular Movements: Extraocular movements intact.      Pupils: Pupils are equal, round, and reactive to light.   Neck:      Musculoskeletal: Normal range of motion.      Comments: Baseline chronic neck pain  Cardiovascular:      Rate and Rhythm: Normal rate. Rhythm irregular.   Pulmonary:      Effort: Pulmonary effort is normal. No respiratory distress.      Breath sounds: Normal breath sounds.   Abdominal:      Palpations: Abdomen is soft.      Tenderness: There is no abdominal tenderness. There is no guarding.   Musculoskeletal: Normal range of motion.         General: No tenderness.   Skin:     General: Skin is warm and dry.      Capillary Refill: Capillary refill takes less than 2 seconds.   Neurological:      General: No focal deficit present.      Mental Status: She is alert and oriented to person, place, and time.   Psychiatric:         Mood and Affect: Mood normal.         Behavior: Behavior normal.         Fluids    Intake/Output Summary (Last 24 hours) at 9/20/2020 1350  Last data filed at 9/20/2020 0830  Gross per 24 hour   Intake 120 ml   Output 200 ml   Net -80 ml       Laboratory  Recent Labs     09/18/20  0252 09/19/20  0331 09/20/20  0353   WBC 17.9* 15.9* 12.4*   RBC 5.70* 4.88 4.96   HEMOGLOBIN 16.6* 14.3 14.4   HEMATOCRIT 52.8* 45.5 46.5   MCV 92.6 93.2 93.8   MCH 29.1 29.3 29.0   MCHC 31.4* 31.4* 31.0*   RDW 47.2 47.8 48.1   PLATELETCT 203 181 173   MPV 10.2 10.9 10.4     Recent Labs     09/18/20  4178  09/19/20  0331 09/20/20  0353   SODIUM 134* 137 136   POTASSIUM 4.5 4.1 3.7   CHLORIDE 95* 96 96   CO2 23 32 30   GLUCOSE 134* 110* 110*   BUN 31* 31* 24*   CREATININE 1.10 0.82 0.78   CALCIUM 8.8 8.6 8.7     Recent Labs     09/18/20  0252 09/19/20  0331 09/20/20  0353   APTT 27.7  --   --    INR 1.05 1.05 1.16*               Imaging  [id number] STENT - TUBE   Final Result      Common bile duct stent placed. Considerable biliary dilatation confirmed      DX-PORTABLE FLUORO > 1 HOUR   Final Result      Portable fluoroscopy utilized for 2.2 minutes.         INTERPRETING LOCATION: [address] [place] [address], [place], [address]      [id number]   Final Result      1.  Positive for choledocholithiasis with 12 mm and 11 mm stone in the distal common bile duct      2.  Associated marked intrahepatic and extra hepatic biliary dilation with common bile duct measuring up to 21 mm      3.  Mild pancreatic duct dilation measuring 4 mm      4.  Hepatomegaly and hepatic steatosis      5.  Incidental renal cysts      No follow up imaging is recommended per consensus guidelines of the 2019 ACR Incidental Findings Committee for probably benign incidental simple appearing renal cystic lesion(s) based on imaging criteria.      DX-CHEST-PORTABLE (1 VIEW)   Final Result      Cardiomegaly.           Assessment/Plan  Other pulmonary embolism without acute cor pulmonale (HCC)- (present on admission)  Assessment & Plan  - b/l pulmonary embolism diagnosed February  - Patient has completed 6 months of anticoagulation for first PE. However, due to large nature of embolism and that embolism occurred while on eliquis, I think it is imperative to bridge coumadin with heparin gtt. Continue Heparin gtt and Coumadin    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 with obstruction- ERCP EXTRACTON/ SPHINCTEROTOMY, recurrent Feb 2020- (present on admission)  Assessment & Plan  - s/p ERCP 9/17 with stone removal and CBD stent placed. LFTs and bilirubin trending  down  - Possible cholangitis s/p ERCP, received 6 days of Ceftriaxone. Leukocytosis trending down and afebrile, stop Abx      Persistent atrial fibrillation (HCC)- (present on admission)  Assessment & Plan  - Patient with history of atrial fibrillation secondary to b/l pulmonary embolism. She required ICU for heart rate control on admission.  - HR well controlled  - Patient more bradycardic today with HR occasionally in the 40s, Asymptomatic. Continue Metoprolol, decrease Cardizem to 30mg. Continue to monitor  - Continue to bridge with Heparin gtt until therapeutic, continue Coumadin.    Acute kidney injury superimposed on chronic kidney disease ([facility])  Assessment & Plan  - Resolved  - Resume home torsemide tomorrow    Hyperkalemia  Assessment & Plan  - Patient previously hypokalemic likely secondary to torsemide use and was replete daily with oral potassium  - Resolved    Acute cystitis with hematuria  Assessment & Plan  - Resolved    Acute on chronic respiratory failure with hypoxia ([facility])- (present on admission)  Assessment & Plan  - Patient has chronic COPD and pulmonary edema on admission, pulmonary edema likely secondary to uncontrolled afib and blood pressure  - No evidence of COPD exacerbation at this point, continue with RT protocol nebulizer treatments oxygen support  - No evidence of acute exacerbation  - Will need new order for Xoponex / duoneb on discharge and will consider home health    Essential hypertension- (present on admission)  Assessment & Plan  - Continue with blood pressure management, continue Cardizem and Metoprolol    Obesity, morbid, BMI 40.0-49.9 ([facility])- (present on admission)  Assessment & Plan  Body mass index is 36.09 kg/m².  - Patient will need outpatient weight loss management program.    Musculoskeletal neck pain  Assessment & Plan  - Improving  - Patient with history of chronic neck pain on narcotics at home  - Continue Baclofen and change oxycodone to home hydrocodone    Mixed  hyperlipidemia- (present on admission)  Assessment & Plan  - Low-fat low-cholesterol diet  - Continue Statin       VTE prophylaxis: Heparin gtt and coumadin for Afib and PE

## 2020-09-20 NOTE — PROGRESS NOTES
"Assessment completed, patient is alert and oriented x 4, patient states her pain is 4/10 which is \"normal for me.\" Medicated patient per MAR. Safety precautions in place.   "

## 2020-09-20 NOTE — PROGRESS NOTES
Inpatient Anticoagulation Service Note    Date: 2020    Reason for Anticoagulation: Atrial Fibrillation   Target INR: 2.0 to 3.0  [id number] VASc Score: 6  HAS-BLED Score: 1   Hemoglobin Value: 14.4  Hematocrit Value: 46.5  Lab Platelet Value: 173    INR from last 7 days     Date/Time INR Value    20 0353  (!) 1.16    20 0331  1.05    20 0252  1.05    20 0012  1.1    20 1128  (!) 1.36    20 0453  (!) 1.82    09/15/20 0452  (!) 3.33    20 0500  (!) 4    20 1645  (!) 3.2        Dose from last 7 days     Date/Time Dose (mg)    20 0616  5    20 0700  7.5    20 0620  5    20 0600  --    20 0600  0    20 2121  2.5        Significant Interactions: Statin  Bridge Therapy: Yes  Date of Last VTE Event: 20  Bridge Therapy Start Date: 02  Days of Overlap Therapy: 3    Reversal Agent Administered: Vitamin K By Mouth  Comments: Warfarin continued for afib. H/H high and plts WNL. INR only slighty out of range, will give half of normal dose to ease back into therapeutic range.    Plan:  Warfarin 5 mg PO for INR 1.16  Education Material Provided?: No  Pharmacist suggested discharge dosin mg daily once INR > 2 for two consecutive days     Bernie Cui, OsielD

## 2020-09-20 NOTE — PROGRESS NOTES
Rounded with Dr. Caputo at bedside, updated MD of patient's HR increasing to 160s during ambulation, no further orders at this time.

## 2020-09-21 LAB
ALBUMIN SERPL BCP-MCNC: 2.8 G/DL (ref 3.2–4.9)
ALBUMIN/GLOB SERPL: 0.9 G/DL
ALP SERPL-CCNC: 154 U/L (ref 30–99)
ALT SERPL-CCNC: 46 U/L (ref 2–50)
ANION GAP SERPL CALC-SCNC: 8 MMOL/L (ref 7–16)
AST SERPL-CCNC: 23 U/L (ref 12–45)
BASOPHILS # BLD AUTO: 0.2 % (ref 0–1.8)
BASOPHILS # BLD: 0.02 K/UL (ref 0–0.12)
BILIRUB SERPL-MCNC: 0.8 MG/DL (ref 0.1–1.5)
BUN SERPL-MCNC: 17 MG/DL (ref 8–22)
CALCIUM SERPL-MCNC: 8.8 MG/DL (ref 8.4–10.2)
CHLORIDE SERPL-SCNC: 98 MMOL/L (ref 96–112)
CO2 SERPL-SCNC: 33 MMOL/L (ref 20–33)
CREAT SERPL-MCNC: 0.72 MG/DL (ref 0.5–1.4)
EOSINOPHIL # BLD AUTO: 0.05 K/UL (ref 0–0.51)
EOSINOPHIL NFR BLD: 0.5 % (ref 0–6.9)
ERYTHROCYTE [DISTWIDTH] IN BLOOD BY AUTOMATED COUNT: 46.3 FL (ref 35.9–50)
GLOBULIN SER CALC-MCNC: 3.2 G/DL (ref 1.9–3.5)
GLUCOSE SERPL-MCNC: 114 MG/DL (ref 65–99)
HCT VFR BLD AUTO: 45.6 % (ref 37–47)
HGB BLD-MCNC: 14.3 G/DL (ref 12–16)
IMM GRANULOCYTES # BLD AUTO: 0.04 K/UL (ref 0–0.11)
IMM GRANULOCYTES NFR BLD AUTO: 0.4 % (ref 0–0.9)
INR PPP: 1.34 (ref 0.87–1.13)
LYMPHOCYTES # BLD AUTO: 2.62 K/UL (ref 1–4.8)
LYMPHOCYTES NFR BLD: 25.4 % (ref 22–41)
MAGNESIUM SERPL-MCNC: 2.2 MG/DL (ref 1.5–2.5)
MCH RBC QN AUTO: 28.9 PG (ref 27–33)
MCHC RBC AUTO-ENTMCNC: 31.4 G/DL (ref 33.6–35)
MCV RBC AUTO: 92.3 FL (ref 81.4–97.8)
MONOCYTES # BLD AUTO: 0.86 K/UL (ref 0–0.85)
MONOCYTES NFR BLD AUTO: 8.3 % (ref 0–13.4)
NEUTROPHILS # BLD AUTO: 6.74 K/UL (ref 2–7.15)
NEUTROPHILS NFR BLD: 65.2 % (ref 44–72)
NRBC # BLD AUTO: 0 K/UL
NRBC BLD-RTO: 0 /100 WBC
PLATELET # BLD AUTO: 189 K/UL (ref 164–446)
PMV BLD AUTO: 10.9 FL (ref 9–12.9)
POTASSIUM SERPL-SCNC: 3.8 MMOL/L (ref 3.6–5.5)
PROT SERPL-MCNC: 6 G/DL (ref 6–8.2)
PROTHROMBIN TIME: 16.2 SEC (ref 12–14.6)
RBC # BLD AUTO: 4.94 M/UL (ref 4.2–5.4)
SODIUM SERPL-SCNC: 139 MMOL/L (ref 135–145)
UFH PPP CHRO-ACNC: 0.52 IU/ML
WBC # BLD AUTO: 10.3 K/UL (ref 4.8–10.8)

## 2020-09-21 PROCEDURE — 700111 HCHG RX REV CODE 636 W/ 250 OVERRIDE (IP): Performed by: STUDENT IN AN ORGANIZED HEALTH CARE EDUCATION/TRAINING PROGRAM

## 2020-09-21 PROCEDURE — 85520 HEPARIN ASSAY: CPT

## 2020-09-21 PROCEDURE — 700102 HCHG RX REV CODE 250 W/ 637 OVERRIDE(OP): Performed by: STUDENT IN AN ORGANIZED HEALTH CARE EDUCATION/TRAINING PROGRAM

## 2020-09-21 PROCEDURE — 770020 HCHG ROOM/CARE - TELE (206)

## 2020-09-21 PROCEDURE — 97530 THERAPEUTIC ACTIVITIES: CPT

## 2020-09-21 PROCEDURE — 83735 ASSAY OF MAGNESIUM: CPT

## 2020-09-21 PROCEDURE — 99232 SBSQ HOSP IP/OBS MODERATE 35: CPT | Performed by: STUDENT IN AN ORGANIZED HEALTH CARE EDUCATION/TRAINING PROGRAM

## 2020-09-21 PROCEDURE — 85610 PROTHROMBIN TIME: CPT

## 2020-09-21 PROCEDURE — A9270 NON-COVERED ITEM OR SERVICE: HCPCS | Performed by: HOSPITALIST

## 2020-09-21 PROCEDURE — A9270 NON-COVERED ITEM OR SERVICE: HCPCS | Performed by: STUDENT IN AN ORGANIZED HEALTH CARE EDUCATION/TRAINING PROGRAM

## 2020-09-21 PROCEDURE — 85025 COMPLETE CBC W/AUTO DIFF WBC: CPT

## 2020-09-21 PROCEDURE — 80053 COMPREHEN METABOLIC PANEL: CPT

## 2020-09-21 PROCEDURE — 700102 HCHG RX REV CODE 250 W/ 637 OVERRIDE(OP): Performed by: HOSPITALIST

## 2020-09-21 RX ORDER — WARFARIN SODIUM 5 MG/1
5 TABLET ORAL DAILY
Status: DISCONTINUED | OUTPATIENT
Start: 2020-09-21 | End: 2020-09-22

## 2020-09-21 RX ADMIN — HYDROCODONE BITARTRATE AND ACETAMINOPHEN 2 TABLET: 5; 325 TABLET ORAL at 08:04

## 2020-09-21 RX ADMIN — METOPROLOL TARTRATE 100 MG: 50 TABLET, FILM COATED ORAL at 17:15

## 2020-09-21 RX ADMIN — POTASSIUM CHLORIDE 20 MEQ: 1500 TABLET, EXTENDED RELEASE ORAL at 05:09

## 2020-09-21 RX ADMIN — HYDROCODONE BITARTRATE AND ACETAMINOPHEN 2 TABLET: 5; 325 TABLET ORAL at 02:49

## 2020-09-21 RX ADMIN — HEPARIN SODIUM 22 UNITS/KG/HR: 5000 INJECTION, SOLUTION INTRAVENOUS at 16:13

## 2020-09-21 RX ADMIN — DILTIAZEM HYDROCHLORIDE 30 MG: 30 TABLET, FILM COATED ORAL at 20:17

## 2020-09-21 RX ADMIN — WARFARIN SODIUM 5 MG: 5 TABLET ORAL at 17:14

## 2020-09-21 RX ADMIN — HYDROCODONE BITARTRATE AND ACETAMINOPHEN 2 TABLET: 5; 325 TABLET ORAL at 17:14

## 2020-09-21 RX ADMIN — FLUTICASONE PROPIONATE 88 MCG: 44 AEROSOL, METERED RESPIRATORY (INHALATION) at 05:12

## 2020-09-21 RX ADMIN — FAMOTIDINE 20 MG: 20 TABLET ORAL at 05:09

## 2020-09-21 RX ADMIN — METOPROLOL TARTRATE 100 MG: 50 TABLET, FILM COATED ORAL at 05:10

## 2020-09-21 RX ADMIN — DILTIAZEM HYDROCHLORIDE 30 MG: 30 TABLET, FILM COATED ORAL at 08:07

## 2020-09-21 RX ADMIN — ROSUVASTATIN CALCIUM 20 MG: 10 TABLET, FILM COATED ORAL at 17:14

## 2020-09-21 RX ADMIN — BACLOFEN 5 MG: 10 TABLET ORAL at 05:10

## 2020-09-21 RX ADMIN — ZOLPIDEM TARTRATE 10 MG: 5 TABLET ORAL at 21:16

## 2020-09-21 RX ADMIN — BACLOFEN 5 MG: 10 TABLET ORAL at 17:14

## 2020-09-21 RX ADMIN — UMECLIDINIUM BROMIDE AND VILANTEROL TRIFENATATE 1 PUFF: 62.5; 25 POWDER RESPIRATORY (INHALATION) at 05:12

## 2020-09-21 RX ADMIN — HEPARIN SODIUM 22 UNITS/KG/HR: 5000 INJECTION, SOLUTION INTRAVENOUS at 00:30

## 2020-09-21 RX ADMIN — DILTIAZEM HYDROCHLORIDE 30 MG: 30 TABLET, FILM COATED ORAL at 14:19

## 2020-09-21 RX ADMIN — HYDROCODONE BITARTRATE AND ACETAMINOPHEN 2 TABLET: 5; 325 TABLET ORAL at 21:16

## 2020-09-21 RX ADMIN — DILTIAZEM HYDROCHLORIDE 30 MG: 30 TABLET, FILM COATED ORAL at 01:35

## 2020-09-21 RX ADMIN — HYDROCODONE BITARTRATE AND ACETAMINOPHEN 2 TABLET: 5; 325 TABLET ORAL at 12:59

## 2020-09-21 ASSESSMENT — COGNITIVE AND FUNCTIONAL STATUS - GENERAL
MOBILITY SCORE: 24
SUGGESTED CMS G CODE MODIFIER MOBILITY: CH

## 2020-09-21 ASSESSMENT — PAIN DESCRIPTION - PAIN TYPE
TYPE: ACUTE PAIN
TYPE: CHRONIC PAIN
TYPE: CHRONIC PAIN
TYPE: ACUTE PAIN
TYPE: CHRONIC PAIN
TYPE: ACUTE PAIN

## 2020-09-21 ASSESSMENT — ENCOUNTER SYMPTOMS
PALPITATIONS: 0
PSYCHIATRIC NEGATIVE: 1
CHILLS: 0
VOMITING: 0
RESPIRATORY NEGATIVE: 1
SHORTNESS OF BREATH: 0
NAUSEA: 0
FEVER: 0
NEUROLOGICAL NEGATIVE: 1
CARDIOVASCULAR NEGATIVE: 1
NECK PAIN: 0
EYES NEGATIVE: 1

## 2020-09-21 ASSESSMENT — GAIT ASSESSMENTS
DEVIATION: NO DEVIATION
GAIT LEVEL OF ASSIST: MODIFIED INDEPENDENT
ASSISTIVE DEVICE: 4 WHEEL WALKER
DISTANCE (FEET): 150

## 2020-09-21 ASSESSMENT — FIBROSIS 4 INDEX: FIB4 SCORE: 1.35

## 2020-09-21 NOTE — PROGRESS NOTES
Telemetry Shift Summary    Rhythm A fib  HR Range 100-111  Ectopy rPVCs, rBigem  Measurements -/0.8/-        Normal Values  Rhythm SR  HR Range    Measurements 0.12-0.20 / 0.06-0.10  / 0.30-0.52   Name band;

## 2020-09-21 NOTE — PROGRESS NOTES
Inpatient Anticoagulation Service Note    Date: 2020    Reason for Anticoagulation: Atrial Fibrillation   Target INR: 2.0 to 3.0  [id number] VASc Score: 6  HAS-BLED Score: 1   Hemoglobin Value: 14.3  Hematocrit Value: 45.6  Lab Platelet Value: 189    INR from last 7 days     Date/Time INR Value    20 0401  (!) 1.34    20 0353  (!) 1.16    20 0331  1.05    20 0252  1.05    20 0012  1.1    20 1128  (!) 1.36    20 0453  (!) 1.82    09/15/20 0452  (!) 3.33        Dose from last 7 days     Date/Time Dose (mg)    20 0600  5    20 0616  5    20 0700  7.5    20 0620  5    20 0600  --        Significant Interactions: Statin  Bridge Therapy: Yes  Date of Last VTE Event: 20  Bridge Therapy Start Date: 02  Days of Overlap Therapy: 4    Reversal Agent Administered: Vitamin K By Mouth    Plan:  Warfarin 5 mg PO tonight for INR 1.34  Education Material Provided?: No  Pharmacist suggested discharge dosin mg daily     Osiel CoteD

## 2020-09-21 NOTE — PROGRESS NOTES
Report given to Catarino RN. Plan of care discussed. Patient resting comfortably in bed. Safety precautions in place.     RN spoke to pharmacy regarding due times on MAR for 1727 and 1839 for Norco 5-325mg. RN administered 2 tab of Norco 5-325 mg at 1728. Per pharmacist, only one dispensed is shown on [employer] for 2 Norco 5-325mg which was given at 1728. Per pharmacist, RN is to enter cancel entry for 1727 and 1839 due times.

## 2020-09-21 NOTE — PROGRESS NOTES
Report received from GILLIAN Arceo. Plan of care discussed. Patient resting comfortably in bed, declines any further needs at this time. Safety precautions in place.

## 2020-09-21 NOTE — ED NOTES
Assumed patient care. Pt assesement done.  Plan of care reviewed with patient. Medicinal interventions carried out per ERP orders.    Father (98.6)

## 2020-09-21 NOTE — THERAPY
Physical Therapy   Daily Treatment     Patient Name: Rufina Macias  Age:  75 y.o., Sex:  female  Medical Record #: [id number]  Today's Date: 9/21/2020     Precautions: (P) (pt a fall risk only due to needing line management with O2 and IV)    Assessment    Pt has made significant improvement since her initial evaluation 3 days ago. She reports she is feeling better as well. Today she was able to demonstrate all mobility without cues; she has good safety awareness and understands her limitations. She had no LOB or path deviation during gait; able to manage small spaces and open spaces w/o difficulty. At this point, she has met all of her PT goals and no longer requires PT in the acute care setting. From a PT standpoint, she is back to her baseline mobility and is safe to discharge home. She should continue to mobilize with nursing staff while in the hospital to preserve function; pt was encouraged to ask for assist to get OOB for all meals and to ambulate at least 3x/day. She verbalized understanding.     Plan    Discharge secondary to goals met.; DC needs only    DC Equipment Recommendations: (P) None  Discharge Recommendations: (P) Anticipate that the patient will have no further physical therapy needs after discharge from the hospital      Subjective  Pt asleep but easily aroused and agreeable to PT     Objective       09/21/20 1049   Balance   Sitting Balance (Static) Normal   Sitting Balance (Dynamic) Good   Standing Balance (Static) Fair   Standing Balance (Dynamic) Fair   Comments with 4WW   Gait Analysis   Gait Level Of Assist Modified Independent   Assistive Device 4 Wheel Walker   Distance (Feet) 150   Deviation No deviation   Comments no path deviations or LOB; good safety awareness   Bed Mobility    Supine to Sit Supervised   Scooting Independent   Comments HOB slightly elevated as per her home set-up   Functional Mobility   Sit to Stand Modified Independent   Mobility ambulated in room and hallway    Short Term Goals    Short Term Goal # 1 pt will go sit<>stand w/mod I in 6tx w/fww for safe d/c home   Goal Outcome # 1 Goal met   Short Term Goal # 2 pt will transfer bed<>chair w/Mod I in 6tx w/4WW for safe d/c home    Goal Outcome # 2 Goal met   Short Term Goal # 3 pt will ambulate for 150ft w/4ww w/Mod I in 6tx for safe d/c home    Goal Outcome # 3 Goal met     Radha Hernandez, PT

## 2020-09-21 NOTE — THERAPY
Missed Therapy     Patient Name: Rufina Macias  Age:  75 y.o., Sex:  female  Medical Record #: [id number]  Today's Date: 9/21/2020    Discussed missed therapy with RN       09/21/20 4378   Interdisciplinary Plan of Care Collaboration   Collaboration Comments Checked on pt- she appears at baseline with mobility with and without her 4ww.  Her HR is better controlled now.  She will have assist from family and HH followup at home.  RN does not see need for further OT intervention at this time.  Evin d/isabel from OT as pt appears to have met goals per nursing report and PT notes.

## 2020-09-21 NOTE — PROGRESS NOTES
RN called pharmacy to clarify coumadin order as it has not been ordered for this evening, pharmacist at [facility] to speak with pharmacist Bernie and call RN back.

## 2020-09-21 NOTE — CARE PLAN
Problem: Knowledge Deficit  Goal: Knowledge of disease process/condition, treatment plan, diagnostic tests, and medications will improve  Outcome: PROGRESSING AS EXPECTED  Note: Updated pt on plan of care, no questions at this time.   Goal: Knowledge of the prescribed therapeutic regimen will improve  Outcome: PROGRESSING AS EXPECTED  Note: Educated pt on heaprin gtt and bridge to coumadin

## 2020-09-21 NOTE — PROGRESS NOTES
Telemetry Shift Summary    Rhythm afib w/ BBB  HR Range 70s-100 (brief high of 190s w/ ambulation)  Ectopy occ-freq PVCs  Measurements --/0.14/--        Normal Values  Rhythm SR  HR Range    Measurements 0.12-0.20 / 0.06-0.10  / 0.30-0.52

## 2020-09-21 NOTE — CARE PLAN
Problem: Safety  Goal: Will remain free from falls  Outcome: PROGRESSING AS EXPECTED  Intervention: Implement fall precautions  Flowsheets (Taken 9/20/2020 2000)  Environmental Precautions:   Treaded Slipper Socks on Patient   Personal Belongings, Wastebasket, Call Bell etc. in Easy Reach   Transferred to Stronger Side   Report Given to Other Health Care Providers Regarding Fall Risk   Bed in Low Position   Communication Sign for Patients & Families   Mobility Assessed & Appropriate Sign Placed  Note: Pt currently on heparin infusion, importance of fall prevention stressed due to increased risk, pt calls appropriately.      Problem: Bowel/Gastric:  Goal: Will not experience complications related to bowel motility  Outcome: PROGRESSING AS EXPECTED  Note: Pt had been constipated but has now been having regular Bms.  Risk of constipation with chronic opioid use discussed with pt.

## 2020-09-21 NOTE — PROGRESS NOTES
Hospital Medicine Daily Progress Note    Date of Service  9/21/2020    Chief Complaint  75 y.o. female admitted 9/13/2020 with abdominal pain    Hospital Course    76 yo female with known hx of afib with previous admission of RVR, hx of PE in [facility] on anticoagulation, complicated gall stone hx with s/p gall bladder surgery 5 years ago and has had multiple problems with common bile duct stones with ERCP this past February to remove the debris (previous in 2018 and remove 10-15 stones). She has presented with abdominal pain and elevated LFT including alk phos and seen by Dr. Galeana (GI) for occurrence of choledocholithiasis and obstruction. She also was found to be in pulmonary edema and afib RVR with also UTI (EColi). Patient was started on Abx with ceftriaxone.    Patient was admitted to ICU for management of Afib with RVR and pulmonary edema. Patient HR was controlled and she was transferred to the floor.    MRCP displayed choledocholithiasis. ERCP was performed 9/17 with CBD stones and CBD stent was placed.    Due to large PE while on Eliquis, once coumadin was held and subtherapeutic patient was placed on heparin gtt. Heparin was restarted day after ERCP and bridge to therapeutic coumadin.        Interval Problem Update  No acute events overnight. Patient feeling well. Has been ambulating more and pain well controlled.    Consultants/Specialty  GI - Dr. Galeana  Pulm/Critical Care - Dr. Love-Margarita    Code Status  Full Code    Disposition  Pending therapeutic INR    Review of Systems  Review of Systems   Constitutional: Negative for chills and fever.   HENT: Negative.    Eyes: Negative.    Respiratory: Negative.  Negative for shortness of breath.    Cardiovascular: Negative.  Negative for chest pain and palpitations.   Gastrointestinal: Negative for nausea and vomiting.   Genitourinary: Negative.    Musculoskeletal: Negative for neck pain.   Skin: Negative.    Neurological: Negative.    Endo/Heme/Allergies:  Negative.    Psychiatric/Behavioral: Negative.         Physical Exam  Temp:  [36.2 °C (97.2 °F)-36.9 °C (98.5 °F)] 36.2 °C (97.2 °F)  Pulse:  [62-90] 90  Resp:  [18] 18  BP: (113-141)/(62-90) 141/80  SpO2:  [97 %-100 %] 97 %    Physical Exam  Constitutional:       General: She is not in acute distress.     Appearance: She is obese.   HENT:      Head: Normocephalic and atraumatic.      Mouth/Throat:      Mouth: Mucous membranes are moist.      Pharynx: Oropharynx is clear.   Eyes:      Extraocular Movements: Extraocular movements intact.      Pupils: Pupils are equal, round, and reactive to light.   Neck:      Musculoskeletal: Normal range of motion.      Comments: Baseline chronic neck pain  Cardiovascular:      Rate and Rhythm: Normal rate. Rhythm irregular.   Pulmonary:      Effort: Pulmonary effort is normal. No respiratory distress.      Breath sounds: Normal breath sounds.   Abdominal:      Palpations: Abdomen is soft.      Tenderness: There is no abdominal tenderness. There is no guarding.   Musculoskeletal: Normal range of motion.         General: No tenderness.   Skin:     General: Skin is warm and dry.      Capillary Refill: Capillary refill takes less than 2 seconds.   Neurological:      General: No focal deficit present.      Mental Status: She is alert and oriented to person, place, and time.   Psychiatric:         Mood and Affect: Mood normal.         Behavior: Behavior normal.         Fluids  No intake or output data in the 24 hours ending 09/21/20 1143    Laboratory  Recent Labs     09/19/20  0331 09/20/20  0353 09/21/20  0401   WBC 15.9* 12.4* 10.3   RBC 4.88 4.96 4.94   HEMOGLOBIN 14.3 14.4 14.3   HEMATOCRIT 45.5 46.5 45.6   MCV 93.2 93.8 92.3   MCH 29.3 29.0 28.9   MCHC 31.4* 31.0* 31.4*   RDW 47.8 48.1 46.3   PLATELETCT 181 173 189   MPV 10.9 10.4 10.9     Recent Labs     09/19/20  0331 09/20/20  0353 09/21/20  0401   SODIUM 137 136 139   POTASSIUM 4.1 3.7 3.8   CHLORIDE 96 96 98   CO2 32 30 33    GLUCOSE 110* 110* 114*   BUN 31* 24* 17   CREATININE 0.82 0.78 0.72   CALCIUM 8.6 8.7 8.8     Recent Labs     09/19/20  0331 09/20/20  0353 09/21/20  0401   INR 1.05 1.16* 1.34*               Imaging  [id number] STENT - TUBE   Final Result      Common bile duct stent placed. Considerable biliary dilatation confirmed      DX-PORTABLE FLUORO > 1 HOUR   Final Result      Portable fluoroscopy utilized for 2.2 minutes.         INTERPRETING LOCATION: [address] [place] [address], [place], [address]      [id number]   Final Result      1.  Positive for choledocholithiasis with 12 mm and 11 mm stone in the distal common bile duct      2.  Associated marked intrahepatic and extra hepatic biliary dilation with common bile duct measuring up to 21 mm      3.  Mild pancreatic duct dilation measuring 4 mm      4.  Hepatomegaly and hepatic steatosis      5.  Incidental renal cysts      No follow up imaging is recommended per consensus guidelines of the 2019 ACR Incidental Findings Committee for probably benign incidental simple appearing renal cystic lesion(s) based on imaging criteria.      DX-CHEST-PORTABLE (1 VIEW)   Final Result      Cardiomegaly.           Assessment/Plan  Other pulmonary embolism without acute cor pulmonale (HCC)- (present on admission)  Assessment & Plan  - b/l pulmonary embolism diagnosed February  - Patient has completed 6 months of anticoagulation for first PE. However, due to large nature of embolism and that embolism occurred while on eliquis, I think it is imperative to bridge coumadin with heparin gtt. Continue Heparin gtt and Coumadin    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 with obstruction- ERCP EXTRACTON/ SPHINCTEROTOMY, recurrent Feb 2020- (present on admission)  Assessment & Plan  - s/p ERCP 9/17 with stone removal and CBD stent placed. LFTs and bilirubin trending down  - Possible cholangitis s/p ERCP, received 6 days of Ceftriaxone. Leukocytosis trending down and afebrile, stop Abx      Persistent  atrial fibrillation (HCC)- (present on admission)  Assessment & Plan  - Patient with history of atrial fibrillation secondary to b/l pulmonary embolism. She required ICU for heart rate control on admission.  - HR well controlled  - Patient more bradycardic today with HR occasionally in the 40s, Asymptomatic. Continue Metoprolol, decrease Cardizem to 30mg. Continue to monitor  - Continue to bridge with Heparin gtt until therapeutic, continue Coumadin.    Acute kidney injury superimposed on chronic kidney disease ([facility])  Assessment & Plan  - Resolved  - Resume home torsemide tomorrow    Hyperkalemia  Assessment & Plan  - Patient previously hypokalemic likely secondary to torsemide use and was replete daily with oral potassium  - Resolved    Acute cystitis with hematuria  Assessment & Plan  - Resolved    Acute on chronic respiratory failure with hypoxia ([facility])- (present on admission)  Assessment & Plan  - Patient has chronic COPD and pulmonary edema on admission, pulmonary edema likely secondary to uncontrolled afib and blood pressure  - No evidence of COPD exacerbation at this point, continue with RT protocol nebulizer treatments oxygen support  - No evidence of acute exacerbation  - Will need new order for Xoponex / duoneb on discharge and will consider home health    Essential hypertension- (present on admission)  Assessment & Plan  - Continue with blood pressure management, continue Cardizem and Metoprolol    Obesity, morbid, BMI 40.0-49.9 ([facility])- (present on admission)  Assessment & Plan  Body mass index is 36.09 kg/m².  - Patient will need outpatient weight loss management program.    Musculoskeletal neck pain  Assessment & Plan  - Improving  - Patient with history of chronic neck pain on narcotics at home  - Continue Baclofen and change oxycodone to home hydrocodone    Mixed hyperlipidemia- (present on admission)  Assessment & Plan  - Low-fat low-cholesterol diet  - Continue Statin       VTE prophylaxis: Heparin  gtt and coumadin for Afib and PE

## 2020-09-22 LAB
ALBUMIN SERPL BCP-MCNC: 3 G/DL (ref 3.2–4.9)
ALBUMIN/GLOB SERPL: 0.9 G/DL
ALP SERPL-CCNC: 143 U/L (ref 30–99)
ALT SERPL-CCNC: 39 U/L (ref 2–50)
ANION GAP SERPL CALC-SCNC: 8 MMOL/L (ref 7–16)
AST SERPL-CCNC: 20 U/L (ref 12–45)
BASOPHILS # BLD AUTO: 0.2 % (ref 0–1.8)
BASOPHILS # BLD: 0.02 K/UL (ref 0–0.12)
BILIRUB SERPL-MCNC: 0.7 MG/DL (ref 0.1–1.5)
BUN SERPL-MCNC: 13 MG/DL (ref 8–22)
CALCIUM SERPL-MCNC: 9 MG/DL (ref 8.4–10.2)
CHLORIDE SERPL-SCNC: 102 MMOL/L (ref 96–112)
CO2 SERPL-SCNC: 31 MMOL/L (ref 20–33)
CREAT SERPL-MCNC: 0.66 MG/DL (ref 0.5–1.4)
EOSINOPHIL # BLD AUTO: 0.08 K/UL (ref 0–0.51)
EOSINOPHIL NFR BLD: 0.9 % (ref 0–6.9)
ERYTHROCYTE [DISTWIDTH] IN BLOOD BY AUTOMATED COUNT: 46.9 FL (ref 35.9–50)
GLOBULIN SER CALC-MCNC: 3.3 G/DL (ref 1.9–3.5)
GLUCOSE SERPL-MCNC: 117 MG/DL (ref 65–99)
HCT VFR BLD AUTO: 47.3 % (ref 37–47)
HGB BLD-MCNC: 14.7 G/DL (ref 12–16)
IMM GRANULOCYTES # BLD AUTO: 0.03 K/UL (ref 0–0.11)
IMM GRANULOCYTES NFR BLD AUTO: 0.3 % (ref 0–0.9)
INR PPP: 1.51 (ref 0.87–1.13)
LYMPHOCYTES # BLD AUTO: 2.66 K/UL (ref 1–4.8)
LYMPHOCYTES NFR BLD: 29.9 % (ref 22–41)
MAGNESIUM SERPL-MCNC: 2.2 MG/DL (ref 1.5–2.5)
MCH RBC QN AUTO: 29.1 PG (ref 27–33)
MCHC RBC AUTO-ENTMCNC: 31.1 G/DL (ref 33.6–35)
MCV RBC AUTO: 93.5 FL (ref 81.4–97.8)
MONOCYTES # BLD AUTO: 0.63 K/UL (ref 0–0.85)
MONOCYTES NFR BLD AUTO: 7.1 % (ref 0–13.4)
NEUTROPHILS # BLD AUTO: 5.47 K/UL (ref 2–7.15)
NEUTROPHILS NFR BLD: 61.6 % (ref 44–72)
NRBC # BLD AUTO: 0 K/UL
NRBC BLD-RTO: 0 /100 WBC
PLATELET # BLD AUTO: 190 K/UL (ref 164–446)
PMV BLD AUTO: 10.4 FL (ref 9–12.9)
POTASSIUM SERPL-SCNC: 3.9 MMOL/L (ref 3.6–5.5)
PROT SERPL-MCNC: 6.3 G/DL (ref 6–8.2)
PROTHROMBIN TIME: 17.8 SEC (ref 12–14.6)
RBC # BLD AUTO: 5.06 M/UL (ref 4.2–5.4)
SODIUM SERPL-SCNC: 141 MMOL/L (ref 135–145)
UFH PPP CHRO-ACNC: 0.6 IU/ML
WBC # BLD AUTO: 8.9 K/UL (ref 4.8–10.8)

## 2020-09-22 PROCEDURE — A9270 NON-COVERED ITEM OR SERVICE: HCPCS | Performed by: STUDENT IN AN ORGANIZED HEALTH CARE EDUCATION/TRAINING PROGRAM

## 2020-09-22 PROCEDURE — 99233 SBSQ HOSP IP/OBS HIGH 50: CPT | Performed by: INTERNAL MEDICINE

## 2020-09-22 PROCEDURE — 83735 ASSAY OF MAGNESIUM: CPT

## 2020-09-22 PROCEDURE — 85025 COMPLETE CBC W/AUTO DIFF WBC: CPT

## 2020-09-22 PROCEDURE — 85610 PROTHROMBIN TIME: CPT

## 2020-09-22 PROCEDURE — 770020 HCHG ROOM/CARE - TELE (206)

## 2020-09-22 PROCEDURE — 700102 HCHG RX REV CODE 250 W/ 637 OVERRIDE(OP): Performed by: HOSPITALIST

## 2020-09-22 PROCEDURE — 700102 HCHG RX REV CODE 250 W/ 637 OVERRIDE(OP): Performed by: STUDENT IN AN ORGANIZED HEALTH CARE EDUCATION/TRAINING PROGRAM

## 2020-09-22 PROCEDURE — 85520 HEPARIN ASSAY: CPT

## 2020-09-22 PROCEDURE — 90471 IMMUNIZATION ADMIN: CPT

## 2020-09-22 PROCEDURE — 700111 HCHG RX REV CODE 636 W/ 250 OVERRIDE (IP): Performed by: STUDENT IN AN ORGANIZED HEALTH CARE EDUCATION/TRAINING PROGRAM

## 2020-09-22 PROCEDURE — 700111 HCHG RX REV CODE 636 W/ 250 OVERRIDE (IP)

## 2020-09-22 PROCEDURE — 80053 COMPREHEN METABOLIC PANEL: CPT

## 2020-09-22 PROCEDURE — 3E0234Z INTRODUCTION OF SERUM, TOXOID AND VACCINE INTO MUSCLE, PERCUTANEOUS APPROACH: ICD-10-PCS | Performed by: INTERNAL MEDICINE

## 2020-09-22 PROCEDURE — 700111 HCHG RX REV CODE 636 W/ 250 OVERRIDE (IP): Performed by: NURSE PRACTITIONER

## 2020-09-22 PROCEDURE — 90662 IIV NO PRSV INCREASED AG IM: CPT | Performed by: NURSE PRACTITIONER

## 2020-09-22 PROCEDURE — A9270 NON-COVERED ITEM OR SERVICE: HCPCS | Performed by: HOSPITALIST

## 2020-09-22 PROCEDURE — 90662 IIV NO PRSV INCREASED AG IM: CPT

## 2020-09-22 RX ORDER — WARFARIN SODIUM 5 MG/1
5 TABLET ORAL
Status: COMPLETED | OUTPATIENT
Start: 2020-09-22 | End: 2020-09-22

## 2020-09-22 RX ADMIN — METOPROLOL TARTRATE 100 MG: 50 TABLET, FILM COATED ORAL at 05:41

## 2020-09-22 RX ADMIN — METOPROLOL TARTRATE 100 MG: 50 TABLET, FILM COATED ORAL at 17:39

## 2020-09-22 RX ADMIN — UMECLIDINIUM BROMIDE AND VILANTEROL TRIFENATATE 1 PUFF: 62.5; 25 POWDER RESPIRATORY (INHALATION) at 05:41

## 2020-09-22 RX ADMIN — FLUTICASONE PROPIONATE 88 MCG: 44 AEROSOL, METERED RESPIRATORY (INHALATION) at 05:43

## 2020-09-22 RX ADMIN — HYDROCODONE BITARTRATE AND ACETAMINOPHEN 2 TABLET: 5; 325 TABLET ORAL at 13:24

## 2020-09-22 RX ADMIN — DILTIAZEM HYDROCHLORIDE 30 MG: 30 TABLET, FILM COATED ORAL at 08:16

## 2020-09-22 RX ADMIN — BACLOFEN 5 MG: 10 TABLET ORAL at 05:41

## 2020-09-22 RX ADMIN — HYDROCODONE BITARTRATE AND ACETAMINOPHEN 2 TABLET: 5; 325 TABLET ORAL at 21:39

## 2020-09-22 RX ADMIN — DILTIAZEM HYDROCHLORIDE 30 MG: 30 TABLET, FILM COATED ORAL at 20:36

## 2020-09-22 RX ADMIN — HYDROCODONE BITARTRATE AND ACETAMINOPHEN 2 TABLET: 5; 325 TABLET ORAL at 08:15

## 2020-09-22 RX ADMIN — HEPARIN SODIUM 22 UNITS/KG/HR: 5000 INJECTION, SOLUTION INTRAVENOUS at 06:58

## 2020-09-22 RX ADMIN — HYDROCODONE BITARTRATE AND ACETAMINOPHEN 2 TABLET: 5; 325 TABLET ORAL at 03:58

## 2020-09-22 RX ADMIN — POTASSIUM CHLORIDE 20 MEQ: 1500 TABLET, EXTENDED RELEASE ORAL at 05:41

## 2020-09-22 RX ADMIN — SENNOSIDES-DOCUSATE SODIUM TAB 8.6-50 MG 2 TABLET: 8.6-5 TAB at 20:36

## 2020-09-22 RX ADMIN — HYDROCODONE BITARTRATE AND ACETAMINOPHEN 2 TABLET: 5; 325 TABLET ORAL at 17:38

## 2020-09-22 RX ADMIN — DILTIAZEM HYDROCHLORIDE 30 MG: 30 TABLET, FILM COATED ORAL at 02:41

## 2020-09-22 RX ADMIN — DILTIAZEM HYDROCHLORIDE 30 MG: 30 TABLET, FILM COATED ORAL at 13:24

## 2020-09-22 RX ADMIN — TORSEMIDE 20 MG: 20 TABLET ORAL at 08:39

## 2020-09-22 RX ADMIN — ROSUVASTATIN CALCIUM 20 MG: 10 TABLET, FILM COATED ORAL at 17:39

## 2020-09-22 RX ADMIN — ZOLPIDEM TARTRATE 10 MG: 5 TABLET ORAL at 21:39

## 2020-09-22 RX ADMIN — INFLUENZA A VIRUS A/MICHIGAN/45/2015 X-275 (H1N1) ANTIGEN (FORMALDEHYDE INACTIVATED), INFLUENZA A VIRUS A/SINGAPORE/INFIMH-16-0019/2016 IVR-186 (H3N2) ANTIGEN (FORMALDEHYDE INACTIVATED), INFLUENZA B VIRUS B/PHUKET/3073/2013 ANTIGEN (FORMALDEHYDE INACTIVATED), AND INFLUENZA B VIRUS B/MARYLAND/15/2016 BX-69A ANTIGEN (FORMALDEHYDE INACTIVATED) 0.7 ML: 60; 60; 60; 60 INJECTION, SUSPENSION INTRAMUSCULAR at 08:21

## 2020-09-22 RX ADMIN — BACLOFEN 5 MG: 10 TABLET ORAL at 17:39

## 2020-09-22 RX ADMIN — FAMOTIDINE 20 MG: 20 TABLET ORAL at 05:41

## 2020-09-22 RX ADMIN — WARFARIN SODIUM 5 MG: 5 TABLET ORAL at 17:39

## 2020-09-22 ASSESSMENT — PAIN DESCRIPTION - PAIN TYPE
TYPE: CHRONIC PAIN

## 2020-09-22 ASSESSMENT — ENCOUNTER SYMPTOMS
SHORTNESS OF BREATH: 0
CHILLS: 0
VOMITING: 0
RESPIRATORY NEGATIVE: 1
CARDIOVASCULAR NEGATIVE: 1
NECK PAIN: 0
PALPITATIONS: 0
FEVER: 0
NAUSEA: 0
NEUROLOGICAL NEGATIVE: 1
PSYCHIATRIC NEGATIVE: 1
EYES NEGATIVE: 1

## 2020-09-22 NOTE — PROGRESS NOTES
Telemetry Strip     Strip printed: 1543  Measurements from am strip were as follows:  Rhythm: Afib  HR:  up to 190 with ambulation  Measurements: -/ 0.14/ -  Ectopy: o PVC, r trig             Normal Values  Rhythm SR  HR Range    Measurements 0.12-0.20 / 0.06-0.10  / 0.30-0.52

## 2020-09-22 NOTE — PROGRESS NOTES
Hospital Medicine Daily Progress Note    Date of Service  9/22/2020    Chief Complaint  75 y.o. female admitted 9/13/2020 with abdominal pain    Hospital Course    74 yo female with known hx of afib with previous admission of RVR, hx of PE in [facility] on anticoagulation, complicated gall stone hx with s/p gall bladder surgery 5 years ago and has had multiple problems with common bile duct stones with ERCP this past February to remove the debris (previous in 2018 and remove 10-15 stones). She has presented with abdominal pain and elevated LFT including alk phos and seen by Dr. Galeana (GI) for occurrence of choledocholithiasis and obstruction. She also was found to be in pulmonary edema and afib RVR with also UTI (EColi). Patient was started on Abx with ceftriaxone.    Patient was admitted to ICU for management of Afib with RVR and pulmonary edema. Patient HR was controlled and she was transferred to the floor.    MRCP displayed choledocholithiasis. ERCP was performed 9/17 with CBD stones and CBD stent was placed.    Due to large PE while on Eliquis, once coumadin was held and subtherapeutic patient was placed on heparin gtt. Heparin was restarted day after ERCP and bridge to therapeutic coumadin.        Interval Problem Update  No acute events overnight  T-max 97.8, P 89, 16, 97% on 2L, 130/75  WBC is normal  H&H stable and within normal limits  No belly pain  INR 1.51  Tolerating heparin drip without evidence of bleeding  Has questions regarding cardioversion--called from [facility] already regarding follow-up but no cardiology follow-up yet    Consultants/Specialty  GI - Dr. Galeana  Pulm/Critical Care - Dr. Love-Margarita    Code Status  Full Code    Disposition  Pending therapeutic INR.  setup complete    Review of Systems  Review of Systems   Constitutional: Negative for chills and fever.   HENT: Negative.    Eyes: Negative.    Respiratory: Negative.  Negative for shortness of breath.    Cardiovascular: Negative.  Negative  for chest pain and palpitations.   Gastrointestinal: Negative for nausea and vomiting.   Genitourinary: Negative.    Musculoskeletal: Negative for neck pain.   Skin: Negative.    Neurological: Negative.    Endo/Heme/Allergies: Negative.    Psychiatric/Behavioral: Negative.         Physical Exam  Temp:  [36.2 °C (97.2 °F)-36.6 °C (97.8 °F)] 36.3 °C (97.4 °F)  Pulse:  [72-97] 89  Resp:  [16-18] 16  BP: (102-141)/(61-92) 130/75  SpO2:  [97 %-99 %] 97 %    Physical Exam  Constitutional:       General: She is not in acute distress.     Appearance: She is obese.   HENT:      Head: Normocephalic and atraumatic.      Mouth/Throat:      Mouth: Mucous membranes are moist.      Pharynx: Oropharynx is clear.   Eyes:      Extraocular Movements: Extraocular movements intact.      Pupils: Pupils are equal, round, and reactive to light.   Neck:      Musculoskeletal: Normal range of motion.      Comments: Baseline chronic neck pain  Cardiovascular:      Rate and Rhythm: Normal rate. Rhythm irregular.   Pulmonary:      Effort: Pulmonary effort is normal. No respiratory distress.      Breath sounds: Normal breath sounds.   Abdominal:      Palpations: Abdomen is soft.      Tenderness: There is no abdominal tenderness. There is no guarding.   Musculoskeletal: Normal range of motion.         General: No tenderness.   Skin:     General: Skin is warm and dry.      Capillary Refill: Capillary refill takes less than 2 seconds.   Neurological:      General: No focal deficit present.      Mental Status: She is alert and oriented to person, place, and time.   Psychiatric:         Mood and Affect: Mood normal.         Behavior: Behavior normal.       I have performed a physical exam and reviewed and updated ROS and Plan today (9/22/2020). In review of yesterday's note (9/21/2020), there are no changes except as documented above.         Fluids  No intake or output data in the 24 hours ending 09/22/20 1047    Laboratory  Recent Labs      09/20/20  0353 09/21/20  0401 09/22/20  0418   WBC 12.4* 10.3 8.9   RBC 4.96 4.94 5.06   HEMOGLOBIN 14.4 14.3 14.7   HEMATOCRIT 46.5 45.6 47.3*   MCV 93.8 92.3 93.5   MCH 29.0 28.9 29.1   MCHC 31.0* 31.4* 31.1*   RDW 48.1 46.3 46.9   PLATELETCT 173 189 190   MPV 10.4 10.9 10.4     Recent Labs     09/20/20  0353 09/21/20  0401 09/22/20  0418   SODIUM 136 139 141   POTASSIUM 3.7 3.8 3.9   CHLORIDE 96 98 102   CO2 30 33 31   GLUCOSE 110* 114* 117*   BUN 24* 17 13   CREATININE 0.78 0.72 0.66   CALCIUM 8.7 8.8 9.0     Recent Labs     09/20/20  0353 09/21/20  0401 09/22/20  0418   INR 1.16* 1.34* 1.51*               Imaging  [id number] STENT - TUBE   Final Result      Common bile duct stent placed. Considerable biliary dilatation confirmed      DX-PORTABLE FLUORO > 1 HOUR   Final Result      Portable fluoroscopy utilized for 2.2 minutes.         INTERPRETING LOCATION: [address] [place] [address], [facility], [id number]      [id number]   Final Result      1.  Positive for choledocholithiasis with 12 mm and 11 mm stone in the distal common bile duct      2.  Associated marked intrahepatic and extra hepatic biliary dilation with common bile duct measuring up to 21 mm      3.  Mild pancreatic duct dilation measuring 4 mm      4.  Hepatomegaly and hepatic steatosis      5.  Incidental renal cysts      No follow up imaging is recommended per consensus guidelines of the 2019 ACR Incidental Findings Committee for probably benign incidental simple appearing renal cystic lesion(s) based on imaging criteria.      DX-CHEST-PORTABLE (1 VIEW)   Final Result      Cardiomegaly.           Assessment/Plan  Other pulmonary embolism without acute cor pulmonale (HCC)- (present on admission)  Assessment & Plan  - b/l pulmonary embolism diagnosed February  - Patient has completed 6 months of anticoagulation for first PE. However, due to large nature of embolism and that embolism occurred while on eliquis, I think it is imperative to bridge coumadin with  heparin gtt. Continue Heparin gtt and Coumadin    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 with obstruction- ERCP EXTRACTON/ SPHINCTEROTOMY, recurrent Feb 2020- (present on admission)  Assessment & Plan  - s/p ERCP 9/17 with stone removal and CBD stent placed. LFTs and bilirubin trending down  - Possible cholangitis s/p ERCP, received 6 days of Ceftriaxone. Leukocytosis trending down and afebrile, stop Abx      Persistent atrial fibrillation (HCC)- (present on admission)  Assessment & Plan  - Patient with history of atrial fibrillation secondary to b/l pulmonary embolism. She required ICU for heart rate control on admission.  - HR well controlled  - Patient more bradycardic today with HR occasionally in the 40s, Asymptomatic. Continue Metoprolol, decrease Cardizem to 30mg. Continue to monitor  - Continue to bridge with Heparin gtt until therapeutic, continue Coumadin.    Acute kidney injury superimposed on chronic kidney disease ([facility])  Assessment & Plan  - Resolved  - Resume home torsemide tomorrow    Hyperkalemia  Assessment & Plan  - Patient previously hypokalemic likely secondary to torsemide use and was replete daily with oral potassium  - Resolved    Acute cystitis with hematuria  Assessment & Plan  - Resolved    Acute on chronic respiratory failure with hypoxia ([facility])- (present on admission)  Assessment & Plan  - Patient has chronic COPD and pulmonary edema on admission, pulmonary edema likely secondary to uncontrolled afib and blood pressure  - No evidence of COPD exacerbation at this point, continue with RT protocol nebulizer treatments oxygen support  - No evidence of acute exacerbation  - Will need new order for Xoponex / duoneb on discharge and will consider home health    Essential hypertension- (present on admission)  Assessment & Plan  - Continue with blood pressure management, continue Cardizem and Metoprolol    Obesity, morbid, BMI 40.0-49.9 ([facility])- (present on admission)  Assessment & Plan  Body  mass index is 36.09 kg/m².  - Patient will need outpatient weight loss management program.    Musculoskeletal neck pain  Assessment & Plan  - Improving  - Patient with history of chronic neck pain on narcotics at home  - Continue Baclofen and change oxycodone to home hydrocodone    Mixed hyperlipidemia- (present on admission)  Assessment & Plan  - Low-fat low-cholesterol diet  - Continue Statin       VTE prophylaxis: Heparin gtt and coumadin for Afib and PE

## 2020-09-22 NOTE — PROGRESS NOTES
"TARIQ Shrestha met with pt bedside to introduce CCM services. Pt states she, along with her daughter, manage her care as well as follow up appointments. Pt states she was receiving home health services. She declined the need for CCM services/follow up appointment scheduling/[facility] referral at this time. Pt denies the need for food, housing and transportation issues outside of [facility]. Offered to give pt CCM contact info but pt declined. She states \" I have all the follow up care I need\".  She thanked me for stopping by.     Pt will be d/c from CCM services due to declining.   "

## 2020-09-22 NOTE — PROGRESS NOTES
1845: Report received by Sandra FRENCH. Plan of care discussed. Safety measures in place, call light in reach.     2009: Assessment complete. Patient complaining of pain that's chronic in nature and requesting pain medication. Educated patient on medication schedule frequency. Patient voiced understanding. No other concerns at this time.

## 2020-09-22 NOTE — CARE PLAN
Problem: Knowledge Deficit  Goal: Knowledge of disease process/condition, treatment plan, diagnostic tests, and medications will improve  Outcome: PROGRESSING AS EXPECTED  Note: Updated pt on plan of care, no questions at this time.      Problem: Respiratory:  Goal: Respiratory status will improve  Outcome: PROGRESSING AS EXPECTED  Note: Pt maintaining oxygen saturation above 90% on 2L, will ween as tolerated.

## 2020-09-22 NOTE — PROGRESS NOTES
Telemetry Shift Summary    Rhythm AFIB w/ BBB  HR Range   Ectopy oPVC  Measurements na/0.14/na  Per GUANACO Moncada    Normal Values  Rhythm SR  HR Range    Measurements 0.12-0.20 / 0.06-0.10  / 0.30-0.52

## 2020-09-22 NOTE — PROGRESS NOTES
Telemetry Strip     Strip printed: 1428  Measurements from am strip were as follows:  Rhythm: Afib with BBB  HR:   Measurements: -/ 0.12/ -  Ectopy: r PVC             Normal Values  Rhythm SR  HR Range    Measurements 0.12-0.20 / 0.06-0.10  / 0.30-0.52

## 2020-09-22 NOTE — PROGRESS NOTES
Inpatient Anticoagulation Service Note    Date: 2020    Reason for Anticoagulation: Atrial Fibrillation   Target INR: 2.0 to 3.0  [id number] VASc Score: 6  HAS-BLED Score: 1   Hemoglobin Value: 14.7  Hematocrit Value: (!) 47.3  Lab Platelet Value: 190    INR from last 7 days     Date/Time INR Value    20 0418  (!) 1.51    20 0401  (!) 1.34    20 0353  (!) 1.16    20 0331  1.05    20 0252  1.05    20 0012  1.1    20 1128  (!) 1.36    20 0453  (!) 1.82        Dose from last 7 days     Date/Time Dose (mg)    20 0418  5    20 0600  5    20 0616  5    20 0700  7.5    20 0620  5    20 0600  --        Significant Interactions: Statin  Bridge Therapy: Yes  Date of Last VTE Event: 20  Bridge Therapy Start Date: 02  Days of Overlap Therapy: 4 (If less than 5 days and overlap therapy discontinued -- document reason (i.e. Bleed Risk))    (If still on overlap therapy, if No -- document reason (i.e. Bleed Risk))    Reversal Agent Administered: Vitamin K By Mouth  Comments: Warfarin resumed with Heparin infusion bridge therapy.    Plan:  Give Coumadin 5 mg tonight for INR 1.51  Education Material Provided?: No  Pharmacist suggested discharge dosin mg daily once INR > 2 for two consecutive days     Sandy Pedroza, [facility]

## 2020-09-23 ENCOUNTER — APPOINTMENT (OUTPATIENT)
Dept: MEDICAL GROUP | Age: 75
End: 2020-09-23
Payer: MEDICARE

## 2020-09-23 LAB
INR PPP: 1.75 (ref 0.87–1.13)
PROTHROMBIN TIME: 20 SEC (ref 12–14.6)
UFH PPP CHRO-ACNC: 0.54 IU/ML
UFH PPP CHRO-ACNC: 0.72 IU/ML

## 2020-09-23 PROCEDURE — 700111 HCHG RX REV CODE 636 W/ 250 OVERRIDE (IP): Performed by: STUDENT IN AN ORGANIZED HEALTH CARE EDUCATION/TRAINING PROGRAM

## 2020-09-23 PROCEDURE — 99232 SBSQ HOSP IP/OBS MODERATE 35: CPT | Performed by: INTERNAL MEDICINE

## 2020-09-23 PROCEDURE — 700102 HCHG RX REV CODE 250 W/ 637 OVERRIDE(OP): Performed by: STUDENT IN AN ORGANIZED HEALTH CARE EDUCATION/TRAINING PROGRAM

## 2020-09-23 PROCEDURE — A9270 NON-COVERED ITEM OR SERVICE: HCPCS | Performed by: HOSPITALIST

## 2020-09-23 PROCEDURE — A9270 NON-COVERED ITEM OR SERVICE: HCPCS | Performed by: STUDENT IN AN ORGANIZED HEALTH CARE EDUCATION/TRAINING PROGRAM

## 2020-09-23 PROCEDURE — 770020 HCHG ROOM/CARE - TELE (206)

## 2020-09-23 PROCEDURE — 85520 HEPARIN ASSAY: CPT

## 2020-09-23 PROCEDURE — 700102 HCHG RX REV CODE 250 W/ 637 OVERRIDE(OP): Performed by: HOSPITALIST

## 2020-09-23 PROCEDURE — 85610 PROTHROMBIN TIME: CPT

## 2020-09-23 RX ORDER — WARFARIN SODIUM 5 MG/1
5 TABLET ORAL
Status: COMPLETED | OUTPATIENT
Start: 2020-09-23 | End: 2020-09-23

## 2020-09-23 RX ADMIN — DILTIAZEM HYDROCHLORIDE 30 MG: 30 TABLET, FILM COATED ORAL at 09:47

## 2020-09-23 RX ADMIN — TORSEMIDE 20 MG: 20 TABLET ORAL at 09:46

## 2020-09-23 RX ADMIN — FLUTICASONE PROPIONATE 88 MCG: 44 AEROSOL, METERED RESPIRATORY (INHALATION) at 05:39

## 2020-09-23 RX ADMIN — HYDROCODONE BITARTRATE AND ACETAMINOPHEN 2 TABLET: 5; 325 TABLET ORAL at 17:49

## 2020-09-23 RX ADMIN — ROSUVASTATIN CALCIUM 20 MG: 10 TABLET, FILM COATED ORAL at 17:48

## 2020-09-23 RX ADMIN — DILTIAZEM HYDROCHLORIDE 30 MG: 30 TABLET, FILM COATED ORAL at 21:06

## 2020-09-23 RX ADMIN — UMECLIDINIUM BROMIDE AND VILANTEROL TRIFENATATE 1 PUFF: 62.5; 25 POWDER RESPIRATORY (INHALATION) at 05:39

## 2020-09-23 RX ADMIN — METOPROLOL TARTRATE 100 MG: 50 TABLET, FILM COATED ORAL at 05:40

## 2020-09-23 RX ADMIN — HYDROCODONE BITARTRATE AND ACETAMINOPHEN 2 TABLET: 5; 325 TABLET ORAL at 05:47

## 2020-09-23 RX ADMIN — HYDROCODONE BITARTRATE AND ACETAMINOPHEN 2 TABLET: 5; 325 TABLET ORAL at 14:07

## 2020-09-23 RX ADMIN — DILTIAZEM HYDROCHLORIDE 30 MG: 30 TABLET, FILM COATED ORAL at 02:35

## 2020-09-23 RX ADMIN — HEPARIN SODIUM 22 UNITS/KG/HR: 5000 INJECTION, SOLUTION INTRAVENOUS at 00:43

## 2020-09-23 RX ADMIN — DILTIAZEM HYDROCHLORIDE 10 MG: 5 INJECTION INTRAVENOUS at 18:51

## 2020-09-23 RX ADMIN — BACLOFEN 5 MG: 10 TABLET ORAL at 05:40

## 2020-09-23 RX ADMIN — HYDROCODONE BITARTRATE AND ACETAMINOPHEN 2 TABLET: 5; 325 TABLET ORAL at 22:03

## 2020-09-23 RX ADMIN — BACLOFEN 5 MG: 10 TABLET ORAL at 17:50

## 2020-09-23 RX ADMIN — FAMOTIDINE 20 MG: 20 TABLET ORAL at 05:40

## 2020-09-23 RX ADMIN — METOPROLOL TARTRATE 100 MG: 50 TABLET, FILM COATED ORAL at 17:48

## 2020-09-23 RX ADMIN — WARFARIN SODIUM 5 MG: 5 TABLET ORAL at 17:49

## 2020-09-23 RX ADMIN — HYDROCODONE BITARTRATE AND ACETAMINOPHEN 2 TABLET: 5; 325 TABLET ORAL at 09:51

## 2020-09-23 RX ADMIN — HEPARIN SODIUM 20 UNITS/KG/HR: 5000 INJECTION, SOLUTION INTRAVENOUS at 17:55

## 2020-09-23 RX ADMIN — POTASSIUM CHLORIDE 20 MEQ: 1500 TABLET, EXTENDED RELEASE ORAL at 05:40

## 2020-09-23 RX ADMIN — ZOLPIDEM TARTRATE 10 MG: 5 TABLET ORAL at 22:03

## 2020-09-23 ASSESSMENT — ENCOUNTER SYMPTOMS
NAUSEA: 0
NECK PAIN: 0
VOMITING: 0
PALPITATIONS: 0
RESPIRATORY NEGATIVE: 1
NEUROLOGICAL NEGATIVE: 1
EYES NEGATIVE: 1
CHILLS: 0
FEVER: 0
PSYCHIATRIC NEGATIVE: 1
SHORTNESS OF BREATH: 0
CARDIOVASCULAR NEGATIVE: 1

## 2020-09-23 ASSESSMENT — PAIN DESCRIPTION - PAIN TYPE
TYPE: CHRONIC PAIN

## 2020-09-23 NOTE — PROGRESS NOTES
1845: Report received by Sandra FRENCH. Plan of care discussed. Safety measures in place, call light in reach.    2022: Assessment complete. Complaining of chronic pain, non-pharm measures used at this time, as no PRN's available. No other concerns at this time.

## 2020-09-23 NOTE — PROGRESS NOTES
Inpatient Anticoagulation Service Note    Date: 2020    Reason for Anticoagulation: Atrial Fibrillation   Target INR: 2.0 to 3.0  [id number] VASc Score: 6  HAS-BLED Score: 1   Hemoglobin Value: 14.7  Hematocrit Value: (!) 47.3  Lab Platelet Value: 190    INR from last 7 days     Date/Time INR Value    20 0449  (!) 1.75    20 0418  (!) 1.51    20 0401  (!) 1.34    20 0353  (!) 1.16    20 0331  1.05    20 0252  1.05    20 0012  1.1    20 1128  (!) 1.36        Dose from last 7 days     Date/Time Dose (mg)    20 0449  5    20 0418  5    20 0600  5    20 0616  5    20 0700  7.5    20 0620  5    20 0600  --        Significant Interactions: Statin  Bridge Therapy: Yes  Date of Last VTE Event: 20  Bridge Therapy Start Date: 02  Days of Overlap Therapy: 4 (If less than 5 days and overlap therapy discontinued -- document reason (i.e. Bleed Risk))    (If still on overlap therapy, if No -- document reason (i.e. Bleed Risk))    Reversal Agent Administered: Vitamin K By Mouth  Comments: Warfarin resumed with Heparin infusion bridge therapy.    Plan:  Give Coumadin 5mg tonight for INR   Education Material Provided?: No  Pharmacist suggested discharge dosin mg daily once INR > 2 for two consecutive days     Sandy Pedroza [facility]

## 2020-09-23 NOTE — PROGRESS NOTES
Telemetry Shift Summary     Rhythm Afib with BBB  HR Range 69-92 (down to 45)  Ectopy oPVCs  Measurements -/0.16/-           Normal Values  Rhythm SR  HR Range    Measurements 0.12-0.20 / 0.06-0.10  / 0.30-0.52

## 2020-09-23 NOTE — PROGRESS NOTES
Hospital Medicine Daily Progress Note    Date of Service  9/23/2020    Chief Complaint  75 y.o. female admitted 9/13/2020 with abdominal pain    Hospital Course    76 yo female with known hx of afib with previous admission of RVR, hx of PE in [facility] on anticoagulation, complicated gall stone hx with s/p gall bladder surgery 5 years ago and has had multiple problems with common bile duct stones with ERCP this past February to remove the debris (previous in 2018 and remove 10-15 stones). She has presented with abdominal pain and elevated LFT including alk phos and seen by Dr. Galeana (GI) for occurrence of choledocholithiasis and obstruction. She also was found to be in pulmonary edema and afib RVR with also UTI (EColi). Patient was started on Abx with ceftriaxone.    Patient was admitted to ICU for management of Afib with RVR and pulmonary edema. Patient HR was controlled and she was transferred to the floor.    MRCP displayed choledocholithiasis. ERCP was performed 9/17 with CBD stones and CBD stent was placed.    Due to large PE while on Eliquis, once coumadin was held and subtherapeutic patient was placed on heparin gtt. Heparin was restarted day after ERCP and bridge to therapeutic coumadin.        Interval Problem Update  No acute events overnight  VSS. Tolerating heparin drip. INR 1.75  Likely OK to dc home tomorrow    Consultants/Specialty  GI - Dr. Galeana  Pulm[facility]Critical [facility]    Code Status  Full Code    Disposition  Pending therapeutic INR. HH setup complete    Review of Systems  Review of Systems   Constitutional: Negative for chills and fever.   HENT: Negative.    Eyes: Negative.    Respiratory: Negative.  Negative for shortness of breath.    Cardiovascular: Negative.  Negative for chest pain and palpitations.   Gastrointestinal: Negative for nausea and vomiting.   Genitourinary: Negative.    Musculoskeletal: Negative for neck pain.   Skin: Negative.    Neurological: Negative.     Endo/Heme/Allergies: Negative.    Psychiatric/Behavioral: Negative.         Physical Exam  Temp:  [36.3 °C (97.3 °F)-36.4 °C (97.5 °F)] 36.3 °C (97.4 °F)  Pulse:  [] 86  Resp:  [16] 16  BP: (103-132)/(61-81) 132/79  SpO2:  [93 %-98 %] 96 %    Physical Exam  Constitutional:       General: She is not in acute distress.     Appearance: She is obese.   HENT:      Head: Normocephalic and atraumatic.      Mouth/Throat:      Mouth: Mucous membranes are moist.      Pharynx: Oropharynx is clear.   Eyes:      Extraocular Movements: Extraocular movements intact.      Pupils: Pupils are equal, round, and reactive to light.   Neck:      Musculoskeletal: Normal range of motion.      Comments: Baseline chronic neck pain  Cardiovascular:      Rate and Rhythm: Normal rate. Rhythm irregular.   Pulmonary:      Effort: Pulmonary effort is normal. No respiratory distress.      Breath sounds: Normal breath sounds.   Abdominal:      Palpations: Abdomen is soft.      Tenderness: There is no abdominal tenderness. There is no guarding.   Musculoskeletal: Normal range of motion.         General: No tenderness.   Skin:     General: Skin is warm and dry.      Capillary Refill: Capillary refill takes less than 2 seconds.   Neurological:      General: No focal deficit present.      Mental Status: She is alert and oriented to person, place, and time.   Psychiatric:         Mood and Affect: Mood normal.         Behavior: Behavior normal.       I have performed a physical exam and reviewed and updated ROS and Plan today (9/23/2020). In review of yesterday's note (9/22/2020), there are no changes except as documented above.         Fluids  No intake or output data in the 24 hours ending 09/23/20 1107    Laboratory  Recent Labs     09/21/20  0401 09/22/20  0418   WBC 10.3 8.9   RBC 4.94 5.06   HEMOGLOBIN 14.3 14.7   HEMATOCRIT 45.6 47.3*   MCV 92.3 93.5   MCH 28.9 29.1   MCHC 31.4* 31.1*   RDW 46.3 46.9   PLATELETCT 189 190   MPV 10.9 10.4      Recent Labs     09/21/20  0401 09/22/20  0418   SODIUM 139 141   POTASSIUM 3.8 3.9   CHLORIDE 98 102   CO2 33 31   GLUCOSE 114* 117*   BUN 17 13   CREATININE 0.72 0.66   CALCIUM 8.8 9.0     Recent Labs     09/21/20  0401 09/22/20  0418 09/23/20  0449   INR 1.34* 1.51* 1.75*               Imaging  [id number] STENT - TUBE   Final Result      Common bile duct stent placed. Considerable biliary dilatation confirmed      DX-PORTABLE FLUORO > 1 HOUR   Final Result      Portable fluoroscopy utilized for 2.2 minutes.         INTERPRETING LOCATION: [address] [place] [address], [place], [address]      [id number]   Final Result      1.  Positive for choledocholithiasis with 12 mm and 11 mm stone in the distal common bile duct      2.  Associated marked intrahepatic and extra hepatic biliary dilation with common bile duct measuring up to 21 mm      3.  Mild pancreatic duct dilation measuring 4 mm      4.  Hepatomegaly and hepatic steatosis      5.  Incidental renal cysts      No follow up imaging is recommended per consensus guidelines of the 2019 ACR Incidental Findings Committee for probably benign incidental simple appearing renal cystic lesion(s) based on imaging criteria.      DX-CHEST-PORTABLE (1 VIEW)   Final Result      Cardiomegaly.           Assessment/Plan  Other pulmonary embolism without acute cor pulmonale (HCC)- (present on admission)  Assessment & Plan  - b/l pulmonary embolism diagnosed February  - Patient has completed 6 months of anticoagulation for first PE. However, due to large nature of embolism and that embolism occurred while on eliquis, I think it is imperative to bridge coumadin with heparin gtt. Continue Heparin gtt and Coumadin    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 with obstruction- ERCP EXTRACTON/ SPHINCTEROTOMY, recurrent Feb 2020- (present on admission)  Assessment & Plan  - s/p ERCP 9/17 with stone removal and CBD stent placed. LFTs and bilirubin trending down  - Possible cholangitis s/p  ERCP, received 6 days of Ceftriaxone. Leukocytosis trending down and afebrile, stop Abx      Persistent atrial fibrillation (HCC)- (present on admission)  Assessment & Plan  - Patient with history of atrial fibrillation secondary to b/l pulmonary embolism. She required ICU for heart rate control on admission.  - HR well controlled  - Patient more bradycardic today with HR occasionally in the 40s, Asymptomatic. Continue Metoprolol, decrease Cardizem to 30mg. Continue to monitor  - Continue to bridge with Heparin gtt until therapeutic, continue Coumadin.    Acute kidney injury superimposed on chronic kidney disease (HCC)  Assessment & Plan  - Resolved  - Resume home torsemide tomorrow    Hyperkalemia  Assessment & Plan  - Patient previously hypokalemic likely secondary to torsemide use and was replete daily with oral potassium  - Resolved    Acute cystitis with hematuria  Assessment & Plan  - Resolved    Acute on chronic respiratory failure with hypoxia ([facility])- (present on admission)  Assessment & Plan  - Patient has chronic COPD and pulmonary edema on admission, pulmonary edema likely secondary to uncontrolled afib and blood pressure  - No evidence of COPD exacerbation at this point, continue with RT protocol nebulizer treatments oxygen support  - No evidence of acute exacerbation  - Will need new order for Xoponex / duoneb on discharge and will consider home health    Essential hypertension- (present on admission)  Assessment & Plan  - Continue with blood pressure management, continue Cardizem and Metoprolol    Obesity, morbid, BMI 40.0-49.9 ([facility])- (present on admission)  Assessment & Plan  Body mass index is 36.09 kg/m².  - Patient will need outpatient weight loss management program.    Musculoskeletal neck pain  Assessment & Plan  - Improving  - Patient with history of chronic neck pain on narcotics at home  - Continue Baclofen and change oxycodone to home hydrocodone    Mixed hyperlipidemia- (present on  admission)  Assessment & Plan  - Low-fat low-cholesterol diet  - Continue Statin       VTE prophylaxis: Heparin gtt and coumadin for Afib and PE

## 2020-09-23 NOTE — CARE PLAN
Problem: Communication  Goal: The ability to communicate needs accurately and effectively will improve  Outcome: PROGRESSING AS EXPECTED     Problem: Safety  Goal: Will remain free from injury  Outcome: PROGRESSING AS EXPECTED  Goal: Will remain free from falls  Outcome: PROGRESSING AS EXPECTED     Problem: Infection  Goal: Will remain free from infection  Outcome: PROGRESSING AS EXPECTED     Problem: Venous Thromboembolism (VTW)/Deep Vein Thrombosis (DVT) Prevention:  Goal: Patient will participate in Venous Thrombosis (VTE)/Deep Vein Thrombosis (DVT)Prevention Measures  Outcome: PROGRESSING AS EXPECTED     Problem: Bowel/Gastric:  Goal: Normal bowel function is maintained or improved  Outcome: PROGRESSING AS EXPECTED  Goal: Will not experience complications related to bowel motility  Outcome: PROGRESSING AS EXPECTED     Problem: Knowledge Deficit  Goal: Knowledge of disease process/condition, treatment plan, diagnostic tests, and medications will improve  Outcome: PROGRESSING AS EXPECTED  Goal: Knowledge of the prescribed therapeutic regimen will improve  Outcome: PROGRESSING AS EXPECTED     Problem: Discharge Barriers/Planning  Goal: Patient's continuum of care needs will be met  Outcome: PROGRESSING AS EXPECTED     Problem: Pain Management  Goal: Pain level will decrease to patient's comfort goal  Outcome: PROGRESSING AS EXPECTED     Problem: Fluid Volume:  Goal: Will maintain balanced intake and output  Outcome: PROGRESSING AS EXPECTED     Problem: Respiratory:  Goal: Respiratory status will improve  Outcome: PROGRESSING AS EXPECTED

## 2020-09-24 ENCOUNTER — APPOINTMENT (OUTPATIENT)
Dept: MEDICAL GROUP | Facility: MEDICAL CENTER | Age: 75
End: 2020-09-24
Payer: MEDICARE

## 2020-09-24 ENCOUNTER — TELEPHONE (OUTPATIENT)
Dept: CARDIOLOGY | Facility: MEDICAL CENTER | Age: 75
End: 2020-09-24

## 2020-09-24 VITALS
BODY MASS INDEX: 37.04 KG/M2 | HEIGHT: 62 IN | DIASTOLIC BLOOD PRESSURE: 65 MMHG | OXYGEN SATURATION: 96 % | HEART RATE: 45 BPM | TEMPERATURE: 97.5 F | SYSTOLIC BLOOD PRESSURE: 119 MMHG | RESPIRATION RATE: 18 BRPM | WEIGHT: 201.28 LBS

## 2020-09-24 LAB
INR PPP: 2 (ref 0.87–1.13)
PROTHROMBIN TIME: 22.3 SEC (ref 12–14.6)
UFH PPP CHRO-ACNC: 0.52 IU/ML

## 2020-09-24 PROCEDURE — A9270 NON-COVERED ITEM OR SERVICE: HCPCS | Performed by: HOSPITALIST

## 2020-09-24 PROCEDURE — 85610 PROTHROMBIN TIME: CPT

## 2020-09-24 PROCEDURE — 700102 HCHG RX REV CODE 250 W/ 637 OVERRIDE(OP): Performed by: HOSPITALIST

## 2020-09-24 PROCEDURE — 99239 HOSP IP/OBS DSCHRG MGMT >30: CPT | Performed by: INTERNAL MEDICINE

## 2020-09-24 PROCEDURE — A9270 NON-COVERED ITEM OR SERVICE: HCPCS | Performed by: STUDENT IN AN ORGANIZED HEALTH CARE EDUCATION/TRAINING PROGRAM

## 2020-09-24 PROCEDURE — 85520 HEPARIN ASSAY: CPT

## 2020-09-24 PROCEDURE — 700102 HCHG RX REV CODE 250 W/ 637 OVERRIDE(OP): Performed by: STUDENT IN AN ORGANIZED HEALTH CARE EDUCATION/TRAINING PROGRAM

## 2020-09-24 RX ORDER — BACLOFEN 5 MG/1
5 TABLET ORAL 2 TIMES DAILY
Qty: 60 TAB | Refills: 0 | Status: SHIPPED | OUTPATIENT
Start: 2020-09-24 | End: 2020-09-25

## 2020-09-24 RX ORDER — DILTIAZEM HYDROCHLORIDE 120 MG/1
120 CAPSULE, COATED, EXTENDED RELEASE ORAL DAILY
Qty: 30 CAP | Refills: 1 | Status: SHIPPED | OUTPATIENT
Start: 2020-09-24 | End: 2020-09-29 | Stop reason: SDUPTHER

## 2020-09-24 RX ORDER — WARFARIN SODIUM 5 MG/1
5 TABLET ORAL DAILY
Status: DISCONTINUED | OUTPATIENT
Start: 2020-09-24 | End: 2020-09-24 | Stop reason: HOSPADM

## 2020-09-24 RX ADMIN — DILTIAZEM HYDROCHLORIDE 30 MG: 30 TABLET, FILM COATED ORAL at 15:20

## 2020-09-24 RX ADMIN — METOPROLOL TARTRATE 100 MG: 50 TABLET, FILM COATED ORAL at 05:36

## 2020-09-24 RX ADMIN — DILTIAZEM HYDROCHLORIDE 30 MG: 30 TABLET, FILM COATED ORAL at 08:28

## 2020-09-24 RX ADMIN — HYDROCODONE BITARTRATE AND ACETAMINOPHEN 2 TABLET: 5; 325 TABLET ORAL at 02:31

## 2020-09-24 RX ADMIN — FAMOTIDINE 20 MG: 20 TABLET ORAL at 05:36

## 2020-09-24 RX ADMIN — UMECLIDINIUM BROMIDE AND VILANTEROL TRIFENATATE 1 PUFF: 62.5; 25 POWDER RESPIRATORY (INHALATION) at 05:37

## 2020-09-24 RX ADMIN — HYDROCODONE BITARTRATE AND ACETAMINOPHEN 2 TABLET: 5; 325 TABLET ORAL at 11:33

## 2020-09-24 RX ADMIN — HYDROCODONE BITARTRATE AND ACETAMINOPHEN 1 TABLET: 5; 325 TABLET ORAL at 16:06

## 2020-09-24 RX ADMIN — HYDROCODONE BITARTRATE AND ACETAMINOPHEN 2 TABLET: 5; 325 TABLET ORAL at 07:08

## 2020-09-24 RX ADMIN — POTASSIUM CHLORIDE 20 MEQ: 1500 TABLET, EXTENDED RELEASE ORAL at 05:36

## 2020-09-24 RX ADMIN — BACLOFEN 5 MG: 10 TABLET ORAL at 05:37

## 2020-09-24 RX ADMIN — HYDROCODONE BITARTRATE AND ACETAMINOPHEN 1 TABLET: 5; 325 TABLET ORAL at 16:00

## 2020-09-24 RX ADMIN — DILTIAZEM HYDROCHLORIDE 30 MG: 30 TABLET, FILM COATED ORAL at 02:31

## 2020-09-24 RX ADMIN — FLUTICASONE PROPIONATE 88 MCG: 44 AEROSOL, METERED RESPIRATORY (INHALATION) at 05:37

## 2020-09-24 RX ADMIN — BACLOFEN 5 MG: 10 TABLET ORAL at 17:20

## 2020-09-24 ASSESSMENT — PAIN DESCRIPTION - PAIN TYPE
TYPE: CHRONIC PAIN

## 2020-09-24 NOTE — PROGRESS NOTES
Inpatient Anticoagulation Service Note    Date: 2020    Reason for Anticoagulation: Atrial Fibrillation   Target INR: 2.0 to 3.0  [id number] VASc Score: 6  HAS-BLED Score: 1   Hemoglobin Value: 14.7  Hematocrit Value: (!) 47.3  Lab Platelet Value: 190    INR from last 7 days     Date/Time INR Value    20 0305  (!) 2    20 0449  (!) 1.75    20 0418  (!) 1.51    20 0401  (!) 1.34    20 0353  (!) 1.16    20 0331  1.05    20 0252  1.05        Dose from last 7 days     Date/Time Dose (mg)    20 0812  5    20 0449  5    20 0418  5    20 0600  5    20 0616  5    20 0700  7.5    20 0620  5    20 0600  --        Significant Interactions: Statin  Bridge Therapy: No  Date of Last VTE Event: 20  Bridge Therapy Start Date: 20  Days of Overlap Therapy: 5    Reversal Agent Administered: Vitamin K By Mouth(given 9/15/20)  Comments: Warfarin resumed with Heparin infusion bridge therapy.    Plan:  Warfarin 5 mg PO daily for INR 2  Education Material Provided?: No  Pharmacist suggested discharge dosin mg daily     Bernie Cui, [facility]

## 2020-09-24 NOTE — PROGRESS NOTES
Monitor Summary     Rhythm:afib   Measurements: -/0.12/-  ECTOPIES: O PVC, R trig        Normal Values  Rhythm SR  HR Range    Measurements 0.12-0.20 / 0.06-0.10  / 0.30-0.52

## 2020-09-24 NOTE — PROGRESS NOTES
1845: Report received by OSVALDO RN. Plan of care discussed. Safety measures in place, call light in reach.    2056: Assessment complete. Complaints of pain, chronic in nature. No medical interventions available at this time.

## 2020-09-24 NOTE — TELEPHONE ENCOUNTER
Cale,    I just got a call from the floor RN. This patient will be discharged today. He MD that is rounding on this patient would like her to get rescheduled for the CHARLES/Cardioversion that was scheduled for this patient previously.    Please call her to schedule once she is discharged.    Thank You,  Beata

## 2020-09-24 NOTE — DISCHARGE SUMMARY
Discharge Summary    CHIEF COMPLAINT ON ADMISSION  Chief Complaint   Patient presents with   • Abdominal Pain   • Flank Pain       Reason for Admission  Abd Pain     Admission Date  9/13/2020    CODE STATUS  Full Code    HPI & HOSPITAL COURSE  74 yo female with known hx of afib with previous admission for AF w RVR, PE in [facility] on anticoagulation, complicated gall stone hx with s/p gall bladder surgery 5 years ago and has had multiple problems with common bile duct stones with ERCP this past February to remove the debris (previous in 2018 and remove 10-15 stones). She has presented with abdominal pain and elevated LFT including alk phos and seen by Dr. Galeana (GI) for occurrence of choledocholithiasis and obstruction. She also was found to be in pulmonary edema and afib RVR with also UTI (EColi). Patient was started on Abx with ceftriaxone.    Patient was admitted to ICU for management of Afib with RVR and pulmonary edema. Patient HR was controlled and she was transferred to the floor.    MRCP displayed choledocholithiasis. ERCP was performed 9/17 with CBD stones and CBD stent was placed.    Due to large PE while on Eliquis, once coumadin was held and subtherapeutic patient was placed on heparin gtt. Heparin was restarted day after ERCP and bridge to therapeutic coumadin.     Her INR is now 2.0.  Heparin drip has been stopped.  She has already been established with the anticoagulation clinic.  She will need to follow-up there for repeat INR.  She has an appointment already.  We have also assisted with cardiology follow-up as she missed her appointment due to this hospitalization.  Finally, I will stop her every 6 hour immediate release diltiazem and place her on 120 mg of sustained release.  Her heart rate has been well controlled if not even bradycardic at times.     She is independent with ambulation.  She is showing no evidence of syncope or hypotension.  She is tolerating a diet. Therefore, she is discharged in  good and stable condition to home with close outpatient follow-up.    The patient met 2-midnight criteria for an inpatient stay at the time of discharge.    Discharge Date  9/24/2020    FOLLOW UP ITEMS POST DISCHARGE  Cardiology follow-up  PCP follow-up  Anticoagulation clinic follow-up    DISCHARGE DIAGNOSES  Active Problems:    Persistent atrial fibrillation (HCC) POA: Yes    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 with obstruction- ERCP EXTRACTON/ SPHINCTEROTOMY, recurrent Feb 2020 POA: Yes    Other pulmonary embolism without acute cor pulmonale (HCC) POA: Yes    Hyperkalemia POA: No    Acute kidney injury superimposed on chronic kidney disease (HCC) POA: No    Mixed hyperlipidemia POA: Yes      Overview: ICD-10 transition    COPD (chronic obstructive pulmonary disease) ([facility]) POA: Yes    Musculoskeletal neck pain POA: Unknown    Obesity, morbid, BMI 40.0-49.9 ([facility]) POA: Yes    Essential hypertension POA: Yes    Acute on chronic respiratory failure with hypoxia ([facility]) POA: Yes    Long term (current) use of anticoagulants POA: Yes    Coronary artery calcification seen on CAT scan POA: Yes    Acute cystitis with hematuria POA: Unknown  Resolved Problems:    Hypokalemia POA: No      FOLLOW UP  Future Appointments   Date Time Provider Department Center   9/28/2020  1:15 PM [facility] PHARMACIST IRINA Mello   11/16/2020  1:20 PM Quincy Angel M.D. [address] [address]  [phone]          MEDICATIONS ON DISCHARGE     Medication List      Start taking these medications      Instructions   baclofen 5 MG Tabs  Commonly known as: Lioresal   Take 1 Tab by mouth 2 Times a Day.  Dose: 5 mg        Change how you take these medications      Instructions   DILTIAZem  MG Cp24  What changed:   · medication strength  · how much to take  · when to take this  Commonly known as: CARDIZEM CD   Take 1 Cap by mouth every day.  Dose: 120 mg        Continue  taking these medications      Instructions   albuterol 108 (90 Base) MCG/ACT Aers inhalation aerosol   Inhale 2 Puffs by mouth every 6 hours as needed for Shortness of Breath.  Dose: 2 Puff     CALCIUM 1200 PO   Take 1,200 mg by mouth every day.  Dose: 1,200 mg     famotidine 20 MG Tabs  Commonly known as: PEPCID   Take 20 mg by mouth every day.  Dose: 20 mg     FIBER PO   Take 2 Caps by mouth every day.  Dose: 2 Cap     HYDROcodone/acetaminophen  MG Tabs  Commonly known as: NORCO   Take 1 Tab by mouth every 6 hours as needed.  Dose: 1 Tab     Magnesium 400 MG Tabs   Take 400 mg by mouth every day.  Dose: 400 mg     metoprolol 100 MG Tabs  Commonly known as: LOPRESSOR   Take 1 Tab by mouth 2 times a day.  Dose: 100 mg     montelukast 10 MG Tabs  Commonly known as: SINGULAIR   Take 10 mg by mouth every evening.  Dose: 10 mg     potassium chloride ER 10 MEQ tablet  Commonly known as: KLOR-CON   Take 40 mEq by mouth every day. 4 tablets = 40 meq  Dose: 40 mEq     rosuvastatin 20 MG Tabs  Commonly known as: CRESTOR   Doctor's comments: Patient's plan is requesting a 100 day supply. Thank you  Take 1 Tab by mouth every evening.  Dose: 20 mg     torsemide 20 MG Tabs  Commonly known as: DEMADEX   Take 1 Tab by mouth every day.  Dose: 20 mg     Trelegy Ellipta 100-62.5-25 MCG/INH Aepb  Generic drug: Fluticasone-Umeclidin-Vilant   Inhale 1 Inhaler by mouth every day.  Dose: 1 Inhaler     VITAMIN D PO   Take 1 Tab by mouth every day.  Dose: 1 Tab     warfarin 5 MG Tabs  Commonly known as: COUMADIN   Doctor's comments: This rx was submitted by a pharmacist working under a collaborative practice agreement.  Take 1 tab by mouth daily or as directed by anticoagulation  clinic     zolpidem 10 MG Tabs  Commonly known as: AMBIEN   Take 10 mg by mouth at bedtime as needed for Sleep.  Dose: 10 mg        Stop taking these medications    cefdinir 300 MG Caps  Commonly known as: OMNICEF            Allergies  Allergies   Allergen  Reactions   • Codeine Swelling   • Ace Inhibitors      Cough       DIET  Orders Placed This Encounter   Procedures   • Diet Order Cardiac     Standing Status:   Standing     Number of Occurrences:   1     Order Specific Question:   Diet:     Answer:   Cardiac [6]     Order Specific Question:   Miscellaneous modifications:     Answer:   Vegetarian [13]       ACTIVITY  As tolerated.  Weight bearing as tolerated    CONSULTATIONS  Gastroenterology    PROCEDURES  ERCP with biliary cannulation, extension of sphincterotomy, stone fragmentation, stone removal, and placement of a 10 Mauritian 5 cm straight plastic biliary stent; 9/17/2020; Dr. Clinton Ray    LABORATORY  Lab Results   Component Value Date    SODIUM 141 09/22/2020    POTASSIUM 3.9 09/22/2020    CHLORIDE 102 09/22/2020    CO2 31 09/22/2020    GLUCOSE 117 (H) 09/22/2020    BUN 13 09/22/2020    CREATININE 0.66 09/22/2020    CREATININE 0.80 12/02/2010    GLOMRATE >59 12/02/2010        Lab Results   Component Value Date    WBC 8.9 09/22/2020    WBC 7.6 12/02/2010    HEMOGLOBIN 14.7 09/22/2020    HEMATOCRIT 47.3 (H) 09/22/2020    PLATELETCT 190 09/22/2020        Total time of the discharge process exceeds 40 minutes.

## 2020-09-24 NOTE — DISCHARGE INSTRUCTIONS
Discharge Instructions    Discharged to home by car with relative. Discharged via wheelchair, hospital escort: Yes.  Special equipment needed: Not Applicable    Be sure to schedule a follow-up appointment with your primary care doctor or any specialists as instructed.     Discharge Plan:   Diet Plan: Discussed  Activity Level: Discussed  Confirmed Follow up Appointment: Appointment Scheduled  Confirmed Symptoms Management: Discussed  Medication Reconciliation Updated: Yes  Influenza Vaccine Indication: Indicated: 65 years and older  Influenza Vaccine Given - only chart on this line when given: Influenza Vaccine Given (See MAR)    I understand that a diet low in cholesterol, fat, and sodium is recommended for good health. Unless I have been given specific instructions below for another diet, I accept this instruction as my diet prescription.   Other diet: Cardiac    Special Instructions: None    · Is patient discharged on Warfarin / Coumadin?   Yes    You are receiving the drug warfarin. Please understand the importance of monitoring warfarin with scheduled PT/INR blood draws.  Follow-up with the [facility] in one week for INR lab.    IMPORTANT: HOW TO USE THIS INFORMATION:  This is a summary and does NOT have all possible information about this product. This information does not assure that this product is safe, effective, or appropriate for you. This information is not individual medical advice and does not substitute for the advice of your health care professional. Always ask your health care professional for complete information about this product and your specific health needs.      WARFARIN - ORAL (WARF-uh-rin)      COMMON BRAND NAME(S): Coumadin      WARNING:  Warfarin can cause very serious (possibly fatal) bleeding. This is more likely to occur when you first start taking this medication or if you take too much warfarin. To decrease your risk for bleeding, your doctor or other health care provider will  "monitor you closely and check your lab results (INR test) to make sure you are not taking too much warfarin. Keep all medical and laboratory appointments. Tell your doctor right away if you notice any signs of serious bleeding. See also Side Effects section.      USES:  This medication is used to treat blood clots (such as in deep vein thrombosis-DVT or pulmonary embolus-PE) and/or to prevent new clots from forming in your body. Preventing harmful blood clots helps to reduce the risk of a stroke or heart attack. Conditions that increase your risk of developing blood clots include a certain type of irregular heart rhythm (atrial fibrillation), heart valve replacement, recent heart attack, and certain surgeries (such as hip/knee replacement). Warfarin is commonly called a \"blood thinner,\" but the more correct term is \"anticoagulant.\" It helps to keep blood flowing smoothly in your body by decreasing the amount of certain substances (clotting proteins) in your blood.      HOW TO USE:  Read the Medication Guide provided by your pharmacist before you start taking warfarin and each time you get a refill. If you have any questions, ask your doctor or pharmacist. Take this medication by mouth with or without food as directed by your doctor or other health care professional, usually once a day. It is very important to take it exactly as directed. Do not increase the dose, take it more frequently, or stop using it unless directed by your doctor. Dosage is based on your medical condition, laboratory tests (such as INR), and response to treatment. Your doctor or other health care provider will monitor you closely while you are taking this medication to determine the right dose for you. Use this medication regularly to get the most benefit from it. To help you remember, take it at the same time each day. It is important to eat a balanced, consistent diet while taking warfarin. Some foods can affect how warfarin works in your " body and may affect your treatment and dose. Avoid sudden large increases or decreases in your intake of foods high in vitamin K (such as broccoli, cauliflower, cabbage, brussels sprouts, kale, spinach, and other green leafy vegetables, liver, green tea, certain vitamin supplements). If you are trying to lose weight, check with your doctor before you try to go on a diet. Cranberry products may also affect how your warfarin works. Limit the amount of cranberry juice (16 ounces/480 milliliters a day) or other cranberry products you may drink or eat.      SIDE EFFECTS:  Nausea, loss of appetite, or stomach/abdominal pain may occur. If any of these effects persist or worsen, tell your doctor or pharmacist promptly. Remember that your doctor has prescribed this medication because he or she has judged that the benefit to you is greater than the risk of side effects. Many people using this medication do not have serious side effects. This medication can cause serious bleeding if it affects your blood clotting proteins too much (shown by unusually high INR lab results). Even if your doctor stops your medication, this risk of bleeding can continue for up to a week. Tell your doctor right away if you have any signs of serious bleeding, including: unusual pain/swelling/discomfort, unusual/easy bruising, prolonged bleeding from cuts or gums, persistent/frequent nosebleeds, unusually heavy/prolonged menstrual flow, pink/dark urine, coughing up blood, vomit that is bloody or looks like coffee grounds, severe headache, dizziness/fainting, unusual or persistent tiredness/weakness, bloody/black/tarry stools, chest pain, shortness of breath, difficulty swallowing. Tell your doctor right away if any of these unlikely but serious side effects occur: persistent nausea/vomiting, severe stomach/abdominal pain, yellowing eyes/skin. This drug rarely has caused very serious (possibly fatal) problems if its effects lead to small blood clots  (usually at the beginning of treatment). This can lead to severe skin/tissue damage that may require surgery or amputation if left untreated. Patients with certain blood conditions (protein C or S deficiency) may be at greater risk. Get medical help right away if any of these rare but serious side effects occur: painful/red/purplish patches on the skin (such as on the toe, breast, abdomen), change in the amount of urine, vision changes, confusion, slurred speech, weakness on one side of the body. A very serious allergic reaction to this drug is rare. However, get medical help right away if you notice any symptoms of a serious allergic reaction, including: rash, itching/swelling (especially of the face/tongue/throat), severe dizziness, trouble breathing. This is not a complete list of possible side effects. If you notice other effects not listed above, contact your doctor or pharmacist. In the [place] - Call your doctor for medical advice about side effects. You may report side effects to FDA at 7-279-VFT-7846. In [place] - Call your doctor for medical advice about side effects. You may report side effects to [facility] [place] at 1-512.128.3439.      PRECAUTIONS:  Before taking warfarin, tell your doctor or pharmacist if you are allergic to it; or if you have any other allergies. This product may contain inactive ingredients, which can cause allergic reactions or other problems. Talk to your pharmacist for more details. Before using this medication, tell your doctor or pharmacist your medical history, especially of: blood disorders (such as anemia, hemophilia), bleeding problems (such as bleeding of the stomach/intestines, bleeding in the brain), blood vessel disorders (such as aneurysms), recent major injury/surgery, liver disease, alcohol use, mental/mood disorders (including memory problems), frequent falls/injuries. It is important that all your doctors and dentists know that you take warfarin. Before having surgery or any  medical/dental procedures, tell your doctor or dentist that you are taking this medication and about all the products you use (including prescription drugs, nonprescription drugs, and herbal products). Avoid getting injections into the muscles. If you must have an injection into a muscle (for example, a flu shot), it should be given in the arm. This way, it will be easier to check for bleeding and/or apply pressure bandages. This medication may cause stomach bleeding. Daily use of alcohol while using this medicine will increase your risk for stomach bleeding and may also affect how this medication works. Limit or avoid alcoholic beverages. If you have not been eating well, if you have an illness or infection that causes fever, vomiting, or diarrhea for more than 2 days, or if you start using any antibiotic medications, contact your doctor or pharmacist immediately because these conditions can affect how warfarin works. This medication can cause heavy bleeding. To lower the chance of getting cut, bruised, or injured, use great caution with sharp objects like safety razors and nail cutters. Use an electric razor when shaving and a soft toothbrush when brushing your teeth. Avoid activities such as contact sports. If you fall or injure yourself, especially if you hit your head, call your doctor immediately. Your doctor may need to check you. The Food & Drug Administration has stated that generic warfarin products are interchangeable. However, consult your doctor or pharmacist before switching warfarin products. Be careful not to take more than one medication that contains warfarin unless specifically directed by the doctor or health care provider who is monitoring your warfarin treatment. Older adults may be at greater risk for bleeding while using this drug. This medication is not recommended for use during pregnancy because of serious (possibly fatal) harm to an unborn baby. Discuss the use of reliable forms of birth  "control with your doctor. If you become pregnant or think you may be pregnant, tell your doctor immediately. If you are planning pregnancy, discuss a plan for managing your condition with your doctor before you become pregnant. Your doctor may switch the type of medication you use during pregnancy. Very small amounts of this medication may pass into breast milk but is unlikely to harm a nursing infant. Consult your doctor before breast-feeding.      DRUG INTERACTIONS:  Drug interactions may change how your medications work or increase your risk for serious side effects. This document does not contain all possible drug interactions. Keep a list of all the products you use (including prescription/nonprescription drugs and herbal products) and share it with your doctor and pharmacist. Do not start, stop, or change the dosage of any medicines without your doctor's approval. Warfarin interacts with many prescription, nonprescription, vitamin, and herbal products. This includes medications that are applied to the skin or inside the vagina or rectum. The interactions with warfarin usually result in an increase or decrease in the \"blood-thinning\" (anticoagulant) effect. Your doctor or other health care professional should closely monitor you to prevent serious bleeding or clotting problems. While taking warfarin, it is very important to tell your doctor or pharmacist of any changes in medications, vitamins, or herbal products that you are taking. Some products that may interact with this drug include: capecitabine, imatinib, mifepristone. Aspirin, aspirin-like drugs (salicylates), and nonsteroidal anti-inflammatory drugs (NSAIDs such as ibuprofen, naproxen, celecoxib) may have effects similar to warfarin. These drugs may increase the risk of bleeding problems if taken during treatment with warfarin. Carefully check all prescription/nonprescription product labels (including drugs applied to the skin such as pain-relieving " creams) since the products may contain NSAIDs or salicylates. Talk to your doctor about using a different medication (such as acetaminophen) to treat pain/fever. Low-dose aspirin and related drugs (such as clopidogrel, ticlopidine) should be continued if prescribed by your doctor for specific medical reasons such as heart attack or stroke prevention. Consult your doctor or pharmacist for more details. Many herbal products interact with warfarin. Tell your doctor before taking any herbal products, especially bromelains, coenzyme Q10, cranberry, danshen, dong quai, fenugreek, garlic, ginkgo biloba, ginseng, and [place]'s wort, among others. This medication may interfere with a certain laboratory test to measure theophylline levels, possibly causing false test results. Make sure laboratory personnel and all your doctors know you use this drug.      OVERDOSE:  If overdose is suspected, contact a poison control center or emergency room immediately.  residents can call the [place] Poison Hotline at 1-723.971.6571. [place] residents can call a provincial poison control center. Symptoms of overdose may include: bloody/black/tarry stools, pink/dark urine, unusual/prolonged bleeding.      NOTES:  Do not share this medication with others. Laboratory and/or medical tests (such as INR, complete blood count) must be performed periodically to monitor your progress or check for side effects. Consult your doctor for more details.      MISSED DOSE:  For the best possible benefit, do not miss any doses. If you do miss a dose and remember on the same day, take it as soon as you remember. If you remember on the next day, skip the missed dose and resume your usual dosing schedule. Do not double the dose to catch up because this could increase your risk for bleeding. Keep a record of missed doses to give to your doctor or pharmacist. Contact your doctor or pharmacist if you miss 2 or more doses in a row.      STORAGE:  Store at room  temperature away from light and moisture. Do not store in the bathroom. Keep all medications away from children and pets. Do not flush medications down the toilet or pour them into a drain unless instructed to do so. Properly discard this product when it is  or no longer needed. Consult your pharmacist or local waste disposal company for more details about how to safely discard your product.      MEDICAL ALERT:  Your condition and medication can cause complications in a medical emergency. For information about enrolling in MedicAlert, call 1-128.317.8232 ([place]) or 1-343.908.9868 ([place]).      Information last revised 2010 Copyright(c) 2010 [employer]             Depression / Suicide Risk    As you are discharged from this [facility] facility, it is important to learn how to keep safe from harming yourself.    Recognize the warning signs:  · Abrupt changes in personality, positive or negative- including increase in energy   · Giving away possessions  · Change in eating patterns- significant weight changes-  positive or negative  · Change in sleeping patterns- unable to sleep or sleeping all the time   · Unwillingness or inability to communicate  · Depression  · Unusual sadness, discouragement and loneliness  · Talk of wanting to die  · Neglect of personal appearance   · Rebelliousness- reckless behavior  · Withdrawal from people/activities they love  · Confusion- inability to concentrate     If you or a loved one observes any of these behaviors or has concerns about self-harm, here's what you can do:  · Talk about it- your feelings and reasons for harming yourself  · Remove any means that you might use to hurt yourself (examples: pills, rope, extension cords, firearm)  · Get professional help from the community (Mental Health, Substance Abuse, psychological counseling)  · Do not be alone:Call your Safe Contact- someone whom you trust who will be there for you.  · Call your local CRISIS  HOTLINE 774-7151 or 975-863-6643  · Call your local Children's Mobile Crisis Response Team [place] (659) 414-9388 or www.[facility]  · Call the toll free National Suicide Prevention Hotlines   · National Suicide Prevention Lifeline 855-961-GWVZ (1756)  · [facility] Line Network 800-SUICIDE (470-9603)      Endoscopic Retrograde Cholangiopancreatogram, Care After  This sheet gives you information about how to care for yourself after your procedure. Your health care provider may also give you more specific instructions. If you have problems or questions, contact your health care provider.  What can I expect after the procedure?  After the procedure, it is common to have:  · Soreness in your throat.  · Nausea.  · Bloating.  · Dizziness.  · Tiredness (fatigue).  Follow these instructions at home:    · Take over-the-counter and prescription medicines only as told by your health care provider.  · Do not drive for 24 hours if you were given a medicine to help you relax (sedative) during your procedure. Have someone stay with you for 24 hours after the procedure.  · Return to your normal activities as told by your health care provider. Ask your health care provider what activities are safe for you.  · Return to eating what you normally do as soon as you feel well enough or as told by your health care provider.  · Keep all follow-up visits as told by your health care provider. This is important.  Contact a health care provider if:  · You have pain in your abdomen that does not get better with medicine.  · You develop signs of infection, such as:  ? Chills.  ? Feeling unwell.  Get help right away if:  · You have difficulty swallowing.  · You have worsening pain in your throat, chest, or abdomen.  · You vomit bright red blood or a substance that looks like coffee grounds.  · You have bloody or very black stools.  · You have a fever.  · You have a sudden increase in swelling (bloating) in your  abdomen.  Summary  · After the procedure, it is common to feel tired and to have some discomfort in your throat.  · Contact your health care provider if you have signs of infection--such as chills or feeling unwell--or if you have pain that does not improve with medicine.  · Get help right away if you have trouble swallowing, worsening pain, bloody or black vomit, bloody or black stools, a fever, or increased swelling in your abdomen.  · Keep all follow-up visits as told by your health care provider. This is important.  This information is not intended to replace advice given to you by your health care provider. Make sure you discuss any questions you have with your health care provider.  Document Released: 10/08/2014 Document Revised: 11/30/2018 Document Reviewed: 11/06/2017  Elsevier Patient Education © 2020 [employer]

## 2020-09-24 NOTE — PROGRESS NOTES
Telemetry Shift Summary    Rhythm AFIB  HR Range   Ectopy oPVC  Measurements na/0.12/na  Per [place], [place]    Normal Values  Rhythm SR  HR Range    Measurements 0.12-0.20 / 0.06-0.10  / 0.30-0.52

## 2020-09-25 ENCOUNTER — TELEPHONE (OUTPATIENT)
Dept: CARDIOLOGY | Facility: MEDICAL CENTER | Age: 75
End: 2020-09-25

## 2020-09-25 RX ORDER — CYCLOBENZAPRINE HCL 5 MG
5 TABLET ORAL 2 TIMES DAILY PRN
Qty: 20 TAB | Refills: 0 | Status: SHIPPED | OUTPATIENT
Start: 2020-09-25 | End: 2020-11-20

## 2020-09-25 NOTE — PROGRESS NOTES
Pt called to say there was a problem dispensing meds at her pharmacy, baclofen was not sent to the pharmacy, and diltiazem was also sent as capsules and cannot be cut to pt's dosing. Called [facility] hospitalist RN. Hospitalist RN to call pharmacy and pt in the am. Called pt to verify communication.

## 2020-09-25 NOTE — TELEPHONE ENCOUNTER
Returned pt's call. She states she was just dc'd from hospital. She was supposed to have cardioversion but was unable to for multiple reasons. Pt says before being admitted she had chills and fever, it was discovered she had gall stones. She was then admitted and also ended up needing a stent. She would like to know when the cardioversion would take place now and she would like to verify that med changes made in hospital are ok. Advised pt that she should follow the medication recommendations made while in hospital and that per VR's note, she will have to be on anticoagulation for 3 months at least, before cardioversion. Recommended that pt make an appt w/ VR to further discuss. Transferred pt to scheduling.

## 2020-09-25 NOTE — TELEPHONE ENCOUNTER
VR      Hello, patient calling with questions regarding her medication. She just got discharged today and the Dr's changed her medication. She will like a call back at  [phone]         Thank you!

## 2020-09-25 NOTE — PROGRESS NOTES
Monitor Summary     Rhythm:afib   Measurements: -/0.14/-  ECTOPIES: O PVC        Normal Values  Rhythm SR  HR Range    Measurements 0.12-0.20 / 0.06-0.10  / 0.30-0.52

## 2020-09-28 ENCOUNTER — ANTICOAGULATION VISIT (OUTPATIENT)
Dept: MEDICAL GROUP | Facility: MEDICAL CENTER | Age: 75
End: 2020-09-28
Payer: MEDICARE

## 2020-09-28 DIAGNOSIS — Z79.01 LONG TERM (CURRENT) USE OF ANTICOAGULANTS: ICD-10-CM

## 2020-09-28 DIAGNOSIS — I48.19 PERSISTENT ATRIAL FIBRILLATION (HCC): ICD-10-CM

## 2020-09-28 LAB — INR PPP: 2.5 (ref 2–3.5)

## 2020-09-28 PROCEDURE — 93793 ANTICOAG MGMT PT WARFARIN: CPT | Performed by: INTERNAL MEDICINE

## 2020-09-28 PROCEDURE — 85610 PROTHROMBIN TIME: CPT | Performed by: INTERNAL MEDICINE

## 2020-09-28 NOTE — PROGRESS NOTES
OP Anticoagulation Service Note    Date: 2020  There were no vitals filed for this visit.   pt declined vitals    Anticoagulation Summary  As of 2020    INR goal:  2.0-3.0   TTR:  81.2 % (6.1 mo)   INR used for dosin.50 (2020)   Warfarin maintenance plan:  5 mg (5 mg x 1) every day   Weekly warfarin total:  35 mg   Plan last modified:  Cindy Treviño, PharmD (2020)   Next INR check:  10/5/2020   Target end date:  Indefinite    Indications    Persistent atrial fibrillation (HCC) [I48.19]  Acute pulmonary embolism (HCC) [I26.99]  Long term (current) use of anticoagulants [Z79.01]             Anticoagulation Episode Summary     INR check location:      Preferred lab:      Send INR reminders to:      Comments:        Anticoagulation Care Providers     Provider Role Specialty Phone number    Mary Carmen Rogers M.D. Referring Internal Medicine Critical Care [phone]    [facility] Responsible  [phone]        Anticoagulation Patient Findings      HPI:   Rufina Macias seen in clinic today, on anticoagulation therapy with warfarin (a high risk medication) for atrial fibrillation and PE       Pt is here today to evaluate anticoagulation therapy  She was admitted ot the hospital for gall stones and atrial fibrillation, she was taken off her coumadin for procedure and it was resumed in hospital.     Confirmed warfarin dosing regimen, denies missed or extra doses of coumadin.   Diet has been consistent with foods rich in vitamin K: Yes - but planning to start a new diet to prevent gall stones  Changes in ETOH:  No  Changes in smoking status: No  Changes in medication: Yes, cyclobenzaprine and lower diltiazem dose  Cost restriction: No  S/s of bleeding:  No  Falls or accidents since last visit No  Signs/symptoms  thrombosis since the last appt: No    A/P   INR  therapeutic today,  will require close follow up as adjusting diet and recent hospitalization  Continue current  warfarin regimen     Per Pt [facility].     Pt educated to contact our clinic with any changes in medications or s/s of bleeding or thrombosis. Pt is aware to seek immediate medical attention for falls, head injury or deep cuts    Follow up appointment in 1 week(s) to reduce risk of adverse events from warfarin   Cindy Treviño, PharmD

## 2020-09-29 ENCOUNTER — OFFICE VISIT (OUTPATIENT)
Dept: MEDICAL GROUP | Age: 75
End: 2020-09-29
Payer: MEDICARE

## 2020-09-29 VITALS
HEART RATE: 117 BPM | HEIGHT: 62 IN | TEMPERATURE: 98.3 F | WEIGHT: 191.2 LBS | DIASTOLIC BLOOD PRESSURE: 92 MMHG | OXYGEN SATURATION: 95 % | BODY MASS INDEX: 35.19 KG/M2 | SYSTOLIC BLOOD PRESSURE: 140 MMHG

## 2020-09-29 DIAGNOSIS — K91.5 POST-CHOLECYSTECTOMY SYNDROME: ICD-10-CM

## 2020-09-29 DIAGNOSIS — I26.99 OTHER ACUTE PULMONARY EMBOLISM WITHOUT ACUTE COR PULMONALE (HCC): ICD-10-CM

## 2020-09-29 DIAGNOSIS — I10 ESSENTIAL HYPERTENSION: ICD-10-CM

## 2020-09-29 DIAGNOSIS — Z09 HOSPITAL DISCHARGE FOLLOW-UP: ICD-10-CM

## 2020-09-29 DIAGNOSIS — Z79.01 ANTICOAGULATED: ICD-10-CM

## 2020-09-29 DIAGNOSIS — J43.1 PANLOBULAR EMPHYSEMA (HCC): ICD-10-CM

## 2020-09-29 DIAGNOSIS — I48.19 PERSISTENT ATRIAL FIBRILLATION (HCC): ICD-10-CM

## 2020-09-29 DIAGNOSIS — R19.7 DIARRHEA DUE TO MALABSORPTION: ICD-10-CM

## 2020-09-29 DIAGNOSIS — F41.9 ANXIETY: ICD-10-CM

## 2020-09-29 DIAGNOSIS — K80.51 CALCULUS OF BILE DUCT WITHOUT CHOLECYSTITIS WITH OBSTRUCTION: ICD-10-CM

## 2020-09-29 DIAGNOSIS — K90.9 DIARRHEA DUE TO MALABSORPTION: ICD-10-CM

## 2020-09-29 PROCEDURE — 99214 OFFICE O/P EST MOD 30 MIN: CPT | Performed by: INTERNAL MEDICINE

## 2020-09-29 RX ORDER — MONTELUKAST SODIUM 4 MG/1
1 TABLET, CHEWABLE ORAL 2 TIMES DAILY
Qty: 60 TAB | Refills: 5 | Status: SHIPPED | OUTPATIENT
Start: 2020-09-29 | End: 2020-10-22

## 2020-09-29 RX ORDER — SERTRALINE HYDROCHLORIDE 25 MG/1
25 TABLET, FILM COATED ORAL DAILY
Qty: 30 TAB | Refills: 11 | Status: SHIPPED | OUTPATIENT
Start: 2020-09-29 | End: 2021-01-01

## 2020-09-29 RX ORDER — DILTIAZEM HYDROCHLORIDE 120 MG/1
120 CAPSULE, COATED, EXTENDED RELEASE ORAL DAILY
Qty: 60 CAP | Refills: 5 | Status: SHIPPED | OUTPATIENT
Start: 2020-09-29 | End: 2020-09-30 | Stop reason: SDUPTHER

## 2020-09-29 ASSESSMENT — ENCOUNTER SYMPTOMS
GASTROINTESTINAL NEGATIVE: 1
CONSTITUTIONAL NEGATIVE: 1
CARDIOVASCULAR NEGATIVE: 1
EYES NEGATIVE: 1
NEUROLOGICAL NEGATIVE: 1
RESPIRATORY NEGATIVE: 1
PSYCHIATRIC NEGATIVE: 1
MUSCULOSKELETAL NEGATIVE: 1

## 2020-09-29 ASSESSMENT — FIBROSIS 4 INDEX: FIB4 SCORE: 1.26

## 2020-09-30 ENCOUNTER — HOME CARE VISIT (OUTPATIENT)
Dept: HOME HEALTH SERVICES | Facility: HOME HEALTHCARE | Age: 75
End: 2020-09-30
Payer: MEDICARE

## 2020-09-30 ENCOUNTER — TELEPHONE (OUTPATIENT)
Dept: CARDIOLOGY | Facility: MEDICAL CENTER | Age: 75
End: 2020-09-30

## 2020-09-30 VITALS
OXYGEN SATURATION: 95 % | BODY MASS INDEX: 34.04 KG/M2 | WEIGHT: 185 LBS | HEIGHT: 62 IN | RESPIRATION RATE: 18 BRPM | DIASTOLIC BLOOD PRESSURE: 70 MMHG | SYSTOLIC BLOOD PRESSURE: 118 MMHG | HEART RATE: 78 BPM | TEMPERATURE: 99 F

## 2020-09-30 DIAGNOSIS — Z79.01 CHRONIC ANTICOAGULATION: ICD-10-CM

## 2020-09-30 PROCEDURE — 665001 SOC-HOME HEALTH

## 2020-09-30 PROCEDURE — G0493 RN CARE EA 15 MIN HH/HOSPICE: HCPCS

## 2020-09-30 RX ORDER — DILTIAZEM HYDROCHLORIDE 120 MG/1
120 CAPSULE, COATED, EXTENDED RELEASE ORAL 2 TIMES DAILY
Qty: 60 CAP | Refills: 5 | Status: ON HOLD | OUTPATIENT
Start: 2020-09-30 | End: 2021-01-01

## 2020-09-30 ASSESSMENT — FIBROSIS 4 INDEX: FIB4 SCORE: 1.26

## 2020-09-30 NOTE — TELEPHONE ENCOUNTER
You  You; Guillaume Diez M.D. 1 hour ago (9:13 AM)     Is pt low, intermediate, or high risk for EGD/ERCP with stent removal? Can she hold warfarin prior, or does she need bridging?      Guillaume Diez M.D.  You 1 hour ago (9:23 AM)     She can hold coumadin for 5 days and resume day after procedure. No bridging necessary. Thank you Laura!      Form completed, signed by ELLYN, faxed to [phone]. Receipt confirmed, form to scanning.

## 2020-09-30 NOTE — TELEPHONE ENCOUNTER
Received clearance request from [facility] for pt's EGD/ERCP with stent removal on 11/24/20. They would also like pt to hold warfarin prior.    To ELLYN

## 2020-09-30 NOTE — PROGRESS NOTES
"Subjective:      Rufina Macias is a 75 y.o. female who presents with Hospital Follow-up  Patient is here for one-week hospital follow-up visit for her acute choledocholithiasis from retained stone status post colonoscopy a few years ago.  Is the third episode since had since colonoscopy.  A common bile duct stent was placed and this will be removed in 6 to 8 weeks.      This caused postponement of a planned electrocardioversion attempt of her atrial fibrillation.  It also resulted in her having to stop her Coumadin and go on bridging therapy with Lovenox temporarily.  She is now back on Coumadin.  Abdominal pain has resolved.    The patient has developed loose stools and diarrhea from her postcholecystectomy syndrome and wants something for this.  She is using Imodium right ear right now.  Her heart is still racing from the atrial fibrillation and she says that they cut down on her diltiazem from 240 mg a day to 120 mg a day.  She will not see cardiology for another month.  She also remains on metoprolol 100 mg twice daily.          HPI    Review of Systems   Constitutional: Negative.    HENT: Negative.    Eyes: Negative.    Respiratory: Negative.    Cardiovascular: Negative.    Gastrointestinal: Negative.    Genitourinary: Negative.    Musculoskeletal: Negative.    Skin: Negative.    Neurological: Negative.    Endo/Heme/Allergies: Negative.    Psychiatric/Behavioral: Negative.           Objective:     /92 (BP Location: Left arm, Patient Position: Standing, BP Cuff Size: Adult)   Pulse (!) 117   Temp 36.8 °C (98.3 °F) (Temporal)   Ht 1.575 m (5' 2\")   Wt 86.7 kg (191 lb 3.2 oz)   SpO2 95%   BMI 34.97 kg/m²      Physical Exam  Vitals signs reviewed.   Constitutional:       General: She is not in acute distress.     Appearance: She is well-developed. She is not diaphoretic.   HENT:      Head: Normocephalic and atraumatic.      Right Ear: External ear normal.      Left Ear: External ear normal.      " Nose: Nose normal.      Mouth/Throat:      Pharynx: No oropharyngeal exudate.   Eyes:      General: No scleral icterus.        Right eye: No discharge.         Left eye: No discharge.      Conjunctiva/sclera: Conjunctivae normal.      Pupils: Pupils are equal, round, and reactive to light.   Neck:      Musculoskeletal: Normal range of motion and neck supple.      Thyroid: No thyromegaly.      Vascular: No JVD.      Trachea: No tracheal deviation.   Cardiovascular:      Rate and Rhythm: Tachycardia present. Rhythm irregular.      Heart sounds: Normal heart sounds. No murmur. No friction rub. No gallop.       Comments: Irregularly irregular rhythm with variable intensity of S2.  Rapid variable heart rate at 100-120  Pulmonary:      Effort: Pulmonary effort is normal. No respiratory distress.      Breath sounds: Normal breath sounds. No stridor. No wheezing or rales.   Chest:      Chest wall: No tenderness.   Abdominal:      General: Bowel sounds are normal. There is no distension.      Palpations: Abdomen is soft. There is no mass.      Tenderness: There is no abdominal tenderness. There is no guarding or rebound.   Musculoskeletal: Normal range of motion.         General: No tenderness.   Lymphadenopathy:      Cervical: No cervical adenopathy.   Skin:     General: Skin is warm and dry.      Coloration: Skin is not pale.      Findings: No erythema or rash.   Neurological:      Mental Status: She is alert and oriented to person, place, and time.      Cranial Nerves: No cranial nerve deficit.      Motor: No abnormal muscle tone.      Coordination: Coordination normal.      Deep Tendon Reflexes: Reflexes are normal and symmetric. Reflexes normal.   Psychiatric:         Behavior: Behavior normal.         Thought Content: Thought content normal.         Judgment: Judgment normal.       Anticoagulation Visit on 09/28/2020   Component Date Value   • INR 09/28/2020 2.50    No results displayed because visit has over 200  results.      Hospital Outpatient Visit on 09/11/2020   Component Date Value   • WBC 09/11/2020 12.9*   • RBC 09/11/2020 5.04    • Hemoglobin 09/11/2020 14.9    • Hematocrit 09/11/2020 48.1*   • MCV 09/11/2020 95.4    • MCH 09/11/2020 29.6    • MCHC 09/11/2020 31.0*   • RDW 09/11/2020 49.2    • Platelet Count 09/11/2020 158*   • MPV 09/11/2020 10.8    • Neutrophils-Polys 09/11/2020 83.90*   • Lymphocytes 09/11/2020 9.00*   • Monocytes 09/11/2020 6.20    • Eosinophils 09/11/2020 0.30    • Basophils 09/11/2020 0.20    • Immature Granulocytes 09/11/2020 0.40    • Nucleated RBC 09/11/2020 0.00    • Neutrophils (Absolute) 09/11/2020 10.78*   • Lymphs (Absolute) 09/11/2020 1.16    • Monos (Absolute) 09/11/2020 0.80    • Eos (Absolute) 09/11/2020 0.04    • Baso (Absolute) 09/11/2020 0.02    • Immature Granulocytes (a* 09/11/2020 0.05    • NRBC (Absolute) 09/11/2020 0.00    • Sodium 09/11/2020 137    • Potassium 09/11/2020 4.1    • Chloride 09/11/2020 98    • Co2 09/11/2020 25    • Anion Gap 09/11/2020 14.0    • Glucose 09/11/2020 132*   • Bun 09/11/2020 19    • Creatinine 09/11/2020 0.87    • Calcium 09/11/2020 9.0    • AST(SGOT) 09/11/2020 147*   • ALT(SGPT) 09/11/2020 146*   • Alkaline Phosphatase 09/11/2020 229*   • Total Bilirubin 09/11/2020 2.4*   • Albumin 09/11/2020 3.7    • Total Protein 09/11/2020 7.0    • Globulin 09/11/2020 3.3    • A-G Ratio 09/11/2020 1.1    • Hepatitis B Surface Anti* 09/11/2020 Non-Reactive    • Hepatitis B Cors Ab,IgM 09/11/2020 Non-Reactive    • Hepatitis A Virus Ab, IgM 09/11/2020 Non-Reactive    • Hepatitis C Antibody 09/11/2020 Reactive*   • GFR If  09/11/2020 >60    • GFR If Non  Ameri* 09/11/2020 >60    • Significant Indicator 09/11/2020 POS*   • Source 09/11/2020 UR    • Site 09/11/2020 -    • Culture Result 09/11/2020 Mixed skin mike 10-50,000 cfu/mL*   • Culture Result 09/11/2020 *                    Value:Escherichia coli  10-50,000 cfu/mL     Office Visit  on 2020   Component Date Value   • POC Color 2020 fina    • POC Appearance 2020 turbid    • POC Leukocyte Esterase 2020 trace    • POC Nitrites 2020 neg    • POC Urobiligen 2020 4.0    • POC Protein 2020 100    • POC Urine PH 2020 5.5    • POC Blood 2020 trace    • POC Specific Gravity 2020 1.025    • POC Ketones 2020 neg    • POC Bilirubin 2020 mod    • POC Glucose 2020 neg    Pre-Admission Testing on 2020   Component Date Value   • Sodium 2020 136    • Potassium 2020 4.3    • Chloride 2020 97    • Co2 2020 24    • Glucose 2020 99    • Bun 2020 16    • Creatinine 2020 0.83    • Calcium 2020 9.6    • Anion Gap 2020 15.0    • WBC 2020 11.6*   • RBC 2020 5.20    • Hemoglobin 2020 15.2    • Hematocrit 2020 49.1*   • MCV 2020 94.4    • MCH 2020 29.2    • MCHC 2020 31.0*   • RDW 2020 47.6    • Platelet Count 2020 169    • MPV 2020 9.8    • PT 2020 23.2*   • INR 2020 1.98*   • Report 2020                      Value:[facility]    Test Date:  2020  Pt Name:    LUANN GAONA                Department: [facility]  MRN:        [id number]                      Room:  Gender:     Female                       Technician: [facility]  :        1945                   Requested By:ALEKSANDR MEDEIROS  Order #:    [id number]                    Reading MD: Guillaume Diez MD    Measurements  Intervals                                Axis  Rate:       88                           P:  DE:                                      QRS:        58  QRSD:       146                          T:          8  QT:         408  QTc:        494    Interpretive Statements  ATRIAL FIBRILLATION, V-RATE    RIGHT BUNDLE BRANCH BLOCK  Compared to ECG 2020 13:28:33  No significant changes  Electronically Signed On 2020 15:26:23  PDT by Guillaume Diez MD     • COVID Order Status 09/08/2020 Received    • GFR If  09/08/2020 >60    • GFR If Non  Ameri* 09/08/2020 >60    • SARS-CoV-2 Source 09/08/2020 Nasal Swab    • SARS-CoV-2 by PCR 09/08/2020 NotDetected       Lab Results   Component Value Date/Time    HBA1C 5.7 (H) 02/22/2020 01:52 AM     Lab Results   Component Value Date/Time    SODIUM 141 09/22/2020 04:18 AM    POTASSIUM 3.9 09/22/2020 04:18 AM    CHLORIDE 102 09/22/2020 04:18 AM    CO2 31 09/22/2020 04:18 AM    GLUCOSE 117 (H) 09/22/2020 04:18 AM    BUN 13 09/22/2020 04:18 AM    CREATININE 0.66 09/22/2020 04:18 AM    CREATININE 0.80 12/02/2010 12:00 AM    BUNCREATRAT 20 12/02/2010 12:00 AM    GLOMRATE >59 12/02/2010 12:00 AM    ALKPHOSPHAT 143 (H) 09/22/2020 04:18 AM    ASTSGOT 20 09/22/2020 04:18 AM    ALTSGPT 39 09/22/2020 04:18 AM    TBILIRUBIN 0.7 09/22/2020 04:18 AM     Lab Results   Component Value Date/Time    INR 2.50 09/28/2020 01:09 PM    INR 2.00 (H) 09/24/2020 03:05 AM    INR 1.75 (H) 09/23/2020 04:49 AM     Lab Results   Component Value Date/Time    CHOLSTRLTOT 80 (L) 09/14/2020 05:00 AM    LDL 33 09/14/2020 05:00 AM    HDL 25 (A) 09/14/2020 05:00 AM    TRIGLYCERIDE 110 09/14/2020 05:00 AM       No results found for: TESTOSTERONE  Lab Results   Component Value Date/Time    TSH 1.640 12/02/2010 12:00 AM     Lab Results   Component Value Date/Time    FREET4 1.68 09/13/2020 04:45 PM    FREET4 1.90 (H) 02/21/2020 06:43 PM     No results found for: URICACID  No components found for: VITB12  Lab Results   Component Value Date/Time    25HYDROXY 39 09/20/2016 11:36 AM    25HYDROXY 39 10/24/2014 11:56 AM                Assessment/Plan:        1. Hospital discharge follow-up-from last week for #2 below.  Doing well postop.    2. 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 with obstruction- ERCP EXTRACTON/ SPHINCTEROTOMY, recurrent Feb 2020, and sept 2020-common bile duct stent placed to be removed in 6 to 8  weeks.  Followed by [facility].       3. Post-cholecystectomy syndrome  We will start on Colestid for diarrhea.  - colestipol (COLESTID) 1 GM Tab; Take 1 Tab by mouth 2 times a day.  Dispense: 60 Tab; Refill: 5    4. Diarrhea due to malabsorption      As above  - colestipol (COLESTID) 1 GM Tab; Take 1 Tab by mouth 2 times a day.  Dispense: 60 Tab; Refill: 5    5. Persistent atrial fibrillation (HCC)- diltiezam and metoprolol; dc cardioversion on hold  Not well controlled.  Increase diltiazem to 240 mg daily.  Continue on metoprolol 100 mg twice daily.  - DILTIAZem CD (CARDIZEM CD) 120 MG CAPSULE SR 24 HR; Take 2 Cap by mouth every day.  Dispense: 60 Cap; Refill: 5    6. Other acute pulmonary embolism without acute cor pulmonale (HCC)  Under good control.  Continue anticoagulation with Coumadin follow-up at the Coumadin clinic.  7. Essential hypertension  Good control.  Continue same regimen    8. Panlobular emphysema (HCC)  Good control continue same regimen  9. Anticoagulated- coumadin (failed eliquis 2/2020)     Good control continue same regimen and frequent INRs from Coumadin clinic.      10. Anxiety-new problem.  Discussed.  Counseled.  Start on Zoloft and recheck in 2-4 weeks to see how she doing with this.     - sertraline (ZOLOFT) 25 MG tablet; Take 1 Tab by mouth every day.  Dispense: 30 Tab; Refill: 11          40 minute face-to-face encounter took place today.  More than half of this time was spent in the coordination of care of the above problems, as well as counseling.      .[id number]  .41

## 2020-10-01 ENCOUNTER — TELEPHONE (OUTPATIENT)
Dept: MEDICAL GROUP | Age: 75
End: 2020-10-01

## 2020-10-01 ENCOUNTER — ANTICOAGULATION MONITORING (OUTPATIENT)
Dept: MEDICAL GROUP | Facility: PHYSICIAN GROUP | Age: 75
End: 2020-10-01

## 2020-10-01 DIAGNOSIS — I48.19 PERSISTENT ATRIAL FIBRILLATION (HCC): ICD-10-CM

## 2020-10-01 DIAGNOSIS — Z79.01 LONG TERM (CURRENT) USE OF ANTICOAGULANTS: ICD-10-CM

## 2020-10-01 NOTE — TELEPHONE ENCOUNTER
VOICEMAIL  1. Caller Name: Rufina Macias                        Call Back Number: [phone] (home)       2. Message: stated that the medication DILTIAZem CD (CARDIZEM CD) 120 MG CAPSULE SR 24 HR was written for once a day         3. Patient approves office to leave a detailed voicemail/MyChart message: no and N\A      Phone Number Called: [phone] (home)       Call outcome: Spoke to patient regarding message below.    Message: Informed her that it was written for 2x daily, that if she had any problems she can give us a call or send us a mychart message. Pt understood

## 2020-10-01 NOTE — PROGRESS NOTES
Received referral from [facility]. Medications reviewed. No clinically significant interactions noted.     A few of the medications on her discharge summary she is currently not taking.    These include:  Baclofen 5 mg 1 twice daily  Montelukast 10 mg once daily  Trelegy 1 puff daily  Vitamin D daily      There are 2 medications on her home med list that were not on her discharge summary, however, she is currently not taking them.    They include:  Cyclobenzaprine 5 mg twice daily  Colestid 1 g twice daily          Ulices Ríos, PharmD, MS, BCACP, [facility]  Phone [phone] fax [phone]    This note was created using voice recognition software (Dragon). The accuracy of the dictation is limited by the abilities of the software. I have reviewed the note prior to signing, however some errors in grammar and context are still possible. If you have any questions related to this note please do not hesitate to contact our office.

## 2020-10-02 ENCOUNTER — HOME CARE VISIT (OUTPATIENT)
Dept: HOME HEALTH SERVICES | Facility: HOME HEALTHCARE | Age: 75
End: 2020-10-02
Payer: MEDICARE

## 2020-10-02 VITALS
SYSTOLIC BLOOD PRESSURE: 122 MMHG | HEART RATE: 68 BPM | RESPIRATION RATE: 18 BRPM | TEMPERATURE: 97.3 F | OXYGEN SATURATION: 96 % | DIASTOLIC BLOOD PRESSURE: 78 MMHG

## 2020-10-02 DIAGNOSIS — Z79.01 CHRONIC ANTICOAGULATION: ICD-10-CM

## 2020-10-02 PROCEDURE — G0495 RN CARE TRAIN/EDU IN HH: HCPCS

## 2020-10-02 SDOH — ECONOMIC STABILITY: HOUSING INSECURITY
HOME SAFETY: SENT AND FUNCTIONAL ON EACH LEVEL OF THE HOME. PATIENT DOES HAVE A FIRE ESCAPE PLAN DEVELOPED. PATIENT DOES NOT HAVE FLAMMABLE MATERIALS PRESENT IN THE HOME PRESENTING A FIRE HAZARD. NO EVIDENCE FOUND OF SMOKING MATERIALS PRESENT IN THE HOME.

## 2020-10-02 SDOH — ECONOMIC STABILITY: HOUSING INSECURITY
HOME SAFETY: OXYGEN SAFETY RISK ASSESSMENT PERFORMED. PATIENT DOES HAVE A NO SMOKING SIGN POSTED IN THE HOME. PATIENT DOES NOT HAVE A WORKING FIRE EXTINGUISHER PRESENT IN THE HOME., INSTRUCTED PATIENT/CAREGIVER TO GET ONE AS SOON AS POSSIBLE. SMOKE ALARMS ARE PRE

## 2020-10-02 SDOH — ECONOMIC STABILITY: HOUSING INSECURITY: EVIDENCE OF SMOKING MATERIAL: 0

## 2020-10-02 ASSESSMENT — PATIENT HEALTH QUESTIONNAIRE - PHQ9
1. LITTLE INTEREST OR PLEASURE IN DOING THINGS: 00
2. FEELING DOWN, DEPRESSED, IRRITABLE, OR HOPELESS: 00
CLINICAL INTERPRETATION OF PHQ2 SCORE: 0

## 2020-10-02 ASSESSMENT — ENCOUNTER SYMPTOMS
DEBILITATING PAIN: 1
DEBILITATING PAIN: 1
NAUSEA: DENIES
NAUSEA: DENIES
VOMITING: DENIES
VOMITING: DENIES

## 2020-10-02 NOTE — PROGRESS NOTES
Pt established with [facility]. Sent INR order for Monday 10/5  Cristela Benoit PharmD

## 2020-10-02 NOTE — PROGRESS NOTES
ACTION REQUIRED:     Patient is currently on Home Health services, please send orders for us to do her INR on Monday.     Thank you!

## 2020-10-02 NOTE — Clinical Note
History pertinent to today's visit: UTI, recurrent CBD gall stones, PMH: Afib, AF w RVR, PE and anticoagulation. Skilled need: health assessment, medication management. Pt/Cg response to the services provided: Patient is alert and oriented x4, ambulating without an assistive device in the home. Vitals WNL, no complaints of SOB. Reporting chronic lower back back at 4/10, reports currently managed well with medications. Discussed non-pharmacologic methods of pain management. Reviewed medications, no changes to list. Patient expressing frustration over many changes that were made while inpatient and inconsistent information that she was sent home with. Discussed recent UTI and s/s to watch for in future. Next INR scheduled for Monday, CC sent to [facility] for clarification on orders. Discussed goals for home health and services to be provided. Adding MSW visit to address daughter's caregiver burnout and financial concerns. Discussed code status and educated on POLST. Reviewed binder, frequencies and contact information for agency and patient's CM. Plan for the next visit: health assessment, INR as ordered. Case communication: CM.

## 2020-10-05 ENCOUNTER — ANTICOAGULATION MONITORING (OUTPATIENT)
Dept: VASCULAR LAB | Facility: MEDICAL CENTER | Age: 75
End: 2020-10-05

## 2020-10-05 ENCOUNTER — HOME CARE VISIT (OUTPATIENT)
Dept: HOME HEALTH SERVICES | Facility: HOME HEALTHCARE | Age: 75
End: 2020-10-05
Payer: MEDICARE

## 2020-10-05 ENCOUNTER — APPOINTMENT (OUTPATIENT)
Dept: MEDICAL GROUP | Facility: MEDICAL CENTER | Age: 75
End: 2020-10-05
Payer: MEDICARE

## 2020-10-05 VITALS
HEART RATE: 72 BPM | RESPIRATION RATE: 16 BRPM | OXYGEN SATURATION: 96 % | DIASTOLIC BLOOD PRESSURE: 72 MMHG | TEMPERATURE: 97.6 F | SYSTOLIC BLOOD PRESSURE: 126 MMHG

## 2020-10-05 DIAGNOSIS — I48.19 PERSISTENT ATRIAL FIBRILLATION (HCC): ICD-10-CM

## 2020-10-05 DIAGNOSIS — Z79.01 LONG TERM (CURRENT) USE OF ANTICOAGULANTS: ICD-10-CM

## 2020-10-05 DIAGNOSIS — I26.99 ACUTE PULMONARY EMBOLISM, UNSPECIFIED PULMONARY EMBOLISM TYPE, UNSPECIFIED WHETHER ACUTE COR PULMONALE PRESENT (HCC): ICD-10-CM

## 2020-10-05 LAB
INR PPP: 2.3 (ref 2–3.5)
INR PPP: 2.3 (ref 2–3.5)

## 2020-10-05 PROCEDURE — G0299 HHS/HOSPICE OF RN EA 15 MIN: HCPCS

## 2020-10-05 SDOH — ECONOMIC STABILITY: HOUSING INSECURITY
HOME SAFETY: FIRE ESCAPE PLAN DEVELOPED. PATIENT DOES NOT HAVE FLAMMABLE MATERIALS PRESENT IN THE HOME PRESENTING A FIRE HAZARD. NO EVIDENCE FOUND OF SMOKING MATERIALS PRESENT IN THE HOME.

## 2020-10-05 SDOH — ECONOMIC STABILITY: HOUSING INSECURITY: EVIDENCE OF SMOKING MATERIAL: 0

## 2020-10-05 SDOH — ECONOMIC STABILITY: HOUSING INSECURITY
HOME SAFETY: OXYGEN SAFETY RISK ASSESSMENT PERFORMED. PATIENT DOES HAVE A NO SMOKING SIGN POSTED IN THE HOME. PATIENT DOES HAVE A WORKING FIRE EXTINGUISHER PRESENT IN THE HOME. SMOKE ALARMS ARE PRESENT AND FUNCTIONAL ON EACH LEVEL OF THE HOME. PATIENT DOES HAVE A

## 2020-10-05 ASSESSMENT — ENCOUNTER SYMPTOMS
SEVERE DYSPNEA: 1
SHORTNESS OF BREATH: T
DEBILITATING PAIN: 1
MUSCLE WEAKNESS: 1

## 2020-10-05 NOTE — PROGRESS NOTES
OP   Telephone Anticoagulation Service Note      Anticoagulation Summary  As of 10/5/2020    INR goal:  2.0-3.0   TTR:  81.9 % (6.3 mo)   INR used for dosin.30 (10/5/2020)   Warfarin maintenance plan:  5 mg (5 mg x 1) every day   Weekly warfarin total:  35 mg   Plan last modified:  Cindy Treviño PharmD (2020)   Next INR check:  10/12/2020   Target end date:  Indefinite    Indications    Persistent atrial fibrillation (HCC) [I48.19]  Acute pulmonary embolism (HCC) [I26.99]  Long term (current) use of anticoagulants [Z79.01]             Anticoagulation Episode Summary     INR check location:      Preferred lab:      Send INR reminders to:      Comments:        Anticoagulation Care Providers     Provider Role Specialty Phone number    Mary Carmen Rogers M.D. Referring Internal Medicine Critical Care [phone]    [facility] Anticoagulation Services Responsible  [phone]        Anticoagulation Patient Findings  Patient Findings     Negatives:  Signs/symptoms of thrombosis, Signs/symptoms of bleeding, Change in medications, Change in diet/appetite        Spoke with the patient on the phone today, reporting a therapeutic INR of 2.3. Confirmed the current warfarin dosing regimen and patient compliance.  Patient denies any interval changes to diet and/or medications. Patient denies any signs/symptoms of bleeding or clotting.  Patient instructed to continue with the current warfarin dosing regimen, and asked to follow up again in 1 week.   Orders sent to [facility].     Mello Wyman PharmD

## 2020-10-07 ENCOUNTER — HOME CARE VISIT (OUTPATIENT)
Dept: HOME HEALTH SERVICES | Facility: HOME HEALTHCARE | Age: 75
End: 2020-10-07
Payer: MEDICARE

## 2020-10-07 PROCEDURE — G0155 HHCP-SVS OF CSW,EA 15 MIN: HCPCS

## 2020-10-08 ENCOUNTER — HOME CARE VISIT (OUTPATIENT)
Dept: HOME HEALTH SERVICES | Facility: HOME HEALTHCARE | Age: 75
End: 2020-10-08
Payer: MEDICARE

## 2020-10-08 VITALS — DIASTOLIC BLOOD PRESSURE: 80 MMHG | SYSTOLIC BLOOD PRESSURE: 140 MMHG | RESPIRATION RATE: 16 BRPM

## 2020-10-08 PROCEDURE — G0495 RN CARE TRAIN/EDU IN HH: HCPCS

## 2020-10-08 ASSESSMENT — ENCOUNTER SYMPTOMS: DEPRESSED MOOD: 1

## 2020-10-08 ASSESSMENT — ACTIVITIES OF DAILY LIVING (ADL): OASIS_M1830: 03

## 2020-10-09 PROCEDURE — G0180 MD CERTIFICATION HHA PATIENT: HCPCS | Performed by: INTERNAL MEDICINE

## 2020-10-09 ASSESSMENT — ENCOUNTER SYMPTOMS: MUSCLE WEAKNESS: 1

## 2020-10-12 ENCOUNTER — HOME CARE VISIT (OUTPATIENT)
Dept: HOME HEALTH SERVICES | Facility: HOME HEALTHCARE | Age: 75
End: 2020-10-12
Payer: MEDICARE

## 2020-10-12 ENCOUNTER — ANTICOAGULATION MONITORING (OUTPATIENT)
Dept: MEDICAL GROUP | Facility: MEDICAL CENTER | Age: 75
End: 2020-10-12

## 2020-10-12 VITALS
DIASTOLIC BLOOD PRESSURE: 60 MMHG | RESPIRATION RATE: 18 BRPM | HEART RATE: 57 BPM | OXYGEN SATURATION: 93 % | SYSTOLIC BLOOD PRESSURE: 104 MMHG | TEMPERATURE: 97.9 F

## 2020-10-12 DIAGNOSIS — I48.19 PERSISTENT ATRIAL FIBRILLATION (HCC): ICD-10-CM

## 2020-10-12 DIAGNOSIS — Z79.01 LONG TERM (CURRENT) USE OF ANTICOAGULANTS: ICD-10-CM

## 2020-10-12 LAB — INR PPP: 2.3 (ref 2–3.5)

## 2020-10-12 PROCEDURE — G0299 HHS/HOSPICE OF RN EA 15 MIN: HCPCS

## 2020-10-12 PROCEDURE — 6650331 HCR  COAGCHECK STRIPS

## 2020-10-12 NOTE — PROGRESS NOTES
Anticoagulation Summary  As of 10/12/2020    INR goal:  2.0-3.0   TTR:  82.5 % (6.5 mo)   INR used for dosin.30 (10/12/2020)   Warfarin maintenance plan:  5 mg (5 mg x 1) every day   Weekly warfarin total:  35 mg   Plan last modified:  Cindy Treviño PharmD (2020)   Next INR check:  10/19/2020   Target end date:  Indefinite    Indications    Persistent atrial fibrillation (HCC) [I48.19]  Acute pulmonary embolism (HCC) [I26.99]  Long term (current) use of anticoagulants [Z79.01]             Anticoagulation Episode Summary     INR check location:      Preferred lab:      Send INR reminders to:      Comments:        Anticoagulation Care Providers     Provider Role Specialty Phone number    Mary Carmen Rogers M.D. Referring Internal Medicine Critical Care [phone]    [facility] Anticoagulation Services Responsible  [phone]        Anticoagulation Patient Findings      Spoke to patient on the phone.   INR  therapeutic.   Denies signs/symptoms of bleeding and/or thrombosis.   Denies changes to diet or medications.   Follow up appointment in 1 week(s).    Continue the same warfarin dose, as noted above.       Ulices Ríos, PharmD, MS, BCACP, [facility]    This note was created using voice recognition software (Dragon). The accuracy of the dictation is limited by the abilities of the software. I have reviewed the note prior to signing, however some errors in grammar and context are still possible. If you have any questions related to this note please do not hesitate to contact our office.

## 2020-10-13 ASSESSMENT — ENCOUNTER SYMPTOMS: MUSCLE WEAKNESS: 1

## 2020-10-19 ENCOUNTER — HOME CARE VISIT (OUTPATIENT)
Dept: HOME HEALTH SERVICES | Facility: HOME HEALTHCARE | Age: 75
End: 2020-10-19
Payer: MEDICARE

## 2020-10-19 ENCOUNTER — ANTICOAGULATION MONITORING (OUTPATIENT)
Dept: VASCULAR LAB | Facility: MEDICAL CENTER | Age: 75
End: 2020-10-19

## 2020-10-19 VITALS
OXYGEN SATURATION: 91 % | TEMPERATURE: 97.4 F | HEART RATE: 70 BPM | SYSTOLIC BLOOD PRESSURE: 130 MMHG | DIASTOLIC BLOOD PRESSURE: 60 MMHG | RESPIRATION RATE: 16 BRPM

## 2020-10-19 DIAGNOSIS — I48.19 PERSISTENT ATRIAL FIBRILLATION (HCC): ICD-10-CM

## 2020-10-19 DIAGNOSIS — Z79.01 LONG TERM (CURRENT) USE OF ANTICOAGULANTS: ICD-10-CM

## 2020-10-19 LAB
INR PPP: 2 (ref 2–3.5)
INR PPP: 2 (ref 2–3.5)

## 2020-10-19 PROCEDURE — G0493 RN CARE EA 15 MIN HH/HOSPICE: HCPCS

## 2020-10-19 SDOH — ECONOMIC STABILITY: HOUSING INSECURITY: EVIDENCE OF SMOKING MATERIAL: 0

## 2020-10-19 ASSESSMENT — ACTIVITIES OF DAILY LIVING (ADL)
HOME_HEALTH_OASIS: 00
OASIS_M1830: 00

## 2020-10-19 ASSESSMENT — PATIENT HEALTH QUESTIONNAIRE - PHQ9: CLINICAL INTERPRETATION OF PHQ2 SCORE: 0

## 2020-10-19 NOTE — PROGRESS NOTES
OP Telephone Anticoagulation Service Note    Date: 10/19/2020      Anticoagulation Summary  As of 10/19/2020    INR goal:  2.0-3.0   TTR:  83.1 % (6.8 mo)   INR used for dosin.00 (10/19/2020)   Warfarin maintenance plan:  5 mg (5 mg x 1) every day   Weekly warfarin total:  35 mg   Plan last modified:  Cindy Treviño PharmD (2020)   Next INR check:  10/26/2020   Target end date:  Indefinite    Indications    Persistent atrial fibrillation (HCC) [I48.19]  Acute pulmonary embolism (HCC) [I26.99]  Long term (current) use of anticoagulants [Z79.01]             Anticoagulation Episode Summary     INR check location:      Preferred lab:      Send INR reminders to:      Comments:        Anticoagulation Care Providers     Provider Role Specialty Phone number    Mary Carmen Rogers M.D. Referring Internal Medicine Critical Care [phone]    [facility] Anticoagulation Services Responsible  [phone]        Anticoagulation Patient Findings      INR therapeutic at 2.  Spoke w/ pt on phone.  Verified regimen w/ pt.  Instructed pt to continue on with current regimen.  NO s/s bleeding reported per pt.  Pt endorses eating more greens since last INR. She does not plan to continue doing so.  NO changes in medications reported per pt.  Check INR in 1 week(s) at  w/ Cindy as she was d/c from .  Instructed pt to call clinic at [phone] if there are any questions.  Pt stated understanding.    Kevan Aceves, OsielD

## 2020-10-20 ENCOUNTER — HOME CARE VISIT (OUTPATIENT)
Dept: HOME HEALTH SERVICES | Facility: HOME HEALTHCARE | Age: 75
End: 2020-10-20
Payer: MEDICARE

## 2020-10-22 ENCOUNTER — OFFICE VISIT (OUTPATIENT)
Dept: CARDIOLOGY | Facility: MEDICAL CENTER | Age: 75
End: 2020-10-22
Payer: MEDICARE

## 2020-10-22 VITALS
HEIGHT: 62 IN | WEIGHT: 195.11 LBS | DIASTOLIC BLOOD PRESSURE: 80 MMHG | SYSTOLIC BLOOD PRESSURE: 146 MMHG | BODY MASS INDEX: 35.9 KG/M2 | OXYGEN SATURATION: 93 % | HEART RATE: 60 BPM | RESPIRATION RATE: 16 BRPM

## 2020-10-22 DIAGNOSIS — I26.99 OTHER ACUTE PULMONARY EMBOLISM WITHOUT ACUTE COR PULMONALE (HCC): ICD-10-CM

## 2020-10-22 DIAGNOSIS — I10 ESSENTIAL HYPERTENSION: ICD-10-CM

## 2020-10-22 DIAGNOSIS — I25.10 CORONARY ARTERY CALCIFICATION SEEN ON CAT SCAN: ICD-10-CM

## 2020-10-22 DIAGNOSIS — I48.19 PERSISTENT ATRIAL FIBRILLATION (HCC): ICD-10-CM

## 2020-10-22 DIAGNOSIS — R73.9 HYPERGLYCEMIA, UNSPECIFIED: ICD-10-CM

## 2020-10-22 DIAGNOSIS — I50.30 HEART FAILURE WITH PRESERVED EJECTION FRACTION, UNSPECIFIED HF CHRONICITY (HCC): ICD-10-CM

## 2020-10-22 DIAGNOSIS — I70.0 AORTIC ATHEROSCLEROSIS (HCC): ICD-10-CM

## 2020-10-22 DIAGNOSIS — E78.2 MIXED HYPERLIPIDEMIA: ICD-10-CM

## 2020-10-22 LAB — EKG IMPRESSION: NORMAL

## 2020-10-22 PROCEDURE — 99215 OFFICE O/P EST HI 40 MIN: CPT | Performed by: INTERNAL MEDICINE

## 2020-10-22 PROCEDURE — 93000 ELECTROCARDIOGRAM COMPLETE: CPT | Performed by: INTERNAL MEDICINE

## 2020-10-22 ASSESSMENT — ENCOUNTER SYMPTOMS
SYNCOPE: 0
COUGH: 0
DIZZINESS: 0
BLURRED VISION: 0
ABDOMINAL PAIN: 0
HEARTBURN: 0
PALPITATIONS: 0
IRREGULAR HEARTBEAT: 0
CONSTIPATION: 0
WEIGHT GAIN: 0
FLANK PAIN: 0
BACK PAIN: 0
PND: 0
SHORTNESS OF BREATH: 0
ORTHOPNEA: 0
VOMITING: 0
NAUSEA: 0
DECREASED APPETITE: 0
DYSPNEA ON EXERTION: 0
DIARRHEA: 0
CLAUDICATION: 0
DEPRESSION: 0
ALTERED MENTAL STATUS: 0
WEIGHT LOSS: 0
NEAR-SYNCOPE: 0
FEVER: 0

## 2020-10-22 ASSESSMENT — FIBROSIS 4 INDEX: FIB4 SCORE: 1.26

## 2020-10-22 NOTE — PROGRESS NOTES
Cardiology Note    Chief Complaint   Patient presents with   • Atrial Fibrillation       History of Present Illness: Rufina Macias is a 75 y.o. female PMH persistent AF 02/2020, PE 02/2020 while on eliquis converted to coumadin, COPD (30 pack years; quit 1995), HLD, HTN, diffuse vascular atherosclerosis who presents for follow up.    Was not able to get cardioverted due to fevers and chills and admitted. Admit 9/2020 with abdominal pain and recurrence choledochoalithiasis and obstruction c/b AF RVR and HFpEF. Pending common bile duct stent removal with GI in November. Currently without active cardiac symptoms. Leg swelling much improved. Still on torsemide. Compliant with medications and denies adverse effects.    Review of Systems   Constitution: Negative for decreased appetite, fever, malaise/fatigue, weight gain and weight loss.   HENT: Negative for congestion and nosebleeds.    Eyes: Negative for blurred vision.   Cardiovascular: Negative for chest pain, claudication, dyspnea on exertion, irregular heartbeat, leg swelling, near-syncope, orthopnea, palpitations, paroxysmal nocturnal dyspnea and syncope.   Respiratory: Negative for cough and shortness of breath.    Endocrine: Negative for cold intolerance and heat intolerance.   Skin: Negative for rash.   Musculoskeletal: Negative for back pain.   Gastrointestinal: Negative for abdominal pain, constipation, diarrhea, heartburn, melena, nausea and vomiting.   Genitourinary: Negative for dysuria, flank pain and hematuria.   Neurological: Negative for dizziness.   Psychiatric/Behavioral: Negative for altered mental status and depression.         Past Medical History:   Diagnosis Date   • Allergy    • Anxiety 9/29/2020   • Arrhythmia    • Arthritis     Spine, neck, hands, ankles and knees   • Asthma     inhalers as needed   • Back pain     and neck   • Blood clotting disorder (HCC) 02/2020    lung   • Breath shortness     at times, uses O2 1l prn Preferred   •  Bronchitis    • CA - cancer of uterus    • Cancer (HCC) 2010    uterine   • Carpal tunnel syndrome    • COPD    • Coronary artery calcification seen on CAT scan 6/12/2020   • Dental disorder     full dentures   • Diverticula of colon    • Emphysema of lung (HCC)    • Heart burn    • High cholesterol    • Hyperlipidemia    • Hypertension    • Pneumonia 1993   • Tremor, hereditary, benign    • Wheezing          Past Surgical History:   Procedure Laterality Date   • PB ERCP,DIAGNOSTIC  9/17/2020    Procedure: ERCP (ENDOSCOPIC RETROGRADE CHOLANGIOPANCREATOGRAPHY);  Surgeon: Cj Guerrier D.O.;  Location: [facility];  Service: Gastroenterology   • PB ERCP,DIAGNOSTIC  2/14/2020    Procedure: ERCP (ENDOSCOPIC RETROGRADE CHOLANGIOPANCREATOGRAPHY);  Surgeon: Clinton Ray M.D.;  Location: [facility];  Service: Gastroenterology   • ERCP  4/5/2018    Procedure: ERCP W/POSS BIOPSY;  Surgeon: Quincy Louie M.D.;  Location: [facility];  Service: Gastroenterology   • ERCP W/SPHINCTEROTOMY/PAPILL.  4/5/2018    Procedure: ERCP W/SPHINCTEROTOMY/PAPILL.;  Surgeon: Quincy Louie M.D.;  Location: [facility];  Service: Gastroenterology   • ERCP W/ INSERTION STENT/TUBE  4/5/2018    Procedure: ERCP W/ INSERTION STENT/TUBE - STENT PLACEMENT/REMOVAL;  Surgeon: Quincy Louie M.D.;  Location: [facility];  Service: Gastroenterology   • ERCP W/REMOVAL CALCULUS  4/5/2018    Procedure: ERCP W/REMOVAL CALCULUS W/DILATION;  Surgeon: Quincy Louie M.D.;  Location: [facility];  Service: Gastroenterology   • ERCP  2/15/2018    Procedure: ERCP, SPHINCTEROTOMY, STENT PLACEMENT, DEBRIDEMENT;  Surgeon: Quincy Louie M.D.;  Location: [facility];  Service: Gastroenterology   • COMMON BILE DUCT EXPLORATION  02/15/2018   • ERCP W/ INSERTION STENT/TUBE  02/15/2018   • ERCP W/SPHINCTEROTOMY/PAPILL.  02/15/2018   • ERCP W/REMOVAL CALCULUS   02/15/2018   • ARIELLA BY LAPAROSCOPY  july, 2015    [facility]   • HYSTERECTOMY, TOTAL ABDOMINAL  1/18/10    Uterine, Dr. Whelan and Dr. Cam +BSO   • ABDOMINAL HYSTERECTOMY TOTAL  Jan 2010    Dr. Cam   • OOPHORECTOMY  Jan 2010    BSO   • OPEN REDUCTION      ankle         Current Outpatient Medications   Medication Sig Dispense Refill   • DILTIAZem CD (CARDIZEM CD) 120 MG CAPSULE SR 24 HR Take 1 Cap by mouth 2 Times a Day. 60 Cap 5   • sertraline (ZOLOFT) 25 MG tablet Take 1 Tab by mouth every day. 30 Tab 11   • cyclobenzaprine (FLEXERIL) 5 MG tablet Take 1 Tab by mouth 2 times a day as needed for Muscle Spasms. 20 Tab 0   • HYDROcodone/acetaminophen (NORCO)  MG Tab Take 1 Tab by mouth every 6 hours as needed.     • Fluticasone-Umeclidin-Vilant (TRELEGY ELLIPTA) 100-62.5-25 MCG/INH AEROSOL POWDER, BREATH ACTIVATED Inhale 1 Inhaler by mouth every day. 1 Each 0   • warfarin (COUMADIN) 5 MG Tab Take 1 tab by mouth daily or as directed by anticoagulation  clinic (Patient taking differently: Take 5 mg by mouth every day. Take 1 tab by mouth daily or as directed by anticoagulation  clinic) 90 Tab 1   • potassium chloride ER (KLOR-CON) 10 MEQ tablet Take 40 mEq by mouth every day. 4 tablets = 40 meq     • VITAMIN D PO Take 1 Tab by mouth every day.     • rosuvastatin (CRESTOR) 20 MG Tab Take 1 Tab by mouth every evening. 100 Tab 3   • torsemide (DEMADEX) 20 MG Tab Take 1 Tab by mouth every day. 30 Tab 3   • famotidine (PEPCID) 20 MG Tab Take 20 mg by mouth every day.     • zolpidem (AMBIEN) 10 MG Tab Take 10 mg by mouth at bedtime as needed for Sleep.     • albuterol 108 (90 Base) MCG/ACT Aero Soln inhalation aerosol Inhale 2 Puffs by mouth every 6 hours as needed for Shortness of Breath. 8.5 g 2   • metoprolol (LOPRESSOR) 100 MG Tab Take 1 Tab by mouth 2 times a day. 180 Tab 4   • Calcium Carbonate-Vit D-Min (CALCIUM 1200 PO) Take 1,200 mg by mouth every day.     • Magnesium 400 MG Tab Take 400 mg by mouth every day.      • FIBER PO Take 2 Caps by mouth every day.       No current facility-administered medications for this visit.          Allergies   Allergen Reactions   • Codeine Swelling   • Ace Inhibitors      Cough         Family History   Problem Relation Age of Onset   • Heart Disease Father 45        MI   • Lung Disease Father    • Stroke Father 70   • Cancer Father    • Hypertension Father    • Cancer Paternal Grandmother         breast   • Cancer Paternal Grandfather          Social History     Socioeconomic History   • Marital status:      Spouse name: Not on file   • Number of children: Not on file   • Years of education: Not on file   • Highest education level: Not on file   Occupational History   • Not on file   Social Needs   • Financial resource strain: Not on file   • Food insecurity     Worry: Not on file     Inability: Not on file   • Transportation needs     Medical: Not on file     Non-medical: Not on file   Tobacco Use   • Smoking status: Former Smoker     Packs/day: 3.00     Years: 20.00     Pack years: 60.00     Types: Cigarettes     Quit date: 1991     Years since quittin.8   • Smokeless tobacco: Never Used   Substance and Sexual Activity   • Alcohol use: No     Alcohol/week: 0.0 - 2.4 oz     Comment: hardly ever   • Drug use: No   • Sexual activity: Never     Partners: Male     Birth control/protection: Post-Menopausal   Lifestyle   • Physical activity     Days per week: Not on file     Minutes per session: Not on file   • Stress: Not on file   Relationships   • Social connections     Talks on phone: Not on file     Gets together: Not on file     Attends Scientologist service: Not on file     Active member of club or organization: Not on file     Attends meetings of clubs or organizations: Not on file     Relationship status: Not on file   • Intimate partner violence     Fear of current or ex partner: Not on file     Emotionally abused: Not on file     Physically abused: Not on file     Forced  "sexual activity: Not on file   Other Topics Concern   • Not on file   Social History Narrative   • Not on file         Physical Exam:  Ambulatory Vitals  /80 (BP Location: Left arm, Patient Position: Sitting, BP Cuff Size: Adult)   Pulse 60   Resp 16   Ht 1.575 m (5' 2\")   Wt 88.5 kg (195 lb 1.7 oz)   SpO2 93%    BP Readings from Last 4 Encounters:   10/22/20 146/80   10/19/20 130/60   10/12/20 104/60   10/08/20 140/80     Weight/BMI:   Vitals:    10/22/20 1414   BP: 146/80   Weight: 88.5 kg (195 lb 1.7 oz)   Height: 1.575 m (5' 2\")    Body mass index is 35.69 kg/m².  Wt Readings from Last 4 Encounters:   10/22/20 88.5 kg (195 lb 1.7 oz)   09/30/20 83.9 kg (185 lb)   09/29/20 86.7 kg (191 lb 3.2 oz)   09/21/20 91.3 kg (201 lb 4.5 oz)       Physical Exam   Constitutional: She is oriented to person, place, and time and well-developed, well-nourished, and in no distress. No distress.   HENT:   Head: Normocephalic and atraumatic.   Eyes: Pupils are equal, round, and reactive to light. Conjunctivae are normal.   Neck: Normal range of motion. Neck supple. No JVD present.   Cardiovascular: Normal rate, regular rhythm, normal heart sounds and intact distal pulses. Exam reveals no gallop and no friction rub.   No murmur heard.  Pulmonary/Chest: Effort normal and breath sounds normal. No respiratory distress. She has no wheezes. She has no rales. She exhibits no tenderness.   Abdominal: Soft. Bowel sounds are normal. She exhibits no distension.   Musculoskeletal:         General: Edema present.   Neurological: She is alert and oriented to person, place, and time.   Skin: Skin is warm and dry.   Psychiatric: Affect and judgment normal.       Lab Data Review:  Lab Results   Component Value Date/Time    CHOLSTRLTOT 80 (L) 09/14/2020 05:00 AM    LDL 33 09/14/2020 05:00 AM    HDL 25 (A) 09/14/2020 05:00 AM    TRIGLYCERIDE 110 09/14/2020 05:00 AM       Lab Results   Component Value Date/Time    SODIUM 141 09/22/2020 " 04:18 AM    POTASSIUM 3.9 09/22/2020 04:18 AM    CHLORIDE 102 09/22/2020 04:18 AM    CO2 31 09/22/2020 04:18 AM    GLUCOSE 117 (H) 09/22/2020 04:18 AM    BUN 13 09/22/2020 04:18 AM    CREATININE 0.66 09/22/2020 04:18 AM    CREATININE 0.80 12/02/2010 12:00 AM    BUNCREATRAT 20 12/02/2010 12:00 AM    GLOMRATE >59 12/02/2010 12:00 AM     CrCl cannot be calculated (Patient's most recent lab result is older than the maximum 7 days allowed.).  Lab Results   Component Value Date/Time    ALKPHOSPHAT 143 (H) 09/22/2020 04:18 AM    ASTSGOT 20 09/22/2020 04:18 AM    ALTSGPT 39 09/22/2020 04:18 AM    TBILIRUBIN 0.7 09/22/2020 04:18 AM      Lab Results   Component Value Date/Time    WBC 8.9 09/22/2020 04:18 AM    WBC 7.6 12/02/2010 12:00 AM     Lab Results   Component Value Date/Time    HBA1C 5.7 (H) 02/22/2020 01:52 AM     No components found for: TROP      Cardiac Imaging and Procedures Review:      EKG 6/12/20 atrial fibrillation, RBBB    CTA chest 02/21/2020  1.  Pulmonary embolus involving the right distal main pulmonary artery extending into right middle and lower lobe segmental and subsegmental branches. Left upper and lower lobe subsegmental pulmonary emboli.  2.  Distal esophageal wall thickening, recommend follow-up esophagoscopy for evaluation of esophagitis versus infiltrating esophageal neoplasia.  3.  Hepatomegaly  4.  Left adrenal nodule, indeterminate, recommend follow-up adrenal protocol CT or MRI for further characterization as clinically appropriate.  5.  1.3 cm left lower lobe pulmonary nodule, see nodule follow-up recommendations below.  [diffuse coronary calcification and aortic atherosclerosis]    TTE 02/25/2020  CONCLUSIONS  Limited Exam for LV and RV Function  Compared to the images of the prior study done 2/11/20 -  there has   been no significant change.   Left ventricular systolic function is normal.  Left ventricular ejection fraction is visually estimated to be 55%.  Right ventricular systolic  function is normal.  Right Ventricle  Normal right ventricular size. Right ventricular systolic function is   normal.    TTE 02/11/2020  CONCLUSIONS  Normal left ventricular systolic function. Left ventricular ejection   fraction is visually estimated to be 60%.   Normal diastolic function.  Right ventricular systolic pressure is estimated to be 25 mmHg.  Right Ventricle  Mildly dilated right ventricle. Normal right ventricular systolic   Function.    Nuclear PET stress 08/2017  SCINTOGRAPHIC FINDINGS:   Normal left ventricular perfusion with stress and rest images.  There is no evidence of ischemia or scar.   GATED WALL MOTION FINDINGS:   The left ventricle wall motion is normal with stress and rest  imagings.  Measured resting ejection fraction is 58 %.      CONCLUSIONS AND IMPRESSIONS:    Normal perfusion on PET with normal function of the left ventricle.  No evidence of ischemia or infarction.     Medical Decision Making:  Problem List Items Addressed This Visit     Persistent atrial fibrillation (HCC)    Relevant Orders    Basic Metabolic Panel    HEMOGLOBIN A1C (Glycohemoglobin GHB Total/A1C with MBG Estimate)    EKG (Completed)    Other pulmonary embolism without acute cor pulmonale (HCC)    Relevant Orders    EKG (Completed)    Mixed hyperlipidemia    Relevant Orders    EKG (Completed)    Essential hypertension    Hyperglycemia, unspecified    Relevant Orders    HEMOGLOBIN A1C (Glycohemoglobin GHB Total/A1C with MBG Estimate)    EKG (Completed)    Coronary artery calcification seen on CAT scan    Aortic atherosclerosis (HCC)    Heart failure with preserved ejection fraction (HCC)    Relevant Orders    proBrain Natriuretic Peptide, NT    HEMOGLOBIN A1C (Glycohemoglobin GHB Total/A1C with MBG Estimate)    EKG (Completed)        Unprovoked PE with doac failure - continue anticoagulation with coumadin.     Persistent AF - chadsvasc 5 - probably also contributing to dyspnea. Continue coumadin for cva prevention.  Continue rate control. Will plan CHARLES/DCCV when GI issues resolved.    HTN - seems to fluctuate. Trend readings at home. Goal 120/80. She is to call if sustains above 130/80.    Coronary calcification / aortic atherosclerosis / ascvd / HLD - continue coumadin. No need for additional antiplatelets for primary prevention. Continue high intensity statin. LDL at goal <70.    Leg swelling - improved. Continue torsemide. She can modify as needed. Repeat BMP. Encouraged compression stockings. There probably is a component of HFpEF given elevated pBNP. Repeat labs.     It was my pleasure to meet with Ms. Macias.

## 2020-10-22 NOTE — PATIENT INSTRUCTIONS
"If sustains blood pressure over 130/80 consecutively for 2-3 days, call cardiology.     Hypertension, Adult  High blood pressure (hypertension) is when the force of blood pumping through the arteries is too strong. The arteries are the blood vessels that carry blood from the heart throughout the body. Hypertension forces the heart to work harder to pump blood and may cause arteries to become narrow or stiff. Untreated or uncontrolled hypertension can cause a heart attack, heart failure, a stroke, kidney disease, and other problems.  A blood pressure reading consists of a higher number over a lower number. Ideally, your blood pressure should be below 120/80. The first (\"top\") number is called the systolic pressure. It is a measure of the pressure in your arteries as your heart beats. The second (\"bottom\") number is called the diastolic pressure. It is a measure of the pressure in your arteries as the heart relaxes.  What are the causes?  The exact cause of this condition is not known. There are some conditions that result in or are related to high blood pressure.  What increases the risk?  Some risk factors for high blood pressure are under your control. The following factors may make you more likely to develop this condition:  · Smoking.  · Having type 2 diabetes mellitus, high cholesterol, or both.  · Not getting enough exercise or physical activity.  · Being overweight.  · Having too much fat, sugar, calories, or salt (sodium) in your diet.  · Drinking too much alcohol.  Some risk factors for high blood pressure may be difficult or impossible to change. Some of these factors include:  · Having chronic kidney disease.  · Having a family history of high blood pressure.  · Age. Risk increases with age.  · Race. You may be at higher risk if you are .  · Gender. Men are at higher risk than women before age 45. After age 65, women are at higher risk than men.  · Having obstructive sleep " apnea.  · Stress.  What are the signs or symptoms?  High blood pressure may not cause symptoms. Very high blood pressure (hypertensive crisis) may cause:  · Headache.  · Anxiety.  · Shortness of breath.  · Nosebleed.  · Nausea and vomiting.  · Vision changes.  · Severe chest pain.  · Seizures.  How is this diagnosed?  This condition is diagnosed by measuring your blood pressure while you are seated, with your arm resting on a flat surface, your legs uncrossed, and your feet flat on the floor. The cuff of the blood pressure monitor will be placed directly against the skin of your upper arm at the level of your heart. It should be measured at least twice using the same arm. Certain conditions can cause a difference in blood pressure between your right and left arms.  Certain factors can cause blood pressure readings to be lower or higher than normal for a short period of time:  · When your blood pressure is higher when you are in a health care provider's office than when you are at home, this is called white coat hypertension. Most people with this condition do not need medicines.  · When your blood pressure is higher at home than when you are in a health care provider's office, this is called masked hypertension. Most people with this condition may need medicines to control blood pressure.  If you have a high blood pressure reading during one visit or you have normal blood pressure with other risk factors, you may be asked to:  · Return on a different day to have your blood pressure checked again.  · Monitor your blood pressure at home for 1 week or longer.  If you are diagnosed with hypertension, you may have other blood or imaging tests to help your health care provider understand your overall risk for other conditions.  How is this treated?  This condition is treated by making healthy lifestyle changes, such as eating healthy foods, exercising more, and reducing your alcohol intake. Your health care provider may  prescribe medicine if lifestyle changes are not enough to get your blood pressure under control, and if:  · Your systolic blood pressure is above 130.  · Your diastolic blood pressure is above 80.  Your personal target blood pressure may vary depending on your medical conditions, your age, and other factors.  Follow these instructions at home:  Eating and drinking    · Eat a diet that is high in fiber and potassium, and low in sodium, added sugar, and fat. An example eating plan is called the DASH (Dietary Approaches to Stop Hypertension) diet. To eat this way:  ? Eat plenty of fresh fruits and vegetables. Try to fill one half of your plate at each meal with fruits and vegetables.  ? Eat whole grains, such as whole-wheat pasta, brown rice, or whole-grain bread. Fill about one fourth of your plate with whole grains.  ? Eat or drink low-fat dairy products, such as skim milk or low-fat yogurt.  ? Avoid fatty cuts of meat, processed or cured meats, and poultry with skin. Fill about one fourth of your plate with lean proteins, such as fish, chicken without skin, beans, eggs, or tofu.  ? Avoid pre-made and processed foods. These tend to be higher in sodium, added sugar, and fat.  · Reduce your daily sodium intake. Most people with hypertension should eat less than 1,500 mg of sodium a day.  · Do not drink alcohol if:  ? Your health care provider tells you not to drink.  ? You are pregnant, may be pregnant, or are planning to become pregnant.  · If you drink alcohol:  ? Limit how much you use to:  § 0-1 drink a day for women.  § 0-2 drinks a day for men.  ? Be aware of how much alcohol is in your drink. In the [place] one drink equals one 12 oz bottle of beer (355 mL), one 5 oz glass of wine (148 mL), or one 1½ oz glass of hard liquor (44 mL).  Lifestyle    · Work with your health care provider to maintain a healthy body weight or to lose weight. Ask what an ideal weight is for you.  · Get at least 30 minutes of exercise  most days of the week. Activities may include walking, swimming, or biking.  · Include exercise to strengthen your muscles (resistance exercise), such as Pilates or lifting weights, as part of your weekly exercise routine. Try to do these types of exercises for 30 minutes at least 3 days a week.  · Do not use any products that contain nicotine or tobacco, such as cigarettes, e-cigarettes, and chewing tobacco. If you need help quitting, ask your health care provider.  · Monitor your blood pressure at home as told by your health care provider.  · Keep all follow-up visits as told by your health care provider. This is important.  Medicines  · Take over-the-counter and prescription medicines only as told by your health care provider. Follow directions carefully. Blood pressure medicines must be taken as prescribed.  · Do not skip doses of blood pressure medicine. Doing this puts you at risk for problems and can make the medicine less effective.  · Ask your health care provider about side effects or reactions to medicines that you should watch for.  Contact a health care provider if you:  · Think you are having a reaction to a medicine you are taking.  · Have headaches that keep coming back (recurring).  · Feel dizzy.  · Have swelling in your ankles.  · Have trouble with your vision.  Get help right away if you:  · Develop a severe headache or confusion.  · Have unusual weakness or numbness.  · Feel faint.  · Have severe pain in your chest or abdomen.  · Vomit repeatedly.  · Have trouble breathing.  Summary  · Hypertension is when the force of blood pumping through your arteries is too strong. If this condition is not controlled, it may put you at risk for serious complications.  · Your personal target blood pressure may vary depending on your medical conditions, your age, and other factors. For most people, a normal blood pressure is less than 120/80.  · Hypertension is treated with lifestyle changes, medicines, or a  combination of both. Lifestyle changes include losing weight, eating a healthy, low-sodium diet, exercising more, and limiting alcohol.  This information is not intended to replace advice given to you by your health care provider. Make sure you discuss any questions you have with your health care provider.  Document Released: 12/18/2006 Document Revised: 08/28/2019 Document Reviewed: 08/28/2019  Elsevier Patient Education © 2020 [employer]

## 2020-10-26 ENCOUNTER — ANTICOAGULATION VISIT (OUTPATIENT)
Dept: MEDICAL GROUP | Facility: MEDICAL CENTER | Age: 75
End: 2020-10-26
Payer: MEDICARE

## 2020-10-26 DIAGNOSIS — I48.19 PERSISTENT ATRIAL FIBRILLATION (HCC): ICD-10-CM

## 2020-10-26 DIAGNOSIS — Z79.01 LONG TERM (CURRENT) USE OF ANTICOAGULANTS: Primary | ICD-10-CM

## 2020-10-26 LAB — INR PPP: 1.7 (ref 2–3.5)

## 2020-10-26 PROCEDURE — 99211 OFF/OP EST MAY X REQ PHY/QHP: CPT | Performed by: INTERNAL MEDICINE

## 2020-10-26 PROCEDURE — 85610 PROTHROMBIN TIME: CPT | Performed by: INTERNAL MEDICINE

## 2020-10-27 ENCOUNTER — TELEPHONE (OUTPATIENT)
Dept: MEDICAL GROUP | Age: 75
End: 2020-10-27

## 2020-10-27 NOTE — TELEPHONE ENCOUNTER
ESTABLISHED PATIENT PRE-VISIT PLANNING     Patient was NOT contacted to complete PVP.     Note: Patient will not be contacted if there is no indication to call.     1.  Reviewed notes from the last few office visits within the medical group: Yes    2.  If any orders were placed at last visit or intended to be done for this visit (i.e. 6 mos follow-up), do we have Results/Consult Notes?        •  Labs - Labs were not ordered at last office visit.   Note: If patient appointment is for lab review and patient did not complete labs, check with provider if OK to reschedule patient until labs completed.       •  Imaging - Imaging was not ordered at last office visit.       •  Referrals - No referrals were ordered at last office visit.    3. Is this appointment scheduled as a Hospital Follow-Up? No    4.  Immunizations were updated in Epic using WebIZ?: Epic matches WebIZ       •  Web Iz Recommendations: HEPATITIS B and SHINGRIX (Shingles)    5.  Patient is due for the following Health Maintenance Topics:   Health Maintenance Due   Topic Date Due   • IMM HEP B VACCINE (1 of 3 - Risk 3-dose series) 03/18/1964   • IMM ZOSTER VACCINES (2 of 3) 12/10/2013   • Annual Wellness Visit  06/27/2019   • MAMMOGRAM  11/27/2020           6. Orders for overdue Health Maintenance topics pended in Pre-Charting? N\A    7.  AHA (MDX) form printed for Provider? No, already completed    8.  Patient was NOT informed to arrive 15 min prior to their scheduled appointment and bring in their medication bottles.

## 2020-10-27 NOTE — PROGRESS NOTES
OP Anticoagulation Service Note    Date: 10/26/2020  There were no vitals filed for this visit.   pt declined vitals    Anticoagulation Summary  As of 10/26/2020    INR goal:  2.0-3.0   TTR:  80.0 % (7 mo)   INR used for dosin.70 (10/26/2020)   Warfarin maintenance plan:  5 mg (5 mg x 1) every day   Weekly warfarin total:  35 mg   Plan last modified:  Cindy Treviño, PharmD (2020)   Next INR check:  2020   Target end date:  Indefinite    Indications    Persistent atrial fibrillation (HCC) [I48.19]  Acute pulmonary embolism (HCC) [I26.99]  Long term (current) use of anticoagulants [Z79.01]             Anticoagulation Episode Summary     INR check location:      Preferred lab:      Send INR reminders to:      Comments:        Anticoagulation Care Providers     Provider Role Specialty Phone number    Mary Carmen Rogers M.D. Referring Internal Medicine Critical Care [phone]    [facility] Responsible  [phone]        Anticoagulation Patient Findings      HPI:   Rufina Macias seen in clinic today, on anticoagulation therapy with warfarin (a high risk medication) for atrial fibrillation, hx of PE, chadsvasc 5      Pt is here today to evaluate anticoagulation therapy    Previous INR was  2.0 on 10-    Pt was instructed to continue current regimen    Confirmed warfarin dosing regimen, denies missed or extra doses of coumadin.   Diet has been consistent with foods rich in vitamin K: Yes - she is avoiding meat, but otherwise diet is consistent. She has not had any V8 juice  Changes in ETOH:  No  Changes in smoking status: No  Changes in medication: No   Cost restriction: No  S/s of bleeding:  No  Falls or accidents since last visit No  Signs/symptoms  thrombosis since the last appt: No    A/P   INR  SUBtherapeutic today, will require dose adjust ment today to prevent stroke) and closer follow up.   Tonight 7.5 mg then continue current regimen     Pt will be having stent  removed 11/24/2020, pt will need to stop anticoagulation 5 days prior, high CHADS-VASC of 5 and hx of PE, pt will most likely need to be bridged.     2/21 check referral    Pt educated to contact our clinic with any changes in medications or s/s of bleeding or thrombosis. Pt is aware to seek immediate medical attention for falls, head injury or deep cuts    Follow up appointment in 1 week(s) to reduce risk of adverse events from warfarin   Cindy Treviño, PharmD

## 2020-10-27 NOTE — TELEPHONE ENCOUNTER
Patient has filled out the assistance form but still has not received an answer and would like some samples in the meantime.     SAMPLES    Have we ever prescribed this med? Yes.  If yes, what date? 08/24/2020    Last OV: 08/24/2020- Dr. Rogers     Next OV: No appointment on file-   RTC 4-6 months    Medications:   Requested Prescriptions     Pending Prescriptions Disp Refills   • Fluticasone-Umeclidin-Vilant (TRELEGY ELLIPTA) 100-62.5-25 MCG/INH AEROSOL POWDER, BREATH ACTIVATED 1 Each 0     Sig: Inhale 1 Inhaler by mouth every day.

## 2020-10-29 ENCOUNTER — OFFICE VISIT (OUTPATIENT)
Dept: MEDICAL GROUP | Age: 75
End: 2020-10-29
Payer: MEDICARE

## 2020-10-29 VITALS
HEIGHT: 62 IN | DIASTOLIC BLOOD PRESSURE: 64 MMHG | TEMPERATURE: 97.4 F | SYSTOLIC BLOOD PRESSURE: 122 MMHG | BODY MASS INDEX: 34.41 KG/M2 | WEIGHT: 187 LBS | HEART RATE: 98 BPM | OXYGEN SATURATION: 95 %

## 2020-10-29 DIAGNOSIS — R73.01 IFG (IMPAIRED FASTING GLUCOSE): ICD-10-CM

## 2020-10-29 DIAGNOSIS — I10 ESSENTIAL HYPERTENSION: ICD-10-CM

## 2020-10-29 DIAGNOSIS — E55.9 VITAMIN D DEFICIENCY: ICD-10-CM

## 2020-10-29 DIAGNOSIS — J43.1 PANLOBULAR EMPHYSEMA (HCC): ICD-10-CM

## 2020-10-29 DIAGNOSIS — E78.2 MIXED HYPERLIPIDEMIA: ICD-10-CM

## 2020-10-29 DIAGNOSIS — I48.19 PERSISTENT ATRIAL FIBRILLATION (HCC): ICD-10-CM

## 2020-10-29 PROCEDURE — 99214 OFFICE O/P EST MOD 30 MIN: CPT | Performed by: INTERNAL MEDICINE

## 2020-10-29 ASSESSMENT — ENCOUNTER SYMPTOMS
MUSCULOSKELETAL NEGATIVE: 1
CARDIOVASCULAR NEGATIVE: 1
GASTROINTESTINAL NEGATIVE: 1
PSYCHIATRIC NEGATIVE: 1
RESPIRATORY NEGATIVE: 1
EYES NEGATIVE: 1
NEUROLOGICAL NEGATIVE: 1
CONSTITUTIONAL NEGATIVE: 1

## 2020-10-29 ASSESSMENT — FIBROSIS 4 INDEX: FIB4 SCORE: 1.26

## 2020-10-30 ENCOUNTER — HOSPITAL ENCOUNTER (OUTPATIENT)
Dept: LAB | Facility: MEDICAL CENTER | Age: 75
End: 2020-10-30
Attending: INTERNAL MEDICINE
Payer: MEDICARE

## 2020-10-30 DIAGNOSIS — R73.9 HYPERGLYCEMIA, UNSPECIFIED: ICD-10-CM

## 2020-10-30 DIAGNOSIS — I50.30 HEART FAILURE WITH PRESERVED EJECTION FRACTION, UNSPECIFIED HF CHRONICITY (HCC): ICD-10-CM

## 2020-10-30 DIAGNOSIS — I48.19 PERSISTENT ATRIAL FIBRILLATION (HCC): ICD-10-CM

## 2020-10-30 LAB
ANION GAP SERPL CALC-SCNC: 12 MMOL/L (ref 7–16)
BUN SERPL-MCNC: 26 MG/DL (ref 8–22)
CALCIUM SERPL-MCNC: 9.2 MG/DL (ref 8.4–10.2)
CHLORIDE SERPL-SCNC: 103 MMOL/L (ref 96–112)
CO2 SERPL-SCNC: 26 MMOL/L (ref 20–33)
CREAT SERPL-MCNC: 0.88 MG/DL (ref 0.5–1.4)
EST. AVERAGE GLUCOSE BLD GHB EST-MCNC: 126 MG/DL
GLUCOSE SERPL-MCNC: 112 MG/DL (ref 65–99)
HBA1C MFR BLD: 6 % (ref 0–5.6)
NT-PROBNP SERPL IA-MCNC: 1986 PG/ML (ref 0–125)
POTASSIUM SERPL-SCNC: 4.4 MMOL/L (ref 3.6–5.5)
SODIUM SERPL-SCNC: 141 MMOL/L (ref 135–145)

## 2020-10-30 PROCEDURE — 36415 COLL VENOUS BLD VENIPUNCTURE: CPT

## 2020-10-30 PROCEDURE — 83036 HEMOGLOBIN GLYCOSYLATED A1C: CPT

## 2020-10-30 PROCEDURE — 80048 BASIC METABOLIC PNL TOTAL CA: CPT

## 2020-10-30 PROCEDURE — 83880 ASSAY OF NATRIURETIC PEPTIDE: CPT

## 2020-10-30 NOTE — PROGRESS NOTES
Subjective:      Rufina Macias is a 75 y.o. female who presents with Follow-Up (HTN) and Medication Follow-up (Norco)  The patient is here for followup of chronic medical problems listed below. The patient is compliant with medications and having no side effects from them. Denies chest pain, abdominal pain, dyspnea, myalgias, or cough.   Patient Active Problem List    Diagnosis Date Noted   • Other pulmonary embolism without acute cor pulmonale (Formerly [facility]) 02/21/2020     Priority: High   • 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 with obstruction- ERCP EXTRACTON/ SPHINCTEROTOMY, recurrent Feb 2020 02/17/2020     Priority: High   • Persistent atrial fibrillation ([facility]) 02/08/2020     Priority: High   • Hyperkalemia 09/18/2020     Priority: Medium   • Acute kidney injury superimposed on chronic kidney disease ([facility]) 09/18/2020     Priority: Medium   • Heart failure with preserved ejection fraction ([facility]) 10/22/2020   • Anxiety 09/29/2020   • Acute cystitis with hematuria 09/14/2020   • Coronary artery calcification seen on CAT scan 06/12/2020   • Aortic atherosclerosis ([facility]) 06/12/2020   • Leg swelling 06/12/2020   • Mild concentric left ventricular hypertrophy (LVH) 04/06/2020   • Bilateral leg edema 04/06/2020   • High risk medication use 03/11/2020   • Anticoagulated 03/11/2020   • Long term (current) use of anticoagulants 03/09/2020   • Acute pulmonary embolism ([facility]) 03/05/2020   • Acute on chronic respiratory failure with hypoxia ([facility]) 03/05/2020   • Multiple pulmonary nodules determined by computed tomography of lung 02/24/2020   • Herpes zoster without complication 02/23/2020   • Hyperglycemia, unspecified 02/21/2020   • Adrenal nodule ([facility]) 02/21/2020   • Uncomplicated opioid dependence ([facility])- nv pain and spine 01/06/2020   • Ganglion cyst of tendon sheath of right hand- surveillance 07/08/2019   • Panlobular emphysema ([facility]) 01/08/2019   • Chronic pain of left knee- dr contreras- celebrex and cortisone inj;   alyssia to do  supartz inj 01/08/2019   • Primary osteoarthritis involving multiple joints- to get supartz inj left knee- nv pain and spine 06/26/2018   • Chronic midline low back pain without sciatica- norco chronic daily use; nv pain and spine 04/04/2017   • Essential hypertension 08/31/2015   • Obesity, morbid, BMI 40.0-49.9 ([facility]) 06/26/2015   • DDD (degenerative disc disease), cervical 04/07/2015   • Musculoskeletal neck pain 04/07/2015   • Thoracic radiculopathy 03/09/2015   • Lumbar radiculopathy 03/09/2015   • Vitamin D deficiency disease 10/11/2013   • DDD (degenerative disc disease), lumbar 10/11/2013   • Chronic allergic rhinitis 10/11/2013   • Primary insomnia 11/29/2012   • COPD (chronic obstructive pulmonary disease) ([facility]) 08/31/2012   • Mixed hyperlipidemia 10/28/2010     Allergies   Allergen Reactions   • Codeine Swelling   • Ace Inhibitors      Cough     Outpatient Medications Prior to Visit   Medication Sig Dispense Refill   • Fluticasone-Umeclidin-Vilant (TRELEGY ELLIPTA) 100-62.5-25 MCG/INH AEROSOL POWDER, BREATH ACTIVATED Inhale 1 Inhaler by mouth every day. 1 Each 0   • DILTIAZem CD (CARDIZEM CD) 120 MG CAPSULE SR 24 HR Take 1 Cap by mouth 2 Times a Day. 60 Cap 5   • sertraline (ZOLOFT) 25 MG tablet Take 1 Tab by mouth every day. 30 Tab 11   • cyclobenzaprine (FLEXERIL) 5 MG tablet Take 1 Tab by mouth 2 times a day as needed for Muscle Spasms. 20 Tab 0   • HYDROcodone/acetaminophen (NORCO)  MG Tab Take 1 Tab by mouth every 6 hours as needed.     • warfarin (COUMADIN) 5 MG Tab Take 1 tab by mouth daily or as directed by anticoagulation  clinic (Patient taking differently: Take 5 mg by mouth every day. Take 1 tab by mouth daily or as directed by anticoagulation  clinic) 90 Tab 1   • potassium chloride ER (KLOR-CON) 10 MEQ tablet Take 40 mEq by mouth every day. 4 tablets = 40 meq     • VITAMIN D PO Take 1 Tab by mouth every day.     • rosuvastatin (CRESTOR) 20 MG Tab Take 1 Tab by mouth every  evening. 100 Tab 3   • torsemide (DEMADEX) 20 MG Tab Take 1 Tab by mouth every day. 30 Tab 3   • famotidine (PEPCID) 20 MG Tab Take 20 mg by mouth every day.     • zolpidem (AMBIEN) 10 MG Tab Take 10 mg by mouth at bedtime as needed for Sleep.     • albuterol 108 (90 Base) MCG/ACT Aero Soln inhalation aerosol Inhale 2 Puffs by mouth every 6 hours as needed for Shortness of Breath. 8.5 g 2   • metoprolol (LOPRESSOR) 100 MG Tab Take 1 Tab by mouth 2 times a day. 180 Tab 4   • Calcium Carbonate-Vit D-Min (CALCIUM 1200 PO) Take 1,200 mg by mouth every day.     • Magnesium 400 MG Tab Take 400 mg by mouth every day.     • FIBER PO Take 2 Caps by mouth every day.       No facility-administered medications prior to visit.      Anticoagulation Visit on 10/26/2020   Component Date Value   • INR 10/26/2020 1.70    Office Visit on 10/22/2020   Component Date Value   • Report 10/22/2020                      Value:[facility]    Test Date:  2020-10-22  Pt Name:    LUANN GAONA                Department: [facility]  MRN:        [id number]                      Room:  Gender:     Female                       Technician: ROC  :        1945                   Requested By:GUILLAUME WILBURN  Order #:    [id number]                    Reading MD: Guillaume Wilburn MD    Measurements  Intervals                                Axis  Rate:       80                           P:  NH:                                      QRS:        62  QRSD:       142                          T:          6  QT:         420  QTc:        485    Interpretive Statements  ATRIAL FIBRILLATION, V-RATE  62- 96  RIGHT BUNDLE BRANCH BLOCK  Compared to ECG 2020 18:03:15  Sinus tachycardia no longer present  Electronically Signed On 10- 16:37:32 PDT by Guillaume Wilburn MD     Anticoagulation Monitoring on 10/19/2020   Component Date Value   • INR 10/19/2020 2.00    Home Care Visit on 10/19/2020   Component Date Value   • INR 10/19/2020 2.00     Home Care Visit on 10/12/2020   Component Date Value   • INR 10/12/2020 2.30    Anticoagulation Monitoring on 10/05/2020   Component Date Value   • INR 10/05/2020 2.30    Home Care Visit on 10/05/2020   Component Date Value   • INR 10/05/2020 2.30       Lab Results   Component Value Date/Time    HBA1C 5.7 (H) 02/22/2020 01:52 AM     Lab Results   Component Value Date/Time    SODIUM 141 09/22/2020 04:18 AM    POTASSIUM 3.9 09/22/2020 04:18 AM    CHLORIDE 102 09/22/2020 04:18 AM    CO2 31 09/22/2020 04:18 AM    GLUCOSE 117 (H) 09/22/2020 04:18 AM    BUN 13 09/22/2020 04:18 AM    CREATININE 0.66 09/22/2020 04:18 AM    CREATININE 0.80 12/02/2010 12:00 AM    BUNCREATRAT 20 12/02/2010 12:00 AM    GLOMRATE >59 12/02/2010 12:00 AM    ALKPHOSPHAT 143 (H) 09/22/2020 04:18 AM    ASTSGOT 20 09/22/2020 04:18 AM    ALTSGPT 39 09/22/2020 04:18 AM    TBILIRUBIN 0.7 09/22/2020 04:18 AM     Lab Results   Component Value Date/Time    INR 1.70 10/26/2020 04:53 PM    INR 2.00 10/19/2020 10:43 AM    INR 2.00 10/19/2020     Lab Results   Component Value Date/Time    CHOLSTRLTOT 80 (L) 09/14/2020 05:00 AM    LDL 33 09/14/2020 05:00 AM    HDL 25 (A) 09/14/2020 05:00 AM    TRIGLYCERIDE 110 09/14/2020 05:00 AM       No results found for: TESTOSTERONE  Lab Results   Component Value Date/Time    TSH 1.640 12/02/2010 12:00 AM     Lab Results   Component Value Date/Time    FREET4 1.68 09/13/2020 04:45 PM    FREET4 1.90 (H) 02/21/2020 06:43 PM     No results found for: URICACID  No components found for: VITB12  Lab Results   Component Value Date/Time    25HYDROXY 39 09/20/2016 11:36 AM    25HYDROXY 39 10/24/2014 11:56 AM               HPI    Review of Systems   Constitutional: Negative.    HENT: Negative.    Eyes: Negative.    Respiratory: Negative.    Cardiovascular: Negative.    Gastrointestinal: Negative.    Genitourinary: Negative.    Musculoskeletal: Negative.    Skin: Negative.    Neurological: Negative.    Endo/Heme/Allergies: Negative.   "  Psychiatric/Behavioral: Negative.           Objective:     /64 (BP Location: Left arm, Patient Position: Sitting, BP Cuff Size: Adult)   Pulse 98   Temp 36.3 °C (97.4 °F) (Temporal)   Ht 1.575 m (5' 2\")   Wt 84.8 kg (187 lb)   SpO2 95%   BMI 34.20 kg/m²      Physical Exam  Vitals signs reviewed.   Constitutional:       General: She is not in acute distress.     Appearance: She is well-developed. She is not diaphoretic.   HENT:      Head: Normocephalic and atraumatic.      Right Ear: External ear normal.      Left Ear: External ear normal.      Nose: Nose normal.      Mouth/Throat:      Pharynx: No oropharyngeal exudate.   Eyes:      General: No scleral icterus.        Right eye: No discharge.         Left eye: No discharge.      Conjunctiva/sclera: Conjunctivae normal.      Pupils: Pupils are equal, round, and reactive to light.   Neck:      Musculoskeletal: Normal range of motion and neck supple.      Thyroid: No thyromegaly.      Vascular: No JVD.      Trachea: No tracheal deviation.   Cardiovascular:      Rate and Rhythm: Normal rate and regular rhythm.      Heart sounds: Normal heart sounds. No murmur. No friction rub. No gallop.    Pulmonary:      Effort: Pulmonary effort is normal. No respiratory distress.      Breath sounds: Normal breath sounds. No stridor. No wheezing or rales.   Chest:      Chest wall: No tenderness.   Abdominal:      General: Bowel sounds are normal. There is no distension.      Palpations: Abdomen is soft. There is no mass.      Tenderness: There is no abdominal tenderness. There is no guarding or rebound.   Musculoskeletal: Normal range of motion.         General: No tenderness.   Lymphadenopathy:      Cervical: No cervical adenopathy.   Skin:     General: Skin is warm and dry.      Coloration: Skin is not pale.      Findings: No erythema or rash.   Neurological:      Mental Status: She is alert and oriented to person, place, and time.      Cranial Nerves: No cranial nerve " deficit.      Motor: No abnormal muscle tone.      Coordination: Coordination normal.      Deep Tendon Reflexes: Reflexes are normal and symmetric. Reflexes normal.   Psychiatric:         Behavior: Behavior normal.         Thought Content: Thought content normal.         Judgment: Judgment normal.                 Assessment/Plan:        1. Persistent atrial fibrillation (HCC)    Under good control. Continue same regimen.  Good rate control on diltiazem 120 mg twice daily and metoprolol 100 mg twice daily.  Should heart rate significantly increase can always increase the diltiazem.  But will hold steady where she is now.  Heart rate and blood pressure normal.    2. Essential hypertension  Good control on current regimen of diltiazem and metoprolol.  Should BP increase without need for rate control, consider readding low-dose losartan.  But maintain on diltiazem and metoprolol for now.    3. Panlobular emphysema (HCC)  Good control.  Continue current regimen-rosuvastatin 20 mg daily  4. Mixed hyperlipidemia  Good control continue current regimen  - TSH; Future  - Comp Metabolic Panel; Future  - Lipid Profile; Future  - CBC WITH DIFFERENTIAL; Future    5. Vitamin D deficiency disease  Good control continue vitamin D supplements.  - VITAMIN D,25 HYDROXY; Future    6. IFG (impaired fasting glucose)  Good control.  With A1c just barely elevated at 5.7.  Needs recheck however.  Continue with high-protein low-carb diet and weight reduction.  Patient counseled  - HEMOGLOBIN A1C; Future    7. Vitamin D deficiency       As above  - VITAMIN D,25 HYDROXY; Future

## 2020-11-02 ENCOUNTER — ANTICOAGULATION VISIT (OUTPATIENT)
Dept: MEDICAL GROUP | Facility: MEDICAL CENTER | Age: 75
End: 2020-11-02
Payer: MEDICARE

## 2020-11-02 DIAGNOSIS — Z79.01 LONG TERM (CURRENT) USE OF ANTICOAGULANTS: ICD-10-CM

## 2020-11-02 DIAGNOSIS — I48.19 PERSISTENT ATRIAL FIBRILLATION (HCC): ICD-10-CM

## 2020-11-02 LAB — INR PPP: 2.5 (ref 2–3.5)

## 2020-11-02 PROCEDURE — 99211 OFF/OP EST MAY X REQ PHY/QHP: CPT | Performed by: INTERNAL MEDICINE

## 2020-11-02 PROCEDURE — 85610 PROTHROMBIN TIME: CPT | Performed by: INTERNAL MEDICINE

## 2020-11-03 NOTE — PROGRESS NOTES
OP Anticoagulation Service Note    Date: 2020  There were no vitals filed for this visit.   pt declined vitals    Anticoagulation Summary  As of 2020    INR goal:  2.0-3.0   TTR:  79.5 % (7.2 mo)   INR used for dosin.50 (2020)   Warfarin maintenance plan:  7.5 mg (5 mg x 1.5) every Mon; 5 mg (5 mg x 1) all other days   Weekly warfarin total:  37.5 mg   Plan last modified:  Cindy Treviño, PharmD (2020)   Next INR check:  2020   Target end date:  Indefinite    Indications    Persistent atrial fibrillation (HCC) [I48.19]  Acute pulmonary embolism (HCC) [I26.99]  Long term (current) use of anticoagulants [Z79.01]             Anticoagulation Episode Summary     INR check location:      Preferred lab:      Send INR reminders to:      Comments:        Anticoagulation Care Providers     Provider Role Specialty Phone number    Mary Carmen Rogers M.D. Referring Internal Medicine Critical Care [phone]    [facility] Responsible  [phone]        Anticoagulation Patient Findings      HPI:   Rufina Macias seen in clinic today, on anticoagulation therapy with warfarin (a high risk medication) for atrial fibrillation, hx of PE , CHADS-VASC = 5      Pt is here today to evaluate anticoagulation therapy    Previous INR was  1.7 on 10-    Pt was instructed to take 7.5 mg then continue current regimen    Confirmed warfarin dosing regimen, denies missed or extra doses of coumadin.   Diet has been consistent with foods rich in vitamin K: Yes  Changes in ETOH:  No  Changes in smoking status: No  Changes in medication: Yes - cut back on calcium d/t it having vitamin K  Cost restriction: No  S/s of bleeding:  No  Falls or accidents since last visit No  Signs/symptoms  thrombosis since the last appt: No    A/P   INR  therapeutic today, but pt is wanting to increase her greens   Increase weekly regimen, increase green vegetables     Pt will be having stent removed 2020,  pt will need to stop anticoagulation 5 days prior, high CHADS-VASC of 5 and hx of PE, pt will most likely need to be bridged.   2/21 check referral    Pt educated to contact our clinic with any changes in medications or s/s of bleeding or thrombosis. Pt is aware to seek immediate medical attention for falls, head injury or deep cuts    Follow up appointment in 1 week(s) to reduce risk of adverse events from warfarin  Cindy Treviño, PharmD

## 2020-11-09 ENCOUNTER — ANTICOAGULATION VISIT (OUTPATIENT)
Dept: MEDICAL GROUP | Facility: MEDICAL CENTER | Age: 75
End: 2020-11-09
Payer: MEDICARE

## 2020-11-09 DIAGNOSIS — I48.19 PERSISTENT ATRIAL FIBRILLATION (HCC): ICD-10-CM

## 2020-11-09 DIAGNOSIS — Z79.01 LONG TERM (CURRENT) USE OF ANTICOAGULANTS: Primary | ICD-10-CM

## 2020-11-09 LAB — INR PPP: 2.7 (ref 2–3.5)

## 2020-11-09 PROCEDURE — 99211 OFF/OP EST MAY X REQ PHY/QHP: CPT | Performed by: INTERNAL MEDICINE

## 2020-11-09 PROCEDURE — 85610 PROTHROMBIN TIME: CPT | Performed by: INTERNAL MEDICINE

## 2020-11-10 NOTE — PROGRESS NOTES
OP Anticoagulation Service Note    Date: 2020  There were no vitals filed for this visit.   pt declined vitals    Anticoagulation Summary  As of 2020    INR goal:  2.0-3.0   TTR:  80.1 % (7.5 mo)   INR used for dosin.70 (2020)   Warfarin maintenance plan:  7.5 mg (5 mg x 1.5) every Mon; 5 mg (5 mg x 1) all other days   Weekly warfarin total:  37.5 mg   Plan last modified:  Cindy Treviño, PharmD (2020)   Next INR check:  2020   Target end date:  Indefinite    Indications    Persistent atrial fibrillation (HCC) [I48.19]  Acute pulmonary embolism (HCC) [I26.99]  Long term (current) use of anticoagulants [Z79.01]             Anticoagulation Episode Summary     INR check location:      Preferred lab:      Send INR reminders to:      Comments:        Anticoagulation Care Providers     Provider Role Specialty Phone number    Mary Carmen Rogers M.D. Referring Internal Medicine Critical Care [phone]    [facility] Responsible  [phone]        Anticoagulation Patient Findings      HPI:   Rufina Macias seen in clinic today, on anticoagulation therapy with warfarin (a high risk medication) for atrial fibrillation and hx of PE, CHADS-VASC = 5. Pt possibly failed Eliquis therapy back when initially started on Eliquis      Pt is here today to evaluate anticoagulation therapy    Previous INR was  2.5 on 2020    Pt was instructed to continue current regimn    Confirmed warfarin dosing regimen, denies missed or extra doses of coumadin.   Diet has been consistent with foods rich in vitamin K: Yes  Changes in ETOH:  No  Changes in smoking status: No  Changes in medication: No   Cost restriction: No  S/s of bleeding:  No  Falls or accidents since last visit No  Signs/symptoms  thrombosis since the last appt: No    A/P   INR  therapeutic today,   Continue current warfarin regimen     Pt is having stent removed 2020, she will need to stop anticoagulation 5 days  prior. Pt with high CHADS-VASC of 5 and hx of PE in March 2020. Will bridge pt with Lovenox 120 mg (1.5 mg/kg), CrCl >30 ml/min. Pt given instructions below for bridging      11/19/2020 Stop warfarin   11/20/20 Start Lovenox 120 mg onc daily in the AM   11/21/20 Continue Lovenox 120 mg once daily in the AM   11/22/2020 Continue  120 mg once daily in the AM   11/23/2020 Day before procedure no blood thinners  11/24/2020 Day of procedure No blood thinners before procedure. After procedure if ok with provider restart warfarin at your usual dosing regimen.   11/25/2020 Continue warfarin at usual dosing regimen, Resume Lovenox 120 mg once daily in the AM. Continue both wafarin and lovenox until INR > 2.0    INR 11/30/2020 at 4:45 PM       Pt educated to contact our clinic with any changes in medications or s/s of bleeding or thrombosis. Pt is aware to seek immediate medical attention for falls, head injury or deep cuts    Follow up appointment in 5 days after procedure to reduce risk of adverse events from warfarin  Cindy Treviño, PharmD      Cindy Treviño, PharmD  [phone]

## 2020-11-20 ENCOUNTER — PRE-ADMISSION TESTING (OUTPATIENT)
Dept: ADMISSIONS | Facility: MEDICAL CENTER | Age: 75
End: 2020-11-20
Attending: INTERNAL MEDICINE
Payer: MEDICARE

## 2020-11-20 DIAGNOSIS — Z01.812 PRE-OPERATIVE LABORATORY EXAMINATION: ICD-10-CM

## 2020-11-20 LAB
COVID ORDER STATUS COVID19: NORMAL
ERYTHROCYTE [DISTWIDTH] IN BLOOD BY AUTOMATED COUNT: 47.8 FL (ref 35.9–50)
HCT VFR BLD AUTO: 45 % (ref 37–47)
HGB BLD-MCNC: 14.7 G/DL (ref 12–16)
MCH RBC QN AUTO: 30.1 PG (ref 27–33)
MCHC RBC AUTO-ENTMCNC: 32.7 G/DL (ref 33.6–35)
MCV RBC AUTO: 92 FL (ref 81.4–97.8)
PLATELET # BLD AUTO: 157 K/UL (ref 164–446)
PMV BLD AUTO: 9.9 FL (ref 9–12.9)
RBC # BLD AUTO: 4.89 M/UL (ref 4.2–5.4)
WBC # BLD AUTO: 12.6 K/UL (ref 4.8–10.8)

## 2020-11-20 PROCEDURE — C9803 HOPD COVID-19 SPEC COLLECT: HCPCS

## 2020-11-20 PROCEDURE — 85027 COMPLETE CBC AUTOMATED: CPT

## 2020-11-20 PROCEDURE — U0003 INFECTIOUS AGENT DETECTION BY NUCLEIC ACID (DNA OR RNA); SEVERE ACUTE RESPIRATORY SYNDROME CORONAVIRUS 2 (SARS-COV-2) (CORONAVIRUS DISEASE [COVID-19]), AMPLIFIED PROBE TECHNIQUE, MAKING USE OF HIGH THROUGHPUT TECHNOLOGIES AS DESCRIBED BY CMS-2020-01-R: HCPCS

## 2020-11-20 PROCEDURE — 36415 COLL VENOUS BLD VENIPUNCTURE: CPT

## 2020-11-20 ASSESSMENT — FIBROSIS 4 INDEX: FIB4 SCORE: 1.26

## 2020-11-20 NOTE — OR NURSING
"Pre-admit appointment completed. \"Preparing for your Procedure\" sheet given to Pt with verbal and written instructions. Pt states all instructions given are understood and to call pre-admit or Dr's office for additional questions or any symptoms of illness/covid develop prior to DOS. Self isolation instructions for after the Covid test given to patient. Medications the patient will take the morning of surgery per anesthesia protocol:  Albuterol, diltiazem, pepcid, trelegy, norco, metoprolol, potassium chloride    Denies anesthesia complications     "

## 2020-11-21 LAB
SARS-COV-2 RNA RESP QL NAA+PROBE: NOTDETECTED
SPECIMEN SOURCE: NORMAL

## 2020-11-23 ENCOUNTER — ANESTHESIA EVENT (OUTPATIENT)
Dept: SURGERY | Facility: MEDICAL CENTER | Age: 75
End: 2020-11-23
Payer: MEDICARE

## 2020-11-24 ENCOUNTER — APPOINTMENT (OUTPATIENT)
Dept: RADIOLOGY | Facility: MEDICAL CENTER | Age: 75
End: 2020-11-24
Attending: INTERNAL MEDICINE
Payer: MEDICARE

## 2020-11-24 ENCOUNTER — HOSPITAL ENCOUNTER (OUTPATIENT)
Facility: MEDICAL CENTER | Age: 75
End: 2020-11-24
Attending: INTERNAL MEDICINE | Admitting: INTERNAL MEDICINE
Payer: MEDICARE

## 2020-11-24 ENCOUNTER — ANESTHESIA (OUTPATIENT)
Dept: SURGERY | Facility: MEDICAL CENTER | Age: 75
End: 2020-11-24
Payer: MEDICARE

## 2020-11-24 VITALS
HEART RATE: 96 BPM | HEIGHT: 62 IN | BODY MASS INDEX: 33.96 KG/M2 | TEMPERATURE: 97.9 F | SYSTOLIC BLOOD PRESSURE: 138 MMHG | RESPIRATION RATE: 15 BRPM | DIASTOLIC BLOOD PRESSURE: 88 MMHG | OXYGEN SATURATION: 99 % | WEIGHT: 184.53 LBS

## 2020-11-24 PROCEDURE — 160025 RECOVERY II MINUTES (STATS): Performed by: INTERNAL MEDICINE

## 2020-11-24 PROCEDURE — 160046 HCHG PACU - 1ST 60 MINS PHASE II: Performed by: INTERNAL MEDICINE

## 2020-11-24 PROCEDURE — 700102 HCHG RX REV CODE 250 W/ 637 OVERRIDE(OP): Performed by: INTERNAL MEDICINE

## 2020-11-24 PROCEDURE — C1769 GUIDE WIRE: HCPCS | Performed by: INTERNAL MEDICINE

## 2020-11-24 PROCEDURE — 160035 HCHG PACU - 1ST 60 MINS PHASE I: Performed by: INTERNAL MEDICINE

## 2020-11-24 PROCEDURE — 500066 HCHG BITE BLOCK, ECT: Performed by: INTERNAL MEDICINE

## 2020-11-24 PROCEDURE — 700111 HCHG RX REV CODE 636 W/ 250 OVERRIDE (IP): Performed by: ANESTHESIOLOGY

## 2020-11-24 PROCEDURE — 160207 HCHG ENDO MINUTES - EA ADDL 1 MIN LEVEL 3: Performed by: INTERNAL MEDICINE

## 2020-11-24 PROCEDURE — 160009 HCHG ANES TIME/MIN: Performed by: INTERNAL MEDICINE

## 2020-11-24 PROCEDURE — 700102 HCHG RX REV CODE 250 W/ 637 OVERRIDE(OP): Performed by: ANESTHESIOLOGY

## 2020-11-24 PROCEDURE — 160048 HCHG OR STATISTICAL LEVEL 1-5: Performed by: INTERNAL MEDICINE

## 2020-11-24 PROCEDURE — 700105 HCHG RX REV CODE 258: Performed by: INTERNAL MEDICINE

## 2020-11-24 PROCEDURE — 502240 HCHG MISC OR SUPPLY RC 0272: Performed by: INTERNAL MEDICINE

## 2020-11-24 PROCEDURE — 110371 HCHG SHELL REV 272: Performed by: INTERNAL MEDICINE

## 2020-11-24 PROCEDURE — 700101 HCHG RX REV CODE 250: Performed by: ANESTHESIOLOGY

## 2020-11-24 PROCEDURE — A9270 NON-COVERED ITEM OR SERVICE: HCPCS | Performed by: ANESTHESIOLOGY

## 2020-11-24 PROCEDURE — A9270 NON-COVERED ITEM OR SERVICE: HCPCS | Performed by: INTERNAL MEDICINE

## 2020-11-24 PROCEDURE — 160002 HCHG RECOVERY MINUTES (STAT): Performed by: INTERNAL MEDICINE

## 2020-11-24 PROCEDURE — 700105 HCHG RX REV CODE 258: Performed by: ANESTHESIOLOGY

## 2020-11-24 PROCEDURE — 160202 HCHG ENDO MINUTES - 1ST 30 MINS LEVEL 3: Performed by: INTERNAL MEDICINE

## 2020-11-24 RX ORDER — ACETAMINOPHEN 325 MG/1
650 TABLET ORAL ONCE
Status: COMPLETED | OUTPATIENT
Start: 2020-11-24 | End: 2020-11-24

## 2020-11-24 RX ORDER — LIDOCAINE HYDROCHLORIDE 40 MG/ML
SOLUTION TOPICAL PRN
Status: DISCONTINUED | OUTPATIENT
Start: 2020-11-24 | End: 2020-11-24 | Stop reason: SURG

## 2020-11-24 RX ORDER — DEXAMETHASONE SODIUM PHOSPHATE 4 MG/ML
INJECTION, SOLUTION INTRA-ARTICULAR; INTRALESIONAL; INTRAMUSCULAR; INTRAVENOUS; SOFT TISSUE PRN
Status: DISCONTINUED | OUTPATIENT
Start: 2020-11-24 | End: 2020-11-24 | Stop reason: SURG

## 2020-11-24 RX ORDER — SODIUM CHLORIDE, SODIUM LACTATE, POTASSIUM CHLORIDE, CALCIUM CHLORIDE 600; 310; 30; 20 MG/100ML; MG/100ML; MG/100ML; MG/100ML
INJECTION, SOLUTION INTRAVENOUS CONTINUOUS
Status: DISCONTINUED | OUTPATIENT
Start: 2020-11-24 | End: 2020-11-24 | Stop reason: HOSPADM

## 2020-11-24 RX ORDER — ONDANSETRON 2 MG/ML
INJECTION INTRAMUSCULAR; INTRAVENOUS PRN
Status: DISCONTINUED | OUTPATIENT
Start: 2020-11-24 | End: 2020-11-24 | Stop reason: SURG

## 2020-11-24 RX ORDER — SUCCINYLCHOLINE/SOD CL,ISO/PF 200MG/10ML
SYRINGE (ML) INTRAVENOUS PRN
Status: DISCONTINUED | OUTPATIENT
Start: 2020-11-24 | End: 2020-11-24 | Stop reason: SURG

## 2020-11-24 RX ORDER — METOPROLOL TARTRATE 1 MG/ML
1 INJECTION, SOLUTION INTRAVENOUS
Status: DISCONTINUED | OUTPATIENT
Start: 2020-11-24 | End: 2020-11-24 | Stop reason: HOSPADM

## 2020-11-24 RX ORDER — OXYCODONE HCL 5 MG/5 ML
10 SOLUTION, ORAL ORAL
Status: COMPLETED | OUTPATIENT
Start: 2020-11-24 | End: 2020-11-24

## 2020-11-24 RX ORDER — OXYCODONE HCL 5 MG/5 ML
5 SOLUTION, ORAL ORAL
Status: COMPLETED | OUTPATIENT
Start: 2020-11-24 | End: 2020-11-24

## 2020-11-24 RX ORDER — HALOPERIDOL 5 MG/ML
1 INJECTION INTRAMUSCULAR
Status: DISCONTINUED | OUTPATIENT
Start: 2020-11-24 | End: 2020-11-24 | Stop reason: HOSPADM

## 2020-11-24 RX ORDER — ONDANSETRON 2 MG/ML
4 INJECTION INTRAMUSCULAR; INTRAVENOUS
Status: DISCONTINUED | OUTPATIENT
Start: 2020-11-24 | End: 2020-11-24 | Stop reason: HOSPADM

## 2020-11-24 RX ADMIN — SODIUM CHLORIDE, POTASSIUM CHLORIDE, SODIUM LACTATE AND CALCIUM CHLORIDE: 600; 310; 30; 20 INJECTION, SOLUTION INTRAVENOUS at 06:40

## 2020-11-24 RX ADMIN — DEXAMETHASONE SODIUM PHOSPHATE 4 MG: 4 INJECTION, SOLUTION INTRAMUSCULAR; INTRAVENOUS at 07:59

## 2020-11-24 RX ADMIN — OXYCODONE HYDROCHLORIDE 10 MG: 5 SOLUTION ORAL at 08:42

## 2020-11-24 RX ADMIN — Medication 120 MG: at 07:59

## 2020-11-24 RX ADMIN — PHENYLEPHRINE HYDROCHLORIDE 40 MCG/MIN: 10 INJECTION INTRAVENOUS at 07:58

## 2020-11-24 RX ADMIN — LIDOCAINE HYDROCHLORIDE 4 ML: 40 SOLUTION TOPICAL at 07:59

## 2020-11-24 RX ADMIN — ACETAMINOPHEN 650 MG: 325 TABLET, FILM COATED ORAL at 08:42

## 2020-11-24 RX ADMIN — PROPOFOL 120 MG: 10 INJECTION, EMULSION INTRAVENOUS at 07:59

## 2020-11-24 RX ADMIN — ONDANSETRON 4 MG: 2 INJECTION INTRAMUSCULAR; INTRAVENOUS at 08:20

## 2020-11-24 RX ADMIN — POVIDONE IODINE 15 ML: 100 SOLUTION TOPICAL at 06:10

## 2020-11-24 ASSESSMENT — PAIN DESCRIPTION - PAIN TYPE
TYPE: CHRONIC PAIN

## 2020-11-24 ASSESSMENT — FIBROSIS 4 INDEX: FIB4 SCORE: 1.53

## 2020-11-24 ASSESSMENT — PAIN SCALES - GENERAL: PAIN_LEVEL: 6

## 2020-11-24 NOTE — ANESTHESIA PREPROCEDURE EVALUATION
75F here for ERCP and stent removal    Allergies   Allergen Reactions   • Codeine Swelling   • Ace Inhibitors      Cough      Relevant Problems   PULMONARY   (+) COPD (chronic obstructive pulmonary disease) (HCC)   (+) Panlobular emphysema (HCC)      CARDIAC   (+) Acute pulmonary embolism (HCC)   (+) Aortic atherosclerosis (HCC)   (+) Coronary artery calcification seen on CAT scan   (+) Essential hypertension   (+) Other pulmonary embolism without acute cor pulmonale (HCC)   (+) Persistent atrial fibrillation (HCC)         (+) Acute kidney injury superimposed on chronic kidney disease (HCC)     Past Medical History:   Diagnosis Date   • Allergy    • Anxiety 9/29/2020   • Arrhythmia 02/2020   • Arthritis     Spine, neck, hands, ankles and knees   • Asthma     inhalers as needed   • Back pain     and neck   • Blood clotting disorder ([facility]) 02/2020    lung   • Breath shortness     at times, uses O2 1l prn Preferred   • Bronchitis    • CA - cancer of uterus    • Cancer ([facility]) 2010    uterine   • Carpal tunnel syndrome    • COPD    • Coronary artery calcification seen on CAT scan 6/12/2020   • Dental disorder     full dentures   • Diverticula of colon    • Emphysema of lung ([facility])    • Heart burn    • High cholesterol    • Hyperlipidemia    • Hypertension    • Pneumonia 1993   • Tremor, hereditary, benign    • Wheezing       No current facility-administered medications on file prior to encounter.      Current Outpatient Medications on File Prior to Encounter   Medication Sig Dispense Refill   • HYDROcodone/acetaminophen (NORCO)  MG Tab Take 1 Tab by mouth every 6 hours as needed.     • warfarin (COUMADIN) 5 MG Tab Take 1 tab by mouth daily or as directed by anticoagulation  clinic (Patient taking differently: Take 5 mg by mouth every day. Take 1 tab by mouth daily or as directed by anticoagulation  clinic) 90 Tab 1   • potassium chloride ER (KLOR-CON) 10 MEQ tablet Take 40 mEq by mouth every day. 4 tablets = 40 meq      • VITAMIN D PO Take 1 Tab by mouth every day.     • rosuvastatin (CRESTOR) 20 MG Tab Take 1 Tab by mouth every evening. 100 Tab 3   • torsemide (DEMADEX) 20 MG Tab Take 1 Tab by mouth every day. 30 Tab 3   • famotidine (PEPCID) 20 MG Tab Take 20 mg by mouth every day.     • zolpidem (AMBIEN) 10 MG Tab Take 10 mg by mouth at bedtime as needed for Sleep.     • albuterol 108 (90 Base) MCG/ACT Aero Soln inhalation aerosol Inhale 2 Puffs by mouth every 6 hours as needed for Shortness of Breath. 8.5 g 2   • metoprolol (LOPRESSOR) 100 MG Tab Take 1 Tab by mouth 2 times a day. 180 Tab 4   • Calcium Carbonate-Vit D-Min (CALCIUM 1200 PO) Take 1,200 mg by mouth every day.     • Magnesium 400 MG Tab Take 400 mg by mouth every day.     • FIBER PO Take 2 Caps by mouth every day.        Past Surgical History:   Procedure Laterality Date   • PB ERCP,DIAGNOSTIC  9/17/2020    Procedure: ERCP (ENDOSCOPIC RETROGRADE CHOLANGIOPANCREATOGRAPHY);  Surgeon: Cj Guerrier D.O.;  Location: [facility];  Service: Gastroenterology   • PB ERCP,DIAGNOSTIC  2/14/2020    Procedure: ERCP (ENDOSCOPIC RETROGRADE CHOLANGIOPANCREATOGRAPHY);  Surgeon: Clinton Ray M.D.;  Location: [facility];  Service: Gastroenterology   • ERCP  4/5/2018    Procedure: ERCP W/POSS BIOPSY;  Surgeon: Quincy Louie M.D.;  Location: [facility];  Service: Gastroenterology   • ERCP W/SPHINCTEROTOMY/PAPILL.  4/5/2018    Procedure: ERCP W/SPHINCTEROTOMY/PAPILL.;  Surgeon: Quincy Louie M.D.;  Location: [facility];  Service: Gastroenterology   • ERCP W/ INSERTION STENT/TUBE  4/5/2018    Procedure: ERCP W/ INSERTION STENT/TUBE - STENT PLACEMENT/REMOVAL;  Surgeon: Quincy Louie M.D.;  Location: [facility];  Service: Gastroenterology   • ERCP W/REMOVAL CALCULUS  4/5/2018    Procedure: ERCP W/REMOVAL CALCULUS W/DILATION;  Surgeon: uQincy Louie M.D.;  Location: [facility];   "Service: Gastroenterology   • ERCP  2/15/2018    Procedure: ERCP, SPHINCTEROTOMY, STENT PLACEMENT, DEBRIDEMENT;  Surgeon: Quincy Louie M.D.;  Location: SURGERY [facility];  Service: Gastroenterology   • COMMON BILE DUCT EXPLORATION  02/15/2018   • ERCP W/ INSERTION STENT/TUBE  02/15/2018   • ERCP W/SPHINCTEROTOMY/PAPILL.  02/15/2018   • ERCP W/REMOVAL CALCULUS  02/15/2018   • ARIELLA BY LAPAROSCOPY  july, 2015    [facility]   • HYSTERECTOMY, TOTAL ABDOMINAL  1/18/10    Uterine, Dr. Whelan and Dr. Cam +BSO   • ABDOMINAL HYSTERECTOMY TOTAL  Jan 2010    Dr. Cam   • OOPHORECTOMY  Jan 2010    BSO   • OPEN REDUCTION      ankle      Vitals:    11/20/20 1447 11/24/20 0618 11/24/20 0621   BP:  (!) 162/113 (!) 163/85   Pulse:  97    Resp:  16    Temp:  36.1 °C (97 °F)    TempSrc:  Temporal    SpO2:  96%    Weight: 84.2 kg (185 lb 10 oz) 83.7 kg (184 lb 8.4 oz)    Height: 1.575 m (5' 2\") 1.575 m (5' 2\")       Physical Exam    Airway   Mallampati: II  TM distance: >3 FB  Neck ROM: limited       Cardiovascular   Rhythm: irregular     Dental   (+) upper dentures, lower dentures           Pulmonary - normal exam     Abdominal    Neurological - normal exam                 Anesthesia Plan    ASA 3   ASA physical status 3 criteria: COPD    Plan - general       Airway plan will be ETT      Plan Factors:   Patient was previously instructed to abstain from smoking on day of procedure.  Patient did not smoke on day of procedure.      Induction: intravenous    Postoperative Plan: Postoperative administration of opioids is intended.    Pertinent diagnostic labs and testing reviewed    Informed Consent:    Anesthetic plan and risks discussed with patient.        "

## 2020-11-24 NOTE — ANESTHESIA POSTPROCEDURE EVALUATION
Patient: Rufina Macias    Procedure Summary     Date: 11/24/20 Room / Location:  ENDOSCOPIC ULTRASOUND ROOM / SURGERY [facility]    Anesthesia Start: 0754 Anesthesia Stop: 0834    Procedures:       GASTROSCOPY      ERCP, DIAGNOSTIC - WITH STENT REMOVAL Diagnosis:       Choledocholithiasis      (CHOLEDOCHOLITHIASIS)    Surgeons: Quincy Louie M.D. Responsible Provider: Jing Salter M.D.    Anesthesia Type: general ASA Status: 3          Final Anesthesia Type: general  Last vitals  BP   Blood Pressure : 141/81    Temp   37.2 °C (99 °F)    Pulse   Pulse: 91   Resp   18    SpO2   93 %      Anesthesia Post Evaluation    Patient location during evaluation: PACU  Patient participation: complete - patient participated  Level of consciousness: awake and alert  Pain score: 6    Airway patency: patent  Anesthetic complications: no  Cardiovascular status: hemodynamically stable  Respiratory status: acceptable  Hydration status: euvolemic    PONV: none

## 2020-11-24 NOTE — OP REPORT
DATE OF SERVICE:  11/24/2020    PROCEDURE PERFORMED:  Clinton Ray MD    ANESTHESIOLOGIST:  Jing Salter MD    MEDICATION:  General anesthesia.    PREPROCEDURE DIAGNOSIS:  Choledocholithiasis.    POSTPROCEDURE DIAGNOSES:  1.  Endoscopic retrograde cholangiopancreatography with stent removal.  2.  Endoscopic retrograde cholangiopancreatography with stone removal x20   stones or greater.    CONSENT:  Procedure risks and benefits reviewed thoroughly with the patient.    Risks including, but not limited to bleeding, perforation, side effects of   medication were informed.  Patient voiced understanding and agreed to proceed.    Additional risks inherent to ERCP that being mild, moderate, severe   pancreatitis that could lead to postprocedural pain, prolonged   hospitalization, intensive care unit stay, and/or death reviewed with the   patient who voiced understanding and agreed to proceed.    DESCRIPTION OF PROCEDURE:  Patient was placed in a prone position after   intubation and sedation, a side-viewing duodenoscope was passed carefully and   easily under indirect visualization into the esophagus, past second portion of   duodenum, brought in a shortened position.  A previously placed common bile   duct stent was appreciated endoscopically.  The ampulla was adjacent to a   large mouth duodenal diverticulum.  This stent was snared and removed to   channel the scope, after which a CleverCut sphincterotome with 0.035 wire was   utilized to cannulate the biliary system.  Aspiration of bile confirmed this.    Wire was maintained.  The tome was exchanged for an occlusion balloon, which   was advanced to the common hepatic duct and after 5-10 sweeps of the biliary   system and removal of greater than 20 biliary stones of various shapes and   sizes, which were yellow to black in color, eventually, a complete clearing of   the biliary system was appreciated.  An occlusion balloon cholangiogram was   performed without any  evidence for any residual filling defects with complete   decompression and emptying of the biliary system of all the contrast that had   been injected.  Stomach was suctioned of all air and fluid contents and the   scope was removed.    COMPLICATIONS:  None.    BLOOD LOSS:  None.    SPECIMENS:  None.    RECOMMENDATIONS:  Successful ERCP with stent and stone removal.  Dietary   modifications, avoidance of processed foods, avoidance of animal based   products to decrease risk for future stone formation as the patient has a   greater than 30% chance for future stone formation was reviewed with the   patient and the patient's daughter.  All questions were answered to their   satisfaction.  Patient will be scheduled for followup appointment in April   after her completion of cardiac evaluation and treatment for cardioversion for   atrial fibrillation.  The above was reviewed with the patient's daughter.    All questions were answered to her satisfaction.       ____________________________________     MD ASPEN Barone / NTS    DD:  11/24/2020 08:31:56  DT:  11/24/2020 09:12:14    D#:  [id number]  Job#:  [id number]

## 2020-11-24 NOTE — ANESTHESIA TIME REPORT
Anesthesia Start and Stop Event Times     Date Time Event    11/24/2020 0720 Ready for Procedure     0754 Anesthesia Start     0834 Anesthesia Stop        Responsible Staff  11/24/20    Name Role Begin End    Jing Salter M.D. Anesth 0754 0834        Preop Diagnosis (Free Text):  Pre-op Diagnosis     CHOLEDOCHOLITHIASIS        Preop Diagnosis (Codes):  Diagnosis Information     Diagnosis Code(s): Choledocholithiasis [K80.50]        Post op Diagnosis  Choledocholithiasis      Premium Reason  Non-Premium    Comments:

## 2020-11-24 NOTE — DISCHARGE INSTRUCTIONS
ENDOSCOPY HOME CARE INSTRUCTIONS    GASTROSCOPY OR ERCP  1. Don't eat or drink anything for about an hour after the test. You can then resume your regular diet.  2. Don't drive or drink alcohol for 24 hours. The medication you received will make you too drowsy.  3. Don't take any coffee, tea, or aspirin products until after you see your doctor. These can harm the lining of your stomach.  4. If you begin to vomit bloody material, or develop black or bloody stools, call your doctor as soon as possible.  5. If you have any neck, chest, abdominal pain or temp of 100 degrees, call your doctor.  6. See your doctor as scheduled.       You should call 911 if you develop problems with breathing or chest pain.  If any questions arise, call your doctor. If your doctor is not available, please feel free to call [phone]. You can also call the HEALTH HOTLINE open 24 hours/day, 7 days/week and speak to a nurse at [phone], or toll free [phone].    Depression / Suicide Risk    As you are discharged from this [facility] facility, it is important to learn how to keep safe from harming yourself.    Recognize the warning signs:  · Abrupt changes in personality, positive or negative- including increase in energy   · Giving away possessions  · Change in eating patterns- significant weight changes-  positive or negative  · Change in sleeping patterns- unable to sleep or sleeping all the time   · Unwillingness or inability to communicate  · Depression  · Unusual sadness, discouragement and loneliness  · Talk of wanting to die  · Neglect of personal appearance   · Rebelliousness- reckless behavior  · Withdrawal from people/activities they love  · Confusion- inability to concentrate     If you or a loved one observes any of these behaviors or has concerns about self-harm, here's what you can do:  · Talk about it- your feelings and reasons for harming yourself  · Remove any means that you might use to hurt yourself  (examples: pills, rope, extension cords, firearm)  · Get professional help from the community (Mental Health, Substance Abuse, psychological counseling)  · Do not be alone:Call your Safe Contact- someone whom you trust who will be there for you.  · Call your local CRISIS HOTLINE 793-6999 or 229-723-2298  · Call your local Children's Mobile Crisis Response Team [place] (769) 467-2127 or www.Rx Network  · Call the toll free National Suicide Prevention Hotlines   · National Suicide Prevention Lifeline 411-339-CGCX (1827)  · National Hope Line Network 800-SUICIDE (112-3430)    I acknowledge receipt and understanding of these Home Care Instructions.

## 2020-11-24 NOTE — OR NURSING
0830: pt arrives to PACU on gurney from endoscopy, awake but drowsy, DAY, on 5L O2 via mask. Pt denies any procedural pain, but reports chronic pain in her back that she has not taken her pain meds for the last 2 days at home.     0842: Pt given small sip of water with oral meds and O2 discontinued.       0845: called and updated patients daughter Destinee at this time    0900: no change     0915: no change and pt meets criteria for discharge to stage II.     0920: report given to Magnolia FRENCH     0924: pt transferred at this time

## 2020-11-24 NOTE — ANESTHESIA PROCEDURE NOTES
Airway    Date/Time: 11/24/2020 8:00 AM  Performed by: Jing Salter M.D.  Authorized by: Jing Salter M.D.     Location:  OR  Urgency:  Elective  Difficult Airway: No    Indications for Airway Management:  Anesthesia      Spontaneous Ventilation: absent    Sedation Level:  Deep  Preoxygenated: Yes    Patient Position:  Sniffing  Mask Difficulty Assessment:  0 - not attempted  Final Airway Type:  Endotracheal airway  Final Endotracheal Airway:  ETT  Cuffed: Yes    Technique Used for Successful ETT Placement:  Direct laryngoscopy  Devices/Methods Used in Placement:  Intubating stylet    Insertion Site:  Oral  Blade Type:  Ileana  Laryngoscope Blade/Videolaryngoscope Blade Size:  3  ETT Size (mm):  6.5  Leak Pressue (cm H2O):  35  Measured from:  Gums  ETT to Gums (cm):  21  Placement Verified by: auscultation and capnometry    Cormack-Lehane Classification:  Grade I - full view of glottis  Number of Attempts at Approach:  1

## 2020-11-30 ENCOUNTER — ANTICOAGULATION VISIT (OUTPATIENT)
Dept: MEDICAL GROUP | Facility: MEDICAL CENTER | Age: 75
End: 2020-11-30
Payer: MEDICARE

## 2020-11-30 DIAGNOSIS — I48.19 PERSISTENT ATRIAL FIBRILLATION (HCC): ICD-10-CM

## 2020-11-30 DIAGNOSIS — Z79.01 LONG TERM (CURRENT) USE OF ANTICOAGULANTS: Primary | ICD-10-CM

## 2020-11-30 LAB — INR PPP: 1.6 (ref 2–3.5)

## 2020-11-30 PROCEDURE — 85610 PROTHROMBIN TIME: CPT | Performed by: INTERNAL MEDICINE

## 2020-11-30 PROCEDURE — 99211 OFF/OP EST MAY X REQ PHY/QHP: CPT | Performed by: INTERNAL MEDICINE

## 2020-12-01 NOTE — PROGRESS NOTES
OP Anticoagulation Service Note    Date: 2020  There were no vitals filed for this visit.   pt declined vitals    Anticoagulation Summary  As of 2020    INR goal:  2.0-3.0   TTR:  78.7 % (8.2 mo)   INR used for dosin.60 (2020)   Warfarin maintenance plan:  7.5 mg (5 mg x 1.5) every Mon; 5 mg (5 mg x 1) all other days   Weekly warfarin total:  37.5 mg   Plan last modified:  Cindy Treviño, PharmD (2020)   Next INR check:  12/3/2020   Target end date:  Indefinite    Indications    Persistent atrial fibrillation (HCC) [I48.19]  Acute pulmonary embolism (HCC) [I26.99]  Long term (current) use of anticoagulants [Z79.01]             Anticoagulation Episode Summary     INR check location:      Preferred lab:      Send INR reminders to:      Comments:        Anticoagulation Care Providers     Provider Role Specialty Phone number    Mary Carmen Rogers M.D. Referring Internal Medicine Critical Care [phone]    [facility] Anticoagulation Services Responsible  [phone]        Anticoagulation Patient Findings      HPI:   Rufina Macias seen in clinic today, on anticoagulation therapy with warfarin (a high risk medication) for atrial fibrillation, hx of PE     Pt is here today to evaluate anticoagulation therapy    Pt has been off warfarin for procedure, she is currently bridging with lovenox    Confirmed warfarin dosing regimen, denies missed or extra doses of coumadin.   Diet has been consistent with foods rich in vitamin K: NO - pt reports poor appetite and diet off since procedure.   Changes in ETOH:  No  Changes in smoking status: No  Changes in medication: No   Cost restriction: No  S/s of bleeding:  No  Falls or accidents since last visit No  Signs/symptoms  thrombosis since the last appt: No    A/P   INR SUB therapeutic today, will require dose adjust ment today to prevent recurrence of thrombosis or stroke) and closer follow up.   Tonight 10 mg of warfarin, tomorrow 7.5 mg of warfarin then  resume usual regimen. Finish last two Lovenox 120 mg injections once daily, then stop         Pt educated to contact our clinic with any changes in medications or s/s of bleeding or thrombosis. Pt is aware to seek immediate medical attention for falls, head injury or deep cuts    Follow up appointment in 3 days(s) to reduce risk of adverse events from warfarin  Cindy Treviño, PharmD

## 2020-12-03 ENCOUNTER — ANTICOAGULATION VISIT (OUTPATIENT)
Dept: MEDICAL GROUP | Facility: MEDICAL CENTER | Age: 75
End: 2020-12-03
Payer: MEDICARE

## 2020-12-03 DIAGNOSIS — I48.19 PERSISTENT ATRIAL FIBRILLATION (HCC): ICD-10-CM

## 2020-12-03 DIAGNOSIS — Z79.01 LONG TERM (CURRENT) USE OF ANTICOAGULANTS: ICD-10-CM

## 2020-12-03 LAB — INR PPP: 2.9 (ref 2–3.5)

## 2020-12-03 PROCEDURE — 85610 PROTHROMBIN TIME: CPT | Performed by: INTERNAL MEDICINE

## 2020-12-03 PROCEDURE — 93793 ANTICOAG MGMT PT WARFARIN: CPT | Performed by: INTERNAL MEDICINE

## 2020-12-04 NOTE — PROGRESS NOTES
OP Anticoagulation Service Note    Date: 12/3/2020  There were no vitals filed for this visit.   pt declined vitals    Anticoagulation Summary  As of 12/3/2020    INR goal:  2.0-3.0   TTR:  78.6 % (8.3 mo)   INR used for dosin.90 (12/3/2020)   Warfarin maintenance plan:  7.5 mg (5 mg x 1.5) every Mon; 5 mg (5 mg x 1) all other days   Weekly warfarin total:  37.5 mg   Plan last modified:  Cindy Treviño, PharmD (2020)   Next INR check:  2020   Target end date:  Indefinite    Indications    Persistent atrial fibrillation (HCC) [I48.19]  Acute pulmonary embolism (HCC) [I26.99]  Long term (current) use of anticoagulants [Z79.01]             Anticoagulation Episode Summary     INR check location:      Preferred lab:      Send INR reminders to:      Comments:        Anticoagulation Care Providers     Provider Role Specialty Phone number    Mary Carmen Rogers M.D. Referring Internal Medicine Critical Care [phone]    [facility] Anticoagulation Services Responsible  [phone]        Anticoagulation Patient Findings      HPI:   Rufina Macias seen in clinic today, on anticoagulation therapy with warfarin (a high risk medication) for atrial fibrillation, hx of PE       Pt is here today to evaluate anticoagulation therapy    Previous INR was  1.6 on 20    Pt was instructed to Tonight 10 mg, 7.5 mg then continue current regimen and continue lovenox     Confirmed warfarin dosing regimen, denies missed or extra doses of coumadin.   Diet has been consistent with foods rich in vitamin K: Yes  Changes in ETOH:  No  Changes in smoking status: No  Changes in medication: No   Cost restriction: No  S/s of bleeding:  No  Falls or accidents since last visit No  Signs/symptoms  thrombosis since the last appt: No    A/P   INR  therapeutic today  Continue current warfarin regimen       Pt educated to contact our clinic with any changes in medications or s/s of bleeding or thrombosis. Pt is aware to seek immediate  medical attention for falls, head injury or deep cuts    Follow up appointment in 2 week(s) to reduce risk of adverse events from warfarin  Cindy Treviño, PharmD

## 2020-12-10 ENCOUNTER — TELEPHONE (OUTPATIENT)
Dept: CARDIOLOGY | Facility: MEDICAL CENTER | Age: 75
End: 2020-12-10

## 2020-12-11 NOTE — TELEPHONE ENCOUNTER
ELLYN    PH: [phone]   PT NM: Rufina Macias   : 45   RE: Does she need lab work for   appointment ?  If so where does she  go?   --------------------------------------  Message History  Account: [id number]  Taken:  Thu 10-Dec-2020  2:37p AF  Serial#: [id number]

## 2020-12-21 ENCOUNTER — ANTICOAGULATION VISIT (OUTPATIENT)
Dept: MEDICAL GROUP | Facility: MEDICAL CENTER | Age: 75
End: 2020-12-21
Payer: MEDICARE

## 2020-12-21 DIAGNOSIS — I48.19 PERSISTENT ATRIAL FIBRILLATION (HCC): ICD-10-CM

## 2020-12-21 DIAGNOSIS — Z79.01 LONG TERM (CURRENT) USE OF ANTICOAGULANTS: Primary | ICD-10-CM

## 2020-12-21 LAB — INR PPP: 2.1 (ref 2–3.5)

## 2020-12-21 PROCEDURE — 93793 ANTICOAG MGMT PT WARFARIN: CPT | Performed by: PHYSICIAN ASSISTANT

## 2020-12-21 PROCEDURE — 85610 PROTHROMBIN TIME: CPT | Performed by: PHYSICIAN ASSISTANT

## 2020-12-22 NOTE — PROGRESS NOTES
OP Anticoagulation Service Note    Date: 2020  There were no vitals filed for this visit.   pt declined vitals    Anticoagulation Summary  As of 2020    INR goal:  2.0-3.0   TTR:  80.0 % (8.9 mo)   INR used for dosin.10 (2020)   Warfarin maintenance plan:  7.5 mg (5 mg x 1.5) every Mon; 5 mg (5 mg x 1) all other days   Weekly warfarin total:  37.5 mg   Plan last modified:  Cindy Treviño, PharmD (2020)   Next INR check:  2021   Target end date:  Indefinite    Indications    Persistent atrial fibrillation (HCC) [I48.19]  Acute pulmonary embolism (HCC) [I26.99]  Long term (current) use of anticoagulants [Z79.01]             Anticoagulation Episode Summary     INR check location:      Preferred lab:      Send INR reminders to:      Comments:        Anticoagulation Care Providers     Provider Role Specialty Phone number    Mary Carmen Rogers M.D. Referring Internal Medicine Critical Care [phone]    [facility] Anticoagulation Services Responsible  [phone]        Anticoagulation Patient Findings      HPI:   Rufina Macias seen in clinic today, on anticoagulation therapy with warfarin (a high risk medication) for atrial fibrillation, hx of PE ,       Pt is here today to evaluate anticoagulation therapy    Previous INR was  2.9 on 12/3/2020    Pt was instructed to continue current regimen    Confirmed warfarin dosing regimen, denies missed or extra doses of coumadin.   Diet has been consistent with foods rich in vitamin K: - less meat  Changes in ETOH:  No  Changes in smoking status: No  Changes in medication: No   Pt is not on antiplatelet therapy.  Cost restriction: No  S/s of bleeding:  No  Falls or accidents since last visit No  Signs/symptoms  thrombosis since the last appt: No  =    A/P   INR  therapeutic today,    Continue current warfarin regimen      Our protocol suggests we test in 4 weeks.  Given the pt's risk factors and previous INR stability, it is reasonable to extend to  5 weeks to reduce Covid exposure.  This decision was made using shared decision making with the pt and benefits vs risks were discussed        Pt educated to contact our clinic with any changes in medications or s/s of bleeding or thrombosis. Pt is aware to seek immediate medical attention for falls, head injury or deep cuts    Follow up appointment in 5 week(s) to reduce risk of adverse events from warfarin  Cindy Treviño, PharmD

## 2020-12-29 ENCOUNTER — OFFICE VISIT (OUTPATIENT)
Dept: SLEEP MEDICINE | Facility: MEDICAL CENTER | Age: 75
End: 2020-12-29
Payer: MEDICARE

## 2020-12-29 VITALS
HEART RATE: 83 BPM | WEIGHT: 196 LBS | OXYGEN SATURATION: 94 % | SYSTOLIC BLOOD PRESSURE: 130 MMHG | RESPIRATION RATE: 16 BRPM | HEIGHT: 62 IN | BODY MASS INDEX: 36.07 KG/M2 | DIASTOLIC BLOOD PRESSURE: 84 MMHG

## 2020-12-29 DIAGNOSIS — J45.40 MODERATE PERSISTENT ASTHMA WITHOUT COMPLICATION: ICD-10-CM

## 2020-12-29 DIAGNOSIS — J44.9 CHRONIC OBSTRUCTIVE PULMONARY DISEASE, UNSPECIFIED COPD TYPE (HCC): ICD-10-CM

## 2020-12-29 DIAGNOSIS — I48.19 PERSISTENT ATRIAL FIBRILLATION (HCC): ICD-10-CM

## 2020-12-29 DIAGNOSIS — J30.9 CHRONIC ALLERGIC RHINITIS: ICD-10-CM

## 2020-12-29 DIAGNOSIS — Z79.01 LONG TERM (CURRENT) USE OF ANTICOAGULANTS: ICD-10-CM

## 2020-12-29 DIAGNOSIS — Z86.711 HISTORY OF PULMONARY EMBOLUS (PE): ICD-10-CM

## 2020-12-29 DIAGNOSIS — R91.1 PULMONARY NODULE 1 CM OR GREATER IN DIAMETER: ICD-10-CM

## 2020-12-29 PROCEDURE — 99214 OFFICE O/P EST MOD 30 MIN: CPT | Performed by: PHYSICIAN ASSISTANT

## 2020-12-29 RX ORDER — MONTELUKAST SODIUM 10 MG/1
10 TABLET ORAL
Qty: 30 TAB | Refills: 0 | Status: SHIPPED | OUTPATIENT
Start: 2020-12-29 | End: 2021-01-01 | Stop reason: SDUPTHER

## 2020-12-29 ASSESSMENT — ENCOUNTER SYMPTOMS
WHEEZING: 1
HEARTBURN: 1
PALPITATIONS: 1
CHILLS: 0
ORTHOPNEA: 0
INSOMNIA: 1
DIZZINESS: 0
SORE THROAT: 1
HEADACHES: 0
COUGH: 0
TREMORS: 0
WEIGHT LOSS: 0
SHORTNESS OF BREATH: 1
SINUS PAIN: 0
FEVER: 0
SPUTUM PRODUCTION: 1

## 2020-12-29 ASSESSMENT — FIBROSIS 4 INDEX: FIB4 SCORE: 1.53

## 2020-12-29 NOTE — PATIENT INSTRUCTIONS
1-sample Trelegy  2-recheck prior authorization  3-reviewed chest imaging-patient to consider follow up  4-cardioversion pending  5-reviewed covid 19 precautions:  Continue wearing mask in public, social distancing, frequent handwashing, had flu shot  6-follow up in 4 months, sooner if needed

## 2020-12-29 NOTE — PROGRESS NOTES
CC:    HPI:  Rufina Macias is a 75 y.o. year old female here today for hospital follow-up on COPD, moderate asthma, unprovoked pulmonary embolus on chronic anticoagulation.  Last seen in clinic 8/24/2020 by Dr. Rogers.  Patient with reported 60-pack-year history smoking history and quit date in 1991.    Patient was admitted in September 2020 for 11 days for management of obstructive choledocholithiasis now status post ERCP with common bile duct stone removal and stent placement, pulmonary edema from the A. fib with rapid ventricular response, UTI.  She required ICU management initially.  Due to previous large PE on Eliquis patient was bridged to Coumadin and is followed at the anticoagulation clinic.  Patient reports pending cardioversion.    Reviewed in clinic vitals including blood pressure of 130/84, heart rate of 83, O2 sat of 94%. Patient's body mass index is 35.85 kg/m². Exercise and nutrition counseling were performed at this visit.  Patient would benefit from pulmonary rehab once atrial fibrillation more controlled.    Reviewed home medication regimen including Trelegy, montelukast, torsemide famotidine, albuterol which she reports using 2 puffs every morning.    Reviewed most recent imaging including chest x-ray obtained 9/13/2020 demonstrating cardiomegaly.    Echocardiogram obtained 2/24/2020 demonstrated limited exam for left and right ventricular function.  Normal left ventricular chamber size, systolic function.  Mild concentric left ventricular hypertrophy, LVEF estimated 55%.  Normal right ventricular size and systolic function.    CTA obtained 2/21/2020 demonstrated no enlarged mediastinal lymph nodes, pulmonary embolus involving the right distal main pulmonary artery extending into the right middle and lower lobe segmental and subsegmental branches, left upper and lower lobe subsegmental pulmonary emboli, distal esophageal wall thickening follow-up esophagoscopy for evaluation of esophagitis  versus infiltrating esophageal neoplasia, 1.3 cm left lower lobe pulmonary nodule, CT follow-up was recommended.  Patient again has opted not to undergo follow-up imaging and does not want to pursue any form of chemo radiation or surgical treatment if this was to be cancerous.    Pulmonary function testing obtained 8/21/2020 demonstrating FEV1 of 0.65 L or 33% predicted, FVC of 1.05 L or 42% predicted, FEV1/FVC ratio of 61, FEV1 increase of 19% postbronchodilator, residual volume 101% predicted, TLC 78% predicted, DLCO 100% predicted.  PFT consistent with GOLD stage C.    Per pulmonologist interpretation severely reduced mid flows, low FEV1, reduced FEV1/FVC ratio, definite bronchodilator response, restricted lung volumes, reduced expiratory reserve volume at 33% reflects elevated BMI of 38.  Normal oxygen transfer.  Flow volume loop confirms combined severe obstructive pattern with superimposed restriction.    Review of Systems   Constitutional: Positive for malaise/fatigue. Negative for chills, fever and weight loss.   HENT: Positive for congestion (mild due to allergies at times) and sore throat (mild at night occasionally only lasts an hour). Negative for hearing loss, nosebleeds, sinus pain and tinnitus.    Eyes:        Pres eyeglasses   Respiratory: Positive for sputum production (clear to slightly cloudy ), shortness of breath (with exertion ) and wheezing (once in a while ). Negative for cough.    Cardiovascular: Positive for palpitations (afib, poss cardioversion in March) and leg swelling (off/on ). Negative for chest pain and orthopnea.   Gastrointestinal: Positive for heartburn (controlled on Pepcid ).        Upper and lower dentures, rare swallowing issue with large pills    Neurological: Negative for dizziness, tremors and headaches.   Psychiatric/Behavioral: The patient has insomnia (takes ambien ).        Past Medical History:   Diagnosis Date   • Allergy    • Anxiety 9/29/2020   • Arrhythmia 02/2020    • Arthritis     Spine, neck, hands, ankles and knees   • Asthma     inhalers as needed   • Back pain     and neck   • Blood clotting disorder (HCC) 02/2020    lung   • Breath shortness     at times, uses O2 1l prn Preferred   • Bronchitis    • CA - cancer of uterus    • Cancer (HCC) 2010    uterine   • Carpal tunnel syndrome    • COPD    • Coronary artery calcification seen on CAT scan 6/12/2020   • Dental disorder     full dentures   • Diverticula of colon    • Emphysema of lung ([facility])    • Heart burn    • High cholesterol    • Hyperlipidemia    • Hypertension    • Pneumonia 1993   • Tremor, hereditary, benign    • Wheezing        Past Surgical History:   Procedure Laterality Date   • PB ERCP,DIAGNOSTIC  11/24/2020    Procedure: ERCP, DIAGNOSTIC - WITH STENT REMOVAL;  Surgeon: Quincy Louie M.D.;  Location: [facility];  Service: Gastroenterology   • GASTROSCOPY-ENDO  11/24/2020    Procedure: GASTROSCOPY;  Surgeon: Quincy Louie M.D.;  Location: [facility];  Service: Gastroenterology   • PB ERCP,DIAGNOSTIC  9/17/2020    Procedure: ERCP (ENDOSCOPIC RETROGRADE CHOLANGIOPANCREATOGRAPHY);  Surgeon: Cj Guerrier D.O.;  Location: [facility];  Service: Gastroenterology   • PB ERCP,DIAGNOSTIC  2/14/2020    Procedure: ERCP (ENDOSCOPIC RETROGRADE CHOLANGIOPANCREATOGRAPHY);  Surgeon: Clinton Ray M.D.;  Location: [facility];  Service: Gastroenterology   • ERCP  4/5/2018    Procedure: ERCP W/POSS BIOPSY;  Surgeon: Quincy Louie M.D.;  Location: [facility];  Service: Gastroenterology   • ERCP W/SPHINCTEROTOMY/PAPILL.  4/5/2018    Procedure: ERCP W/SPHINCTEROTOMY/PAPILL.;  Surgeon: Quincy Louie M.D.;  Location: [facility];  Service: Gastroenterology   • ERCP W/ INSERTION STENT/TUBE  4/5/2018    Procedure: ERCP W/ INSERTION STENT/TUBE - STENT PLACEMENT/REMOVAL;  Surgeon: Quincy Louie M.D.;  Location: [facility];   Service: Gastroenterology   • ERCP W/REMOVAL CALCULUS  2018    Procedure: ERCP W/REMOVAL CALCULUS W/DILATION;  Surgeon: Quincy Louie M.D.;  Location: [facility];  Service: Gastroenterology   • ERCP  2/15/2018    Procedure: ERCP, SPHINCTEROTOMY, STENT PLACEMENT, DEBRIDEMENT;  Surgeon: Quincy Louie M.D.;  Location: SURGERY [facility];  Service: Gastroenterology   • COMMON BILE DUCT EXPLORATION  02/15/2018   • ERCP W/ INSERTION STENT/TUBE  02/15/2018   • ERCP W/SPHINCTEROTOMY/PAPILL.  02/15/2018   • ERCP W/REMOVAL CALCULUS  02/15/2018   • ARIELLA BY LAPAROSCOPY      [facility]   • HYSTERECTOMY, TOTAL ABDOMINAL  1/18/10    Uterine, Dr. Whelan and Dr. Cam +BSO   • ABDOMINAL HYSTERECTOMY TOTAL  2010    Dr. Cam   • OOPHORECTOMY  2010    BSO   • OPEN REDUCTION      ankle       Family History   Problem Relation Age of Onset   • Heart Disease Father 45        MI   • Lung Disease Father    • Stroke Father 70   • Cancer Father    • Hypertension Father    • Cancer Paternal Grandmother         breast   • Cancer Paternal Grandfather        Social History     Socioeconomic History   • Marital status:      Spouse name: Not on file   • Number of children: Not on file   • Years of education: Not on file   • Highest education level: Not on file   Occupational History   • Not on file   Social Needs   • Financial resource strain: Not on file   • Food insecurity     Worry: Not on file     Inability: Not on file   • Transportation needs     Medical: Not on file     Non-medical: Not on file   Tobacco Use   • Smoking status: Former Smoker     Packs/day: 3.00     Years: 20.00     Pack years: 60.00     Types: Cigarettes     Quit date: 1991     Years since quittin.0   • Smokeless tobacco: Never Used   Substance and Sexual Activity   • Alcohol use: No     Alcohol/week: 0.0 - 2.4 oz     Comment: hardly ever   • Drug use: No   • Sexual activity: Never     Partners: Male     Birth  "control/protection: Post-Menopausal   Lifestyle   • Physical activity     Days per week: Not on file     Minutes per session: Not on file   • Stress: Not on file   Relationships   • Social connections     Talks on phone: Not on file     Gets together: Not on file     Attends [employer] service: Not on file     Active member of club or organization: Not on file     Attends meetings of clubs or organizations: Not on file     Relationship status: Not on file   • Intimate partner violence     Fear of current or ex partner: Not on file     Emotionally abused: Not on file     Physically abused: Not on file     Forced sexual activity: Not on file   Other Topics Concern   • Not on file   Social History Narrative   • Not on file       Allergies as of 12/29/2020 - Reviewed 12/29/2020   Allergen Reaction Noted   • Codeine Swelling 02/12/2010   • Ace inhibitors  08/31/2012        @Vital signs for this encounter:  Vitals:    12/29/20 1359   Height: 1.575 m (5' 2\")   Weight: 88.9 kg (196 lb)   Weight % change since last entry.: 0 %   BP: 130/84   Pulse: 83   BMI (Calculated): 35.85   Resp: 16       Current medications as of today   Current Outpatient Medications   Medication Sig Dispense Refill   • enoxaparin (LOVENOX) 120 MG/0.8ML Solution inj Inject 120 mg as instructed every day. 10 Each 1   • Fluticasone-Umeclidin-Vilant (TRELEGY ELLIPTA) 100-62.5-25 MCG/INH AEROSOL POWDER, BREATH ACTIVATED Inhale 1 Inhaler by mouth every day. 1 Each 0   • DILTIAZem CD (CARDIZEM CD) 120 MG CAPSULE SR 24 HR Take 1 Cap by mouth 2 Times a Day. 60 Cap 5   • sertraline (ZOLOFT) 25 MG tablet Take 1 Tab by mouth every day. 30 Tab 11   • HYDROcodone/acetaminophen (NORCO)  MG Tab Take 1 Tab by mouth every 6 hours as needed.     • warfarin (COUMADIN) 5 MG Tab Take 1 tab by mouth daily or as directed by anticoagulation  clinic (Patient taking differently: Take 5 mg by mouth every day. Take 1 tab by mouth daily or as directed by anticoagulation  " clinic) 90 Tab 1   • potassium chloride ER (KLOR-CON) 10 MEQ tablet Take 40 mEq by mouth every day. 4 tablets = 40 meq     • VITAMIN D PO Take 1 Tab by mouth every day.     • rosuvastatin (CRESTOR) 20 MG Tab Take 1 Tab by mouth every evening. 100 Tab 3   • torsemide (DEMADEX) 20 MG Tab Take 1 Tab by mouth every day. 30 Tab 3   • famotidine (PEPCID) 20 MG Tab Take 20 mg by mouth every day.     • zolpidem (AMBIEN) 10 MG Tab Take 10 mg by mouth at bedtime as needed for Sleep.     • albuterol 108 (90 Base) MCG/ACT Aero Soln inhalation aerosol Inhale 2 Puffs by mouth every 6 hours as needed for Shortness of Breath. 8.5 g 2   • metoprolol (LOPRESSOR) 100 MG Tab Take 1 Tab by mouth 2 times a day. 180 Tab 4   • Calcium Carbonate-Vit D-Min (CALCIUM 1200 PO) Take 1,200 mg by mouth every day.     • Magnesium 400 MG Tab Take 400 mg by mouth every day.     • FIBER PO Take 2 Caps by mouth every day.       No current facility-administered medications for this visit.          Physical Exam:   Gen:           Alert and oriented, No apparent distress. Mood and affect appropriate, normal interaction with provider.  Eyes:          sclere white, conjunctive moist.  Hearing:     Grossly intact.  Dentition:    Upper and lower dentures  Oropharynx:   Tongue normal, posterior pharynx without erythema or exudate.  Neck:        Supple, trachea midline, no masses.  Respiratory Effort: No intercostal retractions or use of accessory muscles.   Lung Auscultation:      Diminished throughout; no rales, rhonchi or wheezing.  CV:            Regular rate and rhythm. No edema. No murmurs, rubs or gallops.  Digits, Nails, Ext: No clubbing, cyanosis, petechiae, or nodes.   Skin:        No rashes, lesions or ulcers noted on exposed skin surfaces.                     Assessment:  1. Chronic obstructive pulmonary disease, unspecified COPD type (HCC)  Fluticasone-Umeclidin-Vilant (TRELEGY ELLIPTA) 100-62.5-25 MCG/INH AEROSOL POWDER, BREATH ACTIVATED   2.  Chronic allergic rhinitis  montelukast (SINGULAIR) 10 MG Tab   3. Long term (current) use of anticoagulants     4. History of pulmonary embolus (PE)     5. Persistent atrial fibrillation (HCC)     6. Pulmonary nodule 1 cm or greater in diameter     7. BMI 35.0-35.9,adult         Immunizations:    Flu: 9/22/2020   Pneumovax 23: 4/7/2020   Prevnar 13: 3/23/2016      Plan:  5 y.o. year old female here today for hospital follow-up on COPD, moderate asthma, unprovoked pulmonary embolus on chronic anticoagulation.  Last seen in clinic 8/24/2020 by Dr. Rogers.  Patient with reported 60-pack-year history smoking history and quit date in 1991.    Remote smoking history: Remains abstinent.    Patient was admitted in September 2020 for 11 days for management of obstructive choledocholithiasis now status post ERCP with common bile duct stone removal and stent placement, pulmonary edema from the A. fib with rapid ventricular response, UTI.  She required ICU management initially.  Due to previous large PE on Eliquis patient was bridged to Coumadin and is followed at the anticoagulation clinic.  Patient reports pending cardioversion.    Unprovoked pulmonary embolism: Patient remains on chronic anticoagulation.  Persistent atrial fibrillation: Chronic anticoagulation, cardioversion pending per patient report.    Reviewed in clinic vitals including blood pressure of 130/84, heart rate of 83, O2 sat of 94%. Patient's body mass index is 35.85 kg/m².    Elevated BMI: Exercise and nutrition counseling were performed at this visit.  Patient would benefit from pulmonary rehab once atrial fibrillation more controlled.    Reviewed home medication regimen including Trelegy, montelukast, torsemide famotidine, albuterol which she reports using 2 puffs every morning.    Chronic allergic rhinitis: States benefit with Singulair.  Refill provided.    COPD/moderate persistent asthma: States benefit with Trelegy use, requests sample and prior  authorization for coverage.    Reviewed most recent imaging including chest x-ray obtained 9/13/2020 demonstrating cardiomegaly.    Echocardiogram obtained 2/24/2020 demonstrated limited exam for left and right ventricular function.  Normal left ventricular chamber size, systolic function.  Mild concentric left ventricular hypertrophy, LVEF estimated 55%.  Normal right ventricular size and systolic function.    CTA obtained 2/21/2020 demonstrated no enlarged mediastinal lymph nodes, pulmonary embolus involving the right distal main pulmonary artery extending into the right middle and lower lobe segmental and subsegmental branches, left upper and lower lobe subsegmental pulmonary emboli, distal esophageal wall thickening follow-up esophagoscopy for evaluation of esophagitis versus infiltrating esophageal neoplasia, 1.3 cm left lower lobe pulmonary nodule, CT follow-up was recommended. Patient again has opted not to undergo follow-up imaging and does not want to pursue any form of chemo radiation or surgical treatment if this was to be cancerous.    Pulmonary nodule: Patient declines follow-up on 1.3 cm left lower lobe pulmonary nodule.  Patient aware she has additional changes distal esophageal wall thickening.    Pulmonary function testing obtained 8/21/2020 demonstrating FEV1 of 0.65 L or 33% predicted, FVC of 1.05 L or 42% predicted, FEV1/FVC ratio of 61, FEV1 increase of 19% postbronchodilator, residual volume 101% predicted, TLC 78% predicted, DLCO 100% predicted.  PFT consistent with GOLD stage C.    Per pulmonologist interpretation severely reduced mid flows, low FEV1, reduced FEV1/FVC ratio, definite bronchodilator response, restricted lung volumes, reduced expiratory reserve volume at 33% reflects elevated BMI of 38.  Normal oxygen transfer.  Flow volume loop confirms combined severe obstructive pattern with superimposed restriction.    Reviewed COVID-19 precautions including wearing mask in public, social  distancing, frequent hand washing, has had 2020 flu shot.    Follow-up in 4 months, sooner if needed.    This dictation was created using voice recognition software. The accuracy of the dictation is limited to the abilities of the software. I expect there may be some errors of grammar and possibly content.

## 2021-01-01 ENCOUNTER — ANTICOAGULATION MONITORING (OUTPATIENT)
Dept: VASCULAR LAB | Facility: MEDICAL CENTER | Age: 76
End: 2021-01-01

## 2021-01-01 ENCOUNTER — TELEPHONE (OUTPATIENT)
Dept: CARDIOLOGY | Facility: MEDICAL CENTER | Age: 76
End: 2021-01-01

## 2021-01-01 ENCOUNTER — HOSPITAL ENCOUNTER (OUTPATIENT)
Dept: RADIATION ONCOLOGY | Facility: MEDICAL CENTER | Age: 76
End: 2021-09-30
Attending: RADIOLOGY
Payer: MEDICARE

## 2021-01-01 ENCOUNTER — HOSPITAL ENCOUNTER (OUTPATIENT)
Dept: RADIATION ONCOLOGY | Facility: MEDICAL CENTER | Age: 76
End: 2021-08-31
Attending: RADIOLOGY
Payer: MEDICARE

## 2021-01-01 ENCOUNTER — ANTICOAGULATION MONITORING (OUTPATIENT)
Dept: MEDICAL GROUP | Facility: MEDICAL CENTER | Age: 76
End: 2021-01-01

## 2021-01-01 ENCOUNTER — TELEPHONE (OUTPATIENT)
Dept: SLEEP MEDICINE | Facility: MEDICAL CENTER | Age: 76
End: 2021-01-01

## 2021-01-01 ENCOUNTER — HOSPITAL ENCOUNTER (OUTPATIENT)
Dept: RADIATION ONCOLOGY | Facility: MEDICAL CENTER | Age: 76
End: 2021-08-17
Payer: MEDICARE

## 2021-01-01 ENCOUNTER — HOSPITAL ENCOUNTER (OUTPATIENT)
Dept: RADIOLOGY | Facility: MEDICAL CENTER | Age: 76
End: 2021-05-28
Attending: PHYSICIAN ASSISTANT
Payer: MEDICARE

## 2021-01-01 ENCOUNTER — TELEPHONE (OUTPATIENT)
Dept: MEDICAL GROUP | Age: 76
End: 2021-01-01

## 2021-01-01 ENCOUNTER — ANTICOAGULATION VISIT (OUTPATIENT)
Dept: MEDICAL GROUP | Facility: MEDICAL CENTER | Age: 76
End: 2021-01-01
Payer: MEDICARE

## 2021-01-01 ENCOUNTER — ANESTHESIA (OUTPATIENT)
Dept: CARDIOLOGY | Facility: MEDICAL CENTER | Age: 76
End: 2021-01-01
Payer: MEDICARE

## 2021-01-01 ENCOUNTER — PATIENT OUTREACH (OUTPATIENT)
Dept: OTHER | Facility: MEDICAL CENTER | Age: 76
End: 2021-01-01

## 2021-01-01 ENCOUNTER — HOSPITAL ENCOUNTER (OUTPATIENT)
Dept: RADIATION ONCOLOGY | Facility: MEDICAL CENTER | Age: 76
End: 2021-10-31
Attending: RADIOLOGY
Payer: MEDICARE

## 2021-01-01 ENCOUNTER — PRE-ADMISSION TESTING (OUTPATIENT)
Dept: ADMISSIONS | Facility: MEDICAL CENTER | Age: 76
End: 2021-01-01
Attending: INTERNAL MEDICINE
Payer: MEDICARE

## 2021-01-01 ENCOUNTER — DOCUMENTATION (OUTPATIENT)
Dept: SLEEP MEDICINE | Facility: MEDICAL CENTER | Age: 76
End: 2021-01-01

## 2021-01-01 ENCOUNTER — OFFICE VISIT (OUTPATIENT)
Dept: CARDIOLOGY | Facility: MEDICAL CENTER | Age: 76
End: 2021-01-01
Payer: MEDICARE

## 2021-01-01 ENCOUNTER — NON-PROVIDER VISIT (OUTPATIENT)
Dept: MEDICAL GROUP | Age: 76
End: 2021-01-01
Payer: MEDICARE

## 2021-01-01 ENCOUNTER — HOSPITAL ENCOUNTER (OUTPATIENT)
Dept: LAB | Facility: MEDICAL CENTER | Age: 76
End: 2021-10-06
Attending: INTERNAL MEDICINE
Payer: MEDICARE

## 2021-01-01 ENCOUNTER — OFFICE VISIT (OUTPATIENT)
Dept: SLEEP MEDICINE | Facility: MEDICAL CENTER | Age: 76
End: 2021-01-01
Payer: MEDICARE

## 2021-01-01 ENCOUNTER — OFFICE VISIT (OUTPATIENT)
Dept: MEDICAL GROUP | Age: 76
End: 2021-01-01
Payer: MEDICARE

## 2021-01-01 ENCOUNTER — HOSPITAL ENCOUNTER (OUTPATIENT)
Facility: MEDICAL CENTER | Age: 76
End: 2021-07-06
Attending: INTERNAL MEDICINE | Admitting: INTERNAL MEDICINE
Payer: MEDICARE

## 2021-01-01 ENCOUNTER — HOSPITAL ENCOUNTER (OUTPATIENT)
Dept: RADIATION ONCOLOGY | Facility: MEDICAL CENTER | Age: 76
End: 2021-12-31
Attending: RADIOLOGY
Payer: MEDICARE

## 2021-01-01 ENCOUNTER — APPOINTMENT (OUTPATIENT)
Dept: MEDICAL GROUP | Facility: MEDICAL CENTER | Age: 76
End: 2021-01-01
Payer: MEDICARE

## 2021-01-01 ENCOUNTER — HOSPITAL ENCOUNTER (OUTPATIENT)
Dept: RADIATION ONCOLOGY | Facility: MEDICAL CENTER | Age: 76
End: 2021-09-03
Payer: MEDICARE

## 2021-01-01 ENCOUNTER — DOCUMENTATION (OUTPATIENT)
Dept: VASCULAR LAB | Facility: MEDICAL CENTER | Age: 76
End: 2021-01-01

## 2021-01-01 ENCOUNTER — NON-PROVIDER VISIT (OUTPATIENT)
Dept: MEDICAL GROUP | Facility: MEDICAL CENTER | Age: 76
End: 2021-01-01
Payer: MEDICARE

## 2021-01-01 ENCOUNTER — HOSPITAL ENCOUNTER (OUTPATIENT)
Dept: LAB | Facility: MEDICAL CENTER | Age: 76
End: 2021-07-14
Attending: INTERNAL MEDICINE
Payer: MEDICARE

## 2021-01-01 ENCOUNTER — APPOINTMENT (OUTPATIENT)
Dept: CARDIOLOGY | Facility: MEDICAL CENTER | Age: 76
End: 2021-01-01
Attending: INTERNAL MEDICINE
Payer: MEDICARE

## 2021-01-01 ENCOUNTER — ANESTHESIA (OUTPATIENT)
Dept: SURGERY | Facility: MEDICAL CENTER | Age: 76
End: 2021-01-01
Payer: MEDICARE

## 2021-01-01 ENCOUNTER — ANESTHESIA EVENT (OUTPATIENT)
Dept: SURGERY | Facility: MEDICAL CENTER | Age: 76
End: 2021-01-01
Payer: MEDICARE

## 2021-01-01 ENCOUNTER — NON-PROVIDER VISIT (OUTPATIENT)
Dept: VASCULAR LAB | Facility: MEDICAL CENTER | Age: 76
End: 2021-01-01
Attending: INTERNAL MEDICINE
Payer: MEDICARE

## 2021-01-01 ENCOUNTER — HOSPITAL ENCOUNTER (OUTPATIENT)
Dept: RADIATION ONCOLOGY | Facility: MEDICAL CENTER | Age: 76
End: 2021-08-30
Payer: MEDICARE

## 2021-01-01 ENCOUNTER — APPOINTMENT (OUTPATIENT)
Dept: HEMATOLOGY ONCOLOGY | Facility: MEDICAL CENTER | Age: 76
End: 2021-01-01
Payer: MEDICARE

## 2021-01-01 ENCOUNTER — HOSPITAL ENCOUNTER (OUTPATIENT)
Dept: RADIATION ONCOLOGY | Facility: MEDICAL CENTER | Age: 76
End: 2021-08-27
Payer: MEDICARE

## 2021-01-01 ENCOUNTER — APPOINTMENT (OUTPATIENT)
Dept: RADIOLOGY | Facility: MEDICAL CENTER | Age: 76
End: 2021-01-01
Attending: INTERNAL MEDICINE
Payer: MEDICARE

## 2021-01-01 ENCOUNTER — PATIENT OUTREACH (OUTPATIENT)
Dept: HEALTH INFORMATION MANAGEMENT | Facility: OTHER | Age: 76
End: 2021-01-01

## 2021-01-01 ENCOUNTER — HOSPITAL ENCOUNTER (OUTPATIENT)
Dept: RADIOLOGY | Facility: MEDICAL CENTER | Age: 76
End: 2021-12-01
Attending: RADIOLOGY
Payer: MEDICARE

## 2021-01-01 ENCOUNTER — TELEPHONE (OUTPATIENT)
Dept: MEDICAL GROUP | Facility: MEDICAL CENTER | Age: 76
End: 2021-01-01

## 2021-01-01 ENCOUNTER — HOSPITAL ENCOUNTER (OUTPATIENT)
Dept: RADIATION ONCOLOGY | Facility: MEDICAL CENTER | Age: 76
End: 2021-09-01

## 2021-01-01 ENCOUNTER — HOSPITAL ENCOUNTER (OUTPATIENT)
Facility: MEDICAL CENTER | Age: 76
End: 2021-03-02
Attending: INTERNAL MEDICINE | Admitting: INTERNAL MEDICINE
Payer: MEDICARE

## 2021-01-01 ENCOUNTER — HOSPITAL ENCOUNTER (OUTPATIENT)
Dept: LAB | Facility: MEDICAL CENTER | Age: 76
End: 2021-03-11
Attending: INTERNAL MEDICINE
Payer: MEDICARE

## 2021-01-01 ENCOUNTER — HOSPITAL ENCOUNTER (OUTPATIENT)
Dept: RADIOLOGY | Facility: MEDICAL CENTER | Age: 76
End: 2021-07-26
Attending: INTERNAL MEDICINE
Payer: MEDICARE

## 2021-01-01 ENCOUNTER — HOSPITAL ENCOUNTER (OUTPATIENT)
Dept: RADIOLOGY | Facility: MEDICAL CENTER | Age: 76
End: 2021-04-19
Attending: PHYSICIAN ASSISTANT
Payer: MEDICARE

## 2021-01-01 ENCOUNTER — TELEPHONE (OUTPATIENT)
Dept: HEALTH INFORMATION MANAGEMENT | Facility: OTHER | Age: 76
End: 2021-01-01

## 2021-01-01 ENCOUNTER — ANESTHESIA EVENT (OUTPATIENT)
Dept: CARDIOLOGY | Facility: MEDICAL CENTER | Age: 76
End: 2021-01-01
Payer: MEDICARE

## 2021-01-01 ENCOUNTER — HOSPITAL ENCOUNTER (OUTPATIENT)
Dept: RADIATION ONCOLOGY | Facility: MEDICAL CENTER | Age: 76
End: 2021-08-25
Payer: MEDICARE

## 2021-01-01 ENCOUNTER — APPOINTMENT (OUTPATIENT)
Dept: RADIOLOGY | Facility: MEDICAL CENTER | Age: 76
End: 2021-01-01
Attending: PHYSICIAN ASSISTANT
Payer: MEDICARE

## 2021-01-01 VITALS
WEIGHT: 203.6 LBS | BODY MASS INDEX: 37.47 KG/M2 | HEIGHT: 62 IN | SYSTOLIC BLOOD PRESSURE: 128 MMHG | OXYGEN SATURATION: 92 % | TEMPERATURE: 99.8 F | HEART RATE: 78 BPM | DIASTOLIC BLOOD PRESSURE: 60 MMHG

## 2021-01-01 VITALS — DIASTOLIC BLOOD PRESSURE: 76 MMHG | SYSTOLIC BLOOD PRESSURE: 189 MMHG | HEART RATE: 74 BPM

## 2021-01-01 VITALS
HEIGHT: 62 IN | DIASTOLIC BLOOD PRESSURE: 98 MMHG | SYSTOLIC BLOOD PRESSURE: 150 MMHG | BODY MASS INDEX: 34.41 KG/M2 | HEART RATE: 94 BPM | WEIGHT: 187 LBS | OXYGEN SATURATION: 95 %

## 2021-01-01 VITALS — DIASTOLIC BLOOD PRESSURE: 68 MMHG | SYSTOLIC BLOOD PRESSURE: 135 MMHG

## 2021-01-01 VITALS
SYSTOLIC BLOOD PRESSURE: 184 MMHG | HEIGHT: 62 IN | HEART RATE: 57 BPM | OXYGEN SATURATION: 96 % | TEMPERATURE: 97.8 F | DIASTOLIC BLOOD PRESSURE: 88 MMHG | WEIGHT: 195.5 LBS | BODY MASS INDEX: 35.98 KG/M2

## 2021-01-01 VITALS — SYSTOLIC BLOOD PRESSURE: 132 MMHG | DIASTOLIC BLOOD PRESSURE: 68 MMHG | HEART RATE: 93 BPM

## 2021-01-01 VITALS
OXYGEN SATURATION: 93 % | HEIGHT: 62 IN | HEART RATE: 69 BPM | BODY MASS INDEX: 35.88 KG/M2 | DIASTOLIC BLOOD PRESSURE: 90 MMHG | SYSTOLIC BLOOD PRESSURE: 134 MMHG | WEIGHT: 195 LBS | RESPIRATION RATE: 16 BRPM

## 2021-01-01 VITALS
BODY MASS INDEX: 37.13 KG/M2 | HEART RATE: 59 BPM | TEMPERATURE: 98.4 F | OXYGEN SATURATION: 95 % | DIASTOLIC BLOOD PRESSURE: 80 MMHG | HEIGHT: 62 IN | WEIGHT: 201.8 LBS | SYSTOLIC BLOOD PRESSURE: 134 MMHG

## 2021-01-01 VITALS
WEIGHT: 198.41 LBS | SYSTOLIC BLOOD PRESSURE: 145 MMHG | TEMPERATURE: 97.6 F | HEART RATE: 55 BPM | HEIGHT: 61 IN | OXYGEN SATURATION: 92 % | DIASTOLIC BLOOD PRESSURE: 72 MMHG | BODY MASS INDEX: 37.46 KG/M2 | RESPIRATION RATE: 18 BRPM

## 2021-01-01 VITALS
DIASTOLIC BLOOD PRESSURE: 81 MMHG | BODY MASS INDEX: 36.31 KG/M2 | RESPIRATION RATE: 16 BRPM | SYSTOLIC BLOOD PRESSURE: 169 MMHG | HEART RATE: 67 BPM | HEIGHT: 62 IN | WEIGHT: 197.31 LBS | OXYGEN SATURATION: 94 % | TEMPERATURE: 96.8 F

## 2021-01-01 VITALS
OXYGEN SATURATION: 95 % | HEIGHT: 62 IN | WEIGHT: 188.4 LBS | SYSTOLIC BLOOD PRESSURE: 138 MMHG | TEMPERATURE: 97.9 F | HEART RATE: 65 BPM | BODY MASS INDEX: 34.67 KG/M2 | DIASTOLIC BLOOD PRESSURE: 80 MMHG

## 2021-01-01 VITALS
WEIGHT: 192 LBS | OXYGEN SATURATION: 96 % | SYSTOLIC BLOOD PRESSURE: 132 MMHG | BODY MASS INDEX: 35.33 KG/M2 | DIASTOLIC BLOOD PRESSURE: 88 MMHG | HEART RATE: 66 BPM | RESPIRATION RATE: 16 BRPM | HEIGHT: 62 IN

## 2021-01-01 VITALS
OXYGEN SATURATION: 95 % | SYSTOLIC BLOOD PRESSURE: 218 MMHG | DIASTOLIC BLOOD PRESSURE: 106 MMHG | HEART RATE: 101 BPM | TEMPERATURE: 97.5 F

## 2021-01-01 VITALS — HEART RATE: 100 BPM | DIASTOLIC BLOOD PRESSURE: 69 MMHG | SYSTOLIC BLOOD PRESSURE: 153 MMHG

## 2021-01-01 VITALS
DIASTOLIC BLOOD PRESSURE: 100 MMHG | WEIGHT: 191 LBS | HEART RATE: 62 BPM | HEIGHT: 62 IN | BODY MASS INDEX: 35.15 KG/M2 | RESPIRATION RATE: 18 BRPM | OXYGEN SATURATION: 97 % | SYSTOLIC BLOOD PRESSURE: 188 MMHG

## 2021-01-01 VITALS — SYSTOLIC BLOOD PRESSURE: 132 MMHG | DIASTOLIC BLOOD PRESSURE: 80 MMHG

## 2021-01-01 VITALS — DIASTOLIC BLOOD PRESSURE: 63 MMHG | HEART RATE: 90 BPM | SYSTOLIC BLOOD PRESSURE: 149 MMHG

## 2021-01-01 VITALS — SYSTOLIC BLOOD PRESSURE: 127 MMHG | DIASTOLIC BLOOD PRESSURE: 70 MMHG

## 2021-01-01 DIAGNOSIS — Z79.01 LONG TERM (CURRENT) USE OF ANTICOAGULANTS: ICD-10-CM

## 2021-01-01 DIAGNOSIS — I50.30 HEART FAILURE WITH PRESERVED EJECTION FRACTION, UNSPECIFIED HF CHRONICITY (HCC): ICD-10-CM

## 2021-01-01 DIAGNOSIS — Z79.01 LONG TERM (CURRENT) USE OF ANTICOAGULANTS: Primary | ICD-10-CM

## 2021-01-01 DIAGNOSIS — J44.9 CHRONIC OBSTRUCTIVE PULMONARY DISEASE, UNSPECIFIED COPD TYPE (HCC): ICD-10-CM

## 2021-01-01 DIAGNOSIS — I26.92 CHRONIC SADDLE PULMONARY EMBOLISM WITHOUT ACUTE COR PULMONALE (HCC): ICD-10-CM

## 2021-01-01 DIAGNOSIS — E78.2 MIXED HYPERLIPIDEMIA: ICD-10-CM

## 2021-01-01 DIAGNOSIS — Z92.89 HISTORY OF CARDIOVERSION: ICD-10-CM

## 2021-01-01 DIAGNOSIS — R91.8 MASS OF LOWER LOBE OF LEFT LUNG: ICD-10-CM

## 2021-01-01 DIAGNOSIS — I27.82 CHRONIC PULMONARY EMBOLISM WITHOUT ACUTE COR PULMONALE, UNSPECIFIED PULMONARY EMBOLISM TYPE (HCC): ICD-10-CM

## 2021-01-01 DIAGNOSIS — F11.20 UNCOMPLICATED OPIOID DEPENDENCE (HCC): ICD-10-CM

## 2021-01-01 DIAGNOSIS — Z12.31 ENCOUNTER FOR SCREENING MAMMOGRAM FOR BREAST CANCER: ICD-10-CM

## 2021-01-01 DIAGNOSIS — I48.19 PERSISTENT ATRIAL FIBRILLATION (HCC): ICD-10-CM

## 2021-01-01 DIAGNOSIS — I25.10 CORONARY ARTERY CALCIFICATION SEEN ON CAT SCAN: ICD-10-CM

## 2021-01-01 DIAGNOSIS — C34.90 MALIGNANT NEOPLASM OF UNSPECIFIED PART OF UNSPECIFIED BRONCHUS OR LUNG (HCC): ICD-10-CM

## 2021-01-01 DIAGNOSIS — E87.6 HYPOKALEMIA: ICD-10-CM

## 2021-01-01 DIAGNOSIS — Z86.79 HISTORY OF CHRONIC ATRIAL FIBRILLATION: ICD-10-CM

## 2021-01-01 DIAGNOSIS — J96.11 CHRONIC RESPIRATORY FAILURE WITH HYPOXIA (HCC): ICD-10-CM

## 2021-01-01 DIAGNOSIS — I27.82 CHRONIC SADDLE PULMONARY EMBOLISM WITHOUT ACUTE COR PULMONALE (HCC): ICD-10-CM

## 2021-01-01 DIAGNOSIS — R91.8 MULTIPLE PULMONARY NODULES DETERMINED BY COMPUTED TOMOGRAPHY OF LUNG: ICD-10-CM

## 2021-01-01 DIAGNOSIS — Z65.8 PSYCHOSOCIAL DISTRESS: ICD-10-CM

## 2021-01-01 DIAGNOSIS — C34.32 PRIMARY CANCER OF LEFT LOWER LOBE OF LUNG (HCC): ICD-10-CM

## 2021-01-01 DIAGNOSIS — R73.01 IFG (IMPAIRED FASTING GLUCOSE): ICD-10-CM

## 2021-01-01 DIAGNOSIS — Z79.01 CHRONIC ANTICOAGULATION: ICD-10-CM

## 2021-01-01 DIAGNOSIS — I27.82 OTHER CHRONIC PULMONARY EMBOLISM, UNSPECIFIED WHETHER ACUTE COR PULMONALE PRESENT (HCC): ICD-10-CM

## 2021-01-01 DIAGNOSIS — Z23 NEED FOR VACCINATION: ICD-10-CM

## 2021-01-01 DIAGNOSIS — R91.8 PULMONARY NODULES: ICD-10-CM

## 2021-01-01 DIAGNOSIS — E04.1 THYROID NODULE: ICD-10-CM

## 2021-01-01 DIAGNOSIS — I10 ESSENTIAL HYPERTENSION: ICD-10-CM

## 2021-01-01 DIAGNOSIS — J43.1 PANLOBULAR EMPHYSEMA (HCC): ICD-10-CM

## 2021-01-01 DIAGNOSIS — I70.0 AORTIC ATHEROSCLEROSIS (HCC): ICD-10-CM

## 2021-01-01 DIAGNOSIS — F41.9 ANXIETY: ICD-10-CM

## 2021-01-01 DIAGNOSIS — Z01.812 PRE-OPERATIVE LABORATORY EXAMINATION: ICD-10-CM

## 2021-01-01 DIAGNOSIS — J30.9 CHRONIC ALLERGIC RHINITIS: ICD-10-CM

## 2021-01-01 DIAGNOSIS — I27.20 PULMONARY HYPERTENSION (HCC): ICD-10-CM

## 2021-01-01 DIAGNOSIS — I27.82 OTHER CHRONIC PULMONARY EMBOLISM WITHOUT ACUTE COR PULMONALE (HCC): ICD-10-CM

## 2021-01-01 DIAGNOSIS — E66.01 OBESITY, MORBID, BMI 40.0-49.9 (HCC): ICD-10-CM

## 2021-01-01 DIAGNOSIS — Z79.899 HIGH RISK MEDICATION USE: ICD-10-CM

## 2021-01-01 DIAGNOSIS — I48.91 HYPERCOAGULABILITY DUE TO ATRIAL FIBRILLATION (HCC): ICD-10-CM

## 2021-01-01 DIAGNOSIS — Z87.891 FORMER SMOKER, STOPPED SMOKING MANY YEARS AGO: ICD-10-CM

## 2021-01-01 DIAGNOSIS — R91.1 SOLITARY PULMONARY NODULE: ICD-10-CM

## 2021-01-01 DIAGNOSIS — E66.9 OBESITY (BMI 30.0-34.9): ICD-10-CM

## 2021-01-01 DIAGNOSIS — F51.01 PRIMARY INSOMNIA: ICD-10-CM

## 2021-01-01 DIAGNOSIS — R76.8 FALSE POSITIVE SEROLOGICAL TEST FOR HEPATITIS C: ICD-10-CM

## 2021-01-01 DIAGNOSIS — E04.1 THYROID NODULE GREATER THAN OR EQUAL TO 1 CM IN DIAMETER INCIDENTALLY NOTED ON IMAGING STUDY: ICD-10-CM

## 2021-01-01 DIAGNOSIS — E55.9 VITAMIN D DEFICIENCY: ICD-10-CM

## 2021-01-01 DIAGNOSIS — D68.69 HYPERCOAGULABILITY DUE TO ATRIAL FIBRILLATION (HCC): ICD-10-CM

## 2021-01-01 DIAGNOSIS — I26.99 OTHER ACUTE PULMONARY EMBOLISM WITHOUT ACUTE COR PULMONALE (HCC): ICD-10-CM

## 2021-01-01 DIAGNOSIS — B37.0 ORAL THRUSH: ICD-10-CM

## 2021-01-01 DIAGNOSIS — Z79.01 ANTICOAGULATED: ICD-10-CM

## 2021-01-01 LAB
25(OH)D3 SERPL-MCNC: 52 NG/ML (ref 30–100)
ALBUMIN SERPL BCP-MCNC: 3.8 G/DL (ref 3.2–4.9)
ALBUMIN SERPL BCP-MCNC: 3.8 G/DL (ref 3.2–4.9)
ALBUMIN SERPL BCP-MCNC: 4 G/DL (ref 3.2–4.9)
ALBUMIN SERPL BCP-MCNC: 4.1 G/DL (ref 3.2–4.9)
ALBUMIN/GLOB SERPL: 1.3 G/DL
ALBUMIN/GLOB SERPL: 1.3 G/DL
ALBUMIN/GLOB SERPL: 1.4 G/DL
ALBUMIN/GLOB SERPL: 1.5 G/DL
ALP SERPL-CCNC: 62 U/L (ref 30–99)
ALP SERPL-CCNC: 63 U/L (ref 30–99)
ALP SERPL-CCNC: 73 U/L (ref 30–99)
ALP SERPL-CCNC: 83 U/L (ref 30–99)
ALT SERPL-CCNC: 13 U/L (ref 2–50)
ALT SERPL-CCNC: 14 U/L (ref 2–50)
ALT SERPL-CCNC: 14 U/L (ref 2–50)
ALT SERPL-CCNC: 22 U/L (ref 2–50)
ANION GAP SERPL CALC-SCNC: 10 MMOL/L (ref 7–16)
ANION GAP SERPL CALC-SCNC: 11 MMOL/L (ref 7–16)
ANION GAP SERPL CALC-SCNC: 7 MMOL/L (ref 7–16)
ANION GAP SERPL CALC-SCNC: 7 MMOL/L (ref 7–16)
ANION GAP SERPL CALC-SCNC: 8 MMOL/L (ref 7–16)
AST SERPL-CCNC: 17 U/L (ref 12–45)
AST SERPL-CCNC: 19 U/L (ref 12–45)
AST SERPL-CCNC: 21 U/L (ref 12–45)
AST SERPL-CCNC: 28 U/L (ref 12–45)
BASOPHILS # BLD AUTO: 0.2 % (ref 0–1.8)
BASOPHILS # BLD AUTO: 0.3 % (ref 0–1.8)
BASOPHILS # BLD AUTO: 0.3 % (ref 0–1.8)
BASOPHILS # BLD: 0.02 K/UL (ref 0–0.12)
BASOPHILS # BLD: 0.03 K/UL (ref 0–0.12)
BASOPHILS # BLD: 0.03 K/UL (ref 0–0.12)
BILIRUB SERPL-MCNC: 0.3 MG/DL (ref 0.1–1.5)
BILIRUB SERPL-MCNC: 0.4 MG/DL (ref 0.1–1.5)
BILIRUB SERPL-MCNC: 0.4 MG/DL (ref 0.1–1.5)
BILIRUB SERPL-MCNC: 0.5 MG/DL (ref 0.1–1.5)
BUN SERPL-MCNC: 13 MG/DL (ref 8–22)
BUN SERPL-MCNC: 15 MG/DL (ref 8–22)
BUN SERPL-MCNC: 16 MG/DL (ref 8–22)
BUN SERPL-MCNC: 17 MG/DL (ref 8–22)
BUN SERPL-MCNC: 18 MG/DL (ref 8–22)
CALCIUM SERPL-MCNC: 8.8 MG/DL (ref 8.4–10.2)
CALCIUM SERPL-MCNC: 9.2 MG/DL (ref 8.5–10.5)
CALCIUM SERPL-MCNC: 9.3 MG/DL (ref 8.5–10.5)
CALCIUM SERPL-MCNC: 9.5 MG/DL (ref 8.5–10.5)
CALCIUM SERPL-MCNC: 9.7 MG/DL (ref 8.5–10.5)
CHEMOTHERAPY INFUSION START DATE: NORMAL
CHEMOTHERAPY RECORDS: 10
CHEMOTHERAPY RECORDS: 5000
CHEMOTHERAPY RECORDS: NORMAL
CHEMOTHERAPY RX CANCER: NORMAL
CHLORIDE SERPL-SCNC: 100 MMOL/L (ref 96–112)
CHLORIDE SERPL-SCNC: 102 MMOL/L (ref 96–112)
CHLORIDE SERPL-SCNC: 104 MMOL/L (ref 96–112)
CHLORIDE SERPL-SCNC: 105 MMOL/L (ref 96–112)
CHLORIDE SERPL-SCNC: 105 MMOL/L (ref 96–112)
CHOLEST SERPL-MCNC: 116 MG/DL (ref 100–199)
CHOLEST SERPL-MCNC: 117 MG/DL (ref 100–199)
CHOLEST SERPL-MCNC: 121 MG/DL (ref 100–199)
CO2 SERPL-SCNC: 26 MMOL/L (ref 20–33)
CO2 SERPL-SCNC: 26 MMOL/L (ref 20–33)
CO2 SERPL-SCNC: 27 MMOL/L (ref 20–33)
CO2 SERPL-SCNC: 27 MMOL/L (ref 20–33)
CO2 SERPL-SCNC: 28 MMOL/L (ref 20–33)
COVID ORDER STATUS COVID19: NORMAL
CREAT SERPL-MCNC: 0.65 MG/DL (ref 0.5–1.4)
CREAT SERPL-MCNC: 0.66 MG/DL (ref 0.5–1.4)
CREAT SERPL-MCNC: 0.71 MG/DL (ref 0.5–1.4)
CREAT SERPL-MCNC: 0.76 MG/DL (ref 0.5–1.4)
CREAT SERPL-MCNC: 0.78 MG/DL (ref 0.5–1.4)
DATE 1ST CHEMO CANCER: NORMAL
EKG IMPRESSION: NORMAL
EOSINOPHIL # BLD AUTO: 0.07 K/UL (ref 0–0.51)
EOSINOPHIL # BLD AUTO: 0.11 K/UL (ref 0–0.51)
EOSINOPHIL # BLD AUTO: 0.12 K/UL (ref 0–0.51)
EOSINOPHIL NFR BLD: 0.7 % (ref 0–6.9)
EOSINOPHIL NFR BLD: 1.2 % (ref 0–6.9)
EOSINOPHIL NFR BLD: 1.2 % (ref 0–6.9)
ERYTHROCYTE [DISTWIDTH] IN BLOOD BY AUTOMATED COUNT: 45.1 FL (ref 35.9–50)
ERYTHROCYTE [DISTWIDTH] IN BLOOD BY AUTOMATED COUNT: 45.5 FL (ref 35.9–50)
ERYTHROCYTE [DISTWIDTH] IN BLOOD BY AUTOMATED COUNT: 46.9 FL (ref 35.9–50)
ERYTHROCYTE [DISTWIDTH] IN BLOOD BY AUTOMATED COUNT: 47.7 FL (ref 35.9–50)
ERYTHROCYTE [DISTWIDTH] IN BLOOD BY AUTOMATED COUNT: 48.1 FL (ref 35.9–50)
EST. AVERAGE GLUCOSE BLD GHB EST-MCNC: 111 MG/DL
EST. AVERAGE GLUCOSE BLD GHB EST-MCNC: 117 MG/DL
FASTING STATUS PATIENT QL REPORTED: NORMAL
GLOBULIN SER CALC-MCNC: 2.5 G/DL (ref 1.9–3.5)
GLOBULIN SER CALC-MCNC: 2.8 G/DL (ref 1.9–3.5)
GLOBULIN SER CALC-MCNC: 3 G/DL (ref 1.9–3.5)
GLOBULIN SER CALC-MCNC: 3.2 G/DL (ref 1.9–3.5)
GLUCOSE SERPL-MCNC: 108 MG/DL (ref 65–99)
GLUCOSE SERPL-MCNC: 126 MG/DL (ref 65–99)
GLUCOSE SERPL-MCNC: 83 MG/DL (ref 65–99)
GLUCOSE SERPL-MCNC: 84 MG/DL (ref 65–99)
GLUCOSE SERPL-MCNC: 89 MG/DL (ref 65–99)
HBA1C MFR BLD: 5.5 % (ref 4–5.6)
HBA1C MFR BLD: 5.7 % (ref 4–5.6)
HCT VFR BLD AUTO: 43.8 % (ref 37–47)
HCT VFR BLD AUTO: 46.1 % (ref 37–47)
HCT VFR BLD AUTO: 46.1 % (ref 37–47)
HCT VFR BLD AUTO: 47 % (ref 37–47)
HCT VFR BLD AUTO: 49.4 % (ref 37–47)
HCV AB SER QL: REACTIVE
HDLC SERPL-MCNC: 42 MG/DL
HDLC SERPL-MCNC: 50 MG/DL
HDLC SERPL-MCNC: 52 MG/DL
HGB BLD-MCNC: 14 G/DL (ref 12–16)
HGB BLD-MCNC: 14.5 G/DL (ref 12–16)
HGB BLD-MCNC: 14.5 G/DL (ref 12–16)
HGB BLD-MCNC: 15 G/DL (ref 12–16)
HGB BLD-MCNC: 15.2 G/DL (ref 12–16)
IMM GRANULOCYTES # BLD AUTO: 0.02 K/UL (ref 0–0.11)
IMM GRANULOCYTES # BLD AUTO: 0.03 K/UL (ref 0–0.11)
IMM GRANULOCYTES # BLD AUTO: 0.05 K/UL (ref 0–0.11)
IMM GRANULOCYTES NFR BLD AUTO: 0.2 % (ref 0–0.9)
IMM GRANULOCYTES NFR BLD AUTO: 0.3 % (ref 0–0.9)
IMM GRANULOCYTES NFR BLD AUTO: 0.5 % (ref 0–0.9)
INR PPP: 1.03 (ref 0.87–1.13)
INR PPP: 1.3 (ref 2–3.5)
INR PPP: 1.9 (ref 2–3.5)
INR PPP: 2.1 (ref 2–3.5)
INR PPP: 2.1 (ref 2–3.5)
INR PPP: 2.2 (ref 2–3.5)
INR PPP: 2.2 (ref 2–3.5)
INR PPP: 2.29 (ref 0.87–1.13)
INR PPP: 2.4 (ref 2–3.5)
INR PPP: 2.6 (ref 2–3.5)
INR PPP: 3 (ref 2–3.5)
INR PPP: 3.2 (ref 2–3.5)
INR PPP: 3.2 (ref 2–3.5)
INR PPP: 3.4 (ref 2–3.5)
INR PPP: 4.1 (ref 2–3.5)
INR PPP: 4.1 (ref 2–3.5)
INR PPP: 4.6 (ref 2–3.5)
INR PPP: 6.8 (ref 2–3.5)
LDLC SERPL CALC-MCNC: 48 MG/DL
LDLC SERPL CALC-MCNC: 48 MG/DL
LDLC SERPL CALC-MCNC: 59 MG/DL
LYMPHOCYTES # BLD AUTO: 1.14 K/UL (ref 1–4.8)
LYMPHOCYTES # BLD AUTO: 1.69 K/UL (ref 1–4.8)
LYMPHOCYTES # BLD AUTO: 2.38 K/UL (ref 1–4.8)
LYMPHOCYTES NFR BLD: 12.9 % (ref 22–41)
LYMPHOCYTES NFR BLD: 17.3 % (ref 22–41)
LYMPHOCYTES NFR BLD: 24.2 % (ref 22–41)
MCH RBC QN AUTO: 29 PG (ref 27–33)
MCH RBC QN AUTO: 29.5 PG (ref 27–33)
MCH RBC QN AUTO: 29.9 PG (ref 27–33)
MCH RBC QN AUTO: 29.9 PG (ref 27–33)
MCH RBC QN AUTO: 30.3 PG (ref 27–33)
MCHC RBC AUTO-ENTMCNC: 30.8 G/DL (ref 33.6–35)
MCHC RBC AUTO-ENTMCNC: 31.5 G/DL (ref 33.6–35)
MCHC RBC AUTO-ENTMCNC: 31.5 G/DL (ref 33.6–35)
MCHC RBC AUTO-ENTMCNC: 31.9 G/DL (ref 33.6–35)
MCHC RBC AUTO-ENTMCNC: 32 G/DL (ref 33.6–35)
MCV RBC AUTO: 93.6 FL (ref 81.4–97.8)
MCV RBC AUTO: 93.6 FL (ref 81.4–97.8)
MCV RBC AUTO: 93.7 FL (ref 81.4–97.8)
MCV RBC AUTO: 94.1 FL (ref 81.4–97.8)
MCV RBC AUTO: 96.2 FL (ref 81.4–97.8)
MONOCYTES # BLD AUTO: 0.69 K/UL (ref 0–0.85)
MONOCYTES # BLD AUTO: 0.72 K/UL (ref 0–0.85)
MONOCYTES # BLD AUTO: 0.73 K/UL (ref 0–0.85)
MONOCYTES NFR BLD AUTO: 7.1 % (ref 0–13.4)
MONOCYTES NFR BLD AUTO: 7.4 % (ref 0–13.4)
MONOCYTES NFR BLD AUTO: 8.2 % (ref 0–13.4)
NEUTROPHILS # BLD AUTO: 6.51 K/UL (ref 2–7.15)
NEUTROPHILS # BLD AUTO: 6.81 K/UL (ref 2–7.15)
NEUTROPHILS # BLD AUTO: 7.27 K/UL (ref 2–7.15)
NEUTROPHILS NFR BLD: 66.4 % (ref 44–72)
NEUTROPHILS NFR BLD: 74.4 % (ref 44–72)
NEUTROPHILS NFR BLD: 77.2 % (ref 44–72)
NRBC # BLD AUTO: 0 K/UL
NRBC BLD-RTO: 0 /100 WBC
PLATELET # BLD AUTO: 132 K/UL (ref 164–446)
PLATELET # BLD AUTO: 148 K/UL (ref 164–446)
PLATELET # BLD AUTO: 152 K/UL (ref 164–446)
PLATELET # BLD AUTO: 154 K/UL (ref 164–446)
PLATELET # BLD AUTO: 172 K/UL (ref 164–446)
PMV BLD AUTO: 10 FL (ref 9–12.9)
PMV BLD AUTO: 10.1 FL (ref 9–12.9)
PMV BLD AUTO: 10.3 FL (ref 9–12.9)
PMV BLD AUTO: 10.4 FL (ref 9–12.9)
PMV BLD AUTO: 9.1 FL (ref 9–12.9)
POTASSIUM SERPL-SCNC: 3.9 MMOL/L (ref 3.6–5.5)
POTASSIUM SERPL-SCNC: 4.2 MMOL/L (ref 3.6–5.5)
POTASSIUM SERPL-SCNC: 4.5 MMOL/L (ref 3.6–5.5)
POTASSIUM SERPL-SCNC: 4.6 MMOL/L (ref 3.6–5.5)
POTASSIUM SERPL-SCNC: 5 MMOL/L (ref 3.6–5.5)
PROT SERPL-MCNC: 6.3 G/DL (ref 6–8.2)
PROT SERPL-MCNC: 6.8 G/DL (ref 6–8.2)
PROT SERPL-MCNC: 6.8 G/DL (ref 6–8.2)
PROT SERPL-MCNC: 7.3 G/DL (ref 6–8.2)
PROTHROMBIN TIME: 13.2 SEC (ref 12–14.6)
PROTHROMBIN TIME: 25.9 SEC (ref 12–14.6)
RAD ONC ARIA COURSE LAST TREATMENT DATE: NORMAL
RAD ONC ARIA COURSE TREATMENT ELAPSED DAYS: NORMAL
RAD ONC ARIA REFERENCE POINT DOSAGE GIVEN TO DATE: 10
RAD ONC ARIA REFERENCE POINT DOSAGE GIVEN TO DATE: 10.05
RAD ONC ARIA REFERENCE POINT DOSAGE GIVEN TO DATE: 20
RAD ONC ARIA REFERENCE POINT DOSAGE GIVEN TO DATE: 20.09
RAD ONC ARIA REFERENCE POINT DOSAGE GIVEN TO DATE: 30
RAD ONC ARIA REFERENCE POINT DOSAGE GIVEN TO DATE: 30.14
RAD ONC ARIA REFERENCE POINT DOSAGE GIVEN TO DATE: 40
RAD ONC ARIA REFERENCE POINT DOSAGE GIVEN TO DATE: 40.19
RAD ONC ARIA REFERENCE POINT DOSAGE GIVEN TO DATE: 50
RAD ONC ARIA REFERENCE POINT DOSAGE GIVEN TO DATE: 50.23
RAD ONC ARIA REFERENCE POINT ID: NORMAL
RAD ONC ARIA REFERENCE POINT SESSION DOSAGE GIVEN: 10
RAD ONC ARIA REFERENCE POINT SESSION DOSAGE GIVEN: 10.05
RBC # BLD AUTO: 4.68 M/UL (ref 4.2–5.4)
RBC # BLD AUTO: 4.79 M/UL (ref 4.2–5.4)
RBC # BLD AUTO: 4.92 M/UL (ref 4.2–5.4)
RBC # BLD AUTO: 5.02 M/UL (ref 4.2–5.4)
RBC # BLD AUTO: 5.25 M/UL (ref 4.2–5.4)
SARS-COV-2 RNA RESP QL NAA+PROBE: NOTDETECTED
SODIUM SERPL-SCNC: 133 MMOL/L (ref 135–145)
SODIUM SERPL-SCNC: 139 MMOL/L (ref 135–145)
SODIUM SERPL-SCNC: 140 MMOL/L (ref 135–145)
SODIUM SERPL-SCNC: 140 MMOL/L (ref 135–145)
SODIUM SERPL-SCNC: 141 MMOL/L (ref 135–145)
SPECIMEN SOURCE: NORMAL
TRIGL SERPL-MCNC: 107 MG/DL (ref 0–149)
TRIGL SERPL-MCNC: 78 MG/DL (ref 0–149)
TRIGL SERPL-MCNC: 89 MG/DL (ref 0–149)
TSH SERPL DL<=0.005 MIU/L-ACNC: 1.59 UIU/ML (ref 0.38–5.33)
TSH SERPL DL<=0.005 MIU/L-ACNC: 2.04 UIU/ML (ref 0.38–5.33)
TSH SERPL DL<=0.005 MIU/L-ACNC: 2.42 UIU/ML (ref 0.38–5.33)
WBC # BLD AUTO: 12 K/UL (ref 4.8–10.8)
WBC # BLD AUTO: 8.8 K/UL (ref 4.8–10.8)
WBC # BLD AUTO: 9.4 K/UL (ref 4.8–10.8)
WBC # BLD AUTO: 9.8 K/UL (ref 4.8–10.8)
WBC # BLD AUTO: 9.8 K/UL (ref 4.8–10.8)

## 2021-01-01 PROCEDURE — 83036 HEMOGLOBIN GLYCOSYLATED A1C: CPT

## 2021-01-01 PROCEDURE — 77300 RADIATION THERAPY DOSE PLAN: CPT | Mod: 26 | Performed by: RADIOLOGY

## 2021-01-01 PROCEDURE — 85610 PROTHROMBIN TIME: CPT | Performed by: INTERNAL MEDICINE

## 2021-01-01 PROCEDURE — 77373 STRTCTC BDY RAD THER TX DLVR: CPT | Performed by: RADIOLOGY

## 2021-01-01 PROCEDURE — 99214 OFFICE O/P EST MOD 30 MIN: CPT | Performed by: INTERNAL MEDICINE

## 2021-01-01 PROCEDURE — A9270 NON-COVERED ITEM OR SERVICE: HCPCS | Performed by: INTERNAL MEDICINE

## 2021-01-01 PROCEDURE — 85025 COMPLETE CBC W/AUTO DIFF WBC: CPT

## 2021-01-01 PROCEDURE — 85610 PROTHROMBIN TIME: CPT | Performed by: FAMILY MEDICINE

## 2021-01-01 PROCEDURE — 90662 IIV NO PRSV INCREASED AG IM: CPT | Performed by: INTERNAL MEDICINE

## 2021-01-01 PROCEDURE — 93788 AMBL BP MNTR W/SW A/R: CPT

## 2021-01-01 PROCEDURE — 80061 LIPID PANEL: CPT

## 2021-01-01 PROCEDURE — 85610 PROTHROMBIN TIME: CPT

## 2021-01-01 PROCEDURE — 99214 OFFICE O/P EST MOD 30 MIN: CPT | Mod: 25 | Performed by: INTERNAL MEDICINE

## 2021-01-01 PROCEDURE — 84443 ASSAY THYROID STIM HORMONE: CPT

## 2021-01-01 PROCEDURE — 99211 OFF/OP EST MAY X REQ PHY/QHP: CPT | Performed by: INTERNAL MEDICINE

## 2021-01-01 PROCEDURE — 36415 COLL VENOUS BLD VENIPUNCTURE: CPT

## 2021-01-01 PROCEDURE — 77334 RADIATION TREATMENT AID(S): CPT | Performed by: RADIOLOGY

## 2021-01-01 PROCEDURE — 77280 THER RAD SIMULAJ FIELD SMPL: CPT | Performed by: RADIOLOGY

## 2021-01-01 PROCEDURE — 77280 THER RAD SIMULAJ FIELD SMPL: CPT | Mod: 26 | Performed by: RADIOLOGY

## 2021-01-01 PROCEDURE — 700111 HCHG RX REV CODE 636 W/ 250 OVERRIDE (IP): Performed by: ANESTHESIOLOGY

## 2021-01-01 PROCEDURE — 77290 THER RAD SIMULAJ FIELD CPLX: CPT | Mod: 26 | Performed by: RADIOLOGY

## 2021-01-01 PROCEDURE — 71250 CT THORAX DX C-: CPT | Mod: ME

## 2021-01-01 PROCEDURE — 76536 US EXAM OF HEAD AND NECK: CPT

## 2021-01-01 PROCEDURE — 77300 RADIATION THERAPY DOSE PLAN: CPT | Performed by: RADIOLOGY

## 2021-01-01 PROCEDURE — 85610 PROTHROMBIN TIME: CPT | Mod: CS | Performed by: FAMILY MEDICINE

## 2021-01-01 PROCEDURE — 99205 OFFICE O/P NEW HI 60 MIN: CPT | Performed by: RADIOLOGY

## 2021-01-01 PROCEDURE — 99214 OFFICE O/P EST MOD 30 MIN: CPT | Performed by: RADIOLOGY

## 2021-01-01 PROCEDURE — 700101 HCHG RX REV CODE 250: Performed by: INTERNAL MEDICINE

## 2021-01-01 PROCEDURE — 77295 3-D RADIOTHERAPY PLAN: CPT | Mod: 26 | Performed by: RADIOLOGY

## 2021-01-01 PROCEDURE — 99999 PR NO CHARGE: CPT | Performed by: INTERNAL MEDICINE

## 2021-01-01 PROCEDURE — A9552 F18 FDG: HCPCS

## 2021-01-01 PROCEDURE — 71250 CT THORAX DX C-: CPT | Mod: MH

## 2021-01-01 PROCEDURE — 93793 ANTICOAG MGMT PT WARFARIN: CPT | Performed by: INTERNAL MEDICINE

## 2021-01-01 PROCEDURE — 99999 PR NO CHARGE: CPT | Performed by: FAMILY MEDICINE

## 2021-01-01 PROCEDURE — 700105 HCHG RX REV CODE 258: Performed by: INTERNAL MEDICINE

## 2021-01-01 PROCEDURE — 99211 OFF/OP EST MAY X REQ PHY/QHP: CPT | Performed by: FAMILY MEDICINE

## 2021-01-01 PROCEDURE — 99214 OFFICE O/P EST MOD 30 MIN: CPT | Performed by: PHYSICIAN ASSISTANT

## 2021-01-01 PROCEDURE — 80053 COMPREHEN METABOLIC PANEL: CPT

## 2021-01-01 PROCEDURE — 8041 PR SCP AHA: Performed by: INTERNAL MEDICINE

## 2021-01-01 PROCEDURE — 90471 IMMUNIZATION ADMIN: CPT | Performed by: INTERNAL MEDICINE

## 2021-01-01 PROCEDURE — 92960 CARDIOVERSION ELECTRIC EXT: CPT | Performed by: INTERNAL MEDICINE

## 2021-01-01 PROCEDURE — 93793 ANTICOAG MGMT PT WARFARIN: CPT | Performed by: FAMILY MEDICINE

## 2021-01-01 PROCEDURE — 80048 BASIC METABOLIC PNL TOTAL CA: CPT

## 2021-01-01 PROCEDURE — 93790 AMBL BP MNTR W/SW I&R: CPT | Performed by: INTERNAL MEDICINE

## 2021-01-01 PROCEDURE — 77470 SPECIAL RADIATION TREATMENT: CPT | Mod: 26 | Performed by: RADIOLOGY

## 2021-01-01 PROCEDURE — G0472 HEP C SCREEN HIGH RISK/OTHER: HCPCS

## 2021-01-01 PROCEDURE — 93005 ELECTROCARDIOGRAM TRACING: CPT | Performed by: INTERNAL MEDICINE

## 2021-01-01 PROCEDURE — G0008 ADMIN INFLUENZA VIRUS VAC: HCPCS | Performed by: INTERNAL MEDICINE

## 2021-01-01 PROCEDURE — U0005 INFEC AGEN DETEC AMPLI PROBE: HCPCS

## 2021-01-01 PROCEDURE — 77293 RESPIRATOR MOTION MGMT SIMUL: CPT | Mod: 26 | Performed by: RADIOLOGY

## 2021-01-01 PROCEDURE — 700117 HCHG RX CONTRAST REV CODE 255: Performed by: RADIOLOGY

## 2021-01-01 PROCEDURE — 92960 CARDIOVERSION ELECTRIC EXT: CPT

## 2021-01-01 PROCEDURE — 93010 ELECTROCARDIOGRAM REPORT: CPT | Mod: 59 | Performed by: INTERNAL MEDICINE

## 2021-01-01 PROCEDURE — 77295 3-D RADIOTHERAPY PLAN: CPT | Performed by: RADIOLOGY

## 2021-01-01 PROCEDURE — 93786 AMBL BP MNTR W/SW REC ONLY: CPT

## 2021-01-01 PROCEDURE — 99215 OFFICE O/P EST HI 40 MIN: CPT | Performed by: RADIOLOGY

## 2021-01-01 PROCEDURE — 77293 RESPIRATOR MOTION MGMT SIMUL: CPT | Performed by: RADIOLOGY

## 2021-01-01 PROCEDURE — 77334 RADIATION TREATMENT AID(S): CPT | Mod: 26 | Performed by: RADIOLOGY

## 2021-01-01 PROCEDURE — 77435 SBRT MANAGEMENT: CPT | Performed by: RADIOLOGY

## 2021-01-01 PROCEDURE — 93000 ELECTROCARDIOGRAM COMPLETE: CPT | Performed by: INTERNAL MEDICINE

## 2021-01-01 PROCEDURE — 82306 VITAMIN D 25 HYDROXY: CPT

## 2021-01-01 PROCEDURE — 77470 SPECIAL RADIATION TREATMENT: CPT | Performed by: RADIOLOGY

## 2021-01-01 PROCEDURE — 77263 THER RADIOLOGY TX PLNG CPLX: CPT | Performed by: RADIOLOGY

## 2021-01-01 PROCEDURE — 77370 RADIATION PHYSICS CONSULT: CPT | Performed by: RADIOLOGY

## 2021-01-01 PROCEDURE — 90750 HZV VACC RECOMBINANT IM: CPT | Performed by: INTERNAL MEDICINE

## 2021-01-01 PROCEDURE — 77336 RADIATION PHYSICS CONSULT: CPT | Performed by: RADIOLOGY

## 2021-01-01 PROCEDURE — U0003 INFECTIOUS AGENT DETECTION BY NUCLEIC ACID (DNA OR RNA); SEVERE ACUTE RESPIRATORY SYNDROME CORONAVIRUS 2 (SARS-COV-2) (CORONAVIRUS DISEASE [COVID-19]), AMPLIFIED PROBE TECHNIQUE, MAKING USE OF HIGH THROUGHPUT TECHNOLOGIES AS DESCRIBED BY CMS-2020-01-R: HCPCS

## 2021-01-01 PROCEDURE — 700101 HCHG RX REV CODE 250

## 2021-01-01 PROCEDURE — 77290 THER RAD SIMULAJ FIELD CPLX: CPT | Performed by: RADIOLOGY

## 2021-01-01 PROCEDURE — C9803 HOPD COVID-19 SPEC COLLECT: HCPCS

## 2021-01-01 PROCEDURE — 160002 HCHG RECOVERY MINUTES (STAT)

## 2021-01-01 PROCEDURE — 71260 CT THORAX DX C+: CPT | Mod: ME

## 2021-01-01 PROCEDURE — 85027 COMPLETE CBC AUTOMATED: CPT

## 2021-01-01 PROCEDURE — 93010 ELECTROCARDIOGRAM REPORT: CPT | Performed by: INTERNAL MEDICINE

## 2021-01-01 PROCEDURE — 99212 OFFICE O/P EST SF 10 MIN: CPT | Performed by: RADIOLOGY

## 2021-01-01 RX ORDER — ROSUVASTATIN CALCIUM 20 MG/1
20 TABLET, COATED ORAL EVERY EVENING
Qty: 100 TABLET | Refills: 3 | Status: SHIPPED | OUTPATIENT
Start: 2021-01-01

## 2021-01-01 RX ORDER — POTASSIUM CHLORIDE 750 MG/1
40 TABLET, FILM COATED, EXTENDED RELEASE ORAL 2 TIMES DAILY
Qty: 180 TABLET | Refills: 4 | Status: SHIPPED | OUTPATIENT
Start: 2021-01-01 | End: 2021-01-01 | Stop reason: SDUPTHER

## 2021-01-01 RX ORDER — SODIUM CHLORIDE, SODIUM LACTATE, POTASSIUM CHLORIDE, CALCIUM CHLORIDE 600; 310; 30; 20 MG/100ML; MG/100ML; MG/100ML; MG/100ML
INJECTION, SOLUTION INTRAVENOUS CONTINUOUS
Status: CANCELLED | OUTPATIENT
Start: 2021-01-01

## 2021-01-01 RX ORDER — OXYCODONE HCL 5 MG/5 ML
5 SOLUTION, ORAL ORAL
Status: CANCELLED | OUTPATIENT
Start: 2021-01-01

## 2021-01-01 RX ORDER — LOSARTAN POTASSIUM 25 MG/1
25 TABLET ORAL DAILY
Qty: 100 TABLET | Refills: 2 | Status: SHIPPED | OUTPATIENT
Start: 2021-01-01 | End: 2021-01-01 | Stop reason: SDUPTHER

## 2021-01-01 RX ORDER — LOSARTAN POTASSIUM 50 MG/1
50 TABLET ORAL 2 TIMES DAILY
Qty: 180 TABLET | Refills: 3 | Status: SHIPPED | OUTPATIENT
Start: 2021-01-01 | End: 2021-01-01 | Stop reason: SDUPTHER

## 2021-01-01 RX ORDER — POTASSIUM CHLORIDE 750 MG/1
20 TABLET, FILM COATED, EXTENDED RELEASE ORAL
Qty: 60 TABLET | Refills: 5 | Status: SHIPPED | OUTPATIENT
Start: 2021-01-01 | End: 2021-01-01

## 2021-01-01 RX ORDER — LOSARTAN POTASSIUM 50 MG/1
50 TABLET ORAL 2 TIMES DAILY
Qty: 200 TABLET | Refills: 3 | Status: SHIPPED | OUTPATIENT
Start: 2021-01-01

## 2021-01-01 RX ORDER — ONDANSETRON 2 MG/ML
4 INJECTION INTRAMUSCULAR; INTRAVENOUS
Status: CANCELLED | OUTPATIENT
Start: 2021-01-01

## 2021-01-01 RX ORDER — OXYCODONE HCL 5 MG/5 ML
10 SOLUTION, ORAL ORAL
Status: CANCELLED | OUTPATIENT
Start: 2021-01-01

## 2021-01-01 RX ORDER — MONTELUKAST SODIUM 10 MG/1
10 TABLET ORAL
Qty: 30 TABLET | Refills: 11 | Status: SHIPPED | OUTPATIENT
Start: 2021-01-01

## 2021-01-01 RX ORDER — WARFARIN SODIUM 5 MG/1
TABLET ORAL
Qty: 90 TABLET | Refills: 1 | Status: SHIPPED | OUTPATIENT
Start: 2021-01-01

## 2021-01-01 RX ORDER — IPRATROPIUM BROMIDE AND ALBUTEROL SULFATE 2.5; .5 MG/3ML; MG/3ML
3 SOLUTION RESPIRATORY (INHALATION)
Status: CANCELLED | OUTPATIENT
Start: 2021-01-01

## 2021-01-01 RX ORDER — POTASSIUM CHLORIDE 750 MG/1
10 TABLET, FILM COATED, EXTENDED RELEASE ORAL
COMMUNITY

## 2021-01-01 RX ORDER — SERTRALINE HYDROCHLORIDE 25 MG/1
TABLET, FILM COATED ORAL
Qty: 30 TABLET | Refills: 11 | Status: SHIPPED | OUTPATIENT
Start: 2021-01-01

## 2021-01-01 RX ORDER — PREGABALIN 50 MG/1
CAPSULE ORAL
COMMUNITY
Start: 2021-01-01

## 2021-01-01 RX ORDER — DIPHENHYDRAMINE HYDROCHLORIDE 50 MG/ML
12.5 INJECTION INTRAMUSCULAR; INTRAVENOUS
Status: CANCELLED | OUTPATIENT
Start: 2021-01-01

## 2021-01-01 RX ORDER — LOSARTAN POTASSIUM 25 MG/1
25 TABLET ORAL DAILY
Qty: 90 TABLET | Refills: 3 | Status: SHIPPED | OUTPATIENT
Start: 2021-01-01 | End: 2021-01-01 | Stop reason: SDUPTHER

## 2021-01-01 RX ORDER — HALOPERIDOL 5 MG/ML
1 INJECTION INTRAMUSCULAR
Status: CANCELLED | OUTPATIENT
Start: 2021-01-01

## 2021-01-01 RX ORDER — SODIUM CHLORIDE, SODIUM LACTATE, POTASSIUM CHLORIDE, CALCIUM CHLORIDE 600; 310; 30; 20 MG/100ML; MG/100ML; MG/100ML; MG/100ML
INJECTION, SOLUTION INTRAVENOUS CONTINUOUS
Status: DISCONTINUED | OUTPATIENT
Start: 2021-01-01 | End: 2021-01-01 | Stop reason: HOSPADM

## 2021-01-01 RX ORDER — TORSEMIDE 20 MG/1
20 TABLET ORAL DAILY
Qty: 90 TABLET | Refills: 2 | Status: SHIPPED | OUTPATIENT
Start: 2021-01-01

## 2021-01-01 RX ORDER — SPIRONOLACTONE 25 MG/1
12.5 TABLET ORAL DAILY
Qty: 15 TABLET | Refills: 1 | Status: CANCELLED | OUTPATIENT
Start: 2021-01-01

## 2021-01-01 RX ORDER — METOPROLOL TARTRATE 100 MG/1
100 TABLET ORAL 2 TIMES DAILY
Qty: 180 TABLET | Refills: 4 | Status: SHIPPED | OUTPATIENT
Start: 2021-01-01

## 2021-01-01 RX ORDER — OXYCODONE AND ACETAMINOPHEN 10; 325 MG/1; MG/1
1 TABLET ORAL EVERY 4 HOURS PRN
COMMUNITY

## 2021-01-01 RX ORDER — WARFARIN SODIUM 5 MG/1
5 TABLET ORAL DAILY
Qty: 90 TABLET | Refills: 1 | Status: SHIPPED | OUTPATIENT
Start: 2021-01-01 | End: 2021-01-01

## 2021-01-01 RX ORDER — HYDRALAZINE HYDROCHLORIDE 20 MG/ML
10 INJECTION INTRAMUSCULAR; INTRAVENOUS ONCE
Status: COMPLETED | OUTPATIENT
Start: 2021-01-01 | End: 2021-01-01

## 2021-01-01 RX ORDER — LOSARTAN POTASSIUM 25 MG/1
25 TABLET ORAL 2 TIMES DAILY
Qty: 180 TABLET | Refills: 3 | Status: SHIPPED | OUTPATIENT
Start: 2021-01-01 | End: 2021-01-01 | Stop reason: SDUPTHER

## 2021-01-01 RX ORDER — LIDOCAINE HYDROCHLORIDE 10 MG/ML
INJECTION, SOLUTION INFILTRATION; PERINEURAL
Status: COMPLETED
Start: 2021-01-01 | End: 2021-01-01

## 2021-01-01 RX ADMIN — HYDRALAZINE HYDROCHLORIDE 10 MG: 20 INJECTION INTRAMUSCULAR; INTRAVENOUS at 10:03

## 2021-01-01 RX ADMIN — HYDRALAZINE HYDROCHLORIDE 10 MG: 20 INJECTION INTRAMUSCULAR; INTRAVENOUS at 10:21

## 2021-01-01 RX ADMIN — IOHEXOL 75 ML: 350 INJECTION, SOLUTION INTRAVENOUS at 13:29

## 2021-01-01 RX ADMIN — PROPOFOL 50 MG: 10 INJECTION, EMULSION INTRAVENOUS at 12:14

## 2021-01-01 RX ADMIN — WATER 15 ML: 100 IRRIGANT IRRIGATION at 10:04

## 2021-01-01 RX ADMIN — PROPOFOL 20 MG: 10 INJECTION, EMULSION INTRAVENOUS at 12:16

## 2021-01-01 RX ADMIN — SODIUM CHLORIDE, POTASSIUM CHLORIDE, SODIUM LACTATE AND CALCIUM CHLORIDE: 600; 310; 30; 20 INJECTION, SOLUTION INTRAVENOUS at 11:00

## 2021-01-01 RX ADMIN — POVIDONE IODINE 15 ML: 100 SOLUTION TOPICAL at 10:31

## 2021-01-01 RX ADMIN — SODIUM CHLORIDE, POTASSIUM CHLORIDE, SODIUM LACTATE AND CALCIUM CHLORIDE: 600; 310; 30; 20 INJECTION, SOLUTION INTRAVENOUS at 10:04

## 2021-01-01 RX ADMIN — LIDOCAINE HYDROCHLORIDE: 10 INJECTION, SOLUTION INFILTRATION; PERINEURAL at 10:04

## 2021-01-01 ASSESSMENT — ENCOUNTER SYMPTOMS
ABDOMINAL PAIN: 0
PSYCHIATRIC NEGATIVE: 1
WEIGHT LOSS: 0
SPUTUM PRODUCTION: 1
CONSTITUTIONAL NEGATIVE: 1
DIZZINESS: 0
CONSTIPATION: 0
DIARRHEA: 0
SPUTUM PRODUCTION: 1
MUSCULOSKELETAL NEGATIVE: 1
FLANK PAIN: 0
HEARTBURN: 1
DEPRESSION: 0
WEIGHT LOSS: 0
COUGH: 0
TREMORS: 0
DIZZINESS: 0
DECREASED APPETITE: 0
CARDIOVASCULAR NEGATIVE: 1
DYSPNEA ON EXERTION: 0
INSOMNIA: 1
EYES NEGATIVE: 1
GASTROINTESTINAL NEGATIVE: 1
FEVER: 0
DECREASED APPETITE: 0
MUSCULOSKELETAL NEGATIVE: 1
FEVER: 0
DIZZINESS: 0
RESPIRATORY NEGATIVE: 1
COUGH: 1
ORTHOPNEA: 0
HEARTBURN: 0
ALTERED MENTAL STATUS: 0
GASTROINTESTINAL NEGATIVE: 1
WHEEZING: 1
EYES NEGATIVE: 1
WEIGHT LOSS: 0
PALPITATIONS: 0
EYES NEGATIVE: 1
WHEEZING: 0
SYNCOPE: 0
CLAUDICATION: 0
MUSCULOSKELETAL NEGATIVE: 1
SHORTNESS OF BREATH: 0
CHILLS: 0
IRREGULAR HEARTBEAT: 0
TREMORS: 0
HEARTBURN: 1
PALPITATIONS: 0
WEIGHT GAIN: 0
VOMITING: 0
PND: 0
CARDIOVASCULAR NEGATIVE: 1
ORTHOPNEA: 0
SYNCOPE: 0
CHILLS: 0
NEAR-SYNCOPE: 0
CLAUDICATION: 0
PALPITATIONS: 0
DEPRESSION: 0
COUGH: 0
VOMITING: 0
DIARRHEA: 0
PALPITATIONS: 0
NEUROLOGICAL NEGATIVE: 1
RESPIRATORY NEGATIVE: 1
HEADACHES: 0
BLURRED VISION: 0
IRREGULAR HEARTBEAT: 0
FLANK PAIN: 0
SINUS PAIN: 0
SINUS PAIN: 0
NEUROLOGICAL NEGATIVE: 1
SHORTNESS OF BREATH: 1
BACK PAIN: 0
CONSTITUTIONAL NEGATIVE: 1
DIZZINESS: 0
COUGH: 0
BACK PAIN: 0
RESPIRATORY NEGATIVE: 1
CONSTIPATION: 0
SHORTNESS OF BREATH: 1
SHORTNESS OF BREATH: 0
NAUSEA: 0
HEARTBURN: 0
FEVER: 0
BLURRED VISION: 0
CARDIOVASCULAR NEGATIVE: 1
ORTHOPNEA: 0
INSOMNIA: 1
HEADACHES: 0
NAUSEA: 0
FEVER: 0
NEUROLOGICAL NEGATIVE: 1
PSYCHIATRIC NEGATIVE: 1
PND: 0
DYSPNEA ON EXERTION: 0
ABDOMINAL PAIN: 0
SORE THROAT: 1
GASTROINTESTINAL NEGATIVE: 1
NEAR-SYNCOPE: 0
CONSTITUTIONAL NEGATIVE: 1
WEIGHT LOSS: 0
ALTERED MENTAL STATUS: 0
PSYCHIATRIC NEGATIVE: 1
WEIGHT GAIN: 0
SORE THROAT: 0

## 2021-01-01 ASSESSMENT — FIBROSIS 4 INDEX
FIB4 SCORE: 2.806243040080456039
FIB4 SCORE: 2.64
FIB4 SCORE: 2.77
FIB4 SCORE: 2.806243040080456039
FIB4 SCORE: 2.64
FIB4 SCORE: 1.53
FIB4 SCORE: 2.64
FIB4 SCORE: 1.53
FIB4 SCORE: 2.71
FIB4 SCORE: 1.82
FIB4 SCORE: 2.806243040080456039
FIB4 SCORE: 2.24

## 2021-01-01 ASSESSMENT — PAIN SCALES - GENERAL
PAINLEVEL: 4=SLIGHT-MODERATE PAIN
PAINLEVEL: 4=SLIGHT-MODERATE PAIN
PAINLEVEL: NO PAIN
PAIN_LEVEL: 0

## 2021-01-01 ASSESSMENT — PATIENT HEALTH QUESTIONNAIRE - PHQ9: CLINICAL INTERPRETATION OF PHQ2 SCORE: 0

## 2021-01-04 ENCOUNTER — TELEPHONE (OUTPATIENT)
Dept: SLEEP MEDICINE | Facility: MEDICAL CENTER | Age: 76
End: 2021-01-04

## 2021-01-04 NOTE — TELEPHONE ENCOUNTER
I already did a P/A for Trelegy for this patient.  Did you need me to do one for Xopenex as well?  Please advise, thank you.

## 2021-01-04 NOTE — TELEPHONE ENCOUNTER
MEDICATION PRIOR AUTHORIZATION NEEDED:    1. Name of Medication: Trelegy Ellipta 100/62.5/25 mcg    2. Requested By (Name of Pharmacy): [facility]     3. Is insurance on file current? yes    4. What is the name & phone number of the 3rd party payor? [facility] [phone]

## 2021-01-04 NOTE — TELEPHONE ENCOUNTER
MEDICATION PRIOR AUTHORIZATION NEEDED:    1. Name of Medication: Xopenex 45 mcg    2. Requested By (Name of Pharmacy): [facility]     3. Is insurance on file current? yes    4. What is the name & phone number of the 3rd party payor? [facility] [phone]

## 2021-01-08 NOTE — TELEPHONE ENCOUNTER
FINAL PRIOR AUTHORIZATION STATUS:    1.  Name of Medication & Dose: Xopenex HFA     2. Prior Auth Status: Approved through 01/06/2022     3. Action Taken: Pharmacy Notified: yes Patient Notified: yes

## 2021-01-11 DIAGNOSIS — Z23 NEED FOR VACCINATION: ICD-10-CM

## 2021-01-25 NOTE — PROGRESS NOTES
OP Anticoagulation Service Note    Date: 1/25/2021  There were no vitals filed for this visit.   pt declined vitals    Anticoagulation Summary  As of 1/25/2021    INR goal:  2.0-3.0   TTR:  80.2 % (10 mo)   INR used for dosing:  3.20 (1/25/2021)   Warfarin maintenance plan:  7.5 mg (5 mg x 1.5) every Mon; 5 mg (5 mg x 1) all other days   Weekly warfarin total:  37.5 mg   Plan last modified:  Cindy Treviño, PharmD (11/2/2020)   Next INR check:  2/22/2021   Target end date:  Indefinite    Indications    Persistent atrial fibrillation (HCC) [I48.19]  Acute pulmonary embolism (HCC) [I26.99]  Long term (current) use of anticoagulants [Z79.01]             Anticoagulation Episode Summary     INR check location:      Preferred lab:      Send INR reminders to:      Comments:        Anticoagulation Care Providers     Provider Role Specialty Phone number    Mary Carmen Rogers M.D. Referring Internal Medicine Critical Care [phone]    [facility] Anticoagulation Services Responsible  [phone]        Anticoagulation Patient Findings  Patient Findings     Positives:  Change in diet/appetite (decreased vit K intake d/t GI issues (now resolved))    Negatives:  Signs/symptoms of thrombosis, Signs/symptoms of bleeding, Laboratory test error suspected, Change in health, Change in alcohol use, Change in activity, Upcoming invasive procedure, Emergency department visit, Upcoming dental procedure, Missed doses, Extra doses, Change in medications, Hospital admission, Bruising, Other complaints          HPI:   Rufina Macias seen in clinic today, on anticoagulation therapy with warfarin (a high risk medication) for atrial fibrillation, PE,     Pt is here today to evaluate anticoagulation therapy    Previous INR was  2.1 on 12/21/2020    Pt was instructed to continue regimen    Confirmed warfarin dosing regimen, denies missed or extra doses of coumadin.   Diet has been consistent with foods rich in vitamin K: No  Changes in ETOH:   No  Changes in smoking status: No  Changes in medication: No   Cost restriction: No  S/s of bleeding:  No  Falls or accidents since last visit No  Signs/symptoms  thrombosis since the last appt: No  =    A/P   INR  supra-therapeutic today, will require dose adjust ment today to prevent bleeding complications or recurrence of thrombosis or stroke    Reduce dose to 5 mg x 1 day, then continue regimen  Pt clinically improved and will be returning to normal vit K intake    Our protocol suggests we test in 2 weeks.  Given the pt's risk factors and previous INR stability, it is reasonable to extend to 4 weeks to reduce Covid exposure.  This decision was made using shared decision making with the pt and benefits vs risks were discussed    02/21 check referral    Pt educated to contact our clinic with any changes in medications or s/s of bleeding or thrombosis. Pt is aware to seek immediate medical attention for falls, head injury or deep cuts    Follow up appointment in 4 week(s) to reduce risk of adverse events from warfarin   Cristela Benoit, OsielD

## 2021-02-17 PROBLEM — I27.82 CHRONIC PULMONARY EMBOLISM (HCC): Status: ACTIVE | Noted: 2020-03-05

## 2021-02-17 NOTE — PROGRESS NOTES
Cardiology Note    Chief Complaint   Patient presents with   • Atrial Flutter     & Persistent atrial fibrillation (HCC)   • Shortness of Breath   • Palpitations       History of Present Illness: Rufina Macias is a 75 y.o. female PMH persistent AF 02/2020, PE 02/2020 while on eliquis converted to coumadin, COPD (30 pack years; quit 1995), HLD, HTN, diffuse vascular atherosclerosis who presents for follow up.    Doing well. No cardiac complaints. Leg edema resolved and stable on torsemide. Home blood pressures range 120-140/80s. Doesn't exert very much. She is having difficulty with diet options because of numerous limitations; salt, vitamin K, fats. Saw GI and underwent repeat EGD with stent removal. She feels well post.     Review of Systems   Constitution: Negative for decreased appetite, fever, malaise/fatigue, weight gain and weight loss.   HENT: Negative for congestion and nosebleeds.    Eyes: Negative for blurred vision.   Cardiovascular: Negative for chest pain, claudication, dyspnea on exertion, irregular heartbeat, leg swelling, near-syncope, orthopnea, palpitations, paroxysmal nocturnal dyspnea and syncope.   Respiratory: Negative for cough and shortness of breath.    Endocrine: Negative for cold intolerance and heat intolerance.   Skin: Negative for rash.   Musculoskeletal: Negative for back pain.   Gastrointestinal: Negative for abdominal pain, constipation, diarrhea, heartburn, melena, nausea and vomiting.   Genitourinary: Negative for dysuria, flank pain and hematuria.   Neurological: Negative for dizziness.   Psychiatric/Behavioral: Negative for altered mental status and depression.         Past Medical History:   Diagnosis Date   • Allergy    • Anxiety 9/29/2020   • Arrhythmia 02/2020   • Arthritis     Spine, neck, hands, ankles and knees   • Asthma     inhalers as needed   • Back pain     and neck   • Blood clotting disorder (HCC) 02/2020    lung   • Breath shortness     at times, uses O2 1l  prn Preferred   • Bronchitis    • CA - cancer of uterus    • Cancer (HCC) 2010    uterine   • Carpal tunnel syndrome    • COPD    • Coronary artery calcification seen on CAT scan 6/12/2020   • Dental disorder     full dentures   • Diverticula of colon    • Emphysema of lung (HCC)    • Heart burn    • High cholesterol    • Hyperlipidemia    • Hypertension    • Pneumonia 1993   • Tremor, hereditary, benign    • Wheezing          Past Surgical History:   Procedure Laterality Date   • PB ERCP,DIAGNOSTIC  11/24/2020    Procedure: ERCP, DIAGNOSTIC - WITH STENT REMOVAL;  Surgeon: Quincy Louie M.D.;  Location: [facility];  Service: Gastroenterology   • GASTROSCOPY-ENDO  11/24/2020    Procedure: GASTROSCOPY;  Surgeon: Quincy Louie M.D.;  Location: [facility];  Service: Gastroenterology   • PB ERCP,DIAGNOSTIC  9/17/2020    Procedure: ERCP (ENDOSCOPIC RETROGRADE CHOLANGIOPANCREATOGRAPHY);  Surgeon: Cj Guerrier D.O.;  Location: [facility];  Service: Gastroenterology   • PB ERCP,DIAGNOSTIC  2/14/2020    Procedure: ERCP (ENDOSCOPIC RETROGRADE CHOLANGIOPANCREATOGRAPHY);  Surgeon: Clinton Ray M.D.;  Location: [facility];  Service: Gastroenterology   • ERCP  4/5/2018    Procedure: ERCP W/POSS BIOPSY;  Surgeon: Quincy Louie M.D.;  Location: [facility];  Service: Gastroenterology   • ERCP W/SPHINCTEROTOMY/PAPILL.  4/5/2018    Procedure: ERCP W/SPHINCTEROTOMY/PAPILL.;  Surgeon: Quincy Louie M.D.;  Location: [facility];  Service: Gastroenterology   • ERCP W/ INSERTION STENT/TUBE  4/5/2018    Procedure: ERCP W/ INSERTION STENT/TUBE - STENT PLACEMENT/REMOVAL;  Surgeon: Quincy Louie M.D.;  Location: [facility];  Service: Gastroenterology   • ERCP W/REMOVAL CALCULUS  4/5/2018    Procedure: ERCP W/REMOVAL CALCULUS W/DILATION;  Surgeon: Quincy Louie M.D.;  Location: [facility];  Service:  Gastroenterology   • ERCP  2/15/2018    Procedure: ERCP, SPHINCTEROTOMY, STENT PLACEMENT, DEBRIDEMENT;  Surgeon: Quincy Louie M.D.;  Location: [facility];  Service: Gastroenterology   • COMMON BILE DUCT EXPLORATION  02/15/2018   • ERCP W/ INSERTION STENT/TUBE  02/15/2018   • ERCP W/SPHINCTEROTOMY/PAPILL.  02/15/2018   • ERCP W/REMOVAL CALCULUS  02/15/2018   • ARIELLA BY LAPAROSCOPY  july, 2015    [facility]   • HYSTERECTOMY, TOTAL ABDOMINAL  1/18/10    Uterine, Dr. Whelan and Dr. Cam +BSO   • ABDOMINAL HYSTERECTOMY TOTAL  Jan 2010    Dr. Cam   • OOPHORECTOMY  Jan 2010    BSO   • OPEN REDUCTION      ankle         Current Outpatient Medications   Medication Sig Dispense Refill   • metoprolol tartrate (LOPRESSOR) 100 MG Tab Take 1 tablet by mouth 2 times a day. 180 tablet 4   • losartan (COZAAR) 25 MG Tab Take 1 tablet by mouth every day. 90 tablet 3   • Fluticasone-Umeclidin-Vilant (TRELEGY ELLIPTA) 100-62.5-25 MCG/INH AEROSOL POWDER, BREATH ACTIVATED Inhale 1 Puff every day. Rinse mouth after use 1 Each 0   • montelukast (SINGULAIR) 10 MG Tab Take 1 Tab by mouth every bedtime. Take 1 tablet by mouth nightly at bedtime. 30 Tab 0   • DILTIAZem CD (CARDIZEM CD) 120 MG CAPSULE SR 24 HR Take 1 Cap by mouth 2 Times a Day. 60 Cap 5   • sertraline (ZOLOFT) 25 MG tablet Take 1 Tab by mouth every day. 30 Tab 11   • HYDROcodone/acetaminophen (NORCO)  MG Tab Take 1 Tab by mouth every 6 hours as needed.     • warfarin (COUMADIN) 5 MG Tab Take 1 tab by mouth daily or as directed by anticoagulation  clinic (Patient taking differently: Take 5 mg by mouth every day. Take 1 tab by mouth daily or as directed by anticoagulation  clinic) 90 Tab 1   • potassium chloride ER (KLOR-CON) 10 MEQ tablet Take 40 mEq by mouth every day. 4 tablets = 40 meq     • VITAMIN D PO Take 1 Tab by mouth every day.     • rosuvastatin (CRESTOR) 20 MG Tab Take 1 Tab by mouth every evening. 100 Tab 3   • torsemide (DEMADEX) 20 MG Tab Take 1  Tab by mouth every day. 30 Tab 3   • famotidine (PEPCID) 20 MG Tab Take 20 mg by mouth every day.     • zolpidem (AMBIEN) 10 MG Tab Take 10 mg by mouth at bedtime as needed for Sleep.     • albuterol 108 (90 Base) MCG/ACT Aero Soln inhalation aerosol Inhale 2 Puffs by mouth every 6 hours as needed for Shortness of Breath. 8.5 g 2   • Calcium Carbonate-Vit D-Min (CALCIUM 1200 PO) Take 1,200 mg by mouth every day.     • Magnesium 400 MG Tab Take 400 mg by mouth every day.     • FIBER PO Take 2 Caps by mouth every day.       No current facility-administered medications for this visit.         Allergies   Allergen Reactions   • Codeine Swelling   • Ace Inhibitors      Cough         Family History   Problem Relation Age of Onset   • Heart Disease Father 45        MI   • Lung Disease Father    • Stroke Father 70   • Cancer Father    • Hypertension Father    • Cancer Paternal Grandmother         breast   • Cancer Paternal Grandfather          Social History     Socioeconomic History   • Marital status:      Spouse name: Not on file   • Number of children: Not on file   • Years of education: Not on file   • Highest education level: Not on file   Occupational History   • Not on file   Tobacco Use   • Smoking status: Former Smoker     Packs/day: 3.00     Years: 20.00     Pack years: 60.00     Types: Cigarettes     Quit date: 1991     Years since quittin.1   • Smokeless tobacco: Never Used   Substance and Sexual Activity   • Alcohol use: No     Alcohol/week: 0.0 - 2.4 oz     Comment: hardly ever   • Drug use: No   • Sexual activity: Never     Partners: Male     Birth control/protection: Post-Menopausal   Other Topics Concern   • Not on file   Social History Narrative   • Not on file     Social Determinants of Health     Financial Resource Strain:    • Difficulty of Paying Living Expenses:    Food Insecurity:    • Worried About Running Out of Food in the Last Year:    • Ran Out of Food in the Last Year:   "  Transportation Needs:    • Lack of Transportation (Medical):    • Lack of Transportation (Non-Medical):    Physical Activity:    • Days of Exercise per Week:    • Minutes of Exercise per Session:    Stress:    • Feeling of Stress :    Social Connections:    • Frequency of Communication with Friends and Family:    • Frequency of Social Gatherings with Friends and Family:    • Attends [employer] Services:    • Active Member of Clubs or Organizations:    • Attends Club or Organization Meetings:    • Marital Status:    Intimate Partner Violence:    • Fear of Current or Ex-Partner:    • Emotionally Abused:    • Physically Abused:    • Sexually Abused:          Physical Exam:  Ambulatory Vitals  /98 (BP Location: Left arm, Patient Position: Sitting, BP Cuff Size: Adult)   Pulse 94   Ht 1.575 m (5' 2\")   Wt 84.8 kg (187 lb)   SpO2 95%    BP Readings from Last 4 Encounters:   02/17/21 150/98   12/29/20 130/84   11/24/20 138/88   10/29/20 122/64     Weight/BMI:   Vitals:    02/17/21 1518   BP: 150/98   Weight: 84.8 kg (187 lb)   Height: 1.575 m (5' 2\")    Body mass index is 34.2 kg/m².  Wt Readings from Last 4 Encounters:   02/17/21 84.8 kg (187 lb)   12/29/20 88.9 kg (196 lb)   11/24/20 83.7 kg (184 lb 8.4 oz)   10/29/20 84.8 kg (187 lb)       Physical Exam   Constitutional: She is oriented to person, place, and time and well-developed, well-nourished, and in no distress. No distress.   HENT:   Head: Normocephalic and atraumatic.   Eyes: Pupils are equal, round, and reactive to light. Conjunctivae are normal.   Neck: No JVD present.   Cardiovascular: Normal rate, regular rhythm, normal heart sounds and intact distal pulses. Exam reveals no gallop and no friction rub.   No murmur heard.  Pulmonary/Chest: Effort normal and breath sounds normal. No respiratory distress. She has no wheezes. She has no rales. She exhibits no tenderness.   Abdominal: Soft. Bowel sounds are normal. She exhibits no distension. "   Musculoskeletal:         General: Edema present.      Cervical back: Normal range of motion and neck supple.   Neurological: She is alert and oriented to person, place, and time.   Skin: Skin is warm and dry.   Psychiatric: Affect and judgment normal.       Lab Data Review:  Lab Results   Component Value Date/Time    CHOLSTRLTOT 80 (L) 09/14/2020 05:00 AM    LDL 33 09/14/2020 05:00 AM    HDL 25 (A) 09/14/2020 05:00 AM    TRIGLYCERIDE 110 09/14/2020 05:00 AM       Lab Results   Component Value Date/Time    SODIUM 141 10/30/2020 02:51 PM    POTASSIUM 4.4 10/30/2020 02:51 PM    CHLORIDE 103 10/30/2020 02:51 PM    CO2 26 10/30/2020 02:51 PM    GLUCOSE 112 (H) 10/30/2020 02:51 PM    BUN 26 (H) 10/30/2020 02:51 PM    CREATININE 0.88 10/30/2020 02:51 PM    CREATININE 0.80 12/02/2010 12:00 AM    BUNCREATRAT 20 12/02/2010 12:00 AM    GLOMRATE >59 12/02/2010 12:00 AM     CrCl cannot be calculated (Patient's most recent lab result is older than the maximum 7 days allowed.).  Lab Results   Component Value Date/Time    ALKPHOSPHAT 143 (H) 09/22/2020 04:18 AM    ASTSGOT 20 09/22/2020 04:18 AM    ALTSGPT 39 09/22/2020 04:18 AM    TBILIRUBIN 0.7 09/22/2020 04:18 AM      Lab Results   Component Value Date/Time    WBC 12.6 (H) 11/20/2020 03:46 PM    WBC 7.6 12/02/2010 12:00 AM     Lab Results   Component Value Date/Time    HBA1C 6.0 (H) 10/30/2020 02:51 PM     No components found for: TROP      Cardiac Imaging and Procedures Review:      EKG 6/12/20 atrial fibrillation, RBBB    CTA chest 02/21/2020  1.  Pulmonary embolus involving the right distal main pulmonary artery extending into right middle and lower lobe segmental and subsegmental branches. Left upper and lower lobe subsegmental pulmonary emboli.  2.  Distal esophageal wall thickening, recommend follow-up esophagoscopy for evaluation of esophagitis versus infiltrating esophageal neoplasia.  3.  Hepatomegaly  4.  Left adrenal nodule, indeterminate, recommend follow-up adrenal  protocol CT or MRI for further characterization as clinically appropriate.  5.  1.3 cm left lower lobe pulmonary nodule, see nodule follow-up recommendations below.  [diffuse coronary calcification and aortic atherosclerosis]    TTE 02/25/2020  CONCLUSIONS  Limited Exam for LV and RV Function  Compared to the images of the prior study done 2/11/20 -  there has   been no significant change.   Left ventricular systolic function is normal.  Left ventricular ejection fraction is visually estimated to be 55%.  Right ventricular systolic function is normal.  Right Ventricle  Normal right ventricular size. Right ventricular systolic function is   normal.    TTE 02/11/2020  CONCLUSIONS  Normal left ventricular systolic function. Left ventricular ejection   fraction is visually estimated to be 60%.   Normal diastolic function.  Right ventricular systolic pressure is estimated to be 25 mmHg.  Right Ventricle  Mildly dilated right ventricle. Normal right ventricular systolic   Function.    Nuclear PET stress 08/2017  SCINTOGRAPHIC FINDINGS:   Normal left ventricular perfusion with stress and rest images.  There is no evidence of ischemia or scar.   GATED WALL MOTION FINDINGS:   The left ventricle wall motion is normal with stress and rest  imagings.  Measured resting ejection fraction is 58 %.      CONCLUSIONS AND IMPRESSIONS:    Normal perfusion on PET with normal function of the left ventricle.  No evidence of ischemia or infarction.     Medical Decision Making:  Problem List Items Addressed This Visit     Persistent atrial fibrillation (HCC)    Relevant Medications    metoprolol tartrate (LOPRESSOR) 100 MG Tab    losartan (COZAAR) 25 MG Tab    Other Relevant Orders    EKG (Completed)    CL-CARDIOVERSION    Mixed hyperlipidemia    Relevant Medications    metoprolol tartrate (LOPRESSOR) 100 MG Tab    losartan (COZAAR) 25 MG Tab    Other Relevant Orders    REFERRAL TO NUTRITION SERVICES    Essential hypertension    Relevant  Medications    metoprolol tartrate (LOPRESSOR) 100 MG Tab    losartan (COZAAR) 25 MG Tab    Other Relevant Orders    REFERRAL TO NUTRITION SERVICES    Chronic pulmonary embolism (HCC)    Relevant Medications    metoprolol tartrate (LOPRESSOR) 100 MG Tab    losartan (COZAAR) 25 MG Tab    Anticoagulated    Relevant Orders    REFERRAL TO NUTRITION SERVICES    Coronary artery calcification seen on CAT scan    Relevant Medications    metoprolol tartrate (LOPRESSOR) 100 MG Tab    losartan (COZAAR) 25 MG Tab    Aortic atherosclerosis (HCC)    Relevant Medications    metoprolol tartrate (LOPRESSOR) 100 MG Tab    losartan (COZAAR) 25 MG Tab    Heart failure with preserved ejection fraction (HCC)    Relevant Medications    metoprolol tartrate (LOPRESSOR) 100 MG Tab    losartan (COZAAR) 25 MG Tab        Unprovoked PE with doac failure - continue anticoagulation with coumadin goal INR 2-3.    Persistent AF - chadsvasc 5 - probably also contributing to dyspnea. Continue coumadin for cva prevention. Continue rate control. Plan DCCV. Coumadin therapeutic since 12/2020.     HTN - elevated. Goal 120/80. Add losartan.    Coronary calcification / aortic atherosclerosis / ascvd / HLD - continue coumadin. No need for additional antiplatelets for primary prevention. Continue high intensity statin. LDL at goal <70.    HFpEF NYHA II - stable. Continue torsemide. She can modify as needed. Encouraged compression stockings.     It was my pleasure to meet with Ms. Macias.

## 2021-02-17 NOTE — PATIENT INSTRUCTIONS
"Hypertension, Adult (goal 120/80)  High blood pressure (hypertension) is when the force of blood pumping through the arteries is too strong. The arteries are the blood vessels that carry blood from the heart throughout the body. Hypertension forces the heart to work harder to pump blood and may cause arteries to become narrow or stiff. Untreated or uncontrolled hypertension can cause a heart attack, heart failure, a stroke, kidney disease, and other problems.  A blood pressure reading consists of a higher number over a lower number. Ideally, your blood pressure should be below 120/80. The first (\"top\") number is called the systolic pressure. It is a measure of the pressure in your arteries as your heart beats. The second (\"bottom\") number is called the diastolic pressure. It is a measure of the pressure in your arteries as the heart relaxes.  What are the causes?  The exact cause of this condition is not known. There are some conditions that result in or are related to high blood pressure.  What increases the risk?  Some risk factors for high blood pressure are under your control. The following factors may make you more likely to develop this condition:  · Smoking.  · Having type 2 diabetes mellitus, high cholesterol, or both.  · Not getting enough exercise or physical activity.  · Being overweight.  · Having too much fat, sugar, calories, or salt (sodium) in your diet.  · Drinking too much alcohol.  Some risk factors for high blood pressure may be difficult or impossible to change. Some of these factors include:  · Having chronic kidney disease.  · Having a family history of high blood pressure.  · Age. Risk increases with age.  · Race. You may be at higher risk if you are .  · Gender. Men are at higher risk than women before age 45. After age 65, women are at higher risk than men.  · Having obstructive sleep apnea.  · Stress.  What are the signs or symptoms?  High blood pressure may not cause " symptoms. Very high blood pressure (hypertensive crisis) may cause:  · Headache.  · Anxiety.  · Shortness of breath.  · Nosebleed.  · Nausea and vomiting.  · Vision changes.  · Severe chest pain.  · Seizures.  How is this diagnosed?  This condition is diagnosed by measuring your blood pressure while you are seated, with your arm resting on a flat surface, your legs uncrossed, and your feet flat on the floor. The cuff of the blood pressure monitor will be placed directly against the skin of your upper arm at the level of your heart. It should be measured at least twice using the same arm. Certain conditions can cause a difference in blood pressure between your right and left arms.  Certain factors can cause blood pressure readings to be lower or higher than normal for a short period of time:  · When your blood pressure is higher when you are in a health care provider's office than when you are at home, this is called white coat hypertension. Most people with this condition do not need medicines.  · When your blood pressure is higher at home than when you are in a health care provider's office, this is called masked hypertension. Most people with this condition may need medicines to control blood pressure.  If you have a high blood pressure reading during one visit or you have normal blood pressure with other risk factors, you may be asked to:  · Return on a different day to have your blood pressure checked again.  · Monitor your blood pressure at home for 1 week or longer.  If you are diagnosed with hypertension, you may have other blood or imaging tests to help your health care provider understand your overall risk for other conditions.  How is this treated?  This condition is treated by making healthy lifestyle changes, such as eating healthy foods, exercising more, and reducing your alcohol intake. Your health care provider may prescribe medicine if lifestyle changes are not enough to get your blood pressure under  control, and if:  · Your systolic blood pressure is above 130.  · Your diastolic blood pressure is above 80.  Your personal target blood pressure may vary depending on your medical conditions, your age, and other factors.  Follow these instructions at home:  Eating and drinking    · Eat a diet that is high in fiber and potassium, and low in sodium, added sugar, and fat. An example eating plan is called the DASH (Dietary Approaches to Stop Hypertension) diet. To eat this way:  ? Eat plenty of fresh fruits and vegetables. Try to fill one half of your plate at each meal with fruits and vegetables.  ? Eat whole grains, such as whole-wheat pasta, brown rice, or whole-grain bread. Fill about one fourth of your plate with whole grains.  ? Eat or drink low-fat dairy products, such as skim milk or low-fat yogurt.  ? Avoid fatty cuts of meat, processed or cured meats, and poultry with skin. Fill about one fourth of your plate with lean proteins, such as fish, chicken without skin, beans, eggs, or tofu.  ? Avoid pre-made and processed foods. These tend to be higher in sodium, added sugar, and fat.  · Reduce your daily sodium intake. Most people with hypertension should eat less than 1,500 mg of sodium a day.  · Do not drink alcohol if:  ? Your health care provider tells you not to drink.  ? You are pregnant, may be pregnant, or are planning to become pregnant.  · If you drink alcohol:  ? Limit how much you use to:  § 0-1 drink a day for women.  § 0-2 drinks a day for men.  ? Be aware of how much alcohol is in your drink. In the [place] one drink equals one 12 oz bottle of beer (355 mL), one 5 oz glass of wine (148 mL), or one 1½ oz glass of hard liquor (44 mL).  Lifestyle    · Work with your health care provider to maintain a healthy body weight or to lose weight. Ask what an ideal weight is for you.  · Get at least 30 minutes of exercise most days of the week. Activities may include walking, swimming, or biking.  · Include  exercise to strengthen your muscles (resistance exercise), such as Pilates or lifting weights, as part of your weekly exercise routine. Try to do these types of exercises for 30 minutes at least 3 days a week.  · Do not use any products that contain nicotine or tobacco, such as cigarettes, e-cigarettes, and chewing tobacco. If you need help quitting, ask your health care provider.  · Monitor your blood pressure at home as told by your health care provider.  · Keep all follow-up visits as told by your health care provider. This is important.  Medicines  · Take over-the-counter and prescription medicines only as told by your health care provider. Follow directions carefully. Blood pressure medicines must be taken as prescribed.  · Do not skip doses of blood pressure medicine. Doing this puts you at risk for problems and can make the medicine less effective.  · Ask your health care provider about side effects or reactions to medicines that you should watch for.  Contact a health care provider if you:  · Think you are having a reaction to a medicine you are taking.  · Have headaches that keep coming back (recurring).  · Feel dizzy.  · Have swelling in your ankles.  · Have trouble with your vision.  Get help right away if you:  · Develop a severe headache or confusion.  · Have unusual weakness or numbness.  · Feel faint.  · Have severe pain in your chest or abdomen.  · Vomit repeatedly.  · Have trouble breathing.  Summary  · Hypertension is when the force of blood pumping through your arteries is too strong. If this condition is not controlled, it may put you at risk for serious complications.  · Your personal target blood pressure may vary depending on your medical conditions, your age, and other factors. For most people, a normal blood pressure is less than 120/80.  · Hypertension is treated with lifestyle changes, medicines, or a combination of both. Lifestyle changes include losing weight, eating a healthy,  low-sodium diet, exercising more, and limiting alcohol.  This information is not intended to replace advice given to you by your health care provider. Make sure you discuss any questions you have with your health care provider.  Document Released: 12/18/2006 Document Revised: 08/28/2019 Document Reviewed: 08/28/2019  Elsevier Patient Education © 2020 [employer]

## 2021-02-18 NOTE — TELEPHONE ENCOUNTER
----- Message from Guillaume Diez M.D. sent at 2/17/2021  4:01 PM PST -----  Donita Madsen,  This patient wanting cardioversion sooner rather than later. Can we schedule her with anesthesia? I think I have an ADR day coming up soon.  Thank you!

## 2021-02-18 NOTE — TELEPHONE ENCOUNTER
Patient is scheduled on 3-2-21 for a CV w/anesthesia with Dr. Diez. Patient needed this week due to her daughter being off work and needs her help. Patient to check in at 10:00 for a 12:00 procedure. H&P was done on 2-17-21 by Dr. Diez. Pre admit to call patient.

## 2021-02-22 NOTE — PROGRESS NOTES
OP Anticoagulation Service Note    Date: 2021  There were no vitals filed for this visit.  pt declined vitals    Anticoagulation Summary  As of 2021    INR goal:  2.0-3.0   TTR:  73.4 % (11 mo)   INR used for dosin.10 (2021)   Warfarin maintenance plan:  5 mg (5 mg x 1) every day   Weekly warfarin total:  35 mg   Plan last modified:  Cristela Benoit, PharmD (2021)   Next INR check:  3/8/2021   Target end date:  Indefinite    Indications    Persistent atrial fibrillation (HCC) [I48.19]  Chronic pulmonary embolism (HCC) [I27.82]  Long term (current) use of anticoagulants [Z79.01]             Anticoagulation Episode Summary     INR check location:      Preferred lab:      Send INR reminders to:      Comments:        Anticoagulation Care Providers     Provider Role Specialty Phone number    Mary Carmen Rogers M.D. Referring Internal Medicine Critical Care [phone]    [facility] Anticoagulation Services Responsible  [phone]        Anticoagulation Patient Findings  Patient Findings     Positives:  Change in health (pt experiencing GI issues on and off)    Negatives:  Signs/symptoms of thrombosis, Signs/symptoms of bleeding, Laboratory test error suspected, Change in alcohol use, Change in activity, Upcoming invasive procedure, Emergency department visit, Upcoming dental procedure, Missed doses, Extra doses, Change in medications, Change in diet/appetite, Hospital admission, Bruising, Other complaints          HPI:   Rufina Pateledge seen in clinic today, on anticoagulation therapy with warfarin (a high risk medication) for atrial fibrillation and hx of PE    Pt is here today to evaluate anticoagulation therapy    Previous INR was  3.2 on 21    Pt was instructed to reduce dose x 1 day then continue regimen    Confirmed warfarin dosing regimen, denies missed or extra doses of coumadin.   Diet has been consistent with foods rich in vitamin K: No  Changes in ETOH:  No  Changes in smoking  status: No  Changes in medication: No   Cost restriction: No  S/s of bleeding:  No  Falls or accidents since last visit No  Signs/symptoms  thrombosis since the last appt: No      A/P   INR  supratherapeutic today, will require dose adjust ment today to prevent bleeding complications and closer follow up.     Hold x 1 day then reduce regimen    02/22 check referral    Pt educated to contact our clinic with any changes in medications or s/s of bleeding or thrombosis. Pt is aware to seek immediate medical attention for falls, head injury or deep cuts    Follow up appointment in 2 week(s) to reduce risk of adverse events from warfarin   Cristela Benoit, OsielD

## 2021-02-23 NOTE — TELEPHONE ENCOUNTER
VR    Pt called asking if she needs to cut back on her potassium since starting losartan. Please call Pt back to discuss at [phone].    Thank you

## 2021-02-25 NOTE — TELEPHONE ENCOUNTER
TO: 12:30p/Thurs/Ofc  NM: Rufina Macias    PH: [phone]   PT NM: Rufina Macias    : [birth year]   REG DR: Rg   RE: Please call regarding cardio  version on 3/2 10a . [facility]mitting  hasn't received paperwork and doesn't  show her scheduled    DISP HIST: 2021 12:07P TO P/disp

## 2021-02-26 NOTE — TELEPHONE ENCOUNTER
Patient and LVM stating she is taking the Trelegy but it has caused her thrush and she is needing a RX sent to pharmacy. With call back that prescription was sent she would like ideas on how to avoid.

## 2021-03-01 NOTE — OR NURSING
COVID-19 Pre-Surgery Screenin. Do you have an undiagnosed respiratory illness or symptoms such as coughing, sneezing or sore throat? Any loss of sense of smell or taste? NO        2. Do you have an unexplained fever greater than 100.4 degrees Fahrenheit or 38 degrees Celsius? NO  ?  3. Have you had direct exposure to a patient who tested positive for Covid-19? NO    4. Patient informed of current visitation and mask policies by this RN.

## 2021-03-01 NOTE — TELEPHONE ENCOUNTER
You  Guillaume Diez M.D. 6 days ago     Any repeat labs after starting losartan?      Guillaume Diez M.D.  You 1 hour ago (1:03 PM)     Sure. I think she will be ok, but can repeat BMP and pBNP two weeks after cardioversion. Thanks Laura!      BMP and BNP orders placed. Called pt and notified. She was planning on completing labs in 2 weeks for her PCP, so she will complete at the same time.

## 2021-03-02 NOTE — PROCEDURES
Electrical Cardioversion    Date/Time: 3/2/2021 12:37 PM  Performed by: Guillaume Diez M.D.  Authorized by: Guillaume Diez M.D.     Consent:     Consent obtained:  Verbal and written    Consent given by:  Patient    Risks discussed:  Cutaneous burn, death, induced arrhythmia and pain    Alternatives discussed:  No treatment, rate-control medication, anti-coagulation medication, alternative treatment, observation, delayed treatment and referral  Sedation:     Patient sedated: Yes      Sedation type:  Per anesthesia  Pre-procedure details:     Cardioversion basis:  Elective    Rhythm:  Atrial fibrillation    Electrode placement:  Anterior-posterior    Anticoagulation status:  Coumadin (INR >2)  Attempt one:     Cardioversion mode:  Synchronous    Waveform:  Biphasic    Shock (Joules):  200    Shock outcome:  Conversion to normal sinus rhythm  Post-procedure details:     Patient status:  Awake    Patient tolerance of procedure:  Tolerated well, no immediate complications

## 2021-03-02 NOTE — ANESTHESIA TIME REPORT
Anesthesia Start and Stop Event Times     Date Time Event    3/2/2021 1128 Ready for Procedure     1207 Anesthesia Start     1224 Anesthesia Stop        Responsible Staff  03/02/21    Name Role Begin End    Dale Strickland M.D. Anesth 1207 1224        Preop Diagnosis (Free Text):  Pre-op Diagnosis             Preop Diagnosis (Codes):    Post op Diagnosis  AF (atrial fibrillation) (HCC)      Premium Reason  Non-Premium    Comments:

## 2021-03-02 NOTE — DISCHARGE INSTRUCTIONS
ACTIVITY: Rest and take it easy for the first 24 hours.  A responsible adult is recommended to remain with you during that time.  It is normal to feel sleepy.  We encourage you to not do anything that requires balance, judgment or coordination.    MILD FLU-LIKE SYMPTOMS ARE NORMAL. YOU MAY EXPERIENCE GENERALIZED MUSCLE ACHES, THROAT IRRITATION, HEADACHE AND/OR SOME NAUSEA.    FOR 24 HOURS DO NOT:  Drive, operate machinery or run household appliances.  Drink beer or alcoholic beverages.   Make important decisions or sign legal documents.    SPECIAL INSTRUCTIONS: follow up with your cardiologist call find out when to follow up.     DIET: To avoid nausea, slowly advance diet as tolerated, avoiding spicy or greasy foods for the first day.  Add more substantial food to your diet according to your physician's instructions.  Babies can be fed formula or breast milk as soon as they are hungry.  INCREASE FLUIDS AND FIBER TO AVOID CONSTIPATION.    FOLLOW-UP APPOINTMENT:  A follow-up appointment should be arranged with your doctor in [phone]; call to schedule.    You should CALL YOUR PHYSICIAN if you develop:  Fever greater than 101 degrees F.  Pain not relieved by medication, or persistent nausea or vomiting.  Excessive bleeding (blood soaking through dressing) or unexpected drainage from the wound.  Extreme redness or swelling around the incision site, drainage of pus or foul smelling drainage.  Inability to urinate or empty your bladder within 8 hours.  Problems with breathing or chest pain.    You should call 911 if you develop problems with breathing or chest pain.  If you are unable to contact your doctor or surgical center, you should go to the nearest emergency room or urgent care center.  Physician's telephone #: [phone]    If any questions arise, call your doctor.  If your doctor is not available, please feel free to call the Surgical Center at [phone]. The Contact Center is open Monday through Friday 7AM to  5PM and may speak to a nurse at [phone], or toll free at [phone].     A registered nurse may call you a few days after your surgery to see how you are doing after your procedure.    MEDICATIONS: Resume taking daily medication.  Take prescribed pain medication with food.  If no medication is prescribed, you may take non-aspirin pain medication if needed.  PAIN MEDICATION CAN BE VERY CONSTIPATING.  Take a stool softener or laxative such as senokot, pericolace, or milk of magnesia if needed.  Electrical Cardioversion, Care After  This sheet gives you information about how to care for yourself after your procedure. Your health care provider may also give you more specific instructions. If you have problems or questions, contact your health care provider.  What can I expect after the procedure?  After the procedure, it is common to have:  · Some redness on the skin where the shocks were given.  Follow these instructions at home:    · Do not drive for 24 hours if you were given a medicine to help you relax (sedative).  · Take over-the-counter and prescription medicines only as told by your health care provider.  · Ask your health care provider how to check your pulse. Check it often.  · Rest for 48 hours after the procedure or as told by your health care provider.  · Avoid or limit your caffeine use as told by your health care provider.  Contact a health care provider if:  · You feel like your heart is beating too quickly or your pulse is not regular.  · You have a serious muscle cramp that does not go away.  Get help right away if:    · You have discomfort in your chest.  · You are dizzy or you feel faint.  · You have trouble breathing or you are short of breath.  · Your speech is slurred.  · You have trouble moving an arm or leg on one side of your body.  · Your fingers or toes turn cold or blue.  This information is not intended to replace advice given to you by your health care provider. Make sure you  discuss any questions you have with your health care provider.  Document Released: 10/08/2014 Document Revised: 11/30/2018 Document Reviewed: 06/23/2017  [employer] Patient Education © 2020 [employer]      If your physician has prescribed pain medication that includes Acetaminophen (Tylenol), do not take additional Acetaminophen (Tylenol) while taking the prescribed medication.    Depression / Suicide Risk    As you are discharged from this [facility] facility, it is important to learn how to keep safe from harming yourself.    Recognize the warning signs:  · Abrupt changes in personality, positive or negative- including increase in energy   · Giving away possessions  · Change in eating patterns- significant weight changes-  positive or negative  · Change in sleeping patterns- unable to sleep or sleeping all the time   · Unwillingness or inability to communicate  · Depression  · Unusual sadness, discouragement and loneliness  · Talk of wanting to die  · Neglect of personal appearance   · Rebelliousness- reckless behavior  · Withdrawal from people/activities they love  · Confusion- inability to concentrate     If you or a loved one observes any of these behaviors or has concerns about self-harm, here's what you can do:  · Talk about it- your feelings and reasons for harming yourself  · Remove any means that you might use to hurt yourself (examples: pills, rope, extension cords, firearm)  · Get professional help from the community (Mental Health, Substance Abuse, psychological counseling)  · Do not be alone:Call your Safe Contact- someone whom you trust who will be there for you.  · Call your local CRISIS HOTLINE 490-4441 or 966-366-6898  · Call your local Children's Mobile Crisis Response Team [place] (099) 687-4534 or www.[employer]  · Call the toll free National Suicide Prevention Hotlines   · National Suicide Prevention Lifeline 437-450-HUKU (8461)  · National Hope Line Network 800-SUICIDE  (214-5997)

## 2021-03-02 NOTE — ANESTHESIA POSTPROCEDURE EVALUATION
Patient: Rufina Macias    Procedure Summary     Date: 03/02/21 Room / Location: [facility] - ECHOCARDIOLOGY [facility]    Anesthesia Start: 1207 Anesthesia Stop: 1224    Procedure: CL-CARDIOVERSION Diagnosis:       Persistent atrial fibrillation (HCC)      (See Associated Dx)    Scheduled Providers: Guillaume Diez M.D.; Dale Strickland M.D. Responsible Provider: Dale Strickland M.D.    Anesthesia Type: MAC ASA Status: 3          Final Anesthesia Type: MAC  Last vitals  BP   Blood Pressure : 156/97    Temp   36.9 °C (98.5 °F)    Pulse   69   Resp   18    SpO2   93 %      Anesthesia Post Evaluation    Patient location during evaluation: PACU  Patient participation: complete - patient participated  Level of consciousness: awake and alert  Pain score: 0    Airway patency: patent  Anesthetic complications: no  Cardiovascular status: hemodynamically stable  Respiratory status: acceptable  Hydration status: euvolemic    PONV: none          No complications documented.     Nurse Pain Score: 5 (NPRS)

## 2021-03-02 NOTE — ANESTHESIA PREPROCEDURE EVALUATION
Relevant Problems   PULMONARY   (+) COPD (chronic obstructive pulmonary disease) (HCC)   (+) Panlobular emphysema (HCC)      CARDIAC   (+) Aortic atherosclerosis (HCC)   (+) Chronic pulmonary embolism (HCC)   (+) Coronary artery calcification seen on CAT scan   (+) Essential hypertension   (+) Other pulmonary embolism without acute cor pulmonale (HCC)   (+) Persistent atrial fibrillation (HCC)         (+) Acute kidney injury superimposed on chronic kidney disease (HCC)       Physical Exam    Airway   Mallampati: II  TM distance: >3 FB  Neck ROM: full       Cardiovascular - normal exam  Rhythm: regular  Rate: normal  (-) murmur     Dental - normal exam           Pulmonary - normal exam  Breath sounds clear to auscultation     Abdominal    Neurological - normal exam                 Anesthesia Plan    ASA 3   ASA physical status 3 criteria: hypertension - poorly controlled, other (comment) and moderate reduction of ejection fraction    Plan - MAC               Induction: intravenous    Postoperative Plan: Postoperative administration of opioids is intended.    Pertinent diagnostic labs and testing reviewed    Informed Consent:    Anesthetic plan and risks discussed with patient.    Use of blood products discussed with: patient whom consented to blood products.

## 2021-03-02 NOTE — OR NURSING
1227 Patient arrived from cath lab s/p Cardioversion. Patient awake, denies pain, denies nausea. vss  1232 Patient tolerating po intake.   1246 Discharge instructions given to patient, patient verbalize understanding of the orders, reviewed activity, worsening symptoms, follow up, medications, dressing care.   1251 Criteria met to discharge patient home.   1300 Patient escorted via w/c with all her personal belongings.

## 2021-03-03 NOTE — TELEPHONE ENCOUNTER
NM: Rufina Macias   PH: [phone]   PT NM: Rufina Macias   : 1945   REG DR: Dr Diez   RE: Calling to have her latest  BP  reading, it  is 132/80   DISP HIST: 2021 12:56P 2RK, pt  dsp  2021 12:58P DSC chkd      Thank you,    Ashley BILLS

## 2021-03-03 NOTE — TELEPHONE ENCOUNTER
Contacted [facility], I spoke to Dalia confirmed prescription received and 20 dollar co pay. I spoke to the patient informed confirmed with the pharmacy script received and co pay cost.

## 2021-03-03 NOTE — TELEPHONE ENCOUNTER
Patient left vm states she was contacted and informed prescription for Thrush was sent to pharmacy. Per patient pharmacy did not receive medication.   Medication placed 2/25/21 on plain paper.   Attempt to reach out to the pharmacy was on hold for 10 minutes. Please sign updated prescription to be sent electronically.

## 2021-03-03 NOTE — PROGRESS NOTES
Rufina Macias is a 75 y.o. female here for a non-provider visit for a blood pressure recheck.     If abnormal was an in office provider notified today (if so, indicate provider)? Was not abnormal   Routed to PCP? No

## 2021-03-03 NOTE — TELEPHONE ENCOUNTER
Received: Yesterday    Guillaume Diez M.D.  JODIE Membreno,   Would you mind reaching out to Rufina tomorrow to check on her blood pressure? She was cardioverted today and it seemed high. I want to take her off diltiazem (continue metoprolol) now that she's in sinus rhythm and will want to titrate losartan to goal /80.   Thank you!       Called pt. She explained that she doesn't trust her home BP cuff and is planning to get a new one when she can afford it. She is planning on going to her PCP today to have her BP checked and will call us later with the results.

## 2021-03-05 NOTE — TELEPHONE ENCOUNTER
Guillaume Diez M.D.  You 8 minutes ago (8:54 AM)     That's not too bad...but could be better. Can increase losartan 25mg to twice daily. This way will line up nicely with twice daily metoprolol. Thanks Laura!      Called pt and discussed increasing losartan to BID. New Rx sent. Encouraged her to notify us in a couple weeks if BP still elevated.

## 2021-03-08 NOTE — PROGRESS NOTES
OP Anticoagulation Service Note    Date: 3/8/2021  There were no vitals filed for this visit.   pt declined vitals    Anticoagulation Summary  As of 3/8/2021    INR goal:  2.0-3.0   TTR:  72.7 % (11.4 mo)   INR used for dosin.90 (3/8/2021)   Warfarin maintenance plan:  5 mg (5 mg x 1) every day   Weekly warfarin total:  35 mg   Plan last modified:  Cristela Benoit, PharmD (2021)   Next INR check:  2021   Target end date:  Indefinite    Indications    Persistent atrial fibrillation (HCC) [I48.19]  Chronic pulmonary embolism (HCC) [I27.82]  Long term (current) use of anticoagulants [Z79.01]             Anticoagulation Episode Summary     INR check location:      Preferred lab:      Send INR reminders to:      Comments:        Anticoagulation Care Providers     Provider Role Specialty Phone number    Mary Carmen Rogers M.D. Referring Internal Medicine Critical Care [phone]    [facility] Anticoagulation Services Responsible  [phone]        Anticoagulation Patient Findings  Patient Findings     Positives:  Change in medications (diltiazem d/c'd, losartan initiated), Change in diet/appetite (slight increased intake in vit K), Hospital admission (s/p cardioversion)    Negatives:  Signs/symptoms of thrombosis, Signs/symptoms of bleeding, Laboratory test error suspected, Change in health, Change in alcohol use, Change in activity, Upcoming invasive procedure, Emergency department visit, Upcoming dental procedure, Missed doses, Extra doses, Bruising, Other complaints          HPI:   Rufina Macias seen in clinic today, on anticoagulation therapy with warfarin (a high risk medication) for atrial fibrillation, hx of PE    Pt is here today to evaluate anticoagulation therapy    Previous INR was  4.1 on 2021    Pt was instructed to hold x 1 day then continue regimen    Confirmed warfarin dosing regimen, denies missed or extra doses of coumadin.   Diet has been consistent with foods rich in vitamin K:  No  Changes in ETOH:  No  Changes in smoking status: No  Changes in medication: Yes   Cost restriction: No  S/s of bleeding:  No  Falls or accidents since last visit No  Signs/symptoms  thrombosis since the last appt: No      A/P   INR  slightly sub-therapeutic today    Since INR is only 0.1 out of range, Pt is to continue with current warfarin dosing regimen. Pt to decrease vit K intake as well.    02/22 check referral    Pt educated to contact our clinic with any changes in medications or s/s of bleeding or thrombosis. Pt is aware to seek immediate medical attention for falls, head injury or deep cuts    Follow up appointment in 4 week(s) to reduce risk of adverse events from warfarin   Cristela Benoit, OsielD

## 2021-03-16 NOTE — TELEPHONE ENCOUNTER
ESTABLISHED PATIENT PRE-VISIT PLANNING     Patient was contacted to complete PVP.     Note: Patient will not be contacted if there is no indication to call.     1.  Reviewed notes from the last few office visits within the medical group: Yes    2.  If any orders were placed at last visit or intended to be done for this visit (i.e. 6 mos follow-up), do we have Results/Consult Notes?         •  Labs - Labs ordered, completed on 3/11/2021 and results are in chart.  Note: If patient appointment is for lab review and patient did not complete labs, check with provider if OK to reschedule patient until labs completed.       •  Imaging - Imaging was not ordered at last office visit.       •  Referrals - Referral ordered, patient was seen and consult notes are in chart. Care Teams updated  NO.    3. Is this appointment scheduled as a Hospital Follow-Up? No    4.  Immunizations were updated in Epic using Reconcile Outside Information activity? Yes    5.  Patient is due for the following Health Maintenance Topics:   Health Maintenance Due   Topic Date Due   • IMM HEP B VACCINE (1 of 3 - Risk 3-dose series) Never done   • IMM ZOSTER VACCINES (2 of 3) 12/10/2013   • Annual Wellness Visit  06/27/2019   • MAMMOGRAM  11/27/2020       - Patient plans to schedule appointment for Annual Wellness Visit (AWV).    6.  AHA (Pulse8) form printed for Provider? Email sent to [facility] requesting form

## 2021-03-17 PROBLEM — Z86.79 HISTORY OF CHRONIC ATRIAL FIBRILLATION: Status: ACTIVE | Noted: 2021-01-01

## 2021-03-17 PROBLEM — N30.01 ACUTE CYSTITIS WITH HEMATURIA: Status: RESOLVED | Noted: 2020-09-14 | Resolved: 2021-01-01

## 2021-03-17 PROBLEM — J96.11 CHRONIC RESPIRATORY FAILURE WITH HYPOXIA (HCC): Status: ACTIVE | Noted: 2020-03-05

## 2021-03-17 PROBLEM — R76.8 FALSE POSITIVE SEROLOGICAL TEST FOR HEPATITIS C: Status: ACTIVE | Noted: 2021-01-01

## 2021-03-17 PROBLEM — N17.9 ACUTE KIDNEY INJURY SUPERIMPOSED ON CHRONIC KIDNEY DISEASE (HCC): Status: RESOLVED | Noted: 2020-09-18 | Resolved: 2021-01-01

## 2021-03-17 PROBLEM — N18.9 ACUTE KIDNEY INJURY SUPERIMPOSED ON CHRONIC KIDNEY DISEASE (HCC): Status: RESOLVED | Noted: 2020-09-18 | Resolved: 2021-01-01

## 2021-03-17 PROBLEM — I27.20 PULMONARY HYPERTENSION (HCC): Status: ACTIVE | Noted: 2021-01-01

## 2021-03-17 PROBLEM — I48.19 PERSISTENT ATRIAL FIBRILLATION (HCC): Status: RESOLVED | Noted: 2020-02-08 | Resolved: 2021-01-01

## 2021-03-17 PROBLEM — K80.51 CALCULUS OF BILE DUCT WITHOUT CHOLECYSTITIS WITH OBSTRUCTION: Status: RESOLVED | Noted: 2020-02-17 | Resolved: 2021-01-01

## 2021-03-17 PROBLEM — F41.9 ANXIETY: Status: RESOLVED | Noted: 2020-09-29 | Resolved: 2021-01-01

## 2021-03-17 PROBLEM — Z79.01 ANTICOAGULATED: Status: RESOLVED | Noted: 2020-03-11 | Resolved: 2021-01-01

## 2021-03-17 PROBLEM — R60.0 BILATERAL LEG EDEMA: Status: RESOLVED | Noted: 2020-04-06 | Resolved: 2021-01-01

## 2021-03-17 PROBLEM — E87.5 HYPERKALEMIA: Status: RESOLVED | Noted: 2020-09-18 | Resolved: 2021-01-01

## 2021-03-17 PROBLEM — B02.9 HERPES ZOSTER WITHOUT COMPLICATION: Status: RESOLVED | Noted: 2020-02-23 | Resolved: 2021-01-01

## 2021-03-17 PROBLEM — M79.89 LEG SWELLING: Status: RESOLVED | Noted: 2020-06-12 | Resolved: 2021-01-01

## 2021-03-17 PROBLEM — E27.8 ADRENAL NODULE (HCC): Status: RESOLVED | Noted: 2020-02-21 | Resolved: 2021-01-01

## 2021-03-17 PROBLEM — R73.01 IFG (IMPAIRED FASTING GLUCOSE): Status: ACTIVE | Noted: 2020-02-21

## 2021-03-17 PROBLEM — I26.99 OTHER PULMONARY EMBOLISM WITHOUT ACUTE COR PULMONALE (HCC): Status: RESOLVED | Noted: 2020-02-21 | Resolved: 2021-01-01

## 2021-03-17 NOTE — PROGRESS NOTES
Subjective:      Rufina Macias is a 75 y.o. female who presents with Follow-Up (Persistent atrial fibrillation)  The patient is here for followup of chronic medical problems listed below. The patient is compliant with medications and having no side effects from them. Denies chest pain, abdominal pain, dyspnea, myalgias, or cough.   Patient Active Problem List    Diagnosis Date Noted   • False positive serological test for hepatitis C- neg HCV quant RNA by PCR [id number] 03/17/2021   • Pulmonary hypertension (HCC)- RVSP = 25 ECHO 2021 03/17/2021   • History of chronic atrial fibrillation 03/17/2021   • Heart failure with preserved ejection fraction (HCC) 10/22/2020   • Coronary artery calcification seen on CAT scan 06/12/2020   • Aortic atherosclerosis (HCC) 06/12/2020   • Mild concentric left ventricular hypertrophy (LVH) 04/06/2020   • High risk medication use 03/11/2020   • Long term (current) use of anticoagulants 03/09/2020   • Chronic pulmonary embolism (HCC) 03/05/2020   • Chronic respiratory failure with hypoxia (HCC) 03/05/2020   • Multiple pulmonary nodules determined by computed tomography of lung 02/24/2020   • IFG (impaired fasting glucose) 02/21/2020   • Uncomplicated opioid dependence (HCC)- nv pain and spine 01/06/2020   • Ganglion cyst of tendon sheath of right hand- surveillance 07/08/2019   • Panlobular emphysema (HCC) 01/08/2019   • Chronic pain of left knee- dr contreras- celebrex and cortisone inj; dr cade to do  supartz inj 01/08/2019   • Primary osteoarthritis involving multiple joints- to get supartz inj left knee- nv pain and spine 06/26/2018   • Chronic midline low back pain without sciatica- norco chronic daily use; nv pain and spine 04/04/2017   • History of cardioversio 3//2/21 FOR CHRONIC A.FIB 03/23/2016   • Essential hypertension 08/31/2015   • Obesity (BMI 30.0-34.9) 06/26/2015   • DDD (degenerative disc disease), cervical 04/07/2015   • Musculoskeletal neck pain 04/07/2015   •  Thoracic radiculopathy 03/09/2015   • Lumbar radiculopathy 03/09/2015   • Vitamin D deficiency disease 10/11/2013   • DDD (degenerative disc disease), lumbar 10/11/2013   • Chronic allergic rhinitis 10/11/2013   • Primary insomnia 11/29/2012   • Mixed hyperlipidemia 10/28/2010     Allergies   Allergen Reactions   • Codeine Swelling   • Ace Inhibitors      Cough     Outpatient Medications Prior to Visit   Medication Sig Dispense Refill   • rosuvastatin (CRESTOR) 20 MG Tab Take 1 tablet by mouth every evening. 100 tablet 3   • losartan (COZAAR) 25 MG Tab Take 1 tablet by mouth 2 Times a Day. 180 tablet 3   • warfarin (COUMADIN) 5 MG Tab Take 1 tablet by mouth every day. As directed by [facility] 90 tablet 1   • metoprolol tartrate (LOPRESSOR) 100 MG Tab Take 1 tablet by mouth 2 times a day. 180 tablet 4   • Fluticasone-Umeclidin-Vilant (TRELEGY ELLIPTA) 100-62.5-25 MCG/INH AEROSOL POWDER, BREATH ACTIVATED Inhale 1 Puff every day. Rinse mouth after use 1 Each 0   • montelukast (SINGULAIR) 10 MG Tab Take 1 Tab by mouth every bedtime. Take 1 tablet by mouth nightly at bedtime. 30 Tab 0   • sertraline (ZOLOFT) 25 MG tablet Take 1 Tab by mouth every day. 30 Tab 11   • HYDROcodone/acetaminophen (NORCO)  MG Tab Take 1 Tab by mouth every 6 hours as needed.     • VITAMIN D PO Take 2,000 mg by mouth every day.     • torsemide (DEMADEX) 20 MG Tab Take 1 Tab by mouth every day. 30 Tab 3   • famotidine (PEPCID) 20 MG Tab Take 20 mg by mouth every day.     • zolpidem (AMBIEN) 10 MG Tab Take 10 mg by mouth at bedtime as needed for Sleep.     • albuterol 108 (90 Base) MCG/ACT Aero Soln inhalation aerosol Inhale 2 Puffs by mouth every 6 hours as needed for Shortness of Breath. 8.5 g 2   • Calcium Carbonate-Vit D-Min (CALCIUM 1200 PO) Take 1,200 mg by mouth every day.     • Magnesium 400 MG Tab Take 400 mg by mouth every day.     • nystatin (MYCOSTATIN) 677630 UNIT/ML Suspension Take 5 mL by mouth 4 times a  day. Swish and swallow. (Patient not taking: Reported on 3/8/2021) 250 mL 0   • potassium chloride ER (KLOR-CON) 10 MEQ tablet Take 40 mEq by mouth every day. 4 tablets = 40 meq     • FIBER PO Take 2 Caps by mouth every day.       No facility-administered medications prior to visit.     Hospital Outpatient Visit on 03/11/2021   Component Date Value   • 25-Hydroxy   Vitamin D 25 03/11/2021 52    • Glycohemoglobin 03/11/2021 5.7*   • Est Avg Glucose 03/11/2021 117    • WBC 03/11/2021 9.8    • RBC 03/11/2021 5.25    • Hemoglobin 03/11/2021 15.2    • Hematocrit 03/11/2021 49.4*   • MCV 03/11/2021 94.1    • MCH 03/11/2021 29.0    • MCHC 03/11/2021 30.8*   • RDW 03/11/2021 47.7    • Platelet Count 03/11/2021 152*   • MPV 03/11/2021 10.3    • Neutrophils-Polys 03/11/2021 74.40*   • Lymphocytes 03/11/2021 17.30*   • Monocytes 03/11/2021 7.10    • Eosinophils 03/11/2021 0.70    • Basophils 03/11/2021 0.20    • Immature Granulocytes 03/11/2021 0.30    • Nucleated RBC 03/11/2021 0.00    • Neutrophils (Absolute) 03/11/2021 7.27*   • Lymphs (Absolute) 03/11/2021 1.69    • Monos (Absolute) 03/11/2021 0.69    • Eos (Absolute) 03/11/2021 0.07    • Baso (Absolute) 03/11/2021 0.02    • Immature Granulocytes (a* 03/11/2021 0.03    • NRBC (Absolute) 03/11/2021 0.00    • Cholesterol,Tot 03/11/2021 117    • Triglycerides 03/11/2021 78    • HDL 03/11/2021 42    • LDL 03/11/2021 59    • Sodium 03/11/2021 140    • Potassium 03/11/2021 4.2    • Chloride 03/11/2021 102    • Co2 03/11/2021 27    • Anion Gap 03/11/2021 11.0    • Glucose 03/11/2021 83    • Bun 03/11/2021 15    • Creatinine 03/11/2021 0.66    • Calcium 03/11/2021 9.7    • AST(SGOT) 03/11/2021 21    • ALT(SGPT) 03/11/2021 14    • Alkaline Phosphatase 03/11/2021 73    • Total Bilirubin 03/11/2021 0.5    • Albumin 03/11/2021 4.1    • Total Protein 03/11/2021 7.3    • Globulin 03/11/2021 3.2    • A-G Ratio 03/11/2021 1.3    • TSH 03/11/2021 1.590    • Hepatitis C Antibody 03/11/2021  Reactive*   • Fasting Status 2021 Fasting    • GFR If  2021 >60    • GFR If Non  Ameri* 2021 >60    Anticoagulation Visit on 2021   Component Date Value   • INR 2021 1.90*   Admission on 2021, Discharged on 2021   Component Date Value   • WBC 2021 12.0*   • RBC 2021 5.02    • Hemoglobin 2021 15.0    • Hematocrit 2021 47.0    • MCV 2021 93.6    • MCH 2021 29.9    • MCHC 2021 31.9*   • RDW 2021 48.1    • Platelet Count 2021 132*   • MPV 2021 10.0    • Sodium 2021 133*   • Potassium 2021 4.6    • Chloride 2021 100    • Co2 2021 26    • Glucose 2021 108*   • Bun 2021 18    • Creatinine 2021 0.78    • Calcium 2021 9.5    • Anion Gap 2021 7.0    • PT 2021 25.9*   • INR 2021 2.29*   • Report 2021                      Value:[facility]    Test Date:  2021  Pt Name:    LUANN GAONA                Department: [facility]  MRN:        [id number]                      Room:       [facility]  Gender:     Female                       Technician: [email or web address]  :        1945                   Requested By:MYNOR WILBURN  Order #:    [id number]                    Reading MD: Gerald Espinoza MD    Measurements  Intervals                                Axis  Rate:       77                           P:  NJ:                                      QRS:        36  QRSD:       148                          T:          -19  QT:         412  QTc:        467    Interpretive Statements  ATRIAL FIBRILLATION, V-RATE  55- 89  RIGHT BUNDLE BRANCH BLOCK  Compared to ECG 2021 15:27:56  No significant changes  Electronically Signed On 3-2-2021 22:47:05 PST by Gerald Espinoza MD     • GFR If  2021 >60    • GFR If Non  Ameri* 2021 >60    • Report 2021                      Value:[facility]    Test Date:   2021  Pt Name:    LUANN GAONA                Department: [facility]  MRN:        [id number]                      Room:       [facility]  Gender:     Female                       Technician: [email or web address]  :        1945                   Requested By:MYNOR WILBURN  Order #:    [id number]                    Reading MD: Gerald Espinoza MD    Measurements  Intervals                                Axis  Rate:       58                           P:          53  TN:         172                          QRS:        42  QRSD:       146                          T:          -9  QT:         468  QTc:        460    Interpretive Statements  SINUS BRADYCARDIA  ATRIAL PREMATURE COMPLEX  CONSIDER LEFT ATRIAL ABNORMALITY  RIGHT BUNDLE BRANCH BLOCK  Compared to ECG 2021 10:40:35  Atrial premature complex(es) now present  Atrial fibrillation no longer present  Electronically Signed On 3-3-2021 0:54:29 PST by Gerald Espinoza MD     Pre-Admission Testing on 2021   Component Date Value   • SARS-CoV-2 Source 2021 Nasal Swab    • SARS-CoV-2 by PCR 2021 NotDetected    • COVID Order Status 2021 Received    Anticoagulation Visit on 2021   Component Date Value   • INR 2021 4.10*      ,  Lab Results   Component Value Date/Time    HBA1C 5.7 (H) 2021 12:59 PM    HBA1C 6.0 (H) 10/30/2020 02:51 PM     Lab Results   Component Value Date/Time    SODIUM 140 2021 12:59 PM    POTASSIUM 4.2 2021 12:59 PM    CHLORIDE 102 2021 12:59 PM    CO2 27 2021 12:59 PM    GLUCOSE 83 2021 12:59 PM    BUN 15 2021 12:59 PM    CREATININE 0.66 2021 12:59 PM    CREATININE 0.80 2010 12:00 AM    BUNCREATRAT 20 2010 12:00 AM    GLOMRATE >59 2010 12:00 AM    ALKPHOSPHAT 73 2021 12:59 PM    ASTSGOT 21 2021 12:59 PM    ALTSGPT 14 2021 12:59 PM    TBILIRUBIN 0.5 2021 12:59 PM     Lab Results   Component Value Date/Time    INR 1.90 (A) 2021 03:49 PM     "INR 2.29 (H) 03/02/2021 10:42 AM    INR 4.10 (A) 02/22/2021 03:32 PM     Lab Results   Component Value Date/Time    CHOLSTRLTOT 117 03/11/2021 12:59 PM    LDL 59 03/11/2021 12:59 PM    HDL 42 03/11/2021 12:59 PM    TRIGLYCERIDE 78 03/11/2021 12:59 PM       No results found for: TESTOSTERONE  Lab Results   Component Value Date/Time    TSH 1.640 12/02/2010 12:00 AM     Lab Results   Component Value Date/Time    FREET4 1.68 09/13/2020 04:45 PM    FREET4 1.90 (H) 02/21/2020 06:43 PM     No results found for: URICACID  No components found for: VITB12  Lab Results   Component Value Date/Time    25HYDROXY 52 03/11/2021 12:59 PM    25HYDROXY 39 09/20/2016 11:36 AM     .lll          HPI    Review of Systems   Constitutional: Negative.    HENT: Negative.    Eyes: Negative.    Respiratory: Negative.    Cardiovascular: Negative.    Gastrointestinal: Negative.    Genitourinary: Negative.    Musculoskeletal: Negative.    Skin: Negative.    Neurological: Negative.    Endo/Heme/Allergies: Negative.    Psychiatric/Behavioral: Negative.           Objective:     /80 (BP Location: Left arm, Patient Position: Sitting, BP Cuff Size: Adult)   Pulse 65   Temp 36.6 °C (97.9 °F) (Temporal)   Ht 1.575 m (5' 2\")   Wt 85.5 kg (188 lb 6.4 oz)   SpO2 95%   BMI 34.46 kg/m²      Physical Exam  Vitals reviewed.   Constitutional:       General: She is not in acute distress.     Appearance: She is well-developed. She is not diaphoretic.   HENT:      Head: Normocephalic and atraumatic.      Right Ear: External ear normal.      Left Ear: External ear normal.      Nose: Nose normal.      Mouth/Throat:      Pharynx: No oropharyngeal exudate.   Eyes:      General: No scleral icterus.        Right eye: No discharge.         Left eye: No discharge.      Conjunctiva/sclera: Conjunctivae normal.      Pupils: Pupils are equal, round, and reactive to light.   Neck:      Thyroid: No thyromegaly.      Vascular: No JVD.      Trachea: No tracheal " deviation.   Cardiovascular:      Rate and Rhythm: Normal rate and regular rhythm.      Heart sounds: Normal heart sounds. No murmur. No friction rub. No gallop.    Pulmonary:      Effort: Pulmonary effort is normal. No respiratory distress.      Breath sounds: Normal breath sounds. No stridor. No wheezing or rales.   Chest:      Chest wall: No tenderness.   Abdominal:      General: Bowel sounds are normal. There is no distension.      Palpations: Abdomen is soft. There is no mass.      Tenderness: There is no abdominal tenderness. There is no guarding or rebound.   Musculoskeletal:         General: No tenderness. Normal range of motion.      Cervical back: Normal range of motion and neck supple.   Lymphadenopathy:      Cervical: No cervical adenopathy.   Skin:     General: Skin is warm and dry.      Coloration: Skin is not pale.      Findings: No erythema or rash.   Neurological:      Mental Status: She is alert and oriented to person, place, and time.      Cranial Nerves: No cranial nerve deficit.      Motor: No abnormal muscle tone.      Coordination: Coordination normal.      Deep Tendon Reflexes: Reflexes are normal and symmetric. Reflexes normal.   Psychiatric:         Behavior: Behavior normal.         Thought Content: Thought content normal.         Judgment: Judgment normal.                 Assessment/Plan:        1. False positive serological test for hepatitis C- neg HCV quant RNA by PCR [id number]  Reviewed and confirmed    2. Heart failure with preserved ejection fraction, unspecified HF chronicity (HCC)  Good control continue same regimen    3. Aortic atherosclerosis (HCC)  Good control continue same regimen    4. Chronic respiratory failure with hypoxia (HCC)   Good control continue same regimen    5. Other chronic pulmonary embolism without acute cor pulmonale (HCC)     Good control continue same regimen        7. Uncomplicated opioid dependence (HCC)- nv pain and spine    Good control continue same  regimen    8. Panlobular emphysema (HCC)   Good control continue same regimen    9. Essential hypertensionGood control continue same regimen       10. Obesity (BMI 30.0-34.9)    diet/exercise/lose 15 lbs.; patient counseled    11. Primary insomnia   Good control continue same regimen    12. Mixed hyperlipidemia   Good control continue same regimen  - TSH; Future  - Comp Metabolic Panel; Future  - Lipid Profile; Future  - CBC WITH DIFFERENTIAL; Future            14. Other acute pulmonary embolism without acute cor pulmonale (HCC)   Good control continue same regimen    15. IFG (impaired fasting glucose)   Good control continue same regimen    16. Pulmonary hypertension (HCC)- RVSP = 25 ECHO 2021       17. History of chronic atrial fibrillation-resolved with DC cardioversion a few days ago.  Remains in sinus rhythm clinically.       18. Hypokalemia-good control continue replacement.  Dosage reduced to 2 a day      - potassium chloride ER (KLOR-CON) 10 MEQ tablet; Take 4 Tablets by mouth 2 times a day.  Dispense: 180 tablet; Refill: 4    19. Encounter for screening mammogram for breast cancer     - MA-SCREENING MAMMO BILAT W/CAD; Future    20. Need for vaccination          - Shingrix Vaccine

## 2021-03-18 NOTE — PROGRESS NOTES
Called and wish the PT a happy birthday. There was nothing the PT needed at this time. Used Epic and PQ

## 2021-03-29 NOTE — PROGRESS NOTES
CC: Would like to proceed with CT chest    HPI:  Rufina Macias is a 76 y.o. year old female here today for follow-up on COPD.  Patient is a former smoker with reported 20-pack-year history and quit date 1991.  She was last seen in clinic 12/29/2020.    Pertinent history includes moderate asthma, unprovoked PE on chronic anticoagulation, atrial fib status post cardioversion, uterine cancer 2010, benign tremor, pneumonia, chronic allergic rhinitis, pulmonary hypertension, pulmonary nodule.    Reviewed in clinic vitals including blood pressure 132/88, heart rate is 66, O2 sat of 96% on room air and Patient's body mass index is 35.12 kg/m². Exercise and nutrition counseling were performed at this visit.    Reviewed home medication regimen including montelukast, albuterol inhaler which he uses every morning, Ambien, Trelegy, famotidine and warfarin.    Reviewed most recent imaging including CTA obtained 2/21/2020 demonstrating no enlarged mediastinal lymph nodes, PE involving right distal main pulmonary artery extending into the right middle and lower lobe segmental and subsegmental branches, left upper and lower lobe subsegmental pulmonary emboli as well distal esophageal wall thickening, 1.3 cm left lower lobe pulmonary nodule.  Follow-up imaging previously recommended patient has opted now to pursue this.    Pulmonary function testing obtained 8/21/2020 demonstrating FEV1 of 0.65 L or 33% predicted, FVC of 1.05 L or 42% predicted, FEV1/FVC ratio of 61, FEV1 increase of 19% postbronchodilator, residual volume 101% predicted, TLC 78% predicted, DLCO 100% predicted.  PFT consistent with GOLD stage C.    Per pulmonologist interpretation severely reduced mid flows, low FEV1, reduced FEV1/FVC ratio, definite bronchodilator response, restricted lung volumes, reduced expiratory reserve volume at 33% reflects elevated BMI of 38.  Normal oxygen transfer.  Flow volume loop confirms combined severe obstructive pattern with  superimposed restriction.    Review of Systems   Constitutional: Positive for malaise/fatigue (mod). Negative for chills, fever and weight loss.   HENT: Positive for congestion (allergies) and sore throat (occasional ). Negative for hearing loss, nosebleeds, sinus pain and tinnitus.    Eyes:        Presc glasses   Respiratory: Positive for sputum production (tan ) and shortness of breath (with activity). Negative for cough and wheezing.    Cardiovascular: Negative for chest pain and palpitations (not since cardioversion).   Gastrointestinal: Positive for heartburn (controlled on pepcid ).        Upper and lower dentures, swallowing difficulty with large pills, some food items such as egg hardboiled   Neurological: Negative for dizziness, tremors and headaches.   Psychiatric/Behavioral: The patient has insomnia (ambien).        Past Medical History:   Diagnosis Date   • Acute cystitis with hematuria 9/14/2020   • Acute kidney injury superimposed on chronic kidney disease ([facility]) 9/18/2020   • Adrenal nodule ([facility]) 2/21/2020   • Allergy    • Anticoagulated 3/11/2020   • Anxiety 9/29/2020   • Arrhythmia 02/2020    A fib   • Arthritis     Spine, neck, hands, ankles and knees   • Asthma     inhalers as needed   • Back pain     and neck   • Bilateral leg edema 4/6/2020   • Blood clotting disorder ([facility]) 02/2020    lung   • Bowel habit changes 2021    diarrhea with diet changes   • Breath shortness     at times, uses O2 1l prn Preferred   • Bronchitis    • CA - cancer of uterus 2009   • 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 with obstruction- ERCP EXTRACTON/ SPHINCTEROTOMY, recurrent Feb 2020 2/17/2020   • Cancer ([facility]) 2010    uterine   • Carpal tunnel syndrome    • COPD    • COPD (chronic obstructive pulmonary disease) ([facility]) 8/31/2012   • Coronary artery calcification seen on CAT scan 6/12/2020   • Dental disorder     full dentures   • Diverticula of colon    • Emphysema of lung ([facility]) 2020    COPD   • Heart burn    •  High cholesterol    • Hyperlipidemia    • Hypertension    • Leg swelling 6/12/2020   • Other pulmonary embolism without acute cor pulmonale (HCC) 2/21/2020   • Persistent atrial fibrillation (HCC) 2/8/2020   • Pneumonia 1993   • Tremor, hereditary, benign    • Urinary incontinence 2021    slightly at night   • Wheezing        Past Surgical History:   Procedure Laterality Date   • PB ERCP,DIAGNOSTIC  11/24/2020    Procedure: ERCP, DIAGNOSTIC - WITH STENT REMOVAL;  Surgeon: Quincy Louie M.D.;  Location: [facility];  Service: Gastroenterology   • GASTROSCOPY-ENDO  11/24/2020    Procedure: GASTROSCOPY;  Surgeon: Quincy Louie M.D.;  Location: [facility];  Service: Gastroenterology   • PB ERCP,DIAGNOSTIC  9/17/2020    Procedure: ERCP (ENDOSCOPIC RETROGRADE CHOLANGIOPANCREATOGRAPHY);  Surgeon: Cj Guerrier D.O.;  Location: [facility];  Service: Gastroenterology   • PB ERCP,DIAGNOSTIC  2/14/2020    Procedure: ERCP (ENDOSCOPIC RETROGRADE CHOLANGIOPANCREATOGRAPHY);  Surgeon: Clinton Ray M.D.;  Location: [facility];  Service: Gastroenterology   • ERCP W/SPHINCTEROTOMY/PAPILL.  4/5/2018    Procedure: ERCP W/SPHINCTEROTOMY/PAPILL.;  Surgeon: Qunicy Louie M.D.;  Location: [facility];  Service: Gastroenterology   • ERCP W/ INSERTION STENT/TUBE  4/5/2018    Procedure: ERCP W/ INSERTION STENT/TUBE - STENT PLACEMENT/REMOVAL;  Surgeon: Quincy Louie M.D.;  Location: [facility];  Service: Gastroenterology   • ERCP W/REMOVAL CALCULUS  4/5/2018    Procedure: ERCP W/REMOVAL CALCULUS W/DILATION;  Surgeon: Quincy Louie M.D.;  Location: [facility];  Service: Gastroenterology   • ERCP  4/5/2018    Procedure: ERCP W/POSS BIOPSY;  Surgeon: Quincy Louie M.D.;  Location: [facility];  Service: Gastroenterology   • COMMON BILE DUCT EXPLORATION  02/15/2018   • ERCP W/ INSERTION STENT/TUBE  02/15/2018   • ERCP  W/SPHINCTEROTOMY/PAPILL.  02/15/2018   • ERCP W/REMOVAL CALCULUS  02/15/2018   • ERCP  2/15/2018    Procedure: ERCP, SPHINCTEROTOMY, STENT PLACEMENT, DEBRIDEMENT;  Surgeon: Quincy Louie M.D.;  Location: SUR[facility]ERY [facility];  Service: Gastroenterology   • ARIELLA BY LAPAROSCOPY      [facility]   • HYSTERECTOMY, TOTAL ABDOMINAL  1/18/10    Uterine, Dr. Whelan and Dr. Cam +BSO   • ABDOMINAL HYSTERECTOMY TOTAL  2010    Dr. Cam   • OOPHORECTOMY  2010    BSO   • OPEN REDUCTION      ankle       Family History   Problem Relation Age of Onset   • Heart Disease Father 45        MI   • Lung Disease Father    • Stroke Father 70   • Cancer Father    • Hypertension Father    • Cancer Paternal Grandmother         breast   • Cancer Paternal Grandfather        Social History     Socioeconomic History   • Marital status:      Spouse name: Not on file   • Number of children: Not on file   • Years of education: Not on file   • Highest education level: Not on file   Occupational History   • Not on file   Tobacco Use   • Smoking status: Former Smoker     Packs/day: 1.00     Years: 20.00     Pack years: 20.00     Types: Cigarettes     Quit date: 1991     Years since quittin.2   • Smokeless tobacco: Never Used   Substance and Sexual Activity   • Alcohol use: Not Currently     Alcohol/week: 0.0 - 2.4 oz     Comment: hardly ever   • Drug use: No   • Sexual activity: Never     Partners: Male     Birth control/protection: Post-Menopausal   Other Topics Concern   • Not on file   Social History Narrative   • Not on file     Social Determinants of Health     Financial Resource Strain:    • Difficulty of Paying Living Expenses:    Food Insecurity:    • Worried About Running Out of Food in the Last Year:    • Ran Out of Food in the Last Year:    Transportation Needs:    • Lack of Transportation (Medical):    • Lack of Transportation (Non-Medical):    Physical Activity:    • Days of Exercise per Week:    •  "Minutes of Exercise per Session:    Stress:    • Feeling of Stress :    Social Connections:    • Frequency of Communication with Friends and Family:    • Frequency of Social Gatherings with Friends and Family:    • Attends Taoism Services:    • Active Member of Clubs or Organizations:    • Attends Club or Organization Meetings:    • Marital Status:    Intimate Partner Violence:    • Fear of Current or Ex-Partner:    • Emotionally Abused:    • Physically Abused:    • Sexually Abused:        Allergies as of 03/29/2021 - Reviewed 03/29/2021   Allergen Reaction Noted   • Codeine Swelling 02/12/2010   • Ace inhibitors  08/31/2012        @Vital signs for this encounter:  Vitals:    03/29/21 0952   Height: 1.575 m (5' 2\")   Weight: 87.1 kg (192 lb)   Weight % change since last entry.: 0 %   BP: 132/88   Pulse: 66   BMI (Calculated): 35.12   Resp: 16       Current medications as of today   Current Outpatient Medications   Medication Sig Dispense Refill   • potassium chloride ER (KLOR-CON) 10 MEQ tablet Take 4 Tablets by mouth 2 times a day. 180 tablet 4   • rosuvastatin (CRESTOR) 20 MG Tab Take 1 tablet by mouth every evening. 100 tablet 3   • losartan (COZAAR) 25 MG Tab Take 1 tablet by mouth 2 Times a Day. 180 tablet 3   • warfarin (COUMADIN) 5 MG Tab Take 1 tablet by mouth every day. As directed by [facility] 90 tablet 1   • metoprolol tartrate (LOPRESSOR) 100 MG Tab Take 1 tablet by mouth 2 times a day. 180 tablet 4   • Fluticasone-Umeclidin-Vilant (TRELEGY ELLIPTA) 100-62.5-25 MCG/INH AEROSOL POWDER, BREATH ACTIVATED Inhale 1 Puff every day. Rinse mouth after use 1 Each 0   • montelukast (SINGULAIR) 10 MG Tab Take 1 Tab by mouth every bedtime. Take 1 tablet by mouth nightly at bedtime. 30 Tab 0   • sertraline (ZOLOFT) 25 MG tablet Take 1 Tab by mouth every day. 30 Tab 11   • HYDROcodone/acetaminophen (NORCO)  MG Tab Take 1 Tab by mouth every 6 hours as needed.     • VITAMIN D PO Take 2,000 " mg by mouth every day.     • torsemide (DEMADEX) 20 MG Tab Take 1 Tab by mouth every day. 30 Tab 3   • famotidine (PEPCID) 20 MG Tab Take 20 mg by mouth every day.     • zolpidem (AMBIEN) 10 MG Tab Take 10 mg by mouth at bedtime as needed for Sleep.     • albuterol 108 (90 Base) MCG/ACT Aero Soln inhalation aerosol Inhale 2 Puffs by mouth every 6 hours as needed for Shortness of Breath. 8.5 g 2   • Calcium Carbonate-Vit D-Min (CALCIUM 1200 PO) Take 1,200 mg by mouth every day.     • Magnesium 400 MG Tab Take 400 mg by mouth every day.       No current facility-administered medications for this visit.         Physical Exam:   Gen:           Alert and oriented, No apparent distress. Mood and affect appropriate, normal interaction with provider.  Eyes:          sclere white, conjunctive moist.  Hearing:     Grossly intact.  Dentition:    Good dentition.  Oropharynx:   Tongue normal, posterior pharynx without erythema or exudate.  Neck:        Supple, trachea midline, no masses.  Respiratory Effort: No intercostal retractions or use of accessory muscles.   Lung Auscultation:      Clear to auscultation bilaterally; no rales, rhonchi or wheezing.  CV:            Regular rate and rhythm. No edema. No murmurs, rubs or gallops.  Digits, Nails, Ext: No clubbing, cyanosis, petechiae, or nodes.   Skin:        No rashes, lesions or ulcers noted on back or exposed skin surfaces.                     Assessment:  1. Solitary pulmonary nodule  CT-CHEST (THORAX) W/O   2. Chronic obstructive pulmonary disease, unspecified COPD type (HCC)  Fluticasone-Umeclidin-Vilant (TRELEGY ELLIPTA) 100-62.5-25 MCG/INH AEROSOL POWDER, BREATH ACTIVATED    Multiple Oximetry   3. Chronic allergic rhinitis  montelukast (SINGULAIR) 10 MG Tab   4. Former smoker, stopped smoking many years ago     T3    Immunizations:    Flu: 9/22/2020  Pneumovax 23: 4/7/2020  Prevnar 13:3/23/2016    Plan:    76 y.o. year old female here today for follow-up on COPD.   Patient is a former smoker with reported 20-pack-year history and quit date 1991.  She was last seen in clinic 12/29/2020.    Former smoker:  Continues to abstain from nicotine/tobacco    Pertinent history includes moderate asthma, unprovoked PE on chronic anticoagulation, atrial fib status post cardioversion, uterine cancer 2010, benign tremor, pneumonia, chronic allergic rhinitis, pulmonary hypertension, pulmonary nodule.    Reviewed in clinic vitals including blood pressure 132/88, heart rate is 66, O2 sat of 96% on room air and patient's body mass index is 35.12 kg/m². Exercise and nutrition counseling were performed at this visit.    Reviewed home medication regimen including montelukast, albuterol inhaler which she uses every morning, Ambien, Trelegy, famotidine and warfarin.  Patient does have O2 at 1 L ordered by PCP post cardioversion.    COPD: Continue current regimen.  Refills Trelegy and Singulair.  Patient will let us know when albuterol inhaler is needed.    Chronic allergic rhinitis:  Benefit with montelukast, consider resuming  OTC antihistamine.    Reviewed most recent imaging including CTA obtained 2/21/2020 demonstrating no enlarged mediastinal lymph nodes, PE involving right distal main pulmonary artery extending into the right middle and lower lobe segmental and subsegmental branches, left upper and lower lobe subsegmental pulmonary emboli as well distal esophageal wall thickening, 1.3 cm left lower lobe pulmonary nodule.  Follow-up imaging previously recommended patient has opted now to pursue this.    Solitary pulmonary nodule: Update chest CT.    Pulmonary function testing obtained 8/21/2020 demonstrating FEV1 of 0.65 L or 33% predicted, FVC of 1.05 L or 42% predicted, FEV1/FVC ratio of 61, FEV1 increase of 19% postbronchodilator, residual volume 101% predicted, TLC 78% predicted, DLCO 100% predicted.  PFT consistent with GOLD stage C.    Per pulmonologist interpretation severely reduced mid  flows, low FEV1, reduced FEV1/FVC ratio, definite bronchodilator response, restricted lung volumes, reduced expiratory reserve volume at 33% reflects elevated BMI of 38.  Normal oxygen transfer.  Flow volume loop confirms combined severe obstructive pattern with superimposed restriction.    Reviewed COVID-19 precautions including wearing mask in public, social distancing, frequent handwashing, annual flu shot.  Patient has had Covid vaccine x2, follow-up in 6 months, sooner if needed.      This dictation was created using voice recognition software. The accuracy of the dictation is limited to the abilities of the software. I expect there may be some errors of grammar and possibly content.

## 2021-03-29 NOTE — PATIENT INSTRUCTIONS
1-paper script for Trelegy  2-will let us know when albuterol needed  3-refill montelukast to pharmacy  4-had covid vaccine   5-continue covid 19 precautions inc wearing mask in public, social distancing, frequent handwashing, annual flu   6-follow up in 6 months, sooner if need    Obtain CT scan, will call for plan

## 2021-04-05 NOTE — PROGRESS NOTES
OP Anticoagulation Service Note    Date: 2021  There were no vitals filed for this visit.   pt declined vitals    Anticoagulation Summary  As of 2021    INR goal:  2.0-3.0   TTR:  73.2 % (1 y)   INR used for dosin.40 (2021)   Warfarin maintenance plan:  5 mg (5 mg x 1) every day   Weekly warfarin total:  35 mg   Plan last modified:  Cristela Benoit, PharmD (2021)   Next INR check:  2021   Target end date:  Indefinite    Indications    Persistent atrial fibrillation (HCC) (Resolved) [I48.19]  Chronic pulmonary embolism (HCC) [I27.82]  Long term (current) use of anticoagulants [Z79.01]             Anticoagulation Episode Summary     INR check location:      Preferred lab:      Send INR reminders to:      Comments:        Anticoagulation Care Providers     Provider Role Specialty Phone number    Mary Carmen Rogers M.D. Referring Internal Medicine Critical Care [phone]    [facility]tico[facility] Responsible  [phone]        Anticoagulation Patient Findings      HPI:   Rufina Macias seen in clinic today, on anticoagulation therapy with warfarin (a high risk medication) for atrial fibrillation, PE      Pt is here today to evaluate anticoagulation therapy    Previous INR was  1.9 on 3/8/21    Pt was instructed to continue current regimen    Confirmed warfarin dosing regimen, denies missed or extra doses of coumadin.   Diet has been consistent with foods rich in vitamin K: Yes  Changes in ETOH:  No  Changes in smoking status: No  Changes in medication: Yes - diltiazem stopped, losartan dose changed  Pt is not on antiplatelet therapy  Cost restriction: No  S/s of bleeding:  No  Falls or accidents since last visit No  Signs/symptoms  thrombosis since the last appt: No      A/P   INR  therapeutic today,   Continue current warfarin regimen          Pt educated to contact our clinic with any changes in medications or s/s of bleeding or thrombosis. Pt is aware to seek immediate  medical attention for falls, head injury or deep cuts    Follow up appointment in 6 week(s) to reduce risk of adverse events from warfarin  Cindy Treviño, PharmD

## 2021-04-15 NOTE — PROGRESS NOTES
Called patient to schedule her Comprehensive geriatric assessment. Patient stated she would like for us to call her back in about six weeks to schedule.  Attempt #2

## 2021-04-20 NOTE — PROGRESS NOTES
CT chest results reviewed with pulmonologist.  Mass noted left lower lobe has enlarged. History of uterine cancer in 2010.  Patient contacted regarding CT chest results.  Pet scan recommended.  Order placed.

## 2021-05-17 NOTE — PROGRESS NOTES
OP Anticoagulation Service Note    Date: 2021  There were no vitals filed for this visit.  pt declined vitals    Anticoagulation Summary  As of 2021    INR goal:  2.0-3.0   TTR:  75.9 % (1.1 y)   INR used for dosin.60 (2021)   Warfarin maintenance plan:  5 mg (5 mg x 1) every day   Weekly warfarin total:  35 mg   Plan last modified:  Cristela Benoit, PharmD (2021)   Next INR check:  2021   Target end date:  Indefinite    Indications    Persistent atrial fibrillation (HCC) (Resolved) [I48.19]  Chronic pulmonary embolism (HCC) [I27.82]  Long term (current) use of anticoagulants [Z79.01]             Anticoagulation Episode Summary     INR check location:      Preferred lab:      Send INR reminders to:      Comments:        Anticoagulation Care Providers     Provider Role Specialty Phone number    Mary Carmen Rogers M.D. Referring Internal Medicine Critical Care [phone]    [facility] Responsible  [phone]        Anticoagulation Patient Findings      HPI:   Rufina Karina Gilberto seen in clinic today, on anticoagulation therapy with warfarin (a high risk medication) for PAF; PE    Pt is here today to evaluate anticoagulation therapy    Previous INR was  2.4 on 21    Pt was instructed to continue regimen    Confirmed warfarin dosing regimen, denies missed or extra doses of coumadin.   Diet has been consistent with foods rich in vitamin K: Yes  Changes in ETOH:  No  Changes in smoking status: No  Changes in medication: No   Pt is not on antiplatelet/NSAID therapy  Pt NOT on antiplatelet therapy   Cost restriction: No  S/s of bleeding:  No  Falls or accidents since last visit No  Signs/symptoms  thrombosis since the last appt: No      A/P   INR  remains -therapeutic today  Pt is to continue with current warfarin dosing regimen.        check referral    Pt educated to contact our clinic with any changes in medications or s/s of bleeding or thrombosis. Pt is aware  to seek immediate medical attention for falls, head injury or deep cuts    Follow up appointment in 6 week(s) to reduce risk of adverse events from warfarin  Jose Coyle, OsielD

## 2021-06-02 NOTE — TELEPHONE ENCOUNTER
Pt contacted informed JESSICA Sanchez pa-c has been out of the office. I will send message to JESSICA Sanchez pa-c if she is back tomorrow. If provider not in clinic tomorrow will forward to in clinic providers to review if appropriate. Pt states was unable to schedule sooner appt. Pt confirm to detailed vm.

## 2021-06-02 NOTE — TELEPHONE ENCOUNTER
Pt left  requesting results of her PET scan. Call back number [phone]    Last seen 3/29/21 JESSICA Sanchez pa-c     PET CT 5/28/21 completed.     Next OV 8/10/21 JESSICA Sanchez pa-c

## 2021-06-02 NOTE — TELEPHONE ENCOUNTER
Patient called in wanting to know the results of her pet scan. She does not want to wait for her August appointment. Please advise. Thank you!

## 2021-06-05 NOTE — TELEPHONE ENCOUNTER
Patient contacted, results reviewed and recommendation made for navigational bronchoscopy.  She is on warfarin and expressed concern about interrupting that to have procedure completed as she has had some difficulty regulating.  She has an appointment on Monday at this time so we can answer any additional questions but she would like to think about things over the weekend before deciding whether to proceed.

## 2021-06-07 NOTE — PROGRESS NOTES
CC: Concern regarding PET scan results.    HPI:  Rufina Macias is a 76 y.o. year old female here today for follow-up on abnormal chest CT and PET scan results.  Last seen in clinic 3/29/2021 for her COPD. She is a former smoker with reported 20-pack-year history and quit date 1991.  She is accompanied today by her daughter.      Past medical history includes history of uterine cancer in 2010, moderate persistent asthma, allergic rhinitis, chronic PE, chronic respiratory failure with hypoxia, pulmonary hypertension, chronic afib, chronic anticoagulation, benign tremor. Cardioversion 3/2/21.  Patient reports improvement in work of breathing post cardioversion.    Reviewed in clinic vitals including /90, heart rate 69, O2 sat of 93% on room air.    Reviewed home medication regimen including montelukast, albuterol, Trelegy, Ambien, famotidine and warfarin.  Patient reports using her albuterol inhaler every morning.    Reviewed most recent imaging including PET scan obtained 5/28/2021 demonstrating focal uptake right thyroid gland, SUV uptake 5.7, left lower lobe mass with increased uptake, SUV max 6.5, no hypermetabolic hilar mediastinal or axillary lymphadenopathy.    Chest CT obtained 4/19/21 demonstrated enlarging mass medial base left lower lobe previously measured at 1 x 1.3cm 1.5 x 2.1 cm.  Mucus debris within the trachea. Blebs and pleural thickening lung apices. Bilateral basilar atelectasis, dilated pancreatic duct and possible mild pancreatic head mass.  Surgically absent gallbladder and pneumobilia.    Limited echocardiogram obtained 2/25/2020 demonstrated mild concentric left ventricular hypertrophy, normal left ventricular chamber size and systolic function, LVEF estimated at 55%.  Normal right ventricular size and systolic function  .  CTA obtained 2/21/2020 demonstrating no enlarged mediastinal lymph nodes, PE involving right distal main pulmonary artery extending into the right middle and  lower lobe segmental and subsegmental branches, left upper and lower lobe subsegmental pulmonary emboli as well distal esophageal wall thickening, 1.3 cm left lower lobe pulmonary nodule.  Follow-up imaging previously recommended patient has opted now to pursue this.     Pulmonary function testing obtained 8/21/2020 demonstrating FEV1 of 0.65 L or 33% predicted, FVC of 1.05 L or 42% predicted, FEV1/FVC ratio of 61, FEV1 increase of 19% postbronchodilator, residual volume 101% predicted, TLC 78% predicted, DLCO 100% predicted.  PFT consistent with GOLD stage C.     Per pulmonologist interpretation severely reduced mid flows, low FEV1, reduced FEV1/FVC ratio, definite bronchodilator response, restricted lung volumes, reduced expiratory reserve volume at 33% reflects elevated BMI of 38.  Normal oxygen transfer.  Flow volume loop confirms combined severe obstructive pattern with superimposed restriction.    Review of Systems   Constitutional: Positive for malaise/fatigue. Negative for chills, fever and weight loss.   HENT: Positive for congestion. Negative for hearing loss, nosebleeds, sinus pain, sore throat and tinnitus.    Eyes:        Presc   Respiratory: Positive for cough (tickle cough, nose running ), sputum production (light tan/beige), shortness of breath (with exertion ) and wheezing (occasional ).    Cardiovascular: Negative for chest pain, palpitations, orthopnea and leg swelling.   Gastrointestinal: Positive for heartburn (managed on famotidine ).        Upper and lower dentures, some difficulty swallowing, does well with water   Neurological: Negative for dizziness, tremors and headaches.   Psychiatric/Behavioral: The patient has insomnia (takes ambien, staying asleep ).        Past Medical History:   Diagnosis Date   • Acute cystitis with hematuria 9/14/2020   • Acute kidney injury superimposed on chronic kidney disease (HCC) 9/18/2020   • Adrenal nodule (HCC) 2/21/2020   • Allergy    • Anticoagulated  3/11/2020   • Anxiety 9/29/2020   • Arrhythmia 02/2020    A fib   • Arthritis     Spine, neck, hands, ankles and knees   • Asthma     inhalers as needed   • Back pain     and neck   • Bilateral leg edema 4/6/2020   • Blood clotting disorder ([facility]) 02/2020    lung   • Bowel habit changes 2021    diarrhea with diet changes   • Breath shortness     at times, uses O2 1l prn Preferred   • Bronchitis    • CA - cancer of uterus 2009   • 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 with obstruction- ERCP EXTRACTON/ SPHINCTEROTOMY, recurrent Feb 2020 2/17/2020   • Cancer ([facility]) 2010    uterine   • Carpal tunnel syndrome    • COPD    • COPD (chronic obstructive pulmonary disease) ([facility]) 8/31/2012   • Coronary artery calcification seen on CAT scan 6/12/2020   • Dental disorder     full dentures   • Diverticula of colon    • Emphysema of lung ([facility]) 2020    COPD   • Heart burn    • High cholesterol    • Hyperlipidemia    • Hypertension    • Leg swelling 6/12/2020   • Other pulmonary embolism without acute cor pulmonale ([facility]) 2/21/2020   • Persistent atrial fibrillation ([facility]) 2/8/2020   • Pneumonia 1993   • Tremor, hereditary, benign    • Urinary incontinence 2021    slightly at night   • Wheezing        Past Surgical History:   Procedure Laterality Date   • PB ERCP,DIAGNOSTIC  11/24/2020    Procedure: ERCP, DIAGNOSTIC - WITH STENT REMOVAL;  Surgeon: Quincy Louie M.D.;  Location: [facility];  Service: Gastroenterology   • GASTROSCOPY-ENDO  11/24/2020    Procedure: GASTROSCOPY;  Surgeon: Quincy Louie M.D.;  Location: [facility];  Service: Gastroenterology   • PB ERCP,DIAGNOSTIC  9/17/2020    Procedure: ERCP (ENDOSCOPIC RETROGRADE CHOLANGIOPANCREATOGRAPHY);  Surgeon: Cj Guerrier D.O.;  Location: [facility];  Service: Gastroenterology   • PB ERCP,DIAGNOSTIC  2/14/2020    Procedure: ERCP (ENDOSCOPIC RETROGRADE CHOLANGIOPANCREATOGRAPHY);  Surgeon: Clinton Ray M.D.;  Location: [facility];  Service: Gastroenterology   • ERCP W/SPHINCTEROTOMY/PAPILL.  4/5/2018    Procedure: ERCP W/SPHINCTEROTOMY/PAPILL.;  Surgeon: Quincy Louie M.D.;  Location: [facility];  Service: Gastroenterology   • ERCP W/ INSERTION STENT/TUBE  4/5/2018    Procedure: ERCP W/ INSERTION STENT/TUBE - STENT PLACEMENT/REMOVAL;  Surgeon: Quincy Louie M.D.;  Location: [facility];  Service: Gastroenterology   • ERCP W/REMOVAL CALCULUS  4/5/2018    Procedure: ERCP W/REMOVAL CALCULUS W/DILATION;  Surgeon: Quincy Louie M.D.;  Location: [facility];  Service: Gastroenterology   • ERCP  4/5/2018    Procedure: ERCP W/POSS BIOPSY;  Surgeon: Quincy Louie M.D.;  Location: [facility];  Service: Gastroenterology   • COMMON BILE DUCT EXPLORATION  02/15/2018   • ERCP W/ INSERTION STENT/TUBE  02/15/2018   • ERCP W/SPHINCTEROTOMY/PAPILL.  02/15/2018   • ERCP W/REMOVAL CALCULUS  02/15/2018   • ERCP  2/15/2018    Procedure: ERCP, SPHINCTEROTOMY, STENT PLACEMENT, DEBRIDEMENT;  Surgeon: Quincy Louie M.D.;  Location: [facility];  Service: Gastroenterology   • ARIELLA BY LAPAROSCOPY  july, 2015    [facility]   • HYSTERECTOMY, TOTAL ABDOMINAL  1/18/10    Uterine, Dr. Whelan and Dr. Cam +BSO   • ABDOMINAL HYSTERECTOMY TOTAL  Jan 2010    Dr. Cam   • OOPHORECTOMY  Jan 2010    BSO   • OPEN REDUCTION      ankle       Family History   Problem Relation Age of Onset   • Heart Disease Father 45        MI   • Lung Disease Father    • Stroke Father 70   • Cancer Father    • Hypertension Father    • Cancer Paternal Grandmother         breast   • Cancer Paternal Grandfather        Social History     Socioeconomic History   • Marital status:      Spouse name: Not on file   • Number of children: Not on file   • Years of education: Not on file   • Highest education level: Not on file   Occupational History   • Not on file   Tobacco Use   • Smoking status: Former  "Smoker     Packs/day: 1.00     Years: 20.00     Pack years: 20.00     Types: Cigarettes     Quit date: 1991     Years since quittin.4   • Smokeless tobacco: Never Used   Vaping Use   • Vaping Use: Never used   Substance and Sexual Activity   • Alcohol use: Not Currently     Alcohol/week: 0.0 - 2.4 oz     Comment: hardly ever   • Drug use: No   • Sexual activity: Never     Partners: Male     Birth control/protection: Post-Menopausal   Other Topics Concern   • Not on file   Social History Narrative   • Not on file     Social Determinants of Health     Financial Resource Strain:    • Difficulty of Paying Living Expenses:    Food Insecurity:    • Worried About Running Out of Food in the Last Year:    • Ran Out of Food in the Last Year:    Transportation Needs:    • Lack of Transportation (Medical):    • Lack of Transportation (Non-Medical):    Physical Activity:    • Days of Exercise per Week:    • Minutes of Exercise per Session:    Stress:    • Feeling of Stress :    Social Connections:    • Frequency of Communication with Friends and Family:    • Frequency of Social Gatherings with Friends and Family:    • Attends Rastafarian Services:    • Active Member of Clubs or Organizations:    • Attends Club or Organization Meetings:    • Marital Status:    Intimate Partner Violence:    • Fear of Current or Ex-Partner:    • Emotionally Abused:    • Physically Abused:    • Sexually Abused:        Allergies as of 2021 - Reviewed 2021   Allergen Reaction Noted   • Codeine Swelling 2010   • Ace inhibitors  2012        @Vital signs for this encounter:  Vitals:    21 1531   Height: 1.575 m (5' 2\")   Weight: 88.5 kg (195 lb)   Weight % change since last entry.: 0 %   BP: 134/90   Pulse: 69   BMI (Calculated): 35.67   Resp: 16       Current medications as of today   Current Outpatient Medications   Medication Sig Dispense Refill   • Fluticasone-Umeclidin-Vilant (TRELEGY ELLIPTA) 100-62.5-25 MCG/INH " AEROSOL POWDER, BREATH ACTIVATED Inhale 1 Puff every day. Rinse mouth after use 1 Each 11   • montelukast (SINGULAIR) 10 MG Tab Take 1 tablet by mouth every bedtime. Take 1 tablet by mouth nightly at bedtime. 30 tablet 11   • potassium chloride ER (KLOR-CON) 10 MEQ tablet Take 4 Tablets by mouth 2 times a day. 180 tablet 4   • rosuvastatin (CRESTOR) 20 MG Tab Take 1 tablet by mouth every evening. 100 tablet 3   • losartan (COZAAR) 25 MG Tab Take 1 tablet by mouth 2 Times a Day. 180 tablet 3   • warfarin (COUMADIN) 5 MG Tab Take 1 tablet by mouth every day. As directed by [facility] 90 tablet 1   • metoprolol tartrate (LOPRESSOR) 100 MG Tab Take 1 tablet by mouth 2 times a day. 180 tablet 4   • sertraline (ZOLOFT) 25 MG tablet Take 1 Tab by mouth every day. 30 Tab 11   • HYDROcodone/acetaminophen (NORCO)  MG Tab Take 1 Tab by mouth every 6 hours as needed.     • VITAMIN D PO Take 2,000 mg by mouth every day.     • torsemide (DEMADEX) 20 MG Tab Take 1 Tab by mouth every day. 30 Tab 3   • famotidine (PEPCID) 20 MG Tab Take 20 mg by mouth every day.     • zolpidem (AMBIEN) 10 MG Tab Take 10 mg by mouth at bedtime as needed for Sleep.     • albuterol 108 (90 Base) MCG/ACT Aero Soln inhalation aerosol Inhale 2 Puffs by mouth every 6 hours as needed for Shortness of Breath. 8.5 g 2   • Calcium Carbonate-Vit D-Min (CALCIUM 1200 PO) Take 1,200 mg by mouth every day.     • Magnesium 400 MG Tab Take 400 mg by mouth every day.       No current facility-administered medications for this visit.         Physical Exam:   Gen:           Alert and oriented, No apparent distress. Mood and affect appropriate, normal interaction with provider.  Eyes:          sclere white, conjunctive moist.  Hearing:     Grossly intact.  Dentition:    Upper and lower dentures  Oropharynx:   Tongue normal, posterior pharynx without erythema or exudate.  Neck:        Supple, trachea midline, no masses.  Respiratory Effort: No  intercostal retractions or use of accessory muscles.   Lung Auscultation:      Decreased bases; no rales, rhonchi or wheezing.  CV:            Regular rate and rhythm. No edema. No murmurs, rubs or gallops.  Digits, Nails, Ext: No clubbing, cyanosis, petechiae, or nodes.   Skin:        No rashes, lesions or ulcers noted on exposed skin  surfaces.    Gait:     Requires walker for stabilization.                Sorry      Assessment:  1. Pulmonary nodules  BRONCHOSCOPY   2. Long term (current) use of anticoagulants     3. Chronic obstructive pulmonary disease, unspecified COPD type (HCC)     4. Chronic pulmonary embolism without acute cor pulmonale, unspecified pulmonary embolism type (HCC)         Immunizations:    Flu: 9/22/2020  Pneumovax 23: 4/7/2020  Prevnar 13: 3/23/2016    Plan:    76 y.o. year old female here today for follow-up on abnormal chest CT and PET scan results.  Last seen in clinic 3/29/2021 for her COPD. She is a former smoker with reported 20-pack-year history and quit date 1991.  She is accompanied today by her daughter.      Former smoker: Remains abstinent of tobacco for many years.    Past medical history includes history of uterine cancer in 2010, moderate persistent asthma, allergic rhinitis, chronic PE, chronic respiratory failure with hypoxia, pulmonary hypertension, chronic afib, chronic anticoagulation, benign tremor. Cardioversion 3/2/21.  Patient reports improvement in work of breathing post cardioversion.    Reviewed in clinic vitals including /90, heart rate 69, O2 sat of 93% on room air.    Reviewed home medication regimen including montelukast, albuterol, Trelegy, Ambien, famotidine and warfarin.  Patient reports using her albuterol inhaler every morning.    COPD: continue current regimen with stated benefit. Albuterol refilled.    Reviewed most recent imaging including PET scan obtained 5/28/2021 demonstrating focal uptake right thyroid gland, SUV uptake 5.7, left lower lobe  mass with increased uptake, SUV max 6.5, no hypermetabolic hilar mediastinal or axillary lymphadenopathy.    Abnormal imaging: After thorough discussion patient would like to proceed with navigational bronchoscopy for lung mass.  Will also need ultrasound of her thyroid.  Reached out to primary care provider who will follow up on thyroid issue.      Chronic anticoagulation:  Patient is on anticoagulant for chronic PE and chronic atrial fibrillation and will need to hold prior to procedure.  She awaits instruction on that.    Patient has follow-up appointment scheduled in August.    This dictation was created using voice recognition software. The accuracy of the dictation is limited to the abilities of the software. I expect there may be some errors of grammar and possibly content.

## 2021-06-07 NOTE — TELEPHONE ENCOUNTER
Lorena Sanchez P.A.-C.  You 1 hour ago (11:47 AM)     Patient was contacted, see result note.      Routing comment

## 2021-06-07 NOTE — PATIENT INSTRUCTIONS
1-proceed with bronchoscopy  2-update Dr. Angel  3-needs follow up u/s thyroid  4-albuterol reordered  5-follow up as already scheduled

## 2021-06-14 NOTE — TELEPHONE ENCOUNTER
[facility]    Received anticoagulation clearance regarding upcoming bronchoscopy.    Procedure date 7/6/21    Pt is on warfarin for hx of PE and hx of atrial fibrillation.  D/t hx of PE, as well as CHADsVASc = 5 (htn, age, vascular dz, female), bridging with Lovenox is indicated    Will d/w pt during her anticoagulation appt on 6/28/21    Osiel SalterD

## 2021-06-14 NOTE — TELEPHONE ENCOUNTER
EBUS + Veran scheduled for 7/6/2021 checking in at 1100 for a 1300 start time.   CT for Veran at 1130.  Sending this message to Coumadin clinic for clearance.

## 2021-06-17 NOTE — OR NURSING
Hx and meds reviewed, pre op instructions given, handouts reviewed, questions answered.  Pt instructed to continue regularly prescribed medications through day before surgery.Per anesthesia protocol pt instructed to take these medications with a sip of water the day of surgery. Anesthesia fasting guidelines reviewed with pt-albuterol inh if needed, pepcid, norco if needed and metoprolol.Per pharmacy note- pt has appt 6/28 at coumadin clinic and they will tell her when to hold coumadin pre bronch, pt aware. Covid vaccinated, given CDC guidelines for vaccinated people. Placed on TOM protocol and fall risk per screening scores. Dr Chavez notified of cardiac hx.

## 2021-06-18 NOTE — PREPROCEDURE INSTRUCTIONS
Dr Chavez's response-  As you have mentioned, patient will need to await instructions as to when to hold anticoagulation prior to the procedure. She should take all her pulmonary medications, particularly on the day of surgery, given that she has a combined obstructive and restrictive lung disease and gets good bronchodilator response. She remains at intermediate to high risk for postoperative pulmonary complications given the need for a general anesthetic and an endotracheal tube. OK to proceed from my standpoint.  Thank you.   Thien Chavez M.D.  [facility] was instructed to take pulmonary related meds DOS at [facility].

## 2021-06-21 NOTE — TELEPHONE ENCOUNTER
FAX: [facility]    RE: Lab: Platelets are low       Scanned fax into pt's chart and routed to Dr Rogers.

## 2021-06-28 NOTE — PROGRESS NOTES
OP Anticoagulation Service Note    Date: 6/28/2021  There were no vitals filed for this visit.   pt declined vitals    Anticoagulation Summary  As of 6/28/2021    INR goal:  2.0-3.0   TTR:  78.2 % (1.2 y)   INR used for dosing:  3.00 (6/28/2021)   Warfarin maintenance plan:  5 mg (5 mg x 1) every day   Weekly warfarin total:  35 mg   Plan last modified:  Cristela Benoit, PharmD (2/22/2021)   Next INR check:  7/8/2021   Target end date:  Indefinite    Indications    Persistent atrial fibrillation (HCC) (Resolved) [I48.19]  Chronic pulmonary embolism (HCC) [I27.82]  Long term (current) use of anticoagulants [Z79.01]             Anticoagulation Episode Summary     INR check location:      Preferred lab:      Send INR reminders to:      Comments:        Anticoagulation Care Providers     Provider Role Specialty Phone number    Mary Carmen Rogers M.D. Referring Internal Medicine Critical Care [phone]    [facility] Responsible  [phone]        Anticoagulation Patient Findings      HPI:   Rufina Macias seen in clinic today, on anticoagulation therapy with warfarin (a high risk medication) for atrial fibrillation w/ hx of PE     Pt is here today to evaluate anticoagulation therapy    Previous INR was  2.6 on 5/17/21    Pt was instructed to continue current regimen    Confirmed warfarin dosing regimen, denies missed or extra doses of coumadin.   Diet has been consistent with foods rich in vitamin K: Yes  Changes in ETOH:  No  Changes in smoking status: No  Changes in medication: Yes - stopped her calcium with vitamin K  Pt is not on antiplatelet/NSAID therapy  Cost restriction: No  S/s of bleeding:  No  Falls or accidents since last visit No  Signs/symptoms  thrombosis since the last appt: No      A/P   INR  -therapeutic toda   Continue current warfarin regimen     Pt is having procedure 7-6-21, will need to stop warfarin and bridge. Will dose lovenox 1.5 mg/kg, CrCl >60 ml/min.      7-1-21  Stop warfarin   7-2-21 Start Lovenox 120 mg once daily in the AM   7-3-21 Continue Lovenox 120 mg once daily in the AM   7-4-21 Continue  Lovenox 120 mg once daily in the AM   7-5-21 Day before procedure no blood thinners  7-6-21 Day of procedure No blood thinners before procedure. After procedure if ok with provider restart warfarin at your usual dosing regimen.   7-7-21 Continue warfarin at 7.5 mg regimen, Resume Lovenox 120 mg once daily in the AM.   7-8-21 Continue warfarin at 7.5 mg regimen, Resume Lovenox 120 mg once daily in the AM.    Continue both wafarin 7.5 mg daily and lovenox 120 mg once daily until INR > 2.0    Pt educated to contact our clinic with any changes in medications or s/s of bleeding or thrombosis. Pt is aware to seek immediate medical attention for falls, head injury or deep cuts    Follow up appointment in 2 days after procedure and to reduce risk of adverse events from warfarin  Cindy Treviño, Shahid Treviño, PharmD

## 2021-06-28 NOTE — PATIENT INSTRUCTIONS
7-1-21 Stop warfarin   7-2-21 Start Lovenox 120 mg once daily in the AM   7-3-21 Continue Lovenox 120 mg once daily in the AM   7-4-21 Continue  Lovenox 120 mg once daily in the AM   7-5-21 Day before procedure no blood thinners  7-6-21 Day of procedure No blood thinners before procedure. After procedure if ok with provider restart warfarin at your usual dosing regimen.   7-7-21 Continue warfarin at 7.5 mg regimen, Resume Lovenox 120 mg once daily in the AM.   7-8-21 Continue warfarin at 7.5 mg regimen, Resume Lovenox 120 mg once daily in the AM.    Continue both wafarin 7.5 mg daily and lovenox 120 mg once daily until INR > 2.0

## 2021-07-06 NOTE — OR NURSING
1003: Patient allergies and NPO status verified, home medication reconciliation completed and belongings secured. Patient verbalizes understanding of pain scale, expected course of stay and plan of care. Surgical site verified with patient. IV access established. Sequentials placed on legs. Triple aim completed by CNA.    BP elevated, Dr. Jesus aware, order for Hydralyazine.      1020:  Pt BP remains high, Dr. Jesus ordered Hydralazine.    1023:  Pt To CT.    1120:  Dr Rogers CX surgery d/t tumor placement.

## 2021-07-06 NOTE — PROGRESS NOTES
Patient is scheduled today for bronchoscopy/EBUS/Veran navigational w/ biopsy and BAL for enlarging LLL nodule with avid uptake on PET scan concerning for malignancy until proven otherwise. Discussed about risks and benefits of undergoing bronchoscopy/EBUS/Veran with possible biopsy and BAL. Risks discussed include but were not limited to bleeding, pneumothorax, respiratory failure requiring ventilatory support, infection, and possible death. Questions were encouraged and answered. Patient is hesitant about undergoing the scheduled bronchoscopy given the aforementioned risks as she was not aware of all the risks associated with this procedure. Patient and her daughter requested not to proceed with bronchoscopy and wants a radiation oncology referral to see if she is a candidate for radiation therapy. Radiation oncology referral placed. Endo team notified and cancelled procedure.     Mary Carmen Rogers MD   Pulmonary Critical Care   [facility]

## 2021-07-06 NOTE — ANESTHESIA PREPROCEDURE EVALUATION
Pulmonary mass. COPD on nocturnal O2.     Denies angina, new dyspnea, GERD.     Relevant Problems   PULMONARY   (positive) Panlobular emphysema (HCC)      NEURO   (positive) History of chronic atrial fibrillation      CARDIAC   (positive) Aortic atherosclerosis (HCC)   (positive) Chronic pulmonary embolism (HCC)   (positive) Coronary artery calcification seen on CAT scan   (positive) Essential hypertension   (positive) Pulmonary hypertension (HCC)- RVSP = 25 ECHO 2021       Physical Exam    Airway   Mallampati: II  TM distance: >3 FB  Neck ROM: full       Cardiovascular - normal exam  Rhythm: regular  Rate: normal  (-) murmur     Dental - normal exam  (+) upper dentures, lower dentures      Very poor dentition   Pulmonary - normal exam  Breath sounds clear to auscultation     Abdominal    Neurological - normal exam                 Anesthesia Plan    ASA 3   ASA physical status 3 criteria: COPD    Plan - general       Airway plan will be LMA          Induction: intravenous    Postoperative Plan: Postoperative administration of opioids is intended.    Pertinent diagnostic labs and testing reviewed    Informed Consent:    Anesthetic plan and risks discussed with patient.    Use of blood products discussed with: patient whom consented to blood products.

## 2021-07-07 NOTE — PROGRESS NOTES
Referral Notes 07/07/2021  1:43 PM Rosalind Chicas - -   Note    Called pt to schedule consult. Offered 7/12 or 7/13. Pt didn't want to schedule until she sees PCP on 7/19. Let her know Dr. Lopez was out of toen until first week of August and she was fine with scheduling 8/3/21.        8/3/21 1:30pm     Dx: R91.1

## 2021-07-07 NOTE — PROGRESS NOTES
Mary Carmen Rogers M.D.  You 23 hours ago (11:37 AM)   CT  Please make sure the referral gets through to radiation oncology.     Thanks,     - CT

## 2021-07-07 NOTE — TELEPHONE ENCOUNTER
Patient showed up for bronch yesterday and after talking to Dr. Rogers decided she did not want to go forward with the procedure.

## 2021-07-09 NOTE — PROGRESS NOTES
OP Anticoagulation Service Note    Date: 2021  There were no vitals filed for this visit.   pt declined vitals    Anticoagulation Summary  As of 2021    INR goal:  2.0-3.0   TTR:  77.3 % (1.3 y)   INR used for dosin.30 (2021)   Warfarin maintenance plan:  5 mg (5 mg x 1) every day   Weekly warfarin total:  35 mg   Plan last modified:  Cristela Benoit, PharmD (2021)   Next INR check:  2021   Target end date:  Indefinite    Indications    Persistent atrial fibrillation (HCC) (Resolved) [I48.19]  Chronic pulmonary embolism (HCC) [I27.82]  Long term (current) use of anticoagulants [Z79.01]             Anticoagulation Episode Summary     INR check location:      Preferred lab:      Send INR reminders to:      Comments:        Anticoagulation Care Providers     Provider Role Specialty Phone number    Mary Carmen Rogers M.D. Referring Internal Medicine Critical Care [phone]    [facility] Anticoagulation Services Responsible  [phone]        Anticoagulation Patient Findings  Patient Findings     Positives:  Missed doses, Bruising    Negatives:  Signs/symptoms of thrombosis, Signs/symptoms of bleeding, Laboratory test error suspected, Change in health, Change in alcohol use, Change in activity, Upcoming invasive procedure, Emergency department visit, Upcoming dental procedure, Extra doses, Change in medications, Change in diet/appetite, Hospital admission, Other complaints          HPI:   Rufina Macias seen in clinic today, on anticoagulation therapy with warfarin (a high risk medication) for atrial fibrillation with hx of PE       Pt is here today to evaluate anticoagulation therapy    Previous INR was  1.03 on 21    She was off for procedure that she ended up no undergoing, she is bridging with lovenox post-operatively      Diet has been consistent with foods rich in vitamin K: No  Changes in ETOH:  No  Changes in smoking status: No  Changes in medication: No   Pt is not on  antiplatelet/NSAID therapy  Cost restriction: No  S/s of bleeding:  No  Falls or accidents since last visit No  Signs/symptoms  thrombosis since the last appt: No      A/P   INR  SUB-therapeutic today, will require dose adjust ment today to prevent recurrence of thrombosis or stroke and closer follow up.    Continue Lovenox and increase warfarin to 7.5 mg x 3 days then resume usual warfarin regimen         Pt educated to contact our clinic with any changes in medications or s/s of bleeding or thrombosis. Pt is aware to seek immediate medical attention for falls, head injury or deep cuts    Follow up appointment in 4 day(s) to reduce risk of adverse events from warfarin  Cindy Treviño, PharmD

## 2021-07-13 NOTE — PROGRESS NOTES
OP Anticoagulation Service Note    Date: 2021  There were no vitals filed for this visit.   pt declined vitals    Anticoagulation Summary  As of 2021    INR goal:  2.0-3.0   TTR:  76.8 % (1.3 y)   INR used for dosin.20 (2021)   Warfarin maintenance plan:  5 mg (5 mg x 1) every day   Weekly warfarin total:  35 mg   Plan last modified:  Cristela Benoit, PharmD (2021)   Next INR check:  2021   Target end date:  Indefinite    Indications    Persistent atrial fibrillation (HCC) (Resolved) [I48.19]  Chronic pulmonary embolism (HCC) [I27.82]  Long term (current) use of anticoagulants [Z79.01]             Anticoagulation Episode Summary     INR check location:      Preferred lab:      Send INR reminders to:      Comments:        Anticoagulation Care Providers     Provider Role Specialty Phone number    Mary Carmen Rogers M.D. Referring Internal Medicine Critical Care [phone]    [facility] Anticoagulation Services Responsible  [phone]        Anticoagulation Patient Findings  Patient Findings     Negatives:  Signs/symptoms of thrombosis, Signs/symptoms of bleeding, Laboratory test error suspected, Missed doses, Extra doses, Bruising          HPI:   Rufina Macias seen in clinic today, on anticoagulation therapy with warfarin (a high risk medication) for atrial fibrillation, hx of PE     Pt iss here today to evaluate anticoagulation therapy    Previous INR was  1.3 on 21    Pt was instructed to continue bridging with lovenox, increase warfarin dose x 3 days    Confirmed warfarin dosing regimen, denies missed or extra doses of coumadin.   Diet has been consistent with foods rich in vitamin K: Yes  Changes in ETOH:  No  Changes in smoking status: No  Changes in medication: No     Cost restriction: No  S/s of bleeding:  No  Falls or accidents since last visit No  Signs/symptoms  thrombosis since the last appt: No      A/P   INR  therapeutic today  Stop Lovenox, continue usual warfarin  regimen         Pt educated to contact our clinic with any changes in medications or s/s of bleeding or thrombosis. Pt is aware to seek immediate medical attention for falls, head injury or deep cuts    Follow up appointment in 2 week(s) to reduce risk of adverse events from warfarin  Cindy Treviño, PharmD

## 2021-07-19 PROBLEM — R91.8 MASS OF LOWER LOBE OF LEFT LUNG: Status: ACTIVE | Noted: 2021-01-01

## 2021-07-20 NOTE — PROGRESS NOTES
Subjective:      Rufina Macias is a 76 y.o. female who presents with Lab Results and COPD (medication management )  The patient is here for followup of chronic medical problems listed below. The patient is compliant with medications and having no side effects from them. Denies chest pain, abdominal pain, dyspnea, myalgias, or cough.   Patient Active Problem List    Diagnosis Date Noted   • Mass of lower lobe of left lung- enlarging on petct june 2021 07/19/2021   • False positive serological test for hepatitis C- neg HCV quant RNA by PCR [id number] 03/17/2021   • Pulmonary hypertension (HCC)- RVSP = 25 ECHO 2021 03/17/2021   • History of chronic atrial fibrillation- dc cardioversion 3/2021- dr chatterjee 03/17/2021   • Heart failure with preserved ejection fraction (HCC) 10/22/2020   • Coronary artery calcification seen on CAT scan 06/12/2020   • Aortic atherosclerosis (HCC) 06/12/2020   • Mild concentric left ventricular hypertrophy (LVH) 04/06/2020   • High risk medication use 03/11/2020   • Long term (current) use of anticoagulants 03/09/2020   • Chronic pulmonary embolism (HCC) 03/05/2020   • Chronic respiratory failure with hypoxia (HCC) 03/05/2020   • Multiple pulmonary nodules determined by computed tomography of lung 02/24/2020   • IFG (impaired fasting glucose) 02/21/2020   • Uncomplicated opioid dependence (HCC)- nv pain and spine 01/06/2020   • Ganglion cyst of tendon sheath of right hand- surveillance 07/08/2019   • Panlobular emphysema (HCC) 01/08/2019   • Chronic pain of left knee- dr contreras- celebrex and cortisone inj; dr cade to do  supartz inj 01/08/2019   • Primary osteoarthritis involving multiple joints- to get supartz inj left knee- nv pain and spine 06/26/2018   • Chronic midline low back pain without sciatica- norco chronic daily use; nv pain and spine 04/04/2017   • History of cardioversio 3//2/21 FOR CHRONIC A.FIB 03/23/2016   • Essential hypertension 08/31/2015   • Obesity (BMI 30.0-34.9)  "06/26/2015   • DDD (degenerative disc disease), cervical 04/07/2015   • Musculoskeletal neck pain 04/07/2015   • Thoracic radiculopathy 03/09/2015   • Lumbar radiculopathy 03/09/2015   • Vitamin D deficiency disease 10/11/2013   • DDD (degenerative disc disease), lumbar 10/11/2013   • Chronic allergic rhinitis 10/11/2013   • Primary insomnia 11/29/2012   • Mixed hyperlipidemia 10/28/2010     Codeine, Ace inhibitors, and Other drug  I have changed Rufina Macias's potassium chloride ER. I am also having her maintain her Calcium Carbonate-Vit D-Min (CALCIUM 1200 PO), Magnesium, zolpidem, famotidine, torsemide, VITAMIN D PO, HYDROcodone/acetaminophen, sertraline, metoprolol tartrate, warfarin, losartan, rosuvastatin, Trelegy Ellipta, montelukast, albuterol, FIBER PO, and enoxaparin.            HPI    Review of Systems   Constitutional: Negative.    HENT: Negative.    Eyes: Negative.    Respiratory: Negative.    Cardiovascular: Negative.    Gastrointestinal: Negative.    Genitourinary: Negative.    Musculoskeletal: Negative.    Skin: Negative.    Neurological: Negative.    Endo/Heme/Allergies: Negative.    Psychiatric/Behavioral: Negative.           Objective:     /80 (BP Location: Left arm, Patient Position: Sitting, BP Cuff Size: Adult)   Pulse (!) 59   Temp 36.9 °C (98.4 °F) (Temporal)   Ht 1.575 m (5' 2\")   Wt 91.5 kg (201 lb 12.8 oz)   SpO2 95%   BMI 36.91 kg/m²      Physical Exam  Vitals reviewed.   Constitutional:       General: She is not in acute distress.     Appearance: She is well-developed. She is not diaphoretic.   HENT:      Head: Normocephalic and atraumatic.      Right Ear: External ear normal.      Left Ear: External ear normal.      Nose: Nose normal.      Mouth/Throat:      Pharynx: No oropharyngeal exudate.   Eyes:      General: No scleral icterus.        Right eye: No discharge.         Left eye: No discharge.      Conjunctiva/sclera: Conjunctivae normal.      Pupils: Pupils are " equal, round, and reactive to light.   Neck:      Thyroid: No thyromegaly.      Vascular: No JVD.      Trachea: No tracheal deviation.   Cardiovascular:      Rate and Rhythm: Normal rate and regular rhythm.      Heart sounds: Normal heart sounds. No murmur heard.   No friction rub. No gallop.    Pulmonary:      Effort: Pulmonary effort is normal. No respiratory distress.      Breath sounds: Normal breath sounds. No stridor. No wheezing or rales.   Chest:      Chest wall: No tenderness.   Abdominal:      General: Bowel sounds are normal. There is no distension.      Palpations: Abdomen is soft. There is no mass.      Tenderness: There is no abdominal tenderness. There is no guarding or rebound.   Musculoskeletal:         General: No tenderness. Normal range of motion.      Cervical back: Normal range of motion and neck supple.   Lymphadenopathy:      Cervical: No cervical adenopathy.   Skin:     General: Skin is warm and dry.      Coloration: Skin is not pale.      Findings: No erythema or rash.   Neurological:      Mental Status: She is alert and oriented to person, place, and time.      Cranial Nerves: No cranial nerve deficit.      Motor: No abnormal muscle tone.      Coordination: Coordination normal.      Deep Tendon Reflexes: Reflexes are normal and symmetric. Reflexes normal.   Psychiatric:         Behavior: Behavior normal.         Thought Content: Thought content normal.         Judgment: Judgment normal.                         Assessment/Plan:        1. Mass of lower lobe of left lung- enlarging on petct june 2021  This appears to be enlarging lung cancer by PET CT scanning followed by pulmonary.  Without benefit of biopsy PMA has recommended possible radiation therapy and referrals been placed.    2. Hypokalemia  Level now equals 5.0.  Will reduce her potassium dosage so that she only takes 2 a day and only on the days she takes her furosemide.  This was incidentally found  - potassium chloride ER  (KLOR-CON) 10 MEQ tablet; Take 2 Tablets by mouth 1 time a day as needed (when taking lasix fwater pill) for up to 90 days.  Dispense: 60 tablet; Refill: 5    3. Thyroid nodule  At the time of her CT and PET CT scanning.  Ultrasound ordered.  Pending these results we may consider referral for biopsy and for further management.  Will call patient with results.    4. Heart failure with preserved ejection fraction, unspecified HF chronicity (HCC)  Good control continue same regimen.    5. Mixed hyperlipidemia  Good control continue same regimen  - TSH; Future  - Comp Metabolic Panel; Future  - Lipid Profile; Future  - CBC WITH DIFFERENTIAL; Future    6. Persistent atrial fibrillation (HCC)  Good control continue same regimen.    7. IFG (impaired fasting glucose)  Good control continue same regimen  - HEMOGLOBIN A1C; Future    8. Panlobular emphysema (HCC)  Good control continue same regimen    9. Need for vaccination     - Shingrix Vaccine    10. Obesity, morbid, BMI 40.0-49.9 (HCC)      diet/exercise/lose 15 lbs.; patient counseled

## 2021-07-26 NOTE — PROGRESS NOTES
OP Anticoagulation Service Note    Date: 2021  There were no vitals filed for this visit.   pt declined vitals    Anticoagulation Summary  As of 2021    INR goal:  2.0-3.0   TTR:  77.5 % (1.3 y)   INR used for dosin.20 (2021)   Warfarin maintenance plan:  5 mg (5 mg x 1) every day   Weekly warfarin total:  35 mg   Plan last modified:  Cristela Benoit, PharmD (2021)   Next INR check:  2021   Target end date:  Indefinite    Indications    Persistent atrial fibrillation (HCC) (Resolved) [I48.19]  Chronic pulmonary embolism (HCC) [I27.82]  Long term (current) use of anticoagulants [Z79.01]             Anticoagulation Episode Summary     INR check location:      Preferred lab:      Send INR reminders to:      Comments:        Anticoagulation Care Providers     Provider Role Specialty Phone number    Mary Carmen Rogers M.D. Referring Internal Medicine Critical Care [phone]    [facility] Anticoagulation Services Responsible  [phone]            HPI:   Rufina Macias seen in clinic today, on anticoagulation therapy with warfarin (a high risk medication) for atrial fibrillation, hx of PE   Pt is here today to evaluate anticoagulation therapy    Previous INR was  2.2 on 22    Pt was instructed to continue current regimen    Anticoagulation Patient Findings  Patient Findings     Negatives:  Signs/symptoms of thrombosis, Signs/symptoms of bleeding, Change in health, Change in alcohol use, Upcoming invasive procedure, Emergency department visit, Upcoming dental procedure, Missed doses, Extra doses, Change in medications, Change in diet/appetite, Hospital admission, Bruising          Confirmed warfarin dosing regimen    Pt is not on antiplatelet/NSAID therapy  Falls or accidents since last visit No        A/P   INR  therapeutic today,   Continue current warfarin regimen         check referral    Pt educated to contact our clinic with any changes in medications or s/s of bleeding or  thrombosis. Pt is aware to seek immediate medical attention for falls, head injury or deep cuts    Follow up appointment in 4 week(s) to reduce risk of adverse events from warfarin  Cindy Treviño, PharmD

## 2021-07-27 NOTE — PROGRESS NOTES
Patient referred to [facility] (where she has been seen for pulmonary nodules), for new evaluation of incidentally discovered (on PET/CT) right 1.8 cm thyroid nodule.  May need biopsy.

## 2021-07-28 NOTE — TELEPHONE ENCOUNTER
Called and spoke to pt. Let her know Dr Rory Lepe, oncologist, has reviewed her US images and report and stated that her thyroid nodule is benign or non-cancerous and no additional imaging or procedures are needed. Pt verbalized understanding.

## 2021-08-02 PROBLEM — C34.32 PRIMARY CANCER OF LEFT LOWER LOBE OF LUNG (HCC): Status: ACTIVE | Noted: 2021-01-01

## 2021-08-03 NOTE — CONSULTS
RADIATION ONCOLOGY CONSULT    DATE OF SERVICE: 8/3/2021    IDENTIFICATION:   Stage IA3 ([id number]) non-small cell lung carcinoma of the left lower lobe of lung, medically inoperable    HISTORY OF PRESENT ILLNESS: I had the pleasure of seeing Ms. Macias today in consultation at the request of Dr. Rogers for her lung cancer.  Patient presented with a solitary pulmonary nodule in her left lower lobe which originally measured 1.3 x 1.0 cm with short interval follow-up this had increased in size to 2.1 x 1.5 cm.  This is directly adjacent to her vena cava.  Patient had a PET scan that showed only avidity in this left lower lobe lesion without any adenopathy or evidence of distant disease.  She was scheduled for a bronchoscopy but while discussing the risks and benefits, felt the benefits were not outweighed by the risks and canceled her appointment.  Of note in 2020 her pulmonary function test showed an FEV1 of 0.65 which is 33% predicted, and FVC of 1.5 which is 42% predicted, and FEV1 to FVC ratio of 61%, and a DLCO of 100%.  Therefore exhibiting a significantly restrictive pattern.  Given her pulmonary function test and the proximity to the great vessel the biopsy certainly would be in the more risky category.  Therefore she is sent to me for consideration of stereotactic body radiosurgery for this presumed early stage lung cancer.    PAST MEDICAL HISTORY:   Past Medical History:   Diagnosis Date   • Acute cystitis with hematuria 9/14/2020   • Acute kidney injury superimposed on chronic kidney disease (HCC) 9/18/2020   • Adrenal nodule (HCC) 2/21/2020   • Allergy    • Anticoagulated 3/11/2020   • Anxiety 9/29/2020   • Arrhythmia 02/2020    A fib   • Arthritis     Spine, neck, hands, ankles and knees   • Asthma     inhalers as needed   • Back pain     and neck   • Bilateral leg edema 4/6/2020   • Blood clotting disorder (HCC) 02/2020    lung   • Bowel habit changes 2021    diarrhea with diet changes   • Breath shortness      at times, uses O2 1l prn Preferred   • Bronchitis    • CA - cancer of uterus 2009   • 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 with obstruction- ERCP EXTRACTON/ SPHINCTEROTOMY, recurrent Feb 2020 2/17/2020   • Cancer (HCC) 2021    LLL nodule   • Carpal tunnel syndrome    • COPD    • COPD (chronic obstructive pulmonary disease) ([facility]) 8/31/2012   • Coronary artery calcification seen on CAT scan 6/12/2020   • Dental disorder     full dentures   • Diverticula of colon    • Diverticulosis    • Emphysema of lung ([facility]) 2020    COPD   • Heart burn    • High cholesterol    • Hyperlipidemia    • Hypertension    • Leg swelling 6/12/2020   • Other pulmonary embolism without acute cor pulmonale ([facility]) 2/21/2020   • Persistent atrial fibrillation ([facility]) 2/8/2020   • Pneumonia 1993   • Tremor, hereditary, benign    • Urinary incontinence 2021    slightly at night   • Wheezing        PAST SURGICAL HISTORY:  Past Surgical History:   Procedure Laterality Date   • PB ERCP,DIAGNOSTIC  11/24/2020    Procedure: ERCP, DIAGNOSTIC - WITH STENT REMOVAL;  Surgeon: Quincy Louie M.D.;  Location: [facility];  Service: Gastroenterology   • GASTROSCOPY-ENDO  11/24/2020    Procedure: GASTROSCOPY;  Surgeon: Quincy Louie M.D.;  Location: [facility];  Service: Gastroenterology   • PB ERCP,DIAGNOSTIC  9/17/2020    Procedure: ERCP (ENDOSCOPIC RETROGRADE CHOLANGIOPANCREATOGRAPHY);  Surgeon: Cj Guerrier D.O.;  Location: [facility];  Service: Gastroenterology   • PB ERCP,DIAGNOSTIC  2/14/2020    Procedure: ERCP (ENDOSCOPIC RETROGRADE CHOLANGIOPANCREATOGRAPHY);  Surgeon: Clinton Ray M.D.;  Location: [facility];  Service: Gastroenterology   • ERCP W/SPHINCTEROTOMY/PAPILL.  4/5/2018    Procedure: ERCP W/SPHINCTEROTOMY/PAPILL.;  Surgeon: Quincy Louie M.D.;  Location: [facility];  Service: Gastroenterology   • ERCP W/ INSERTION STENT/TUBE  4/5/2018    Procedure: ERCP W/  INSERTION STENT/TUBE - STENT PLACEMENT/REMOVAL;  Surgeon: Quincy Louie M.D.;  Location: [facility];  Service: Gastroenterology   • ERCP W/REMOVAL CALCULUS  4/5/2018    Procedure: ERCP W/REMOVAL CALCULUS W/DILATION;  Surgeon: Quincy Louie M.D.;  Location: [facility];  Service: Gastroenterology   • ERCP  4/5/2018    Procedure: ERCP W/POSS BIOPSY;  Surgeon: Quincy Louie M.D.;  Location: [facility];  Service: Gastroenterology   • COMMON BILE DUCT EXPLORATION  02/15/2018   • ERCP W/ INSERTION STENT/TUBE  02/15/2018   • ERCP W/SPHINCTEROTOMY/PAPILL.  02/15/2018   • ERCP W/REMOVAL CALCULUS  02/15/2018   • ERCP  2/15/2018    Procedure: ERCP, SPHINCTEROTOMY, STENT PLACEMENT, DEBRIDEMENT;  Surgeon: Quincy Louie M.D.;  Location: [facility];  Service: Gastroenterology   • ARIELLA BY LAPAROSCOPY  july, 2015    [facility]   • HYSTERECTOMY, TOTAL ABDOMINAL  1/18/10    Uterine, Dr. Whelan and Dr. Cam +BSO   • ABDOMINAL HYSTERECTOMY TOTAL  Jan 2010    Dr. Cam   • OOPHORECTOMY  Jan 2010    BSO   • OPEN REDUCTION      ankle       CURRENT MEDICATIONS:  Current Outpatient Medications   Medication Sig Dispense Refill   • potassium chloride ER (KLOR-CON) 10 MEQ tablet Take 2 Tablets by mouth 1 time a day as needed (when taking lasix fwater pill) for up to 90 days. 60 tablet 5   • FIBER PO Take 10 mg by mouth every day.     • albuterol 108 (90 Base) MCG/ACT Aero Soln inhalation aerosol INHALE 2 PUFFS BY MOUTH EVERY 6 HOURS AS NEEDED FOR SHORTNESS OF BREATH 8.5 g 4   • Fluticasone-Umeclidin-Vilant (TRELEGY ELLIPTA) 100-62.5-25 MCG/INH AEROSOL POWDER, BREATH ACTIVATED Inhale 1 Puff every day. Rinse mouth after use 1 Each 11   • montelukast (SINGULAIR) 10 MG Tab Take 1 tablet by mouth every bedtime. Take 1 tablet by mouth nightly at bedtime. 30 tablet 11   • rosuvastatin (CRESTOR) 20 MG Tab Take 1 tablet by mouth every evening. 100 tablet 3   • losartan (COZAAR) 25 MG  Tab Take 1 tablet by mouth 2 Times a Day. 180 tablet 3   • warfarin (COUMADIN) 5 MG Tab Take 1 tablet by mouth every day. As directed by [facility] Anticoagulation [facility] 90 tablet 1   • metoprolol tartrate (LOPRESSOR) 100 MG Tab Take 1 tablet by mouth 2 times a day. 180 tablet 4   • sertraline (ZOLOFT) 25 MG tablet Take 1 Tab by mouth every day. 30 Tab 11   • HYDROcodone/acetaminophen (NORCO)  MG Tab Take 1 Tab by mouth every 6 hours as needed.     • VITAMIN D PO Take 2,000 mg by mouth every day.     • torsemide (DEMADEX) 20 MG Tab Take 1 Tab by mouth every day. (Patient taking differently: Take 20 mg by mouth 1 time a day as needed.) 30 Tab 3   • famotidine (PEPCID) 20 MG Tab Take 20 mg by mouth every day.     • zolpidem (AMBIEN) 10 MG Tab Take 10 mg by mouth at bedtime as needed for Sleep.     • Calcium Carbonate-Vit D-Min (CALCIUM 1200 PO) Take 1,200 mg by mouth every day.     • Magnesium 400 MG Tab Take 400 mg by mouth every day.       No current facility-administered medications for this encounter.       ALLERGIES:    Codeine, Ace inhibitors, and Other drug    FAMILY HISTORY:    Family History   Problem Relation Age of Onset   • Heart Disease Father 45        MI   • Lung Disease Father    • Stroke Father 70   • Cancer Father    • Hypertension Father    • Cancer Paternal Grandmother         breast   • Cancer Paternal Grandfather         leukemia       SOCIAL HISTORY:    Social History     Tobacco Use   • Smoking status: Former Smoker     Packs/day: 1.00     Years: 20.00     Pack years: 20.00     Types: Cigarettes     Quit date: 1991     Years since quittin.6   • Smokeless tobacco: Never Used   Vaping Use   • Vaping Use: Never used   Substance Use Topics   • Alcohol use: Not Currently     Alcohol/week: 0.0 - 2.4 oz     Comment: hardly ever   • Drug use: No     Patient is , has 1 child and lives in [place], [place]. Patient is retired.    REVIEW OF SYSTEMS:  A complete review of systems was completed  "in patient's chart on 8/3/2021.  All are negative with relationship to this diagnosis with the exception of:  Patient's breathing has been stable, she does use intermittent oxygen at night or with exertion, she denies any cough that is new.  She denies any headache or neurologic symptoms    PHYSICAL EXAM:    Vitals:    08/03/21 1333   BP: (!) 184/88   Pulse: (!) 57   Temp: 36.6 °C (97.8 °F)   SpO2: 96%   Weight: 88.7 kg (195 lb 8 oz)   Height: 1.575 m (5' 2\")   Pain Score: 4=Slight-Moderate Pain      Pain Scale: 0-10  Pain Assessement: initial  Pain Location, Orientation and Scale: 4/10 chronic pain neck/low back/joints  What makes the pain better: norco  What makes the pain worse: movement    2= Ambulatory and capable of all self care, but unable to carry out any work activities.  Up and about more than 50% of waking hours.       GENERAL: No apparent distress.  HEENT:  Pupils are equal, round, and reactive to light.  Extraocular muscles   are intact. Sclerae nonicteric.  Conjunctivae pink.  Oral cavity, tongue   protrudes midline.   NECK:  Supple without evidence of thyromegaly.  NODES:  No peripheral adenopathy of the neck, supraclavicular fossa or axillae   bilaterally.  LUNGS:  Clear to ascultation bilaterally   HEART:  Regular rate and rhythm.  No murmur appreciated  ABDOMEN:  Soft. No evidence of hepatosplenomegaly.  Positive bowel sounds.  EXTREMITIES:  Without Edema.  NEUROLOGIC:  Cranial nerves II through XII were intact. Normal stance and gait motor and sensory grossly within normal limits      IMPRESSION:   Stage IA3 ([id number]) non-small cell lung carcinoma of the left lower lobe of lung, medically inoperable        RECOMMENDATIONS:   I discussed the diagnosis, prognosis, and treatment options over a 1 hr 5 min time period, 95% of that time dedicated to ongoing treatment management.  I discussed with the patient the work-up and analysis of a solitary pulmonary nodule.  We went over that many patients by " location or lung function cannot perform histologic confirmation.  Therefore various algorithms have been created for probability of malignancy.  These include CAT scan characteristics as well as change on serial CAT scans combined with PET scan findings.  Her specific parameters would put her at high probability of malignancy for her solitary pulmonary nodule.  Statistically this would also be most likely then to be a non-small cell lung carcinoma primary.  Therefore we discussed the management of early versus more advanced non-small cell lung cancer.  She is currently medically inoperable.  Therefore we went over stereotactic radiosurgery for management.  Patient would like to proceed with treatment.  She was given a simulation for next Wednesday at 1 PM.    We discussed the risks, benefits and side effects of treatment and the patient is amenable to treatment.  If patient has any questions or concerns, she should feel free to contact me.    Thank you for the opportunity to participate in her care.  If any questions or comments, please do not hesitate in calling.

## 2021-08-03 NOTE — CT SIMULATION
PATIENT NAME Rufina Macias   PRIMARY PHYSICIAN Quincy Angel [id number]   REFERRING PHYSICIAN Mary Carmne Rogers M.D. 1945     Primary cancer of left lower lobe of lung (HCC)  Staging form: Lung, AJCC 8th Edition  - Clinical: Stage IA3 (cT1c, cN0, cM0) - Signed by Quincy Lopez M.D. on 8/2/2021         Treatment Planning CT Simulation      Order Questions     Question Answer Comment    Is this for a new course of treatment? Yes     Is this an Addendum? No     Implanted Device/Pacemaker No     Simulation Status Initial     Treatment Site Lung     Laterality Left     Treatment Technique SBRT     Other Technique(s) SBRT     Treatment Pattern/Frequency Q OD     Concurrent Chemotherapy No     CT Technique 4D     Slice Thickness 2mm     Scan Extent Chest     Treatment Device(s) Body Fix      OmniBoard     Patient Attire Gown     Patient Position Supine     Patient Orientation Head First     Arm Position Up     Treatment Machine TB1 - STx     Treatment Image Guidance CBCT     Frequency (CBCT) Daily     Image Guidance Match Bone     Treatment Planning Image Fusion CT/PET     Special Physics Consult Stereotactic     Other Orders Special Tx Procedure      Weekly Physics Check             Comments     LLL next to IVC, would likely benefit from O2 on sim and treatment

## 2021-08-03 NOTE — NON-PROVIDER
"Patient was seen today in clinic with Dr. Lopez for consultation.  Vitals signs and weight were obtained and pain assessment was completed.  Allergies and medications were reviewed with the patient.      Vitals/Pain:  Vitals:    08/03/21 1333   BP: (!) 184/88   Pulse: (!) 57   Temp: 36.6 °C (97.8 °F)   SpO2: 96%   Weight: 88.7 kg (195 lb 8 oz)   Height: 1.575 m (5' 2\")   Pain Score: 4=Slight-Moderate Pain        Allergies:   Codeine, Ace inhibitors, and Other drug    Current Medications:  Current Outpatient Medications   Medication Sig Dispense Refill   • potassium chloride ER (KLOR-CON) 10 MEQ tablet Take 2 Tablets by mouth 1 time a day as needed (when taking lasix fwater pill) for up to 90 days. 60 tablet 5   • FIBER PO Take 10 mg by mouth every day.     • albuterol 108 (90 Base) MCG/ACT Aero Soln inhalation aerosol INHALE 2 PUFFS BY MOUTH EVERY 6 HOURS AS NEEDED FOR SHORTNESS OF BREATH 8.5 g 4   • Fluticasone-Umeclidin-Vilant (TRELEGY ELLIPTA) 100-62.5-25 MCG/INH AEROSOL POWDER, BREATH ACTIVATED Inhale 1 Puff every day. Rinse mouth after use 1 Each 11   • montelukast (SINGULAIR) 10 MG Tab Take 1 tablet by mouth every bedtime. Take 1 tablet by mouth nightly at bedtime. 30 tablet 11   • rosuvastatin (CRESTOR) 20 MG Tab Take 1 tablet by mouth every evening. 100 tablet 3   • losartan (COZAAR) 25 MG Tab Take 1 tablet by mouth 2 Times a Day. 180 tablet 3   • warfarin (COUMADIN) 5 MG Tab Take 1 tablet by mouth every day. As directed by [facility] 90 tablet 1   • metoprolol tartrate (LOPRESSOR) 100 MG Tab Take 1 tablet by mouth 2 times a day. 180 tablet 4   • sertraline (ZOLOFT) 25 MG tablet Take 1 Tab by mouth every day. 30 Tab 11   • HYDROcodone/acetaminophen (NORCO)  MG Tab Take 1 Tab by mouth every 6 hours as needed.     • VITAMIN D PO Take 2,000 mg by mouth every day.     • torsemide (DEMADEX) 20 MG Tab Take 1 Tab by mouth every day. (Patient taking differently: Take 20 mg by mouth 1 time " a day as needed.) 30 Tab 3   • famotidine (PEPCID) 20 MG Tab Take 20 mg by mouth every day.     • zolpidem (AMBIEN) 10 MG Tab Take 10 mg by mouth at bedtime as needed for Sleep.     • Calcium Carbonate-Vit D-Min (CALCIUM 1200 PO) Take 1,200 mg by mouth every day.     • Magnesium 400 MG Tab Take 400 mg by mouth every day.       No current facility-administered medications for this encounter.           Wanda Amaya R.N.

## 2021-08-19 NOTE — PROGRESS NOTES
"Call placed to patient.  Introduced self and explained role of cancer navigator.  Pt feels she understands information that was provided to her and what to expect with treatment.  Pt reports will be driving herself and currently no financial issues.  She said she can \"fill out that form\", if does have issues with co-pays.  Pt scored self 6/10 on oncology distress scale.  She reports daughter injured her calf and can not drive.  Patient is juggling her schedule, taking daughter to work and physical therapy.  She discussed that they are \"taking care of each other\".  Provided patient with information on [employer] transportation as added resource as well.  Pt with no other questions at this time.  Provided patient with contact information and encouraged to call if needs come up.  "

## 2021-08-23 NOTE — PROGRESS NOTES
OP Anticoagulation Service Note    Date: 2021  There were no vitals filed for this visit.   pt declined vitals    Anticoagulation Summary  As of 2021    INR goal:  2.0-3.0   TTR:  78.7 % (1.4 y)   INR used for dosin.40 (2021)   Warfarin maintenance plan:  5 mg (5 mg x 1) every day   Weekly warfarin total:  35 mg   Plan last modified:  Cristela Benoit, PharmD (2021)   Next INR check:  2021   Target end date:  Indefinite    Indications    Persistent atrial fibrillation (HCC) (Resolved) [I48.19]  Chronic pulmonary embolism (HCC) [I27.82]  Long term (current) use of anticoagulants [Z79.01]             Anticoagulation Episode Summary     INR check location:      Preferred lab:      Send INR reminders to:      Comments:        Anticoagulation Care Providers     Provider Role Specialty Phone number    Mary Carmen Rogers M.D. Referring Internal Medicine Critical Care [phone]    [facility] Responsible  [phone]            HPI:   Rufina Macias seen in clinic today, on anticoagulation therapy with warfarin (a high risk medication) for atrial fibrillation, hx of PE   Pt is here today to evaluate anticoagulation therapy    Previous INR was  2.2 on 21    Pt was instructed to continue current regimen    Anticoagulation Patient Findings  Patient Findings     Positives:  Other complaints    Negatives:  Signs/symptoms of thrombosis, Signs/symptoms of bleeding, Laboratory test error suspected, Change in health, Change in alcohol use, Change in activity, Upcoming invasive procedure, Emergency department visit, Upcoming dental procedure, Missed doses, Extra doses, Change in medications, Change in diet/appetite, Hospital admission, Bruising    Comments:  Pt is going to be starting radiation          Confirmed warfarin dosing regimen    Pt is not on antiplatelet/NSAID therapy    Falls or accidents since last visit No        A/P   INR  therapeutic today,  Continue current  warfarin regimen        2/21 check referral    Pt educated to contact our clinic with any changes in medications or s/s of bleeding or thrombosis. Pt is aware to seek immediate medical attention for falls, head injury or deep cuts    Follow up appointment in 4 week(s) to reduce risk of adverse events from warfarin  Cindy Treviño, PharmD

## 2021-09-01 NOTE — ON TREATMENT VISIT
ON TREATMENT NOTE  RADIATION ONCOLOGY DEPARTMENT    Patient name:  Rufina Macias    Primary Physician:  Quincy Angel M.D. MRN: [id number]  CSN: [id number]   Referring physician:  Mary Carmen Rogers M.D. : 1945, 76 y.o.     ENCOUNTER DATE:  21    DIAGNOSIS:    Primary cancer of left lower lobe of lung (HCC)  Staging form: Lung, AJCC 8th Edition  - Clinical: Stage IA3 (cT1c, cN0, cM0) - Signed by Quincy Lopez M.D. on 2021      TREATMENT SUMMARY:  [facility] Treatment Information        Some values may be hidden. Unless noted otherwise, only the newest values recorded on each date are displayed.         [facility] Treatment Summary 21   Course First Treatment Date 2021   Course Last Treatment Date 2021   LLL_Lung_SBRT Plan from Course C1_LLLLung   Fraction 4 of 5   Elapsed Course Days  @ [id number]   Prescribed Fraction Dose 1,000 cGy   Prescribed Total Dose 5,000 cGy   LLL_Lung_SBRT Reference Point from Course C1_LLLLung   Elapsed Course Days  @ [id number]   Session Dose 1,000 cGy   Total Dose 4,000 cGy   LLL_Lung_SBRT CP Reference Point from Course C1_LLLLung   Elapsed Course Days  @    Session Dose 1,005 cGy   Total Dose 4,019 cGy              SUBJECTIVE:   Patient is doing well.  She does not have any questions regarding her radiation    VITAL SIGNS:       Pain Assessment 8/3/2021 8/3/2021   Pain Assessment Chronic Pain -   Pain Score 4 4   Pain Loc Back -   What increases pain? neck/low back/joints (arthritis) -   What decreases pain? norco -   Some recent data might be hidden          PHYSICAL EXAM:  No erythema    TOXICITY  No flowsheet data found.      IMPRESSION:  Cancer Staging  Primary cancer of left lower lobe of lung (HCC)  Staging form: Lung, AJCC 8th Edition  - Clinical: Stage IA3 (cT1c, cN0, cM0) - Signed by Quincy Lopez M.D. on 2021      PLAN:  No change in treatment plan    Disposition:  Treatment plan reviewed. Questions answered. Continue  therapy outlined.     Quincy Lopez M.D.    No orders of the defined types were placed in this encounter.

## 2021-09-24 PROBLEM — I27.20 PULMONARY HYPERTENSION (HCC): Status: RESOLVED | Noted: 2021-01-01 | Resolved: 2021-01-01

## 2021-09-24 NOTE — PROGRESS NOTES
Cardiology Note    Chief Complaint   Patient presents with   • Atrial Fibrillation     F/V Dx: Persistent atrial fibrillation (HCC)   • Aortic Atherosclerosis   • Hypertension     F/V Dx: Essential hypertension       History of Present Illness: Rufina Macias is a 76 y.o. female PMH paroxysmal AF 02/2020, PE 02/2020 while on eliquis converted to coumadin, COPD (30 pack years; quit 1995), HLD, HTN, diffuse vascular atherosclerosis who presents for follow up.    This visit states started radiation treatment for lung cancer. Currently states no chemotherapy. She reminds her blood pressure is elevated at physicians offices. States home pressures 140-150s/80s. Breathing easier in sinus rhythm. Doesn't do much walking. No recurrent palpitations. Her daughter injured her calf and suffered DVT. Patient now caring for her and doing all the grocery shopping. admittedly using cart when doing this but overall more active than prior and tolerating. Compliant with medications and denies adverse effects.     Review of Systems   Constitutional: Negative for decreased appetite, fever, malaise/fatigue, weight gain and weight loss.   HENT: Negative for congestion and nosebleeds.    Eyes: Negative for blurred vision.   Cardiovascular: Negative for chest pain, claudication, dyspnea on exertion, irregular heartbeat, leg swelling, near-syncope, orthopnea, palpitations, paroxysmal nocturnal dyspnea and syncope.   Respiratory: Negative for cough and shortness of breath.    Endocrine: Negative for cold intolerance and heat intolerance.   Skin: Negative for rash.   Musculoskeletal: Negative for back pain.   Gastrointestinal: Negative for abdominal pain, constipation, diarrhea, heartburn, melena, nausea and vomiting.   Genitourinary: Negative for dysuria, flank pain and hematuria.   Neurological: Negative for dizziness.   Psychiatric/Behavioral: Negative for altered mental status and depression.         Past Medical History:   Diagnosis  Date   • Acute cystitis with hematuria 9/14/2020   • Acute kidney injury superimposed on chronic kidney disease (HCC) 9/18/2020   • Adrenal nodule ([facility]) 2/21/2020   • Allergy    • Anticoagulated 3/11/2020   • Anxiety 9/29/2020   • Arrhythmia 02/2020    A fib   • Arthritis     Spine, neck, hands, ankles and knees   • Asthma     inhalers as needed   • Back pain     and neck   • Bilateral leg edema 4/6/2020   • Blood clotting disorder ([facility]) 02/2020    lung   • Bowel habit changes 2021    diarrhea with diet changes   • Breath shortness     at times, uses O2 1l prn Preferred   • Bronchitis    • CA - cancer of uterus 2009   • 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 with obstruction- ERCP EXTRACTON/ SPHINCTEROTOMY, recurrent Feb 2020 2/17/2020   • Cancer ([facility]) 2021    LLL nodule   • Carpal tunnel syndrome    • COPD    • COPD (chronic obstructive pulmonary disease) ([facility]) 8/31/2012   • Coronary artery calcification seen on CAT scan 6/12/2020   • Dental disorder     full dentures   • Diverticula of colon    • Diverticulosis    • Emphysema of lung ([facility]) 2020    COPD   • Heart burn    • High cholesterol    • Hyperlipidemia    • Hypertension    • Leg swelling 6/12/2020   • Other pulmonary embolism without acute cor pulmonale ([facility]) 2/21/2020   • Persistent atrial fibrillation ([facility]) 2/8/2020   • Pneumonia 1993   • Tremor, hereditary, benign    • Urinary incontinence 2021    slightly at night   • Wheezing          Past Surgical History:   Procedure Laterality Date   • PB ERCP,DIAGNOSTIC  11/24/2020    Procedure: ERCP, DIAGNOSTIC - WITH STENT REMOVAL;  Surgeon: Quincy Louie M.D.;  Location: SURGERY [facility];  Service: Gastroenterology   • GASTROSCOPY-ENDO  11/24/2020    Procedure: GASTROSCOPY;  Surgeon: Quincy Louie M.D.;  Location: SURGERY [facility];  Service: Gastroenterology   • PB ERCP,DIAGNOSTIC  9/17/2020    Procedure: ERCP (ENDOSCOPIC RETROGRADE CHOLANGIOPANCREATOGRAPHY);  Surgeon: Cj Guerrier D.O.;   Location: [facility];  Service: Gastroenterology   • PB ERCP,DIAGNOSTIC  2/14/2020    Procedure: ERCP (ENDOSCOPIC RETROGRADE CHOLANGIOPANCREATOGRAPHY);  Surgeon: Clinton Ray M.D.;  Location: [facility];  Service: Gastroenterology   • ERCP W/SPHINCTEROTOMY/PAPILL.  4/5/2018    Procedure: ERCP W/SPHINCTEROTOMY/PAPILL.;  Surgeon: Quincy Louie M.D.;  Location: [facility];  Service: Gastroenterology   • ERCP W/ INSERTION STENT/TUBE  4/5/2018    Procedure: ERCP W/ INSERTION STENT/TUBE - STENT PLACEMENT/REMOVAL;  Surgeon: uQincy Louie M.D.;  Location: [facility];  Service: Gastroenterology   • ERCP W/REMOVAL CALCULUS  4/5/2018    Procedure: ERCP W/REMOVAL CALCULUS W/DILATION;  Surgeon: Quincy Louie M.D.;  Location: [facility];  Service: Gastroenterology   • ERCP  4/5/2018    Procedure: ERCP W/POSS BIOPSY;  Surgeon: Quincy Louie M.D.;  Location: [facility];  Service: Gastroenterology   • COMMON BILE DUCT EXPLORATION  02/15/2018   • ERCP W/ INSERTION STENT/TUBE  02/15/2018   • ERCP W/SPHINCTEROTOMY/PAPILL.  02/15/2018   • ERCP W/REMOVAL CALCULUS  02/15/2018   • ERCP  2/15/2018    Procedure: ERCP, SPHINCTEROTOMY, STENT PLACEMENT, DEBRIDEMENT;  Surgeon: Quincy Louie M.D.;  Location: [facility];  Service: Gastroenterology   • ARIELLA BY LAPAROSCOPY  july, 2015    [facility]   • HYSTERECTOMY, TOTAL ABDOMINAL  1/18/10    Uterine, Dr. Whelan and Dr. Cam +BSO   • ABDOMINAL HYSTERECTOMY TOTAL  Jan 2010    Dr. Cam   • OOPHORECTOMY  Jan 2010    BSO   • OPEN REDUCTION      ankle         Current Outpatient Medications   Medication Sig Dispense Refill   • losartan (COZAAR) 50 MG Tab Take 1 Tablet by mouth 2 times a day. 180 Tablet 3   • warfarin (COUMADIN) 5 MG Tab TAKE 1 TABLET BY MOUTH EVERY DAY OR AS DIRECTED BY [facility] 90 Tablet 1   • potassium chloride ER (KLOR-CON) 10 MEQ tablet Take 2  Tablets by mouth 1 time a day as needed (when taking lasix fwater pill) for up to 90 days. 60 tablet 5   • FIBER PO Take 10 mg by mouth every day.     • albuterol 108 (90 Base) MCG/ACT Aero Soln inhalation aerosol INHALE 2 PUFFS BY MOUTH EVERY 6 HOURS AS NEEDED FOR SHORTNESS OF BREATH 8.5 g 4   • Fluticasone-Umeclidin-Vilant (TRELEGY ELLIPTA) 100-62.5-25 MCG/INH AEROSOL POWDER, BREATH ACTIVATED Inhale 1 Puff every day. Rinse mouth after use 1 Each 11   • montelukast (SINGULAIR) 10 MG Tab Take 1 tablet by mouth every bedtime. Take 1 tablet by mouth nightly at bedtime. 30 tablet 11   • rosuvastatin (CRESTOR) 20 MG Tab Take 1 tablet by mouth every evening. 100 tablet 3   • metoprolol tartrate (LOPRESSOR) 100 MG Tab Take 1 tablet by mouth 2 times a day. 180 tablet 4   • sertraline (ZOLOFT) 25 MG tablet Take 1 Tab by mouth every day. 30 Tab 11   • HYDROcodone/acetaminophen (NORCO)  MG Tab Take 1 Tab by mouth every 6 hours as needed.     • VITAMIN D PO Take 2,000 mg by mouth every day.     • torsemide (DEMADEX) 20 MG Tab Take 1 Tab by mouth every day. (Patient taking differently: Take 20 mg by mouth 1 time a day as needed.) 30 Tab 3   • famotidine (PEPCID) 20 MG Tab Take 20 mg by mouth every day.     • zolpidem (AMBIEN) 10 MG Tab Take 10 mg by mouth at bedtime as needed for Sleep.     • Calcium Carbonate-Vit D-Min (CALCIUM 1200 PO) Take 1,200 mg by mouth every day.     • Magnesium 400 MG Tab Take 400 mg by mouth every day.       No current facility-administered medications for this visit.         Allergies   Allergen Reactions   • Codeine Swelling   • Ace Inhibitors      Cough   • Other Drug      A cough syrup given as a child, does not remember name         Family History   Problem Relation Age of Onset   • Heart Disease Father 45        MI   • Lung Disease Father    • Stroke Father 70   • Cancer Father    • Hypertension Father    • Cancer Paternal Grandmother         breast   • Cancer Paternal Grandfather          "leukemia         Social History     Socioeconomic History   • Marital status:      Spouse name: Not on file   • Number of children: Not on file   • Years of education: Not on file   • Highest education level: Not on file   Occupational History   • Not on file   Tobacco Use   • Smoking status: Former Smoker     Packs/day: 1.00     Years: 20.00     Pack years: 20.00     Types: Cigarettes     Quit date: 1991     Years since quittin.7   • Smokeless tobacco: Never Used   Vaping Use   • Vaping Use: Never used   Substance and Sexual Activity   • Alcohol use: Not Currently     Alcohol/week: 0.0 - 2.4 oz     Comment: hardly ever   • Drug use: No   • Sexual activity: Never     Partners: Male     Birth control/protection: Post-Menopausal   Other Topics Concern   • Not on file   Social History Narrative   • Not on file     Social Determinants of Health     Financial Resource Strain:    • Difficulty of Paying Living Expenses:    Food Insecurity:    • Worried About Running Out of Food in the Last Year:    • Ran Out of Food in the Last Year:    Transportation Needs:    • Lack of Transportation (Medical):    • Lack of Transportation (Non-Medical):    Physical Activity:    • Days of Exercise per Week:    • Minutes of Exercise per Session:    Stress:    • Feeling of Stress :    Social Connections:    • Frequency of Communication with Friends and Family:    • Frequency of Social Gatherings with Friends and Family:    • Attends Islam Services:    • Active Member of Clubs or Organizations:    • Attends Club or Organization Meetings:    • Marital Status:    Intimate Partner Violence:    • Fear of Current or Ex-Partner:    • Emotionally Abused:    • Physically Abused:    • Sexually Abused:          Physical Exam:  Ambulatory Vitals  BP (!) 188/100 (BP Location: Left arm, Patient Position: Sitting, BP Cuff Size: Adult)   Pulse 62   Resp 18   Ht 1.575 m (5' 2\")   Wt 86.6 kg (191 lb)   SpO2 97%    BP Readings from " "Last 4 Encounters:   09/24/21 (!) 188/100   08/03/21 (!) 184/88   07/19/21 134/80   07/06/21 (!) 169/81     Weight/BMI:   Vitals:    09/24/21 0922   BP: (!) 188/100   Weight: 86.6 kg (191 lb)   Height: 1.575 m (5' 2\")    Body mass index is 34.93 kg/m².  Wt Readings from Last 4 Encounters:   09/24/21 86.6 kg (191 lb)   08/03/21 88.7 kg (195 lb 8 oz)   07/19/21 91.5 kg (201 lb 12.8 oz)   07/06/21 89.5 kg (197 lb 5 oz)       Physical Exam  Constitutional:       General: She is not in acute distress.  HENT:      Head: Normocephalic and atraumatic.   Eyes:      Conjunctiva/sclera: Conjunctivae normal.      Pupils: Pupils are equal, round, and reactive to light.   Neck:      Vascular: No JVD.   Cardiovascular:      Rate and Rhythm: Normal rate and regular rhythm.      Heart sounds: Normal heart sounds. No murmur heard.   No friction rub. No gallop.    Pulmonary:      Effort: Pulmonary effort is normal. No respiratory distress.      Breath sounds: Normal breath sounds. No wheezing or rales.   Chest:      Chest wall: No tenderness.   Abdominal:      General: Bowel sounds are normal. There is no distension.      Palpations: Abdomen is soft.   Musculoskeletal:      Cervical back: Normal range of motion and neck supple.   Skin:     General: Skin is warm and dry.   Neurological:      Mental Status: She is alert and oriented to person, place, and time.   Psychiatric:         Mood and Affect: Affect normal.         Judgment: Judgment normal.         Lab Data Review:  Lab Results   Component Value Date/Time    CHOLSTRLTOT 121 07/14/2021 01:30 PM    LDL 48 07/14/2021 01:30 PM    HDL 52 07/14/2021 01:30 PM    TRIGLYCERIDE 107 07/14/2021 01:30 PM       Lab Results   Component Value Date/Time    SODIUM 139 07/14/2021 01:30 PM    POTASSIUM 5.0 07/14/2021 01:30 PM    CHLORIDE 105 07/14/2021 01:30 PM    CO2 26 07/14/2021 01:30 PM    GLUCOSE 84 07/14/2021 01:30 PM    BUN 17 07/14/2021 01:30 PM    CREATININE 0.71 07/14/2021 01:30 PM    " CREATININE 0.80 12/02/2010 12:00 AM    BUNCREATRAT 20 12/02/2010 12:00 AM    GLOMRATE >59 12/02/2010 12:00 AM     CrCl cannot be calculated (Patient's most recent lab result is older than the maximum 7 days allowed.).  Lab Results   Component Value Date/Time    ALKPHOSPHAT 63 07/14/2021 01:30 PM    ASTSGOT 28 07/14/2021 01:30 PM    ALTSGPT 22 07/14/2021 01:30 PM    TBILIRUBIN 0.4 07/14/2021 01:30 PM      Lab Results   Component Value Date/Time    WBC 9.8 07/14/2021 01:30 PM    WBC 7.6 12/02/2010 12:00 AM     Lab Results   Component Value Date/Time    HBA1C 5.7 (H) 03/11/2021 12:59 PM     No components found for: TROP      Cardiac Imaging and Procedures Review:      EKG 9/24/21 reviewed by me sinus 59 bpm, RBBB  EKG 3/2/21 sinus hao 58 bpm, PAC, RBBB  EKG 6/12/20 atrial fibrillation, RBBB    CTA chest 02/21/2020  1.  Pulmonary embolus involving the right distal main pulmonary artery extending into right middle and lower lobe segmental and subsegmental branches. Left upper and lower lobe subsegmental pulmonary emboli.  2.  Distal esophageal wall thickening, recommend follow-up esophagoscopy for evaluation of esophagitis versus infiltrating esophageal neoplasia.  3.  Hepatomegaly  4.  Left adrenal nodule, indeterminate, recommend follow-up adrenal protocol CT or MRI for further characterization as clinically appropriate.  5.  1.3 cm left lower lobe pulmonary nodule, see nodule follow-up recommendations below.  [diffuse coronary calcification and aortic atherosclerosis]    TTE 02/25/2020  CONCLUSIONS  Limited Exam for LV and RV Function  Compared to the images of the prior study done 2/11/20 -  there has   been no significant change.   Left ventricular systolic function is normal.  Left ventricular ejection fraction is visually estimated to be 55%.  Right ventricular systolic function is normal.  Right Ventricle  Normal right ventricular size. Right ventricular systolic function is   normal.    TTE  02/11/2020  CONCLUSIONS  Normal left ventricular systolic function. Left ventricular ejection   fraction is visually estimated to be 60%.   Normal diastolic function.  Right ventricular systolic pressure is estimated to be 25 mmHg.  Right Ventricle  Mildly dilated right ventricle. Normal right ventricular systolic   Function.    Nuclear PET stress 08/2017  SCINTOGRAPHIC FINDINGS:   Normal left ventricular perfusion with stress and rest images.  There is no evidence of ischemia or scar.   GATED WALL MOTION FINDINGS:   The left ventricle wall motion is normal with stress and rest  imagings.  Measured resting ejection fraction is 58 %.      CONCLUSIONS AND IMPRESSIONS:    Normal perfusion on PET with normal function of the left ventricle.  No evidence of ischemia or infarction.     Medical Decision Making:  Problem List Items Addressed This Visit     Mixed hyperlipidemia    Relevant Medications    losartan (COZAAR) 50 MG Tab    Essential hypertension    Relevant Medications    losartan (COZAAR) 50 MG Tab    Other Relevant Orders    REFERRAL TO PHARMACOTHERAPY SERVICE    Chronic pulmonary embolism (HCC)    Relevant Medications    losartan (COZAAR) 50 MG Tab    Coronary artery calcification seen on CAT scan    Relevant Medications    losartan (COZAAR) 50 MG Tab    Aortic atherosclerosis (HCC)    Relevant Medications    losartan (COZAAR) 50 MG Tab    Heart failure with preserved ejection fraction (HCC)    Relevant Medications    losartan (COZAAR) 50 MG Tab    History of chronic atrial fibrillation- dc cardioversion 3/2021- dr chatterjee        Unprovoked PE with doac failure - continue anticoagulation with coumadin goal INR 2-3.    Paroxysmal AF - chadsvasc 5 - improved symptoms in sinus rhythm. Continue coumadin for cva prevention. Continue rate control.      HTN - elevated. Goal 120/80. Titrate losartan. Referral to pharmacy for help titrating.    Coronary calcification / aortic atherosclerosis / ascvd / HLD - continue  coumadin. No need for additional antiplatelets for primary prevention. Continue high intensity statin. LDL at goal <70.    HFpEF NYHA II - stable. Continue torsemide. She can modify as needed. Compression stockings.     It was my pleasure to meet with Ms. Macias.

## 2021-09-24 NOTE — PROGRESS NOTES
OP Anticoagulation Service Note    Date: 2021  There were no vitals filed for this visit.   pt declined vitals    Anticoagulation Summary  As of 2021    INR goal:  2.0-3.0   TTR:  80.0 % (1.5 y)   INR used for dosin.10 (2021)   Warfarin maintenance plan:  5 mg (5 mg x 1) every day   Weekly warfarin total:  35 mg   Plan last modified:  Cristela Benoit, PharmD (2021)   Next INR check:  2021   Target end date:  Indefinite    Indications    Persistent atrial fibrillation (HCC) (Resolved) [I48.19]  Chronic pulmonary embolism (HCC) [I27.82]  Long term (current) use of anticoagulants [Z79.01]             Anticoagulation Episode Summary     INR check location:      Preferred lab:      Send INR reminders to:      Comments:        Anticoagulation Care Providers     Provider Role Specialty Phone number    Mary Carmen Rogers M.D. Referring Internal Medicine Critical Care [phone]    [facility] Responsible  [phone]            HPI:   Rufina Macias seen in clinic today, on anticoagulation therapy with warfarin (a high risk medication) for atrial fibrillation and hx of PE       Pt is here today to evaluate anticoagulation therapy    Previous INR was  2.4 on 21    Pt was instructed to continue current regimen    Anticoagulation Patient Findings  Patient Findings     Positives:  Change in diet/appetite (inconsistent appetite)    Negatives:  Signs/symptoms of thrombosis, Signs/symptoms of bleeding, Laboratory test error suspected, Change in health, Change in alcohol use, Change in activity, Upcoming invasive procedure, Emergency department visit, Upcoming dental procedure, Missed doses, Extra doses, Change in medications, Hospital admission, Bruising, Other complaints          Confirmed warfarin dosing regimen    Pt is not on antiplatelet/NSAID therapy    Falls or accidents since last visit No        A/P   INR  therapeutic today,   Continue current warfarin regimen           Pt educated to contact our clinic with any changes in medications or s/s of bleeding or thrombosis. Pt is aware to seek immediate medical attention for falls, head injury or deep cuts    Follow up appointment in 6 week(s) to reduce risk of adverse events from warfarin  Cindy Treviño, PharmD

## 2021-09-29 NOTE — PROGRESS NOTES
[facility]    Received pharmacotherapy referral for HTN from Dr. Diez on 9-23-21.    LM with patient to call back to establish care.  Of note, she is current anticoag pt, suggested rescheduling current INR appt to sooner date.    Insurance: SCP  PCP: [facility]  Locations to be seen: Any    [facility] at [phone], fax [phone]    Gage Figueroa, OsielD, BCACP

## 2021-10-12 NOTE — TELEPHONE ENCOUNTER
ESTABLISHED PATIENT PRE-VISIT PLANNING     Patient was NOT contacted to complete PVP.     Note: Patient will not be contacted if there is no indication to call.     1.  Reviewed notes from the last few office visits within the medical group: Yes    2.  If any orders were placed at last visit or intended to be done for this visit (i.e. 6 mos follow-up), do we have Results/Consult Notes?         •  Labs - Labs ordered, completed on 10/6/2021 and results are in chart.  Note: If patient appointment is for lab review and patient did not complete labs, check with provider if OK to reschedule patient until labs completed.       •  Imaging - Imaging ordered, completed and results are in chart.       •  Referrals - Referral ordered, patient has NOT been seen.    3. Is this appointment scheduled as a Hospital Follow-Up? No    4.  Immunizations were updated in Epic using Reconcile Outside Information activity? Yes    5.  Patient is due for the following Health Maintenance Topics:   Health Maintenance Due   Topic Date Due   • Annual Wellness Visit  06/27/2019   • MAMMOGRAM  11/27/2020   • IMM ZOSTER VACCINES (3 of 3) 05/12/2021   • Annual Pulmonary Function Test / Spirometry  08/21/2021   • IMM INFLUENZA (1) 09/01/2021       - Patient plans to schedule appointment for Annual Wellness Visit (AWV), Immunizations: FLU and SHINGRIX (Shingles) and Mammogram.    6.  AHA (Pulse8) form printed for Provider? No, already completed

## 2021-10-13 PROBLEM — D68.69 HYPERCOAGULABILITY DUE TO ATRIAL FIBRILLATION (HCC): Status: ACTIVE | Noted: 2021-01-01

## 2021-10-13 PROBLEM — I48.91 HYPERCOAGULABILITY DUE TO ATRIAL FIBRILLATION (HCC): Status: ACTIVE | Noted: 2021-01-01

## 2021-10-13 PROBLEM — I26.92 CHRONIC SADDLE PULMONARY EMBOLISM WITHOUT ACUTE COR PULMONALE (HCC): Status: ACTIVE | Noted: 2020-03-05

## 2021-10-13 NOTE — PROGRESS NOTES
Subjective     Rufina Macias is a 76 y.o. female who presents with Follow-Up (3mon F/V) and Lab Results  The patient is here for followup of chronic medical problems listed below. The patient is compliant with medications and having no side effects from them. Denies chest pain, abdominal pain, dyspnea, myalgias, or cough.   Patient Active Problem List    Diagnosis Date Noted   • Hypercoagulability due to atrial fibrillation (HCC) 10/13/2021   • Primary cancer of left lower lobe of lung (HCC) 07/19/2021   • False positive serological test for hepatitis C- neg HCV quant RNA by PCR [id number] 03/17/2021   • History of chronic atrial fibrillation- dc cardioversion 3/2021- dr chatterjee 03/17/2021   • Heart failure with preserved ejection fraction (HCC) 10/22/2020   • Coronary artery calcification seen on CAT scan 06/12/2020   • Aortic atherosclerosis (HCC) 06/12/2020   • Mild concentric left ventricular hypertrophy (LVH) 04/06/2020   • High risk medication use 03/11/2020   • Long term (current) use of anticoagulants 03/09/2020   • Chronic saddle pulmonary embolism without acute cor pulmonale (HCC) 03/05/2020   • Chronic respiratory failure with hypoxia (HCC) 03/05/2020   • Multiple pulmonary nodules determined by computed tomography of lung 02/24/2020   • IFG (impaired fasting glucose) 02/21/2020   • Uncomplicated opioid dependence (HCC)- nv pain and spine 01/06/2020   • Ganglion cyst of tendon sheath of right hand- surveillance 07/08/2019   • Panlobular emphysema (HCC) 01/08/2019   • Chronic pain of left knee- dr contreras- celebrex and cortisone inj; dr cade to do  supartz inj 01/08/2019   • Primary osteoarthritis involving multiple joints- to get supartz inj left knee- nv pain and spine 06/26/2018   • Chronic midline low back pain without sciatica- norco chronic daily use; nv pain and spine 04/04/2017   • History of cardioversio 3//2/21 FOR CHRONIC A.FIB 03/23/2016   • Essential hypertension 08/31/2015   • Obesity (BMI  30.0-34.9) 06/26/2015   • DDD (degenerative disc disease), cervical 04/07/2015   • Musculoskeletal neck pain 04/07/2015   • Thoracic radiculopathy 03/09/2015   • Lumbar radiculopathy 03/09/2015   • Vitamin D deficiency disease 10/11/2013   • DDD (degenerative disc disease), lumbar 10/11/2013   • Chronic allergic rhinitis 10/11/2013   • Primary insomnia 11/29/2012   • Mixed hyperlipidemia 10/28/2010     Allergies   Allergen Reactions   • Codeine Swelling   • Ace Inhibitors      Cough   • Other Drug      A cough syrup given as a child, does not remember name     Outpatient Medications Prior to Visit   Medication Sig Dispense Refill   • sertraline (ZOLOFT) 25 MG tablet TAKE 1 TABLET BY MOUTH EVERY DAY 30 Tablet 11   • losartan (COZAAR) 50 MG Tab Take 1 Tablet by mouth 2 times a day. 180 Tablet 3   • warfarin (COUMADIN) 5 MG Tab TAKE 1 TABLET BY MOUTH EVERY DAY OR AS DIRECTED BY [facility] 90 Tablet 1   • potassium chloride ER (KLOR-CON) 10 MEQ tablet Take 2 Tablets by mouth 1 time a day as needed (when taking lasix fwater pill) for up to 90 days. 60 tablet 5   • FIBER PO Take 10 mg by mouth every day.     • albuterol 108 (90 Base) MCG/ACT Aero Soln inhalation aerosol INHALE 2 PUFFS BY MOUTH EVERY 6 HOURS AS NEEDED FOR SHORTNESS OF BREATH 8.5 g 4   • montelukast (SINGULAIR) 10 MG Tab Take 1 tablet by mouth every bedtime. Take 1 tablet by mouth nightly at bedtime. 30 tablet 11   • rosuvastatin (CRESTOR) 20 MG Tab Take 1 tablet by mouth every evening. 100 tablet 3   • metoprolol tartrate (LOPRESSOR) 100 MG Tab Take 1 tablet by mouth 2 times a day. 180 tablet 4   • HYDROcodone/acetaminophen (NORCO)  MG Tab Take 1 Tab by mouth every 6 hours as needed.     • VITAMIN D PO Take 2,000 mg by mouth every day.     • torsemide (DEMADEX) 20 MG Tab Take 1 Tab by mouth every day. (Patient taking differently: Take 20 mg by mouth 1 time a day as needed.) 30 Tab 3   • famotidine (PEPCID) 20 MG Tab Take 20 mg by  mouth every day.     • zolpidem (AMBIEN) 10 MG Tab Take 10 mg by mouth at bedtime as needed for Sleep.     • Calcium Carbonate-Vit D-Min (CALCIUM 1200 PO) Take 1,200 mg by mouth every day.     • Magnesium 400 MG Tab Take 400 mg by mouth every day.     • Fluticasone-Umeclidin-Vilant (TRELEGY ELLIPTA) 100-62.5-25 MCG/INH AEROSOL POWDER, BREATH ACTIVATED Inhale 1 Puff every day. Rinse mouth after use 1 Each 11     No facility-administered medications prior to visit.     Hospital Outpatient Visit on 10/06/2021   Component Date Value   • Glycohemoglobin 10/06/2021 5.5    • Est Avg Glucose 10/06/2021 111    • Cholesterol,Tot 10/06/2021 116    • Triglycerides 10/06/2021 89    • HDL 10/06/2021 50    • LDL 10/06/2021 48    • WBC 10/06/2021 8.8    • RBC 10/06/2021 4.92    • Hemoglobin 10/06/2021 14.5    • Hematocrit 10/06/2021 46.1    • MCV 10/06/2021 93.7    • MCH 10/06/2021 29.5    • MCHC 10/06/2021 31.5*   • RDW 10/06/2021 45.5    • Platelet Count 10/06/2021 154*   • MPV 10/06/2021 10.1    • Neutrophils-Polys 10/06/2021 77.20*   • Lymphocytes 10/06/2021 12.90*   • Monocytes 10/06/2021 8.20    • Eosinophils 10/06/2021 1.20    • Basophils 10/06/2021 0.30    • Immature Granulocytes 10/06/2021 0.20    • Nucleated RBC 10/06/2021 0.00    • Neutrophils (Absolute) 10/06/2021 6.81    • Lymphs (Absolute) 10/06/2021 1.14    • Monos (Absolute) 10/06/2021 0.72    • Eos (Absolute) 10/06/2021 0.11    • Baso (Absolute) 10/06/2021 0.03    • Immature Granulocytes (a* 10/06/2021 0.02    • NRBC (Absolute) 10/06/2021 0.00    • Sodium 10/06/2021 141    • Potassium 10/06/2021 4.5    • Chloride 10/06/2021 104    • Co2 10/06/2021 27    • Anion Gap 10/06/2021 10.0    • Glucose 10/06/2021 89    • Bun 10/06/2021 13    • Creatinine 10/06/2021 0.65    • Calcium 10/06/2021 9.3    • AST(SGOT) 10/06/2021 17    • ALT(SGPT) 10/06/2021 14    • Alkaline Phosphatase 10/06/2021 83    • Total Bilirubin 10/06/2021 0.4    • Albumin 10/06/2021 3.8    • Total  Protein 10/06/2021 6.8    • Globulin 10/06/2021 3.0    • A-G Ratio 10/06/2021 1.3    • TSH 10/06/2021 2.040    • Fasting Status 10/06/2021 Fasting    • GFR If  10/06/2021 >60    • GFR If Non  Ameri* 10/06/2021 >60    Anticoagulation Visit on 09/23/2021   Component Date Value   • INR 09/23/2021 2.10       Lab Results   Component Value Date/Time    HBA1C 5.5 10/06/2021 12:40 PM    HBA1C 5.7 (H) 03/11/2021 12:59 PM     Lab Results   Component Value Date/Time    SODIUM 141 10/06/2021 12:40 PM    POTASSIUM 4.5 10/06/2021 12:40 PM    CHLORIDE 104 10/06/2021 12:40 PM    CO2 27 10/06/2021 12:40 PM    GLUCOSE 89 10/06/2021 12:40 PM    BUN 13 10/06/2021 12:40 PM    CREATININE 0.65 10/06/2021 12:40 PM    CREATININE 0.80 12/02/2010 12:00 AM    BUNCREATRAT 20 12/02/2010 12:00 AM    GLOMRATE >59 12/02/2010 12:00 AM    ALKPHOSPHAT 83 10/06/2021 12:40 PM    ASTSGOT 17 10/06/2021 12:40 PM    ALTSGPT 14 10/06/2021 12:40 PM    TBILIRUBIN 0.4 10/06/2021 12:40 PM     Lab Results   Component Value Date/Time    INR 2.10 09/23/2021 04:38 PM    INR 2.40 08/23/2021 04:31 PM    INR 2.20 07/26/2021 04:06 PM     Lab Results   Component Value Date/Time    CHOLSTRLTOT 116 10/06/2021 12:40 PM    LDL 48 10/06/2021 12:40 PM    HDL 50 10/06/2021 12:40 PM    TRIGLYCERIDE 89 10/06/2021 12:40 PM       No results found for: TESTOSTERONE  Lab Results   Component Value Date/Time    TSH 1.640 12/02/2010 12:00 AM     Lab Results   Component Value Date/Time    FREET4 1.68 09/13/2020 04:45 PM    FREET4 1.90 (H) 02/21/2020 06:43 PM     No results found for: URICACID  No components found for: VITB12  Lab Results   Component Value Date/Time    25HYDROXY 52 03/11/2021 12:59 PM    25HYDROXY 39 09/20/2016 11:36 AM             HPI    Review of Systems   Constitutional: Negative.    HENT: Negative.    Eyes: Negative.    Respiratory: Negative.    Cardiovascular: Negative.    Gastrointestinal: Negative.    Genitourinary: Negative.   "  Musculoskeletal: Negative.    Skin: Negative.    Neurological: Negative.    Endo/Heme/Allergies: Negative.    Psychiatric/Behavioral: Negative.               Objective     /60 (BP Location: Right arm, Patient Position: Sitting, BP Cuff Size: Adult)   Pulse 78   Temp 37.7 °C (99.8 °F) (Temporal)   Ht 1.575 m (5' 2\")   Wt 92.4 kg (203 lb 9.6 oz)   SpO2 92%   BMI 37.24 kg/m²      Physical Exam  Vitals reviewed.   Constitutional:       General: She is not in acute distress.     Appearance: She is well-developed. She is not diaphoretic.   HENT:      Head: Normocephalic and atraumatic.      Right Ear: External ear normal.      Left Ear: External ear normal.      Nose: Nose normal.      Mouth/Throat:      Pharynx: No oropharyngeal exudate.   Eyes:      General: No scleral icterus.        Right eye: No discharge.         Left eye: No discharge.      Conjunctiva/sclera: Conjunctivae normal.      Pupils: Pupils are equal, round, and reactive to light.   Neck:      Thyroid: No thyromegaly.      Vascular: No JVD.      Trachea: No tracheal deviation.   Cardiovascular:      Rate and Rhythm: Normal rate and regular rhythm.      Heart sounds: Normal heart sounds. No murmur heard.   No friction rub. No gallop.    Pulmonary:      Effort: Pulmonary effort is normal. No respiratory distress.      Breath sounds: Normal breath sounds. No stridor. No wheezing or rales.   Chest:      Chest wall: No tenderness.   Abdominal:      General: Bowel sounds are normal. There is no distension.      Palpations: Abdomen is soft. There is no mass.      Tenderness: There is no abdominal tenderness. There is no guarding or rebound.   Musculoskeletal:         General: No tenderness. Normal range of motion.      Cervical back: Normal range of motion and neck supple.   Lymphadenopathy:      Cervical: No cervical adenopathy.   Skin:     General: Skin is warm and dry.      Coloration: Skin is not pale.      Findings: No erythema or rash. "   Neurological:      Mental Status: She is alert and oriented to person, place, and time.      Cranial Nerves: No cranial nerve deficit.      Motor: No abnormal muscle tone.      Coordination: Coordination normal.      Deep Tendon Reflexes: Reflexes are normal and symmetric. Reflexes normal.   Psychiatric:         Behavior: Behavior normal.         Thought Content: Thought content normal.         Judgment: Judgment normal.                  Hospital Outpatient Visit on 10/06/2021   Component Date Value   • Glycohemoglobin 10/06/2021 5.5    • Est Avg Glucose 10/06/2021 111    • Cholesterol,Tot 10/06/2021 116    • Triglycerides 10/06/2021 89    • HDL 10/06/2021 50    • LDL 10/06/2021 48    • WBC 10/06/2021 8.8    • RBC 10/06/2021 4.92    • Hemoglobin 10/06/2021 14.5    • Hematocrit 10/06/2021 46.1    • MCV 10/06/2021 93.7    • MCH 10/06/2021 29.5    • MCHC 10/06/2021 31.5*   • RDW 10/06/2021 45.5    • Platelet Count 10/06/2021 154*   • MPV 10/06/2021 10.1    • Neutrophils-Polys 10/06/2021 77.20*   • Lymphocytes 10/06/2021 12.90*   • Monocytes 10/06/2021 8.20    • Eosinophils 10/06/2021 1.20    • Basophils 10/06/2021 0.30    • Immature Granulocytes 10/06/2021 0.20    • Nucleated RBC 10/06/2021 0.00    • Neutrophils (Absolute) 10/06/2021 6.81    • Lymphs (Absolute) 10/06/2021 1.14    • Monos (Absolute) 10/06/2021 0.72    • Eos (Absolute) 10/06/2021 0.11    • Baso (Absolute) 10/06/2021 0.03    • Immature Granulocytes (a* 10/06/2021 0.02    • NRBC (Absolute) 10/06/2021 0.00    • Sodium 10/06/2021 141    • Potassium 10/06/2021 4.5    • Chloride 10/06/2021 104    • Co2 10/06/2021 27    • Anion Gap 10/06/2021 10.0    • Glucose 10/06/2021 89    • Bun 10/06/2021 13    • Creatinine 10/06/2021 0.65    • Calcium 10/06/2021 9.3    • AST(SGOT) 10/06/2021 17    • ALT(SGPT) 10/06/2021 14    • Alkaline Phosphatase 10/06/2021 83    • Total Bilirubin 10/06/2021 0.4    • Albumin 10/06/2021 3.8    • Total Protein 10/06/2021 6.8    •  Globulin 10/06/2021 3.0    • A-G Ratio 10/06/2021 1.3    • TSH 10/06/2021 2.040    • Fasting Status 10/06/2021 Fasting    • GFR If  10/06/2021 >60    • GFR If Non  Ameri* 10/06/2021 >60    Anticoagulation Visit on 09/23/2021   Component Date Value   • INR 09/23/2021 2.10       Lab Results   Component Value Date/Time    HBA1C 5.5 10/06/2021 12:40 PM    HBA1C 5.7 (H) 03/11/2021 12:59 PM     Lab Results   Component Value Date/Time    SODIUM 141 10/06/2021 12:40 PM    POTASSIUM 4.5 10/06/2021 12:40 PM    CHLORIDE 104 10/06/2021 12:40 PM    CO2 27 10/06/2021 12:40 PM    GLUCOSE 89 10/06/2021 12:40 PM    BUN 13 10/06/2021 12:40 PM    CREATININE 0.65 10/06/2021 12:40 PM    CREATININE 0.80 12/02/2010 12:00 AM    BUNCREATRAT 20 12/02/2010 12:00 AM    GLOMRATE >59 12/02/2010 12:00 AM    ALKPHOSPHAT 83 10/06/2021 12:40 PM    ASTSGOT 17 10/06/2021 12:40 PM    ALTSGPT 14 10/06/2021 12:40 PM    TBILIRUBIN 0.4 10/06/2021 12:40 PM     Lab Results   Component Value Date/Time    INR 2.10 09/23/2021 04:38 PM    INR 2.40 08/23/2021 04:31 PM    INR 2.20 07/26/2021 04:06 PM     Lab Results   Component Value Date/Time    CHOLSTRLTOT 116 10/06/2021 12:40 PM    LDL 48 10/06/2021 12:40 PM    HDL 50 10/06/2021 12:40 PM    TRIGLYCERIDE 89 10/06/2021 12:40 PM       No results found for: TESTOSTERONE  Lab Results   Component Value Date/Time    TSH 1.640 12/02/2010 12:00 AM     Lab Results   Component Value Date/Time    FREET4 1.68 09/13/2020 04:45 PM    FREET4 1.90 (H) 02/21/2020 06:43 PM     No results found for: URICACID  No components found for: VITB12  Lab Results   Component Value Date/Time    25HYDROXY 52 03/11/2021 12:59 PM    25HYDROXY 39 09/20/2016 11:36 AM                    Assessment & Plan        1. Need for vaccination     - Influenza Vaccine, High Dose (65+ Only)    2. Primary cancer of left lower lobe of lung (HCC)  Getting radiation therapy currently.  Continue through to completion.    3. Chronic  respiratory failure with hypoxia (HCC)    Good control continue current regimen  4. Aortic atherosclerosis (HCC)      Good control continue current regimen    5. Chronic saddle pulmonary embolism without acute cor pulmonale (HCC)       Good control continue current regimen    6. Heart failure with preserved ejection fraction, unspecified HF chronicity (HCC)     Good control continue current regimen    7. Multiple pulmonary nodules determined by computed tomography of lung     Good control continue current regimen    8. Essential hypertension     Good control continue current regimen  9. IFG (impaired fasting glucose)     Good control continue current regimen  - HEMOGLOBIN A1C; Future    10. Mixed hyperlipidemia     Good control continue current regimen- TSH; Future  - Comp Metabolic Panel; Future  - Lipid Profile; Future  - CBC WITH DIFFERENTIAL; Future    11. Vitamin D deficiency     Good control continue current regimen  - VITAMIN D,25 HYDROXY; Future    12. Panlobular emphysema (HCC)     Good control continue current [id number]. Uncomplicated opioid dependence (HCC)- nv pain and spine     Good control continue current regimen  14. Hypercoagulability due to atrial fibrillation (HCC)     Good control continue current regimen.  Continue Coumadin    15. History of chronic atrial fibrillation- dc cardioversion 3/2021- dr chatterjee         Good control continue current regimen

## 2021-10-14 NOTE — PROGRESS NOTES
OP Anticoagulation Service Note    Date: 10/14/2021  There were no vitals filed for this visit. See non provider encounter for details.    Anticoagulation Summary  As of 10/14/2021    INR goal:  2.0-3.0   TTR:  80.7 % (1.5 y)   INR used for dosin.10 (10/14/2021)   Warfarin maintenance plan:  5 mg (5 mg x 1) every day   Weekly warfarin total:  35 mg   Plan last modified:  Cristela Benoit, PharmD (2021)   Next INR check:  2021   Target end date:  Indefinite    Indications    Persistent atrial fibrillation (HCC) (Resolved) [I48.19]  Chronic saddle pulmonary embolism without acute cor pulmonale (HCC) [I26.02  I27.82]  Long term (current) use of anticoagulants [Z79.01]             Anticoagulation Episode Summary     INR check location:      Preferred lab:      Send INR reminders to:      Comments:        Anticoagulation Care Providers     Provider Role Specialty Phone number    Mary Carmen Rogers M.D. Referring Internal Medicine Critical Care [phone]    [facility] Responsible  [phone]            HPI:   Rufina Pateledge seen in clinic today, on anticoagulation therapy with warfarin (a high risk medication) for hx of PE, atrial fibrillation    Pt is here today to evaluate anticoagulation therapy    Previous INR was  2.4 on 21    Pt was instructed to continue regimen    Anticoagulation Patient Findings  Patient Findings     Positives:  Change in health (started radiation therapy)    Negatives:  Signs/symptoms of thrombosis, Signs/symptoms of bleeding, Laboratory test error suspected, Change in alcohol use, Change in activity, Upcoming invasive procedure, Emergency department visit, Upcoming dental procedure, Missed doses, Extra doses, Change in medications, Change in diet/appetite, Hospital admission, Bruising, Other complaints          Confirmed warfarin dosing regimen    Pt is not on antiplatelet/NSAID therapy    Falls or accidents since last visit No        A/P   INR   therapeutic today    Pt is to continue with current warfarin dosing regimen.     02/22 check referral    Pt educated to contact our clinic with any changes in medications or s/s of bleeding or thrombosis. Pt is aware to seek immediate medical attention for falls, head injury or deep cuts    Follow up appointment in 7 week(s) to reduce risk of adverse events from warfarin  Cristela Benoit, OsielD

## 2021-10-14 NOTE — PROGRESS NOTES
Pharmacotherapy Hypertension Visit  10/14/21     Type of Visit:  Initial Visit  Start Time:1530  End time:1600    Rufina Macias has been referred for evaluation and management of hypertension    HPI:   Vitals:    10/14/21 1606   BP: 127/70       Age at Initial Diagnosis: 46 years old      Home BP readings:  Pt states her BP is always <130/80 at home  Any readings above  180/110:  None   Any symptoms of high blood pressure (TIA, Stroke, Head ache, vision changes): None      Current Prescription HTN Medications - including dose:    Losartan 25 mg q AM and 50 mg q PM      Current side effects potentially related to antihypertensive medications (lightheaded, dizziness, leg swelling):   Reported fatigue and dizziness on losartan 50 mg BID    Current Adherence to Blood Pressure Medications   Partial - pt self-reduced her AM dose of losartan d/t aforementioned ADR    Previously attempted BP medications:   Triamterene-hctz - discontinued after admission in Feb 2020 d/t need for loop diuresis for HF exacerbation    Any current interfering substances: None     Any current lifestyle issues affecting BP:    Pt recently started radiation therapy    In the past 6 months have pt had the following (if no, then order)  EKG - yes   Microalbumin - not indicated; renal indices wnl  Electrolytes and eGFR - yes  TSH - yes; wnl  CBC - yes; pt is thrombocytopenic  Fasting lipid panel -  Yes wnl  Fasting glucose - yes wnl    Lab Results   Component Value Date/Time    SODIUM 141 10/06/2021 12:40 PM    POTASSIUM 4.5 10/06/2021 12:40 PM    CHLORIDE 104 10/06/2021 12:40 PM    CO2 27 10/06/2021 12:40 PM    GLUCOSE 89 10/06/2021 12:40 PM    BUN 13 10/06/2021 12:40 PM    CREATININE 0.65 10/06/2021 12:40 PM    CREATININE 0.80 12/02/2010 12:00 AM    BUNCREATRAT 20 12/02/2010 12:00 AM    GLOMRATE >59 12/02/2010 12:00 AM         Medications Reconciled  CURRENT MEDICATIONS:   Current Outpatient Medications:   •  sertraline, TAKE 1 TABLET BY  MOUTH EVERY DAY  •  losartan, 50 mg, Oral, BID  •  warfarin, TAKE 1 TABLET BY MOUTH EVERY DAY OR AS DIRECTED BY [facility] ANTICOAGULATION [facility]  •  potassium chloride ER, 20 mEq, Oral, QDAY PRN  •  FIBER PO, 10 mg, Oral, DAILY  •  albuterol, 2 Puff, Inhalation, Q6HRS PRN  •  Trelegy Ellipta, 1 Puff, Inhalation, DAILY  •  montelukast, 10 mg, Oral, QHS  •  rosuvastatin, 20 mg, Oral, Q EVENING  •  metoprolol tartrate, 100 mg, Oral, BID  •  HYDROcodone/acetaminophen, 1 Tablet, Oral, Q6HRS PRN  •  VITAMIN D PO, 2,000 mg, Oral, DAILY  •  torsemide, 20 mg, Oral, DAILY (Patient taking differently: 20 mg, Oral, 1 TIME DAILY PRN)  •  famotidine, 20 mg, Oral, DAILY  •  zolpidem, 10 mg, Oral, HS PRN  •  Calcium Carbonate-Vit D-Min (CALCIUM 1200 PO), 1,200 mg, Oral, DAILY  •  Magnesium, 400 mg, Oral, DAILY  ALLERGIES: Codeine, Ace inhibitors, and Other drug    SOCIAL HISTORY   Social History     Tobacco Use   Smoking Status Former Smoker   • Packs/day: 1.00   • Years: 20.00   • Pack years: 20.00   • Types: Cigarettes   • Quit date: 1991   • Years since quittin.7   Smokeless Tobacco Never Used     Change in weight: Stable  Exercise habits: no regular exercise program  Diet: limit Na+ intake to 2gm/daily     ASSESSMENT AND PLAN    Pt states her BP is controlled at home and during PCP visits, and her BP is uncontrolled when being seen by cardiology or before having surgery.    Pt's BP yesterday during PCP visit and today in clinic was at goal    BP Goal 130/80    BP Monitoring Recommendations - Home BP     Proper technique for blood pressure monitoring reviewed with patient.  Pt instructed to check BP at varying times through out the day 4 times per week.  Provided pt log for blood pressure monitoring and pt instructed to bring in at each visit for reviews    Lifestyle Recommendations From Today’s Visit:    Dietary Sodium Restriction  Increase Physical Activity    Medication recommendations from today's visit:   ARB/ACEI:  Continue with losartan 25 mg q AM and 50 mg q PM  Diuretic: Torsemide 20 mg daily for HF  Other class: Beta blocker - metoprolol 100 mg BID  Importance of adherence discussed    Other recommendations: Ordered 24 hr ABPM to determine whether or not pt has whitecoat HTN    Studies Ordered at Todays Visit: None  Blood Work Ordered At Today’s visit: None order BMP if not done in last 6 months  Follow-Up: 6 weeks    Cristela Benoit, PharmD    CC:  Dr Bloch

## 2021-10-21 NOTE — PROGRESS NOTES
Telephone Appointment Visit   As a means of avoiding spread of COVID-19, this visit is being conducted by telephone. This telephone visit was initiated by the patient and they verbally consented.    Time at start of call: 10:00    Reason for Call:  Symptom Follow-up    HPI:    Stage IA3 ([id number]) non-small cell lung carcinoma of the left lower lobe of lung, medically inoperable, status post stereotactic radiosurgery to 6000 cGy in August 2021    Patient states she did not have any direct symptoms or signs she would attribute to her radiosurgery.  However she has felt she has some exacerbation of her COPD over the last 6 weeks.  She is uncertain whether this is just related to usual changes this time a year or any of her treatment.  In addition she has had some left shoulder discomfort that is started since radiation but this does not feel like it is correlating with her treatment area.  It feels more musculoskeletal in nature.    Labs / Images Reviewed:   None    Assessment and Plan:     Discussed with patient we will plan on getting her first restaging scan in roughly 6 weeks.  If any of her symptomology worsens or changes between now and then she will contact me and we will get her scans sooner.  Otherwise I will plan on seeing her after her CT scan of chest.    Follow-up: 6 weeks    Time at end of call: 10:15 AM  Total Time Spent: 11-20 minutes    Quincy Lopez M.D.

## 2021-11-03 NOTE — PROGRESS NOTES
Ambulatory blood pressure monitor results reviewed  Full report under media tab  Reasonable data acquisition    Mean daytime: 153/75 consistent with poorly controlled out of office systolic blood pressure    Nocturnal dip: Appears blunted    Clinical correlation needed.  We will discuss with patient at follow-up visit    Michael Bloch, MD  [facility]

## 2021-11-22 NOTE — Clinical Note
Patient seen to day for f/u hypertension in pharmacotherapy clinic. BP continues to be elevated. Losartan increased to 50 mg twice daily. Follow up 2 weeks.     Thank you,    Cindy Treviño, PharmD

## 2021-11-23 NOTE — PROGRESS NOTES
Pharmacotherapy Hypertension Visit  11/22/21     Goal Date when HTN will be controlled:    Type of Visit:  Follow Up      Rufina Macias has been referred for evaluation and management of hypertension    HPI:   Vitals:    11/22/21 1608 11/22/21 1609   BP: 158/78 135/68         Age at Initial Diagnosis: 46      Home BP readings:   None - her BP monitor broke  Any readings above  180/110:  None   Roxanne symptoms of high blood pressure (TIA, Stroke, Head ache, vision changes): None      Current Prescription HTN Medications - including dose:    Losartan 50 mg q AM and 25 mg qPM  Metoprolol 100 mg BID      Current side effects potentially related to antihypertensive medications (lightheaded, dizziness, leg swelling): None    Current Adherence to Blood Pressure Medications complete    Previously attempted BP medications:   Triamterene-hctz - discontinued after admission in Feb 2020 d/t need for loop diuresis for HF exacerbation    Any current interfering substances: None (caffeine, NSAIDs, corticosteroids, etc)    Any current lifestyle issues affecting BP:  She just completed radiation therapy    In the past 6 months have pt had the following (if no, then order)  EKG - yes   Microalbumin - not indicated; renal indices wnl  Electrolytes and eGFR - yes  TSH - yes; wnl  CBC - yes; pt is thrombocytopenic  Fasting lipid panel -  Yes wnl  Fasting glucose - yes wnl    Lab Results   Component Value Date/Time    SODIUM 141 10/06/2021 12:40 PM    POTASSIUM 4.5 10/06/2021 12:40 PM    CHLORIDE 104 10/06/2021 12:40 PM    CO2 27 10/06/2021 12:40 PM    GLUCOSE 89 10/06/2021 12:40 PM    BUN 13 10/06/2021 12:40 PM    CREATININE 0.65 10/06/2021 12:40 PM    CREATININE 0.80 12/02/2010 12:00 AM    BUNCREATRAT 20 12/02/2010 12:00 AM    GLOMRATE >59 12/02/2010 12:00 AM         Medications Reconciled  CURRENT MEDICATIONS:   Current Outpatient Medications:   •  sertraline, TAKE 1 TABLET BY MOUTH EVERY DAY  •  losartan, 50 mg, Oral, BID  •   warfarin, TAKE 1 TABLET BY MOUTH EVERY DAY OR AS DIRECTED BY [facility]  •  FIBER PO, 10 mg, Oral, DAILY  •  albuterol, 2 Puff, Inhalation, Q6HRS PRN  •  Trelegy Ellipta, 1 Puff, Inhalation, DAILY  •  montelukast, 10 mg, Oral, QHS  •  rosuvastatin, 20 mg, Oral, Q EVENING  •  metoprolol tartrate, 100 mg, Oral, BID  •  HYDROcodone/acetaminophen, 1 Tablet, Oral, Q6HRS PRN  •  VITAMIN D PO, 2,000 mg, Oral, DAILY  •  torsemide, 20 mg, Oral, DAILY (Patient taking differently: 20 mg, Oral, 1 TIME DAILY PRN)  •  famotidine, 20 mg, Oral, DAILY  •  zolpidem, 10 mg, Oral, HS PRN  •  Calcium Carbonate-Vit D-Min (CALCIUM 1200 PO), 1,200 mg, Oral, DAILY  •  Magnesium, 400 mg, Oral, DAILY  ALLERGIES: Codeine, Ace inhibitors, and Other drug    SOCIAL HISTORY   Social History     Tobacco Use   Smoking Status Former Smoker   • Packs/day: 1.00   • Years: 20.00   • Pack years: 20.00   • Types: Cigarettes   • Quit date: 1991   • Years since quittin.9   Smokeless Tobacco Never Used     Change in weight: Stable  Exercise habits: no regular exercise program  Diet: common adult          ASSESSMENT AND PLAN    BP is elevated, at goal, etc    BP Goal 130/80     Her 24 hour ABPM showed:   Mean daytime: 153/75 consistent with poorly controlled out of office systolic blood pressure     Nocturnal dip: Appears blunted       BP Monitoring Recommendations - Home BP     Proper technique for blood pressure monitoring reviewed with patient.  Pt instructed to check BP at varying times through out the day 4 times per week.  Provided pt log for blood pressure monitoring and pt instructed to bring in at each visit for reviews    Lifestyle Recommendations From Today’s Visit:    Eating Plan: Concentrate on  DASH/Med Style diet      Medication recommendations from today's visit: increase losartan to 50 mg twice daily (pt states she does not remember getting dizzy from this dose and ok with increasing.   ARB/ACEI: losartan 50 mg  BID  Diuretic: torsemide PRN  Calcium Channel Blocker: none  Other class: metoprolol 100 mg BID   Importance of adherence discussed    Other recommendations: pt seen today in anticoagulation clinic. BP high, she has been unable to check at home. She will get new monitor and check at home. If BP still uncontrolled will consider addition of CCB or HCTZ     Studies Ordered at Todays Visit: None  Blood Work Ordered At Today’s visit: None     Follow-Up: 4 weeks     Osiel MohanD    CC:  Quincy Angel M.D.  Dr. Michael Bloch Vlad Radulescu, MD

## 2021-11-23 NOTE — PROGRESS NOTES
OP Anticoagulation Service Note    Date: 11/22/2021  Vitals:    11/22/21 1608 11/22/21 1609   BP: 158/78 135/68       Anticoagulation Summary  As of 11/22/2021    INR goal:  2.0-3.0   TTR:  80.8 % (1.6 y)   INR used for dosing:  3.20 (11/22/2021)   Warfarin maintenance plan:  2.5 mg (5 mg x 0.5) every Mon; 5 mg (5 mg x 1) all other days   Weekly warfarin total:  32.5 mg   Plan last modified:  Cindy Treviño, PharmD (11/22/2021)   Next INR check:  12/6/2021   Target end date:  Indefinite    Indications    Persistent atrial fibrillation (HCC) (Resolved) [I48.19]  Chronic saddle pulmonary embolism without acute cor pulmonale (HCC) [I26.02  I27.82]  Long term (current) use of anticoagulants [Z79.01]             Anticoagulation Episode Summary     INR check location:      Preferred lab:      Send INR reminders to:      Comments:        Anticoagulation Care Providers     Provider Role Specialty Phone number    Mary Carmen Rogers M.D. Referring Internal Medicine Critical Care [phone]    [facility]  [phone]            HPI:   Rufina Macias seen in clinic today, on anticoagulation therapy with warfarin (a high risk medication) for atrial fibrillation w/ hx of PE     Pt is here today to evaluate anticoagulation therapy    Previous INR was  2.1 on 10/14/2021    Pt was instructed to continue current regimen    Anticoagulation Patient Findings  Patient Findings     Positives:  Change in diet/appetite (decreased apetite due to radiation)    Negatives:  Signs/symptoms of thrombosis, Signs/symptoms of bleeding, Laboratory test error suspected, Change in health, Change in alcohol use, Change in activity, Upcoming invasive procedure, Emergency department visit, Upcoming dental procedure, Missed doses, Extra doses, Change in medications, Hospital admission, Bruising, Other complaints          Confirmed warfarin dosing regimen    Pt is not on antiplatelet/NSAID therapy    Falls or accidents  since last visit No        A/P   INR  supra-therapeutic today, will require dose adjust ment today to prevent bleeding complications and closer follow up.   Lower weekly regimen d/t change in appetite         Pt educated to contact our clinic with any changes in medications or s/s of bleeding or thrombosis. Pt is aware to seek immediate medical attention for falls, head injury or deep cuts    Follow up appointment in 2 week(s) to reduce risk of adverse events from warfarin  Cindy Treviño, PharmD

## 2021-12-02 NOTE — NON-PROVIDER
"Pharmacotherapy Hypertension Visit  12/02/21     Goal Date when HTN will be controlled:  (1/14/2022) resistant HTN if not at goal in 3 months    Type of Visit:  Follow Up  Start Time:3:00  End time:3:45    Rufina Macias has been referred for evaluation and management of hypertension    HPI:   There were no vitals filed for this visit.  Both arms - in future use arm with higher reading    Age at Initial Diagnosis: 46      Home BP readings:   Pt has not been taking BP at home.  Pt reports that she will purchase a new cuff today at [facility] TODAY.  Pt is 189/76, 185/80 offered repeatedly to take pt to the ED.  PT REFUSING     Any readings above  180/110:  Unable to tell as she does not check bp at home.      BP from cards:        Roxanne symptoms of high blood pressure (TIA, Stroke, Head ache, vision changes): None      Current Prescription HTN Medications - including dose:    Losartan 50mg po bid  Metoprolol 100mg po bid  Torsemide 20mg po daily PRN edema - pt has 2+ pitting edema, yet did not take her torsemide today       Current side effects potentially related to antihypertensive medications (lightheaded, dizziness, leg swelling): None    Current Adherence to Blood Pressure Medications complete (complete, partial, completely non-adherent)    Previously attempted BP medications:   Metoprolol 200mg po bid (per dr Flores)  maxzide - dc after hospital admission 2020  Diovan/nct 160/12.5 daily  Telmisartan/hct 80/12.5mg po daily  Nifedipine 60mg CC daily  Bystolic 20mg po daily  Toprol XL 200mg po daily  Losartan/hct 100-25mg po daily  Lisinopril 10mg po daily - DC DUE TO COUGH  FUROSEMIDE 20MG PO BID  dILTIAZEM 240MG PO DAILY  Amlodipine 5mg po daily    Any current interfering substances: pt drinks regular soda \"now and then\" (caffeine, NSAIDs, corticosteroids, etc)    Any current lifestyle issues affecting BP:  Pt is quite sedentary, just completed radiation therapy, presents on Front wheeled walker and presents " on 2L O2.    Pt continues with a common adult diet.  Pt is not interested in lifestyle changes.          Since last visit any of the below   Creatinine increase > 0.5  Potassium > 5 or < 3.5  New edema present  Hyponatremia    Lab Results   Component Value Date/Time    SODIUM 141 10/06/2021 12:40 PM    POTASSIUM 4.5 10/06/2021 12:40 PM    CHLORIDE 104 10/06/2021 12:40 PM    CO2 27 10/06/2021 12:40 PM    GLUCOSE 89 10/06/2021 12:40 PM    BUN 13 10/06/2021 12:40 PM    CREATININE 0.65 10/06/2021 12:40 PM    CREATININE 0.80 2010 12:00 AM    BUNCREATRAT 20 2010 12:00 AM    GLOMRATE >59 2010 12:00 AM          Medications Reconciled  CURRENT MEDICATIONS:   Current Outpatient Medications:   •  losartan, 50 mg, Oral, BID  •  sertraline, TAKE 1 TABLET BY MOUTH EVERY DAY  •  warfarin, TAKE 1 TABLET BY MOUTH EVERY DAY OR AS DIRECTED BY [facility]  •  FIBER PO, 10 mg, Oral, DAILY  •  albuterol, 2 Puff, Inhalation, Q6HRS PRN  •  Trelegy Ellipta, 1 Puff, Inhalation, DAILY  •  montelukast, 10 mg, Oral, QHS  •  rosuvastatin, 20 mg, Oral, Q EVENING  •  metoprolol tartrate, 100 mg, Oral, BID  •  HYDROcodone/acetaminophen, 1 Tablet, Oral, Q6HRS PRN  •  VITAMIN D PO, 2,000 mg, Oral, DAILY  •  torsemide, 20 mg, Oral, DAILY (Patient taking differently: 20 mg, Oral, 1 TIME DAILY PRN)  •  famotidine, 20 mg, Oral, DAILY  •  zolpidem, 10 mg, Oral, HS PRN  •  Calcium Carbonate-Vit D-Min (CALCIUM 1200 PO), 1,200 mg, Oral, DAILY  •  Magnesium, 400 mg, Oral, DAILY  ALLERGIES: Codeine, Ace inhibitors, and Other drug    SOCIAL HISTORY   Social History     Tobacco Use   Smoking Status Former Smoker   • Packs/day: 1.00   • Years: 20.00   • Pack years: 20.00   • Types: Cigarettes   • Quit date: 1991   • Years since quittin.9   Smokeless Tobacco Never Used     Change in weight: Stable  Exercise habits: no regular exercise program  Diet: common adult      Most recent 24 hours ABPM results if available  n/a    ASSESSMENT AND PLAN    BP is elevated. PT REFUSING TO GO TO THE ER    BP Goal 130/80 or      Does pt have known end organ damage? (ventricular hypertension [LVH], hypertensive retinopathy, ischemic cardiovascular disease)    Below is for reference only - remove from note   Elevated blood pressure - Systolic 120 to 129 mmHg and diastolic <80 mmHg  · Hypertension:-Stage 1 - Systolic 130 to 139 mmHg or diastolic 80 to 89 mmHg -   · encourage life style changes Implement lifestyle changes alone if ACC/AHA 10-year CV risk < 10%  · Use BP meds for patients with home ABPM > 135/85 or home ABPM >130/80 or in office >130/80 with one of the following CV disease, diabetes, chronic kidney disease, or 10-year CV risk ? 10%  -Stage 2 - Systolic at least 140 mmHg or diastolic at least 90 mmHg  · Reinforce lifestyle changes and use BP medication  · Consider initiation of two agents if SBP >150/100        BP Monitoring Recommendations - Home BP     Proper technique for blood pressure monitoring reviewed with patient.  Pt instructed to check BP at varying times through out the day 4 times per week.  Provided pt log for blood pressure monitoring and pt instructed to bring in at each visit for reviews    Lifestyle Recommendations From Today’s Visit:    pt would benefit from low sodium diet for both her BP and CHF, however pt is not interested in lifestyle changes  Weight reduction pt would benefit from daily dedicated walking, however pt presents with front wheeled walker on 2L O2 and is not very steady on her feet.    DASH or mediterranean style eating plan   Dietary Sodium Restriction  Increase Physical Activity  Weight loss  Decrease alcohol consumption  Stop Smoking    Medication recommendations from today's visit:    ARB/ACEI: losartan 100mg po daily  Diuretic: torsemide 20mg po DAILY  Calcium Channel Blocker:    Other class: METOPROLOL 100MG BID     Importance of adherence discussed    Hypertensive patient, who is not  "interested in lifestyle changes.  Pt continues to follow a common adult diet, eating crackers last night for dinner with turkey.  Discussed low sodium diet which would benefit her CHF, visually estimated to be 55% in 2020.      Pt presents today, hypertensive, refusing to go to the ED in a wheelchair.  Pt reports that she has not been taking her BP at home, and that she does not have a working BP cuff.  Pt believes that her BP goal is 150/80.  We reviewed Dr. Granger's note with a BP goal of 120/80.    Pt presents with 2+ pitting edema in both extremities, and did not take her PRN torsemide.    Pt reports being \"upset\" about the BP cuff and BP measurement.  BPs in the cardiology office have also been in the 180s systolic.     24 hours ABPM results mean daytime /75.  Unlikely white coat HTN.    Venkat discussion for patient to take torsemide daily.  Pt reports that the only time her BP was controlled, was when she was on Metoprolol 200mg po bid.  Asked pt to take an extra metoprolol 100mg tonight and take torsemide as soon as she gets home and to take torsemide DAILY.        Other recommendations: would recommend 12.5mg spironolactone daily as pt has CHF history, if authorized by medical director.  I  would recommend amlodipine, however not in the setting of leg swelling.       Studies Ordered at Todays Visit: None  Blood Work Ordered At Today’s visit: None order BMP if not done in last 6 months      24 hours ABPM results mean daytime /75.  Unlikely white coat HTN.    Follow-Up: 1 weeks (every 1-4 weeks until at goal)     Rufina Yanes    CC:  Quincy Angel M.D.  No ref. provider found     Patient Referred to resistant hypertension service                  "

## 2021-12-02 NOTE — PATIENT INSTRUCTIONS
Call [phone] Rufina with BP tonight after you take metoprolol    Continue losartan 100mg daily  Continue metoprolol 100mg twice daily, with extra dose today with BP >180  Take torsemide when you get home and take it every day

## 2021-12-02 NOTE — PROGRESS NOTES
Anticoagulation Summary  As of 2021    INR goal:  2.0-3.0   TTR:  79.5 % (1.7 y)   INR used for dosin.10 (2021)   Warfarin maintenance plan:  2.5 mg (5 mg x 0.5) every Mon, Fri; 5 mg (5 mg x 1) all other days   Weekly warfarin total:  30 mg   Plan last modified:  Rufina Yanes (2021)   Next INR check:     Target end date:  Indefinite    Indications    Persistent atrial fibrillation (HCC) (Resolved) [I48.19]  Chronic saddle pulmonary embolism without acute cor pulmonale (HCC) [I26.02  I27.82]  Long term (current) use of anticoagulants [Z79.01]             Anticoagulation Episode Summary     INR check location:      Preferred lab:      Send INR reminders to:      Comments:        Anticoagulation Care Providers     Provider Role Specialty Phone number    Mary Carmen Rogers M.D. Referring Internal Medicine Critical Care [phone]    [facility] Anticoagulation Services Responsible  [phone]        Anticoagulation Patient Findings          History of Present Illness: follow up appointment for chronic anticoagulation with the high risk medication, warfarin for PE    Medications reconciled yes  Pt is not on antiplatelet therapy    Last INR was out of range, dosage adjusted: pt remains supra therapeutic today trending upward.  Will HOLD dose tonight, and reduce weekly dose by 7%  Follow up in 1 weeks, to reduce the risk of adverse events related to this high risk medication, warfarin.    Rufina Yanes, Clinical Pharmacist

## 2021-12-03 NOTE — PROGRESS NOTES
Spoke with Rufina, who took her regular meds this morning including her torsemide.  Systolic is 156.  Will increase metoprol to 200mg po qam and 100mg po qpm,  Pt to continue losartan 100mg in the morning and  Torsemide 20mg po DAILY    Rufina Yanes, Clinical Pharmacist, CDE, CACP    Case d/w clinical pharmacist  Acc/aha goal <130/80, but would actually probably except 130s systolic in this patient with multiple comorbidies and barriers to control. Well Above even that less stringent goal on ABPM  Generally wouldn't recommend dose of metoprolol >200 mg daily  Agree with continuing losartan and torsemide.   Agree with addition of low dose spironolactone, but will need to follow electrolytes and renal function carefully.  Low dose peripheral alpha blocker would be an option as well   Will also refer to resistant hypertension clinic    Michael Bloch, MD  [facility]

## 2021-12-03 NOTE — TELEPHONE ENCOUNTER
Pt left VM message that after boost dose of metoprolol BP was 161/75 and after PM meds was 150/72.    Returned pt call today, she has not yet taken her medications not her BP.  Requested she take morning meds INCLUDING torsemide, and call with BP 1 hour after am meds.  Rufina Yanes, Clinical Pharmacist, CDE, CACP

## 2021-12-06 NOTE — NON-PROVIDER
Patient was seen today in clinic with Dr. Lopez for Follow up .  Vitals signs and weight were obtained and pain assessment was completed.  Allergies and medications were reviewed with the patient.  Toxicities of treatment assessed.     Vitals/Pain:  Vitals:    12/06/21 1527   BP: (!) 218/106   Pulse: (!) 101   Temp: 36.4 °C (97.5 °F)   SpO2: 95%   Pain Score: No pain        Allergies:   Codeine, Ace inhibitors, and Other drug    Current Medications:  Current Outpatient Medications   Medication Sig Dispense Refill   • pregabalin (LYRICA) 50 MG capsule TAKE 1 CAPSULE BY MOUTH TWICE DAILY AS NEEDED     • losartan (COZAAR) 50 MG Tab Take 1 Tablet by mouth 2 times a day. 200 Tablet 3   • sertraline (ZOLOFT) 25 MG tablet TAKE 1 TABLET BY MOUTH EVERY DAY 30 Tablet 11   • warfarin (COUMADIN) 5 MG Tab TAKE 1 TABLET BY MOUTH EVERY DAY OR AS DIRECTED BY [facility] 90 Tablet 1   • FIBER PO Take 10 mg by mouth every day.     • albuterol 108 (90 Base) MCG/ACT Aero Soln inhalation aerosol INHALE 2 PUFFS BY MOUTH EVERY 6 HOURS AS NEEDED FOR SHORTNESS OF BREATH 8.5 g 4   • Fluticasone-Umeclidin-Vilant (TRELEGY ELLIPTA) 100-62.5-25 MCG/INH AEROSOL POWDER, BREATH ACTIVATED Inhale 1 Puff every day. Rinse mouth after use 1 Each 11   • montelukast (SINGULAIR) 10 MG Tab Take 1 tablet by mouth every bedtime. Take 1 tablet by mouth nightly at bedtime. 30 tablet 11   • rosuvastatin (CRESTOR) 20 MG Tab Take 1 tablet by mouth every evening. 100 tablet 3   • metoprolol tartrate (LOPRESSOR) 100 MG Tab Take 1 tablet by mouth 2 times a day. 180 tablet 4   • HYDROcodone/acetaminophen (NORCO)  MG Tab Take 1 Tab by mouth every 6 hours as needed.     • VITAMIN D PO Take 2,000 mg by mouth every day.     • torsemide (DEMADEX) 20 MG Tab Take 1 Tab by mouth every day. (Patient taking differently: Take 20 mg by mouth 1 time a day as needed.) 30 Tab 3   • famotidine (PEPCID) 20 MG Tab Take 20 mg by mouth every day.     • zolpidem  (AMBIEN) 10 MG Tab Take 10 mg by mouth at bedtime as needed for Sleep.     • Calcium Carbonate-Vit D-Min (CALCIUM 1200 PO) Take 1,200 mg by mouth every day.     • Magnesium 400 MG Tab Take 400 mg by mouth every day.       No current facility-administered medications for this encounter.         PCP:  Jeanne Yang, Med Ass't

## 2021-12-07 NOTE — PROGRESS NOTES
RADIATION ONCOLOGY FOLLOW UP    DATE OF SERVICE: 12/6/2021    IDENTIFICATION:   Stage IA3 ([id number]) non-small cell lung carcinoma of the left lower lobe of lung, medically inoperable, status post stereotactic radiosurgery to 6000 cGy in August 2021  Multifocal pleural-based lung progression with associated mediastinal adenopathy     INTERVAL HISTORY: I had the pleasure of seeing Ms. Macias today in follow up for her lung cancer.  Surprisingly despite patient's current CT imaging she has had minimal progressive symptoms.  In hindsight she has had progressive pleuritic pain on the left chest.  She has baseline shortness of breath but states that the shortness of breath is slowly progressing.  In addition she has difficult hypertension that is being addressed in cardiology.  She often runs the blood pressures as she did today but when she does self checks at home outside of a medical setting her blood pressure normalizes.  She does not have any hypertensive symptoms today.  She denies any new abdominal symptoms or neurologic symptoms.  Unfortunately her current CT scan of chest shows a very unusual progressive pattern.  There are scattered pleural-based nodules in the left hemithorax with associated pleural effusion.  These range in size from 5 to 7 cm and are at least 4 that I can clearly appreciate.  The area we treated with radiosurgery however appears to have resolved but is it within the pleural effusions and was hard to assess.  In addition there is a subcarinal lymph node.  No other bulk of adenopathy is seen or other regional or distant disease.  This came on very abruptly and a very unusual spread pattern.  In fact it almost appears more consistent with mesothelial disease as opposed to parenchymal lung disease.  If you will recall she did not have the primary biopsied previously due to her poor health and the location of the tumor.  It was deemed inaccessible.  She presents today with her daughter to go over  these results.    PHYSICAL EXAM:    Vitals:    12/06/21 1527   BP: (!) 218/106   Pulse: (!) 101   Temp: 36.4 °C (97.5 °F)   SpO2: 95%   Pain Score: No pain      2= Ambulatory and capable of all self care, but unable to carry out any work activities.  Up and about more than 50% of waking hours.    PAIN:  4  What makes the pain better: Gabapentin  What makes the pain worse: Deep breathing  Pain controlled with current regimen: yes  Pain related to condition being seen here for: yes    GENERAL: No apparent distress.  HEENT:  Pupils are equal, round, and reactive to light.  Extraocular muscles   are intact. Sclerae nonicteric.  Conjunctivae pink.  Oral cavity, tongue   protrudes midline.   NECK:  Supple without evidence of thyromegaly.  NODES:  No peripheral adenopathy of the neck, supraclavicular fossa or axillae   bilaterally.  LUNGS:  Clear to ascultation on the right dulled on the left  HEART:  Regular rate and rhythm.  No murmur appreciated  ABDOMEN:  Soft. No evidence of hepatosplenomegaly.  Positive bowel sounds.  EXTREMITIES:  Without Edema.  NEUROLOGIC:  Cranial nerves II through XII were intact. Normal stance and gait motor and sensory grossly within normal limits      IMPRESSION:   Stage IA3 ([id number]) non-small cell lung carcinoma of the left lower lobe of lung, medically inoperable, status post stereotactic radiosurgery to 6000 cGy in August 2021  Multifocal pleural-based lung progression with associated mediastinal adenopathy       RECOMMENDATIONS:   I discussed the diagnosis, prognosis, and treatment options over a 45 min time period, 95% of that time dedicated to ongoing treatment management.  I first reviewed with the patient her original CT scan from July and her most recent CT scan from December.  I described the unusual nature of her progression.  Therefore I have asked for her case to be presented at tumor board on Thursday.  This will be the lung multidisciplinary tumor board.  I anticipate that  this will require tissue now that this is in a safer location to address.  This will be imperative for any further medical oncology decision making given the unusual nature of its pattern.  I do anticipate medical oncology so have placed a referral before completion of tumor board.  I will allow them to order the biopsy to ensure all test required for decision-making are appropriately ordered.  She will likely require further restaging but currently is asymptomatic outside of the chest.  Therefore I would first like to establish histologic confirmation of her disease before finalizing any further staging studies.      If patient has any questions or concerns, she should feel free to contact me.

## 2021-12-09 NOTE — Clinical Note
MIRZA Sanchez,     This pt is requesting a refill of her tosemide, she has been taking it daily since last week.     Thank you,    Cindy Treviño, PharmD

## 2021-12-09 NOTE — Clinical Note
Dr. Bloch,     Pts BP is improved this week. She got diagnosed with progressive lung disease (likely progression of cancer). She is feeling overwhelmed by this. Discussed starting spironolactone but she declined d/t diagnosis this week. She has f/u in 1.5 weeks.     Cindy

## 2021-12-09 NOTE — Clinical Note
Dr. Diez,     Pt seen for HTN f/u. Her BP is in 140s/70s, at home and in office. Kept BP regimen the same. Pt to monitor at home. She has f/u in 10 days.     Thank you,    Cindy

## 2021-12-10 NOTE — PROGRESS NOTES
Pharmacotherapy Hypertension Visit  12/09/21     Goal Date when HTN will be controlled: 1/14/2021  Type of Visit:  Follow Up  Start Time:4:41  End time:5:10    Rufina Macias has been referred for evaluation and management of hypertension    Since visit last week she saw radiation oncology, her lung disease has progressed quickly. Per pt radiation oncologist thinks disease is contributing to her blood pressure.     HPI:   Vitals:    12/09/21 1649 12/09/21 1652   BP: 149/70 149/63   Pulse: 96 90         Age at Initial Diagnosis: 46      Home BP readings:   118/70, 145-150/80s mostly.   Any readings above  180/110:  Yes- at radiation oncology   Roxanne symptoms of high blood pressure (TIA, Stroke, Head ache, vision changes): None, she does report, looking back she realizes she did have some fogginess when her BP was high last week but has since resolved      Current Prescription HTN Medications - including dose:    Losartan 50 mg 1 tablet BID  Metoprolol 100 mg 1 tablet BID  Tosemide 20 mg daily this past week, today edema is minimal in her lower legs.       Current side effects potentially related to antihypertensive medications (lightheaded, dizziness, leg swelling): None    Current Adherence to Blood Pressure Medications complete    Previously attempted BP medications:   Metoprolol 200mg po bid (per dr Flores)  maxzide - dc after hospital admission 2020  Diovan/nct 160/12.5 daily  Telmisartan/hct 80/12.5mg po daily  Nifedipine 60mg CC daily  Bystolic 20mg po daily  Toprol XL 200mg po daily  Losartan/hct 100-25mg po daily  Lisinopril 10mg po daily - DC DUE TO COUGH  FUROSEMIDE 20MG PO BID  dILTIAZEM 240MG PO DAILY  Amlodipine 5mg po daily    Any current interfering substances: none    Any current lifestyle issues affecting BP:  Pt found out her lung disease has progressed quickly by radiation oncology this week. Per pt they told her this is likely contributing to her uncontrolled BP      Lab Results   Component  Value Date/Time    SODIUM 141 10/06/2021 12:40 PM    POTASSIUM 4.5 10/06/2021 12:40 PM    CHLORIDE 104 10/06/2021 12:40 PM    CO2 27 10/06/2021 12:40 PM    GLUCOSE 89 10/06/2021 12:40 PM    BUN 13 10/06/2021 12:40 PM    CREATININE 0.65 10/06/2021 12:40 PM    CREATININE 0.80 2010 12:00 AM    BUNCREATRAT 20 2010 12:00 AM    GLOMRATE >59 2010 12:00 AM         Medications Reconciled  CURRENT MEDICATIONS:   Current Outpatient Medications:   •  potassium chloride ER, 10 mEq, Oral, QDAY PRN  •  torsemide, 20 mg, Oral, DAILY  •  pregabalin, TAKE 1 CAPSULE BY MOUTH TWICE DAILY AS NEEDED  •  losartan, 50 mg, Oral, BID  •  sertraline, TAKE 1 TABLET BY MOUTH EVERY DAY  •  warfarin, TAKE 1 TABLET BY MOUTH EVERY DAY OR AS DIRECTED BY [facility]  •  FIBER PO, 10 mg, Oral, DAILY  •  albuterol, 2 Puff, Inhalation, Q6HRS PRN  •  Trelegy Ellipta, 1 Puff, Inhalation, DAILY  •  montelukast, 10 mg, Oral, QHS  •  rosuvastatin, 20 mg, Oral, Q EVENING  •  metoprolol tartrate, 100 mg, Oral, BID  •  HYDROcodone/acetaminophen, 1 Tablet, Oral, Q6HRS PRN  •  VITAMIN D PO, 2,000 mg, Oral, DAILY  •  famotidine, 20 mg, Oral, DAILY  •  zolpidem, 10 mg, Oral, HS PRN  •  Calcium Carbonate-Vit D-Min (CALCIUM 1200 PO), 1,200 mg, Oral, DAILY  •  Magnesium, 400 mg, Oral, DAILY  ALLERGIES: Codeine, Ace inhibitors, and Other drug    SOCIAL HISTORY   Social History     Tobacco Use   Smoking Status Former Smoker   • Packs/day: 1.00   • Years: 20.00   • Pack years: 20.00   • Types: Cigarettes   • Quit date: 1991   • Years since quittin.9   Smokeless Tobacco Never Used     Change in weight: pt is not weighing her self daily, but reports noticeable decrease in swelling in her lower legs  Exercise habits: no regular exercise program  Diet:       ASSESSMENT AND PLAN    BP is elevated, has improved since last week with daily torsemide.     BP Goal 140/90 may be a more appropriate goal given high initial blood pressure  while on 2 blood pressure medication and prn torsemide and pts lung disease      BP Monitoring Recommendations - Home BP     Proper technique for blood pressure monitoring reviewed with patient.  Pt instructed to check BP at varying times through out the day 4 times per week.  Provided pt log for blood pressure monitoring and pt instructed to bring in at each visit for reviews    Lifestyle Recommendations From Today’s Visit:    Weight Management: check weight daily and keep record   Pt has very poor appetite and is limited in what she can eat right now.     Medication recommendations from today's visit: discussed initiating spironolactone 12.5 mg daily today, pt prefers not to adjust therapy given her diagnosis this week and feeling overwhelmed.   ARB/ACEI: losartan 50 mg BID  Diuretic: torsemide 20 mg daily  Calcium Channel Blocker:   Other class: metoprolol 100 mg BID   Importance of adherence discussed    Other recommendations: Blood pressure is much better this week. Pt is feeling overwhelmed by news of the rapid progression of her lung disease (likely cancer). She sees oncology next week.     Advised pt to monitor BP closely, if above 160/90 consistently to call me to start spironolactone. Discussed why controlling BP is important (to prevent stroke, hospital admission) even though right now it seems not important with her other health issues.     Studies Ordered at Todays Visit: None  Blood Work Ordered At Today’s visit: NoneFollow-Up: 10 days    Cindy Treviño, Shahid    CC:  Quincy Angel M.D.  Dr. Michael Bloch,   Dr. Guillaume Diez

## 2021-12-10 NOTE — PROGRESS NOTES
OP Anticoagulation Service Note    Date: 12/9/2021  Vitals:    12/09/21 1649 12/09/21 1652   BP: 149/70 149/63   Pulse: 96 90         Anticoagulation Summary  As of 12/9/2021    INR goal:  2.0-3.0   TTR:  78.5 % (1.7 y)   INR used for dosing:  3.40 (12/9/2021)   Warfarin maintenance plan:  2.5 mg (5 mg x 0.5) every Mon, Wed, Fri; 5 mg (5 mg x 1) all other days   Weekly warfarin total:  27.5 mg   Plan last modified:  Cindy Treviño, PharmD (12/9/2021)   Next INR check:  12/20/2021   Target end date:  Indefinite    Indications    Persistent atrial fibrillation (HCC) (Resolved) [I48.19]  Chronic saddle pulmonary embolism without acute cor pulmonale (HCC) [I26.02  I27.82]  Long term (current) use of anticoagulants [Z79.01]             Anticoagulation Episode Summary     INR check location:      Preferred lab:      Send INR reminders to:      Comments:        Anticoagulation Care Providers     Provider Role Specialty Phone number    Mary Carmen Rogers M.D. Referring Internal Medicine Critical Care [phone]    [facility] Responsible  [phone]            HPI:   Rufina Collins Gilberto seen in clinic today, on anticoagulation therapy with warfarin (a high risk medication) for atrial fibrilation, hx of PE       Pt is here today to evaluate anticoagulation therapy    Previous INR was  4.1 on 12/2/21    Pt was instructed to HOLD then resume usual regimen    Anticoagulation Patient Findings  Patient Findings     Positives:  Other complaints (CT scan showed progression of her lunch disease)    Negatives:  Signs/symptoms of thrombosis, Signs/symptoms of bleeding, Laboratory test error suspected, Change in health, Change in alcohol use, Change in activity, Upcoming invasive procedure, Emergency department visit, Upcoming dental procedure, Missed doses, Extra doses, Change in medications, Change in diet/appetite, Hospital admission, Bruising          Confirmed warfarin dosing regimen    Pt is not on  antiplatelet/NSAID therapy    Falls or accidents since last visit No        A/P   INR  supratherapeutic today, will require dose adjust ment today to prevent bleeding complications and closer follow up.    Tonight 2.5 mg then lower weekly regimen     2/22 check referral    Pt educated to contact our clinic with any changes in medications or s/s of bleeding or thrombosis. Pt is aware to seek immediate medical attention for falls, head injury or deep cuts    Follow up appointment in 1.5 week(s) to reduce risk of adverse events from warfarin  Cindy Treviño, PharmD

## 2021-12-10 NOTE — TELEPHONE ENCOUNTER
Message  Received: Today  Cindy Treviño, PharmJODIE Alvarez,     This pt is requesting a refill of her tosemide, she has been taking it daily since last week.     Thank you,     Cindy Treviño, PharmSANJU   =======================    Rx sent.

## 2021-12-14 NOTE — TELEPHONE ENCOUNTER
LUANN GAONA (Key: [id number])Need help? Call us at [phone]  Status  Sent to [facility]  [employer]  The plan will fax you a determination, typically within 1 to 5 business days.    How do I follow up?  Drug  Torsemide 20MG tablets  Form  [employer] Medicare Prescription Drug Coverage Form  Prior Authorization Form for Medicare Prescription Drug Coverage Determination  [phone]  [phone]

## 2021-12-14 NOTE — TELEPHONE ENCOUNTER
Per notes, PA for torsemide was submitted today. Called pt and notified that we are waiting for a response. Advised that in the meantime she could check with her pharmacy how much it will be to  a short supply for a cash price, and if it wasn't reasonable she could try printing a coupon from [employer].

## 2021-12-14 NOTE — TELEPHONE ENCOUNTER
Pt called and states that she only has one tablet left of Torsemide for today 12/14 and states her insurance is not covering her Rx. Please call pt back to further discuss at [phone].    Thank you

## 2021-12-20 NOTE — Clinical Note
Dr. Diez,     Pt was referred to pharmacotherapy clinic for HTN by cardiology. Seen today for HTN f/u. Her BP at home is at or below 120/80, in office today 132/68. Pt instructed to continue current regimen and TLC. She has close f/u in 1 week with INR check.     Thank you,   Cindy Treviño, PharmD

## 2021-12-20 NOTE — Clinical Note
Dr. Diez,     Pt seen for HTN f/u. Her BP at home is at or below 120/80, in office today 132/68. Pt instructed to continue current regimen and TLC. She has close f/u in 1 week with INR check.     Thank you,   Cindy Treviño, PharmD

## 2021-12-21 NOTE — PROGRESS NOTES
Pharmacotherapy Hypertension Visit  12/20/21     Goal Date when HTN will be controlled: 1/14/2021    Type of Visit:  Follow Up  Start Time:4:52  End time:5:17    Rufina Macias has been referred for evaluation and management of hypertension    HPI:   Vitals:    12/20/21 1701   BP: 132/68   Pulse: 93         Age at Initial Diagnosis: 46      Home BP readings:   114/74, 116/72, 134/82, 106/78  Any readings above  180/110:  None   Roxanne symptoms of high blood pressure (TIA, Stroke, Head ache, vision changes): None      Current Prescription HTN Medications - including dose:    Losartan 50 mg 1 tablet BID  Metoprolol 100 mg 1 tablet BID  Tosemide 20 mg daily most days, skipped one day d/t doctors appointment    Current side effects potentially related to antihypertensive medications (lightheaded, dizziness, leg swelling): None    Current Adherence to Blood Pressure Medications complete,    Previously attempted BP medications:   Metoprolol 200mg po bid (per dr Flores)  maxzide - dc after hospital admission 2020  Diovan/nct 160/12.5 daily  Telmisartan/hct 80/12.5mg po daily  Nifedipine 60mg CC daily  Bystolic 20mg po daily  Toprol XL 200mg po daily  Losartan/hct 100-25mg po daily  Lisinopril 10mg po daily - DC DUE TO COUGH  FUROSEMIDE 20MG PO BID  dILTIAZEM 240MG PO DAILY  Amlodipine 5mg po daily    Any current interfering substances: None (caffeine, NSAIDs, corticosteroids, etc)    Any current lifestyle issues affecting BP:  Recent diagnosis of rapid progression of her lung disease which is likely cancer        Lab Results   Component Value Date/Time    SODIUM 141 10/06/2021 12:40 PM    POTASSIUM 4.5 10/06/2021 12:40 PM    CHLORIDE 104 10/06/2021 12:40 PM    CO2 27 10/06/2021 12:40 PM    GLUCOSE 89 10/06/2021 12:40 PM    BUN 13 10/06/2021 12:40 PM    CREATININE 0.65 10/06/2021 12:40 PM    CREATININE 0.80 12/02/2010 12:00 AM    BUNCREATRAT 20 12/02/2010 12:00 AM    GLOMRATE >59 12/02/2010 12:00 AM         Medications  Reconciled  CURRENT MEDICATIONS:   Current Outpatient Medications:   •  potassium chloride ER, 10 mEq, Oral, QDAY PRN  •  torsemide, 20 mg, Oral, DAILY  •  pregabalin, TAKE 1 CAPSULE BY MOUTH TWICE DAILY AS NEEDED  •  losartan, 50 mg, Oral, BID  •  sertraline, TAKE 1 TABLET BY MOUTH EVERY DAY  •  warfarin, TAKE 1 TABLET BY MOUTH EVERY DAY OR AS DIRECTED BY [facility]ICO[facility]ULATION [facility]  •  FIBER PO, 10 mg, Oral, DAILY  •  albuterol, 2 Puff, Inhalation, Q6HRS PRN  •  Trelegy Ellipta, 1 Puff, Inhalation, DAILY  •  montelukast, 10 mg, Oral, QHS  •  rosuvastatin, 20 mg, Oral, Q EVENING  •  metoprolol tartrate, 100 mg, Oral, BID  •  HYDROcodone/acetaminophen, 1 Tablet, Oral, Q6HRS PRN  •  VITAMIN D PO, 2,000 mg, Oral, DAILY  •  famotidine, 20 mg, Oral, DAILY  •  zolpidem, 10 mg, Oral, HS PRN  •  Calcium Carbonate-Vit D-Min (CALCIUM 1200 PO), 1,200 mg, Oral, DAILY  •  Magnesium, 400 mg, Oral, DAILY  ALLERGIES: Codeine, Ace inhibitors, and Other drug    SOCIAL HISTORY   Social History     Tobacco Use   Smoking Status Former Smoker   • Packs/day: 1.00   • Years: 20.00   • Pack years: 20.00   • Types: Cigarettes   • Quit date: 1991   • Years since quittin.9   Smokeless Tobacco Never Used     Change in weight: unknown, pt is unable to weigh her self at home, feels like she is losing weight  Exercise habits: no regular exercise program  Diet: low salt, difficult right now d/t poor appetite          ASSESSMENT AND PLAN    BP is at goal at home, right around 115/70s, in office today elevated but much improvement at 132/68.     BP Goal 130/80, per Dr. Diez Closer to 120/80      BP Monitoring Recommendations - Home BP     Proper technique for blood pressure monitoring reviewed with patient.  Pt instructed to check BP at varying times through out the day 4 times per week.  Provided pt log for blood pressure monitoring and pt instructed to bring in at each visit for reviews    Lifestyle Recommendations From Today’s  Visit:    Eating Plan: Concentrate on  salt restriction      Medication recommendations from today's visit: continue current regimen  ARB/ACEI: losartan 50 mg BID  Diuretic: torsemide 20 mg daily as needed  Calcium Channel Blocker:   Other class: metoprolol 100 mg BID   Importance of adherence discussed    Other recommendations: BP has continue to improve and is close to 120/80 at home, even below this past week.     In future if BP uncontrolled may consider addition of spironolactone 12.5 mg daily with BMP in 2-3 weeks after intitiation    Pts INRs have been high recently and requiring close f/u    Studies Ordered at Todays Visit: None  Blood Work Ordered At Today’s visit: None order BMP if not done in last 6 months    Follow-Up: 1 weeks with INR check    Cindy Treviño, PharmD    CC:  Quincy Angel M.D.  Dr. Bloch,   Dr. Diez

## 2021-12-21 NOTE — PROGRESS NOTES
OP Anticoagulation Service Note    Date: 2021  Vitals:    21 1701   BP: 132/68   Pulse: 93         Anticoagulation Summary  As of 2021    INR goal:  2.0-3.0   TTR:  77.2 % (1.7 y)   INR used for dosin.60 (2021)   Warfarin maintenance plan:  5 mg (5 mg x 1) every Tue, Sat; 2.5 mg (5 mg x 0.5) all other days   Weekly warfarin total:  22.5 mg   Plan last modified:  Cindy Treviño, PharmD (2021)   Next INR check:  2021   Target end date:  Indefinite    Indications    Persistent atrial fibrillation (HCC) (Resolved) [I48.19]  Chronic saddle pulmonary embolism without acute cor pulmonale (HCC) [I26.02  I27.82]  Long term (current) use of anticoagulants [Z79.01]             Anticoagulation Episode Summary     INR check location:      Preferred lab:      Send INR reminders to:      Comments:        Anticoagulation Care Providers     Provider Role Specialty Phone number    Mary Carmen Rogers M.D. Referring Internal Medicine Critical Care [phone]    [facility] Responsible  [phone]            HPI:   Rufina Macias seen in clinic today, on anticoagulation therapy with warfarin (a high risk medication) for atrial fibrillation w/ hx of PE       Pt is here today to evaluate anticoagulation therapy    Previous INR was  3.4 on 2021    Pt was instructed to take 2.5 mg then lower weekly regimen    Anticoagulation Patient Findings  Patient Findings     Positives:  Change in medications (no longer could find calcium suplement with vitamin k, stopped a few days ago), Change in diet/appetite (poor appetite, pt will try to start drinking a shake with spinach/kale daily or boost/ensure)    Negatives:  Signs/symptoms of thrombosis, Signs/symptoms of bleeding, Laboratory test error suspected, Change in health, Change in alcohol use, Change in activity, Upcoming invasive procedure, Emergency department visit, Upcoming dental procedure, Missed doses, Extra doses,  Hospital admission, Bruising, Other complaints          Confirmed warfarin dosing regimen    Pt is not on antiplatelet/NSAID therapy    Falls or accidents since last visit No        A/P   INR  supra-therapeutic today, will require dose adjust ment today to prevent bleeding complications  and closer follow up.   Tonight no warfarin then lower weekly regimen     Pt educated to contact our clinic with any changes in medications or s/s of bleeding or thrombosis. Pt is aware to seek immediate medical attention for falls, head injury or deep cuts    Follow up appointment in 1 week(s) to reduce risk of adverse events from warfarin  Cindy Treviño, PharmD

## 2021-12-22 NOTE — TELEPHONE ENCOUNTER
"PT previously requested a tamy back to follow up re: COMPREHENSIVE HEALTH  ASSESSMENT.   Member declined due to other health concerns.   Member also asked about [employer] Rides to medical appointments, on days that her daughter is not available.   Advise PT that this is a \"Value Added Service\" and there is no guarantee that drivers are available. Provided direct number for Melanie, [phone], and introduced her as her . Member declined to schedule any rides at this time, but is happy to know of that option.   "

## 2021-12-28 NOTE — PROGRESS NOTES
OP Anticoagulation Service Note    Date: 2021  There were no vitals filed for this visit.   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 Referring Internal Medicine Critical Care [phone]    [facility] Anticoagulation Services Responsible  [phone]            HPI:   Rufina Collins Gilberto seen in clinic today, on anticoagulation therapy with warfarin (a high risk medication) for atrial fibrillation, hx of PE      Pt is here today to evaluate anticoagulation therapy    Previous INR was  4.6 on 21    Pt was instructed to HOLD then lower regimen    Anticoagulation Patient Findings  Patient Findings     Negatives:  Signs/symptoms of thrombosis, Signs/symptoms of bleeding, Laboratory test error suspected, Change in health, Change in alcohol use, Change in activity, Upcoming invasive procedure, Emergency department visit, Upcoming dental procedure, Missed doses, Extra doses, Change in medications, Change in diet/appetite, Hospital admission, Bruising, Other complaints          Confirmed warfarin dosing regimen    Pt is not on antiplatelet/NSAID therapy    Falls or accidents since last visit No        A/P   INR   Supra-therapeutic today, will require dose adjust ment today to prevent bleeding complications and closer follow up.    HOLD warfarin until next INR check.      Home BP readin/82, 128/81, 131/71, BP has been good at home, high in office today. Will f/u at next INR check.     Pt educated to contact our clinic with any changes in medications or s/s of bleeding or thrombosis. Pt is aware to seek immediate medical attention for falls, head injury or deep cuts    Follow up appointment in 3 day(s) to reduce risk of adverse events from warfarin  Cindy Treviño, PharmD

## 2021-12-31 NOTE — PROGRESS NOTES
OP Anticoagulation Service Note    Date: 2021  There were no vitals filed for this visit.   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 Referring Internal Medicine Critical Care [phone]    [facility] Anticoagulation Services Responsible  [phone]            HPI:   Rufina Collins Gilberto seen in clinic today, on anticoagulation therapy with warfarin (a high risk medication) for atrial fibrillation and hx of PE       Pt is here today to evaluate anticoagulation therapy    Previous INR was  6.8 on 21    Pt was instructed to HOLD Warfarin    Anticoagulation Patient Findings  Patient Findings     Positives:  Change in diet/appetite (drank boost daily or spinach)    Negatives:  Signs/symptoms of thrombosis, Signs/symptoms of bleeding, Laboratory test error suspected, Change in health, Change in alcohol use, Change in activity, Upcoming invasive procedure, Emergency department visit, Upcoming dental procedure, Missed doses, Extra doses, Change in medications, Hospital admission, Bruising, Other complaints          Confirmed warfarin dosing regimen    Pt is not on antiplatelet/NSAID therapy  .  Falls or accidents since last visit No        A/P   INR   -therapeutic today, will require close follow up as previous INR was supra-therapeutic   Take 2.5 mg of warfarin daily     109/80, 120/70 home BPs, pt has increased lower leg edema, unable to check weight at home, she took torsemide and potassium today. Provided BMP for her to get done d/t more regular use of torsemide.         Pt educated to contact our clinic with any changes in medications or s/s of bleeding or thrombosis. Pt is aware to seek immediate medical attention for falls, head injury or deep cuts    Follow up appointment in 4 day(s) to reduce risk of adverse events from warfarin  Cindy Treviño, PharmD

## 2022-01-01 ENCOUNTER — HOSPICE ADMISSION (OUTPATIENT)
Dept: HOSPICE | Facility: HOSPICE | Age: 77
End: 2022-01-01
Payer: MEDICARE

## 2022-01-01 ENCOUNTER — HOME CARE VISIT (OUTPATIENT)
Dept: HOSPICE | Facility: HOSPICE | Age: 77
End: 2022-01-01
Payer: MEDICARE

## 2022-01-01 ENCOUNTER — OFFICE VISIT (OUTPATIENT)
Dept: HEMATOLOGY ONCOLOGY | Facility: MEDICAL CENTER | Age: 77
End: 2022-01-01
Payer: MEDICARE

## 2022-01-01 ENCOUNTER — TELEPHONE (OUTPATIENT)
Dept: SLEEP MEDICINE | Facility: MEDICAL CENTER | Age: 77
End: 2022-01-01

## 2022-01-01 ENCOUNTER — APPOINTMENT (OUTPATIENT)
Dept: MEDICAL GROUP | Facility: MEDICAL CENTER | Age: 77
End: 2022-01-01
Payer: MEDICARE

## 2022-01-01 ENCOUNTER — PATIENT OUTREACH (OUTPATIENT)
Dept: OTHER | Facility: MEDICAL CENTER | Age: 77
End: 2022-01-01

## 2022-01-01 ENCOUNTER — DOCUMENTATION (OUTPATIENT)
Dept: VASCULAR LAB | Facility: MEDICAL CENTER | Age: 77
End: 2022-01-01

## 2022-01-01 ENCOUNTER — APPOINTMENT (OUTPATIENT)
Dept: SLEEP MEDICINE | Facility: MEDICAL CENTER | Age: 77
End: 2022-01-01
Payer: MEDICARE

## 2022-01-01 ENCOUNTER — ANTICOAGULATION VISIT (OUTPATIENT)
Dept: MEDICAL GROUP | Facility: MEDICAL CENTER | Age: 77
End: 2022-01-01
Payer: MEDICARE

## 2022-01-01 VITALS
BODY MASS INDEX: 36.79 KG/M2 | WEIGHT: 215.5 LBS | HEART RATE: 101 BPM | TEMPERATURE: 98.6 F | HEIGHT: 64 IN | DIASTOLIC BLOOD PRESSURE: 78 MMHG | SYSTOLIC BLOOD PRESSURE: 144 MMHG | RESPIRATION RATE: 16 BRPM | OXYGEN SATURATION: 91 %

## 2022-01-01 VITALS — SYSTOLIC BLOOD PRESSURE: 145 MMHG | HEART RATE: 97 BPM | DIASTOLIC BLOOD PRESSURE: 57 MMHG

## 2022-01-01 VITALS — DIASTOLIC BLOOD PRESSURE: 78 MMHG | HEART RATE: 80 BPM | RESPIRATION RATE: 24 BRPM | SYSTOLIC BLOOD PRESSURE: 118 MMHG

## 2022-01-01 VITALS — SYSTOLIC BLOOD PRESSURE: 90 MMHG | RESPIRATION RATE: 14 BRPM | HEART RATE: 100 BPM | DIASTOLIC BLOOD PRESSURE: 60 MMHG

## 2022-01-01 DIAGNOSIS — J96.11 CHRONIC RESPIRATORY FAILURE WITH HYPOXIA (HCC): ICD-10-CM

## 2022-01-01 DIAGNOSIS — C34.32 PRIMARY CANCER OF LEFT LOWER LOBE OF LUNG (HCC): ICD-10-CM

## 2022-01-01 DIAGNOSIS — I26.92 CHRONIC SADDLE PULMONARY EMBOLISM WITHOUT ACUTE COR PULMONALE (HCC): ICD-10-CM

## 2022-01-01 DIAGNOSIS — I27.82 CHRONIC SADDLE PULMONARY EMBOLISM WITHOUT ACUTE COR PULMONALE (HCC): ICD-10-CM

## 2022-01-01 DIAGNOSIS — Z79.01 LONG TERM (CURRENT) USE OF ANTICOAGULANTS: Primary | ICD-10-CM

## 2022-01-01 LAB — INR PPP: 2.5 (ref 2–3.5)

## 2022-01-01 PROCEDURE — 665036 HSPC NOTICE OF ELECTION NOE

## 2022-01-01 PROCEDURE — G0299 HHS/HOSPICE OF RN EA 15 MIN: HCPCS

## 2022-01-01 PROCEDURE — 93793 ANTICOAG MGMT PT WARFARIN: CPT | Performed by: INTERNAL MEDICINE

## 2022-01-01 PROCEDURE — 85610 PROTHROMBIN TIME: CPT | Performed by: INTERNAL MEDICINE

## 2022-01-01 PROCEDURE — S9126 HOSPICE CARE, IN THE HOME, P: HCPCS

## 2022-01-01 PROCEDURE — G0155 HHCP-SVS OF CSW,EA 15 MIN: HCPCS

## 2022-01-01 PROCEDURE — 99203 OFFICE O/P NEW LOW 30 MIN: CPT | Performed by: STUDENT IN AN ORGANIZED HEALTH CARE EDUCATION/TRAINING PROGRAM

## 2022-01-01 RX ORDER — MORPHINE SULFATE 100 MG/5ML
20 SOLUTION ORAL
Qty: 30 ML | Refills: 0 | Status: SHIPPED | OUTPATIENT
Start: 2022-01-01 | End: 2023-01-11

## 2022-01-01 RX ORDER — MORPHINE SULFATE 100 MG/5ML
20 SOLUTION ORAL EVERY 4 HOURS
Qty: 30 ML | Refills: 0 | Status: SHIPPED | OUTPATIENT
Start: 2022-01-01 | End: 2023-01-14

## 2022-01-01 RX ORDER — LORAZEPAM 1 MG/1
1 TABLET ORAL EVERY 4 HOURS PRN
Qty: 30 TABLET | Refills: 0 | Status: SHIPPED | OUTPATIENT
Start: 2022-01-01 | End: 2023-01-11

## 2022-01-01 SDOH — ECONOMIC STABILITY: GENERAL

## 2022-01-01 SDOH — ECONOMIC STABILITY: HOUSING INSECURITY: EVIDENCE OF SMOKING MATERIAL: 0

## 2022-01-01 ASSESSMENT — ENCOUNTER SYMPTOMS
WEIGHT LOSS: 0
LAST BOWEL MOVEMENT: 66117
BRUISES/BLEEDS EASILY: 1
SHORTNESS OF BREATH: 1
PAIN LOCATION - PAIN SEVERITY: 6/10
SUBJECTIVE PAIN PROGRESSION: UNCHANGED
ORTHOPNEA: 1
BACK PAIN: 1
WHEEZING: 1
SPUTUM PRODUCTION: 1
TINGLING: 0
LAST BOWEL MOVEMENT: 66117
PAIN LOCATION: BACK
DIZZINESS: 0
FEVER: 0
CONSTIPATION: 0
PERSON REPORTING PAIN: PATIENT
PAIN LOCATION - RELIEVING FACTORS: MEDICATION
BLOOD IN STOOL: 0
CONSTIPATION: 1
SINUS PAIN: 0
FATIGUES EASILY: 1
HEARTBURN: 0
PAIN LOCATION - PAIN FREQUENCY: CONSTANT
SHORTNESS OF BREATH: 1
DIAPHORESIS: 0
FATIGUE: 1
LOWEST PAIN SEVERITY IN PAST 24 HOURS: 4/10
NAUSEA: 0
LAST BOWEL MOVEMENT: 66117
PAIN LOCATION - PAIN DURATION: CHRONIC
HIGHEST PAIN SEVERITY IN PAST 24 HOURS: 8/10
PAIN LOCATION - PAIN QUALITY: ACHING
SLEEP QUALITY: POOR
FATIGUE: 1
INSOMNIA: 0
PAIN: 1
DIARRHEA: 0
PALPITATIONS: 0
CONSTIPATION: 1
DOUBLE VISION: 0
LOWER EXTREMITY EDEMA: 1
CHILLS: 0
SHORTNESS OF BREATH: 1
SHORTNESS OF BREATH: 1
PAIN LOCATION: BACK
SHORTNESS OF BREATH: 1
BLURRED VISION: 0
NERVOUS/ANXIOUS: 0
SORE THROAT: 0
PAIN LOCATION - PAIN FREQUENCY: CONSTANT
UNABLE TO COMMUNICATE PAIN: 1
ABDOMINAL PAIN: 0
LOWEST PAIN SEVERITY IN PAST 24 HOURS: 4/10
WEAKNESS: 1
PAIN SEVERITY GOAL: 3/10
SLEEP QUALITY: POOR
VOMITING: 0
HEADACHES: 0
PAIN LOCATION: BACK
SLEEP QUALITY: POOR
COUGH: 1
PAIN SEVERITY GOAL: 4/10
LAST BOWEL MOVEMENT: 66117
SLEEP QUALITY: POOR
PERSON REPORTING PAIN: PATIENT
SUBJECTIVE PAIN PROGRESSION: GRADUALLY IMPROVING
PERSON REPORTING PAIN: PATIENT
HIGHEST PAIN SEVERITY IN PAST 24 HOURS: 6/10
CONSTIPATION: 1
PAIN: 1
PAIN SEVERITY GOAL: 4/10
PAIN: 1
MYALGIAS: 1
PAIN LOCATION - PAIN SEVERITY: 4/10
LOWEST PAIN SEVERITY IN PAST 24 HOURS: 5/10
SUBJECTIVE PAIN PROGRESSION: RAPIDLY WORSENING
FATIGUES EASILY: 1

## 2022-01-01 ASSESSMENT — PATIENT HEALTH QUESTIONNAIRE - PHQ9
CLINICAL INTERPRETATION OF PHQ2 SCORE: 0
5. POOR APPETITE OR OVEREATING: 0 - NOT AT ALL
CLINICAL INTERPRETATION OF PHQ2 SCORE: 1
SUM OF ALL RESPONSES TO PHQ QUESTIONS 1-9: 8

## 2022-01-01 ASSESSMENT — PAIN SCALES - PAIN ASSESSMENT IN ADVANCED DEMENTIA (PAINAD)
FACIALEXPRESSION: 0 - SMILING OR INEXPRESSIVE.
NEGVOCALIZATION: 0 - NONE.
CONSOLABILITY: 1 - DISTRACTED OR REASSURED BY VOICE OR TOUCH.
TOTALSCORE: 0
CONSOLABILITY: 0 - NO NEED TO CONSOLE.
BODYLANGUAGE: 1 - TENSE. DISTRESSED PACING. FIDGETING.
TOTALSCORE: 4
NEGVOCALIZATION: 1 - OCCASIONAL MOAN OR GROAN. LOW-LEVEL SPEECH WITH A NEGATIVE OR DISAPPROVING QUALITY.
BODYLANGUAGE: 0 - RELAXED.
FACIALEXPRESSION: 0 - SMILING OR INEXPRESSIVE.

## 2022-01-01 ASSESSMENT — ACTIVITIES OF DAILY LIVING (ADL)
BATHING_REQUIRES_ASSISTANCE: 1
MONEY MANAGEMENT (EXPENSES/BILLS): NEEDS ASSISTANCE
DRESSING_REQUIRES_ASSISTANCE: 1
MONEY MANAGEMENT (EXPENSES/BILLS): NEEDS ASSISTANCE

## 2022-01-01 ASSESSMENT — SOCIAL DETERMINANTS OF HEALTH (SDOH)
ACTIVE STRESSOR - EXPRESSED EMOTIONAL NEED: 1
ACTIVE STRESSOR - HEALTH CHANGES: 1
ACTIVE STRESSOR - EXPRESSED EMOTIONAL NEED: 1
ACTIVE STRESSOR - EXPRESSED EMOTIONAL NEED: 1
ACTIVE STRESSOR - HEALTH CHANGES: 1
ACTIVE STRESSOR - EXPRESSED EMOTIONAL NEED: 1
ACTIVE STRESSOR - HEALTH CHANGES: 1
ACTIVE STRESSOR - HEALTH CHANGES: 1

## 2022-01-01 ASSESSMENT — COPD QUESTIONNAIRES: COPD: 1

## 2022-01-01 ASSESSMENT — FIBROSIS 4 INDEX: FIB4 SCORE: 2.24

## 2022-01-04 NOTE — PROGRESS NOTES
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 Referring Internal Medicine Critical Care [phone]    [facility] Anticoagulation Services Responsible  [phone]            HPI:   Rufina Pateledge seen in clinic today, on anticoagulation therapy with warfarin (a high risk medication) for atrial fibrillation and hx of PE       Pt is here today to evaluate anticoagulation therapy    Previous INR was  2.4 on 21    Pt was instructed to continue current regimen    Anticoagulation Patient Findings      Confirmed warfarin dosing regimen    Pt is not on antiplatelet/NSAID therapy    Falls or accidents since last visit No      f  A/P   INR  -therapeutic today, will require close follow up as INRs have been high recently  Continue current warfarin regimen     BP continues to be good at home 120s/80. She is getting BMP next week d/t increase in torsemide use to daily. Will f/u at next appt.      Pt educated to contact our clinic with any changes in medications or s/s of bleeding or thrombosis. Pt is aware to seek immediate medical attention for falls, head injury or deep cuts    Follow up  appointment in 1 week(s) to reduce risk of adverse events from warfarin  Cindy Treviño, PharmD

## 2022-01-07 NOTE — PROGRESS NOTES
On January 7, 2022, Oncology Social Worker Mimi Nathan and Oncology Nurse Navigator Eboni Hancock contacted pt. to follow up.  Pt. shared hospice had contacted her daughter and scheduled an appointment for Tuesday.     Yes

## 2022-01-07 NOTE — PROGRESS NOTES
Consult:  Hematology/Oncology      Referring Physician: Quincy Lopez MD  Primary Care:  Quincy Angel M.D.    Diagnosis: Lung cancer, stage IV-a ([id number])    Chief Complaint:  Evaluation for systemic therapy    History of Presenting Illness:  Rufina Macias is a 76 y.o.  woman who presents to the clinic with her daughter today for evaluation for systemic therapy.  The patient had been previously diagnosed with lung cancer in the summer, when she was found to have a pulmonary embolism and also found to have a lung mass.  She was originally going to go for bronchoscopy but the procedure was aborted, and she was instead treated with stereotactic radiosurgery for her stage Ia 3 disease.  Unfortunately, on staging scans that she had a significant progression of her disease, with multiple nodules are her left side as well as a pleural effusion.  She was subsequently referred here for further evaluation.    Interval History:  Patient is here for consultation.  She has been functionally declining over the course of the past several months.  Her energy levels have been low, and she spends most of her time in her chair.  She does have some tasks that she tries to do on a daily basis, but these are minor.  She has been having worsening problems with her breathing, requiring oxygen all the time.  She is also having significant issues with pain in her back.  At this time, her main focus is on being comfortable.    Past Medical History:   Diagnosis Date   • Acute cystitis with hematuria 9/14/2020   • Acute kidney injury superimposed on chronic kidney disease (HCC) 9/18/2020   • Adrenal nodule (HCC) 2/21/2020   • Allergy    • Anticoagulated 3/11/2020   • Anxiety 9/29/2020   • Arrhythmia 02/2020    A fib   • Arthritis     Spine, neck, hands, ankles and knees   • Asthma     inhalers as needed   • Back pain     and neck   • Bilateral leg edema 4/6/2020   • Blood clotting disorder (HCC) 02/2020    lung   • Bowel  habit changes 2021    diarrhea with diet changes   • Breath shortness     at times, uses O2 1l prn Preferred   • Bronchitis    • CA - cancer of uterus 2009   • 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 with obstruction- ERCP EXTRACTON/ SPHINCTEROTOMY, recurrent Feb 2020 2/17/2020   • Cancer ([facility]) 2021    LLL nodule   • Carpal tunnel syndrome    • COPD    • COPD (chronic obstructive pulmonary disease) ([facility]) 8/31/2012   • Coronary artery calcification seen on CAT scan 6/12/2020   • Dental disorder     full dentures   • Diverticula of colon    • Diverticulosis    • Emphysema of lung ([facility]) 2020    COPD   • Heart burn    • High cholesterol    • Hyperlipidemia    • Hypertension    • Leg swelling 6/12/2020   • Other pulmonary embolism without acute cor pulmonale ([facility]) 2/21/2020   • Persistent atrial fibrillation ([facility]) 2/8/2020   • Pneumonia 1993   • Tremor, hereditary, benign    • Urinary incontinence 2021    slightly at night   • Wheezing        Past Surgical History:   Procedure Laterality Date   • IL ERCP,DIAGNOSTIC  11/24/2020    Procedure: ERCP, DIAGNOSTIC - WITH STENT REMOVAL;  Surgeon: Quincy Louie M.D.;  Location: [facility];  Service: Gastroenterology   • GASTROSCOPY-ENDO  11/24/2020    Procedure: GASTROSCOPY;  Surgeon: Quincy Louie M.D.;  Location: [facility];  Service: Gastroenterology   • IL ERCP,DIAGNOSTIC  9/17/2020    Procedure: ERCP (ENDOSCOPIC RETROGRADE CHOLANGIOPANCREATOGRAPHY);  Surgeon: Cj Guerrier D.O.;  Location: [facility];  Service: Gastroenterology   • IL ERCP,DIAGNOSTIC  2/14/2020    Procedure: ERCP (ENDOSCOPIC RETROGRADE CHOLANGIOPANCREATOGRAPHY);  Surgeon: Clinton Ray M.D.;  Location: [facility];  Service: Gastroenterology   • ERCP W/SPHINCTEROTOMY/PAPILL.  4/5/2018    Procedure: ERCP W/SPHINCTEROTOMY/PAPILL.;  Surgeon: Quincy Louie M.D.;  Location: [facility];  Service: Gastroenterology   • ERCP W/ INSERTION  STENT/TUBE  2018    Procedure: ERCP W/ INSERTION STENT/TUBE - STENT PLACEMENT/REMOVAL;  Surgeon: Quincy Louie M.D.;  Location: [facility];  Service: Gastroenterology   • ERCP W/REMOVAL CALCULUS  2018    Procedure: ERCP W/REMOVAL CALCULUS W/DILATION;  Surgeon: Quincy Louie M.D.;  Location: [facility];  Service: Gastroenterology   • ERCP  2018    Procedure: ERCP W/POSS BIOPSY;  Surgeon: Quincy Louie M.D.;  Location: [facility];  Service: Gastroenterology   • COMMON BILE DUCT EXPLORATION  02/15/2018   • ERCP W/ INSERTION STENT/TUBE  02/15/2018   • ERCP W/SPHINCTEROTOMY/PAPILL.  02/15/2018   • ERCP W/REMOVAL CALCULUS  02/15/2018   • ERCP  2/15/2018    Procedure: ERCP, SPHINCTEROTOMY, STENT PLACEMENT, DEBRIDEMENT;  Surgeon: Quincy Louie M.D.;  Location: [facility];  Service: Gastroenterology   • ARIELLA BY LAPAROSCOPY      [facility]   • HYSTERECTOMY, TOTAL ABDOMINAL  1/18/10    Uterine, Dr. Whelan and Dr. Cam +BSO   • ABDOMINAL HYSTERECTOMY TOTAL  2010    Dr. Cam   • OOPHORECTOMY  2010    BSO   • OPEN REDUCTION      ankle       Social History     Socioeconomic History   • Marital status:      Spouse name: Not on file   • Number of children: Not on file   • Years of education: Not on file   • Highest education level: Not on file   Occupational History   • Not on file   Tobacco Use   • Smoking status: Former Smoker     Packs/day: 1.00     Years: 20.00     Pack years: 20.00     Types: Cigarettes     Quit date: 1991     Years since quittin.0   • Smokeless tobacco: Never Used   Vaping Use   • Vaping Use: Never used   Substance and Sexual Activity   • Alcohol use: Not Currently     Alcohol/week: 0.0 - 2.4 oz     Comment: hardly ever   • Drug use: No   • Sexual activity: Never     Partners: Male     Birth control/protection: Post-Menopausal   Other Topics Concern   • Not on file   Social History Narrative    • Not on file     Social Determinants of Health     Financial Resource Strain:    • Difficulty of Paying Living Expenses: Not on file   Food Insecurity:    • Worried About Running Out of Food in the Last Year: Not on file   • Ran Out of Food in the Last Year: Not on file   Transportation Needs:    • Lack of Transportation (Medical): Not on file   • Lack of Transportation (Non-Medical): Not on file   Physical Activity:    • Days of Exercise per Week: Not on file   • Minutes of Exercise per Session: Not on file   Stress:    • Feeling of Stress : Not on file   Social Connections:    • Frequency of Communication with Friends and Family: Not on file   • Frequency of Social Gatherings with Friends and Family: Not on file   • Attends Confucianism Services: Not on file   • Active Member of Clubs or Organizations: Not on file   • Attends Club or Organization Meetings: Not on file   • Marital Status: Not on file   Intimate Partner Violence:    • Fear of Current or Ex-Partner: Not on file   • Emotionally Abused: Not on file   • Physically Abused: Not on file   • Sexually Abused: Not on file   Housing Stability:    • Unable to Pay for Housing in the Last Year: Not on file   • Number of Places Lived in the Last Year: Not on file   • Unstable Housing in the Last Year: Not on file       Family History   Problem Relation Age of Onset   • Heart Disease Father 45        MI   • Lung Disease Father    • Stroke Father 70   • Cancer Father    • Hypertension Father    • Cancer Paternal Grandmother         breast   • Cancer Paternal Grandfather         leukemia       OB History    Para Term  AB Living   3 3           SAB IAB Ectopic Molar Multiple Live Births                    # Outcome Date GA Lbr Barry/2nd Weight Sex Delivery Anes PTL Lv   3 Para            2 Para            1 Para                Allergies as of 2022 - Reviewed 2022   Allergen Reaction Noted   • Codeine Swelling 2010   • Ace inhibitors   08/31/2012   • Other drug  06/17/2021         Current Outpatient Medications:   •  oxyCODONE-acetaminophen (PERCOCET-10)  MG Tab, Take 1 Tablet by mouth every four hours as needed for Severe Pain., Disp: , Rfl:   •  potassium chloride ER (KLOR-CON 10) 10 MEQ tablet, Take 10 mEq by mouth 1 time a day as needed., Disp: , Rfl:   •  torsemide (DEMADEX) 20 MG Tab, Take 1 Tablet by mouth every day., Disp: 90 Tablet, Rfl: 2  •  pregabalin (LYRICA) 50 MG capsule, TAKE 1 CAPSULE BY MOUTH TWICE DAILY AS NEEDED, Disp: , Rfl:   •  losartan (COZAAR) 50 MG Tab, Take 1 Tablet by mouth 2 times a day., Disp: 200 Tablet, Rfl: 3  •  sertraline (ZOLOFT) 25 MG tablet, TAKE 1 TABLET BY MOUTH EVERY DAY, Disp: 30 Tablet, Rfl: 11  •  warfarin (COUMADIN) 5 MG Tab, TAKE 1 TABLET BY MOUTH EVERY DAY OR AS DIRECTED BY [facility], Disp: 90 Tablet, Rfl: 1  •  FIBER PO, Take 10 mg by mouth every day., Disp: , Rfl:   •  albuterol 108 (90 Base) MCG/ACT Aero Soln inhalation aerosol, INHALE 2 PUFFS BY MOUTH EVERY 6 HOURS AS NEEDED FOR SHORTNESS OF BREATH, Disp: 8.5 g, Rfl: 4  •  Fluticasone-Umeclidin-Vilant (TRELEGY ELLIPTA) 100-62.5-25 MCG/INH AEROSOL POWDER, BREATH ACTIVATED, Inhale 1 Puff every day. Rinse mouth after use, Disp: 1 Each, Rfl: 11  •  montelukast (SINGULAIR) 10 MG Tab, Take 1 tablet by mouth every bedtime. Take 1 tablet by mouth nightly at bedtime., Disp: 30 tablet, Rfl: 11  •  rosuvastatin (CRESTOR) 20 MG Tab, Take 1 tablet by mouth every evening., Disp: 100 tablet, Rfl: 3  •  metoprolol tartrate (LOPRESSOR) 100 MG Tab, Take 1 tablet by mouth 2 times a day., Disp: 180 tablet, Rfl: 4  •  VITAMIN D PO, Take 2,000 mg by mouth every day., Disp: , Rfl:   •  famotidine (PEPCID) 20 MG Tab, Take 20 mg by mouth every day., Disp: , Rfl:   •  zolpidem (AMBIEN) 10 MG Tab, Take 10 mg by mouth at bedtime as needed for Sleep., Disp: , Rfl:   •  Calcium Carbonate-Vit D-Min (CALCIUM 1200 PO), Take 1,200 mg by mouth every day.,  "Disp: , Rfl:   •  Magnesium 400 MG Tab, Take 400 mg by mouth every day., Disp: , Rfl:     Review of Systems:  Review of Systems   Constitutional: Positive for malaise/fatigue. Negative for chills, diaphoresis, fever and weight loss.   HENT: Negative for hearing loss, nosebleeds, sinus pain and sore throat.    Eyes: Negative for blurred vision and double vision.   Respiratory: Positive for cough, sputum production, shortness of breath and wheezing.    Cardiovascular: Positive for orthopnea. Negative for chest pain, palpitations and leg swelling.   Gastrointestinal: Negative for abdominal pain, blood in stool, constipation, diarrhea, heartburn, melena, nausea and vomiting.   Genitourinary: Negative for dysuria, frequency, hematuria and urgency.   Musculoskeletal: Positive for back pain and myalgias. Negative for joint pain.   Skin: Negative for rash.   Neurological: Positive for weakness. Negative for dizziness, tingling and headaches.   Endo/Heme/Allergies: Bruises/bleeds easily.   Psychiatric/Behavioral: The patient is not nervous/anxious and does not have insomnia.           Physical Exam:  Vitals:    01/06/22 1552   BP: 144/78   Pulse: (!) 101   Resp: 16   Temp: 37 °C (98.6 °F)   TempSrc: Temporal   SpO2: 91%   Weight: 97.7 kg (215 lb 8 oz)   Height: 1.626 m (5' 4\")       DESC; KARNOFSKY SCALE WITH ECOG EQUIVALENT: 40, Disabled; requires special care and assistance (ECOG equivalent 3)    DISTRESS LEVEL: no acute distress    Physical Exam  Vitals and nursing note reviewed.   Constitutional:       General: She is awake. She is not in acute distress.     Appearance: Normal appearance. She is obese. She is not ill-appearing, toxic-appearing or diaphoretic.   HENT:      Head: Normocephalic and atraumatic.      Nose: Nose normal. No congestion.      Mouth/Throat:      Pharynx: Oropharynx is clear. No oropharyngeal exudate or posterior oropharyngeal erythema.   Eyes:      General: No scleral icterus.     Extraocular " Movements: Extraocular movements intact.      Conjunctiva/sclera: Conjunctivae normal.      Pupils: Pupils are equal, round, and reactive to light.   Cardiovascular:      Rate and Rhythm: Tachycardia present. Rhythm irregularly irregular.      Pulses: Normal pulses.      Heart sounds: Normal heart sounds. No murmur heard.  No friction rub. No gallop.    Pulmonary:      Effort: Pulmonary effort is normal. Tachypnea present.      Breath sounds: No decreased air movement. Examination of the left-upper field reveals decreased breath sounds. Examination of the left-middle field reveals decreased breath sounds. Examination of the left-lower field reveals decreased breath sounds. Decreased breath sounds present. No wheezing, rhonchi or rales.   Chest:   Breasts:      Right: No axillary adenopathy.      Left: No axillary adenopathy.       Abdominal:      General: Bowel sounds are normal. There is no distension.      Tenderness: There is no abdominal tenderness.   Musculoskeletal:         General: Tenderness (Mid back with significant tenderness) present. No deformity. Normal range of motion.      Cervical back: Normal range of motion and neck supple. No tenderness.      Right lower leg: No edema.      Left lower leg: No edema.   Lymphadenopathy:      Cervical: No cervical adenopathy.      Upper Body:      Right upper body: No axillary adenopathy.      Left upper body: No axillary adenopathy.      Lower Body: No right inguinal adenopathy. No left inguinal adenopathy.   Skin:     General: Skin is warm and dry.      Coloration: Skin is not jaundiced.      Findings: No erythema or rash.   Neurological:      General: No focal deficit present.      Mental Status: She is alert and oriented to person, place, and time.      Cranial Nerves: Cranial nerves are intact.      Sensory: Sensation is intact.      Motor: Weakness present.      Gait: Gait abnormal (Uses wheelchair as she can't ambulate on her own).   Psychiatric:          Attention and Perception: Attention normal.         Mood and Affect: Mood normal.         Behavior: Behavior normal. Behavior is cooperative.         Thought Content: Thought content normal.         Judgment: Judgment normal.          Depression Screening    Little interest or pleasure in doing things?  0 - not at all   Feeling down, depressed , or hopeless? 1 - several days   Trouble falling or staying asleep, or sleeping too much?  0 - not at all   Feeling tired or having little energy?  3 - nearly every day   Poor appetite or overeating?  0 - not at all   Feeling bad about yourself - or that you are a failure or have let yourself or your family down? 0 - not at all   Trouble concentrating on things, such as reading the newspaper or watching television? 1 - several days   Moving or speaking so slowly that other people could have noticed.  Or the opposite - being so fidgety or restless that you have been moving around a lot more than usual?  3 - nearly every day   Thoughts that you would be better off dead, or of hurting yourself?  0 - not at all   Patient Health Questionnaire Score: 8       If depressive symptoms identified deferred to follow up visit unless specifically addressed in assesment and plan.    Interpretation of PHQ-9 Total Score   Score Severity   1-4 No Depression   5-9 Mild Depression   10-14 Moderate Depression   15-19 Moderately Severe Depression   20-27 Severe Depression    Labs:  Anticoagulation Visit on 01/03/2022   Component Date Value Ref Range Status   • INR 01/03/2022 2.50  2 - 3.5 Final       Imaging:   All listed images below have been independently reviewed by me. I agree with the findings as summarized below:    CT chest 12/01/2021:    IMPRESSION:     1.  Progression of disease with multiple new pleural-based masses scattered throughout the left hemithorax. Moderate loculated left pleural effusion with adjacent compressive atelectasis.     2.  Borderline enlargement of a subcarinal  lymph node. No additional hilar adenopathy is identified.     3.  Atherosclerosis and cardiomegaly.     4.  Dilated main pulmonary artery suggesting pulmonary arterial hypertension.     5.  Intrahepatic ductal dilatation is similar to prior exams. Pneumobilia has resolved. Worsening pancreatic ductal dilatation. If clinically indicated, pancreatic protocol CT or ERCP may be helpful for further evaluation    Pathology:  None available    Assessment & Plan:  1. Primary cancer of left lower lobe of lung (HCC)  Referral to Oncology Psychosocial Screening for Distress    Referral to Hospice   2. Chronic respiratory failure with hypoxia (HCC)     3. Chronic saddle pulmonary embolism without acute cor pulmonale (HCC)         This is a 76-year-old  woman with stage Sadi lung cancer, as well as chronic hypoxic respiratory failure requiring oxygen use secondary to severe COPD.  She was previously treated with stereotactic radiosurgery but unfortunately has had progression of her disease, and is not a candidate for systemic chemotherapy.  We discussed the role of targeted therapies as well as immunotherapy, but ultimately had a discussion regarding her goals of care.  The patient reported that all she wants to be comfortable, and so we discussed hospice care.  The patient was amenable to proceeding with hospice care and a referral was placed.    The patient was also having significant problems with oxygen, and is almost out of oxygen in her home tank.  She is in significant need of oxygen due to the large pleural effusion that she has on exam, and continuous oxygen would be indicated for her.  We have written a prescription for this.    Current Diagnosis and Staging: Lung cancer NOS, stage IV-a disease ([id number])    Treatment Plan: Hospice care    Treatment Citation: KEITH    Plan of Care:    1. Primary Therapy: Referral to hospice care  2. Supportive Therapy: Prescription written for home O2 refill  3. Labs:  None  4. Imaging: None  5. Treatment Planning: Referral to hospice care  6. Consultations: Hospice  7. Code Status: DNR/DNI  8. Miscellaneous: NA  9. Return for Follow Up: PRN    Any questions and concerns raised by the patient were answered to the best of my ability. Thank you for allowing me to participate in the care for this patient. Please feel free to contact me for any questions or concerns.     Total time spent on chart review, clinic encounter, and documentation: 41 minutes.

## 2022-01-11 NOTE — PROGRESS NOTES
Initiating medications for dyspnea, pain, and anxiety during admission to hospice. Morphine SL for dyspnea and breakthrough pain (in addition to percocet for chronic pain, which has been effective). Lorazepam PO for anxiety. Plan discussed with Hospice RN Maya Parrish.

## 2022-01-11 NOTE — PROGRESS NOTES
[facility]      Patient called the clinic today and reported that she cancelled her follow up in the anticoagulation clinic today as she will be starting on Hospice care tomorrow and has a hard time getting to her INR appts.      I asked if she was taken off of warfarin, as many patient's wish to remain on warfarin although on hospice.   She was unsure and would like to discuss this with her hospice team tomorrow.   She will call us after her meeting to update us on her decision to stop or continue with anticoagulation and can reschedule f/u  accordingly.         Mello Wyman  PharmD

## 2022-10-06 NOTE — PROGRESS NOTES
Pt aox4, discharge summary given, discharge education provided.  Pt verbalized understanding.  Follow up scheduled. IV removed. All belongings returned. Escorted by staff to ER.    Render Risk Assessment In Note?: no Detail Level: Zone Recommendation Preamble: The following recommendations were made during the visit: nutraful

## 2023-02-01 NOTE — PROGRESS NOTES
"Critical Care Progress Note    Date of admission  2/21/2020    Chief Complaint  74 y.o. female admitted 2/21/2020 with SOB    Hospital Course    Per Dr. Avila's note:  \"74 y.o. female who presented 2/21/2020 with shortness of breath.  Patient was just in the hospital, had new onset A. fib RVR, sent home on Eliquis which she states she has been taking.  She states she got out of the hospital on Sunday, on Wednesday she started experiencing shortness of breath.  Just before that she did have a sick contact, cold-like symptoms from her family.  She states she developed nonproductive cough.  She denied any chest pain.  Today she went to see her primary care provider where they noted tachycardia, patient was sent to the ER.  Imaging obtained here did show pulmonary embolism.  Patient does remain in atrial fibrillation with RVR\"     02/24: Diursed with lasix 10 mg IV. Loaded with digoxin and cardiology consulted.     02/25: HR down to 50-90s. Diuresed with additional lasix 10 mg IV. Walked with PT and HR remains below 100.     02/26: No acute issues. INR 1.85.     02/27: Awaiting to be discharge and patient went back to A fib with RVR. Received metoprolol 5 mg IV and digoxin 125 mcg IV. HR improved and did not require any infusion therapy.     Interval Problem Update  Reviewed last 24 hour events:  Went back to A fib RVR and discharge held   Re-consulted cardiology (Dr. Gilbert) and given metoprolol 5 mg IV and digoxin 125 mcg IV   HR improved   Transferred to floor today   INR 2.25 today   Patient state feeling better but worried her HR will go up again late in the afternoon. She state that she was evaluated by Dr. Rolon and advised to stay for another night to monitor her atrial fibrillation. Denies chest pain, N/V, fever/chills, or abdominal pain.     Review of Systems  Review of Systems   Constitutional: Positive for malaise/fatigue.   HENT: Negative.    Eyes: Negative.    Respiratory: Positive for shortness of breath. " Condition:: Halo laser full face and neck Negative for cough, sputum production and wheezing.    Cardiovascular: Negative.    Gastrointestinal: Negative.    Genitourinary: Negative.    Musculoskeletal: Negative.    Skin: Negative.    Neurological: Positive for weakness.   Endo/Heme/Allergies: Negative.    Psychiatric/Behavioral: Negative.         Vital Signs for last 24 hours   Temp:  [36 °C (96.8 °F)-36.7 °C (98 °F)] 36 °C (96.8 °F)  Pulse:  [] 72  Resp:  [16-20] 18  BP: (142-164)/(74-91) 143/79  SpO2:  [91 %-98 %] 94 %         Physical Exam   Physical Exam  Constitutional:       Appearance: She is ill-appearing.   HENT:      Mouth/Throat:      Mouth: Mucous membranes are moist.   Eyes:      Extraocular Movements: Extraocular movements intact.      Pupils: Pupils are equal, round, and reactive to light.   Cardiovascular:      Rate and Rhythm: Rhythm irregular.      Pulses: Normal pulses.      Heart sounds: Normal heart sounds.   Pulmonary:      Effort: Pulmonary effort is normal.   Abdominal:      General: Bowel sounds are normal. There is distension.      Palpations: Abdomen is soft.      Tenderness: There is no abdominal tenderness.   Musculoskeletal:         General: No swelling or tenderness.   Skin:     General: Skin is warm and dry.   Neurological:      General: No focal deficit present.      Mental Status: She is alert.   Psychiatric:         Mood and Affect: Mood normal.         Behavior: Behavior normal.         Thought Content: Thought content normal.         Judgment: Judgment normal.         Medications  Current Facility-Administered Medications   Medication Dose Route Frequency Provider Last Rate Last Dose   • warfarin (COUMADIN) tablet 5 mg  5 mg Oral ONCE AT 1800 Mary Carmen Rogers M.D.       • furosemide (LASIX) tablet 10 mg  10 mg Oral QDAY PRN Mary Carmen Rogers M.D.       • DILTIAZem CD (CARDIZEM CD) capsule 240 mg  240 mg Oral Q DAY Mary Carmen Rogers M.D.   240 mg at 02/28/20 0513   • ipratropium (ATROVENT) 0.02 % nebulizer solution 0.5 mg  0.5 mg  Please Describe Your Condition:: Full face and neck Nebulization 4X/DAY (RT) Mary Carmen Rogers M.D.   Stopped at 02/28/20 1040   • ipratropium (ATROVENT) 0.02 % nebulizer solution 0.5 mg  0.5 mg Nebulization Q4H PRN (RT) Mary Carmen Rogers M.D.       • predniSONE (DELTASONE) tablet 40 mg  40 mg Oral DAILY Mary Carmen Rogers M.D.   40 mg at 02/28/20 0513   • digoxin (LANOXIN) tablet 125 mcg  125 mcg Oral DAILY AT 1800 Mary Carmen Rogers M.D.   125 mcg at 02/27/20 1752   • MD Alert...Warfarin per Pharmacy   Other PHARMACY TO DOSE Petros Patel M.D.       • ipratropium (ATROVENT) 0.02 % nebulizer solution 0.5 mg  0.5 mg Nebulization Q6HRS PRN Petros Patel M.D.   0.5 mg at 02/25/20 0606   • metoprolol (LOPRESSOR) tablet 100 mg  100 mg Oral TWICE DAILY Petros Patel M.D.   100 mg at 02/28/20 0513   • HYDROcodone/acetaminophen (NORCO)  MG per tablet 1 Tab  1 Tab Oral Q4HRS PRN EMILY FelicianoO.   1 Tab at 02/28/20 1032   • magnesium oxide (MAG-OX) tablet 400 mg  400 mg Oral DAILY EMILY FelicianoO.   400 mg at 02/28/20 0513   • simvastatin (ZOCOR) tablet 10 mg  10 mg Oral Q EVENING EMILY FelicianoO.   10 mg at 02/27/20 1753   • zolpidem (AMBIEN) tablet 10 mg  10 mg Oral HS PRN EMILY FelicianoO.   10 mg at 02/27/20 2150   • senna-docusate (PERICOLACE or SENOKOT S) 8.6-50 MG per tablet 2 Tab  2 Tab Oral BID EMILY FelicianoO.   Stopped at 02/24/20 0600    And   • polyethylene glycol/lytes (MIRALAX) PACKET 1 Packet  1 Packet Oral QDAY PRN EMILY FelicianoO.        And   • magnesium hydroxide (MILK OF MAGNESIA) suspension 30 mL  30 mL Oral QDAY PRN Parvez Houston D.O.        And   • bisacodyl (DULCOLAX) suppository 10 mg  10 mg Rectal QDAY PRN Parvez Houston D.O.       • Respiratory Therapy Consult   Nebulization Continuous RT Parvez Houston D.O.       • acetaminophen (TYLENOL) tablet 650 mg  650 mg Oral Q6HRS PRN Parvez Houston D.O.       • enalaprilat (VASOTEC) injection 1.25 mg  1.25 mg Intravenous Q6HRS PRN Parvez Houston D.O.   Stopped  at 02/27/20 1649   • ondansetron (ZOFRAN) syringe/vial injection 4 mg  4 mg Intravenous Q4HRS PRN Parvez Houston D.O.   4 mg at 02/25/20 0528   • ondansetron (ZOFRAN ODT) dispertab 4 mg  4 mg Oral Q4HRS PRN Parvez Houston D.O.       • albuterol (PROVENTIL) 2.5mg/0.5ml nebulizer solution 2.5 mg  2.5 mg Nebulization Q2HRS PRN (RT) Parvez Houston D.O.   2.5 mg at 02/25/20 0606       Fluids    Intake/Output Summary (Last 24 hours) at 2/28/2020 1342  Last data filed at 2/28/2020 0500  Gross per 24 hour   Intake 200 ml   Output 2250 ml   Net -2050 ml       Laboratory          Recent Labs     02/26/20  0430 02/27/20  0450 02/28/20  0506   SODIUM 132* 138 139   POTASSIUM 4.5 4.3 4.1   CHLORIDE 97 101 99   CO2 28 27 30   BUN 17 15 14   CREATININE 0.62 0.67 0.65   CALCIUM 8.6 8.9 9.0     Recent Labs     02/26/20  0430 02/27/20  0450 02/28/20  0506   GLUCOSE 112* 97 95     Recent Labs     02/26/20  0430 02/27/20  0450 02/28/20  0506   WBC 11.9* 13.0* 12.7*   NEUTSPOLYS 79.00* 66.60 65.80   LYMPHOCYTES 13.10* 24.60 25.60   MONOCYTES 7.10 7.50 7.40   EOSINOPHILS 0.00 0.20 0.20   BASOPHILS 0.20 0.20 0.20     Recent Labs     02/26/20  0430 02/27/20  0450 02/28/20  0506   RBC 4.99 5.13 5.23   HEMOGLOBIN 14.6 15.1 15.5   HEMATOCRIT 46.3 47.8* 47.9*   PLATELETCT 163* 187 216   PROTHROMBTM 21.8* 27.2* 25.4*   INR 1.85* 2.45* 2.25*       Imaging  Echocardiogram reviewed:  Limited Exam for LV and RV Function  Compared to the images of the prior study done 2/11/20 -  there has   been no significant change.   Left ventricular systolic function is normal.  Left ventricular ejection fraction is visually estimated to be 55%.  Right ventricular systolic function is normal.    Assessment/Plan  * Bilateral pulmonary embolism (HCC)- (present on admission)  Assessment & Plan  Consider unprovoked   Cont coumadin for goal INR 2-3  Pharmacy to dose       Atrial fibrillation (HCC)- (present on admission)  Assessment & Plan  Secondary to acute  PE    On metoprolol, dilt, and digoxin   HR remains in the 50-90s   INR at goal today   Monitor on telemetry   If no evidence of sporadic RVR in the next 24 hours then will plan for discharge tomorrow and outpatient cardiology follow up       Pulmonary nodule  Assessment & Plan  -- Considered intermediate pretest for malignancy given risk factors   -- Will need repeat CT chest in 6 months       Herpes zoster without complication  Assessment & Plan  Chronic on right butt cheek  Cover till crustates  Standard precautions    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 with obstruction- ERCP EXTRACTON/ SPHINCTEROTOMY, recurrent Feb 2020- (present on admission)  Assessment & Plan  Had recent ERCP  Will follow up with GI    Essential hypertension- (present on admission)  Assessment & Plan  On metoprolol and diltiazem    Goal SBP <140    COPD (chronic obstructive pulmonary disease) (HCC)- (present on admission)  Assessment & Plan  Cont prednisone 40 mg X 5 days (end date 3/1)   Cont atrovent QID   Cont PT/OT and IS as tolerated       Hyperlipidemia- (present on admission)  Assessment & Plan  On statin       VTE:  Coumadin  Ulcer: Not Indicated  Lines: PIVs    I have performed a physical exam and reviewed and updated ROS and Plan today (2/28/2020). In review of yesterday's note (2/27/2020), there are no changes except as documented above.     Discussed patient condition and risk of morbidity and/or mortality with RN, RT, Pharmacy, Charge nurse / hot rounds, Patient and Cardiology

## 2023-12-08 LAB
CHEMOTHERAPY INFUSION START DATE: NORMAL
CHEMOTHERAPY INFUSION STOP DATE: NORMAL
CHEMOTHERAPY RECORDS: 10
CHEMOTHERAPY RECORDS: 5000
CHEMOTHERAPY RECORDS: NORMAL
CHEMOTHERAPY RX CANCER: NORMAL
DATE 1ST CHEMO CANCER: NORMAL
RAD ONC ARIA COURSE LAST TREATMENT DATE: NORMAL
RAD ONC ARIA COURSE TREATMENT ELAPSED DAYS: NORMAL
RAD ONC ARIA REFERENCE POINT DOSAGE GIVEN TO DATE: 50
RAD ONC ARIA REFERENCE POINT DOSAGE GIVEN TO DATE: 50.23
RAD ONC ARIA REFERENCE POINT ID: NORMAL
RAD ONC ARIA REFERENCE POINT ID: NORMAL

## 2024-02-08 NOTE — PROGRESS NOTES
2nd attempt  LVM asking pt to return call to schedule appt   Initiating scheduled roxanol and discontinued percocet due to uncontrolled pain. Seems to be declining rapidly and unreliable to take pills soon. Plan discussed with Hospice RN Samantha Finnegan.

## 2025-05-11 NOTE — PROGRESS NOTES
0715 Report received from NOC, Pt resting in bed calmly, IV with Flagyl running has infiltrated. ABX stopped. POC for the day and surgery ERCP planned for today. Pt is NPO. Pt VU.     0830 Full nursing assessment completed, see flowsheet, new IV started to left FA, IV ABX resumed.     0900 Pt seen by Dr Roche and POC for the day reviewed by MD. Pain medication given as ordered, by MD see MAR.   Interstitial Lung disease will Monitor

## (undated) DEVICE — EXTRACTOR PRO XL 9-12 MM ABOVE

## (undated) DEVICE — MASK WITH FACE SHIELD (25/BX 4BX/CA)

## (undated) DEVICE — GUIDE JAGWIRE 035 STRAIGHT (2EA/BX)

## (undated) DEVICE — KIT  I.V. START (100EA/CA)

## (undated) DEVICE — SPONGE GAUZE NON-STERILE 4X4 - (2000/CA 10PK/CA)

## (undated) DEVICE — GLOVE, LITE (PAIR)

## (undated) DEVICE — TUBE E-T HI-LO CUFF 6.5MM (10EA/BX)

## (undated) DEVICE — SYRINGE SAFETY 5 ML 18 GA X 1-1/2 BLUNT LL (100/BX 4BX/CA)

## (undated) DEVICE — BLOCK BITE ENDOSCOPIC 2809 - (100/BX) INTERMEDIATE

## (undated) DEVICE — CATHETER IV SAFETY 20 GA X 1-1/4 (50/BX)

## (undated) DEVICE — MASK ANESTHESIA ADULT  - (100/CA)

## (undated) DEVICE — GOWN SURGEONS LARGE - (32/CA)

## (undated) DEVICE — CANNULA O2 COMFORT SOFT EAR ADULT 7 FT TUBING (50/CA)

## (undated) DEVICE — TUBE CONNECTING SUCTION - CLEAR PLASTIC STERILE 72 IN (50EA/CA)

## (undated) DEVICE — SYRINGE DISP. 50CC LS - (40/BX)

## (undated) DEVICE — NEPTUNE 4 PORT MANIFOLD - (20/PK)

## (undated) DEVICE — SENSOR SPO2 ADULT LNCS ADTX (20/BX) ORDER ITEM #19593

## (undated) DEVICE — SYRINGE SAFETY 3 ML 18 GA X 1 1/2 BLUNT LL (100/BX 8BX/CA)

## (undated) DEVICE — GLOVE SZ 7.5 LF PROTEXIS (50PR/BX)

## (undated) DEVICE — ELECTRODE DUAL RETURN W/ CORD - (50/PK)

## (undated) DEVICE — KIT CUSTOM PROCEDURE SINGLE FOR ENDO  (15/CA)

## (undated) DEVICE — CAPTIVATOR II-10MM ROUND STIFF

## (undated) DEVICE — JAGWIRE, STIFF SHFT 260CM .25

## (undated) DEVICE — SYRINGE SAFETY 10 ML 18 GA X 1 1/2 BLUNT LL (100/BX 4BX/CA)

## (undated) DEVICE — KIT ANESTHESIA W/CIRCUIT & 3/LT BAG W/FILTER (20EA/CA)

## (undated) DEVICE — BASIN EMESIS DISP. - (250/CA)

## (undated) DEVICE — ELECTRODE 850 FOAM ADHESIVE - HYDROGEL RADIOTRNSPRNT (50/PK)

## (undated) DEVICE — TUBE SUCTION YANKAUER  1/4 X 6FT (20EA/CA)"

## (undated) DEVICE — GLOVE BIOGEL ECLIPSE PF LATEX SIZE 8.5 (50PR/BX)

## (undated) DEVICE — SPHINCTEROTOME CLEVER CUT

## (undated) DEVICE — FORCEP RADIAL JAW 4 STANDARD CAPACITY W/NEEDLE 240CM (40EA/BX)

## (undated) DEVICE — CANISTER SUCTION RIGID RED 1500CC (40EA/CA)

## (undated) DEVICE — SOD. CHL. INJ. 0.9% 1000 ML - (14EA/CA 60CA/PF)

## (undated) DEVICE — GLOVE, BIOGEL ECLIPSE, SZ 7.0, PF LTX (50/BX)

## (undated) DEVICE — SET EXTENSION WITH 2 PORTS (48EA/CA) ***PART #2C8610 IS A SUBSTITUTE*****

## (undated) DEVICE — SUCTION INSTRUMENT YANKAUER BULBOUS TIP W/O VENT (50EA/CA)

## (undated) DEVICE — LACTATED RINGERS INJ 1000 ML - (14EA/CA 60CA/PF)

## (undated) DEVICE — WATER IRRIGATION STERILE 1000ML (12EA/CA)

## (undated) DEVICE — TUBE E-T HI-LO CUFF 7.0MM (10EA/PK)

## (undated) DEVICE — BITE BLOCK ADULT 60FR (100EA/CA)

## (undated) DEVICE — CANNULA W/ SUPPLY TUBING O2 - (50/CA)

## (undated) DEVICE — BALLOON RETRIEVAL EXTRACTOR PRO RX   9-12MM

## (undated) DEVICE — INTRODUCER STENT NAVIFLEX 10FR

## (undated) DEVICE — ANESTHESIA CIRCUIT BAIN PED

## (undated) DEVICE — GOWN SURGEONS X-LARGE - DISP. (30/CA)

## (undated) DEVICE — TUBE E-T HI-LO CUFF 7.5MM (10EA/PK)

## (undated) DEVICE — SYRINGE 12 CC LUER TIP - (80/BX) OBSOLETE ITEM

## (undated) DEVICE — SYRINGE DISP. 60 CC LL - (30/BX, 12BX/CA)**WHEN THESE ARE GONE ORDER #500206**

## (undated) DEVICE — GLOVE BIOGEL ECLIPSE PF LATEX SIZE 8.0  (50PR/BX)

## (undated) DEVICE — TUBING CLEARLINK DUO-VENT - C-FLO (48EA/CA)

## (undated) DEVICE — EXTRACTOR PRO XL 12-15 MM ABOVE